# Patient Record
Sex: FEMALE | Race: WHITE | NOT HISPANIC OR LATINO | Employment: OTHER | URBAN - METROPOLITAN AREA
[De-identification: names, ages, dates, MRNs, and addresses within clinical notes are randomized per-mention and may not be internally consistent; named-entity substitution may affect disease eponyms.]

---

## 2017-01-31 ENCOUNTER — ALLSCRIPTS OFFICE VISIT (OUTPATIENT)
Dept: OTHER | Facility: OTHER | Age: 67
End: 2017-01-31

## 2017-01-31 ENCOUNTER — HOSPITAL ENCOUNTER (OUTPATIENT)
Dept: RADIOLOGY | Facility: MEDICAL CENTER | Age: 67
Discharge: HOME | End: 2017-01-31
Payer: MEDICARE

## 2017-01-31 DIAGNOSIS — M54.9 DORSALGIA: ICD-10-CM

## 2017-01-31 DIAGNOSIS — R20.2 PARESTHESIA OF SKIN: ICD-10-CM

## 2017-01-31 DIAGNOSIS — M17.10 OSTEOARTHRITIS OF KNEE: ICD-10-CM

## 2017-01-31 PROCEDURE — 73562 X-RAY EXAM OF KNEE 3: CPT

## 2017-01-31 PROCEDURE — 72110 X-RAY EXAM L-2 SPINE 4/>VWS: CPT

## 2017-02-23 ENCOUNTER — ALLSCRIPTS OFFICE VISIT (OUTPATIENT)
Dept: OTHER | Facility: OTHER | Age: 67
End: 2017-02-23

## 2017-02-28 ENCOUNTER — ALLSCRIPTS OFFICE VISIT (OUTPATIENT)
Dept: OTHER | Facility: OTHER | Age: 67
End: 2017-02-28

## 2017-03-02 ENCOUNTER — APPOINTMENT (OUTPATIENT)
Dept: PHYSICAL THERAPY | Facility: HOME HEALTHCARE | Age: 67
End: 2017-03-02
Payer: MEDICARE

## 2017-03-02 DIAGNOSIS — R20.2 PARESTHESIA OF SKIN: ICD-10-CM

## 2017-03-02 PROCEDURE — 97535 SELF CARE MNGMENT TRAINING: CPT

## 2017-03-02 PROCEDURE — G8979 MOBILITY GOAL STATUS: HCPCS

## 2017-03-02 PROCEDURE — G8978 MOBILITY CURRENT STATUS: HCPCS

## 2017-03-02 PROCEDURE — 97162 PT EVAL MOD COMPLEX 30 MIN: CPT

## 2017-03-03 ENCOUNTER — GENERIC CONVERSION - ENCOUNTER (OUTPATIENT)
Dept: OTHER | Facility: OTHER | Age: 67
End: 2017-03-03

## 2017-03-06 ENCOUNTER — APPOINTMENT (OUTPATIENT)
Dept: PHYSICAL THERAPY | Facility: HOME HEALTHCARE | Age: 67
End: 2017-03-06
Payer: MEDICARE

## 2017-03-06 PROCEDURE — 97140 MANUAL THERAPY 1/> REGIONS: CPT

## 2017-03-06 PROCEDURE — 97110 THERAPEUTIC EXERCISES: CPT

## 2017-03-08 RX ORDER — PRAVASTATIN SODIUM 20 MG
20 TABLET ORAL
COMMUNITY
End: 2019-07-26

## 2017-03-08 RX ORDER — ESCITALOPRAM OXALATE 20 MG/1
20 TABLET ORAL DAILY
COMMUNITY
End: 2018-03-19

## 2017-03-08 RX ORDER — GABAPENTIN 100 MG/1
100 CAPSULE ORAL 3 TIMES DAILY
COMMUNITY
End: 2019-07-26

## 2017-03-08 RX ORDER — DICLOFENAC POTASSIUM 50 MG/1
50 TABLET, FILM COATED ORAL 2 TIMES DAILY
COMMUNITY
End: 2017-08-21

## 2017-03-08 RX ORDER — FOLIC ACID 1 MG/1
1 TABLET ORAL DAILY
COMMUNITY
End: 2018-03-19

## 2017-03-09 ENCOUNTER — APPOINTMENT (OUTPATIENT)
Dept: PHYSICAL THERAPY | Facility: HOME HEALTHCARE | Age: 67
End: 2017-03-09
Payer: MEDICARE

## 2017-03-09 ENCOUNTER — ANESTHESIA EVENT (OUTPATIENT)
Dept: PERIOP | Facility: HOSPITAL | Age: 67
End: 2017-03-09
Payer: MEDICARE

## 2017-03-09 PROCEDURE — 97140 MANUAL THERAPY 1/> REGIONS: CPT

## 2017-03-09 PROCEDURE — 97150 GROUP THERAPEUTIC PROCEDURES: CPT

## 2017-03-10 ENCOUNTER — HOSPITAL ENCOUNTER (OUTPATIENT)
Facility: HOSPITAL | Age: 67
Setting detail: OUTPATIENT SURGERY
Discharge: HOME | End: 2017-03-10
Attending: INTERNAL MEDICINE | Admitting: INTERNAL MEDICINE
Payer: MEDICARE

## 2017-03-10 ENCOUNTER — GENERIC CONVERSION - ENCOUNTER (OUTPATIENT)
Dept: OTHER | Facility: OTHER | Age: 67
End: 2017-03-10

## 2017-03-10 ENCOUNTER — ANESTHESIA (OUTPATIENT)
Dept: PERIOP | Facility: HOSPITAL | Age: 67
End: 2017-03-10
Payer: MEDICARE

## 2017-03-10 VITALS
DIASTOLIC BLOOD PRESSURE: 70 MMHG | TEMPERATURE: 99 F | WEIGHT: 172 LBS | HEIGHT: 61 IN | RESPIRATION RATE: 18 BRPM | SYSTOLIC BLOOD PRESSURE: 127 MMHG | HEART RATE: 67 BPM | BODY MASS INDEX: 32.47 KG/M2 | OXYGEN SATURATION: 95 %

## 2017-03-10 DIAGNOSIS — R13.10 DYSPHAGIA: ICD-10-CM

## 2017-03-10 PROCEDURE — C1726 CATH, BAL DIL, NON-VASCULAR: HCPCS | Performed by: INTERNAL MEDICINE

## 2017-03-10 PROCEDURE — 88305 TISSUE EXAM BY PATHOLOGIST: CPT | Performed by: INTERNAL MEDICINE

## 2017-03-10 RX ORDER — PROPOFOL 10 MG/ML
INJECTION, EMULSION INTRAVENOUS AS NEEDED
Status: DISCONTINUED | OUTPATIENT
Start: 2017-03-10 | End: 2017-03-10 | Stop reason: SURG

## 2017-03-10 RX ORDER — SODIUM CHLORIDE, SODIUM LACTATE, POTASSIUM CHLORIDE, CALCIUM CHLORIDE 600; 310; 30; 20 MG/100ML; MG/100ML; MG/100ML; MG/100ML
125 INJECTION, SOLUTION INTRAVENOUS CONTINUOUS
Status: DISCONTINUED | OUTPATIENT
Start: 2017-03-10 | End: 2017-03-10 | Stop reason: HOSPADM

## 2017-03-10 RX ORDER — ONDANSETRON 2 MG/ML
4 INJECTION INTRAMUSCULAR; INTRAVENOUS ONCE AS NEEDED
Status: DISCONTINUED | OUTPATIENT
Start: 2017-03-10 | End: 2017-03-10 | Stop reason: HOSPADM

## 2017-03-10 RX ADMIN — LIDOCAINE HYDROCHLORIDE 100 MG: 20 INJECTION, SOLUTION INTRAVENOUS at 10:47

## 2017-03-10 RX ADMIN — PROPOFOL 50 MG: 10 INJECTION, EMULSION INTRAVENOUS at 10:47

## 2017-03-10 RX ADMIN — SODIUM CHLORIDE, SODIUM LACTATE, POTASSIUM CHLORIDE, AND CALCIUM CHLORIDE 125 ML/HR: .6; .31; .03; .02 INJECTION, SOLUTION INTRAVENOUS at 10:23

## 2017-03-10 RX ADMIN — PROPOFOL 50 MG: 10 INJECTION, EMULSION INTRAVENOUS at 10:49

## 2017-03-10 RX ADMIN — PROPOFOL 50 MG: 10 INJECTION, EMULSION INTRAVENOUS at 10:51

## 2017-03-10 RX ADMIN — PROPOFOL 50 MG: 10 INJECTION, EMULSION INTRAVENOUS at 10:48

## 2017-03-10 RX ADMIN — PROPOFOL 50 MG: 10 INJECTION, EMULSION INTRAVENOUS at 10:55

## 2017-03-10 RX ADMIN — PROPOFOL 50 MG: 10 INJECTION, EMULSION INTRAVENOUS at 10:58

## 2017-03-10 RX ADMIN — PROPOFOL 50 MG: 10 INJECTION, EMULSION INTRAVENOUS at 10:53

## 2017-03-14 ENCOUNTER — APPOINTMENT (OUTPATIENT)
Dept: PHYSICAL THERAPY | Facility: HOME HEALTHCARE | Age: 67
End: 2017-03-14
Payer: MEDICARE

## 2017-03-14 ENCOUNTER — GENERIC CONVERSION - ENCOUNTER (OUTPATIENT)
Dept: OTHER | Facility: OTHER | Age: 67
End: 2017-03-14

## 2017-03-16 ENCOUNTER — APPOINTMENT (OUTPATIENT)
Dept: PHYSICAL THERAPY | Facility: HOME HEALTHCARE | Age: 67
End: 2017-03-16
Payer: MEDICARE

## 2017-03-16 PROCEDURE — 97110 THERAPEUTIC EXERCISES: CPT

## 2017-03-16 PROCEDURE — 97140 MANUAL THERAPY 1/> REGIONS: CPT

## 2017-03-17 ENCOUNTER — APPOINTMENT (OUTPATIENT)
Dept: PHYSICAL THERAPY | Facility: HOME HEALTHCARE | Age: 67
End: 2017-03-17
Payer: MEDICARE

## 2017-03-17 PROCEDURE — 97140 MANUAL THERAPY 1/> REGIONS: CPT

## 2017-03-17 PROCEDURE — 97110 THERAPEUTIC EXERCISES: CPT

## 2017-03-21 ENCOUNTER — APPOINTMENT (OUTPATIENT)
Dept: PHYSICAL THERAPY | Facility: HOME HEALTHCARE | Age: 67
End: 2017-03-21
Payer: MEDICARE

## 2017-03-23 ENCOUNTER — APPOINTMENT (OUTPATIENT)
Dept: PHYSICAL THERAPY | Facility: HOME HEALTHCARE | Age: 67
End: 2017-03-23
Payer: MEDICARE

## 2017-03-23 PROCEDURE — 97150 GROUP THERAPEUTIC PROCEDURES: CPT

## 2017-03-23 PROCEDURE — 97140 MANUAL THERAPY 1/> REGIONS: CPT

## 2017-03-28 ENCOUNTER — APPOINTMENT (OUTPATIENT)
Dept: PHYSICAL THERAPY | Facility: HOME HEALTHCARE | Age: 67
End: 2017-03-28
Payer: MEDICARE

## 2017-03-30 ENCOUNTER — APPOINTMENT (OUTPATIENT)
Dept: PHYSICAL THERAPY | Facility: HOME HEALTHCARE | Age: 67
End: 2017-03-30
Payer: MEDICARE

## 2017-04-03 ENCOUNTER — APPOINTMENT (OUTPATIENT)
Dept: PHYSICAL THERAPY | Facility: HOME HEALTHCARE | Age: 67
End: 2017-04-03
Payer: MEDICARE

## 2017-04-03 PROCEDURE — G8978 MOBILITY CURRENT STATUS: HCPCS

## 2017-04-03 PROCEDURE — 97164 PT RE-EVAL EST PLAN CARE: CPT

## 2017-04-03 PROCEDURE — G8979 MOBILITY GOAL STATUS: HCPCS

## 2017-04-03 PROCEDURE — G8980 MOBILITY D/C STATUS: HCPCS

## 2017-04-03 PROCEDURE — 97110 THERAPEUTIC EXERCISES: CPT

## 2017-04-03 PROCEDURE — 97140 MANUAL THERAPY 1/> REGIONS: CPT

## 2017-04-05 ENCOUNTER — ALLSCRIPTS OFFICE VISIT (OUTPATIENT)
Dept: OTHER | Facility: OTHER | Age: 67
End: 2017-04-05

## 2017-04-06 ENCOUNTER — ALLSCRIPTS OFFICE VISIT (OUTPATIENT)
Dept: OTHER | Facility: OTHER | Age: 67
End: 2017-04-06

## 2017-04-07 ENCOUNTER — APPOINTMENT (OUTPATIENT)
Dept: PHYSICAL THERAPY | Facility: HOME HEALTHCARE | Age: 67
End: 2017-04-07
Payer: MEDICARE

## 2017-04-13 ENCOUNTER — GENERIC CONVERSION - ENCOUNTER (OUTPATIENT)
Dept: OTHER | Facility: OTHER | Age: 67
End: 2017-04-13

## 2017-04-13 LAB — COLOGUARD RESULT REPORTABLE: NEGATIVE

## 2017-04-28 RX ORDER — PANTOPRAZOLE SODIUM 40 MG/1
40 TABLET, DELAYED RELEASE ORAL 2 TIMES DAILY
COMMUNITY
End: 2019-07-26

## 2017-04-28 RX ORDER — OMEPRAZOLE 40 MG/1
40 CAPSULE, DELAYED RELEASE ORAL DAILY
COMMUNITY
End: 2017-08-21

## 2017-05-01 ENCOUNTER — GENERIC CONVERSION - ENCOUNTER (OUTPATIENT)
Dept: OTHER | Facility: OTHER | Age: 67
End: 2017-05-01

## 2017-05-02 ENCOUNTER — APPOINTMENT (OUTPATIENT)
Dept: RADIOLOGY | Facility: HOSPITAL | Age: 67
End: 2017-05-02
Payer: MEDICARE

## 2017-05-02 ENCOUNTER — HOSPITAL ENCOUNTER (OUTPATIENT)
Facility: HOSPITAL | Age: 67
Setting detail: OUTPATIENT SURGERY
Discharge: HOME | End: 2017-05-02
Attending: ANESTHESIOLOGY | Admitting: ANESTHESIOLOGY
Payer: MEDICARE

## 2017-05-02 VITALS
TEMPERATURE: 98.4 F | BODY MASS INDEX: 32.47 KG/M2 | RESPIRATION RATE: 18 BRPM | HEIGHT: 61 IN | OXYGEN SATURATION: 97 % | HEART RATE: 68 BPM | WEIGHT: 172 LBS | SYSTOLIC BLOOD PRESSURE: 137 MMHG | DIASTOLIC BLOOD PRESSURE: 77 MMHG

## 2017-05-02 PROBLEM — M17.9 OSTEOARTHRITIS, KNEE: Status: ACTIVE | Noted: 2017-05-02

## 2017-05-02 PROBLEM — M17.10 OSTEOARTHRITIS, KNEE: Status: ACTIVE | Noted: 2017-05-02

## 2017-05-02 PROBLEM — M25.561 RIGHT KNEE PAIN: Status: ACTIVE | Noted: 2017-05-02

## 2017-05-02 PROCEDURE — 73560 X-RAY EXAM OF KNEE 1 OR 2: CPT

## 2017-05-02 RX ORDER — LIDOCAINE HYDROCHLORIDE 10 MG/ML
INJECTION, SOLUTION INFILTRATION; PERINEURAL AS NEEDED
Status: DISCONTINUED | OUTPATIENT
Start: 2017-05-02 | End: 2017-05-02 | Stop reason: HOSPADM

## 2017-05-02 RX ORDER — LIDOCAINE HYDROCHLORIDE 20 MG/ML
INJECTION, SOLUTION INFILTRATION; PERINEURAL AS NEEDED
Status: DISCONTINUED | OUTPATIENT
Start: 2017-05-02 | End: 2017-05-02 | Stop reason: HOSPADM

## 2017-05-09 ENCOUNTER — GENERIC CONVERSION - ENCOUNTER (OUTPATIENT)
Dept: OTHER | Facility: OTHER | Age: 67
End: 2017-05-09

## 2017-05-17 ENCOUNTER — ANESTHESIA EVENT (OUTPATIENT)
Dept: PERIOP | Facility: HOSPITAL | Age: 67
End: 2017-05-17
Payer: MEDICARE

## 2017-05-19 ENCOUNTER — ANESTHESIA (OUTPATIENT)
Dept: PERIOP | Facility: HOSPITAL | Age: 67
End: 2017-05-19
Payer: MEDICARE

## 2017-05-19 ENCOUNTER — HOSPITAL ENCOUNTER (OUTPATIENT)
Facility: HOSPITAL | Age: 67
Setting detail: OUTPATIENT SURGERY
Discharge: HOME | End: 2017-05-19
Attending: INTERNAL MEDICINE | Admitting: INTERNAL MEDICINE
Payer: MEDICARE

## 2017-05-19 ENCOUNTER — GENERIC CONVERSION - ENCOUNTER (OUTPATIENT)
Dept: OTHER | Facility: OTHER | Age: 67
End: 2017-05-19

## 2017-05-19 VITALS
WEIGHT: 172 LBS | TEMPERATURE: 97.3 F | BODY MASS INDEX: 32.47 KG/M2 | HEART RATE: 73 BPM | HEIGHT: 61 IN | RESPIRATION RATE: 18 BRPM | DIASTOLIC BLOOD PRESSURE: 81 MMHG | SYSTOLIC BLOOD PRESSURE: 128 MMHG | OXYGEN SATURATION: 95 %

## 2017-05-19 DIAGNOSIS — R13.10 DYSPHAGIA: ICD-10-CM

## 2017-05-19 DIAGNOSIS — K22.2 ESOPHAGEAL OBSTRUCTION: ICD-10-CM

## 2017-05-19 DIAGNOSIS — K22.70 BARRETT ESOPHAGUS: ICD-10-CM

## 2017-05-19 PROCEDURE — 88305 TISSUE EXAM BY PATHOLOGIST: CPT | Performed by: INTERNAL MEDICINE

## 2017-05-19 RX ORDER — ONDANSETRON 2 MG/ML
4 INJECTION INTRAMUSCULAR; INTRAVENOUS ONCE AS NEEDED
Status: DISCONTINUED | OUTPATIENT
Start: 2017-05-19 | End: 2017-05-19 | Stop reason: HOSPADM

## 2017-05-19 RX ORDER — PROPOFOL 10 MG/ML
INJECTION, EMULSION INTRAVENOUS AS NEEDED
Status: DISCONTINUED | OUTPATIENT
Start: 2017-05-19 | End: 2017-05-19 | Stop reason: SURG

## 2017-05-19 RX ORDER — SODIUM CHLORIDE, SODIUM LACTATE, POTASSIUM CHLORIDE, CALCIUM CHLORIDE 600; 310; 30; 20 MG/100ML; MG/100ML; MG/100ML; MG/100ML
125 INJECTION, SOLUTION INTRAVENOUS CONTINUOUS
Status: DISCONTINUED | OUTPATIENT
Start: 2017-05-19 | End: 2017-05-19 | Stop reason: HOSPADM

## 2017-05-19 RX ADMIN — PROPOFOL 50 MG: 10 INJECTION, EMULSION INTRAVENOUS at 09:52

## 2017-05-19 RX ADMIN — LIDOCAINE HYDROCHLORIDE 50 MG: 20 INJECTION, SOLUTION INTRAVENOUS at 09:48

## 2017-05-19 RX ADMIN — SODIUM CHLORIDE, SODIUM LACTATE, POTASSIUM CHLORIDE, AND CALCIUM CHLORIDE 125 ML/HR: .6; .31; .03; .02 INJECTION, SOLUTION INTRAVENOUS at 08:28

## 2017-05-19 RX ADMIN — PROPOFOL 50 MG: 10 INJECTION, EMULSION INTRAVENOUS at 09:48

## 2017-05-19 RX ADMIN — PROPOFOL 50 MG: 10 INJECTION, EMULSION INTRAVENOUS at 09:54

## 2017-05-19 RX ADMIN — PROPOFOL 50 MG: 10 INJECTION, EMULSION INTRAVENOUS at 09:50

## 2017-05-30 ENCOUNTER — GENERIC CONVERSION - ENCOUNTER (OUTPATIENT)
Dept: OTHER | Facility: OTHER | Age: 67
End: 2017-05-30

## 2017-06-04 ENCOUNTER — GENERIC CONVERSION - ENCOUNTER (OUTPATIENT)
Dept: OTHER | Facility: OTHER | Age: 67
End: 2017-06-04

## 2017-06-21 ENCOUNTER — ALLSCRIPTS OFFICE VISIT (OUTPATIENT)
Dept: OTHER | Facility: OTHER | Age: 67
End: 2017-06-21

## 2017-07-03 ENCOUNTER — ALLSCRIPTS OFFICE VISIT (OUTPATIENT)
Dept: OTHER | Facility: OTHER | Age: 67
End: 2017-07-03

## 2017-07-03 DIAGNOSIS — K22.711 BARRETT'S ESOPHAGUS WITH HIGH GRADE DYSPLASIA: ICD-10-CM

## 2017-07-10 ENCOUNTER — HOSPITAL ENCOUNTER (OUTPATIENT)
Dept: NON INVASIVE DIAGNOSTICS | Facility: HOSPITAL | Age: 67
Discharge: HOME | End: 2017-07-10
Payer: MEDICARE

## 2017-07-10 ENCOUNTER — TRANSCRIBE ORDERS (OUTPATIENT)
Dept: ADMINISTRATIVE | Facility: HOSPITAL | Age: 67
End: 2017-07-10

## 2017-07-10 ENCOUNTER — APPOINTMENT (OUTPATIENT)
Dept: LAB | Facility: HOSPITAL | Age: 67
End: 2017-07-10
Payer: MEDICARE

## 2017-07-10 ENCOUNTER — LAB REQUISITION (OUTPATIENT)
Dept: LAB | Facility: HOSPITAL | Age: 67
End: 2017-07-10
Payer: MEDICARE

## 2017-07-10 DIAGNOSIS — K22.711: ICD-10-CM

## 2017-07-10 DIAGNOSIS — K22.711: Primary | ICD-10-CM

## 2017-07-10 DIAGNOSIS — K22.711 BARRETT'S ESOPHAGUS WITH HIGH GRADE DYSPLASIA: ICD-10-CM

## 2017-07-10 LAB
ANION GAP SERPL CALCULATED.3IONS-SCNC: 9 MMOL/L (ref 4–13)
APTT PPP: 32 SECONDS (ref 23–35)
ATRIAL RATE: 75 BPM
BUN SERPL-MCNC: 14 MG/DL (ref 5–25)
CALCIUM SERPL-MCNC: 8.9 MG/DL (ref 8.3–10.1)
CHLORIDE SERPL-SCNC: 106 MMOL/L (ref 100–108)
CO2 SERPL-SCNC: 28 MMOL/L (ref 21–32)
CREAT SERPL-MCNC: 0.84 MG/DL (ref 0.6–1.3)
ERYTHROCYTE [DISTWIDTH] IN BLOOD BY AUTOMATED COUNT: 14.2 % (ref 11.6–15.1)
GFR SERPL CREATININE-BSD FRML MDRD: >60 ML/MIN/1.73SQ M
GLUCOSE SERPL-MCNC: 87 MG/DL (ref 65–140)
HCT VFR BLD AUTO: 43 % (ref 34.8–46.1)
HGB BLD-MCNC: 13.7 G/DL (ref 11.5–15.4)
INR PPP: 1.09 (ref 0.86–1.16)
MCH RBC QN AUTO: 27.6 PG (ref 26.8–34.3)
MCHC RBC AUTO-ENTMCNC: 31.9 G/DL (ref 31.4–37.4)
MCV RBC AUTO: 87 FL (ref 82–98)
P AXIS: 61 DEGREES
PLATELET # BLD AUTO: 238 THOUSANDS/UL (ref 149–390)
PMV BLD AUTO: 11.3 FL (ref 8.9–12.7)
POTASSIUM SERPL-SCNC: 4.1 MMOL/L (ref 3.5–5.3)
PR INTERVAL: 164 MS
PROTHROMBIN TIME: 14 SECONDS (ref 12.1–14.4)
QRS AXIS: -22 DEGREES
QRSD INTERVAL: 86 MS
QT INTERVAL: 376 MS
QTC INTERVAL: 419 MS
RBC # BLD AUTO: 4.97 MILLION/UL (ref 3.81–5.12)
SODIUM SERPL-SCNC: 143 MMOL/L (ref 136–145)
T WAVE AXIS: 23 DEGREES
VENTRICULAR RATE: 75 BPM
WBC # BLD AUTO: 7.45 THOUSAND/UL (ref 4.31–10.16)

## 2017-07-10 PROCEDURE — 80048 BASIC METABOLIC PNL TOTAL CA: CPT

## 2017-07-10 PROCEDURE — 85610 PROTHROMBIN TIME: CPT

## 2017-07-10 PROCEDURE — 85730 THROMBOPLASTIN TIME PARTIAL: CPT

## 2017-07-10 PROCEDURE — 86900 BLOOD TYPING SEROLOGIC ABO: CPT | Performed by: PHYSICIAN ASSISTANT

## 2017-07-10 PROCEDURE — 36415 COLL VENOUS BLD VENIPUNCTURE: CPT

## 2017-07-10 PROCEDURE — 86901 BLOOD TYPING SEROLOGIC RH(D): CPT | Performed by: PHYSICIAN ASSISTANT

## 2017-07-10 PROCEDURE — 86850 RBC ANTIBODY SCREEN: CPT | Performed by: PHYSICIAN ASSISTANT

## 2017-07-10 PROCEDURE — 85027 COMPLETE CBC AUTOMATED: CPT

## 2017-07-10 PROCEDURE — 93005 ELECTROCARDIOGRAM TRACING: CPT

## 2017-07-11 LAB
ABO GROUP BLD: NORMAL
BLD GP AB SCN SERPL QL: NEGATIVE
RH BLD: POSITIVE
SPECIMEN EXPIRATION DATE: NORMAL

## 2017-07-21 ENCOUNTER — GENERIC CONVERSION - ENCOUNTER (OUTPATIENT)
Dept: OTHER | Facility: OTHER | Age: 67
End: 2017-07-21

## 2017-07-26 ENCOUNTER — HOSPITAL ENCOUNTER (OUTPATIENT)
Dept: CT IMAGING | Facility: HOSPITAL | Age: 67
Discharge: HOME | End: 2017-07-26
Payer: MEDICARE

## 2017-07-26 DIAGNOSIS — K22.711 BARRETT'S ESOPHAGUS WITH HIGH GRADE DYSPLASIA: ICD-10-CM

## 2017-07-26 PROCEDURE — 71260 CT THORAX DX C+: CPT

## 2017-07-26 RX ADMIN — IOHEXOL 85 ML: 350 INJECTION, SOLUTION INTRAVENOUS at 14:04

## 2017-08-08 ENCOUNTER — ALLSCRIPTS OFFICE VISIT (OUTPATIENT)
Dept: OTHER | Facility: OTHER | Age: 67
End: 2017-08-08

## 2017-08-09 ENCOUNTER — GENERIC CONVERSION - ENCOUNTER (OUTPATIENT)
Dept: OTHER | Facility: OTHER | Age: 67
End: 2017-08-09

## 2017-08-21 ENCOUNTER — ANESTHESIA EVENT (OUTPATIENT)
Dept: PERIOP | Facility: HOSPITAL | Age: 67
DRG: 327 | End: 2017-08-21
Payer: MEDICARE

## 2017-08-24 ENCOUNTER — APPOINTMENT (INPATIENT)
Dept: RADIOLOGY | Facility: HOSPITAL | Age: 67
DRG: 327 | End: 2017-08-24
Payer: MEDICARE

## 2017-08-24 ENCOUNTER — ANESTHESIA (OUTPATIENT)
Dept: PERIOP | Facility: HOSPITAL | Age: 67
DRG: 327 | End: 2017-08-24
Payer: MEDICARE

## 2017-08-24 ENCOUNTER — HOSPITAL ENCOUNTER (INPATIENT)
Facility: HOSPITAL | Age: 67
LOS: 7 days | Discharge: HOME HEALTH | DRG: 327 | End: 2017-08-31
Attending: THORACIC SURGERY (CARDIOTHORACIC VASCULAR SURGERY) | Admitting: THORACIC SURGERY (CARDIOTHORACIC VASCULAR SURGERY)
Payer: MEDICARE

## 2017-08-24 DIAGNOSIS — K22.711 BARRETT'S ESOPHAGUS WITH HIGH GRADE DYSPLASIA: ICD-10-CM

## 2017-08-24 DIAGNOSIS — E46 MALNUTRITION (HCC): Primary | ICD-10-CM

## 2017-08-24 LAB
ABO GROUP BLD: NORMAL
BASE EXCESS BLDA CALC-SCNC: -2.8 MMOL/L
BASOPHILS # BLD AUTO: 0.02 THOUSANDS/ΜL (ref 0–0.1)
BASOPHILS NFR BLD AUTO: 0 % (ref 0–1)
BLD GP AB SCN SERPL QL: NEGATIVE
EOSINOPHIL # BLD AUTO: 0 THOUSAND/ΜL (ref 0–0.61)
EOSINOPHIL NFR BLD AUTO: 0 % (ref 0–6)
ERYTHROCYTE [DISTWIDTH] IN BLOOD BY AUTOMATED COUNT: 14.1 % (ref 11.6–15.1)
HCO3 BLDA-SCNC: 24.2 MMOL/L (ref 22–28)
HCT VFR BLD AUTO: 37.8 % (ref 34.8–46.1)
HGB BLD-MCNC: 12 G/DL (ref 11.5–15.4)
LACTATE SERPL-SCNC: 1.4 MMOL/L (ref 0.5–2)
LYMPHOCYTES # BLD AUTO: 1.53 THOUSANDS/ΜL (ref 0.6–4.47)
LYMPHOCYTES NFR BLD AUTO: 11 % (ref 14–44)
MAGNESIUM SERPL-MCNC: 1.9 MG/DL (ref 1.6–2.6)
MCH RBC QN AUTO: 27.6 PG (ref 26.8–34.3)
MCHC RBC AUTO-ENTMCNC: 31.7 G/DL (ref 31.4–37.4)
MCV RBC AUTO: 87 FL (ref 82–98)
MONOCYTES # BLD AUTO: 0.2 THOUSAND/ΜL (ref 0.17–1.22)
MONOCYTES NFR BLD AUTO: 1 % (ref 4–12)
NEUTROPHILS # BLD AUTO: 12.35 THOUSANDS/ΜL (ref 1.85–7.62)
NEUTS SEG NFR BLD AUTO: 88 % (ref 43–75)
NON VENT ROOM AIR: ABNORMAL %
NRBC BLD AUTO-RTO: 0 /100 WBCS
O2 CT BLDA-SCNC: 16.9 ML/DL (ref 16–23)
OXYHGB MFR BLDA: 92.1 % (ref 94–97)
PCO2 BLDA: 51.4 MM HG (ref 36–44)
PH BLDA: 7.29 [PH] (ref 7.35–7.45)
PLATELET # BLD AUTO: 226 THOUSANDS/UL (ref 149–390)
PMV BLD AUTO: 10.8 FL (ref 8.9–12.7)
PO2 BLDA: 74.1 MM HG (ref 75–129)
RBC # BLD AUTO: 4.35 MILLION/UL (ref 3.81–5.12)
RH BLD: POSITIVE
SPECIMEN EXPIRATION DATE: NORMAL
SPECIMEN SOURCE: ABNORMAL
WBC # BLD AUTO: 14.15 THOUSAND/UL (ref 4.31–10.16)

## 2017-08-24 PROCEDURE — 0DB60ZZ EXCISION OF STOMACH, OPEN APPROACH: ICD-10-PCS | Performed by: THORACIC SURGERY (CARDIOTHORACIC VASCULAR SURGERY)

## 2017-08-24 PROCEDURE — 86901 BLOOD TYPING SEROLOGIC RH(D): CPT | Performed by: THORACIC SURGERY (CARDIOTHORACIC VASCULAR SURGERY)

## 2017-08-24 PROCEDURE — 86900 BLOOD TYPING SEROLOGIC ABO: CPT | Performed by: THORACIC SURGERY (CARDIOTHORACIC VASCULAR SURGERY)

## 2017-08-24 PROCEDURE — 71010 HB CHEST X-RAY 1 VIEW FRONTAL (PORTABLE): CPT

## 2017-08-24 PROCEDURE — 0DB30ZZ EXCISION OF LOWER ESOPHAGUS, OPEN APPROACH: ICD-10-PCS | Performed by: THORACIC SURGERY (CARDIOTHORACIC VASCULAR SURGERY)

## 2017-08-24 PROCEDURE — 0DHA3UZ INSERTION OF FEEDING DEVICE INTO JEJUNUM, PERCUTANEOUS APPROACH: ICD-10-PCS | Performed by: THORACIC SURGERY (CARDIOTHORACIC VASCULAR SURGERY)

## 2017-08-24 PROCEDURE — 0DJ08ZZ INSPECTION OF UPPER INTESTINAL TRACT, VIA NATURAL OR ARTIFICIAL OPENING ENDOSCOPIC: ICD-10-PCS | Performed by: THORACIC SURGERY (CARDIOTHORACIC VASCULAR SURGERY)

## 2017-08-24 PROCEDURE — 86850 RBC ANTIBODY SCREEN: CPT | Performed by: THORACIC SURGERY (CARDIOTHORACIC VASCULAR SURGERY)

## 2017-08-24 PROCEDURE — 85025 COMPLETE CBC W/AUTO DIFF WBC: CPT | Performed by: SURGERY

## 2017-08-24 PROCEDURE — 82805 BLOOD GASES W/O2 SATURATION: CPT | Performed by: SURGERY

## 2017-08-24 PROCEDURE — 88307 TISSUE EXAM BY PATHOLOGIST: CPT | Performed by: THORACIC SURGERY (CARDIOTHORACIC VASCULAR SURGERY)

## 2017-08-24 PROCEDURE — 83605 ASSAY OF LACTIC ACID: CPT | Performed by: SURGERY

## 2017-08-24 PROCEDURE — 83735 ASSAY OF MAGNESIUM: CPT | Performed by: SURGERY

## 2017-08-24 RX ORDER — HYDROMORPHONE HYDROCHLORIDE 2 MG/ML
INJECTION, SOLUTION INTRAMUSCULAR; INTRAVENOUS; SUBCUTANEOUS AS NEEDED
Status: DISCONTINUED | OUTPATIENT
Start: 2017-08-24 | End: 2017-08-24 | Stop reason: SURG

## 2017-08-24 RX ORDER — FENTANYL CITRATE/PF 50 MCG/ML
25 SYRINGE (ML) INJECTION
Status: DISCONTINUED | OUTPATIENT
Start: 2017-08-24 | End: 2017-08-24 | Stop reason: HOSPADM

## 2017-08-24 RX ORDER — SODIUM CHLORIDE 9 MG/ML
125 INJECTION, SOLUTION INTRAVENOUS CONTINUOUS
Status: DISCONTINUED | OUTPATIENT
Start: 2017-08-24 | End: 2017-08-26

## 2017-08-24 RX ORDER — ONDANSETRON 2 MG/ML
4 INJECTION INTRAMUSCULAR; INTRAVENOUS EVERY 4 HOURS PRN
Status: DISCONTINUED | OUTPATIENT
Start: 2017-08-24 | End: 2017-08-31 | Stop reason: HOSPADM

## 2017-08-24 RX ORDER — SODIUM CHLORIDE 9 MG/ML
INJECTION, SOLUTION INTRAVENOUS CONTINUOUS PRN
Status: DISCONTINUED | OUTPATIENT
Start: 2017-08-24 | End: 2017-08-24 | Stop reason: SURG

## 2017-08-24 RX ORDER — ONDANSETRON 2 MG/ML
INJECTION INTRAMUSCULAR; INTRAVENOUS AS NEEDED
Status: DISCONTINUED | OUTPATIENT
Start: 2017-08-24 | End: 2017-08-24 | Stop reason: SURG

## 2017-08-24 RX ORDER — MAGNESIUM HYDROXIDE 1200 MG/15ML
LIQUID ORAL AS NEEDED
Status: DISCONTINUED | OUTPATIENT
Start: 2017-08-24 | End: 2017-08-24 | Stop reason: HOSPADM

## 2017-08-24 RX ORDER — ONDANSETRON 2 MG/ML
4 INJECTION INTRAMUSCULAR; INTRAVENOUS EVERY 6 HOURS PRN
Status: DISCONTINUED | OUTPATIENT
Start: 2017-08-24 | End: 2017-08-24 | Stop reason: SDUPTHER

## 2017-08-24 RX ORDER — SUCCINYLCHOLINE CHLORIDE 20 MG/ML
INJECTION INTRAMUSCULAR; INTRAVENOUS AS NEEDED
Status: DISCONTINUED | OUTPATIENT
Start: 2017-08-24 | End: 2017-08-24 | Stop reason: SURG

## 2017-08-24 RX ORDER — NALOXONE HYDROCHLORIDE 0.4 MG/ML
0.1 INJECTION, SOLUTION INTRAMUSCULAR; INTRAVENOUS; SUBCUTANEOUS AS NEEDED
Status: DISCONTINUED | OUTPATIENT
Start: 2017-08-24 | End: 2017-08-31 | Stop reason: HOSPADM

## 2017-08-24 RX ORDER — MIDAZOLAM HYDROCHLORIDE 1 MG/ML
INJECTION INTRAMUSCULAR; INTRAVENOUS AS NEEDED
Status: DISCONTINUED | OUTPATIENT
Start: 2017-08-24 | End: 2017-08-24 | Stop reason: SURG

## 2017-08-24 RX ORDER — PANTOPRAZOLE SODIUM 40 MG/1
40 INJECTION, POWDER, FOR SOLUTION INTRAVENOUS
Status: DISCONTINUED | OUTPATIENT
Start: 2017-08-25 | End: 2017-08-27

## 2017-08-24 RX ORDER — ROCURONIUM BROMIDE 10 MG/ML
INJECTION, SOLUTION INTRAVENOUS AS NEEDED
Status: DISCONTINUED | OUTPATIENT
Start: 2017-08-24 | End: 2017-08-24 | Stop reason: SURG

## 2017-08-24 RX ORDER — GLYCOPYRROLATE 0.2 MG/ML
INJECTION INTRAMUSCULAR; INTRAVENOUS AS NEEDED
Status: DISCONTINUED | OUTPATIENT
Start: 2017-08-24 | End: 2017-08-24 | Stop reason: SURG

## 2017-08-24 RX ORDER — HEPARIN SODIUM 5000 [USP'U]/ML
5000 INJECTION, SOLUTION INTRAVENOUS; SUBCUTANEOUS EVERY 12 HOURS SCHEDULED
Status: DISCONTINUED | OUTPATIENT
Start: 2017-08-24 | End: 2017-08-26

## 2017-08-24 RX ORDER — SODIUM CHLORIDE, SODIUM LACTATE, POTASSIUM CHLORIDE, CALCIUM CHLORIDE 600; 310; 30; 20 MG/100ML; MG/100ML; MG/100ML; MG/100ML
75 INJECTION, SOLUTION INTRAVENOUS CONTINUOUS
Status: DISCONTINUED | OUTPATIENT
Start: 2017-08-24 | End: 2017-08-24

## 2017-08-24 RX ORDER — LIDOCAINE HYDROCHLORIDE 10 MG/ML
INJECTION, SOLUTION INFILTRATION; PERINEURAL AS NEEDED
Status: DISCONTINUED | OUTPATIENT
Start: 2017-08-24 | End: 2017-08-24 | Stop reason: SURG

## 2017-08-24 RX ORDER — ALBUMIN, HUMAN INJ 5% 5 %
SOLUTION INTRAVENOUS CONTINUOUS PRN
Status: DISCONTINUED | OUTPATIENT
Start: 2017-08-24 | End: 2017-08-24 | Stop reason: SURG

## 2017-08-24 RX ORDER — PROPOFOL 10 MG/ML
INJECTION, EMULSION INTRAVENOUS AS NEEDED
Status: DISCONTINUED | OUTPATIENT
Start: 2017-08-24 | End: 2017-08-24 | Stop reason: SURG

## 2017-08-24 RX ORDER — FENTANYL CITRATE 50 UG/ML
INJECTION, SOLUTION INTRAMUSCULAR; INTRAVENOUS AS NEEDED
Status: DISCONTINUED | OUTPATIENT
Start: 2017-08-24 | End: 2017-08-24 | Stop reason: SURG

## 2017-08-24 RX ORDER — DIPHENHYDRAMINE HYDROCHLORIDE 50 MG/ML
25 INJECTION INTRAMUSCULAR; INTRAVENOUS EVERY 6 HOURS PRN
Status: DISCONTINUED | OUTPATIENT
Start: 2017-08-24 | End: 2017-08-31 | Stop reason: HOSPADM

## 2017-08-24 RX ORDER — ONDANSETRON 2 MG/ML
4 INJECTION INTRAMUSCULAR; INTRAVENOUS ONCE AS NEEDED
Status: DISCONTINUED | OUTPATIENT
Start: 2017-08-24 | End: 2017-08-24 | Stop reason: HOSPADM

## 2017-08-24 RX ORDER — EPHEDRINE SULFATE 50 MG/ML
INJECTION, SOLUTION INTRAVENOUS AS NEEDED
Status: DISCONTINUED | OUTPATIENT
Start: 2017-08-24 | End: 2017-08-24 | Stop reason: SURG

## 2017-08-24 RX ORDER — SODIUM CHLORIDE, SODIUM LACTATE, POTASSIUM CHLORIDE, CALCIUM CHLORIDE 600; 310; 30; 20 MG/100ML; MG/100ML; MG/100ML; MG/100ML
50 INJECTION, SOLUTION INTRAVENOUS CONTINUOUS
Status: DISCONTINUED | OUTPATIENT
Start: 2017-08-24 | End: 2017-08-24

## 2017-08-24 RX ORDER — SODIUM CHLORIDE, SODIUM LACTATE, POTASSIUM CHLORIDE, CALCIUM CHLORIDE 600; 310; 30; 20 MG/100ML; MG/100ML; MG/100ML; MG/100ML
INJECTION, SOLUTION INTRAVENOUS CONTINUOUS PRN
Status: DISCONTINUED | OUTPATIENT
Start: 2017-08-24 | End: 2017-08-24 | Stop reason: SURG

## 2017-08-24 RX ORDER — NALBUPHINE HCL 10 MG/ML
5 AMPUL (ML) INJECTION
Status: DISCONTINUED | OUTPATIENT
Start: 2017-08-24 | End: 2017-08-31 | Stop reason: HOSPADM

## 2017-08-24 RX ADMIN — HYDROMORPHONE HYDROCHLORIDE 0.4 MG: 1 INJECTION, SOLUTION INTRAMUSCULAR; INTRAVENOUS; SUBCUTANEOUS at 17:33

## 2017-08-24 RX ADMIN — SODIUM CHLORIDE, SODIUM LACTATE, POTASSIUM CHLORIDE, AND CALCIUM CHLORIDE: .6; .31; .03; .02 INJECTION, SOLUTION INTRAVENOUS at 13:06

## 2017-08-24 RX ADMIN — SODIUM CHLORIDE, SODIUM LACTATE, POTASSIUM CHLORIDE, AND CALCIUM CHLORIDE: .6; .31; .03; .02 INJECTION, SOLUTION INTRAVENOUS at 14:58

## 2017-08-24 RX ADMIN — SODIUM CHLORIDE: 0.9 INJECTION, SOLUTION INTRAVENOUS at 13:16

## 2017-08-24 RX ADMIN — Medication: at 17:30

## 2017-08-24 RX ADMIN — ROCURONIUM BROMIDE 10 MG: 10 INJECTION, SOLUTION INTRAVENOUS at 15:39

## 2017-08-24 RX ADMIN — PROPOFOL 150 MG: 10 INJECTION, EMULSION INTRAVENOUS at 13:11

## 2017-08-24 RX ADMIN — HYDROMORPHONE HYDROCHLORIDE 0.2 MG: 1 INJECTION, SOLUTION INTRAMUSCULAR; INTRAVENOUS; SUBCUTANEOUS at 17:47

## 2017-08-24 RX ADMIN — HYDROMORPHONE HYDROCHLORIDE 0.5 MG: 2 INJECTION, SOLUTION INTRAMUSCULAR; INTRAVENOUS; SUBCUTANEOUS at 16:52

## 2017-08-24 RX ADMIN — ROCURONIUM BROMIDE 30 MG: 10 INJECTION, SOLUTION INTRAVENOUS at 14:27

## 2017-08-24 RX ADMIN — FENTANYL CITRATE 25 MCG: 50 INJECTION, SOLUTION INTRAMUSCULAR; INTRAVENOUS at 11:21

## 2017-08-24 RX ADMIN — LIDOCAINE HYDROCHLORIDE 80 MG: 10 INJECTION, SOLUTION INFILTRATION; PERINEURAL at 13:11

## 2017-08-24 RX ADMIN — CEFAZOLIN SODIUM 2000 MG: 2 SOLUTION INTRAVENOUS at 13:25

## 2017-08-24 RX ADMIN — GLYCOPYRROLATE 0.6 MG: 0.2 INJECTION, SOLUTION INTRAMUSCULAR; INTRAVENOUS at 16:35

## 2017-08-24 RX ADMIN — EPHEDRINE SULFATE 20 MG: 50 INJECTION, SOLUTION INTRAMUSCULAR; INTRAVENOUS; SUBCUTANEOUS at 14:02

## 2017-08-24 RX ADMIN — ALBUMIN HUMAN: 0.05 INJECTION, SOLUTION INTRAVENOUS at 15:06

## 2017-08-24 RX ADMIN — ROCURONIUM BROMIDE 50 MG: 10 INJECTION, SOLUTION INTRAVENOUS at 13:20

## 2017-08-24 RX ADMIN — HYDROMORPHONE HYDROCHLORIDE 0.5 MG: 2 INJECTION, SOLUTION INTRAMUSCULAR; INTRAVENOUS; SUBCUTANEOUS at 13:47

## 2017-08-24 RX ADMIN — HYDROMORPHONE HYDROCHLORIDE 0.5 MG: 2 INJECTION, SOLUTION INTRAMUSCULAR; INTRAVENOUS; SUBCUTANEOUS at 16:42

## 2017-08-24 RX ADMIN — SODIUM CHLORIDE 125 ML/HR: 0.9 INJECTION, SOLUTION INTRAVENOUS at 18:00

## 2017-08-24 RX ADMIN — EPHEDRINE SULFATE 10 MG: 50 INJECTION, SOLUTION INTRAMUSCULAR; INTRAVENOUS; SUBCUTANEOUS at 13:59

## 2017-08-24 RX ADMIN — SODIUM CHLORIDE, SODIUM LACTATE, POTASSIUM CHLORIDE, AND CALCIUM CHLORIDE: .6; .31; .03; .02 INJECTION, SOLUTION INTRAVENOUS at 13:07

## 2017-08-24 RX ADMIN — MIDAZOLAM HYDROCHLORIDE 1 MG: 1 INJECTION, SOLUTION INTRAMUSCULAR; INTRAVENOUS at 13:06

## 2017-08-24 RX ADMIN — FENTANYL CITRATE 25 MCG: 50 INJECTION, SOLUTION INTRAMUSCULAR; INTRAVENOUS at 11:20

## 2017-08-24 RX ADMIN — ALBUMIN HUMAN: 0.05 INJECTION, SOLUTION INTRAVENOUS at 14:50

## 2017-08-24 RX ADMIN — ONDANSETRON 4 MG: 2 INJECTION INTRAMUSCULAR; INTRAVENOUS at 16:08

## 2017-08-24 RX ADMIN — HYDROMORPHONE HYDROCHLORIDE 0.5 MG: 2 INJECTION, SOLUTION INTRAMUSCULAR; INTRAVENOUS; SUBCUTANEOUS at 16:57

## 2017-08-24 RX ADMIN — FENTANYL CITRATE 50 MCG: 50 INJECTION, SOLUTION INTRAMUSCULAR; INTRAVENOUS at 13:11

## 2017-08-24 RX ADMIN — HEPARIN SODIUM 5000 UNITS: 5000 INJECTION, SOLUTION INTRAVENOUS; SUBCUTANEOUS at 21:27

## 2017-08-24 RX ADMIN — DEXAMETHASONE SODIUM PHOSPHATE 10 MG: 10 INJECTION INTRAMUSCULAR; INTRAVENOUS at 13:11

## 2017-08-24 RX ADMIN — HYDROMORPHONE HYDROCHLORIDE 0.4 MG: 1 INJECTION, SOLUTION INTRAMUSCULAR; INTRAVENOUS; SUBCUTANEOUS at 17:17

## 2017-08-24 RX ADMIN — METRONIDAZOLE 500 MG: 500 INJECTION, SOLUTION INTRAVENOUS at 13:25

## 2017-08-24 RX ADMIN — EPHEDRINE SULFATE 5 MG: 50 INJECTION, SOLUTION INTRAMUSCULAR; INTRAVENOUS; SUBCUTANEOUS at 13:57

## 2017-08-24 RX ADMIN — MIDAZOLAM HYDROCHLORIDE 1 MG: 1 INJECTION, SOLUTION INTRAMUSCULAR; INTRAVENOUS at 11:20

## 2017-08-24 RX ADMIN — SUCCINYLCHOLINE CHLORIDE 80 MG: 20 INJECTION, SOLUTION INTRAMUSCULAR; INTRAVENOUS at 13:11

## 2017-08-24 RX ADMIN — NEOSTIGMINE METHYLSULFATE 3 MG: 1 INJECTION, SOLUTION INTRAMUSCULAR; INTRAVENOUS; SUBCUTANEOUS at 16:35

## 2017-08-25 ENCOUNTER — APPOINTMENT (INPATIENT)
Dept: RADIOLOGY | Facility: HOSPITAL | Age: 67
DRG: 327 | End: 2017-08-25
Payer: MEDICARE

## 2017-08-25 LAB
ANION GAP SERPL CALCULATED.3IONS-SCNC: 7 MMOL/L (ref 4–13)
ANION GAP SERPL CALCULATED.3IONS-SCNC: 7 MMOL/L (ref 4–13)
BASE EXCESS BLDA CALC-SCNC: -1 MMOL/L
BASOPHILS # BLD AUTO: 0 THOUSANDS/ΜL (ref 0–0.1)
BASOPHILS NFR BLD AUTO: 0 % (ref 0–1)
BODY TEMPERATURE: 98.8 DEGREES FEHRENHEIT
BUN SERPL-MCNC: 15 MG/DL (ref 5–25)
BUN SERPL-MCNC: 15 MG/DL (ref 5–25)
CA-I BLD-SCNC: 1.1 MMOL/L (ref 1.12–1.32)
CALCIUM SERPL-MCNC: 8 MG/DL (ref 8.3–10.1)
CALCIUM SERPL-MCNC: 8.1 MG/DL (ref 8.3–10.1)
CHLORIDE SERPL-SCNC: 110 MMOL/L (ref 100–108)
CHLORIDE SERPL-SCNC: 111 MMOL/L (ref 100–108)
CO2 SERPL-SCNC: 24 MMOL/L (ref 21–32)
CO2 SERPL-SCNC: 25 MMOL/L (ref 21–32)
CREAT SERPL-MCNC: 0.75 MG/DL (ref 0.6–1.3)
CREAT SERPL-MCNC: 0.88 MG/DL (ref 0.6–1.3)
EOSINOPHIL # BLD AUTO: 0 THOUSAND/ΜL (ref 0–0.61)
EOSINOPHIL NFR BLD AUTO: 0 % (ref 0–6)
ERYTHROCYTE [DISTWIDTH] IN BLOOD BY AUTOMATED COUNT: 14.4 % (ref 11.6–15.1)
GFR SERPL CREATININE-BSD FRML MDRD: 68 ML/MIN/1.73SQ M
GFR SERPL CREATININE-BSD FRML MDRD: 82 ML/MIN/1.73SQ M
GLUCOSE SERPL-MCNC: 136 MG/DL (ref 65–140)
GLUCOSE SERPL-MCNC: 137 MG/DL (ref 65–140)
GLUCOSE SERPL-MCNC: 147 MG/DL (ref 65–140)
GLUCOSE SERPL-MCNC: 185 MG/DL (ref 65–140)
HCO3 BLDA-SCNC: 24 MMOL/L (ref 22–28)
HCT VFR BLD AUTO: 35.4 % (ref 34.8–46.1)
HGB BLD-MCNC: 11.4 G/DL (ref 11.5–15.4)
LYMPHOCYTES # BLD AUTO: 1.47 THOUSANDS/ΜL (ref 0.6–4.47)
LYMPHOCYTES NFR BLD AUTO: 10 % (ref 14–44)
MAGNESIUM SERPL-MCNC: 1.8 MG/DL (ref 1.6–2.6)
MCH RBC QN AUTO: 28.1 PG (ref 26.8–34.3)
MCHC RBC AUTO-ENTMCNC: 32.2 G/DL (ref 31.4–37.4)
MCV RBC AUTO: 87 FL (ref 82–98)
MONOCYTES # BLD AUTO: 1.13 THOUSAND/ΜL (ref 0.17–1.22)
MONOCYTES NFR BLD AUTO: 7 % (ref 4–12)
NASAL CANNULA: 4
NEUTROPHILS # BLD AUTO: 12.67 THOUSANDS/ΜL (ref 1.85–7.62)
NEUTS SEG NFR BLD AUTO: 83 % (ref 43–75)
NRBC BLD AUTO-RTO: 0 /100 WBCS
O2 CT BLDA-SCNC: 16.1 ML/DL (ref 16–23)
OXYHGB MFR BLDA: 93.6 % (ref 94–97)
PCO2 BLDA: 41 MM HG (ref 36–44)
PH BLDA: 7.38 [PH] (ref 7.35–7.45)
PLATELET # BLD AUTO: 223 THOUSANDS/UL (ref 149–390)
PLATELET # BLD AUTO: 240 THOUSANDS/UL (ref 149–390)
PMV BLD AUTO: 10.6 FL (ref 8.9–12.7)
PMV BLD AUTO: 10.6 FL (ref 8.9–12.7)
PO2 BLDA: 70.4 MM HG (ref 75–129)
POTASSIUM SERPL-SCNC: 3.5 MMOL/L (ref 3.5–5.3)
POTASSIUM SERPL-SCNC: 3.6 MMOL/L (ref 3.5–5.3)
RBC # BLD AUTO: 4.06 MILLION/UL (ref 3.81–5.12)
SODIUM SERPL-SCNC: 142 MMOL/L (ref 136–145)
SODIUM SERPL-SCNC: 142 MMOL/L (ref 136–145)
SPECIMEN SOURCE: ABNORMAL
WBC # BLD AUTO: 15.3 THOUSAND/UL (ref 4.31–10.16)

## 2017-08-25 PROCEDURE — 82948 REAGENT STRIP/BLOOD GLUCOSE: CPT

## 2017-08-25 PROCEDURE — 71010 HB CHEST X-RAY 1 VIEW FRONTAL (PORTABLE): CPT

## 2017-08-25 PROCEDURE — 80048 BASIC METABOLIC PNL TOTAL CA: CPT | Performed by: SURGERY

## 2017-08-25 PROCEDURE — C9113 INJ PANTOPRAZOLE SODIUM, VIA: HCPCS | Performed by: SURGERY

## 2017-08-25 PROCEDURE — 85049 AUTOMATED PLATELET COUNT: CPT | Performed by: SURGERY

## 2017-08-25 PROCEDURE — 83735 ASSAY OF MAGNESIUM: CPT | Performed by: SURGERY

## 2017-08-25 PROCEDURE — 82805 BLOOD GASES W/O2 SATURATION: CPT | Performed by: EMERGENCY MEDICINE

## 2017-08-25 PROCEDURE — 82330 ASSAY OF CALCIUM: CPT | Performed by: EMERGENCY MEDICINE

## 2017-08-25 PROCEDURE — 85025 COMPLETE CBC W/AUTO DIFF WBC: CPT | Performed by: SURGERY

## 2017-08-25 RX ORDER — POTASSIUM CHLORIDE 14.9 MG/ML
20 INJECTION INTRAVENOUS ONCE
Status: COMPLETED | OUTPATIENT
Start: 2017-08-25 | End: 2017-08-26

## 2017-08-25 RX ORDER — PROMETHAZINE HYDROCHLORIDE 25 MG/ML
25 INJECTION, SOLUTION INTRAMUSCULAR; INTRAVENOUS EVERY 6 HOURS PRN
Status: DISCONTINUED | OUTPATIENT
Start: 2017-08-25 | End: 2017-08-31 | Stop reason: HOSPADM

## 2017-08-25 RX ORDER — POTASSIUM CHLORIDE 14.9 MG/ML
20 INJECTION INTRAVENOUS ONCE
Status: COMPLETED | OUTPATIENT
Start: 2017-08-25 | End: 2017-08-25

## 2017-08-25 RX ORDER — METOCLOPRAMIDE HYDROCHLORIDE 5 MG/ML
10 INJECTION INTRAMUSCULAR; INTRAVENOUS EVERY 6 HOURS PRN
Status: DISCONTINUED | OUTPATIENT
Start: 2017-08-25 | End: 2017-08-25

## 2017-08-25 RX ORDER — MAGNESIUM SULFATE HEPTAHYDRATE 40 MG/ML
2 INJECTION, SOLUTION INTRAVENOUS ONCE
Status: COMPLETED | OUTPATIENT
Start: 2017-08-25 | End: 2017-08-25

## 2017-08-25 RX ADMIN — HYDROMORPHONE HYDROCHLORIDE 0.5 MG: 1 INJECTION, SOLUTION INTRAMUSCULAR; INTRAVENOUS; SUBCUTANEOUS at 11:27

## 2017-08-25 RX ADMIN — HEPARIN SODIUM 5000 UNITS: 5000 INJECTION, SOLUTION INTRAVENOUS; SUBCUTANEOUS at 20:11

## 2017-08-25 RX ADMIN — Medication: at 12:23

## 2017-08-25 RX ADMIN — POTASSIUM CHLORIDE 20 MEQ: 200 INJECTION, SOLUTION INTRAVENOUS at 09:13

## 2017-08-25 RX ADMIN — POTASSIUM CHLORIDE 20 MEQ: 200 INJECTION, SOLUTION INTRAVENOUS at 04:24

## 2017-08-25 RX ADMIN — MAGNESIUM SULFATE IN WATER 2 G: 40 INJECTION, SOLUTION INTRAVENOUS at 04:22

## 2017-08-25 RX ADMIN — PANTOPRAZOLE SODIUM 40 MG: 40 INJECTION, POWDER, FOR SOLUTION INTRAVENOUS at 08:17

## 2017-08-25 RX ADMIN — HEPARIN SODIUM 5000 UNITS: 5000 INJECTION, SOLUTION INTRAVENOUS; SUBCUTANEOUS at 08:23

## 2017-08-25 RX ADMIN — POTASSIUM CHLORIDE 20 MEQ: 200 INJECTION, SOLUTION INTRAVENOUS at 08:14

## 2017-08-25 RX ADMIN — SODIUM CHLORIDE 125 ML/HR: 0.9 INJECTION, SOLUTION INTRAVENOUS at 09:30

## 2017-08-25 RX ADMIN — HYDROMORPHONE HYDROCHLORIDE 0.5 MG: 1 INJECTION, SOLUTION INTRAMUSCULAR; INTRAVENOUS; SUBCUTANEOUS at 10:20

## 2017-08-25 RX ADMIN — ONDANSETRON 4 MG: 2 INJECTION INTRAMUSCULAR; INTRAVENOUS at 15:00

## 2017-08-25 RX ADMIN — CALCIUM GLUCONATE 1 G: 94 INJECTION, SOLUTION INTRAVENOUS at 06:12

## 2017-08-25 RX ADMIN — SODIUM CHLORIDE 125 ML/HR: 0.9 INJECTION, SOLUTION INTRAVENOUS at 17:40

## 2017-08-25 RX ADMIN — PROMETHAZINE HYDROCHLORIDE 25 MG: 25 INJECTION INTRAMUSCULAR; INTRAVENOUS at 16:43

## 2017-08-25 RX ADMIN — POTASSIUM CHLORIDE 20 MEQ: 200 INJECTION, SOLUTION INTRAVENOUS at 06:13

## 2017-08-26 ENCOUNTER — APPOINTMENT (INPATIENT)
Dept: RADIOLOGY | Facility: HOSPITAL | Age: 67
DRG: 327 | End: 2017-08-26
Payer: MEDICARE

## 2017-08-26 LAB
ANION GAP SERPL CALCULATED.3IONS-SCNC: 6 MMOL/L (ref 4–13)
ATRIAL RATE: 109 BPM
BASOPHILS # BLD AUTO: 0.02 THOUSANDS/ΜL (ref 0–0.1)
BASOPHILS NFR BLD AUTO: 0 % (ref 0–1)
BUN SERPL-MCNC: 7 MG/DL (ref 5–25)
CA-I BLD-SCNC: 1.09 MMOL/L (ref 1.12–1.32)
CALCIUM SERPL-MCNC: 8.6 MG/DL (ref 8.3–10.1)
CHLORIDE SERPL-SCNC: 105 MMOL/L (ref 100–108)
CO2 SERPL-SCNC: 26 MMOL/L (ref 21–32)
CREAT SERPL-MCNC: 0.56 MG/DL (ref 0.6–1.3)
EOSINOPHIL # BLD AUTO: 0.02 THOUSAND/ΜL (ref 0–0.61)
EOSINOPHIL NFR BLD AUTO: 0 % (ref 0–6)
ERYTHROCYTE [DISTWIDTH] IN BLOOD BY AUTOMATED COUNT: 14.6 % (ref 11.6–15.1)
GFR SERPL CREATININE-BSD FRML MDRD: 96 ML/MIN/1.73SQ M
GLUCOSE SERPL-MCNC: 105 MG/DL (ref 65–140)
GLUCOSE SERPL-MCNC: 113 MG/DL (ref 65–140)
GLUCOSE SERPL-MCNC: 135 MG/DL (ref 65–140)
GLUCOSE SERPL-MCNC: 138 MG/DL (ref 65–140)
GLUCOSE SERPL-MCNC: 90 MG/DL (ref 65–140)
HCT VFR BLD AUTO: 34.8 % (ref 34.8–46.1)
HGB BLD-MCNC: 11.5 G/DL (ref 11.5–15.4)
LYMPHOCYTES # BLD AUTO: 3.05 THOUSANDS/ΜL (ref 0.6–4.47)
LYMPHOCYTES NFR BLD AUTO: 16 % (ref 14–44)
MAGNESIUM SERPL-MCNC: 2.2 MG/DL (ref 1.6–2.6)
MCH RBC QN AUTO: 28.4 PG (ref 26.8–34.3)
MCHC RBC AUTO-ENTMCNC: 33 G/DL (ref 31.4–37.4)
MCV RBC AUTO: 86 FL (ref 82–98)
MONOCYTES # BLD AUTO: 1.39 THOUSAND/ΜL (ref 0.17–1.22)
MONOCYTES NFR BLD AUTO: 7 % (ref 4–12)
NEUTROPHILS # BLD AUTO: 14.92 THOUSANDS/ΜL (ref 1.85–7.62)
NEUTS SEG NFR BLD AUTO: 77 % (ref 43–75)
NRBC BLD AUTO-RTO: 0 /100 WBCS
P AXIS: 42 DEGREES
PLATELET # BLD AUTO: 224 THOUSANDS/UL (ref 149–390)
PMV BLD AUTO: 10.8 FL (ref 8.9–12.7)
POTASSIUM SERPL-SCNC: 3.6 MMOL/L (ref 3.5–5.3)
PR INTERVAL: 138 MS
QRS AXIS: -33 DEGREES
QRSD INTERVAL: 88 MS
QT INTERVAL: 342 MS
QTC INTERVAL: 460 MS
RBC # BLD AUTO: 4.05 MILLION/UL (ref 3.81–5.12)
SODIUM SERPL-SCNC: 137 MMOL/L (ref 136–145)
T WAVE AXIS: -2 DEGREES
VENTRICULAR RATE: 109 BPM
WBC # BLD AUTO: 19.45 THOUSAND/UL (ref 4.31–10.16)

## 2017-08-26 PROCEDURE — 82330 ASSAY OF CALCIUM: CPT | Performed by: NURSE PRACTITIONER

## 2017-08-26 PROCEDURE — G8978 MOBILITY CURRENT STATUS: HCPCS

## 2017-08-26 PROCEDURE — 3E0H76Z INTRODUCTION OF NUTRITIONAL SUBSTANCE INTO LOWER GI, VIA NATURAL OR ARTIFICIAL OPENING: ICD-10-PCS | Performed by: THORACIC SURGERY (CARDIOTHORACIC VASCULAR SURGERY)

## 2017-08-26 PROCEDURE — G8979 MOBILITY GOAL STATUS: HCPCS

## 2017-08-26 PROCEDURE — 80048 BASIC METABOLIC PNL TOTAL CA: CPT | Performed by: NURSE PRACTITIONER

## 2017-08-26 PROCEDURE — 71010 HB CHEST X-RAY 1 VIEW FRONTAL (PORTABLE): CPT

## 2017-08-26 PROCEDURE — 93005 ELECTROCARDIOGRAM TRACING: CPT | Performed by: SURGERY

## 2017-08-26 PROCEDURE — 97163 PT EVAL HIGH COMPLEX 45 MIN: CPT

## 2017-08-26 PROCEDURE — C9113 INJ PANTOPRAZOLE SODIUM, VIA: HCPCS | Performed by: SURGERY

## 2017-08-26 PROCEDURE — 94668 MNPJ CHEST WALL SBSQ: CPT

## 2017-08-26 PROCEDURE — 85025 COMPLETE CBC W/AUTO DIFF WBC: CPT | Performed by: NURSE PRACTITIONER

## 2017-08-26 PROCEDURE — 94760 N-INVAS EAR/PLS OXIMETRY 1: CPT

## 2017-08-26 PROCEDURE — 83735 ASSAY OF MAGNESIUM: CPT | Performed by: NURSE PRACTITIONER

## 2017-08-26 PROCEDURE — 94669 MECHANICAL CHEST WALL OSCILL: CPT

## 2017-08-26 PROCEDURE — 82948 REAGENT STRIP/BLOOD GLUCOSE: CPT

## 2017-08-26 RX ORDER — POTASSIUM CHLORIDE 14.9 MG/ML
20 INJECTION INTRAVENOUS ONCE
Status: COMPLETED | OUTPATIENT
Start: 2017-08-26 | End: 2017-08-26

## 2017-08-26 RX ORDER — SODIUM CHLORIDE, SODIUM GLUCONATE, SODIUM ACETATE, POTASSIUM CHLORIDE, MAGNESIUM CHLORIDE, SODIUM PHOSPHATE, DIBASIC, AND POTASSIUM PHOSPHATE .53; .5; .37; .037; .03; .012; .00082 G/100ML; G/100ML; G/100ML; G/100ML; G/100ML; G/100ML; G/100ML
125 INJECTION, SOLUTION INTRAVENOUS CONTINUOUS
Status: DISCONTINUED | OUTPATIENT
Start: 2017-08-26 | End: 2017-08-27

## 2017-08-26 RX ORDER — HEPARIN SODIUM 5000 [USP'U]/ML
5000 INJECTION, SOLUTION INTRAVENOUS; SUBCUTANEOUS EVERY 8 HOURS SCHEDULED
Status: DISCONTINUED | OUTPATIENT
Start: 2017-08-26 | End: 2017-08-31 | Stop reason: HOSPADM

## 2017-08-26 RX ORDER — HEPARIN SODIUM 5000 [USP'U]/ML
5000 INJECTION, SOLUTION INTRAVENOUS; SUBCUTANEOUS EVERY 8 HOURS SCHEDULED
Status: CANCELLED | OUTPATIENT
Start: 2017-08-26

## 2017-08-26 RX ORDER — SODIUM CHLORIDE, SODIUM GLUCONATE, SODIUM ACETATE, POTASSIUM CHLORIDE, MAGNESIUM CHLORIDE, SODIUM PHOSPHATE, DIBASIC, AND POTASSIUM PHOSPHATE .53; .5; .37; .037; .03; .012; .00082 G/100ML; G/100ML; G/100ML; G/100ML; G/100ML; G/100ML; G/100ML
1000 INJECTION, SOLUTION INTRAVENOUS ONCE
Status: COMPLETED | OUTPATIENT
Start: 2017-08-26 | End: 2017-08-26

## 2017-08-26 RX ADMIN — CALCIUM GLUCONATE 1 G: 94 INJECTION, SOLUTION INTRAVENOUS at 09:45

## 2017-08-26 RX ADMIN — HEPARIN SODIUM 5000 UNITS: 5000 INJECTION, SOLUTION INTRAVENOUS; SUBCUTANEOUS at 13:41

## 2017-08-26 RX ADMIN — PANTOPRAZOLE SODIUM 40 MG: 40 INJECTION, POWDER, FOR SOLUTION INTRAVENOUS at 09:00

## 2017-08-26 RX ADMIN — SODIUM CHLORIDE 125 ML/HR: 0.9 INJECTION, SOLUTION INTRAVENOUS at 04:35

## 2017-08-26 RX ADMIN — HYDROMORPHONE HYDROCHLORIDE 0.5 MG: 1 INJECTION, SOLUTION INTRAMUSCULAR; INTRAVENOUS; SUBCUTANEOUS at 02:01

## 2017-08-26 RX ADMIN — POTASSIUM CHLORIDE 20 MEQ: 200 INJECTION, SOLUTION INTRAVENOUS at 08:45

## 2017-08-26 RX ADMIN — POTASSIUM CHLORIDE 20 MEQ: 200 INJECTION, SOLUTION INTRAVENOUS at 10:55

## 2017-08-26 RX ADMIN — SODIUM CHLORIDE, SODIUM GLUCONATE, SODIUM ACETATE, POTASSIUM CHLORIDE, MAGNESIUM CHLORIDE, SODIUM PHOSPHATE, DIBASIC, AND POTASSIUM PHOSPHATE 125 ML/HR: .53; .5; .37; .037; .03; .012; .00082 INJECTION, SOLUTION INTRAVENOUS at 07:58

## 2017-08-26 RX ADMIN — SODIUM CHLORIDE, SODIUM GLUCONATE, SODIUM ACETATE, POTASSIUM CHLORIDE, MAGNESIUM CHLORIDE, SODIUM PHOSPHATE, DIBASIC, AND POTASSIUM PHOSPHATE 125 ML/HR: .53; .5; .37; .037; .03; .012; .00082 INJECTION, SOLUTION INTRAVENOUS at 19:32

## 2017-08-26 RX ADMIN — Medication: at 15:00

## 2017-08-26 RX ADMIN — SODIUM CHLORIDE, SODIUM GLUCONATE, SODIUM ACETATE, POTASSIUM CHLORIDE, MAGNESIUM CHLORIDE, SODIUM PHOSPHATE, DIBASIC, AND POTASSIUM PHOSPHATE 1000 ML: .53; .5; .37; .037; .03; .012; .00082 INJECTION, SOLUTION INTRAVENOUS at 07:58

## 2017-08-26 RX ADMIN — Medication: at 03:55

## 2017-08-26 RX ADMIN — HEPARIN SODIUM 5000 UNITS: 5000 INJECTION, SOLUTION INTRAVENOUS; SUBCUTANEOUS at 21:37

## 2017-08-27 ENCOUNTER — APPOINTMENT (INPATIENT)
Dept: RADIOLOGY | Facility: HOSPITAL | Age: 67
DRG: 327 | End: 2017-08-27
Payer: MEDICARE

## 2017-08-27 LAB
ANION GAP SERPL CALCULATED.3IONS-SCNC: 6 MMOL/L (ref 4–13)
BASOPHILS # BLD AUTO: 0.04 THOUSANDS/ΜL (ref 0–0.1)
BASOPHILS NFR BLD AUTO: 0 % (ref 0–1)
BUN SERPL-MCNC: 9 MG/DL (ref 5–25)
CALCIUM SERPL-MCNC: 8.4 MG/DL (ref 8.3–10.1)
CHLORIDE SERPL-SCNC: 104 MMOL/L (ref 100–108)
CO2 SERPL-SCNC: 27 MMOL/L (ref 21–32)
CREAT SERPL-MCNC: 0.53 MG/DL (ref 0.6–1.3)
EOSINOPHIL # BLD AUTO: 0.29 THOUSAND/ΜL (ref 0–0.61)
EOSINOPHIL NFR BLD AUTO: 2 % (ref 0–6)
ERYTHROCYTE [DISTWIDTH] IN BLOOD BY AUTOMATED COUNT: 14.6 % (ref 11.6–15.1)
GFR SERPL CREATININE-BSD FRML MDRD: 98 ML/MIN/1.73SQ M
GLUCOSE SERPL-MCNC: 103 MG/DL (ref 65–140)
GLUCOSE SERPL-MCNC: 110 MG/DL (ref 65–140)
HCT VFR BLD AUTO: 32.4 % (ref 34.8–46.1)
HGB BLD-MCNC: 10.3 G/DL (ref 11.5–15.4)
LYMPHOCYTES # BLD AUTO: 3.34 THOUSANDS/ΜL (ref 0.6–4.47)
LYMPHOCYTES NFR BLD AUTO: 21 % (ref 14–44)
MAGNESIUM SERPL-MCNC: 2.3 MG/DL (ref 1.6–2.6)
MCH RBC QN AUTO: 27.9 PG (ref 26.8–34.3)
MCHC RBC AUTO-ENTMCNC: 31.8 G/DL (ref 31.4–37.4)
MCV RBC AUTO: 88 FL (ref 82–98)
MONOCYTES # BLD AUTO: 1 THOUSAND/ΜL (ref 0.17–1.22)
MONOCYTES NFR BLD AUTO: 6 % (ref 4–12)
NEUTROPHILS # BLD AUTO: 11.2 THOUSANDS/ΜL (ref 1.85–7.62)
NEUTS SEG NFR BLD AUTO: 71 % (ref 43–75)
NRBC BLD AUTO-RTO: 0 /100 WBCS
PLATELET # BLD AUTO: 206 THOUSANDS/UL (ref 149–390)
PMV BLD AUTO: 10.3 FL (ref 8.9–12.7)
POTASSIUM SERPL-SCNC: 3.7 MMOL/L (ref 3.5–5.3)
RBC # BLD AUTO: 3.69 MILLION/UL (ref 3.81–5.12)
SODIUM SERPL-SCNC: 137 MMOL/L (ref 136–145)
WBC # BLD AUTO: 15.96 THOUSAND/UL (ref 4.31–10.16)

## 2017-08-27 PROCEDURE — 94760 N-INVAS EAR/PLS OXIMETRY 1: CPT

## 2017-08-27 PROCEDURE — C9113 INJ PANTOPRAZOLE SODIUM, VIA: HCPCS | Performed by: SURGERY

## 2017-08-27 PROCEDURE — 82948 REAGENT STRIP/BLOOD GLUCOSE: CPT

## 2017-08-27 PROCEDURE — 83735 ASSAY OF MAGNESIUM: CPT | Performed by: SURGERY

## 2017-08-27 PROCEDURE — 71010 HB CHEST X-RAY 1 VIEW FRONTAL (PORTABLE): CPT

## 2017-08-27 PROCEDURE — 94669 MECHANICAL CHEST WALL OSCILL: CPT

## 2017-08-27 PROCEDURE — 85025 COMPLETE CBC W/AUTO DIFF WBC: CPT | Performed by: SURGERY

## 2017-08-27 PROCEDURE — 80048 BASIC METABOLIC PNL TOTAL CA: CPT | Performed by: SURGERY

## 2017-08-27 RX ORDER — POTASSIUM CHLORIDE 14.9 MG/ML
20 INJECTION INTRAVENOUS ONCE
Status: COMPLETED | OUTPATIENT
Start: 2017-08-27 | End: 2017-08-27

## 2017-08-27 RX ORDER — DEXTROSE, SODIUM CHLORIDE, AND POTASSIUM CHLORIDE 5; .45; .15 G/100ML; G/100ML; G/100ML
40 INJECTION INTRAVENOUS CONTINUOUS
Status: DISCONTINUED | OUTPATIENT
Start: 2017-08-27 | End: 2017-08-29

## 2017-08-27 RX ADMIN — PANTOPRAZOLE SODIUM 40 MG: 40 INJECTION, POWDER, FOR SOLUTION INTRAVENOUS at 08:08

## 2017-08-27 RX ADMIN — POTASSIUM CHLORIDE 20 MEQ: 200 INJECTION, SOLUTION INTRAVENOUS at 08:04

## 2017-08-27 RX ADMIN — SODIUM CHLORIDE, SODIUM GLUCONATE, SODIUM ACETATE, POTASSIUM CHLORIDE, MAGNESIUM CHLORIDE, SODIUM PHOSPHATE, DIBASIC, AND POTASSIUM PHOSPHATE 125 ML/HR: .53; .5; .37; .037; .03; .012; .00082 INJECTION, SOLUTION INTRAVENOUS at 03:17

## 2017-08-27 RX ADMIN — HEPARIN SODIUM 5000 UNITS: 5000 INJECTION, SOLUTION INTRAVENOUS; SUBCUTANEOUS at 05:00

## 2017-08-27 RX ADMIN — DEXTROSE, SODIUM CHLORIDE, AND POTASSIUM CHLORIDE 75 ML/HR: 5; .45; .15 INJECTION INTRAVENOUS at 21:45

## 2017-08-27 RX ADMIN — HEPARIN SODIUM 5000 UNITS: 5000 INJECTION, SOLUTION INTRAVENOUS; SUBCUTANEOUS at 21:47

## 2017-08-27 RX ADMIN — HEPARIN SODIUM 5000 UNITS: 5000 INJECTION, SOLUTION INTRAVENOUS; SUBCUTANEOUS at 15:22

## 2017-08-27 RX ADMIN — DEXTROSE, SODIUM CHLORIDE, AND POTASSIUM CHLORIDE 75 ML/HR: 5; .45; .15 INJECTION INTRAVENOUS at 08:02

## 2017-08-27 RX ADMIN — Medication: at 03:22

## 2017-08-28 ENCOUNTER — APPOINTMENT (INPATIENT)
Dept: RADIOLOGY | Facility: HOSPITAL | Age: 67
DRG: 327 | End: 2017-08-28
Attending: THORACIC SURGERY (CARDIOTHORACIC VASCULAR SURGERY)
Payer: MEDICARE

## 2017-08-28 LAB
ANION GAP SERPL CALCULATED.3IONS-SCNC: 5 MMOL/L (ref 4–13)
BASOPHILS # BLD AUTO: 0.03 THOUSANDS/ΜL (ref 0–0.1)
BASOPHILS NFR BLD AUTO: 0 % (ref 0–1)
BUN SERPL-MCNC: 10 MG/DL (ref 5–25)
CALCIUM SERPL-MCNC: 8.6 MG/DL (ref 8.3–10.1)
CHLORIDE SERPL-SCNC: 104 MMOL/L (ref 100–108)
CO2 SERPL-SCNC: 29 MMOL/L (ref 21–32)
CREAT SERPL-MCNC: 0.62 MG/DL (ref 0.6–1.3)
EOSINOPHIL # BLD AUTO: 0.31 THOUSAND/ΜL (ref 0–0.61)
EOSINOPHIL NFR BLD AUTO: 3 % (ref 0–6)
ERYTHROCYTE [DISTWIDTH] IN BLOOD BY AUTOMATED COUNT: 14.5 % (ref 11.6–15.1)
GFR SERPL CREATININE-BSD FRML MDRD: 93 ML/MIN/1.73SQ M
GLUCOSE SERPL-MCNC: 103 MG/DL (ref 65–140)
GLUCOSE SERPL-MCNC: 116 MG/DL (ref 65–140)
GLUCOSE SERPL-MCNC: 124 MG/DL (ref 65–140)
GLUCOSE SERPL-MCNC: 137 MG/DL (ref 65–140)
GLUCOSE SERPL-MCNC: 140 MG/DL (ref 65–140)
HCT VFR BLD AUTO: 31 % (ref 34.8–46.1)
HGB BLD-MCNC: 9.9 G/DL (ref 11.5–15.4)
LYMPHOCYTES # BLD AUTO: 2.96 THOUSANDS/ΜL (ref 0.6–4.47)
LYMPHOCYTES NFR BLD AUTO: 24 % (ref 14–44)
MAGNESIUM SERPL-MCNC: 2.3 MG/DL (ref 1.6–2.6)
MCH RBC QN AUTO: 28.1 PG (ref 26.8–34.3)
MCHC RBC AUTO-ENTMCNC: 31.9 G/DL (ref 31.4–37.4)
MCV RBC AUTO: 88 FL (ref 82–98)
MONOCYTES # BLD AUTO: 0.9 THOUSAND/ΜL (ref 0.17–1.22)
MONOCYTES NFR BLD AUTO: 7 % (ref 4–12)
NEUTROPHILS # BLD AUTO: 8.12 THOUSANDS/ΜL (ref 1.85–7.62)
NEUTS SEG NFR BLD AUTO: 66 % (ref 43–75)
NRBC BLD AUTO-RTO: 0 /100 WBCS
PLATELET # BLD AUTO: 217 THOUSANDS/UL (ref 149–390)
PMV BLD AUTO: 10.6 FL (ref 8.9–12.7)
POTASSIUM SERPL-SCNC: 3.8 MMOL/L (ref 3.5–5.3)
PREALB SERPL-MCNC: 9.7 MG/DL (ref 18–40)
RBC # BLD AUTO: 3.52 MILLION/UL (ref 3.81–5.12)
SODIUM SERPL-SCNC: 138 MMOL/L (ref 136–145)
WBC # BLD AUTO: 12.36 THOUSAND/UL (ref 4.31–10.16)

## 2017-08-28 PROCEDURE — 83735 ASSAY OF MAGNESIUM: CPT | Performed by: SURGERY

## 2017-08-28 PROCEDURE — 71020 HB CHEST X-RAY 2VW FRONTAL&LATL: CPT

## 2017-08-28 PROCEDURE — 0W9B3ZX DRAINAGE OF LEFT PLEURAL CAVITY, PERCUTANEOUS APPROACH, DIAGNOSTIC: ICD-10-PCS | Performed by: THORACIC SURGERY (CARDIOTHORACIC VASCULAR SURGERY)

## 2017-08-28 PROCEDURE — 85025 COMPLETE CBC W/AUTO DIFF WBC: CPT | Performed by: SURGERY

## 2017-08-28 PROCEDURE — 80048 BASIC METABOLIC PNL TOTAL CA: CPT | Performed by: SURGERY

## 2017-08-28 PROCEDURE — 84134 ASSAY OF PREALBUMIN: CPT | Performed by: SURGERY

## 2017-08-28 PROCEDURE — 82948 REAGENT STRIP/BLOOD GLUCOSE: CPT

## 2017-08-28 RX ORDER — POTASSIUM CHLORIDE 20MEQ/15ML
40 LIQUID (ML) ORAL ONCE
Status: COMPLETED | OUTPATIENT
Start: 2017-08-28 | End: 2017-08-28

## 2017-08-28 RX ADMIN — DEXTROSE, SODIUM CHLORIDE, AND POTASSIUM CHLORIDE 40 ML/HR: 5; .45; .15 INJECTION INTRAVENOUS at 11:35

## 2017-08-28 RX ADMIN — POTASSIUM CHLORIDE 40 MEQ: 20 SOLUTION ORAL at 10:49

## 2017-08-28 RX ADMIN — HEPARIN SODIUM 5000 UNITS: 5000 INJECTION, SOLUTION INTRAVENOUS; SUBCUTANEOUS at 21:47

## 2017-08-28 RX ADMIN — HEPARIN SODIUM 5000 UNITS: 5000 INJECTION, SOLUTION INTRAVENOUS; SUBCUTANEOUS at 15:30

## 2017-08-28 RX ADMIN — HEPARIN SODIUM 5000 UNITS: 5000 INJECTION, SOLUTION INTRAVENOUS; SUBCUTANEOUS at 06:34

## 2017-08-29 ENCOUNTER — APPOINTMENT (INPATIENT)
Dept: RADIOLOGY | Facility: HOSPITAL | Age: 67
DRG: 327 | End: 2017-08-29
Payer: MEDICARE

## 2017-08-29 LAB
ANION GAP SERPL CALCULATED.3IONS-SCNC: 6 MMOL/L (ref 4–13)
BASOPHILS # BLD AUTO: 0.03 THOUSANDS/ΜL (ref 0–0.1)
BASOPHILS NFR BLD AUTO: 0 % (ref 0–1)
BUN SERPL-MCNC: 9 MG/DL (ref 5–25)
CALCIUM SERPL-MCNC: 9 MG/DL (ref 8.3–10.1)
CHLORIDE SERPL-SCNC: 103 MMOL/L (ref 100–108)
CO2 SERPL-SCNC: 29 MMOL/L (ref 21–32)
CREAT SERPL-MCNC: 0.62 MG/DL (ref 0.6–1.3)
EOSINOPHIL # BLD AUTO: 0.29 THOUSAND/ΜL (ref 0–0.61)
EOSINOPHIL NFR BLD AUTO: 3 % (ref 0–6)
EOSINOPHIL NFR FLD MANUAL: 2 %
ERYTHROCYTE [DISTWIDTH] IN BLOOD BY AUTOMATED COUNT: 14.7 % (ref 11.6–15.1)
GFR SERPL CREATININE-BSD FRML MDRD: 93 ML/MIN/1.73SQ M
GLUCOSE SERPL-MCNC: 110 MG/DL (ref 65–140)
GLUCOSE SERPL-MCNC: 114 MG/DL (ref 65–140)
GLUCOSE SERPL-MCNC: 125 MG/DL (ref 65–140)
GLUCOSE SERPL-MCNC: 128 MG/DL (ref 65–140)
HCT VFR BLD AUTO: 31.5 % (ref 34.8–46.1)
HGB BLD-MCNC: 9.9 G/DL (ref 11.5–15.4)
HISTIOCYTES NFR FLD: 2 %
INR PPP: 1.06 (ref 0.86–1.16)
LDH FLD L TO P-CCNC: 500 U/L
LYMPHOCYTES # BLD AUTO: 2.7 THOUSANDS/ΜL (ref 0.6–4.47)
LYMPHOCYTES NFR BLD AUTO: 27 % (ref 14–44)
LYMPHOCYTES NFR BLD AUTO: 5 %
MCH RBC QN AUTO: 27.6 PG (ref 26.8–34.3)
MCHC RBC AUTO-ENTMCNC: 31.4 G/DL (ref 31.4–37.4)
MCV RBC AUTO: 88 FL (ref 82–98)
MONO+MESO NFR FLD MANUAL: 3 %
MONOCYTES # BLD AUTO: 0.81 THOUSAND/ΜL (ref 0.17–1.22)
MONOCYTES NFR BLD AUTO: 1 %
MONOCYTES NFR BLD AUTO: 8 % (ref 4–12)
NEUTROPHILS # BLD AUTO: 6.21 THOUSANDS/ΜL (ref 1.85–7.62)
NEUTS BAND NFR FLD MANUAL: 1 %
NEUTS SEG NFR BLD AUTO: 62 % (ref 43–75)
NEUTS SEG NFR BLD AUTO: 86 %
NRBC BLD AUTO-RTO: 0 /100 WBCS
PLATELET # BLD AUTO: 245 THOUSANDS/UL (ref 149–390)
PMV BLD AUTO: 10.7 FL (ref 8.9–12.7)
POTASSIUM SERPL-SCNC: 4.1 MMOL/L (ref 3.5–5.3)
PROT FLD-MCNC: 3.8 G/DL
PROTHROMBIN TIME: 13.8 SECONDS (ref 12.1–14.4)
RBC # BLD AUTO: 3.59 MILLION/UL (ref 3.81–5.12)
SITE: NORMAL
SODIUM SERPL-SCNC: 138 MMOL/L (ref 136–145)
TOTAL CELLS COUNTED SPEC: 100
WBC # BLD AUTO: 10.1 THOUSAND/UL (ref 4.31–10.16)
WBC # FLD MANUAL: 2671 /UL

## 2017-08-29 PROCEDURE — 83615 LACTATE (LD) (LDH) ENZYME: CPT | Performed by: THORACIC SURGERY (CARDIOTHORACIC VASCULAR SURGERY)

## 2017-08-29 PROCEDURE — 92610 EVALUATE SWALLOWING FUNCTION: CPT

## 2017-08-29 PROCEDURE — 84157 ASSAY OF PROTEIN OTHER: CPT | Performed by: THORACIC SURGERY (CARDIOTHORACIC VASCULAR SURGERY)

## 2017-08-29 PROCEDURE — 89051 BODY FLUID CELL COUNT: CPT | Performed by: THORACIC SURGERY (CARDIOTHORACIC VASCULAR SURGERY)

## 2017-08-29 PROCEDURE — 88112 CYTOPATH CELL ENHANCE TECH: CPT | Performed by: THORACIC SURGERY (CARDIOTHORACIC VASCULAR SURGERY)

## 2017-08-29 PROCEDURE — 87070 CULTURE OTHR SPECIMN AEROBIC: CPT | Performed by: THORACIC SURGERY (CARDIOTHORACIC VASCULAR SURGERY)

## 2017-08-29 PROCEDURE — 85025 COMPLETE CBC W/AUTO DIFF WBC: CPT | Performed by: PHYSICIAN ASSISTANT

## 2017-08-29 PROCEDURE — 80048 BASIC METABOLIC PNL TOTAL CA: CPT | Performed by: PHYSICIAN ASSISTANT

## 2017-08-29 PROCEDURE — 85610 PROTHROMBIN TIME: CPT | Performed by: SURGERY

## 2017-08-29 PROCEDURE — 32555 ASPIRATE PLEURA W/ IMAGING: CPT

## 2017-08-29 PROCEDURE — 49083 ABD PARACENTESIS W/IMAGING: CPT

## 2017-08-29 PROCEDURE — 82948 REAGENT STRIP/BLOOD GLUCOSE: CPT

## 2017-08-29 PROCEDURE — 74220 X-RAY XM ESOPHAGUS 1CNTRST: CPT

## 2017-08-29 PROCEDURE — 88305 TISSUE EXAM BY PATHOLOGIST: CPT | Performed by: THORACIC SURGERY (CARDIOTHORACIC VASCULAR SURGERY)

## 2017-08-29 PROCEDURE — 87205 SMEAR GRAM STAIN: CPT | Performed by: THORACIC SURGERY (CARDIOTHORACIC VASCULAR SURGERY)

## 2017-08-29 RX ORDER — OXYCODONE HCL 5 MG/5 ML
10 SOLUTION, ORAL ORAL EVERY 4 HOURS PRN
Status: DISCONTINUED | OUTPATIENT
Start: 2017-08-29 | End: 2017-08-31 | Stop reason: HOSPADM

## 2017-08-29 RX ORDER — OXYCODONE HCL 5 MG/5 ML
5 SOLUTION, ORAL ORAL EVERY 4 HOURS PRN
Status: DISCONTINUED | OUTPATIENT
Start: 2017-08-29 | End: 2017-08-31 | Stop reason: HOSPADM

## 2017-08-29 RX ADMIN — HEPARIN SODIUM 5000 UNITS: 5000 INJECTION, SOLUTION INTRAVENOUS; SUBCUTANEOUS at 13:48

## 2017-08-29 RX ADMIN — Medication: at 07:38

## 2017-08-29 RX ADMIN — HEPARIN SODIUM 5000 UNITS: 5000 INJECTION, SOLUTION INTRAVENOUS; SUBCUTANEOUS at 05:48

## 2017-08-29 RX ADMIN — HEPARIN SODIUM 5000 UNITS: 5000 INJECTION, SOLUTION INTRAVENOUS; SUBCUTANEOUS at 22:50

## 2017-08-29 RX ADMIN — IOHEXOL 10 ML: 350 INJECTION, SOLUTION INTRAVENOUS at 10:45

## 2017-08-30 ENCOUNTER — APPOINTMENT (INPATIENT)
Dept: RADIOLOGY | Facility: HOSPITAL | Age: 67
DRG: 327 | End: 2017-08-30
Attending: THORACIC SURGERY (CARDIOTHORACIC VASCULAR SURGERY)
Payer: MEDICARE

## 2017-08-30 LAB
ANION GAP SERPL CALCULATED.3IONS-SCNC: 6 MMOL/L (ref 4–13)
BUN SERPL-MCNC: 12 MG/DL (ref 5–25)
CALCIUM SERPL-MCNC: 8.8 MG/DL (ref 8.3–10.1)
CHLORIDE SERPL-SCNC: 102 MMOL/L (ref 100–108)
CO2 SERPL-SCNC: 28 MMOL/L (ref 21–32)
CREAT SERPL-MCNC: 0.64 MG/DL (ref 0.6–1.3)
GFR SERPL CREATININE-BSD FRML MDRD: 92 ML/MIN/1.73SQ M
GLUCOSE SERPL-MCNC: 120 MG/DL (ref 65–140)
GLUCOSE SERPL-MCNC: 125 MG/DL (ref 65–140)
GLUCOSE SERPL-MCNC: 125 MG/DL (ref 65–140)
GLUCOSE SERPL-MCNC: 84 MG/DL (ref 65–140)
MAGNESIUM SERPL-MCNC: 2.2 MG/DL (ref 1.6–2.6)
POTASSIUM SERPL-SCNC: 3.8 MMOL/L (ref 3.5–5.3)
SODIUM SERPL-SCNC: 136 MMOL/L (ref 136–145)

## 2017-08-30 PROCEDURE — 97116 GAIT TRAINING THERAPY: CPT

## 2017-08-30 PROCEDURE — 82948 REAGENT STRIP/BLOOD GLUCOSE: CPT

## 2017-08-30 PROCEDURE — 97167 OT EVAL HIGH COMPLEX 60 MIN: CPT

## 2017-08-30 PROCEDURE — 83735 ASSAY OF MAGNESIUM: CPT | Performed by: SURGERY

## 2017-08-30 PROCEDURE — 80048 BASIC METABOLIC PNL TOTAL CA: CPT | Performed by: SURGERY

## 2017-08-30 PROCEDURE — 97110 THERAPEUTIC EXERCISES: CPT

## 2017-08-30 PROCEDURE — G8988 SELF CARE GOAL STATUS: HCPCS

## 2017-08-30 PROCEDURE — G8987 SELF CARE CURRENT STATUS: HCPCS

## 2017-08-30 PROCEDURE — 71020 HB CHEST X-RAY 2VW FRONTAL&LATL: CPT

## 2017-08-30 RX ORDER — POTASSIUM CHLORIDE 20MEQ/15ML
40 LIQUID (ML) ORAL ONCE
Status: COMPLETED | OUTPATIENT
Start: 2017-08-30 | End: 2017-08-30

## 2017-08-30 RX ORDER — ESCITALOPRAM OXALATE 20 MG/1
20 TABLET ORAL DAILY
Status: DISCONTINUED | OUTPATIENT
Start: 2017-08-30 | End: 2017-08-31 | Stop reason: HOSPADM

## 2017-08-30 RX ORDER — PRAVASTATIN SODIUM 20 MG
20 TABLET ORAL
Status: DISCONTINUED | OUTPATIENT
Start: 2017-08-30 | End: 2017-08-31 | Stop reason: HOSPADM

## 2017-08-30 RX ORDER — GABAPENTIN 100 MG/1
100 CAPSULE ORAL 3 TIMES DAILY
Status: DISCONTINUED | OUTPATIENT
Start: 2017-08-30 | End: 2017-08-31 | Stop reason: HOSPADM

## 2017-08-30 RX ADMIN — HEPARIN SODIUM 5000 UNITS: 5000 INJECTION, SOLUTION INTRAVENOUS; SUBCUTANEOUS at 15:10

## 2017-08-30 RX ADMIN — HEPARIN SODIUM 5000 UNITS: 5000 INJECTION, SOLUTION INTRAVENOUS; SUBCUTANEOUS at 06:15

## 2017-08-30 RX ADMIN — GABAPENTIN 100 MG: 100 CAPSULE ORAL at 15:10

## 2017-08-30 RX ADMIN — POTASSIUM CHLORIDE 40 MEQ: 20 SOLUTION ORAL at 10:41

## 2017-08-30 RX ADMIN — ESCITALOPRAM OXALATE 20 MG: 20 TABLET ORAL at 10:20

## 2017-08-30 RX ADMIN — PRAVASTATIN SODIUM 20 MG: 20 TABLET ORAL at 16:50

## 2017-08-30 RX ADMIN — HEPARIN SODIUM 5000 UNITS: 5000 INJECTION, SOLUTION INTRAVENOUS; SUBCUTANEOUS at 21:59

## 2017-08-30 RX ADMIN — GABAPENTIN 100 MG: 100 CAPSULE ORAL at 21:59

## 2017-08-30 RX ADMIN — GABAPENTIN 100 MG: 100 CAPSULE ORAL at 10:20

## 2017-08-30 RX ADMIN — OXYCODONE HYDROCHLORIDE 5 MG: 5 SOLUTION ORAL at 10:12

## 2017-08-30 RX ADMIN — Medication: at 06:00

## 2017-08-31 VITALS
HEIGHT: 62 IN | HEART RATE: 95 BPM | RESPIRATION RATE: 20 BRPM | BODY MASS INDEX: 32.62 KG/M2 | TEMPERATURE: 97.9 F | SYSTOLIC BLOOD PRESSURE: 125 MMHG | WEIGHT: 177.25 LBS | DIASTOLIC BLOOD PRESSURE: 66 MMHG | OXYGEN SATURATION: 94 %

## 2017-08-31 LAB
ANION GAP SERPL CALCULATED.3IONS-SCNC: 7 MMOL/L (ref 4–13)
BUN SERPL-MCNC: 14 MG/DL (ref 5–25)
CALCIUM SERPL-MCNC: 9.2 MG/DL (ref 8.3–10.1)
CHLORIDE SERPL-SCNC: 104 MMOL/L (ref 100–108)
CO2 SERPL-SCNC: 27 MMOL/L (ref 21–32)
CREAT SERPL-MCNC: 0.74 MG/DL (ref 0.6–1.3)
GFR SERPL CREATININE-BSD FRML MDRD: 84 ML/MIN/1.73SQ M
GLUCOSE SERPL-MCNC: 126 MG/DL (ref 65–140)
MAGNESIUM SERPL-MCNC: 2.4 MG/DL (ref 1.6–2.6)
POTASSIUM SERPL-SCNC: 4 MMOL/L (ref 3.5–5.3)
SODIUM SERPL-SCNC: 138 MMOL/L (ref 136–145)

## 2017-08-31 PROCEDURE — 83735 ASSAY OF MAGNESIUM: CPT | Performed by: SURGERY

## 2017-08-31 PROCEDURE — 97116 GAIT TRAINING THERAPY: CPT

## 2017-08-31 PROCEDURE — 80048 BASIC METABOLIC PNL TOTAL CA: CPT | Performed by: SURGERY

## 2017-08-31 PROCEDURE — 97530 THERAPEUTIC ACTIVITIES: CPT

## 2017-08-31 RX ORDER — OXYCODONE HCL 5 MG/5 ML
SOLUTION, ORAL ORAL
Qty: 400 ML | Refills: 0
Start: 2017-08-31 | End: 2018-03-19

## 2017-08-31 RX ADMIN — GABAPENTIN 100 MG: 100 CAPSULE ORAL at 11:58

## 2017-08-31 RX ADMIN — HEPARIN SODIUM 5000 UNITS: 5000 INJECTION, SOLUTION INTRAVENOUS; SUBCUTANEOUS at 06:37

## 2017-08-31 RX ADMIN — ESCITALOPRAM OXALATE 20 MG: 20 TABLET ORAL at 11:58

## 2017-09-01 LAB
BACTERIA SPEC BFLD CULT: NO GROWTH
GRAM STN SPEC: NORMAL
GRAM STN SPEC: NORMAL

## 2017-09-04 ENCOUNTER — HOSPITAL ENCOUNTER (OUTPATIENT)
Dept: RADIOLOGY | Facility: HOSPITAL | Age: 67
Discharge: HOME | End: 2017-09-04
Payer: MEDICARE

## 2017-09-04 DIAGNOSIS — C15.9 ESOPHAGEAL CANCER (HCC): ICD-10-CM

## 2017-09-04 PROCEDURE — 71020 HB CHEST X-RAY 2VW FRONTAL&LATL: CPT

## 2017-09-07 ENCOUNTER — GENERIC CONVERSION - ENCOUNTER (OUTPATIENT)
Dept: OTHER | Facility: OTHER | Age: 67
End: 2017-09-07

## 2017-09-12 ENCOUNTER — GENERIC CONVERSION - ENCOUNTER (OUTPATIENT)
Dept: OTHER | Facility: OTHER | Age: 67
End: 2017-09-12

## 2017-09-13 ENCOUNTER — GENERIC CONVERSION - ENCOUNTER (OUTPATIENT)
Dept: OTHER | Facility: OTHER | Age: 67
End: 2017-09-13

## 2017-09-25 ENCOUNTER — ALLSCRIPTS OFFICE VISIT (OUTPATIENT)
Dept: OTHER | Facility: OTHER | Age: 67
End: 2017-09-25

## 2017-09-27 ENCOUNTER — GENERIC CONVERSION - ENCOUNTER (OUTPATIENT)
Dept: OTHER | Facility: OTHER | Age: 67
End: 2017-09-27

## 2017-10-04 ENCOUNTER — ALLSCRIPTS OFFICE VISIT (OUTPATIENT)
Dept: OTHER | Facility: OTHER | Age: 67
End: 2017-10-04

## 2017-10-05 NOTE — PROGRESS NOTES
Assessment  1  History of Eagle's esophagus with high grade dysplasia (405 12) (K22 619)    Plan  Jejunostomy tube present    · Follow-up visit in 3 months Evaluation and Treatment  Follow-up  Status: Hold For -  Scheduling  Requested for: 27OEF1425   Ordered; For: Jejunostomy tube present; Ordered By: Krishna Britt Performed:  Due: 92FCC6030    Discussion/Summary  Discussion Summary:   50-year-old woman status post transhiatal esophagectomy for Eagle's dysplasia, high-grade  She is doing well  J-tube was removed at today's visit  I will plan see her in 3 months for follow-up to see how she is doing long term  Post esophagectomy guidelines discussed with her including eating smaller meals more frequently, stopping eating when she feels full, and not eating before going to bed  I also recommended that she sleep with the head of bed elevated  She has recently purchased a wedge to help her with the nighttime reflux  Counseling Documentation With Imm: The patient, patient's family was counseled regarding instructions for management,-impressions  Chief Complaint  Chief Complaint Free Text Note Form: Patient here for follow up per Dr Gianluca Duong  Patient had esophagectomy in August 2017  History of Present Illness  Interval History: Patient is a 76-year-old woman status post recent transhiatal esophagectomy for esophageal stricture and high-grade dysplasia  She is here for follow-up  After discharge from the hospital, she was in rehab where she was weaned off J-tube feeds  She last used the J tube about a month ago  Since then, she has gained about 6 lb  She is able to eat soft foods, and is able to do so without much difficulty  All in all, she seems to be doing very well  She is now here to discuss the possibility of J-tube removal     She does have the expected symptoms of occasional gastric reflux post esophagectomy, but no other complaints  Review of Systems  ROS Reviewed:   ROS reviewed  Active Problems  1  3-vessel CAD (414 00) (I25 10)   2  Abnormal blood chemistry (790 6) (R79 9)   3  Acute maxillary sinusitis, recurrence not specified (461 0) (J01 00)   4  Acute tracheobronchitis (466 0) (J20 9)   5  Admission for long-term (current) use of other medications (V58 69) (Z51 81)   6  A-fib (427 31) (I48 91)   7  Anemia (285 9) (D64 9)   8  Atypical chest pain (786 59) (R07 89)   9  Backache with radiation (724 5) (M54 9)   10  Chronic pain of right knee (419 53,911 70) (M25 561,G89 29)   11  Colon cancer screening (V76 51) (Z12 11)   12  Continuous left lower quadrant pain (789 04) (R10 32)   13  Depression (311) (F32 9)   14  Dysphagia (787 20) (R13 10)   15  Encounter for mammogram to establish baseline mammogram (V76 12) (Z12 31)   16  Encounter for routine gynecological examination with Papanicolaou smear of cervix    (V72 31,V76 2) (Z01 419)   17  Gastroesophageal reflux disease (530 81) (K21 9)   18  Hyperlipidemia (272 4) (E78 5)   19  Hypothyroidism (244 9) (E03 9)   20  Insomnia (780 52) (G47 00)   21  Joint pain, knee (719 46) (M25 569)   22  Low vitamin D level (268 9) (E55 9)   23  Lumbar radiculopathy (724 4) (M54 16)   24  Myofascial pain (729 1) (M79 1)   25  Need for hepatitis C screening test (V73 89) (Z11 59)   26  Need for influenza vaccination (V04 81) (Z23)   27  Osteoarthritis (715 90) (M19 90)   28  Osteoarthritis of knee (715 36) (M17 10)   29  Right leg paresthesias (782 0) (R20 2)   30  Sciatica (724 3) (M54 30)   31  Screening for genitourinary condition (V81 6) (Z13 89)   32  Spondylosis of 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 (721 3) (M47 816)   33  Status post total knee replacement (V43 65) (Z96 659)   34  Stricture of esophagus (530 3) (K22 2)   35  Tooth infection (522 4) (K04 7)   36  Venous insufficiency (459 81) (I87 2)    Past Medical History  1  History of Abnormal blood chemistry (790 6) (R79 9)   2   History of Achalasia, esophageal (530 0) (K22 0)   3  History of Acute Medial Meniscus Tear (836 0)   4  History of Acute otitis externa, unspecified laterality   5  History of Acute pharyngitis (462) (J02 9)   6  History of Acute sinusitis (461 9) (J01 90)   7  History of Acute sinusitis (461 9) (J01 90)   8  History of Eagle's esophagus with high grade dysplasia (530 85) (K22 711)   9  History of Cerumen impaction (380 4) (H61 20)   10  History of Headache (784 0) (R51)   11  History of esophagogastroduodenoscopy (EGD) (V15 29) (Z98 890)   12  History of pneumonia (V12 61) (Z87 01)   13  History of sinusitis (V12 69) (Z87 09)   14  History of Impacted cerumen, unspecified laterality (380 4) (H61 20)   15  History of Knee Pain Elicited By Motion   16  History of Screening for depression (V79 0) (Z13 89)   17  History of Screening for genitourinary condition (V81 6) (Z13 89)   18  History of Screening for malignant neoplasm of breast (V76 10) (Z12 31)   19  History of Screening for neurological condition (V80 09) (Z13 89)   20  History of Screening for osteoporosis (V82 81) (Z13 820)   21  History of Shingles (053 9) (B02 9)    Surgical History  1  History of  Section   2  History of Esophagectomy Transhiatal   3  History of J-Tube Replacement Date   4  History of Total Knee Arthroplasty   · Dr Horn [place]  Surgical History Reviewed: The surgical history was reviewed and updated today  Family History  Mother    1  Family history of Alzheimer's disease (V17 2) (Z82 0)  Father    2  Family history of Type 2 diabetes mellitus (250 00) (E11 9)  Family History Reviewed: The family history was reviewed and updated today  Social History   · Denied: History of Being A Social Drinker   · Denied: Drug use (305 90) (F19 90)   · Never a smoker   · Patient has living will (V49 89) (Z78 9)  Social History Reviewed: The social history was reviewed and updated today  The social history was reviewed and is unchanged  Current Meds   1   Acetaminophen [address] [employer] [employer]; Therapy: (Recorded:38Jyd5671) to Recorded   2  Albuterol Sulfate (2 5 MG/3ML) 0 083% Inhalation Nebulization Solution; Therapy: (Laymond August) to Recorded   3  Biscolax 10 MG Rectal Suppository; Therapy: (Laymond August) to Recorded   4  Escitalopram Oxalate 20 MG Oral Tablet; TAKE 1 TABLET EVERY MORNING; Therapy: 90QDL5569 to (Evaluate:72Gie1499)  Requested for: 56XLZ5685; Last   Rx:02Jun2017 Ordered   5  Fleet Enema 7-19 GM/118ML Rectal Enema; Therapy: (Laymond August) to Recorded   6  Folic Acid 1 MG Oral Tablet; TAKE ONE (1) TABLET DAILY; Therapy: 19RNZ9358 to (Warren Lundborg)  Requested for: 05AFO5638; Last   Rx:02Jun2017 Ordered   7  Gabapentin 100 MG Oral Capsule; TAKE 1 CAPSULE BEDTIME MAY INCREASE TO 3   CAPSULES AT BEDTIME AS TOLERATED; Therapy: 87YJO0542 to (Evaluate:06Dcr4932)  Requested for: 00SXL2536; Last   Rx:85Bdy3803 Ordered   8  Levaquin 500 MG Oral Tablet; Therapy: (Laymond August) to Recorded   9  Milk of Magnesia 400 MG/5ML Oral Suspension; Therapy: (Laymond August) to Recorded   10  OxyCODONE HCl - 5 MG/5ML Oral Solution; Therapy: (Laymond August) to Recorded   11  Pravastatin Sodium 20 MG Oral Tablet; TAKE 1 TABLET AT BEDTIME; Therapy: 94ZXO0891 to (Evaluate:31Oct2017)  Requested for: 12IUU5264; Last    RJ:07MPV5116 Ordered  Medication List Reviewed: The medication list was reviewed and updated today  Allergies  1  No Known Drug Allergies    Physical Exam    Constitutional: General appearance: The Patient is well-developed, well-nourished female who appears her stated age in no acute distress  She is pleasant and talkative  HEENT: Conjunctiva and lids: No swelling, erythema, or discharge  -Neck is supple without adenopathy  No JVD  -Well-healed left lower neck incision  Chest: There are bilaterally symmetrical breath sounds  -The lungs are clear to auscultation  Cardiac: Heart is regular rate      Abdomen: Abdomen is soft, nontender without masses  -Incision clean dry and intact  G-tube site clean dry and intact  Health Management  Colon cancer screening   (1) COLOGUARD; every 3 years; Last 33Gwo4241; Next Due: 64Jfs2742; Active  Health Maintenance   Medicare Annual Wellness Visit; every 1 year; Last 47Hwa5995; Next Due: 99Uro4377; Overdue    Future Appointments    Date/Time Provider Specialty Site   2018 10:10 AM IGOR Martinez  Orthopedic Surgery [facility]     End of Encounter Meds  1  Folic Acid 1 MG Oral Tablet; TAKE ONE (1) TABLET DAILY; Therapy: 28JGY5610 to (Kristen Felipe)  Requested for: 38XUY7873; Last   Rx:2017 Ordered  2  Pravastatin Sodium 20 MG Oral Tablet; TAKE 1 TABLET AT BEDTIME; Therapy: 56ILT3070 to (Evaluate:2017)  Requested for: 31EMO8052; Last   [id number] Ordered  3  Escitalopram Oxalate 20 MG Oral Tablet; TAKE 1 TABLET EVERY MORNING; Therapy: 57NXU3396 to (Evaluate:74Roq1093)  Requested for: 59JDT0824; Last   Rx:2017 Ordered  4  Gabapentin 100 MG Oral Capsule; TAKE 1 CAPSULE BEDTIME MAY INCREASE TO 3   CAPSULES AT BEDTIME AS TOLERATED; Therapy: 07UCW7382 to (Evaluate:47Quu8184)  Requested for: 66IYB7922; Last   Rx:39Jpb6232 Ordered  5  Acetaminophen 325 MG CAPS; Therapy: (Lavaun Brunner) to Recorded   6  Albuterol Sulfate (2 5 MG/3ML) 0 083% Inhalation Nebulization Solution; Therapy: (Lavaun Brunner) to Recorded   7  Biscolax 10 MG Rectal Suppository; Therapy: (Lavaun Brunner) to Recorded   8  Fleet Enema 7-19 GM/118ML Rectal Enema; Therapy: (Lavaun Brunner) to Recorded   9  Levaquin 500 MG Oral Tablet (LevoFLOXacin); Therapy: (Lavaun Brunner) to Recorded   10  Milk of Magnesia 400 MG/5ML Oral Suspension; Therapy: (Lavaun Brunner) to Recorded   11  OxyCODONE HCl - 5 MG/5ML Oral Solution;     Therapy: (Lavaun Brunner) to Recorded    Signatures   Electronically signed by : Dmitri Mims MD; Oct  4 2017 11:07AM EST                       (Author)

## 2017-10-26 ENCOUNTER — ALLSCRIPTS OFFICE VISIT (OUTPATIENT)
Dept: OTHER | Facility: OTHER | Age: 67
End: 2017-10-26

## 2017-10-27 NOTE — PROGRESS NOTES
Assessment  1  Exercise counseling (V65 41) (Z71 82)   2  Patient has living will (V49 89) (Z78 9)   3  Advance directive in chart (V49 89) (Z78 9)   4  Acute maxillary sinusitis, recurrence not specified (461 0) (J01 00)    Plan  Acute maxillary sinusitis, recurrence not specified    · Amoxicillin 250 MG/5ML Oral Suspension Reconstituted; SWALLOW 10 ML 3 times  daily  SocHx: Advance directive in chart, SocHx: Patient has living will    · We recommend that you create an advance directive ; Status:Complete - Retrospective  By Protocol Authorization;   Done: 15THS9709    Discussion/Summary    Patient has had extensive gastric surgery  She will need liquid antibiotics  We'll prescribe amoxicillin  Chief Complaint  1  Cold Symptoms    History of Present Illness  Cold Symptoms:   Addie Blanc presents with complaints of sudden onset of constant episodes of moderate cold symptoms  Episodes started about 3 days ago  She is currently experiencing cold symptoms  Associated symptoms include sneezing,-- nasal congestion,-- runny nose,-- post nasal drainage,-- scratchy throat-- and-- sore throat  Review of Systems    Constitutional: feeling poorly-- and-- feeling tired  ENT: sore throat  Cardiovascular: no complaints of slow or fast heart rate, no chest pain, no palpitations, no leg claudication or lower extremity edema  Respiratory: cough  Gastrointestinal: no complaints of abdominal pain, no constipation, no nausea or diarrhea, no vomiting, no bloody stools  Genitourinary: no complaints of dysuria, no incontinence, no pelvic pain, no dysmenorrhea, no vaginal discharge or abnormal vaginal bleeding  Musculoskeletal: no complaints of arthralgia, no myalgia, no joint swelling or stiffness, no limb pain or swelling  Integumentary: no complaints of skin rash or lesion, no itching or dry skin, no skin wounds     Neurological: no complaints of headache, no confusion, no numbness or tingling, no dizziness or fainting  ROS reviewed  Active Problems  1  3-vessel CAD (414 00) (I25 10)   2  Abnormal blood chemistry (790 6) (R79 9)   3  Acute maxillary sinusitis, recurrence not specified (461 0) (J01 00)   4  Acute tracheobronchitis (466 0) (J20 9)   5  Admission for long-term (current) use of other medications (V58 69) (Z51 81)   6  Advance directive in chart (V49 89) (Z78 9)   7  A-fib (427 31) (I48 91)   8  Anemia (285 9) (D64 9)   9  Atypical chest pain (786 59) (R07 89)   10  Backache with radiation (724 5) (M54 9)   11  Chronic pain of right knee (165 22,534 63) (M25 561,G89 29)   12  Colon cancer screening (V76 51) (Z12 11)   13  Continuous left lower quadrant pain (789 04) (R10 32)   14  Depression (311) (F32 9)   15  Dysphagia (787 20) (R13 10)   16  Encounter for mammogram to establish baseline mammogram (V76 12) (Z12 31)   17  Encounter for routine gynecological examination with Papanicolaou smear of cervix    (V72 31,V76 2) (Z01 419)   18  Exercise counseling (V65 41) (Z71 82)   19  Gastroesophageal reflux disease (530 81) (K21 9)   20  Hyperlipidemia (272 4) (E78 5)   21  Hypothyroidism (244 9) (E03 9)   22  Insomnia (780 52) (G47 00)   23  Jejunostomy tube present (V44 4) (Z93 4)   24  Joint pain, knee (719 46) (M25 569)   25  Low vitamin D level (268 9) (E55 9)   26  Lumbar radiculopathy (724 4) (M54 16)   27  Myofascial pain (729 1) (M79 1)   28  Need for hepatitis C screening test (V73 89) (Z11 59)   29  Need for influenza vaccination (V04 81) (Z23)   30  Osteoarthritis (715 90) (M19 90)   31  Osteoarthritis of knee (715 36) (M17 10)   32  Right leg paresthesias (782 0) (R20 2)   33  Sciatica (724 3) (M54 30)   34  Screening for genitourinary condition (V81 6) (Z13 89)   35  Spondylosis of 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 (721 3) (M47 816)   36  Status post total knee replacement (V43 65) (Z96 659)   37  Stricture of esophagus (530 3) (K22 2)   38   Tooth infection (522 4) (K04 7)   39  Venous insufficiency (459 81) (I87 2)    Past Medical History  1  History of Abnormal blood chemistry (790 6) (R79 9)   2  History of Achalasia, esophageal (530 0) (K22 0)   3  History of Acute Medial Meniscus Tear (836 0)   4  History of Acute otitis externa, unspecified laterality   5  History of Acute pharyngitis (462) (J02 9)   6  History of Acute sinusitis (461 9) (J01 90)   7  History of Acute sinusitis (461 9) (J01 90)   8  History of Eagle's esophagus with high grade dysplasia (530 85) (K22 711)   9  History of Cerumen impaction (380 4) (H61 20)   10  History of Headache (784 0) (R51)   11  History of esophagogastroduodenoscopy (EGD) (V15 29) (Z98 890)   12  History of pneumonia (V12 61) (Z87 01)   13  History of sinusitis (V12 69) (Z87 09)   14  History of Impacted cerumen, unspecified laterality (380 4) (H61 20)   15  History of Knee Pain Elicited By Motion   16  History of Screening for depression (V79 0) (Z13 89)   17  History of Screening for genitourinary condition (V81 6) (Z13 89)   18  History of Screening for malignant neoplasm of breast (V76 10) (Z12 31)   19  History of Screening for neurological condition (V80 09) (Z13 89)   20  History of Screening for osteoporosis (V82 81) (Z13 820)   21  History of Shingles (053 9) (B02 9)  Active Problems And Past Medical History Reviewed: The active problems and past medical history were reviewed and updated today  Family History  Mother    1  Family history of Alzheimer's disease (V17 2) (Z82 0)  Father    2  Family history of Type 2 diabetes mellitus (250 00) (E11 9)  Family History Reviewed: The family history was reviewed and updated today  Social History   · Advance directive in chart (V4 89) (Z78 9)   · Denied: History of Being A Social Drinker   · Denied: Drug use (305 90) (F19 90)   · Never a smoker   · Patient has living will (V49 89) (Z78 9)  The social history was reviewed and updated today   The social history was reviewed and is unchanged  Surgical History  1  History of  Section   2  History of Esophagectomy Transhiatal   3  History of J-Tube Replacement Date   4  History of Total Knee Arthroplasty  Surgical History Reviewed: The surgical history was reviewed and updated today  Current Meds   1  Acetaminophen 325 MG CAPS; Therapy: (Recorded:12Uwy6667) to Recorded   2  Escitalopram Oxalate 20 MG Oral Tablet; TAKE 1 TABLET EVERY MORNING; Therapy: 15PBP3762 to (Evaluate:93Lvn2032)  Requested for: 41URW4276; Last   Rx:2017 Ordered   3  Folic Acid 1 MG Oral Tablet; TAKE ONE (1) TABLET DAILY; Therapy: 26WVD1947 to (Ksenia Ramírez)  Requested for: 84RFQ9011; Last   Rx:2017 Ordered   4  Gabapentin 100 MG Oral Capsule; TAKE 1 CAPSULE BEDTIME MAY INCREASE TO 3   CAPSULES AT BEDTIME AS TOLERATED; Therapy: 20AFP4473 to (Evaluate:72Jwn1335)  Requested for: 11HFH0613; Last   Rx:96Mun9974 Ordered    The medication list was reviewed and updated today  Allergies  1  No Known Drug Allergies    Vitals   Recorded: 11AAF3654 08:05AM   Temperature 123 8 F   Systolic 651   Diastolic 80   Height 5 ft 1 in   Weight 166 lb 12 8 oz   BMI Calculated 31 52   BSA Calculated 1 75     Physical Exam    Constitutional   General appearance: No acute distress, well appearing and well nourished  Ears, Nose, Mouth, and Throat   External inspection of ears and nose: Normal     Otoscopic examination: Tympanic membranes translucent with normal light reflex  Canals patent without erythema  Nasal mucosa, septum, and turbinates: Abnormal  -- Sinuses are hyperemic  Oropharynx: Abnormal  -- Postnasal drip  Pulmonary   Respiratory effort: No increased work of breathing or signs of respiratory distress  Auscultation of lungs: Abnormal  -- Rhonchi  Cardiovascular   Palpation of heart: Normal PMI, no thrills  Auscultation of heart: Normal rate and rhythm, normal S1 and S2, without murmurs      Abdomen Fresh surgical scar from laparotomy  Liver and spleen: No hepatomegaly or splenomegaly  Lymphatic   Palpation of lymph nodes in neck: No lymphadenopathy  Musculoskeletal   Gait and station: Normal     Skin   Skin and subcutaneous tissue: Normal without rashes or lesions  Future Appointments    Date/Time Provider Specialty Site   01/03/2018 01:00 PM Yarelis Stahl MD Surgical Oncology [facility]  [place]   01/31/2018 10:10 AM IGOR Workman   Orthopedic Surgery [facility] SPECIAL[facility]     Signatures   Electronically signed by : Lilliam Michaels DO; Oct 26 2017  9:02AM EST                       (Author)

## 2018-01-03 ENCOUNTER — ALLSCRIPTS OFFICE VISIT (OUTPATIENT)
Dept: OTHER | Facility: OTHER | Age: 68
End: 2018-01-03

## 2018-01-04 NOTE — PROGRESS NOTES
Assessment   1  History of Eagle's esophagus with high grade dysplasia (530 85) (K22 711)   2  History of Eagle's esophagus with high grade dysplasia (530 85) (K22 711)    Plan   Gastroesophageal reflux disease    · 1 - Zeus Goodwin MD, Shraddha Cullen (Gastroenterology) Co-Management  *  Status: Active  Requested    for: 84CUO4605   Ordered; For: Gastroesophageal reflux disease; Ordered By: Marci Lundberg Performed:  Due: 32VFT1154  Care Summary provided  : Yes   · Follow-up PRN Evaluation and Treatment  Follow-up  Status: Complete  Done:    70PPG6473   Ordered; For: Gastroesophageal reflux disease; Ordered By: Marci Lundberg Performed:  Due: 40BCJ0794    Discussion/Summary   Discussion Summary:    68-year-old woman with history of high-grade dysplasia/Eagle's esophagus  Patient doing well after transhiatal esophagectomy  Will therefore plan on seeing her on an as-needed basis  Will set up to see and follow up with our GI team here given her prior diagnosis  She may merit surveillance endoscopy in the 1st year after operation  Counseling Documentation With Imm: The patient was counseled regarding diagnostic results,-- instructions for management,-- impressions,-- importance of compliance with treatment  Chief Complaint   Chief Complaint Free Text Note Form: Patient here for 3 month follow up Eagle's esophagus with high grade dysplasia  Patient Reports no major complaints  Her diet and appetite have normalized over the last 3 months  History of Present Illness   Interval History:   She is doing well  She is able to eat fairly normally now  She is having little to no reflux symptoms  Her bowels are moving fairly normally  Review of Systems   Complete Female ROS SurgOnc:      Constitutional: The patient denies new or recent history of general fatigue, no recent weight loss, no change in appetite  Eyes: No complaints of visual problems, no scleral icterus        ENT: no complaints of ear pain, no hoarseness, no difficulty swallowing,-- no tinnitus-- and-- no new masses in head, oral cavity, or neck  Cardiovascular: No complaints of chest pain, no palpitations, no ankle edema  Respiratory: No complaints of shortness of breath, no cough  Gastrointestinal: No complaints of jaundice, no bloody stools, no pale stools  Genitourinary: No complaints of dysuria, no hematuria, no nocturia, no frequent urination, no urethral discharge  Musculoskeletal: No complaints of weakness, paralysis, joint stiffness or arthralgias,  Integumentary: No complaints of rash, no new lesions  Neurological: No complaints of convulsions, no seizures, no dizziness  Hematologic/Lymphatic: No complaints of easy bruising  ROS Reviewed:    ROS reviewed  Active Problems   1  3-vessel CAD (414 00) (I25 10)   2  Abnormal blood chemistry (790 6) (R79 9)   3  Acute maxillary sinusitis, recurrence not specified (461 0) (J01 00)   4  Acute tracheobronchitis (466 0) (J20 9)   5  Admission for long-term (current) use of other medications (V58 69) (Z51 81)   6  Advance directive in chart (V49 89) (Z78 9)   7  A-fib (427 31) (I48 91)   8  Anemia (285 9) (D64 9)   9  Atypical chest pain (786 59) (R07 89)   10  Backache with radiation (724 5) (M54 9)   11  Chronic pain of right knee (848 37,878 87) (M25 561,G89 29)   12  Colon cancer screening (V76 51) (Z12 11)   13  Continuous left lower quadrant pain (789 04) (R10 32)   14  Depression (311) (F32 9)   15  Dysphagia (787 20) (R13 10)   16  Encounter for mammogram to establish baseline mammogram (V76 12) (Z12 31)   17  Encounter for routine gynecological examination with Papanicolaou smear of cervix      (V72 31,V76 2) (Z01 419)   18  Exercise counseling (V65 41) (Z71 82)   19  Gastroesophageal reflux disease (530 81) (K21 9)   20  Hyperlipidemia (272 4) (E78 5)   21  Hypothyroidism (244 9) (E03 9)   22   Insomnia (780 52) (G47 00)   23  Jejunostomy tube present (V44 4) (Z93 4)   24  Joint pain, knee (719 46) (M25 569)   25  Low vitamin D level (268 9) (E55 9)   26  Lumbar radiculopathy (724 4) (M54 16)   27  Myofascial pain (729 1) (M79 1)   28  Need for hepatitis C screening test (V73 89) (Z11 59)   29  Need for influenza vaccination (V04 81) (Z23)   30  Osteoarthritis (715 90) (M19 90)   31  Osteoarthritis of knee (715 36) (M17 10)   32  Right leg paresthesias (782 0) (R20 2)   33  Sciatica (724 3) (M54 30)   34  Screening for genitourinary condition (V81 6) (Z13 89)   35  Spondylosis of 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 (721 3) (M47 816)   36  Status post total knee replacement (V43 65) (Z96 659)   37  Stricture of esophagus (530 3) (K22 2)   38  Tooth infection (522 4) (K04 7)   39  Venous insufficiency (459 81) (I87 2)    Past Medical History   1  History of Abnormal blood chemistry (790 6) (R79 9)   2  History of Achalasia, esophageal (530 0) (K22 0)   3  History of Acute Medial Meniscus Tear (836 0)   4  History of Acute otitis externa, unspecified laterality   5  History of Acute pharyngitis (462) (J02 9)   6  History of Acute sinusitis (461 9) (J01 90)   7  History of Acute sinusitis (461 9) (J01 90)   8  History of Eagle's esophagus with high grade dysplasia (530 85) (K22 711)   9  History of Eagle's esophagus with high grade dysplasia (530 85) (K22 711)   10  History of Cerumen impaction (380 4) (H61 20)   11  History of Headache (784 0) (R51)   12  History of esophagogastroduodenoscopy (EGD) (V15 29) (Z98 890)   13  History of pneumonia (V12 61) (Z87 01)   14  History of sinusitis (V12 69) (Z87 09)   15  History of Impacted cerumen, unspecified laterality (380 4) (H61 20)   16  History of Knee Pain Elicited By Motion   17  History of Screening for depression (V79 0) (Z13 89)   18  History of Screening for genitourinary condition (V81 6) (Z13 89)   19  History of Screening for malignant neoplasm of breast (V76 10) (Z12 31)   20  History of Screening for neurological condition (V80 09) (Z13 89)   21  History of Screening for osteoporosis (V82 81) (Z13 820)   22  History of Shingles (053 9) (B02 9)    Surgical History   1  History of  Section   2  History of Esophagectomy Transhiatal   3  History of J-Tube Replacement Date   4  History of Total Knee Arthroplasty   · Dr Marlene Ho  Surgical History Reviewed: The surgical history was reviewed and updated today  Family History   Mother    1  Family history of Alzheimer's disease (V17 2) (Z82 0)  Father    2  Family history of Type 2 diabetes mellitus (250 00) (E11 9)  Family History Reviewed: The family history was reviewed and updated today  Social History    · Advance directive in chart (V49 89) (Z78 9)   · Denied: History of Being A Social Drinker   · Denied: Drug use (305 90) (F19 90)   · Never a smoker   · Patient has living will (V49 89) (Z78 9)  Social History Reviewed: The social history was reviewed and updated today  The social history was reviewed and is unchanged  Current Meds    1  Acetaminophen 325 MG CAPS; Therapy: (Recorded:57Nbr9487) to Recorded   2  Amoxicillin 250 MG/5ML Oral Suspension Reconstituted; SWALLOW 10 ML 3 times daily; Therapy: 07SLO6549 to (Nerissa Coffee)  Requested for: 2017; Last     Rx:2017 Ordered   3  Escitalopram Oxalate 20 MG Oral Tablet; TAKE 1 TABLET EVERY MORNING; Therapy: 02IPW2693 to (Evaluate:45Gsa1017)  Requested for: 17OCF1479; Last     Rx:2017 Ordered   4  Folic Acid 1 MG Oral Tablet; TAKE ONE (1) TABLET DAILY; Therapy: 45AMI2146 to (Nerissa Coffee)  Requested for: 80JVC3600; Last     Rx:2017 Ordered   5  Gabapentin 100 MG Oral Capsule; TAKE 1 CAPSULE BEDTIME MAY INCREASE TO 3     CAPSULES AT BEDTIME AS TOLERATED; Therapy: 53QEM8328 to (Evaluate:2018)  Requested for: 52OGU0273; Last     Rx:74Wgr0971 Ordered   6   Pantoprazole Sodium 40 MG Oral Tablet Delayed Release; TAKE 1 TABLET DAILY; Therapy: 76DXD7383 to (Evaluate:15Mar2018)  Requested for: 05ZXC1463; Last     Rx:48Kmo8484 Ordered  Medication List Reviewed: The medication list was reviewed and updated today  Allergies   1  No Known Drug Allergies    Vitals   Vital Signs    Recorded: 72EGC4613 01:39PM   Temperature 97 7 F   Heart Rate 89   Respiration 16   Systolic 345   Diastolic 80   Height 5 ft 1 in   Weight 159 lb    BMI Calculated 30 04   BSA Calculated 1 71   O2 Saturation 98     Physical Exam        Constitutional: General appearance: The Patient is well-developed, well-nourished female who appears her stated age in no acute distress  She is pleasant and talkative  HEENT: Conjunctiva and lids: No swelling, erythema, or discharge  -- Sclerae are anicteric  -- External inspection of ears and nose: Normal  -- Nasal mucosa, septum, and turbinates: Normal without edema or erythema  -- Mucous membranes are moist  -- Neck is supple without adenopathy  No JVD  Chest: There are bilaterally symmetrical breath sounds  -- The lungs are clear to auscultation  Cardiac: Heart is regular rate  Neuro: Grossly nonfocal  -- Gait is normal  -- Sensation is intact to light touch  -- Orientation to person, place and time: Normal  -- Mood and affect: Normal        Lymphatic: no evidence of cervical adenopathy bilaterally  -- no evidence of axillary adenopathy bilaterally  Future Appointments      Date/Time Provider Specialty Site   01/31/2018 10:10 AM IGOR Syed  Orthopedic Surgery [facility]     End of Encounter Meds   1  Amoxicillin 250 MG/5ML Oral Suspension Reconstituted; SWALLOW 10 ML 3 times daily; Therapy: 85NCF7738 to (Velna Leopard)  Requested for: 26Oct2017; Last     Rx:26Oct2017 Ordered  2  Pantoprazole Sodium 40 MG Oral Tablet Delayed Release (Protonix); TAKE 1 TABLET     DAILY;      Therapy: 19MQA5356 to (Evaluate:15Mar2018)  Requested for: 28FGM8239; Last Rx:15Nov2017 Ordered  3  Folic Acid 1 MG Oral Tablet; TAKE ONE (1) TABLET DAILY; Therapy: 92JEZ7704 to (Ander Heard)  Requested for: 61VKK8624; Last     Rx:02Jun2017 Ordered  4  Escitalopram Oxalate 20 MG Oral Tablet; TAKE 1 TABLET EVERY MORNING; Therapy: 34SJA5220 to (Evaluate:85Jsp1717)  Requested for: 85QLC1962; Last     Rx:29Nov2017 Ordered  5  Gabapentin 100 MG Oral Capsule; TAKE 1 CAPSULE BEDTIME MAY INCREASE TO 3     CAPSULES AT BEDTIME AS TOLERATED; Therapy: 62FSN4855 to (Evaluate:14Jan2018)  Requested for: 20EPQ7357; Last     Rx:15Nov2017 Ordered  6  Acetaminophen 325 MG CAPS;      Therapy: (Archie Smith) to Recorded    Signatures    Electronically signed by : Zahira Lugo MD; Sen  3 2018  2:03PM EST                       (Author)

## 2018-01-09 ENCOUNTER — ALLSCRIPTS OFFICE VISIT (OUTPATIENT)
Dept: OTHER | Facility: OTHER | Age: 68
End: 2018-01-09

## 2018-01-09 NOTE — RESULT NOTES
Verified Results  * XR CHEST PA & LATERAL 65PZB0968 10:58AM Marcelo Goldman Order Number: [id number]     Test Name Result Flag Reference   XR CHEST PA & LATERAL (Report)     CHEST     INDICATION: Epigastric pain  COMPARISON: November 3, 2014     VIEWS: PA and lateral; 2 images     FINDINGS:     The cardiomediastinal silhouette is unremarkable  The lungs are clear  No pleural effusions  Mild degenerative changes, thoracic spine  Mild thoracic dextroscoliosis  IMPRESSION:     No active pulmonary disease          Workstation performed: [id number]     Signed by:   Ene Copeland MD   11/22/16

## 2018-01-10 NOTE — RESULT NOTES
Verified Results  (1) HEP C ANTIBODY 22Nov2016 11:10AM Carlyon Lemon   TW Order Number: [id number]     Test Name Result Flag Reference   HEPATITIS C ANTIBODY Non-reactive  Non-reactive     (1) CK (CPK) 10FEI2252 11:10AM [employer] Order Number: [id number]     Test Name Result Flag Reference   CK (CPK) 49 U/L       (1) TROPONIN I 62JWJ8539 11:10AM [employer] Order Number: [id number]     Test Name Result Flag Reference   TROPONIN I <0 02 ng/mL  <=0 04   3Autovalidation override      [employer] Chemistry analyzer 99% cutoff is > 0 04 ng/mL in network labs    o cTnI 99% cutoff is useful only when applied to patients in the clinical setting of myocardial ischemia  o cTnI 99% cutoff should be interpreted in the context of clinical history, ECG findings and possibly cardiac imaging to establish correct diagnosis  o cTnI 99% cutoff may be suggestive but clearly not indicative of a coronary event without the clinical setting of myocardial ischemia  (1) D-DIMER 20ONH1931 11:10AM [employer] Order Number: [id number]     Test Name Result Flag Reference   D-DIMER 288 ng/ml (FEU)  0-424   Reference and upper limits to exclude DVT and PE are the same  Do not use to exclude if clinical symptoms are present  Pregnant women:  1st trimester:  <220 - 1060 ng/ml (FEU)  2nd trimester:  <220 - 1880 ng/ml (FEU)  3rd trimester:   238 - 3280 ng/ml (FEU)     (1) COMPREHENSIVE METABOLIC PANEL 99MBC7287 97:03DU Carlyon Lemon   TW Order Number: [id number]     Test Name Result Flag Reference   GLUCOSE,RANDM 92 mg/dL     If the patient is fasting, the ADA then defines impaired fasting glucose as > 100 mg/dL and diabetes as > or equal to 123 mg/dL     SODIUM 137 mmol/L  136-145   POTASSIUM 3 6 mmol/L  3 5-5 3   CHLORIDE 102 mmol/L  100-108   CARBON DIOXIDE 26 mmol/L  21-32   ANION GAP (CALC) 9 mmol/L  4-13   BLOOD UREA NITROGEN 12 mg/dL  5-25   CREATININE 0 73 mg/dL  0 60-1 30   Standardized to IDMS reference method   CALCIUM 9 0 mg/dL  8 3-10 1   BILI, TOTAL 0 50 mg/dL  0 20-1 00   ALK PHOSPHATAS 73 U/L     ALT (SGPT) 24 U/L  12-78   AST(SGOT) 19 U/L  5-45   ALBUMIN 3 7 g/dL  3 5-5 0   TOTAL PROTEIN 7 4 g/dL  6 4-8 2   eGFR Non-African American      >60 0 ml/min/1 73sq [facility] Disease Education Program recommendations are as follows:  GFR calculation is accurate only with a steady state creatinine  Chronic Kidney disease less than 60 ml/min/1 73 sq  meters  Kidney failure less than 15 ml/min/1 73 sq  meters

## 2018-01-10 NOTE — RESULT NOTES
Message   Has Eagle's, but no cancer found on biopsies  Continue PPI  Will repeat EGD in 2-3 months to rebiopsy the ulcerated stricture  Verified Results  (1) TISSUE EXAM 38ZAO7278 10:56AM Carmell Common     Test Name Result Flag Reference   LAB AP CASE REPORT (Report)     Surgical Pathology Report             Case: [id number]                   Authorizing Provider: Laila Farrar MD      Collected:      03/10/2017 1056        Ordering Location:   [facility] Received:      03/10/2017 1332                    Operating Room                                 Pathologist:      Laazro Weathers DO                               Specimens:  A) - Esophagus, Cold forcep Bx of distal esophagus R/O Cancer                     B) - Esophagus, Cold forcep Bx of Proximal esophagus R/O EOE   LAB AP FINAL DIAGNOSIS (Report)     A  Esophagus, distal, biopsy:  - Glandular mucosa with acute and chronic inflammation, focal intestinal   metaplasia and marked reactive atypia  Note: This biopsy shows gastric-type mucosa with scattered goblet cells  The diagnosis in this case depends on the location of this biopsy  If   this biopsy was taken from the tubular esophagus, it represents Eagle   mucosa of the distinctive type  If this biopsy was taken from the gastric   cardia, it represents intestinal metaplasia of the gastric cardia  Recommend clinical correlation  B  Esophagus, proximal, biopsy:  - Benign squamous mucosa with nonspecific reactive changes   - No intraepithelial eosinophils, columnar mucosa, intestinal metaplasia   or dysplasia identified  Interpretation performed at [facility]  Electronically signed by Lazaro Weathers DO on 3/13/2017 at 2:31 PM   LAB AP SURGICAL ADDITIONAL INFORMATION (Report)     These tests were developed and their performance characteristics   determined by [facility] or [address] [place] [address]   They may not be cleared or approved by the [place]  Food and   Drug Administration  The FDA has determined that such clearance or   approval is not necessary  These tests are used for clinical purposes  They should not be regarded as investigational or for research  This   laboratory has been approved by [place] [address], designated as a high-complexity   laboratory and is qualified to perform these tests  LAB AP GROSS DESCRIPTION (Report)     A  The specimen is received in formalin, labeled with the patient's name   and hospital number, and is designated biopsy of distal esophagus  The   specimen consists of 4 tan soft tissue fragments ranging in greatest   dimension from 0 2 to 0 4 centimeters  Entirely submitted  One cassette  B  The specimen is received in formalin, labeled with the patient's name   and hospital number, and is designated biopsy of proximal esophagus  The specimen consists of a single tan soft tissue fragment measuring 0 2   centimeters in greatest dimension  Entirely submitted  One cassette  Note: The estimated total formalin fixation time based upon information   provided by the submitting clinician and the standard processing schedule   is over 72 hours      BMV

## 2018-01-10 NOTE — PROGRESS NOTES
Assessment   1  Acute sinusitis (461 9) (J01 90)    Plan   Acute sinusitis    · Amoxicillin 500 MG Oral Capsule; TAKE 1 CAPSULE 3 TIMES DAILY  Encounter for screening mammogram for malignant neoplasm of breast    · MAMMO SCREENING BILATERAL W 3D & CAD; Status:Temporary Deferral - Pt requests    deferral;    1/9/2019  Need for influenza vaccination    · Stop: Fluzone High-Dose 0 5 ML Intramuscular Suspension Prefilled    Syringe  Screening for depression    · *VB - PHQ-9 Tool; Status:Complete;   Done: 55UBW2974 10:50AM  Screening for genitourinary condition    · *VB - Urinary Incontinence Screen (Dx Z13 89 Screen for UI); Status:Complete;   Done:    64WCE3147 10:50AM  Screening for neurological condition    · *VB - Fall Risk Assessment  (Dx Z13 89 Screen for Neurologic Disorder);    Status:Complete;   Done: 28ZYZ4248 10:48AM    Discussion/Summary   The patient was counseled regarding instructions for management,-- risk factor reductions,-- prognosis,-- patient and family education,-- risks and benefits of treatment options,-- importance of compliance with treatment  Possible side effects of new medications were reviewed with the patient/guardian today  The treatment plan was reviewed with the patient/guardian  The patient/guardian understands and agrees with the treatment plan      Chief Complaint   1  Cold Symptoms  Erika Padilla is here today with cold symptoms x few days  History of Present Illness   HPI: See chief complaint  Review of Systems        Constitutional: chills, but-- no fever  ENT: sore throat--       The patient presents with complaints of right earache  Cardiovascular: no chest pain-- and-- no palpitations  Respiratory: cough-- and-- PND, but-- no shortness of breath-- and-- no wheezing  Gastrointestinal: no abdominal pain,-- no vomiting,-- no constipation-- and-- no blood in stools--       The patient presents with complaints of occasional episodes of mild diarrhea  Musculoskeletal: no arthralgias-- and-- no myalgias  Integumentary: no rashes  Neurological: no headache  ROS reviewed  Active Problems   1  3-vessel CAD (414 00) (I25 10)   2  Abnormal blood chemistry (790 6) (R79 9)   3  Acute maxillary sinusitis, recurrence not specified (461 0) (J01 00)   4  Acute tracheobronchitis (466 0) (J20 9)   5  Admission for long-term (current) use of other medications (V58 69) (Z51 81)   6  Advance directive in chart (V49 89) (Z78 9)   7  A-fib (427 31) (I48 91)   8  Anemia (285 9) (D64 9)   9  Atypical chest pain (786 59) (R07 89)   10  Backache with radiation (724 5) (M54 9)   11  Chronic pain of right knee (273 42,383 23) (M25 561,G89 29)   12  Colon cancer screening (V76 51) (Z12 11)   13  Continuous left lower quadrant pain (789 04) (R10 32)   14  Depression (311) (F32 9)   15  Dysphagia (787 20) (R13 10)   16  Encounter for mammogram to establish baseline mammogram (V76 12) (Z12 31)   17  Encounter for routine gynecological examination with Papanicolaou smear of cervix      (V72 31,V76 2) (Z01 419)   18  Encounter for screening mammogram for malignant neoplasm of breast (V76 12)      (Z12 31)   19  Exercise counseling (V65 41) (Z71 82)   20  Gastroesophageal reflux disease (530 81) (K21 9)   21  Hyperlipidemia (272 4) (E78 5)   22  Hypothyroidism (244 9) (E03 9)   23  Insomnia (780 52) (G47 00)   24  Jejunostomy tube present (V44 4) (Z93 4)   25  Joint pain, knee (719 46) (M25 569)   26  Low vitamin D level (268 9) (E55 9)   27  Lumbar radiculopathy (724 4) (M54 16)   28  Myofascial pain (729 1) (M79 1)   29  Need for hepatitis C screening test (V73 89) (Z11 59)   30  Need for influenza vaccination (V04 81) (Z23)   31  Osteoarthritis (715 90) (M19 90)   32  Osteoarthritis of knee (715 36) (M17 10)   33  Right leg paresthesias (782 0) (R20 2)   34  Sciatica (724 3) (M54 30)   35  Screening for depression (V79 0) (Z13 89)   36   Screening for genitourinary condition (V81 6) (Z13 89)   37  Screening for neurological condition (V80 09) (Z13 89)   38  Spondylosis of 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 (721 3) (M47 816)   39  Status post total knee replacement (V43 65) (Z96 659)   40  Stricture of esophagus (530 3) (K22 2)   41  Tooth infection (522 4) (K04 7)   42  Venous insufficiency (459 81) (I87 2)    Past Medical History   1  History of Abnormal blood chemistry (790 6) (R79 9)   2  History of Achalasia, esophageal (530 0) (K22 0)   3  History of Acute Medial Meniscus Tear (836 0)   4  History of Acute otitis externa, unspecified laterality   5  History of Acute pharyngitis (462) (J02 9)   6  History of Acute sinusitis (461 9) (J01 90)   7  History of Acute sinusitis (461 9) (J01 90)   8  History of Eagle's esophagus with high grade dysplasia (530 85) (K22 711)   9  History of Eagle's esophagus with high grade dysplasia (530 85) (K22 711)   10  History of Cerumen impaction (380 4) (H61 20)   11  History of Headache (784 0) (R51)   12  History of esophagogastroduodenoscopy (EGD) (V15 29) (Z98 890)   13  History of pneumonia (V12 61) (Z87 01)   14  History of sinusitis (V12 69) (Z87 09)   15  History of Impacted cerumen, unspecified laterality (380 4) (H61 20)   16  History of Knee Pain Elicited By Motion   17  History of Screening for genitourinary condition (V81 6) (Z13 89)   18  History of Screening for malignant neoplasm of breast (V76 10) (Z12 31)   19  History of Screening for osteoporosis (V82 81) (Z13 820)   20  History of Shingles (053 9) (B02 9)  Active Problems And Past Medical History Reviewed: The active problems and past medical history were reviewed and updated today  Family History   Mother    1  Family history of Alzheimer's disease (V17 2) (Z82 0)  Father    2  Family history of Type 2 diabetes mellitus (250 00) (E11 9)  Family History Reviewed: The family history was reviewed and updated today         Social History    · Advance directive in chart (V49 89) (Z78 9)   · Denied: History of Being A Social Drinker   · Denied: Drug use (305 90) (F19 90)   · Never a smoker   · Patient has living will (V49 89) (Z78 9)  The social history was reviewed and updated today  The social history was reviewed and is unchanged  Surgical History   1  History of  Section   2  History of Esophagectomy Transhiatal   3  History of J-Tube Replacement Date   4  History of Total Knee Arthroplasty  Surgical History Reviewed: The surgical history was reviewed and updated today  Current Meds    1  Escitalopram Oxalate 20 MG Oral Tablet; TAKE 1 TABLET EVERY MORNING; Therapy: 07DFN1871 to (Evaluate:90Vhu3256)  Requested for: 98ACF5639; Last     Rx:2017 Ordered   2  Folic Acid 1 MG Oral Tablet; TAKE ONE (1) TABLET DAILY; Therapy: 09BRC8273 to (Mando Barrera)  Requested for: 82OTY1576; Last     Rx:2017 Ordered   3  Gabapentin 100 MG Oral Capsule; TAKE 1 CAPSULE BEDTIME MAY INCREASE TO 3     CAPSULES AT BEDTIME AS TOLERATED; Therapy: 43YDY3790 to (Evaluate:2018)  Requested for: 19EMA4296; Last     Rx:01Fbh1319 Ordered   4  Pantoprazole Sodium 40 MG Oral Tablet Delayed Release; TAKE 1 TABLET DAILY; Therapy: 06LZS6992 to (Evaluate:2018)  Requested for: 34GJX9684; Last     Rx:02Vhm5144 Ordered     The medication list was reviewed and updated today  Allergies   1  No Known Drug Allergies    Vitals    Recorded: 73YVN3037 10:45AM   Temperature 91 8 F   Systolic 984, LUE, Sitting   Diastolic 86, LUE, Sitting   Height 5 ft 1 in   Weight 157 lb 6 0 oz   BMI Calculated 29 74   BSA Calculated 1 71     Physical Exam        Constitutional      General appearance: No acute distress, well appearing and well nourished         Ears, Nose, Mouth, and Throat      External inspection of ears and nose: Normal        Otoscopic examination: Abnormal   The right tympanic membrane had a diminished light reflex, but-- was not red  The left tympanic membrane had a diminished light reflex, but-- was not red  Oropharynx: Abnormal   The posterior pharynx was erythematous, but-- did not have an exudate  Pulmonary      Respiratory effort: No increased work of breathing or signs of respiratory distress  Auscultation of lungs: Clear to auscultation  Cardiovascular      Auscultation of heart: Normal rate and rhythm, normal S1 and S2, without murmurs  Lymphatic      Palpation of lymph nodes in neck: Abnormal   bilateral anterior cervical node enlargement  Skin      Skin and subcutaneous tissue: Normal without rashes or lesions  Psychiatric      Orientation to person, place, and time: Normal        Mood and affect: Normal           Results/Data   *VB - PHQ-9 Tool 71NUV4617 10:50AM Homa James      Test Name Result Flag Reference   PHQ-9 Adult Depression Score 0     PHQ-9 Adult Depression Screening Negative        *VB - Urinary Incontinence Screen (Dx Z13 89 Screen for UI) 95TCO9883 10:50AM Tyronlalyjessica James      Test Name Result Flag Reference   Urinary Incontinence Assessment      1/9/18-No urinary incontinence  *VB - Fall Risk Assessment  (Dx Z13 89 Screen for Neurologic Disorder) 75NGU7629 10:48AM Tyronlalyjessica James      Test Name Result Flag Reference   Falls Risk      No falls in the past year        Future Appointments      Date/Time Provider Specialty Site   02/26/2018 02:00 PM Jordi Ro MD Gastroenterology Adult [place] [address]   01/31/2018 10:10 AM [facility]   Orthopedic Surgery [facility]     Signatures    Electronically signed by : Kindra Rodriguez [facility]; Jan 9 2018 11:13AM EST                       (Author)     Electronically signed by : Amilcar Gutierrez DO; Jan 9 2018 11:53AM EST                       (Author)

## 2018-01-12 VITALS
BODY MASS INDEX: 33.25 KG/M2 | TEMPERATURE: 97.4 F | OXYGEN SATURATION: 95 % | HEART RATE: 77 BPM | WEIGHT: 176 LBS | DIASTOLIC BLOOD PRESSURE: 81 MMHG | SYSTOLIC BLOOD PRESSURE: 129 MMHG

## 2018-01-13 VITALS
OXYGEN SATURATION: 97 % | HEART RATE: 82 BPM | TEMPERATURE: 97.9 F | WEIGHT: 174.5 LBS | SYSTOLIC BLOOD PRESSURE: 137 MMHG | DIASTOLIC BLOOD PRESSURE: 73 MMHG | BODY MASS INDEX: 32.97 KG/M2

## 2018-01-13 VITALS
BODY MASS INDEX: 31.49 KG/M2 | DIASTOLIC BLOOD PRESSURE: 80 MMHG | HEIGHT: 61 IN | TEMPERATURE: 100.1 F | WEIGHT: 166.8 LBS | SYSTOLIC BLOOD PRESSURE: 128 MMHG

## 2018-01-13 VITALS
SYSTOLIC BLOOD PRESSURE: 140 MMHG | WEIGHT: 172.38 LBS | TEMPERATURE: 98.2 F | BODY MASS INDEX: 32.55 KG/M2 | HEIGHT: 61 IN | DIASTOLIC BLOOD PRESSURE: 80 MMHG

## 2018-01-13 NOTE — MISCELLANEOUS
Message   Recorded as Task   Date: 05/01/2017 12:02 PM, Created By: Geronimo Thomas   Task Name: Call Back   Assigned To: [facility] [facility],Team   Regarding Patient: Susie Warner, Status: In Progress   Abdulaziz Kidd - 01 May 2017 12:02 PM     TASK CREATED  [facility]- Patient called today stating she never rec'd a reminder call or info about her scheduled procedure for tomorrow  Stated she doesn't know where to go or what she needs to do before appt  Please c/b [phone]  Eyal Cap - 01 May 2017 3:26 PM     TASK IN PROGRESS   Eyal Cap - 01 May 2017 3:28 PM     TASK EDITED  Spoke with patient, informed her that the procedure was at the hospital and that someone from the hospital will call her with the exact time to go and register  patient understood, she was pleasant and appreciative of the call back        Active Problems    1  3-vessel CAD (414 00) (I25 10)   2  Abnormal blood chemistry (790 6) (R79 9)   3  Acute maxillary sinusitis, recurrence not specified (461 0) (J01 00)   4  Acute tracheobronchitis (466 0) (J20 9)   5  Admission for long-term (current) use of other medications (V58 69) (Z51 81)   6  A-fib (427 31) (I48 91)   7  Anemia (285 9) (D64 9)   8  Atypical chest pain (786 59) (R07 89)   9  Backache with radiation (724 5) (M54 9)   10  Eagle esophagus (530 85) (K22 70)   11  Chronic pain of right knee (398 43,334 68) (M25 561,G89 29)   12  Colon cancer screening (V76 51) (Z12 11)   13  Continuous left lower quadrant pain (789 04) (R10 32)   14  Depression (311) (F32 9)   15  Dysphagia (787 20) (R13 10)   16  Encounter for mammogram to establish baseline mammogram (V76 12) (Z12 31)   17  Encounter for routine gynecological examination with Papanicolaou smear of cervix    (V72 31,V76 2) (Z01 419)   18  Gastroesophageal reflux disease (530 81) (K21 9)   19  Hyperlipidemia (272 4) (E78 5)   20  Hypothyroidism (244 9) (E03 9)   21   Insomnia (780 52) (G47 00)   22  Joint pain, knee (719 46) (M25 569)   23  Low vitamin D level (268 9) (E55 9)   24  Lumbar radiculopathy (724 4) (M54 16)   25  Myofascial pain (729 1) (M79 1)   26  Need for hepatitis C screening test (V73 89) (Z11 59)   27  Need for influenza vaccination (V04 81) (Z23)   28  Osteoarthritis (715 90) (M19 90)   29  Osteoarthritis of knee (715 36) (M17 10)   30  Right leg paresthesias (782 0) (R20 2)   31  Sciatica (724 3) (M54 30)   32  Screening for genitourinary condition (V81 6) (Z13 89)   33  Spondylosis of 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 (721 3) (M47 816)   34  Status post total knee replacement (V43 65) (Z96 659)   35  Stricture of esophagus (530 3) (K22 2)   36  Tooth infection (522 4) (K04 7)   37  Venous insufficiency (459 81) (I87 2)    Current Meds   1  Diclofenac Sodium 50 MG Oral Tablet Delayed Release; TAKE 1 TABLET PO BID prn; Therapy: 27FPU4579 to (Renée Santizo)  Requested for: 23Feb2017; Last   Rx:37Okj8622 Ordered   2  Escitalopram Oxalate 20 MG Oral Tablet; TAKE 1 TABLET EVERY MORNING; Therapy: 09YZJ0220 to (Evaluate:22Jun2017)  Requested for: 81Yfi0837; Last   Rx:93But8202 Ordered   3  Folic Acid 1 MG Oral Tablet; Take 1 tablet daily; Therapy: 66NVW9808 to (Renée Santizo)  Requested for: 08LCM3942; Last   Rx:28Nov2016 Ordered   4  Gabapentin 100 MG Oral Capsule; TAKE 1 CAPSULE BEDTIME MAY INCREASE TO 3   CAPSULES AT BEDTIME AS TOLERATED; Therapy: 93GPE6789 to (Evaluate:86Xro7629)  Requested for: 61OKQ7506; Last   Rx:95Isx8043 Ordered   5  Omeprazole 40 MG Oral Capsule Delayed Release; Take 1 capsule twice daily; Therapy: 64DSZ7477 to (Evaluate:02Oct2017)  Requested for: 22Mxg9459; Last   Rx:05Apr2017 Ordered   6  Pantoprazole Sodium 40 MG Oral Tablet Delayed Release (Protonix); TAKE 1 TABLET   DAILY; Therapy: 15JXO2321 to (Evaluate:28Jun2017)  Requested for: 01OGB9535; Last   Rx:94Bjj5197 Ordered   7   Pravastatin Sodium 20 MG Oral Tablet; TAKE 1 TABLET AT BEDTIME; Therapy: 08RNS0605 to (Evaluate:07Apr2017)  Requested for: 12Apr2016; Last   Rx:12Apr2016 Ordered    Allergies    1   No Known Drug Allergies    Signatures   Electronically signed by : Josh Littlejohn, ; May  1 2017  3:29PM EST                       (Author)

## 2018-01-14 VITALS
SYSTOLIC BLOOD PRESSURE: 119 MMHG | BODY MASS INDEX: 32.55 KG/M2 | WEIGHT: 172.38 LBS | HEART RATE: 102 BPM | HEIGHT: 61 IN | DIASTOLIC BLOOD PRESSURE: 76 MMHG

## 2018-01-14 VITALS
WEIGHT: 172.25 LBS | HEIGHT: 61 IN | BODY MASS INDEX: 32.52 KG/M2 | HEART RATE: 92 BPM | SYSTOLIC BLOOD PRESSURE: 146 MMHG | OXYGEN SATURATION: 96 % | TEMPERATURE: 98.2 F | DIASTOLIC BLOOD PRESSURE: 82 MMHG

## 2018-01-14 VITALS
TEMPERATURE: 98.6 F | SYSTOLIC BLOOD PRESSURE: 118 MMHG | DIASTOLIC BLOOD PRESSURE: 70 MMHG | BODY MASS INDEX: 33.09 KG/M2 | WEIGHT: 175.13 LBS

## 2018-01-14 VITALS
HEIGHT: 61 IN | SYSTOLIC BLOOD PRESSURE: 108 MMHG | RESPIRATION RATE: 14 BRPM | BODY MASS INDEX: 33.04 KG/M2 | HEART RATE: 88 BPM | TEMPERATURE: 96.4 F | DIASTOLIC BLOOD PRESSURE: 64 MMHG | WEIGHT: 175 LBS | OXYGEN SATURATION: 97 %

## 2018-01-14 NOTE — MISCELLANEOUS
Message   Recorded as Task   Date: 05/08/2017 11:40 AM, Created By: Deandre Doyle   Task Name: Miscellaneous   Assigned To: Reva Bass   Regarding Patient: Bandar Brody, Status: In Progress   CommentClifton Chua - 08 May 2017 11:40 AM     TASK CREATED  S/P RIGHT GENICULATE NERVE BLOCKS DONE 5/2 @ '[facility] BY JW, F/U 5/30  Attempted to call patient 6 days post-op, Dorcas Hernandez - 08 May 2017 11:40 AM     TASK IN PROGRESS   Deandre Doyle - 09 May 2017 11:33 AM     TASK EDITED  Attempted to call patient 7 days post-op, Cande Lofton - 09 May 2017 1:28 PM     TASK EDITED  Pt left message w/[facility]:  [place] - Phone # [phone]  AdanmercedesNai gastelum - 09 May 2017 4:00 PM     TASK IN PROGRESS   Deandre Doyle - 09 May 2017 4:03 PM     TASK EDITED  Spoke with patient 7 days post-op, she states that she is doing "good"  She denies any s/s of infection, she still has some minimal swelling on the side of her knee but not where the injection was done  She rates her pain 3/10 but only when she bends her knee does she have pain  She is scheduled for follow up 5/30 and will call the office prior to appt if she has any questions, concerns  She was pleasant and appreciative of the call  Active Problems    1  3-vessel CAD (414 00) (I25 10)   2  Abnormal blood chemistry (790 6) (R79 9)   3  Acute maxillary sinusitis, recurrence not specified (461 0) (J01 00)   4  Acute tracheobronchitis (466 0) (J20 9)   5  Admission for long-term (current) use of other medications (V58 69) (Z51 81)   6  A-fib (427 31) (I48 91)   7  Anemia (285 9) (D64 9)   8  Atypical chest pain (786 59) (R07 89)   9  Backache with radiation (724 5) (M54 9)   10  Eagle esophagus (530 85) (K22 70)   11  Chronic pain of right knee (747 83,439 83) (M25 561,G89 29)   12  Colon cancer screening (V76 51) (Z12 11)   13   Continuous left lower quadrant pain (789 04) (R10 32) 14  Depression (311) (F32 9)   15  Dysphagia (787 20) (R13 10)   16  Encounter for mammogram to establish baseline mammogram (V76 12) (Z12 31)   17  Encounter for routine gynecological examination with Papanicolaou smear of cervix    (V72 31,V76 2) (Z01 419)   18  Gastroesophageal reflux disease (530 81) (K21 9)   19  Hyperlipidemia (272 4) (E78 5)   20  Hypothyroidism (244 9) (E03 9)   21  Insomnia (780 52) (G47 00)   22  Joint pain, knee (719 46) (M25 569)   23  Low vitamin D level (268 9) (E55 9)   24  Lumbar radiculopathy (724 4) (M54 16)   25  Myofascial pain (729 1) (M79 1)   26  Need for hepatitis C screening test (V73 89) (Z11 59)   27  Need for influenza vaccination (V04 81) (Z23)   28  Osteoarthritis (715 90) (M19 90)   29  Osteoarthritis of knee (715 36) (M17 10)   30  Right leg paresthesias (782 0) (R20 2)   31  Sciatica (724 3) (M54 30)   32  Screening for genitourinary condition (V81 6) (Z13 89)   33  Spondylosis of 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 (721 3) (M47 816)   34  Status post total knee replacement (V43 65) (Z96 659)   35  Stricture of esophagus (530 3) (K22 2)   36  Tooth infection (522 4) (K04 7)   37  Venous insufficiency (459 81) (I87 2)    Current Meds   1  Diclofenac Sodium 50 MG Oral Tablet Delayed Release; TAKE 1 TABLET PO BID prn; Therapy: 54XVQ7935 to (Ro Joseph)  Requested for: 88Sdd6941; Last   Rx:27Ypw5824 Ordered   2  Escitalopram Oxalate 20 MG Oral Tablet; TAKE 1 TABLET EVERY MORNING; Therapy: 36GTE4208 to (Evaluate:22Jun2017)  Requested for: 61Sps2237; Last   Rx:23Rkq5506 Ordered   3  Folic Acid 1 MG Oral Tablet; Take 1 tablet daily; Therapy: 27OOQ7950 to (Ro Joseph)  Requested for: 77CAR3860; Last   Rx:28Nov2016 Ordered   4  Gabapentin 100 MG Oral Capsule; TAKE 1 CAPSULE BEDTIME MAY INCREASE TO 3   CAPSULES AT BEDTIME AS TOLERATED; Therapy: 19XUL5104 to (Evaluate:24Apr2017)  Requested for: 15HPK1030; Last   Rx:49Jrg1955 Ordered   5  Omeprazole 40 MG Oral Capsule Delayed Release; Take 1 capsule twice daily; Therapy: 71ULB3518 to (Evaluate:02Oct2017)  Requested for: 37Fga8004; Last   Rx:05Apr2017 Ordered   6  Pantoprazole Sodium 40 MG Oral Tablet Delayed Release (Protonix); TAKE 1 TABLET   DAILY; Therapy: 95CCJ2864 to (Evaluate:28Jun2017)  Requested for: 49SDN1888; Last   Rx:70Ccu6306 Ordered   7  Pravastatin Sodium 20 MG Oral Tablet; TAKE 1 TABLET AT BEDTIME; Therapy: 42JTD0503 to (Evaluate:31Oct2017)  Requested for: 98XMX0991; Last   TP:34ZGV1144 Ordered    Allergies    1   No Known Drug Allergies    Signatures   Electronically signed by : Vivian Easley, ; May  9 2017  4:04PM EST                       (Author)

## 2018-01-14 NOTE — CONSULTS
I had the pleasure of evaluating your patient, Kelechi Clark  My full evaluation follows:      History of Present Illness    Diagnosis: Eagle's Esophagus with high grade dyplasia  Procedures: EGD, transhiatal esophagectomy, jejunostomy placement performed on 8/24/17  Pathology: Eagle's Esophagus with extensive high grade dysplasia, no invasive carcinoma identified  Margins negative for dysplasia/neoplasia  All 18 lymph nodes negative  Ms Domingo Darden is a 74 yo female with a history of GERD who was referred to our office by Dr Herminia Marquez for dysphagia, chronic distal esophageal stricture, and Eagle's esophagus with high-grade dysplasia  She was electively admitted on 8/24/17 to undergo a EGD and THE in conjunction with Dr Kelin Edwards  Her hospital course was only complicated by bilateral pleural effusions, requiring a left thoracentesis of 325 mL  She was discharged to home with VNA and full tube feed support on POD #7, 8/31/17  She was seen by Dr Kelin Edwards on 9/7/17 at which point he increased her to a soft diet and decrease her TF from 4 to 3 cans a day, secondary to diarrhea  Approximately one week later, after her appointment with Dr Kelin Edwards, she was admitted to [facility], secondary to weakness  On discussion with Ms Chung, she has not had TF since Saturday, secondary to diarrhea  She is on a regular diet of small portions at [facility], without limitation  She states her weight has been stable and is scheduled to be discharged to home today with VNA  She has only lost 6 lbs since before her surgery  She underwent a CXR on 9/15/17, which revealed a left lower lobe infiltrate  They placed her on Levaquin and her course is finished tomorrow  She denies fever, chills, cough, chest pain, shortness of breath, constipation, dysphagia, nausea, or vomiting  Active Problems    1  3-vessel CAD (414 00) (I25 10)   2  Abnormal blood chemistry (790 6) (R79 9)   3   Acute maxillary sinusitis, recurrence not specified (461 0) (J01 00)   4  Acute tracheobronchitis (466 0) (J20 9)   5  Admission for long-term (current) use of other medications (V58 69) (Z51 81)   6  A-fib (427 31) (I48 91)   7  Anemia (285 9) (D64 9)   8  Atypical chest pain (786 59) (R07 89)   9  Backache with radiation (724 5) (M54 9)   10  Eagle's esophagus with high grade dysplasia (530 85) (K22 711)   11  Chronic pain of right knee (618 72,429 12) (M25 561,G89 29)   12  Colon cancer screening (V76 51) (Z12 11)   13  Continuous left lower quadrant pain (789 04) (R10 32)   14  Depression (311) (F32 9)   15  Dysphagia (787 20) (R13 10)   16  Encounter for mammogram to establish baseline mammogram (V76 12) (Z12 31)   17  Encounter for routine gynecological examination with Papanicolaou smear of cervix    (V72 31,V76 2) (Z01 419)   18  Gastroesophageal reflux disease (530 81) (K21 9)   19  Hyperlipidemia (272 4) (E78 5)   20  Hypothyroidism (244 9) (E03 9)   21  Insomnia (780 52) (G47 00)   22  Joint pain, knee (719 46) (M25 569)   23  Low vitamin D level (268 9) (E55 9)   24  Lumbar radiculopathy (724 4) (M54 16)   25  Myofascial pain (729 1) (M79 1)   26  Need for hepatitis C screening test (V73 89) (Z11 59)   27  Need for influenza vaccination (V04 81) (Z23)   28  Osteoarthritis (715 90) (M19 90)   29  Osteoarthritis of knee (715 36) (M17 10)   30  Right leg paresthesias (782 0) (R20 2)   31  Sciatica (724 3) (M54 30)   32  Screening for genitourinary condition (V81 6) (Z13 89)   33  Spondylosis of 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 (721 3) (M47 816)   34  Status post total knee replacement (V43 65) (Z96 659)   35  Stricture of esophagus (530 3) (K22 2)   36  Tooth infection (522 4) (K04 7)   37  Venous insufficiency (459 81) (I87 2)    Current Meds   1  Acetaminophen 325 MG CAPS; Therapy: (Recorded:17Lpy8626) to Recorded   2  Albuterol Sulfate (2 5 MG/3ML) 0 083% Inhalation Nebulization Solution;    Therapy: (Caralyn Lion) to Recorded   3  Biscolax 10 MG Rectal Suppository; Therapy: (Luis Eduardo Yip) to Recorded   4  Diclofenac Sodium 50 MG Oral Tablet Delayed Release; TAKE 1 TABLET PO BID prn; Therapy: 39IXV5689 to (Uriel Blank)  Requested for: 46Bgr0390; Last   Rx:35Tbx1072 Ordered   5  Escitalopram Oxalate 20 MG Oral Tablet; TAKE 1 TABLET EVERY MORNING; Therapy: 54WVM1847 to (Evaluate:11Rje7138)  Requested for: 96OPP5504; Last   Rx:02Jun2017 Ordered   6  Fleet Enema 7-19 GM/118ML Rectal Enema; Therapy: (Luis Eduardo Yip) to Recorded   7  Folic Acid 1 MG Oral Tablet; TAKE ONE (1) TABLET DAILY; Therapy: 64OGX0149 to (Catrachito Kelly)  Requested for: 00XOL0406; Last   Rx:02Jun2017 Ordered   8  Gabapentin 100 MG Oral Capsule; TAKE 1 CAPSULE BEDTIME MAY INCREASE TO 3   CAPSULES AT BEDTIME AS TOLERATED; Therapy: 60MGD2013 to (Evaluate:41Iqe6093)  Requested for: 78GWD2839; Last   Rx:64Zzd8540 Ordered   9  Levaquin 500 MG Oral Tablet; Therapy: (Luis Eduardo Yip) to Recorded   10  Milk of Magnesia 400 MG/5ML Oral Suspension; Therapy: (Luis Eduardo Yip) to Recorded   11  Omeprazole 40 MG Oral Capsule Delayed Release; Take 1 capsule twice daily; Therapy: 45SVJ2784 to (Evaluate:02Oct2017)  Requested for: 37Pqj1316; Last    Rx:86Fli7595 Ordered   12  OxyCODONE HCl - 5 MG/5ML Oral Solution; Therapy: (Luis Eduardo Yip) to Recorded   13  Pravastatin Sodium 20 MG Oral Tablet; TAKE 1 TABLET AT BEDTIME; Therapy: 38UJN7416 to (Evaluate:31Oct2017)  Requested for: 66LBH4390; Last    NI:53KYG7363 Ordered    Allergies    1  No Known Drug Allergies    Vitals   Recorded: 25GNG2122 01:39PM   Temperature 98 F   Heart Rate 99   Systolic 278   Diastolic 65   Height 5 ft 1 in   Weight 168 lb 6 oz   BMI Calculated 31 81   BSA Calculated 1 76   O2 Saturation 96, RA     Physical Exam    Constitutional   General appearance: No acute distress, well appearing and well nourished      Head and Face   Head and face: Normal  Ears, Nose, Mouth, and Throat   Hearing: Normal     Neck   Neck: Supple, symmetric, trachea midline, no masses  neck incision well healed  Pulmonary   Respiratory effort: No increased work of breathing or signs of respiratory distress  Auscultation of lungs: Clear to auscultation  no rales or crackles were heard bilaterally  no rhonchi  no wheezing  Cardiovascular   Auscultation of heart: Normal rate and rhythm, normal S1 and S2, no murmurs  Abdomen   Abdomen: Non-tender, no masses  The abdomen was rounded  Bowel sounds were normal    Musculoskeletal   Gait and station: Normal     Skin   Incision Site Exam: neck/abdominal incisions healed well  j tube site without infection, minimal erythema around J tube site  Neurologic   Cranial nerves: Cranial nerves II-XII intact  Cortical function: Normal mental status  Psychiatric   Judgment and insight: Normal        Assessment    1  Eagle's esophagus with high grade dysplasia (530 85) (K22 711)    Plan  Eagle's esophagus with high grade dysplasia    · Continue with our present treatment plan ; Status:Complete;   Done: 59MSD6236   Ordered; For:Eagle's esophagus with high grade dysplasia; Ordered By:Evie Martin;   · Follow-up PRN Evaluation and Treatment  Follow-up  Status: Complete  Done:  83GGN8100   Ordered; For: Eagle's esophagus with high grade dysplasia; Ordered By: Stella Liu Performed:  Due: 23RAT0733    Discussion/Summary    Ms Chung is progressing from a thoracic surgery standpoint, without any significant limitations  She is doing very well and maintaining her weight  She will be discharged to home later today and she already has an appointment with Dr Gal Posada next Wednesday at [facility]  We recommended for her to remain off TF, since they are causing diarrhea and eat 5-6 small meals a day   It is quite a hardship to get down to [place] and she would really prefer to finish up her postsurgical follow-up with Dr Gal Posada at [place], since it is closer to her home  Therefore, we will not schedule a follow up appointment at this time  The patient is in complete agreement with the above plan  The patient has the current Goals: Get off TF  The patent has the current Barriers: On tube feeds  Patient is able to Self-Care  Thank you very much for allowing me to participate in the care of this patient  If you have any questions, please do not hesitate to contact me  End of Encounter Meds   Folic Acid 1 MG Oral Tablet; TAKE ONE (1) TABLET DAILY; Therapy: 12RUO9659 to (Evaluate:29Nov2017)  Requested for: 21DJM7625; Last  Rx:02Jun2017 Ordered   Pravastatin Sodium 20 MG Oral Tablet; TAKE 1 TABLET AT BEDTIME; Therapy: 61YDM7919 to (Evaluate:31Oct2017)  Requested for: 69LGH8705; Last  KD:06KUX3571 Ordered   Escitalopram Oxalate 20 MG Oral Tablet; TAKE 1 TABLET EVERY MORNING; Therapy: 06PIP8905 to (Evaluate:60Gcm6535)  Requested for: 72XJQ8381; Last  Rx:02Jun2017 Ordered   Gabapentin 100 MG Oral Capsule; TAKE 1 CAPSULE BEDTIME MAY INCREASE TO 3  CAPSULES AT BEDTIME AS TOLERATED; Therapy: 60GYH7854 to (Evaluate:87Yfx6188)  Requested for: 88CYZ0320; Last  Rx:75Bfs1676 Ordered   Acetaminophen 325 MG CAPS; Therapy: (Recorded:88Hbi4927) to Recorded  Albuterol Sulfate (2 5 MG/3ML) 0 083% Inhalation Nebulization Solution; Therapy: (Recorded:94Wjj9247) to Recorded  Biscolax 10 MG Rectal Suppository; Therapy: (Recorded:05Uza9721) to Recorded  Fleet Enema 7-19 GM/118ML Rectal Enema; Therapy: (Recorded:36Zci8471) to Recorded  Levaquin 500 MG Oral Tablet (LevoFLOXacin); Therapy: (Recorded:14Aft4174) to Recorded  Milk of Magnesia 400 MG/5ML Oral Suspension; Therapy: (Recorded:87Cjs8166) to Recorded  OxyCODONE HCl - 5 MG/5ML Oral Solution;   Therapy: (Talat Zimmerman) to Recorded    Future Appointments    Signatures   Electronically signed by : Tina Jennings [facility]; Sep 25 2017  2:18PM EST                       (Author)    Electronically signed by :  IGOR Merritt ; Sep 25 2017  3:07PM EST                       (Author)

## 2018-01-15 VITALS
BODY MASS INDEX: 32.85 KG/M2 | HEIGHT: 61 IN | DIASTOLIC BLOOD PRESSURE: 76 MMHG | TEMPERATURE: 97.1 F | OXYGEN SATURATION: 93 % | SYSTOLIC BLOOD PRESSURE: 118 MMHG | WEIGHT: 174 LBS | RESPIRATION RATE: 16 BRPM | HEART RATE: 111 BPM

## 2018-01-16 NOTE — MISCELLANEOUS
Message   Recorded as Task   Date: 05/30/2017 01:02 PM, Created By: Olimpia Huizar   Task Name: Call Back   Assigned To: [facility],Team   Regarding Patient: Layman Letters, Status: In Progress   Edith Waggoner - 30 May 2017 1:02 PM     TASK CREATED  PT LM w/service today @ 12:21 "CANCEL TODAY'S APPT @ 1:45 AND NEEDS REFILL ON SLEEPING PILL PHARM "   AdanmagnusNai - 30 May 2017 1:05 PM     TASK IN PROGRESS   Olu Gama - 30 May 2017 1:08 PM     TASK EDITED  prescription was sent to pharmacy earlier  Tried to call patient, left message on machine  Active Problems    1  3-vessel CAD (414 00) (I25 10)   2  Abnormal blood chemistry (790 6) (R79 9)   3  Acute maxillary sinusitis, recurrence not specified (461 0) (J01 00)   4  Acute tracheobronchitis (466 0) (J20 9)   5  Admission for long-term (current) use of other medications (V58 69) (Z51 81)   6  A-fib (427 31) (I48 91)   7  Anemia (285 9) (D64 9)   8  Atypical chest pain (786 59) (R07 89)   9  Backache with radiation (724 5) (M54 9)   10  Eagle esophagus (530 85) (K22 70)   11  Chronic pain of right knee (976 05,053 88) (M25 561,G89 29)   12  Colon cancer screening (V76 51) (Z12 11)   13  Continuous left lower quadrant pain (789 04) (R10 32)   14  Depression (311) (F32 9)   15  Dysphagia (787 20) (R13 10)   16  Encounter for mammogram to establish baseline mammogram (V76 12) (Z12 31)   17  Encounter for routine gynecological examination with Papanicolaou smear of cervix    (V72 31,V76 2) (Z01 419)   18  Gastroesophageal reflux disease (530 81) (K21 9)   19  Hyperlipidemia (272 4) (E78 5)   20  Hypothyroidism (244 9) (E03 9)   21  Insomnia (780 52) (G47 00)   22  Joint pain, knee (719 46) (M25 569)   23  Low vitamin D level (268 9) (E55 9)   24  Lumbar radiculopathy (724 4) (M54 16)   25  Myofascial pain (729 1) (M79 1)   26  Need for hepatitis C screening test (V73 89) (Z11 59)   27   Need for influenza vaccination (V04 81) (Z23)   28  Osteoarthritis (715 90) (M19 90)   29  Osteoarthritis of knee (715 36) (M17 10)   30  Right leg paresthesias (782 0) (R20 2)   31  Sciatica (724 3) (M54 30)   32  Screening for genitourinary condition (V81 6) (Z13 89)   33  Spondylosis of 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 (721 3) (M47 816)   34  Status post total knee replacement (V43 65) (Z96 659)   35  Stricture of esophagus (530 3) (K22 2)   36  Tooth infection (522 4) (K04 7)   37  Venous insufficiency (459 81) (I87 2)    Current Meds   1  Diclofenac Sodium 50 MG Oral Tablet Delayed Release; TAKE 1 TABLET PO BID prn; Therapy: 13EHF4214 to (Eldora Nissen)  Requested for: 80Opn4155; Last   Rx:13Kmt8717 Ordered   2  Escitalopram Oxalate 20 MG Oral Tablet; TAKE 1 TABLET EVERY MORNING; Therapy: 48BUN8401 to (Evaluate:22Jun2017)  Requested for: 29Bjz1851; Last   Rx:39Oih8510 Ordered   3  Folic Acid 1 MG Oral Tablet; Take 1 tablet daily; Therapy: 79BFI5244 to (Sarah Mittal)  Requested for: 47EUF3428; Last   Rx:28Nov2016 Ordered   4  Gabapentin 100 MG Oral Capsule; TAKE 1 CAPSULE BEDTIME MAY INCREASE TO 3   CAPSULES AT BEDTIME AS TOLERATED; Therapy: 68HLO7165 to (Evaluate:96Pqw9674)  Requested for: 39SRT7782; Last   Rx:39Aaa7432 Ordered   5  Omeprazole 40 MG Oral Capsule Delayed Release; Take 1 capsule twice daily; Therapy: 58XKD6301 to (Evaluate:02Oct2017)  Requested for: 47Uwm8154; Last   Rx:95Ezc7148 Ordered   6  Pantoprazole Sodium 40 MG Oral Tablet Delayed Release (Protonix); TAKE 1 TABLET   DAILY; Therapy: 23LPG9836 to (Evaluate:28Jun2017)  Requested for: 41DPL6758; Last   Rx:84Xcz6430 Ordered   7  Pravastatin Sodium 20 MG Oral Tablet; TAKE 1 TABLET AT BEDTIME; Therapy: 22NNM6469 to (Evaluate:31Oct2017)  Requested for: 57KKX7463; Last   DF:50SYU7035 Ordered    Allergies    1   No Known Drug Allergies    Signatures   Electronically signed by : Magdiel Griffin, ; May 30 2017  1:09PM EST (Author)

## 2018-01-16 NOTE — RESULT NOTES
Verified Results  US GALLBLADDER [id number] 07:17AM Tracy [facility] Order Number: [id number]    - Patient Instructions: To schedule this appointment, please contact Central Scheduling at [phone]  Test Name Result Flag Reference   US GALLBLADDER (Report)     RIGHT UPPER QUADRANT ULTRASOUND     INDICATION: Right upper quadrant pain  Pain radiates to back  COMPARISON: None  TECHNIQUE:  Real-time ultrasound of the right upper quadrant was performed with a curvilinear transducer with both volumetric sweeps and still imaging techniques  FINDINGS:     PANCREAS: Visualized portions of the pancreas are within normal limits  AORTA AND IVC: Visualized portions are normal for patient age  LIVER:   Size: Within normal range  The liver measures 14 4 cm in the midclavicular line  Contour: Surface contour is smooth  Parenchyma: Echogenicity and echotexture are within normal limits  No evidence of suspicious mass  The main portal vein is patent and hepatopetal       BILIARY:   The gallbladder is normal in caliber  No wall thickening or pericholecystic fluid  No stones or sludge identified  No sonographic Alarcon's sign  No intrahepatic biliary dilatation  CBD measures 7 mm  No choledocholithiasis  KIDNEY:    Right kidney measures 9 8 x 4 1 cm  Within normal limits  ASCITES:  None  IMPRESSION:     Prominent common bile duct caliber (7 mm)  If clinically warranted, MRCP may provide further information  Workstation performed: [id number]     Signed by:   Sharan Cole MD   11/28/16       Plan  Health Maintenance    · Folic Acid 1 MG Oral Tablet;  Take 1 tablet daily

## 2018-01-17 NOTE — MISCELLANEOUS
Message   Recorded as Task   Date: 05/30/2017 08:15 AM, Created By: Oh Lund   Task Name: Miscellaneous   Assigned To: [facility],Team   Regarding Patient: Diana Merino, Status: Active   CommentTrilby Handler - 30 May 2017 8:15 AM     TASK CREATED  Pt has an appt today at 1:45pm and wanted to request a refill for her Gabapentin 100mg capsules - 90 days before she can in today because she has to get other rxs also at the pharmacy  Please call [phone]  Jackie Michael - 30 May 2017 8:19 AM     TASK REASSIGNED: Previously Assigned To MINI BRADLEY AT [place]  please advise   Yanet Mays - 30 May 2017 12:23 PM     TASK REPLIED TO: Previously Assigned To Jose Rodriguez                      Refill sent to pharmacy        Active Problems    1  3-vessel CAD (414 00) (I25 10)   2  Abnormal blood chemistry (790 6) (R79 9)   3  Acute maxillary sinusitis, recurrence not specified (461 0) (J01 00)   4  Acute tracheobronchitis (466 0) (J20 9)   5  Admission for long-term (current) use of other medications (V58 69) (Z51 81)   6  A-fib (427 31) (I48 91)   7  Anemia (285 9) (D64 9)   8  Atypical chest pain (786 59) (R07 89)   9  Backache with radiation (724 5) (M54 9)   10  Eagle esophagus (530 85) (K22 70)   11  Chronic pain of right knee (014 33,263 60) (M25 561,G89 29)   12  Colon cancer screening (V76 51) (Z12 11)   13  Continuous left lower quadrant pain (789 04) (R10 32)   14  Depression (311) (F32 9)   15  Dysphagia (787 20) (R13 10)   16  Encounter for mammogram to establish baseline mammogram (V76 12) (Z12 31)   17  Encounter for routine gynecological examination with Papanicolaou smear of cervix    (V72 31,V76 2) (Z01 419)   18  Gastroesophageal reflux disease (530 81) (K21 9)   19  Hyperlipidemia (272 4) (E78 5)   20  Hypothyroidism (244 9) (E03 9)   21  Insomnia (780 52) (G47 00)   22  Joint pain, knee (719 46) (M21 569)   23  Low vitamin D level (268 9) (E55 9)   24   Lumbar radiculopathy (724 4) (M54 16)   25  Myofascial pain (729 1) (M79 1)   26  Need for hepatitis C screening test (V73 89) (Z11 59)   27  Need for influenza vaccination (V04 81) (Z23)   28  Osteoarthritis (715 90) (M19 90)   29  Osteoarthritis of knee (715 36) (M17 10)   30  Right leg paresthesias (782 0) (R20 2)   31  Sciatica (724 3) (M54 30)   32  Screening for genitourinary condition (V81 6) (Z13 89)   33  Spondylosis of 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 (721 3) (M47 816)   34  Status post total knee replacement (V43 65) (Z96 659)   35  Stricture of esophagus (530 3) (K22 2)   36  Tooth infection (522 4) (K04 7)   37  Venous insufficiency (459 81) (I87 2)    Current Meds   1  Diclofenac Sodium 50 MG Oral Tablet Delayed Release; TAKE 1 TABLET PO BID prn; Therapy: 57WCM6868 to (Matthew Hang)  Requested for: 90Hdl3856; Last   Rx:66Wtp1683 Ordered   2  Escitalopram Oxalate 20 MG Oral Tablet; TAKE 1 TABLET EVERY MORNING; Therapy: 08WRU4970 to (Evaluate:22Jun2017)  Requested for: 34Sui0646; Last   Rx:00Yzz8875 Ordered   3  Folic Acid 1 MG Oral Tablet; Take 1 tablet daily; Therapy: 19YTI1668 to (Matthew Hang)  Requested for: 86UQG0800; Last   Rx:28Nov2016 Ordered   4  Gabapentin 100 MG Oral Capsule; TAKE 1 CAPSULE BEDTIME MAY INCREASE TO 3   CAPSULES AT BEDTIME AS TOLERATED; Therapy: 37AYM1247 to (Evaluate:32Vdz4980)  Requested for: 00QXW5949; Last   Rx:86Fas5960 Ordered   5  Omeprazole 40 MG Oral Capsule Delayed Release; Take 1 capsule twice daily; Therapy: 76SJZ9939 to (Evaluate:02Oct2017)  Requested for: 77Ojj4237; Last   Rx:54Bjh1587 Ordered   6  Pantoprazole Sodium 40 MG Oral Tablet Delayed Release (Protonix); TAKE 1 TABLET   DAILY; Therapy: 75MIO4470 to (Evaluate:28Jun2017)  Requested for: 63LYT3495; Last   Rx:96Ukt7216 Ordered   7  Pravastatin Sodium 20 MG Oral Tablet; TAKE 1 TABLET AT BEDTIME;    Therapy: 55GGV8259 to (Evaluate:31Oct2017)  Requested for: 39PDC2582; Last   YB:46JJU0250 Ordered    Allergies    1   No Known Drug Allergies    Signatures   Electronically signed by : Ta Hassan, ; May 30 2017 12:54PM EST                       (Author)

## 2018-01-17 NOTE — PROGRESS NOTES
Preliminary Nursing Report                Patient Information    Initial Encounter Entry Date:   2017 10:36 AM EST (Automated Transmission Automated Transmission)       Last Modified:   {ParH  ModifiedOn}              Legal Name: Amado Chapa        Social Security Number:        YOB: 1950        Age (years): 75        Gender: F        Body Mass Index (BMI): 33 kg/m2        Height: 61 in  Weight: 174 lbs (79 kgs)           Address:   [place] [id number]               Phone: [phone]   (consent to leave messages)        Email:        Ethnicity: Decline to State        Episcopal:        Marital Status:        Preferred Language: English        Race: Other Race                    Patient Insurance Information        Primary Insurance Information Carrier Name: {Primary  CarrierName}           Carrier Address:   {Primary  CarrierAddress}              Carrier Phone: {Primary  CarrierPhone}          Group Number: {Primary  GroupNumber}          Policy Number: {Primary  PolicyNumber}          Insured Name: {Primary  InsuredName}          Insured : {Primary  InsuredDOB}          Relationship to Insured: {Primary  RelationshiptoInsured}           Secondary Insurance Information Carrier Name: {Secondary  CarrierName}           Carrier Address:   {Secondary  CarrierAddress}              Carrier Phone: {Secondary  CarrierPhone}          Group Number: {Secondary  GroupNumber}          Policy Number: {Secondary  PolicyNumber}          Insured Name: {Secondary  InsuredName}          Insured : {Secondary  InsuredDOB}          Relationship to Insured: {Secondary  RelationshiptoInsured}                       Health Profile   Booking #:   Tammy Ship #: [id number]               DOS: 2017    Surgery :  Total or near total esophagectomy, without thoracotomy; with pharyngogastrostomy or cervical esophagogastrostomy, with or without pyloroplasty (transhiatal)    Add'l Procedures/Notes:     Surgery Risk: Major          Precautions     3-vessel CAD       A-fib       Atypical chest pain                   Allergies    No Known Drug Allergies             Medications    Diclofenac Sodium 50 MG Oral Tablet Delayed Release       Escitalopram Oxalate 20 MG Oral Tablet       Folic Acid 1 MG Oral Tablet       Gabapentin 100 MG Oral Capsule       Omeprazole 40 MG Oral Capsule Delayed Release       Pravastatin Sodium 20 MG Oral Tablet               Conditions    3-vessel CAD       Abnormal blood chemistry       Acute maxillary sinusitis, recurrence not specified       Acute tracheobronchitis       Admission for long-term (current) use of other medications       A-fib       Anemia       Atypical chest pain       Backache with radiation       Eagle's esophagus with high grade dysplasia       Chronic pain of right knee       Colon cancer screening       Continuous left lower quadrant pain       Depression       Dysphagia       Encounter for mammogram to establish baseline mammogram       Encounter for routine gynecological examination with Papanicolaou smear of cervix       Gastroesophageal reflux disease       Hyperlipidemia       Hypothyroidism       Insomnia       Joint pain, knee       Low vitamin D level       Lumbar radiculopathy       Myofascial pain       Need for hepatitis C screening test       Need for influenza vaccination       Osteoarthritis       Osteoarthritis of knee       Right leg paresthesias       Sciatica       Screening for genitourinary condition       Spondylosis of 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       Status post total knee replacement       Stricture of esophagus       Tooth infection       Venous insufficiency               Family History    None             Surgical History    None             Social History    Denies Being A Social Drinker       Denies Drug use       Never a smoker       Patient has living will Patient Instructions       Medical Procedure Risk  NPO Instructions   The day before surgery it is recommended to have a light dinner at your usual time and you are allowed a light snack early in the evening  Do not eat anything heavy or eat a big meal after 7pm  Do not eat or drink anything after midnight prior to your surgery  If you are supposed to take any of your medications, do so with a sip of water  Failure to follow these instructions can lead to an increased risk of lung complications and may result in a delay or cancellation of your procedure  If you have any questions, contact your institution for further instructions  No candy, no gum, no mints, no chewing tobacco          Gabapentin 100 MG Oral Capsule  Medication Instruction (Neurological Medication) 8  Please continue to take this medication on your normal schedule  If this is an oral medication and you take in the morning, you may do so with a sip of water  Diclofenac Sodium 50 MG Oral Tablet Delayed Release  Medication Instruction (NSAID - Pain Medication) 61  Please stop ibuprofen, naproxen and other non-steroidal anti-inflammatory drugs (NSAIDS) for 24 hrs before surgery  Gastroesophageal reflux disease  Medication Instruction (Reflux Disease)   Please continue to take this medication on your normal schedule  If this is an oral medication and you take in the morning, you may do so with a sip of water  Escitalopram Oxalate 20 MG Oral Tablet  Medication Instruction (SSRI - Antidepressants) 80  Please continue to take this medication on your normal schedule  If this is an oral medication and you take in the morning, you may do so with a sip of water  Pravastatin Sodium 20 MG Oral Tablet  Medication Instruction (Cholesterol Medication) 81, 82  Please continue to take this medication on your normal schedule  If this is an oral medication and you take in the morning, you may do so with a sip of water  Testing Considerations       ? Chest X-ray (CXR) t  Consider a Chest X-Ray (CXR) if patient is having respiratory symptoms  Triggered by: Atypical chest pain, Age or Facility Rec         ? Coagulation Tests (PT/PTT/INR) t  Triggered by: Medical Procedure Risk         ? Complete Blood Count (CBC) t, client, client  If test was completed and normal within last six months, repeat test is not necessary  Triggered by: 3-vessel CAD, A-fib, Anemia, Atypical chest pain, Age or Facility Rec         ? Comprehensive Metabolic Panel (CMP) t  If test was completed and normal within last six months, repeat test is not necessary  Triggered by: 3-vessel CAD, A-fib, Atypical chest pain         ? Electrocardiogram (ECG) t  Patient does not need new test if normal ECG is present within the last six months and no change in clinical condition  Triggered by: 3-vessel CAD, A-fib, Atypical chest pain, Age or Facility Rec         ? Type and Screen client  Type and Screen - Blood: If there is anticipated or possible large blood loss with this procedure, then a Type and Screen for Blood should be ordered  Triggered by: Age or Facility Rec               Consultations       ? Cardiac Consult (Major MI) c  If the patient has had a Myocardial Infarction within 30 days then a cardiac consult is indicated  Also, if the patient is having increasing frequency or intensity of chest pain then a cardiac consult is indicated  Otherwise, optimization of medical therapy is recommended under the direction of the PCP or cardiologist   Triggered by: 3-vessel CAD, Atypical chest pain         ? Cardiac Consult (Major Rate) c  If the patient has a heart rate of greater than 100 bpm, or if the heart rhythm disturbance is new, then a cardiac consult is indicated  Otherwise, optimization of medical therapy is recommended under the direction of the PCP or cardiologist   Triggered by: A-fib         ?  Primary Care Physician Evaluation   Primary care physician may need to evaluate patient prior to surgery  This is likely NOT necessary if the listed conditions are chronic and stable  Triggered by: Anemia               Miscellaneous Questions         Question: Are you able to walk up a flight of stairs, walk up a hill or do heavy housework WITHOUT having chest pain or shortness of breath? Answer: YES                   Allergies/Conditions/Medications Not Found        The following were not recognized by our system when generating the recommendations  Please consider if this would impact any preoperative protocols  ? Denies Being A Social Drinker       ? Encounter for mammogram to establish baseline mammogram       ? Never a smoker       ? Patient has living will       ? Folic Acid 1 MG Oral Tablet                  Appointment Information         Date:    08/24/2017        Location:    [place]        Address:           Directions:                      Footnotes revision 14      ?? Denotes a free-text entry  Legal Disclaimer: Any and all recommendations and services provided herein are designed to assist in the preoperative care of the patient  Nothing contained herein is designed to replace, eliminate or alleviate the responsibility of the attending physician to supervise and determine the patient?s preoperative care and course of treatment  Failure to provide complete, accurate information may negatively impact the system?s ability to recommend the proper preoperative protocol  THE ATTENDING PHYSICIAN IS RESPONSIBLE TO REVIEW THE SUGGESTED PREOPERATIVE PROTOCOLS/COURSE OF TREATMENT AND PRESCRIBE THE FINAL COURSE OF PREOPERATIVE TREATMENT IN CONSULTATION WITH THE PATIENT  THE [employer] SYSTEM AND ITS MATERIALS ARE PROVIDED ? AS IS? WITHOUT WARRANTY OF ANY KIND, EXPRESS OR IMPLIED, INCLUDING, BUT NOT LIMITED TO, WARRANTIES OF PERFORMANCE OR MERCHANTABILITY OR FITNESS FOR A PARTICULAR PURPOSE   PATIENT AND PHYSICIANS HEREBY AGREE THAT THEIR USE OF THE MATERIALS AND RESOURCES ACT AS A CONSENT TO RELEASE AND WAIVE ePREOP FROM ANY AND ALL CLAIMS OF WARRANTY, TORT OR CONTRACT LAW OF ANY KIND  Electronically signed by:Boston TOBAR    Aug  9 2017  2:47PM EST

## 2018-01-22 VITALS
TEMPERATURE: 97.7 F | HEIGHT: 61 IN | OXYGEN SATURATION: 98 % | SYSTOLIC BLOOD PRESSURE: 110 MMHG | BODY MASS INDEX: 30.02 KG/M2 | HEART RATE: 89 BPM | RESPIRATION RATE: 16 BRPM | WEIGHT: 159 LBS | DIASTOLIC BLOOD PRESSURE: 80 MMHG

## 2018-01-22 VITALS
SYSTOLIC BLOOD PRESSURE: 128 MMHG | HEIGHT: 61 IN | DIASTOLIC BLOOD PRESSURE: 86 MMHG | BODY MASS INDEX: 29.71 KG/M2 | TEMPERATURE: 97.5 F | WEIGHT: 157.38 LBS

## 2018-01-22 VITALS
TEMPERATURE: 98.3 F | WEIGHT: 166 LBS | DIASTOLIC BLOOD PRESSURE: 78 MMHG | HEART RATE: 118 BPM | BODY MASS INDEX: 31.34 KG/M2 | SYSTOLIC BLOOD PRESSURE: 122 MMHG | HEIGHT: 61 IN | OXYGEN SATURATION: 97 % | RESPIRATION RATE: 16 BRPM

## 2018-01-22 VITALS
WEIGHT: 168.38 LBS | SYSTOLIC BLOOD PRESSURE: 121 MMHG | DIASTOLIC BLOOD PRESSURE: 65 MMHG | OXYGEN SATURATION: 96 % | HEIGHT: 61 IN | TEMPERATURE: 98 F | HEART RATE: 99 BPM | BODY MASS INDEX: 31.79 KG/M2

## 2018-03-19 ENCOUNTER — OFFICE VISIT (OUTPATIENT)
Dept: FAMILY MEDICINE CLINIC | Facility: CLINIC | Age: 68
End: 2018-03-19
Payer: MEDICARE

## 2018-03-19 VITALS
SYSTOLIC BLOOD PRESSURE: 118 MMHG | DIASTOLIC BLOOD PRESSURE: 78 MMHG | WEIGHT: 152 LBS | HEIGHT: 62 IN | TEMPERATURE: 97.4 F | BODY MASS INDEX: 27.97 KG/M2

## 2018-03-19 DIAGNOSIS — H61.21 IMPACTED CERUMEN OF RIGHT EAR: Primary | ICD-10-CM

## 2018-03-19 PROBLEM — R20.2 RIGHT LEG PARESTHESIAS: Status: ACTIVE | Noted: 2017-02-23

## 2018-03-19 PROBLEM — I48.91 A-FIB (HCC): Status: ACTIVE | Noted: 2017-04-05

## 2018-03-19 PROBLEM — M47.816 SPONDYLOSIS OF LUMBAR REGION WITHOUT MYELOPATHY OR RADICULOPATHY: Status: ACTIVE | Noted: 2017-02-23

## 2018-03-19 PROBLEM — M54.16 LUMBAR RADICULOPATHY: Status: ACTIVE | Noted: 2017-03-02

## 2018-03-19 PROBLEM — I25.10 3-VESSEL CAD: Status: ACTIVE | Noted: 2017-04-05

## 2018-03-19 PROBLEM — M54.9 BACKACHE WITH RADIATION: Status: ACTIVE | Noted: 2017-01-31

## 2018-03-19 PROBLEM — R13.10 DYSPHAGIA: Status: ACTIVE | Noted: 2017-02-28

## 2018-03-19 PROBLEM — M79.18 MYOFASCIAL PAIN: Status: ACTIVE | Noted: 2017-02-23

## 2018-03-19 PROBLEM — M25.569 JOINT PAIN, KNEE: Status: ACTIVE | Noted: 2017-05-02

## 2018-03-19 PROCEDURE — 69209 REMOVE IMPACTED EAR WAX UNI: CPT | Performed by: PHYSICIAN ASSISTANT

## 2018-03-19 PROCEDURE — 99212 OFFICE O/P EST SF 10 MIN: CPT | Performed by: PHYSICIAN ASSISTANT

## 2018-03-19 RX ORDER — ESCITALOPRAM OXALATE 20 MG/1
1 TABLET ORAL
COMMUNITY
Start: 2014-12-01 | End: 2019-07-26

## 2018-03-19 RX ORDER — PANTOPRAZOLE SODIUM 40 MG/1
1 TABLET, DELAYED RELEASE ORAL DAILY
COMMUNITY
Start: 2017-02-28 | End: 2018-03-19

## 2018-03-19 RX ORDER — FOLIC ACID 1 MG/1
1 TABLET ORAL DAILY
COMMUNITY
Start: 2015-03-30 | End: 2019-07-26

## 2018-03-19 RX ORDER — GABAPENTIN 100 MG/1
1 CAPSULE ORAL
COMMUNITY
Start: 2017-02-23 | End: 2018-03-19

## 2018-03-19 NOTE — PROGRESS NOTES
Assessment/Plan:    No problem-specific Assessment & Plan notes found for this encounter  Diagnoses and all orders for this visit:    Impacted cerumen of right ear    Other orders  -     Acetaminophen 325 MG CAPS; Take by mouth  -     escitalopram (LEXAPRO) 20 mg tablet; Take 1 tablet by mouth  -     folic acid (FOLVITE) 1 mg tablet; Take 1 tablet by mouth daily  -     Discontinue: gabapentin (NEURONTIN) 100 mg capsule; Take 1 capsule by mouth  -     Discontinue: pantoprazole (PROTONIX) 40 mg tablet; Take 1 tablet by mouth daily          Subjective:      Patient ID: Melissa Marc is a 70 y o  female  Earache    There is pain in the right ear  This is a new problem  The current episode started in the past 7 days  The problem occurs constantly  The problem has been unchanged  There has been no fever  The pain is mild  Associated symptoms include hearing loss  Pertinent negatives include no abdominal pain, coughing, diarrhea, ear discharge, headaches, neck pain, rash, rhinorrhea, sore throat or vomiting  Treatments tried: hydrogen peroxide into the R ear  The treatment provided no relief  The following portions of the patient's history were reviewed and updated as appropriate:   She  has a past medical history of Anemia; Arthritis; Depression; Disease of thyroid gland; GERD (gastroesophageal reflux disease); Hiatal hernia; and Hyperlipidemia    She   Patient Active Problem List    Diagnosis Date Noted    Eagle's esophagus with high grade dysplasia 08/24/2017    Osteoarthritis of knee 05/02/2017    Joint pain, knee 05/02/2017    3-vessel CAD 04/05/2017    A-fib ([address] ) 04/05/2017    Lumbar radiculopathy 03/02/2017    Dysphagia 02/28/2017    Myofascial pain 02/23/2017    Right leg paresthesias 02/23/2017    Spondylosis of 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 02/23/2017    Backache with radiation 01/31/2017    Atypical chest pain 11/22/2016    Continuous left lower quadrant pain 2015    Low vitamin D level 2015    Abnormal blood chemistry 2015    Sciatica 2015    Stricture of esophagus 2014    Gastroesophageal reflux disease 2014    Hyperlipidemia 2014    Anemia 2014    Hypothyroidism 2014    Venous insufficiency 2013    Depression 2013    Insomnia 2013     She  has a past surgical history that includes  section; pr esophagogastroduodenoscopy transoral diagnostic (N/A, 3/10/2017); Steroid injection knee (Right, 2017); Joint replacement (Right); pr esophagogastroduodenoscopy transoral diagnostic (N/A, 2017); pr esophagoscopy flexible transoral with biopsy (N/A, 2017); pr removal esophagus,no thoracotomy (N/A, 2017); and Gastrojejunostomy w/ jejunostomy tube (N/A, 2017)  Her family history includes No Known Problems in her mother  She  reports that she has never smoked  She has never used smokeless tobacco  She reports that she does not drink alcohol or use drugs  Current Outpatient Prescriptions   Medication Sig Dispense Refill    Acetaminophen 325 MG CAPS Take by mouth      escitalopram (LEXAPRO) 20 mg tablet Take 1 tablet by mouth      folic acid (FOLVITE) 1 mg tablet Take 1 tablet by mouth daily      gabapentin (NEURONTIN) 100 mg capsule Take 100 mg by mouth 3 (three) times a day      pantoprazole (PROTONIX) 40 mg tablet Take 40 mg by mouth 2 (two) times a day        pravastatin (PRAVACHOL) 20 mg tablet Take 20 mg by mouth daily with dinner         No current facility-administered medications for this visit        Current Outpatient Prescriptions on File Prior to Visit   Medication Sig    gabapentin (NEURONTIN) 100 mg capsule Take 100 mg by mouth 3 (three) times a day    pantoprazole (PROTONIX) 40 mg tablet Take 40 mg by mouth 2 (two) times a day      pravastatin (PRAVACHOL) 20 mg tablet Take 20 mg by mouth daily with dinner      [DISCONTINUED] escitalopram (LEXAPRO) 20 mg tablet Take 20 mg by mouth daily    [DISCONTINUED] folic acid (FOLVITE) 1 mg tablet Take 1 mg by mouth daily    [DISCONTINUED] oxyCODONE (ROXICODONE) 5 mg/5 mL solution 5-10 cc every 4 hours as needed for moderate to severe incisional pain     No current facility-administered medications on file prior to visit  She has No Known Allergies       Review of Systems   Constitutional: Negative for activity change, chills, fatigue and fever  HENT: Positive for ear pain and hearing loss  Negative for congestion, ear discharge, postnasal drip, rhinorrhea, sinus pain, sinus pressure, sneezing and sore throat  Respiratory: Negative for cough and shortness of breath  Cardiovascular: Negative for chest pain  Gastrointestinal: Negative for abdominal pain, blood in stool, constipation, diarrhea, nausea and vomiting  Genitourinary: Negative for dysuria  Musculoskeletal: Negative for neck pain  Skin: Negative for rash  Neurological: Negative for headaches  Objective:      /78 (BP Location: Left arm, Patient Position: Sitting)   Temp (!) 97 4 °F (36 3 °C)   Ht 5' 2" (1 575 m)   Wt 68 9 kg (152 lb)   BMI 27 80 kg/m²          Physical Exam   Constitutional: She is oriented to person, place, and time  She appears well-developed and well-nourished  No distress  HENT:   Head: Normocephalic and atraumatic  Right Ear: External ear normal    Left Ear: Hearing, tympanic membrane, external ear and ear canal normal    Mouth/Throat: Oropharynx is clear and moist    R ear canal completely blocked with cerumen   Eyes: Conjunctivae are normal    Neck: Neck supple  No thyromegaly present  Lymphadenopathy:     She has no cervical adenopathy  Neurological: She is alert and oriented to person, place, and time  Skin: Skin is dry  She is not diaphoretic  Psychiatric: She has a normal mood and affect  Her behavior is normal  Judgment and thought content normal    Vitals reviewed  Ear cerumen removal  Date/Time: 3/19/2018 1:24 PM  Performed by: Billie Haley by: Alyson Cronin     Patient location:  Clinic  Indications / Diagnosis:  R ear pain, blockage, decreased hearing  Other Assisting Provider: No    Consent:     Consent obtained:  Verbal    Consent given by:  Patient    Risks discussed:  Pain, incomplete removal and dizziness  Universal protocol:     Procedure explained and questions answered to patient or proxy's satisfaction: yes      Patient identity confirmed:  Verbally with patient  Procedure details:     Local anesthetic:  None    Location:  R ear    Procedure type: irrigation      Approach:  External and natural orifice  Post-procedure details:     Complication:  None    Hearing quality:  Improved    Patient tolerance of procedure:   Tolerated well, no immediate complications

## 2018-06-27 DIAGNOSIS — Z13.820 SCREENING FOR OSTEOPOROSIS: ICD-10-CM

## 2018-06-27 DIAGNOSIS — Z12.39 SCREENING FOR BREAST CANCER: Primary | ICD-10-CM

## 2018-09-17 ENCOUNTER — CLINICAL SUPPORT (OUTPATIENT)
Dept: FAMILY MEDICINE CLINIC | Facility: CLINIC | Age: 68
End: 2018-09-17
Payer: MEDICARE

## 2018-09-17 DIAGNOSIS — Z23 NEED FOR INFLUENZA VACCINATION: Primary | ICD-10-CM

## 2018-09-17 PROCEDURE — G0008 ADMIN INFLUENZA VIRUS VAC: HCPCS

## 2018-09-17 PROCEDURE — 90662 IIV NO PRSV INCREASED AG IM: CPT

## 2018-10-24 ENCOUNTER — OFFICE VISIT (OUTPATIENT)
Dept: FAMILY MEDICINE CLINIC | Facility: CLINIC | Age: 68
End: 2018-10-24
Payer: MEDICARE

## 2018-10-24 VITALS
WEIGHT: 142.6 LBS | BODY MASS INDEX: 26.24 KG/M2 | HEIGHT: 62 IN | DIASTOLIC BLOOD PRESSURE: 78 MMHG | SYSTOLIC BLOOD PRESSURE: 120 MMHG

## 2018-10-24 DIAGNOSIS — R10.32 LEFT LOWER QUADRANT PAIN: Primary | ICD-10-CM

## 2018-10-24 PROCEDURE — 99214 OFFICE O/P EST MOD 30 MIN: CPT | Performed by: FAMILY MEDICINE

## 2018-10-24 NOTE — PROGRESS NOTES
Assessment/Plan:  Patient needs to CBC BMP and a CT scan of the abdomen and pelvis with contrast    No problem-specific Assessment & Plan notes found for this encounter  Diagnoses and all orders for this visit:    Left lower quadrant pain          Subjective:      Patient ID: Fredis Zazueta is a 70 y o  female  Patient has left lower quadrant pain of 1-2 weeks duration sharp stabbing reproducible with palpation of the left lower quadrant no fever no chills patient has had nausea no diarrhea no hematuria hematochezia patient on has had prior abdominal surgery        The following portions of the patient's history were reviewed and updated as appropriate:   She  has a past medical history of Abnormal blood chemistry; Achalasia, esophageal; Acute medial meniscus tear; Acute otitis externa; Anemia; Arthritis; Atrial fibrillation ([address] ); Atypical chest pain; Eagle's esophagus with high grade dysplasia; Cerumen impaction; Chronic pain of right knee; Coronary artery disease; Depression; Disease of thyroid gland; Dysphagia; Esophageal stricture; GERD (gastroesophageal reflux disease); Headache; Hiatal hernia; Hyperlipidemia; Insomnia; Jejunostomy tube present ([address] ); Low vitamin D level; Osteoarthritis, knee; Pneumonia; Right leg paresthesias; Sciatica; Shingles; Spondylosis of 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; and Venous insufficiency    She   Patient Active Problem List    Diagnosis Date Noted    Eagle's esophagus with high grade dysplasia 08/24/2017    Osteoarthritis of knee 05/02/2017    Joint pain, knee 05/02/2017    3-vessel CAD 04/05/2017    A-fib ([facility] [address] ) 04/05/2017    Lumbar radiculopathy 03/02/2017    Dysphagia 02/28/2017    Myofascial pain 02/23/2017    Right leg paresthesias 02/23/2017    Spondylosis of 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 02/23/2017    Backache with radiation 01/31/2017    Atypical chest pain 11/22/2016    Continuous left lower quadrant pain 2015    Low vitamin D level 2015    Abnormal blood chemistry 2015    Sciatica 2015    Stricture of esophagus 2014    Gastroesophageal reflux disease 2014    Hyperlipidemia 2014    Anemia 2014    Hypothyroidism 2014    Venous insufficiency 2013    Depression 2013    Insomnia 2013     She  has a past surgical history that includes  section; pr esophagogastroduodenoscopy transoral diagnostic (N/A, 3/10/2017); Steroid injection knee (Right, 2017); Joint replacement (Right, 11/10/2014); pr esophagogastroduodenoscopy transoral diagnostic (N/A, 2017); pr esophagoscopy flexible transoral with biopsy (N/A, 2017); pr removal esophagus,no thoracotomy (N/A, 2017); and Gastrojejunostomy w/ jejunostomy tube (N/A, 2017)  Her family history includes Alzheimer's disease in her mother; Diabetes type II in her father  She  reports that she has never smoked  She has never used smokeless tobacco  She reports that she does not drink alcohol or use drugs  Current Outpatient Prescriptions   Medication Sig Dispense Refill    Acetaminophen 325 MG CAPS Take by mouth      escitalopram (LEXAPRO) 20 mg tablet Take 1 tablet by mouth      folic acid (FOLVITE) 1 mg tablet Take 1 tablet by mouth daily      gabapentin (NEURONTIN) 100 mg capsule Take 100 mg by mouth 3 (three) times a day      pantoprazole (PROTONIX) 40 mg tablet Take 40 mg by mouth 2 (two) times a day        pravastatin (PRAVACHOL) 20 mg tablet Take 20 mg by mouth daily with dinner         No current facility-administered medications for this visit        Current Outpatient Prescriptions on File Prior to Visit   Medication Sig    Acetaminophen 325 MG CAPS Take by mouth    escitalopram (LEXAPRO) 20 mg tablet Take 1 tablet by mouth    folic acid (FOLVITE) 1 mg tablet Take 1 tablet by mouth daily    gabapentin (NEURONTIN) 100 mg capsule Take 100 mg by mouth 3 (three) times a day    pantoprazole (PROTONIX) 40 mg tablet Take 40 mg by mouth 2 (two) times a day      pravastatin (PRAVACHOL) 20 mg tablet Take 20 mg by mouth daily with dinner       No current facility-administered medications on file prior to visit  She has No Known Allergies       Review of Systems   Constitutional: Negative for activity change, appetite change, diaphoresis, fatigue and fever  HENT: Negative  Eyes: Negative  Respiratory: Negative for apnea, cough, chest tightness, shortness of breath and wheezing  Cardiovascular: Negative for chest pain, palpitations and leg swelling  Gastrointestinal: Positive for abdominal pain  Negative for abdominal distention, anal bleeding, constipation, diarrhea, nausea and vomiting  Endocrine: Negative for cold intolerance, heat intolerance, polydipsia, polyphagia and polyuria  Genitourinary: Negative for difficulty urinating, dysuria, flank pain, hematuria and urgency  Musculoskeletal: Negative for arthralgias, back pain, gait problem, joint swelling and myalgias  Skin: Negative for color change, rash and wound  Allergic/Immunologic: Negative for environmental allergies, food allergies and immunocompromised state  Neurological: Negative for dizziness, seizures, syncope, speech difficulty, numbness and headaches  Hematological: Negative for adenopathy  Does not bruise/bleed easily  Psychiatric/Behavioral: Negative for agitation, behavioral problems, hallucinations, sleep disturbance and suicidal ideas  Objective:      /78 (BP Location: Left arm, Patient Position: Sitting, Cuff Size: Standard)   Ht 5' 2" (1 575 m)   Wt 64 7 kg (142 lb 9 6 oz)   BMI 26 08 kg/m²          Physical Exam   Constitutional: She is oriented to person, place, and time  She appears well-developed and well-nourished  No distress  HENT:   Head: Normocephalic     Right Ear: External ear normal    Left Ear: External ear normal    Nose: Nose normal    Mouth/Throat: Oropharynx is clear and moist    Eyes: Pupils are equal, round, and reactive to light  Conjunctivae and EOM are normal  Right eye exhibits no discharge  Left eye exhibits no discharge  No scleral icterus  Neck: Normal range of motion  No tracheal deviation present  No thyromegaly present  Cardiovascular: Normal rate, regular rhythm and normal heart sounds  Exam reveals no gallop and no friction rub  No murmur heard  Pulmonary/Chest: Effort normal and breath sounds normal  No respiratory distress  She has no wheezes  Abdominal: Soft  Bowel sounds are normal  She exhibits no mass  There is tenderness  There is guarding  Pain left lower quadrant of abdomen with deep palpation   Musculoskeletal: She exhibits no edema or deformity  Lymphadenopathy:     She has no cervical adenopathy  Neurological: She is alert and oriented to person, place, and time  No cranial nerve deficit  Skin: Skin is warm and dry  No rash noted  She is not diaphoretic  No erythema  Psychiatric: She has a normal mood and affect   Thought content normal

## 2018-10-25 ENCOUNTER — LAB (OUTPATIENT)
Dept: LAB | Facility: MEDICAL CENTER | Age: 68
End: 2018-10-25
Payer: MEDICARE

## 2018-10-25 DIAGNOSIS — R10.32 LEFT LOWER QUADRANT PAIN: ICD-10-CM

## 2018-10-25 LAB
ANION GAP SERPL CALCULATED.3IONS-SCNC: 5 MMOL/L (ref 4–13)
BASOPHILS # BLD AUTO: 0.06 THOUSANDS/ΜL (ref 0–0.1)
BASOPHILS NFR BLD AUTO: 1 % (ref 0–1)
BUN SERPL-MCNC: 14 MG/DL (ref 5–25)
CALCIUM SERPL-MCNC: 8.8 MG/DL (ref 8.3–10.1)
CHLORIDE SERPL-SCNC: 106 MMOL/L (ref 100–108)
CO2 SERPL-SCNC: 28 MMOL/L (ref 21–32)
CREAT SERPL-MCNC: 0.8 MG/DL (ref 0.6–1.3)
EOSINOPHIL # BLD AUTO: 0.11 THOUSAND/ΜL (ref 0–0.61)
EOSINOPHIL NFR BLD AUTO: 1 % (ref 0–6)
ERYTHROCYTE [DISTWIDTH] IN BLOOD BY AUTOMATED COUNT: 14.3 % (ref 11.6–15.1)
GFR SERPL CREATININE-BSD FRML MDRD: 75 ML/MIN/1.73SQ M
GLUCOSE P FAST SERPL-MCNC: 85 MG/DL (ref 65–99)
HCT VFR BLD AUTO: 43.7 % (ref 34.8–46.1)
HGB BLD-MCNC: 14.1 G/DL (ref 11.5–15.4)
IMM GRANULOCYTES # BLD AUTO: 0.03 THOUSAND/UL (ref 0–0.2)
IMM GRANULOCYTES NFR BLD AUTO: 0 % (ref 0–2)
LYMPHOCYTES # BLD AUTO: 3.5 THOUSANDS/ΜL (ref 0.6–4.47)
LYMPHOCYTES NFR BLD AUTO: 41 % (ref 14–44)
MCH RBC QN AUTO: 29 PG (ref 26.8–34.3)
MCHC RBC AUTO-ENTMCNC: 32.3 G/DL (ref 31.4–37.4)
MCV RBC AUTO: 90 FL (ref 82–98)
MONOCYTES # BLD AUTO: 0.66 THOUSAND/ΜL (ref 0.17–1.22)
MONOCYTES NFR BLD AUTO: 8 % (ref 4–12)
NEUTROPHILS # BLD AUTO: 4.28 THOUSANDS/ΜL (ref 1.85–7.62)
NEUTS SEG NFR BLD AUTO: 49 % (ref 43–75)
NRBC BLD AUTO-RTO: 0 /100 WBCS
PLATELET # BLD AUTO: 200 THOUSANDS/UL (ref 149–390)
PMV BLD AUTO: 12.1 FL (ref 8.9–12.7)
POTASSIUM SERPL-SCNC: 3.7 MMOL/L (ref 3.5–5.3)
RBC # BLD AUTO: 4.86 MILLION/UL (ref 3.81–5.12)
SODIUM SERPL-SCNC: 139 MMOL/L (ref 136–145)
WBC # BLD AUTO: 8.64 THOUSAND/UL (ref 4.31–10.16)

## 2018-10-25 PROCEDURE — 85025 COMPLETE CBC W/AUTO DIFF WBC: CPT

## 2018-10-25 PROCEDURE — 36415 COLL VENOUS BLD VENIPUNCTURE: CPT

## 2018-10-25 PROCEDURE — 80048 BASIC METABOLIC PNL TOTAL CA: CPT

## 2018-10-29 ENCOUNTER — HOSPITAL ENCOUNTER (OUTPATIENT)
Dept: CT IMAGING | Facility: HOSPITAL | Age: 68
Discharge: HOME | End: 2018-10-29
Attending: FAMILY MEDICINE
Payer: MEDICARE

## 2018-10-29 DIAGNOSIS — R10.32 LEFT LOWER QUADRANT PAIN: ICD-10-CM

## 2018-10-29 PROCEDURE — 74177 CT ABD & PELVIS W/CONTRAST: CPT

## 2018-10-29 RX ADMIN — IOHEXOL 100 ML: 350 INJECTION, SOLUTION INTRAVENOUS at 16:47

## 2018-10-31 ENCOUNTER — TELEPHONE (OUTPATIENT)
Dept: FAMILY MEDICINE CLINIC | Facility: CLINIC | Age: 68
End: 2018-10-31

## 2018-10-31 ENCOUNTER — TELEPHONE (OUTPATIENT)
Dept: UROLOGY | Facility: CLINIC | Age: 68
End: 2018-10-31

## 2018-10-31 DIAGNOSIS — E27.8 ADRENAL NODULE (HCC): Primary | ICD-10-CM

## 2018-10-31 DIAGNOSIS — K46.9 HERNIA OF SMALL INTESTINE: Primary | ICD-10-CM

## 2018-10-31 NOTE — PROGRESS NOTES
Please call the patient regarding her abnormal result    Patient has 3 hernias in her abdomen anyone of them could be causing her pain her she also has 2 adrenal nodules so she should be set up with Dr Ignacio garcia to follow up on the adrenal nodules

## 2018-10-31 NOTE — TELEPHONE ENCOUNTER
Patient has 3 hernias in her abdomen 1 of them has a loop of bowel in it the other to half at anyone of the 3 could be causing her pain she also has 2 adrenal adenomas that needs to be referred to Dr Cheri garcia to follow up

## 2018-10-31 NOTE — TELEPHONE ENCOUNTER
New patient for adrenal nodule  Reason for appointment/Complaint/Diagnosis : adrenal nodule    Insurance: Medicare A/B, [employer]    History of Cancer? yes                       If yes, what kind? Esophogeal and stomach    Previous urologist?     no                  Records requested/where? [address]    Outside testing/where? no    Location Preference for office visit? [facility] patient paperwork sent via mail

## 2018-10-31 NOTE — TELEPHONE ENCOUNTER
Called requesting results of recent testing - Says she is still having pain    (10/29/18 - CT Abdomen/Pelvis)

## 2018-11-01 ENCOUNTER — OFFICE VISIT (OUTPATIENT)
Dept: SURGERY | Facility: HOSPITAL | Age: 68
End: 2018-11-01
Payer: MEDICARE

## 2018-11-01 VITALS
DIASTOLIC BLOOD PRESSURE: 74 MMHG | HEIGHT: 62 IN | BODY MASS INDEX: 26.87 KG/M2 | WEIGHT: 146 LBS | SYSTOLIC BLOOD PRESSURE: 131 MMHG | HEART RATE: 84 BPM | TEMPERATURE: 97.9 F

## 2018-11-01 DIAGNOSIS — K43.2 INCISIONAL HERNIA, WITHOUT OBSTRUCTION OR GANGRENE: Primary | ICD-10-CM

## 2018-11-01 DIAGNOSIS — K46.9 HERNIA OF SMALL INTESTINE: ICD-10-CM

## 2018-11-01 PROCEDURE — 99204 OFFICE O/P NEW MOD 45 MIN: CPT | Performed by: SURGERY

## 2018-11-01 NOTE — PROGRESS NOTES
Assessment/Plan:    Incisional hernia  Status post esophagectomy, now with incisional hernia of upper midline incision  Minimal to no tenderness  Most were tenderness in left lower quadrant where she states her J-tube was prior  No evidence of any obstructive symptoms  Hernia is reducible  Patient not interested have any major surgery of the hernia fixed this time  Incarceration and strangulation hernia were discussed  Patient follow-up  Diagnoses and all orders for this visit:    Incisional hernia, without obstruction or gangrene    Hernia of small intestine  -     Ambulatory referral to General Surgery          Subjective:      Patient ID: Alek Hunt is a 70 y o  female  69-year-old female with history of esophagectomy and J-tube, presents today for evaluation of abdominal pain and subsequent incisional hernia finding on recent CT scan  CT showing small supraumbilical hernia containing fat in addition to an epigastric hernia containing a loop of colon which is nonobstructed  Patient states that majority of her pain is in the left lower quadrant happens intermittently  No specific exacerbating or relieving factors  The pain is sharp sometimes stabbing and does not radiate  Denies any specific pain or tenderness in the upper abdomen  She is tolerating diet although she still is eating very little  No changes in bowel bladder habits  She is passing flatus  No nausea vomiting  No fevers or chills  No chest pain shortness of breath  The following portions of the patient's history were reviewed and updated as appropriate:   She  has a past medical history of Abnormal blood chemistry; Achalasia, esophageal; Acute medial meniscus tear; Acute otitis externa; Anemia; Arthritis; Atrial fibrillation ([address] ); Atypical chest pain; Eagle's esophagus with high grade dysplasia;  Cerumen impaction; Chronic pain of right knee; Coronary artery disease; Depression; Disease of thyroid gland; Dysphagia; Esophageal stricture; GERD (gastroesophageal reflux disease); Headache; Hiatal hernia; Hyperlipidemia; Insomnia; Jejunostomy tube present ([facility] ); Low vitamin D level; Osteoarthritis, knee; Pneumonia; Right leg paresthesias; Sciatica; Shingles; Spondylosis of 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; and Venous insufficiency  She   Patient Active Problem List    Diagnosis Date Noted    Incisional hernia 2018    Eagle's esophagus with high grade dysplasia 2017    Osteoarthritis of knee 2017    Joint pain, knee 2017    3-vessel CAD 2017    A-fib ([facility] [address] ) 2017    Lumbar radiculopathy 2017    Dysphagia 2017    Myofascial pain 2017    Right leg paresthesias 2017    Spondylosis of 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 2017    Backache with radiation 2017    Atypical chest pain 2016    Continuous left lower quadrant pain 2015    Low vitamin D level 2015    Abnormal blood chemistry 2015    Sciatica 2015    Stricture of esophagus 2014    Gastroesophageal reflux disease 2014    Hyperlipidemia 2014    Anemia 2014    Hypothyroidism 2014    Venous insufficiency 2013    Depression 2013    Insomnia 2013     She  has a past surgical history that includes  section; pr esophagogastroduodenoscopy transoral diagnostic (N/A, 3/10/2017); Steroid injection knee (Right, 2017); Joint replacement (Right, 11/10/2014); pr esophagogastroduodenoscopy transoral diagnostic (N/A, 2017); pr esophagoscopy flexible transoral with biopsy (N/A, 2017); pr removal esophagus,no thoracotomy (N/A, 2017); and Gastrojejunostomy w/ jejunostomy tube (N/A, 2017)  Her family history includes Alzheimer's disease in her mother; Diabetes type II in her father  She  reports that she has never smoked   She has never used smokeless tobacco  She reports that she does not drink alcohol or use drugs  Current Outpatient Prescriptions   Medication Sig Dispense Refill    Acetaminophen 325 MG CAPS Take by mouth      escitalopram (LEXAPRO) 20 mg tablet Take 1 tablet by mouth      folic acid (FOLVITE) 1 mg tablet Take 1 tablet by mouth daily      gabapentin (NEURONTIN) 100 mg capsule Take 100 mg by mouth 3 (three) times a day      pantoprazole (PROTONIX) 40 mg tablet Take 40 mg by mouth 2 (two) times a day        pravastatin (PRAVACHOL) 20 mg tablet Take 20 mg by mouth daily with dinner         No current facility-administered medications for this visit  She has No Known Allergies       Review of Systems      A 10 point review of systems was conducted, all negative except as noted above HPI  Objective:      /74   Pulse 84   Temp 97 9 °F (36 6 °C)   Ht 5' 2" (1 575 m)   Wt 66 2 kg (146 lb)   BMI 26 70 kg/m²          Physical Exam   Constitutional: She is oriented to person, place, and time  She appears well-developed and well-nourished  No distress  HENT:   Head: Normocephalic and atraumatic  Eyes: No scleral icterus  Neck: Normal range of motion  Neck supple  No tracheal deviation present  Cardiovascular: Normal rate, regular rhythm and normal heart sounds  Exam reveals no gallop and no friction rub  No murmur heard  Pulmonary/Chest: Effort normal and breath sounds normal  No respiratory distress  She has no wheezes  She has no rales  She exhibits no tenderness  Abdominal: Soft  She exhibits no distension and no mass  There is tenderness (Mild tenderness in the left lower quadrant  No evidence of any rebound or guarding  )  There is no rebound and no guarding  Upper midline incision clean, dry, intact and healed well good cosmesis  There is some mild tenderness palpation the epigastrium  There is a palpable reducible hernia along the midline incision both the epigastric and supraumbilical your region   No evidence of overlying skin changes   Musculoskeletal: Normal range of motion  She exhibits no edema or deformity  Lymphadenopathy:     She has no cervical adenopathy  Neurological: She is alert and oriented to person, place, and time  No cranial nerve deficit  Skin: Skin is warm and dry  No rash noted  She is not diaphoretic  No erythema  No pallor  Psychiatric: She has a normal mood and affect  Her behavior is normal    Vitals reviewed

## 2018-11-09 ENCOUNTER — TELEPHONE (OUTPATIENT)
Dept: FAMILY MEDICINE CLINIC | Facility: CLINIC | Age: 68
End: 2018-11-09

## 2018-11-09 DIAGNOSIS — Z12.31 ENCOUNTER FOR SCREENING MAMMOGRAM FOR MALIGNANT NEOPLASM OF BREAST: Primary | ICD-10-CM

## 2018-11-19 ENCOUNTER — OFFICE VISIT (OUTPATIENT)
Dept: FAMILY MEDICINE CLINIC | Facility: CLINIC | Age: 68
End: 2018-11-19
Payer: MEDICARE

## 2018-11-19 VITALS
BODY MASS INDEX: 25.62 KG/M2 | HEIGHT: 62 IN | TEMPERATURE: 98.9 F | WEIGHT: 139.2 LBS | SYSTOLIC BLOOD PRESSURE: 130 MMHG | DIASTOLIC BLOOD PRESSURE: 68 MMHG

## 2018-11-19 DIAGNOSIS — J02.9 PHARYNGITIS, UNSPECIFIED ETIOLOGY: ICD-10-CM

## 2018-11-19 DIAGNOSIS — J32.9 SINUSITIS, UNSPECIFIED CHRONICITY, UNSPECIFIED LOCATION: Primary | ICD-10-CM

## 2018-11-19 PROCEDURE — 99214 OFFICE O/P EST MOD 30 MIN: CPT | Performed by: PHYSICIAN ASSISTANT

## 2018-11-19 RX ORDER — AMOXICILLIN 500 MG/1
500 CAPSULE ORAL EVERY 8 HOURS SCHEDULED
Qty: 30 CAPSULE | Refills: 0 | Status: SHIPPED | OUTPATIENT
Start: 2018-11-19 | End: 2018-11-29

## 2018-11-19 NOTE — PROGRESS NOTES
Assessment/Plan:         Diagnoses and all orders for this visit:    Sinusitis, unspecified chronicity, unspecified location  -     amoxicillin (AMOXIL) 500 mg capsule; Take 1 capsule (500 mg total) by mouth every 8 (eight) hours for 10 days    Pharyngitis, unspecified etiology  -     amoxicillin (AMOXIL) 500 mg capsule; Take 1 capsule (500 mg total) by mouth every 8 (eight) hours for 10 days          Subjective:      Patient ID: Jamila Gutierrez is a 70 y o  female  Nery Franklin is here today complaining of cold symptoms x 4 days  URI    This is a new problem  The current episode started in the past 7 days  The problem has been unchanged  There has been no fever  Associated symptoms include congestion, coughing, joint pain, rhinorrhea, sinus pain, a sore throat and swollen glands  Pertinent negatives include no abdominal pain, chest pain, diarrhea, dysuria, ear pain, headaches, nausea, rash, vomiting or wheezing  Treatments tried: Mucinex  The treatment provided no relief (Per pt, Mucinex gave her diarrhea and she will never take it again  )  The following portions of the patient's history were reviewed and updated as appropriate:   She  has a past medical history of Abnormal blood chemistry; Achalasia, esophageal; Acute medial meniscus tear; Acute otitis externa; Anemia; Arthritis; Atrial fibrillation ([address] ); Atypical chest pain; Eagle's esophagus with high grade dysplasia; Cerumen impaction; Chronic pain of right knee; Coronary artery disease; Depression; Disease of thyroid gland; Dysphagia; Esophageal stricture; GERD (gastroesophageal reflux disease); Headache; Hiatal hernia; Hyperlipidemia; Insomnia; Jejunostomy tube present ([address] ); Low vitamin D level; Osteoarthritis, knee; Pneumonia; Right leg paresthesias; Sciatica; Shingles; Spondylosis of 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; and Venous insufficiency    She   Patient Active Problem List    Diagnosis Date Noted    Incisional hernia 2018    Eagle's esophagus with high grade dysplasia 2017    Osteoarthritis of knee 2017    Joint pain, knee 2017    3-vessel CAD 2017    A-fib ([facility] [address] ) 2017    Lumbar radiculopathy 2017    Dysphagia 2017    Myofascial pain 2017    Right leg paresthesias 2017    Spondylosis of 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 2017    Backache with radiation 2017    Atypical chest pain 2016    Continuous left lower quadrant pain 2015    Low vitamin D level 2015    Abnormal blood chemistry 2015    Sciatica 2015    Stricture of esophagus 2014    Gastroesophageal reflux disease 2014    Hyperlipidemia 2014    Anemia 2014    Hypothyroidism 2014    Venous insufficiency 2013    Depression 2013    Insomnia 2013     She  has a past surgical history that includes  section; pr esophagogastroduodenoscopy transoral diagnostic (N/A, 3/10/2017); Steroid injection knee (Right, 2017); Joint replacement (Right, 11/10/2014); pr esophagogastroduodenoscopy transoral diagnostic (N/A, 2017); pr esophagoscopy flexible transoral with biopsy (N/A, 2017); pr removal esophagus,no thoracotomy (N/A, 2017); and Gastrojejunostomy w/ jejunostomy tube (N/A, 2017)  Her family history includes Alzheimer's disease in her mother; Diabetes type II in her father  She  reports that she has never smoked  She has never used smokeless tobacco  She reports that she does not drink alcohol or use drugs    Current Outpatient Prescriptions   Medication Sig Dispense Refill    Acetaminophen 325 MG CAPS Take by mouth      escitalopram (LEXAPRO) 20 mg tablet Take 1 tablet by mouth      folic acid (FOLVITE) 1 mg tablet Take 1 tablet by mouth daily      gabapentin (NEURONTIN) 100 mg capsule Take 100 mg by mouth 3 (three) times a day      pantoprazole (PROTONIX) 40 mg tablet Take 40 mg by mouth 2 (two) times a day        pravastatin (PRAVACHOL) 20 mg tablet Take 20 mg by mouth daily with dinner        amoxicillin (AMOXIL) 500 mg capsule Take 1 capsule (500 mg total) by mouth every 8 (eight) hours for 10 days 30 capsule 0     No current facility-administered medications for this visit  Current Outpatient Prescriptions on File Prior to Visit   Medication Sig    Acetaminophen 325 MG CAPS Take by mouth    escitalopram (LEXAPRO) 20 mg tablet Take 1 tablet by mouth    folic acid (FOLVITE) 1 mg tablet Take 1 tablet by mouth daily    gabapentin (NEURONTIN) 100 mg capsule Take 100 mg by mouth 3 (three) times a day    pantoprazole (PROTONIX) 40 mg tablet Take 40 mg by mouth 2 (two) times a day      pravastatin (PRAVACHOL) 20 mg tablet Take 20 mg by mouth daily with dinner       No current facility-administered medications on file prior to visit  She has No Known Allergies       Review of Systems   Constitutional: Positive for fatigue  Negative for chills and fever  HENT: Positive for congestion, postnasal drip, rhinorrhea, sinus pain, sinus pressure and sore throat  Negative for ear pain  Eyes: Negative for visual disturbance  Respiratory: Positive for cough  Negative for chest tightness, shortness of breath and wheezing  Cardiovascular: Negative for chest pain, palpitations and leg swelling  Gastrointestinal: Negative for abdominal pain, constipation, diarrhea, nausea and vomiting  Genitourinary: Negative for decreased urine volume, difficulty urinating and dysuria  Musculoskeletal: Positive for arthralgias, joint pain and myalgias  Skin: Negative for rash  Neurological: Negative for dizziness, light-headedness and headaches  Objective:      /68   Temp 98 9 °F (37 2 °C)   Ht 5' 2" (1 575 m)   Wt 63 1 kg (139 lb 3 2 oz)   BMI 25 46 kg/m²          Physical Exam   Constitutional: She is oriented to person, place, and time   She appears well-developed and well-nourished  No distress  HENT:   Head: Normocephalic and atraumatic  Right Ear: Hearing and external ear normal  Tympanic membrane is injected  Left Ear: Hearing and external ear normal  Tympanic membrane is injected  Mouth/Throat: Uvula is midline and mucous membranes are normal  Posterior oropharyngeal erythema (PND present) present  No oropharyngeal exudate, posterior oropharyngeal edema or tonsillar abscesses  Eyes: Conjunctivae are normal  Right eye exhibits no discharge  Left eye exhibits no discharge  No scleral icterus  Neck: Neck supple  No thyromegaly present  Cardiovascular: Normal rate and regular rhythm  No murmur heard  Pulmonary/Chest: Effort normal and breath sounds normal  No respiratory distress  She has no wheezes  Lymphadenopathy:     She has cervical adenopathy  Neurological: She is alert and oriented to person, place, and time  Skin: Skin is warm and dry  No rash noted  She is not diaphoretic  No erythema  Psychiatric: She has a normal mood and affect   Her behavior is normal  Judgment and thought content normal

## 2018-12-06 ENCOUNTER — OFFICE VISIT (OUTPATIENT)
Dept: UROLOGY | Facility: CLINIC | Age: 68
End: 2018-12-06
Payer: MEDICARE

## 2018-12-06 VITALS
DIASTOLIC BLOOD PRESSURE: 80 MMHG | BODY MASS INDEX: 24.48 KG/M2 | HEIGHT: 62 IN | WEIGHT: 133 LBS | HEART RATE: 92 BPM | SYSTOLIC BLOOD PRESSURE: 130 MMHG

## 2018-12-06 DIAGNOSIS — E27.8 ADRENAL NODULE (HCC): ICD-10-CM

## 2018-12-06 PROCEDURE — 99204 OFFICE O/P NEW MOD 45 MIN: CPT | Performed by: UROLOGY

## 2018-12-06 RX ORDER — DEXAMETHASONE 1 MG
TABLET ORAL
Qty: 1 TABLET | Refills: 0 | Status: SHIPPED | OUTPATIENT
Start: 2018-12-06 | End: 2019-06-14 | Stop reason: ALTCHOICE

## 2018-12-06 NOTE — PROGRESS NOTES
UROLOGY NEW CONSULT NOTE     CHIEF COMPLAINT   Mary Chung is a 70 y o  female with a complaint of   Chief Complaint   Patient presents with    Adrenal Nodules       History of Present Illness:     70 y o  female, known to me socially as the wife of one of my former patients  Patient was operating on in August 2017 for esophageal tumor and stricture disease   Recent CT scan imaging demonstrates 2 small adrenal nodules on the left and a 1 5 cm lesion in the left kidney  She presents for discussion  She has recurrent incisional hernias and pain with eating  Has been referred to general surgery, but she has deferred      Past Medical History:     Past Medical History:   Diagnosis Date    Abnormal blood chemistry     last assessed 03/06/2014    Achalasia, esophageal     Acute medial meniscus tear     last assessed 03/10/2014    Acute otitis externa     last assessed 03/24/2014    Ad[facility]al nodule ([facility] [address] )     Anemia     Arthritis     Atrial fibrillation (HCC)     resolved 01/09/2018    Atypical chest pain     last assessed 11/22/2016    Eagle's esophagus with high grade dysplasia     last assessed 01/03/2018    Cancer ([facility] [address] )     Cerumen impaction     last assessed 03/24/2014    Chronic pain of right knee     last assessed 04/06/2017    Coronary artery disease     3 vessel, resolved 01/09/2018    Depression     Disease of thyroid gland     Dysphagia     last assessed 06/21/2017    Esophageal stricture     last assessed 06/21/2017    GERD (gastroesophageal reflux disease)     Headache     last assessed 08/01/2013    Hiatal hernia     Hyperlipidemia     Insomnia     last assessed 10/15/2013    Jejunostomy tube present ([facility] [address] )     resolved 01/09/2018    Low vitamin D level     resolved 01/08/2018    Osteoarthritis, knee     last assessed 04/06/2017    Pneumonia     last assessed 05/06/2013    Right leg paresthesias     last assessed 02/23/2017    Sciatica     last assessed 05/08/2015  Shingles     last assessed 2015    Spondylosis of 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     last assessed 2017    Venous insufficiency     last assessed 2013       PAST SURGICAL HISTORY:     Past Surgical History:   Procedure Laterality Date     SECTION      GASTROJEJUNOSTOMY W/ JEJUNOSTOMY TUBE N/A 2017    Procedure: Piero Melton;  Surgeon: Glori Fothergill, MD;  [facility];  Service: Thoracic    JOINT REPLACEMENT Right 11/10/2014    knee, Dr Kalia Mercedes [place] ESOPHAGOGASTRODUODENOSCOPY TRANSORAL DIAGNOSTIC N/A 3/10/2017    Procedure: ESOPHAGOGASTRODUODENOSCOPY (EGD); Surgeon: Jose Elizabeth MD;  [facility]: [facility];  Service: Gastroenterology    MI ESOPHAGOGASTRODUODENOSCOPY TRANSORAL DIAGNOSTIC N/A 2017    Procedure: ESOPHAGOGASTRODUODENOSCOPY (EGD); Surgeon: Jose Elizabeth MD;  [facility];  Service: Gastroenterology    MI ESOPHAGOSCOPY FLEXIBLE TRANSORAL WITH BIOPSY N/A 2017    Procedure: ESOPHAGOGASTRODUODENOSCOPY (EGD);   Surgeon: Glori Fothergill, MD;  [facility];  Service: Thoracic    MI REMOVAL [address] N/A 2017    Procedure: TRANSHIATAL ESOPHAGECTOMY;  Surgeon: Glori Fothergill, MD;  [facility];  Service: Thoracic    STEROID INJECTION KNEE Right 2017    Procedure: GENICULATE NERVE BLOCKS;  Surgeon: Rafael Mojica DO;  [facility]: [facility];  Service:        CURRENT MEDICATIONS:     Current Outpatient Prescriptions   Medication Sig Dispense Refill    Acetaminophen 325 MG CAPS Take by mouth      escitalopram (LEXAPRO) 20 mg tablet Take 1 tablet by mouth      folic acid (FOLVITE) 1 mg tablet Take 1 tablet by mouth daily      gabapentin (NEURONTIN) 100 mg capsule Take 100 mg by mouth 3 (three) times a day      pantoprazole (PROTONIX) 40 mg tablet Take 40 mg by mouth 2 (two) times a day        pravastatin (PRAVACHOL) 20 mg tablet Take 20 mg by mouth daily with dinner No current facility-administered medications for this visit  ALLERGIES:   No Known Allergies    SOCIAL HISTORY:     Social History     Social History    Marital status: /Civil Union     Spouse name: N/A    Number of children: N/A    Years of education: N/A     Social History Main Topics    Smoking status: Never Smoker    Smokeless tobacco: Never Used    Alcohol use No    Drug use: No    Sexual activity: Not Asked     Other Topics Concern    None     Social History Narrative    Patient has living will       SOCIAL HISTORY:     Family History   Problem Relation Age of Onset    Alzheimer's disease Mother     Diabetes type II Father        REVIEW OF SYSTEMS:     Review of Systems   Constitutional: Negative  Respiratory: Negative  Cardiovascular: Negative  Gastrointestinal: Positive for abdominal pain  Musculoskeletal: Positive for back pain  Skin: Negative  Psychiatric/Behavioral: Negative  PHYSICAL EXAM:     /80   Pulse 92   Ht 5' 2" (1 575 m)   Wt 60 3 kg (133 lb)   BMI 24 33 kg/m²     General:  Healthy appearing female in no acute distress  They have a normal affect  There is not appear to be any gross neurologic defects or abnormalities  HEENT:  Normocephalic, atraumatic  Neck is supple without any palpable lymphadenopathy  Cardiovascular:  Patient has normal palpable distal radial pulses  There is no significant peripheral edema  No JVD is noted  Respiratory:  Patient has unlabored respirations  There is no audible wheeze or rhonchi  Abdomen:  Abdomen with healed surgical scars  Abdomen is soft and nontender  There is no tympany  Inguinal and umbilical hernia are not appreciated  Musculoskeletal:  Patient does not have significant CVA tenderness in the  flank with palpation or percussion  They full range of motion in all 4 extremities  Strength in all 4 extremities appears congruent    Patient is able to ambulate without assistance or difficulty  Dermatologic:  Patient has no skin abnormalities or rashes  LABS:     CBC:   Lab Results   Component Value Date    WBC 8 64 10/25/2018    HGB 14 1 10/25/2018    HCT 43 7 10/25/2018    MCV 90 10/25/2018     10/25/2018       BMP:   Lab Results   Component Value Date    GLUCOSE 83 07/23/2015    CALCIUM 8 8 10/25/2018     07/23/2015    K 3 7 10/25/2018    CO2 28 10/25/2018     10/25/2018    BUN 14 10/25/2018    CREATININE 0 80 10/25/2018       IMAGING:     10/29/18  CT ABDOMEN AND PELVIS WITH IV CONTRAST     INDICATION:   R10 32: Left lower quadrant pain      COMPARISON:  Chest CT performed July 26, 2017  Ultrasound performed November 28, 2016      TECHNIQUE:  CT examination of the abdomen and pelvis was performed  Axial, sagittal, and coronal 2D reformatted images were created from the source data and submitted for interpretation      Radiation dose length product (DLP) for this visit:  243 9 mGy-cm   This examination, like all CT scans performed in the [facility], was performed utilizing techniques to minimize radiation dose exposure, including the use of iterative   reconstruction and automated exposure control      IV Contrast:  100 mL of iohexol (OMNIPAQUE)  Enteric Contrast:  Enteric contrast was not administered      FINDINGS:     ABDOMEN     LOWER CHEST:  Hiatal hernia containing the entirety of the gastric fundus, similar from July 2017      LIVER/BILIARY TREE:  Unremarkable      GALLBLADDER:  No calcified gallstones  No pericholecystic inflammatory change      SPLEEN:  Unremarkable      PANCREAS:  Unremarkable      ADRENAL GLANDS:  There are 2 left adrenal nodules each measuring 10 mm in size both of which appear to be new when compared with July 26, 2017      KIDNEYS/URETERS:  There is an oblong cystic lesion in the posterior interpolar left kidney, 1 5 x 0 7 cm    An exophytic 9 mm hypodensity at the lateral midportion of the left kidney is too small accurately characterize      STOMACH AND BOWEL:  Hiatal hernia as mentioned above  Colonic diverticulosis without acute diverticulitis  Diverticula are most numerous in the sigmoid  No bowel wall thickening or obstruction      APPENDIX:  A normal appendix was visualized      ABDOMINOPELVIC CAVITY:  No ascites or free intraperitoneal air  No lymphadenopathy      VESSELS:  Unremarkable for patient's age      PELVIS     REPRODUCTIVE ORGANS:  Unremarkable for patient's age      URINARY BLADDER:  Unremarkable      ABDOMINAL WALL/INGUINAL REGIONS:  Multiple ventral abdominal wall hernias are noted including an epigastric region hernia with hernia defect measuring 5 2 cm and containing a nonobstructed loop of transverse colon  There are 2 supraumbilical midline   ventral hernias with hernia defect measuring 14 and 15 mm each containing peritoneal fat within the hernia sac      OSSEOUS STRUCTURES:  No acute fracture or destructive osseous lesion  Lower lumbar degenerative changes are noted      IMPRESSION:     Extensive sigmoid diverticulosis  No convincing CT evidence of acute diverticulitis      Hiatal hernia containing the entirety of the gastric fundus      Ventral abdominal wall hernias      There are 2 left adrenal nodules each measuring 10 mm  Although the nodules are both small, and demonstrate indeterminate features, because they appear to be new when compared with July 26, 2017, further characterization and close interval follow-up   with three-month noncontrast and contrast-enhanced adrenal protocol CT follow-up is recommended      The study was marked in EPIC for significant notification  ASSESSMENT:     70 y o  female with recent high grade dysplasia of the esophagus and new adrenal/renal nodules    PLAN:     Recommended adrenal protocol CT to evaluate the adrenal nodules  May also better characterize the renal lesion  Recommended adrenal protocol labs testing as well   Return in 3 months unless tests abnormal

## 2018-12-10 ENCOUNTER — TELEPHONE (OUTPATIENT)
Dept: UROLOGY | Facility: CLINIC | Age: 68
End: 2018-12-10

## 2018-12-10 ENCOUNTER — APPOINTMENT (OUTPATIENT)
Dept: LAB | Facility: MEDICAL CENTER | Age: 68
End: 2018-12-10
Payer: MEDICARE

## 2018-12-10 DIAGNOSIS — E27.8 ADRENAL NODULE (HCC): ICD-10-CM

## 2018-12-10 LAB — CORTIS AM PEAK SERPL-MCNC: 1.5 UG/DL (ref 4.2–22.4)

## 2018-12-10 PROCEDURE — 82533 TOTAL CORTISOL: CPT

## 2018-12-10 PROCEDURE — 84244 ASSAY OF RENIN: CPT

## 2018-12-10 PROCEDURE — 82088 ASSAY OF ALDOSTERONE: CPT

## 2018-12-10 PROCEDURE — 83835 ASSAY OF METANEPHRINES: CPT

## 2018-12-10 PROCEDURE — 36415 COLL VENOUS BLD VENIPUNCTURE: CPT

## 2018-12-11 ENCOUNTER — HOSPITAL ENCOUNTER (OUTPATIENT)
Dept: CT IMAGING | Facility: HOSPITAL | Age: 68
Discharge: HOME | End: 2018-12-11
Attending: UROLOGY
Payer: MEDICARE

## 2018-12-11 DIAGNOSIS — E27.8 ADRENAL NODULE (HCC): ICD-10-CM

## 2018-12-11 PROCEDURE — 74170 CT ABD WO CNTRST FLWD CNTRST: CPT

## 2018-12-11 RX ADMIN — IOHEXOL 100 ML: 350 INJECTION, SOLUTION INTRAVENOUS at 13:19

## 2018-12-14 LAB
METANEPH FREE SERPL-MCNC: <10 PG/ML (ref 0–62)
NORMETANEPHRINE SERPL-MCNC: 113 PG/ML (ref 0–145)
RENIN PLAS-CCNC: 0.39 NG/ML/HR (ref 0.17–5.38)

## 2018-12-15 LAB — ALDOST SERPL-MCNC: 4.8 NG/DL (ref 0–30)

## 2019-01-28 ENCOUNTER — OFFICE VISIT (OUTPATIENT)
Dept: FAMILY MEDICINE CLINIC | Facility: CLINIC | Age: 69
End: 2019-01-28
Payer: MEDICARE

## 2019-01-28 VITALS
HEIGHT: 62 IN | SYSTOLIC BLOOD PRESSURE: 122 MMHG | TEMPERATURE: 99.8 F | DIASTOLIC BLOOD PRESSURE: 68 MMHG | BODY MASS INDEX: 25.28 KG/M2 | WEIGHT: 137.4 LBS

## 2019-01-28 DIAGNOSIS — J06.9 UPPER RESPIRATORY TRACT INFECTION, UNSPECIFIED TYPE: Primary | ICD-10-CM

## 2019-01-28 PROCEDURE — 99213 OFFICE O/P EST LOW 20 MIN: CPT | Performed by: PHYSICIAN ASSISTANT

## 2019-01-28 RX ORDER — AZITHROMYCIN 250 MG/1
TABLET, FILM COATED ORAL
Qty: 6 TABLET | Refills: 0 | Status: SHIPPED | OUTPATIENT
Start: 2019-01-28 | End: 2019-02-01

## 2019-01-28 NOTE — PROGRESS NOTES
Assessment/Plan:     Diagnoses and all orders for this visit:    Upper respiratory tract infection, unspecified type  -     azithromycin (ZITHROMAX) 250 mg tablet; Take 2 tablets today then 1 tablet daily x 4 days        Prescription for zithromax  Advised patient to push fluids  If symptoms do not improve or worsen she was advised to let us know  Subjective:      Patient ID: Driss Dozier is a 70 y o  female  Claudia Collazo is a 70year old female who presents with cold symptoms since last Wednesday  She complains of congestion, sneezing, sore throat, headache, stuffy nose, and a dry cough  She denies any fevers, chest pains, shortness of breath, abdominal pain, nausea, or vomiting  She admits that she did have diarrhea on Wednesday, but it has subsided  She has not tried anything OTC  URI    This is a new problem  The current episode started in the past 7 days  The problem has been unchanged  There has been no fever  Associated symptoms include congestion, coughing (non-productive), diarrhea (resolved), headaches, rhinorrhea, sneezing and a sore throat  Pertinent negatives include no abdominal pain, chest pain, dysuria, ear pain, joint pain, joint swelling, nausea, neck pain, rash, sinus pain, vomiting or wheezing  She has tried nothing for the symptoms  The following portions of the patient's history were reviewed and updated as appropriate:   She  has a past medical history of Abnormal blood chemistry; Achalasia, esophageal; Acute medial meniscus tear; Acute otitis externa; Adrenal nodule ([address] ); Anemia; Arthritis; Atrial fibrillation ([address] ); Atypical chest pain; Eagle's esophagus with high grade dysplasia; Cancer ([address] ); Cerumen impaction; Chronic pain of right knee; Coronary artery disease; Depression; Disease of thyroid gland; Dysphagia; Esophageal stricture; GERD (gastroesophageal reflux disease); Headache; Hiatal hernia; Hyperlipidemia; Insomnia; Jejunostomy tube present ([address] );  Low vitamin D level; Osteoarthritis, knee; Pneumonia; Right leg paresthesias; Sciatica; Shingles; Spondylosis of 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; and Venous insufficiency  She   Patient Active Problem List    Diagnosis Date Noted    Adrenal nodule ([facility] ) 2018    Incisional hernia 2018    Eagle's esophagus with high grade dysplasia 2017    Osteoarthritis of knee 2017    Joint pain, knee 2017    3-vessel CAD 2017    A-fib ([facility] ) 2017    Lumbar radiculopathy 2017    Dysphagia 2017    Myofascial pain 2017    Right leg paresthesias 2017    Spondylosis of 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 2017    Backache with radiation 2017    Atypical chest pain 2016    Continuous left lower quadrant pain 2015    Low vitamin D level 2015    Abnormal blood chemistry 2015    Sciatica 2015    Stricture of esophagus 2014    Gastroesophageal reflux disease 2014    Hyperlipidemia 2014    Anemia 2014    Hypothyroidism 2014    Venous insufficiency 2013    Depression 2013    Insomnia 2013     She  has a past surgical history that includes  section; pr esophagogastroduodenoscopy transoral diagnostic (N/A, 3/10/2017); Steroid injection knee (Right, 2017); Joint replacement (Right, 11/10/2014); pr esophagogastroduodenoscopy transoral diagnostic (N/A, 2017); pr esophagoscopy flexible transoral with biopsy (N/A, 2017); pr removal esophagus,no thoracotomy (N/A, 2017); and Gastrojejunostomy w/ jejunostomy tube (N/A, 2017)  Her family history includes Alzheimer's disease in her mother; Diabetes type II in her father  She  reports that she has never smoked  She has never used smokeless tobacco  She reports that she does not drink alcohol or use drugs    Current Outpatient Prescriptions   Medication Sig Dispense Refill  Acetaminophen 325 MG CAPS Take by mouth      dexamethasone (DECADRON) 1 mg tablet Take one pill at 11pm the night prior to morning blood testing 1 tablet 0    escitalopram (LEXAPRO) 20 mg tablet Take 1 tablet by mouth      folic acid (FOLVITE) 1 mg tablet Take 1 tablet by mouth daily      gabapentin (NEURONTIN) 100 mg capsule Take 100 mg by mouth 3 (three) times a day      pantoprazole (PROTONIX) 40 mg tablet Take 40 mg by mouth 2 (two) times a day        pravastatin (PRAVACHOL) 20 mg tablet Take 20 mg by mouth daily with dinner        azithromycin (ZITHROMAX) 250 mg tablet Take 2 tablets today then 1 tablet daily x 4 days 6 tablet 0     No current facility-administered medications for this visit  Current Outpatient Prescriptions on File Prior to Visit   Medication Sig    Acetaminophen 325 MG CAPS Take by mouth    dexamethasone (DECADRON) 1 mg tablet Take one pill at 11pm the night prior to morning blood testing    escitalopram (LEXAPRO) 20 mg tablet Take 1 tablet by mouth    folic acid (FOLVITE) 1 mg tablet Take 1 tablet by mouth daily    gabapentin (NEURONTIN) 100 mg capsule Take 100 mg by mouth 3 (three) times a day    pantoprazole (PROTONIX) 40 mg tablet Take 40 mg by mouth 2 (two) times a day      pravastatin (PRAVACHOL) 20 mg tablet Take 20 mg by mouth daily with dinner       No current facility-administered medications on file prior to visit  She has No Known Allergies       Review of Systems   Constitutional: Positive for chills and fatigue  Negative for diaphoresis and fever  HENT: Positive for congestion, rhinorrhea, sinus pressure, sneezing and sore throat  Negative for ear pain, postnasal drip, sinus pain and trouble swallowing  Eyes: Negative for pain and visual disturbance  Respiratory: Positive for cough (non-productive)  Negative for apnea, shortness of breath and wheezing  Cardiovascular: Negative for chest pain and palpitations     Gastrointestinal: Positive for diarrhea (resolved)  Negative for abdominal pain, constipation, nausea and vomiting  Genitourinary: Negative for dysuria and hematuria  Musculoskeletal: Negative for arthralgias, gait problem, joint pain, myalgias and neck pain  Skin: Negative for rash  Neurological: Positive for headaches  Negative for dizziness, syncope, weakness, light-headedness and numbness  Psychiatric/Behavioral: Negative for suicidal ideas  The patient is not nervous/anxious  Objective:      /68   Temp 99 8 °F (37 7 °C)   Ht 5' 2" (1 575 m)   Wt 62 3 kg (137 lb 6 4 oz)   BMI 25 13 kg/m²          Physical Exam   Constitutional: She is oriented to person, place, and time  She appears well-developed and well-nourished  HENT:   Head: Normocephalic and atraumatic  Right Ear: Tympanic membrane, external ear and ear canal normal    Left Ear: Tympanic membrane, external ear and ear canal normal    Nose: Mucosal edema and rhinorrhea present  Mouth/Throat: Mucous membranes are normal  Posterior oropharyngeal erythema present  No oropharyngeal exudate or posterior oropharyngeal edema  +PND   Eyes: Pupils are equal, round, and reactive to light  EOM are normal    Neck: Normal range of motion  Neck supple  Cardiovascular: Normal rate, regular rhythm and normal heart sounds  Exam reveals no gallop and no friction rub  No murmur heard  Pulmonary/Chest: Effort normal and breath sounds normal  No respiratory distress  She has no wheezes  She has no rales  Abdominal: Soft  Bowel sounds are normal  There is no tenderness  There is no rebound and no guarding  Musculoskeletal: Normal range of motion  Lymphadenopathy:     She has no cervical adenopathy  Neurological: She is alert and oriented to person, place, and time  Skin: Skin is warm and dry  Psychiatric: She has a normal mood and affect  Her behavior is normal  Judgment and thought content normal    Vitals reviewed

## 2019-03-21 ENCOUNTER — OFFICE VISIT (OUTPATIENT)
Dept: UROLOGY | Facility: CLINIC | Age: 69
End: 2019-03-21
Payer: MEDICARE

## 2019-03-21 VITALS
BODY MASS INDEX: 25.76 KG/M2 | SYSTOLIC BLOOD PRESSURE: 124 MMHG | DIASTOLIC BLOOD PRESSURE: 80 MMHG | HEIGHT: 62 IN | WEIGHT: 140 LBS | HEART RATE: 77 BPM

## 2019-03-21 DIAGNOSIS — K43.2 INCISIONAL HERNIA, WITHOUT OBSTRUCTION OR GANGRENE: ICD-10-CM

## 2019-03-21 DIAGNOSIS — D35.00 ADRENAL ADENOMA, UNSPECIFIED LATERALITY: Primary | ICD-10-CM

## 2019-03-21 PROCEDURE — 99213 OFFICE O/P EST LOW 20 MIN: CPT | Performed by: UROLOGY

## 2019-03-21 NOTE — PROGRESS NOTES
UROLOGY   FOLLOW-UP NOTE     CHIEF COMPLAINT   Danae Prasad is a 78 y o  female with a complaint of   Chief Complaint   Patient presents with    Adrenal Nodule       History of Present Illness:     78 y o  female, known to me socially as the wife of one of my former patients  Patient was operating on in August 2017 for esophageal tumor and stricture disease   Recent CT scan imaging demonstrates 2 small adrenal nodules on the left and a 1 5 cm lesion in the left kidney  She presents for discussion  She has recurrent incisional hernias and pain with eating  Has been referred to general surgery, but she has deferred  Patient returns for discussion of her testing  She continues to have issues with her ventral hernia and is now more interested in discussing with General surgery      Past Medical History:     Past Medical History:   Diagnosis Date    Abnormal blood chemistry     last assessed 03/06/2014    Achalasia, esophageal     Acute medial meniscus tear     last assessed 03/10/2014    Acute otitis externa     last assessed 03/24/2014    [facility] )     Anemia     Arthritis     Atrial fibrillation (HCC)     resolved 01/09/2018    Atypical chest pain     last assessed 11/22/2016    Eagle's esophagus with high grade dysplasia     last assessed 01/03/2018    Cancer [facility] )     Cerumen impaction     last assessed 03/24/2014    Chronic pain of right knee     last assessed 04/06/2017    Coronary artery disease     3 vessel, resolved 01/09/2018    Depression     Disease of thyroid gland     Dysphagia     last assessed 06/21/2017    Esophageal stricture     last assessed 06/21/2017    GERD (gastroesophageal reflux disease)     Headache     last assessed 08/01/2013    Hiatal hernia     Hyperlipidemia     Insomnia     last assessed 10/15/2013    Jejunostomy tube present ([facility] )     resolved 01/09/2018    Low vitamin D level     resolved 01/08/2018    Osteoarthritis, knee last assessed 2017    Pneumonia     last assessed 2013    Right leg paresthesias     last assessed 2017    Sciatica     last assessed 2015    Shingles     last assessed 2015    Spondylosis of 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     last assessed 2017    Venous insufficiency     last assessed 2013       PAST SURGICAL HISTORY:     Past Surgical History:   Procedure Laterality Date     SECTION      GASTROJEJUNOSTOMY W/ JEJUNOSTOMY TUBE N/A 2017    Procedure: Piero Melton;  Surgeon: Glori Fothergill, MD;  [facility];  Service: Thoracic    JOINT REPLACEMENT Right 11/10/2014    knee, Dr Kalia Mercedes [place] ESOPHAGOGASTRODUODENOSCOPY TRANSORAL DIAGNOSTIC N/A 3/10/2017    Procedure: ESOPHAGOGASTRODUODENOSCOPY (EGD); Surgeon: Jose Elizabeth MD;  Location: [facility];  Service: Gastroenterology    [place] ESOPHAGOGASTRODUODENOSCOPY TRANSORAL DIAGNOSTIC N/A 2017    Procedure: ESOPHAGOGASTRODUODENOSCOPY (EGD); Surgeon: Jose Elizabeth MD;  [facility]: [facility];  Service: Gastroenterology    [place] ESOPHAGOSCOPY FLEXIBLE TRANSORAL WITH BIOPSY N/A 2017    Procedure: ESOPHAGOGASTRODUODENOSCOPY (EGD);   Surgeon: Glori Fothergill, MD;  [facility];  Service: Thoracic    [place] REMOVAL [address] THORACOTOMY N/A 2017    Procedure: TRANSHIATAL ESOPHAGECTOMY;  Surgeon: Glori Fothergill, MD;  [facility];  Service: Thoracic    STEROID INJECTION KNEE Right 2017    Procedure: GENICULATE NERVE BLOCKS;  Surgeon: Rafael Mojica DO;  Location: [facility];  Service:        CURRENT MEDICATIONS:     Current Outpatient Medications   Medication Sig Dispense Refill    Acetaminophen 325 MG CAPS Take by mouth      dexamethasone (DECADRON) 1 mg tablet Take one pill at 11pm the night prior to morning blood testing 1 tablet 0    escitalopram (LEXAPRO) 20 mg tablet Take 1 tablet by mouth      folic acid (FOLVITE) 1 mg tablet Take 1 tablet by mouth daily      gabapentin (NEURONTIN) 100 mg capsule Take 100 mg by mouth 3 (three) times a day      pantoprazole (PROTONIX) 40 mg tablet Take 40 mg by mouth 2 (two) times a day        pravastatin (PRAVACHOL) 20 mg tablet Take 20 mg by mouth daily with dinner         No current facility-administered medications for this visit  ALLERGIES:   No Known Allergies    SOCIAL HISTORY:     Social History     Socioeconomic History    Marital status: /Civil Union     Spouse name: None    Number of children: None    Years of education: None    Highest education level: None   Occupational History    None   Social Needs    Financial resource strain: None    Food insecurity:     Worry: None     Inability: None    Transportation needs:     Medical: None     Non-medical: None   Tobacco Use    Smoking status: Never Smoker    Smokeless tobacco: Never Used   Substance and Sexual Activity    Alcohol use: No    Drug use: No    Sexual activity: None   Lifestyle    Physical activity:     Days per week: None     Minutes per session: None    Stress: None   Relationships    Social connections:     Talks on phone: None     Gets together: None     Attends [employer] service: None     Active member of club or organization: None     Attends meetings of clubs or organizations: None     Relationship status: None    Intimate partner violence:     Fear of current or ex partner: None     Emotionally abused: None     Physically abused: None     Forced sexual activity: None   Other Topics Concern    None   Social History Narrative    Patient has living will       SOCIAL HISTORY:     Family History   Problem Relation Age of Onset    Alzheimer's disease Mother     Diabetes type II Father        REVIEW OF SYSTEMS:     Review of Systems   Constitutional: Negative  Respiratory: Negative  Cardiovascular: Negative  Gastrointestinal: Positive for abdominal pain     Musculoskeletal: Positive for back pain    Skin: Negative  Psychiatric/Behavioral: Negative  PHYSICAL EXAM:     /80   Pulse 77   Ht 5' 2" (1 575 m)   Wt 63 5 kg (140 lb)   BMI 25 61 kg/m²     General:  Healthy appearing female in no acute distress  They have a normal affect  There is not appear to be any gross neurologic defects or abnormalities  HEENT:  Normocephalic, atraumatic  Neck is supple without any palpable lymphadenopathy  Cardiovascular:  Patient has normal palpable distal radial pulses  There is no significant peripheral edema  No JVD is noted  Respiratory:  Patient has unlabored respirations  There is no audible wheeze or rhonchi  Abdomen:  Abdomen with healed surgical scars  Ventral hernia present  Some point tenderness  Abdomen is soft and nontender  There is no tympany  Inguinal and umbilical hernia are not appreciated  Musculoskeletal:  Patient does not have significant CVA tenderness in the  flank with palpation or percussion  They full range of motion in all 4 extremities  Strength in all 4 extremities appears congruent  Patient is able to ambulate without assistance or difficulty  Dermatologic:  Patient has no skin abnormalities or rashes  LABS:     CBC:   Lab Results   Component Value Date    WBC 8 64 10/25/2018    HGB 14 1 10/25/2018    HCT 43 7 10/25/2018    MCV 90 10/25/2018     10/25/2018       BMP:   Lab Results   Component Value Date    GLUCOSE 83 2015    CALCIUM 8 8 10/25/2018     2015    K 3 7 10/25/2018    CO2 28 10/25/2018     10/25/2018    BUN 14 10/25/2018    CREATININE 0 80        Metabolic adrenal workup is negative and available in the lab section    IMAGIN/11/18  CT ABDOMEN - ADRENAL PROTOCOL - WITH AND WITHOUT IV CONTRAST     INDICATION:   New 10 mm left adrenal nodules      COMPARISON: CT of the abdomen/pelvis dated 10/20/2019    CT of the chest dated 2017      TECHNIQUE: Thin section noncontrast CT examination of the abdomen was performed per adrenal mass protocol  Scan were performed prior to IV contrast administration, in postcontrast portal venous phase, and at 10 minute delay  This examination, like all   CT scans performed in the [facility], was performed utilizing techniques to minimize radiation dose exposure, including the use of iterative reconstruction and automated exposure control  Axial, sagittal, and coronal 2D reformatted   images were created from the source data and submitted for interpretation  Rad dose 585 9 mGy-cm      IV Contrast:  100 mL of iohexol (OMNIPAQUE)  Enteric Contrast:  Enteric contrast was not administered      FINDINGS:     ADRENAL GLANDS:   Left adrenal nodule 1--  There is a 9 mm nodule arising from the superior limb of the left adrenal gland (series 2, image 40)   The lesion demonstrates homogeneous attenuation and circumscribed margins  Unenhanced density is -0 50 Hounsfield units  Parenchymal phase density measures 109 Hounsfield units  Delayed phase density measures 24 Hounsfield units       Precontrast and enhancement characteristics are consistent with either small adenoma or diffuse nodular hypertrophy of the left gland      Left adrenal nodule 2--  9 mm nodule arising from inferior medial limb on series 2, image 43  The lesion demonstrates homogeneous attenuation and circumscribed margins  Unenhanced density is -3 Hounsfield units  Parenchymal phase density measures 134 Hounsfield units  Delayed phase density measures 41 Hounsfield units      Precontrast and enhancement characteristics are consistent with either small adenoma or diffuse nodular hypertrophy of the left gland      Right adrenal gland is normal      ABDOMEN     LOWER CHEST:  Large hiatal hernia involving the majority of the gastric body and antrum  No acute findings visualized mediastinum or lower lungs      LIVER/BILIARY TREE:  Unremarkable      GALLBLADDER:  No calcified gallstones   No pericholecystic inflammatory change      SPLEEN:  Unremarkable      PANCREAS: Normal   Main pancreatic duct is prominent but within allowable limits of diameter with smooth distal tapering measuring 4 mm at the head and 2 mm at the distal body  Appearance is unchanged  No pancreatic masses      KIDNEYS/URETERS:  Renal lesions are either characteristically-benign nonenhancing cysts or too small to characterize  No suspicious renal or upper collecting system masses  No perinephric collections  No urolithiasis or hydronephrosis      VISUALIZED STOMACH AND BOWEL:  Large hiatal hernia, as previously discussed  No gastric wall thickening  No dilated or inflamed loops of bowel demonstrated  A few scattered colonic diverticula are demonstrated      ABDOMINAL CAVITY:  Wide necked supraumbilical ventral hernia with knuckle of nonobstructed, noninflamed transverse colon protruding into the defect  2 7 cm x 1 4 cm fat-containing umbilical hernia      VESSELS: Aortoiliac atherosclerosis without aneurysm      OSSEOUS STRUCTURES:  Left hemisacralization of L5  Mild degenerative changes at the left sacroiliac joint  Mild facet hypertrophic predominant lumbar spondylosis  No acute fracture or destructive osseous lesion      IMPRESSION:     1   Left adrenal nodules have imaging characteristics of either two discrete subcentimeter adenomata or manifestations of diffuse nodular hypertrophy of the gland  In the absence of history of prior malignancy, findings are characteristically-benign  Correlate for clinical/laboratory evidence of hyperfunctioning, if warranted      2  No suspicious renal masses or lesions requiring imaging follow-up      3  Chronic findings including large hiatal hernia, colonic diverticulosis, and small ventral hernias      ASSESSMENT:     78 y o  female with recent high grade dysplasia of the esophagus and new adrenal/renal nodules    PLAN:      fortunately, the patient's metabolic workup and directed imaging are not concerning for adrenal malignancy or hyper functioning nodule  I have discussed this with her and given her a copy of her report  I have released her from urologic care at this point time  The patient has significant discomfort from her ventral hernia would like evaluation    We have referred her to the general surgery team

## 2019-04-16 ENCOUNTER — CONSULT (OUTPATIENT)
Dept: SURGERY | Facility: HOSPITAL | Age: 69
End: 2019-04-16
Payer: MEDICARE

## 2019-04-16 VITALS
TEMPERATURE: 98.7 F | WEIGHT: 144 LBS | HEART RATE: 89 BPM | SYSTOLIC BLOOD PRESSURE: 133 MMHG | DIASTOLIC BLOOD PRESSURE: 79 MMHG | BODY MASS INDEX: 26.5 KG/M2 | HEIGHT: 62 IN

## 2019-04-16 DIAGNOSIS — K43.2 INCISIONAL HERNIA, WITHOUT OBSTRUCTION OR GANGRENE: Primary | ICD-10-CM

## 2019-04-16 PROCEDURE — 99214 OFFICE O/P EST MOD 30 MIN: CPT | Performed by: SURGERY

## 2019-06-14 ENCOUNTER — CONSULT (OUTPATIENT)
Dept: FAMILY MEDICINE CLINIC | Facility: CLINIC | Age: 69
End: 2019-06-14
Payer: MEDICARE

## 2019-06-14 VITALS
SYSTOLIC BLOOD PRESSURE: 112 MMHG | RESPIRATION RATE: 15 BRPM | WEIGHT: 128 LBS | HEART RATE: 59 BPM | TEMPERATURE: 97.8 F | HEIGHT: 62 IN | OXYGEN SATURATION: 98 % | DIASTOLIC BLOOD PRESSURE: 68 MMHG | BODY MASS INDEX: 23.55 KG/M2

## 2019-06-14 DIAGNOSIS — Z01.810 PREOPERATIVE CARDIOVASCULAR EXAMINATION: ICD-10-CM

## 2019-06-14 DIAGNOSIS — K45.8 OTHER SPECIFIED ABDOMINAL HERNIA WITHOUT OBSTRUCTION OR GANGRENE: Primary | ICD-10-CM

## 2019-06-14 DIAGNOSIS — I10 ESSENTIAL HYPERTENSION: ICD-10-CM

## 2019-06-14 DIAGNOSIS — Z01.818 PREOPERATIVE TESTING: ICD-10-CM

## 2019-06-14 PROCEDURE — 99213 OFFICE O/P EST LOW 20 MIN: CPT | Performed by: FAMILY MEDICINE

## 2019-06-18 ENCOUNTER — HOSPITAL ENCOUNTER (OUTPATIENT)
Dept: NON INVASIVE DIAGNOSTICS | Facility: HOSPITAL | Age: 69
Discharge: HOME | End: 2019-06-18
Attending: FAMILY MEDICINE
Payer: MEDICARE

## 2019-06-18 ENCOUNTER — APPOINTMENT (OUTPATIENT)
Dept: LAB | Facility: HOSPITAL | Age: 69
End: 2019-06-18
Attending: FAMILY MEDICINE
Payer: MEDICARE

## 2019-06-18 ENCOUNTER — HOSPITAL ENCOUNTER (OUTPATIENT)
Dept: RADIOLOGY | Facility: HOSPITAL | Age: 69
Discharge: HOME | End: 2019-06-18
Attending: FAMILY MEDICINE
Payer: MEDICARE

## 2019-06-18 DIAGNOSIS — Z01.818 PREOPERATIVE TESTING: ICD-10-CM

## 2019-06-18 DIAGNOSIS — Z01.810 PREOPERATIVE CARDIOVASCULAR EXAMINATION: ICD-10-CM

## 2019-06-18 DIAGNOSIS — K45.8 OTHER SPECIFIED ABDOMINAL HERNIA WITHOUT OBSTRUCTION OR GANGRENE: ICD-10-CM

## 2019-06-18 DIAGNOSIS — I10 ESSENTIAL HYPERTENSION: ICD-10-CM

## 2019-06-18 LAB
ALBUMIN SERPL BCP-MCNC: 3.6 G/DL (ref 3.5–5)
ALP SERPL-CCNC: 57 U/L (ref 46–116)
ALT SERPL W P-5'-P-CCNC: 17 U/L (ref 12–78)
ANION GAP SERPL CALCULATED.3IONS-SCNC: 9 MMOL/L (ref 4–13)
APTT PPP: 31 SECONDS (ref 26–38)
AST SERPL W P-5'-P-CCNC: 16 U/L (ref 5–45)
BILIRUB SERPL-MCNC: 0.6 MG/DL (ref 0.2–1)
BUN SERPL-MCNC: 12 MG/DL (ref 5–25)
CALCIUM SERPL-MCNC: 9.2 MG/DL (ref 8.3–10.1)
CHLORIDE SERPL-SCNC: 106 MMOL/L (ref 100–108)
CO2 SERPL-SCNC: 28 MMOL/L (ref 21–32)
CREAT SERPL-MCNC: 0.76 MG/DL (ref 0.6–1.3)
ERYTHROCYTE [DISTWIDTH] IN BLOOD BY AUTOMATED COUNT: 13.5 % (ref 11.6–15.1)
GFR SERPL CREATININE-BSD FRML MDRD: 80 ML/MIN/1.73SQ M
GLUCOSE P FAST SERPL-MCNC: 85 MG/DL (ref 65–99)
HCT VFR BLD AUTO: 41.5 % (ref 34.8–46.1)
HGB BLD-MCNC: 13.4 G/DL (ref 11.5–15.4)
INR PPP: 1.13 (ref 0.86–1.17)
MCH RBC QN AUTO: 28.6 PG (ref 26.8–34.3)
MCHC RBC AUTO-ENTMCNC: 32.3 G/DL (ref 31.4–37.4)
MCV RBC AUTO: 89 FL (ref 82–98)
PLATELET # BLD AUTO: 222 THOUSANDS/UL (ref 149–390)
PMV BLD AUTO: 10.8 FL (ref 8.9–12.7)
POTASSIUM SERPL-SCNC: 3.4 MMOL/L (ref 3.5–5.3)
PROT SERPL-MCNC: 7.2 G/DL (ref 6.4–8.2)
PROTHROMBIN TIME: 14 SECONDS (ref 11.8–14.2)
RBC # BLD AUTO: 4.68 MILLION/UL (ref 3.81–5.12)
SODIUM SERPL-SCNC: 143 MMOL/L (ref 136–145)
WBC # BLD AUTO: 6.45 THOUSAND/UL (ref 4.31–10.16)

## 2019-06-18 PROCEDURE — 85730 THROMBOPLASTIN TIME PARTIAL: CPT | Performed by: FAMILY MEDICINE

## 2019-06-18 PROCEDURE — 71046 X-RAY EXAM CHEST 2 VIEWS: CPT

## 2019-06-18 PROCEDURE — 80053 COMPREHEN METABOLIC PANEL: CPT | Performed by: FAMILY MEDICINE

## 2019-06-18 PROCEDURE — 85027 COMPLETE CBC AUTOMATED: CPT | Performed by: FAMILY MEDICINE

## 2019-06-18 PROCEDURE — 36415 COLL VENOUS BLD VENIPUNCTURE: CPT | Performed by: FAMILY MEDICINE

## 2019-06-18 PROCEDURE — 85610 PROTHROMBIN TIME: CPT | Performed by: FAMILY MEDICINE

## 2019-06-18 PROCEDURE — 93005 ELECTROCARDIOGRAM TRACING: CPT

## 2019-06-19 LAB
ATRIAL RATE: 86 BPM
P AXIS: 62 DEGREES
PR INTERVAL: 162 MS
QRS AXIS: -65 DEGREES
QRSD INTERVAL: 88 MS
QT INTERVAL: 382 MS
QTC INTERVAL: 457 MS
T WAVE AXIS: 34 DEGREES
VENTRICULAR RATE: 86 BPM

## 2019-06-19 PROCEDURE — 93010 ELECTROCARDIOGRAM REPORT: CPT | Performed by: INTERNAL MEDICINE

## 2019-06-25 DIAGNOSIS — R91.8 RIGHT UPPER LOBE PULMONARY INFILTRATE: Primary | ICD-10-CM

## 2019-06-26 ENCOUNTER — OFFICE VISIT (OUTPATIENT)
Dept: SURGERY | Facility: HOSPITAL | Age: 69
End: 2019-06-26
Payer: MEDICARE

## 2019-06-26 VITALS
HEIGHT: 62 IN | HEART RATE: 89 BPM | DIASTOLIC BLOOD PRESSURE: 81 MMHG | WEIGHT: 126 LBS | SYSTOLIC BLOOD PRESSURE: 155 MMHG | BODY MASS INDEX: 23.19 KG/M2 | TEMPERATURE: 99.6 F

## 2019-06-26 DIAGNOSIS — K43.2 INCISIONAL HERNIA, WITHOUT OBSTRUCTION OR GANGRENE: Primary | ICD-10-CM

## 2019-06-26 PROCEDURE — 99213 OFFICE O/P EST LOW 20 MIN: CPT | Performed by: SURGERY

## 2019-06-26 RX ORDER — HEPARIN SODIUM 5000 [USP'U]/ML
5000 INJECTION, SOLUTION INTRAVENOUS; SUBCUTANEOUS ONCE
Status: CANCELLED | OUTPATIENT
Start: 2019-07-31 | End: 2019-06-26

## 2019-06-26 RX ORDER — CHLORHEXIDINE GLUCONATE 4 G/100ML
SOLUTION TOPICAL DAILY PRN
Status: CANCELLED | OUTPATIENT
Start: 2019-06-26

## 2019-06-26 RX ORDER — CEFAZOLIN SODIUM 1 G/50ML
1000 SOLUTION INTRAVENOUS ONCE
Status: CANCELLED | OUTPATIENT
Start: 2019-07-31 | End: 2019-06-26

## 2019-06-26 RX ORDER — SODIUM CHLORIDE, SODIUM LACTATE, POTASSIUM CHLORIDE, CALCIUM CHLORIDE 600; 310; 30; 20 MG/100ML; MG/100ML; MG/100ML; MG/100ML
125 INJECTION, SOLUTION INTRAVENOUS CONTINUOUS
Status: CANCELLED | OUTPATIENT
Start: 2019-07-31

## 2019-06-28 PROBLEM — K43.2 INCISIONAL HERNIA, WITHOUT OBSTRUCTION OR GANGRENE: Status: ACTIVE | Noted: 2019-06-28

## 2019-07-01 ENCOUNTER — HOSPITAL ENCOUNTER (OUTPATIENT)
Dept: CT IMAGING | Facility: HOSPITAL | Age: 69
Discharge: HOME | End: 2019-07-01
Attending: FAMILY MEDICINE
Payer: MEDICARE

## 2019-07-01 DIAGNOSIS — R91.8 RIGHT UPPER LOBE PULMONARY INFILTRATE: ICD-10-CM

## 2019-07-01 PROCEDURE — 71260 CT THORAX DX C+: CPT

## 2019-07-01 RX ADMIN — IOHEXOL 85 ML: 350 INJECTION, SOLUTION INTRAVENOUS at 13:00

## 2019-07-05 ENCOUNTER — TELEPHONE (OUTPATIENT)
Dept: SURGERY | Facility: HOSPITAL | Age: 69
End: 2019-07-05

## 2019-07-26 NOTE — PRE-PROCEDURE INSTRUCTIONS
No outpatient medications have been marked as taking for the 7/31/19 encounter [facility] Encounter)

## 2019-07-29 ENCOUNTER — ANESTHESIA EVENT (OUTPATIENT)
Dept: PERIOP | Facility: HOSPITAL | Age: 69
DRG: 353 | End: 2019-07-29
Payer: MEDICARE

## 2019-07-31 ENCOUNTER — ANESTHESIA (OUTPATIENT)
Dept: PERIOP | Facility: HOSPITAL | Age: 69
DRG: 353 | End: 2019-07-31
Payer: MEDICARE

## 2019-07-31 ENCOUNTER — HOSPITAL ENCOUNTER (INPATIENT)
Facility: HOSPITAL | Age: 69
LOS: 1 days | DRG: 353 | End: 2019-08-02
Attending: SURGERY | Admitting: SURGERY
Payer: MEDICARE

## 2019-07-31 DIAGNOSIS — R09.02 HYPOXIA: Primary | ICD-10-CM

## 2019-07-31 DIAGNOSIS — N17.9 AKI (ACUTE KIDNEY INJURY) (HCC): ICD-10-CM

## 2019-07-31 PROCEDURE — 49652 PR LAP, VENTRAL HERNIA REPAIR,REDUCIBLE: CPT | Performed by: SURGERY

## 2019-07-31 PROCEDURE — NC001 PR NO CHARGE: Performed by: SURGERY

## 2019-07-31 PROCEDURE — 0WUF4JZ SUPPLEMENT ABDOMINAL WALL WITH SYNTHETIC SUBSTITUTE, PERCUTANEOUS ENDOSCOPIC APPROACH: ICD-10-PCS | Performed by: SURGERY

## 2019-07-31 PROCEDURE — 49652 PR LAP, VENTRAL HERNIA REPAIR,REDUCIBLE: CPT

## 2019-07-31 PROCEDURE — C1781 MESH (IMPLANTABLE): HCPCS | Performed by: SURGERY

## 2019-07-31 DEVICE — CAPSURE PERMANENT FIXATION SYSTEM 30 PERMANENT FASTENERS
Type: IMPLANTABLE DEVICE | Site: ABDOMEN | Status: FUNCTIONAL
Brand: CAPSURE PERMANENT FIXATION SYSTEM

## 2019-07-31 DEVICE — VENTRALIGHT ST MESH
Type: IMPLANTABLE DEVICE | Site: ABDOMEN | Status: FUNCTIONAL
Brand: VENTRALIGHT ST

## 2019-07-31 RX ORDER — FENTANYL CITRATE 50 UG/ML
INJECTION, SOLUTION INTRAMUSCULAR; INTRAVENOUS AS NEEDED
Status: DISCONTINUED | OUTPATIENT
Start: 2019-07-31 | End: 2019-07-31 | Stop reason: SURG

## 2019-07-31 RX ORDER — KETOROLAC TROMETHAMINE 30 MG/ML
15 INJECTION, SOLUTION INTRAMUSCULAR; INTRAVENOUS EVERY 6 HOURS SCHEDULED
Status: DISCONTINUED | OUTPATIENT
Start: 2019-07-31 | End: 2019-08-01

## 2019-07-31 RX ORDER — ROCURONIUM BROMIDE 10 MG/ML
INJECTION, SOLUTION INTRAVENOUS AS NEEDED
Status: DISCONTINUED | OUTPATIENT
Start: 2019-07-31 | End: 2019-07-31 | Stop reason: SURG

## 2019-07-31 RX ORDER — EPHEDRINE SULFATE 50 MG/ML
INJECTION INTRAVENOUS AS NEEDED
Status: DISCONTINUED | OUTPATIENT
Start: 2019-07-31 | End: 2019-07-31 | Stop reason: SURG

## 2019-07-31 RX ORDER — HYDROMORPHONE HYDROCHLORIDE 2 MG/ML
INJECTION, SOLUTION INTRAMUSCULAR; INTRAVENOUS; SUBCUTANEOUS AS NEEDED
Status: DISCONTINUED | OUTPATIENT
Start: 2019-07-31 | End: 2019-07-31 | Stop reason: SURG

## 2019-07-31 RX ORDER — ONDANSETRON 2 MG/ML
4 INJECTION INTRAMUSCULAR; INTRAVENOUS EVERY 6 HOURS PRN
Status: DISCONTINUED | OUTPATIENT
Start: 2019-07-31 | End: 2019-08-02 | Stop reason: HOSPADM

## 2019-07-31 RX ORDER — DEXAMETHASONE SODIUM PHOSPHATE 4 MG/ML
INJECTION, SOLUTION INTRA-ARTICULAR; INTRALESIONAL; INTRAMUSCULAR; INTRAVENOUS; SOFT TISSUE AS NEEDED
Status: DISCONTINUED | OUTPATIENT
Start: 2019-07-31 | End: 2019-07-31 | Stop reason: SURG

## 2019-07-31 RX ORDER — CHLORHEXIDINE GLUCONATE 4 G/100ML
SOLUTION TOPICAL DAILY PRN
Status: DISCONTINUED | OUTPATIENT
Start: 2019-07-31 | End: 2019-07-31 | Stop reason: HOSPADM

## 2019-07-31 RX ORDER — HEPARIN SODIUM 5000 [USP'U]/ML
5000 INJECTION, SOLUTION INTRAVENOUS; SUBCUTANEOUS ONCE
Status: COMPLETED | OUTPATIENT
Start: 2019-07-31 | End: 2019-07-31

## 2019-07-31 RX ORDER — OXYCODONE HYDROCHLORIDE 10 MG/1
10 TABLET ORAL EVERY 6 HOURS PRN
Status: DISCONTINUED | OUTPATIENT
Start: 2019-07-31 | End: 2019-08-01

## 2019-07-31 RX ORDER — FENTANYL CITRATE/PF 50 MCG/ML
25 SYRINGE (ML) INJECTION
Status: COMPLETED | OUTPATIENT
Start: 2019-07-31 | End: 2019-07-31

## 2019-07-31 RX ORDER — PROPOFOL 10 MG/ML
INJECTION, EMULSION INTRAVENOUS AS NEEDED
Status: DISCONTINUED | OUTPATIENT
Start: 2019-07-31 | End: 2019-07-31 | Stop reason: SURG

## 2019-07-31 RX ORDER — METOCLOPRAMIDE HYDROCHLORIDE 5 MG/ML
10 INJECTION INTRAMUSCULAR; INTRAVENOUS EVERY 6 HOURS PRN
Status: DISCONTINUED | OUTPATIENT
Start: 2019-07-31 | End: 2019-08-02 | Stop reason: HOSPADM

## 2019-07-31 RX ORDER — HEPARIN SODIUM 5000 [USP'U]/ML
5000 INJECTION, SOLUTION INTRAVENOUS; SUBCUTANEOUS EVERY 8 HOURS SCHEDULED
Status: DISCONTINUED | OUTPATIENT
Start: 2019-07-31 | End: 2019-08-01

## 2019-07-31 RX ORDER — SUCCINYLCHOLINE/SOD CL,ISO/PF 100 MG/5ML
SYRINGE (ML) INTRAVENOUS AS NEEDED
Status: DISCONTINUED | OUTPATIENT
Start: 2019-07-31 | End: 2019-07-31 | Stop reason: SURG

## 2019-07-31 RX ORDER — SODIUM CHLORIDE, SODIUM LACTATE, POTASSIUM CHLORIDE, CALCIUM CHLORIDE 600; 310; 30; 20 MG/100ML; MG/100ML; MG/100ML; MG/100ML
125 INJECTION, SOLUTION INTRAVENOUS CONTINUOUS
Status: DISCONTINUED | OUTPATIENT
Start: 2019-07-31 | End: 2019-07-31

## 2019-07-31 RX ORDER — NEOSTIGMINE METHYLSULFATE 1 MG/ML
INJECTION INTRAVENOUS AS NEEDED
Status: DISCONTINUED | OUTPATIENT
Start: 2019-07-31 | End: 2019-07-31 | Stop reason: SURG

## 2019-07-31 RX ORDER — SODIUM CHLORIDE, SODIUM LACTATE, POTASSIUM CHLORIDE, CALCIUM CHLORIDE 600; 310; 30; 20 MG/100ML; MG/100ML; MG/100ML; MG/100ML
125 INJECTION, SOLUTION INTRAVENOUS CONTINUOUS
Status: ACTIVE | OUTPATIENT
Start: 2019-07-31 | End: 2019-07-31

## 2019-07-31 RX ORDER — ONDANSETRON 2 MG/ML
INJECTION INTRAMUSCULAR; INTRAVENOUS AS NEEDED
Status: DISCONTINUED | OUTPATIENT
Start: 2019-07-31 | End: 2019-07-31 | Stop reason: SURG

## 2019-07-31 RX ORDER — MAGNESIUM HYDROXIDE 1200 MG/15ML
LIQUID ORAL AS NEEDED
Status: DISCONTINUED | OUTPATIENT
Start: 2019-07-31 | End: 2019-07-31 | Stop reason: HOSPADM

## 2019-07-31 RX ORDER — HYDROMORPHONE HCL/PF 1 MG/ML
0.2 SYRINGE (ML) INJECTION
Status: COMPLETED | OUTPATIENT
Start: 2019-07-31 | End: 2019-07-31

## 2019-07-31 RX ORDER — GLYCOPYRROLATE 0.2 MG/ML
INJECTION INTRAMUSCULAR; INTRAVENOUS AS NEEDED
Status: DISCONTINUED | OUTPATIENT
Start: 2019-07-31 | End: 2019-07-31 | Stop reason: SURG

## 2019-07-31 RX ORDER — CEFAZOLIN SODIUM 1 G/50ML
1000 SOLUTION INTRAVENOUS ONCE
Status: COMPLETED | OUTPATIENT
Start: 2019-07-31 | End: 2019-07-31

## 2019-07-31 RX ORDER — ONDANSETRON 2 MG/ML
4 INJECTION INTRAMUSCULAR; INTRAVENOUS ONCE AS NEEDED
Status: DISCONTINUED | OUTPATIENT
Start: 2019-07-31 | End: 2019-07-31 | Stop reason: HOSPADM

## 2019-07-31 RX ORDER — SODIUM CHLORIDE, SODIUM LACTATE, POTASSIUM CHLORIDE, CALCIUM CHLORIDE 600; 310; 30; 20 MG/100ML; MG/100ML; MG/100ML; MG/100ML
125 INJECTION, SOLUTION INTRAVENOUS CONTINUOUS
Status: DISCONTINUED | OUTPATIENT
Start: 2019-07-31 | End: 2019-08-02 | Stop reason: HOSPADM

## 2019-07-31 RX ORDER — PANTOPRAZOLE SODIUM 40 MG/1
40 TABLET, DELAYED RELEASE ORAL
Status: DISCONTINUED | OUTPATIENT
Start: 2019-07-31 | End: 2019-08-02

## 2019-07-31 RX ORDER — HYDROMORPHONE HCL/PF 1 MG/ML
0.5 SYRINGE (ML) INJECTION
Status: DISCONTINUED | OUTPATIENT
Start: 2019-07-31 | End: 2019-08-02 | Stop reason: HOSPADM

## 2019-07-31 RX ORDER — LIDOCAINE HYDROCHLORIDE 10 MG/ML
INJECTION, SOLUTION INFILTRATION; PERINEURAL AS NEEDED
Status: DISCONTINUED | OUTPATIENT
Start: 2019-07-31 | End: 2019-07-31 | Stop reason: SURG

## 2019-07-31 RX ORDER — BUPIVACAINE HYDROCHLORIDE AND EPINEPHRINE 5; 5 MG/ML; UG/ML
INJECTION, SOLUTION PERINEURAL AS NEEDED
Status: DISCONTINUED | OUTPATIENT
Start: 2019-07-31 | End: 2019-07-31 | Stop reason: HOSPADM

## 2019-07-31 RX ORDER — MIDAZOLAM HYDROCHLORIDE 1 MG/ML
INJECTION INTRAMUSCULAR; INTRAVENOUS AS NEEDED
Status: DISCONTINUED | OUTPATIENT
Start: 2019-07-31 | End: 2019-07-31 | Stop reason: SURG

## 2019-07-31 RX ORDER — OXYCODONE HYDROCHLORIDE 5 MG/1
5 TABLET ORAL EVERY 6 HOURS PRN
Status: DISCONTINUED | OUTPATIENT
Start: 2019-07-31 | End: 2019-08-02 | Stop reason: HOSPADM

## 2019-07-31 RX ADMIN — ROCURONIUM BROMIDE 10 MG: 10 INJECTION, SOLUTION INTRAVENOUS at 08:49

## 2019-07-31 RX ADMIN — METOCLOPRAMIDE 10 MG: 5 INJECTION, SOLUTION INTRAMUSCULAR; INTRAVENOUS at 19:05

## 2019-07-31 RX ADMIN — ROCURONIUM BROMIDE 10 MG: 10 INJECTION, SOLUTION INTRAVENOUS at 07:56

## 2019-07-31 RX ADMIN — HEPARIN SODIUM 5000 UNITS: 5000 INJECTION INTRAVENOUS; SUBCUTANEOUS at 07:20

## 2019-07-31 RX ADMIN — SODIUM CHLORIDE, SODIUM LACTATE, POTASSIUM CHLORIDE, AND CALCIUM CHLORIDE 125 ML/HR: .6; .31; .03; .02 INJECTION, SOLUTION INTRAVENOUS at 06:33

## 2019-07-31 RX ADMIN — PANTOPRAZOLE SODIUM 40 MG: 40 TABLET, DELAYED RELEASE ORAL at 16:22

## 2019-07-31 RX ADMIN — FENTANYL CITRATE 25 MCG: 50 INJECTION, SOLUTION INTRAMUSCULAR; INTRAVENOUS at 07:57

## 2019-07-31 RX ADMIN — KETOROLAC TROMETHAMINE 15 MG: 30 INJECTION, SOLUTION INTRAMUSCULAR; INTRAVENOUS at 23:29

## 2019-07-31 RX ADMIN — EPHEDRINE SULFATE 5 MG: 50 INJECTION, SOLUTION INTRAVENOUS at 07:46

## 2019-07-31 RX ADMIN — HYDROMORPHONE HYDROCHLORIDE 0.5 MG: 2 INJECTION, SOLUTION INTRAMUSCULAR; INTRAVENOUS; SUBCUTANEOUS at 10:12

## 2019-07-31 RX ADMIN — SODIUM CHLORIDE, POTASSIUM CHLORIDE, SODIUM LACTATE AND CALCIUM CHLORIDE 125 ML/HR: 600; 310; 30; 20 INJECTION, SOLUTION INTRAVENOUS at 21:43

## 2019-07-31 RX ADMIN — KETOROLAC TROMETHAMINE 15 MG: 30 INJECTION, SOLUTION INTRAMUSCULAR; INTRAVENOUS at 11:01

## 2019-07-31 RX ADMIN — MIDAZOLAM HYDROCHLORIDE 2 MG: 1 INJECTION, SOLUTION INTRAMUSCULAR; INTRAVENOUS at 07:29

## 2019-07-31 RX ADMIN — CEFAZOLIN SODIUM 1000 MG: 1 SOLUTION INTRAVENOUS at 07:24

## 2019-07-31 RX ADMIN — FENTANYL CITRATE 25 MCG: 50 INJECTION, SOLUTION INTRAMUSCULAR; INTRAVENOUS at 07:31

## 2019-07-31 RX ADMIN — FENTANYL CITRATE 25 MCG: 50 INJECTION INTRAMUSCULAR; INTRAVENOUS at 10:48

## 2019-07-31 RX ADMIN — PHENYLEPHRINE HYDROCHLORIDE 20 MCG/MIN: 10 INJECTION INTRAVENOUS at 07:50

## 2019-07-31 RX ADMIN — NEOSTIGMINE METHYLSULFATE 3 MG: 1 INJECTION INTRAMUSCULAR; INTRAVENOUS; SUBCUTANEOUS at 10:06

## 2019-07-31 RX ADMIN — PROPOFOL 140 MG: 10 INJECTION, EMULSION INTRAVENOUS at 07:32

## 2019-07-31 RX ADMIN — KETOROLAC TROMETHAMINE 15 MG: 30 INJECTION, SOLUTION INTRAMUSCULAR; INTRAVENOUS at 17:40

## 2019-07-31 RX ADMIN — HYDROMORPHONE HYDROCHLORIDE 0.2 MG: 1 INJECTION, SOLUTION INTRAMUSCULAR; INTRAVENOUS; SUBCUTANEOUS at 11:23

## 2019-07-31 RX ADMIN — PHENYLEPHRINE HYDROCHLORIDE 100 MCG: 10 INJECTION INTRAVENOUS at 07:45

## 2019-07-31 RX ADMIN — CEFAZOLIN SODIUM 1000 MG: 1 SOLUTION INTRAVENOUS at 07:26

## 2019-07-31 RX ADMIN — GLYCOPYRROLATE 0.4 MG: 0.2 INJECTION, SOLUTION INTRAMUSCULAR; INTRAVENOUS at 10:06

## 2019-07-31 RX ADMIN — Medication 70 MG: at 07:32

## 2019-07-31 RX ADMIN — FENTANYL CITRATE 25 MCG: 50 INJECTION INTRAMUSCULAR; INTRAVENOUS at 10:43

## 2019-07-31 RX ADMIN — PHENYLEPHRINE HYDROCHLORIDE 100 MCG: 10 INJECTION INTRAVENOUS at 07:39

## 2019-07-31 RX ADMIN — FENTANYL CITRATE 25 MCG: 50 INJECTION INTRAMUSCULAR; INTRAVENOUS at 10:53

## 2019-07-31 RX ADMIN — ROCURONIUM BROMIDE 10 MG: 10 INJECTION, SOLUTION INTRAVENOUS at 08:58

## 2019-07-31 RX ADMIN — DEXAMETHASONE SODIUM PHOSPHATE 4 MG: 4 INJECTION, SOLUTION INTRAMUSCULAR; INTRAVENOUS at 07:42

## 2019-07-31 RX ADMIN — METOCLOPRAMIDE 10 MG: 5 INJECTION, SOLUTION INTRAMUSCULAR; INTRAVENOUS at 13:01

## 2019-07-31 RX ADMIN — FENTANYL CITRATE 50 MCG: 50 INJECTION, SOLUTION INTRAMUSCULAR; INTRAVENOUS at 07:58

## 2019-07-31 RX ADMIN — HYDROMORPHONE HYDROCHLORIDE 0.2 MG: 1 INJECTION, SOLUTION INTRAMUSCULAR; INTRAVENOUS; SUBCUTANEOUS at 11:08

## 2019-07-31 RX ADMIN — PROPOFOL 20 MG: 10 INJECTION, EMULSION INTRAVENOUS at 07:34

## 2019-07-31 RX ADMIN — ONDANSETRON HYDROCHLORIDE 4 MG: 2 INJECTION, SOLUTION INTRAVENOUS at 09:44

## 2019-07-31 RX ADMIN — HYDROMORPHONE HYDROCHLORIDE 0.5 MG: 2 INJECTION, SOLUTION INTRAMUSCULAR; INTRAVENOUS; SUBCUTANEOUS at 10:10

## 2019-07-31 RX ADMIN — HYDROMORPHONE HYDROCHLORIDE 0.2 MG: 1 INJECTION, SOLUTION INTRAMUSCULAR; INTRAVENOUS; SUBCUTANEOUS at 11:13

## 2019-07-31 RX ADMIN — SODIUM CHLORIDE, SODIUM LACTATE, POTASSIUM CHLORIDE, AND CALCIUM CHLORIDE: .6; .31; .03; .02 INJECTION, SOLUTION INTRAVENOUS at 08:51

## 2019-07-31 RX ADMIN — FENTANYL CITRATE 25 MCG: 50 INJECTION INTRAMUSCULAR; INTRAVENOUS at 10:58

## 2019-07-31 RX ADMIN — ROCURONIUM BROMIDE 30 MG: 10 INJECTION, SOLUTION INTRAVENOUS at 07:40

## 2019-07-31 RX ADMIN — ROCURONIUM BROMIDE 10 MG: 10 INJECTION, SOLUTION INTRAVENOUS at 08:17

## 2019-07-31 RX ADMIN — HYDROMORPHONE HYDROCHLORIDE 0.2 MG: 1 INJECTION, SOLUTION INTRAMUSCULAR; INTRAVENOUS; SUBCUTANEOUS at 11:28

## 2019-07-31 RX ADMIN — LIDOCAINE HYDROCHLORIDE 50 MG: 10 INJECTION, SOLUTION INFILTRATION; PERINEURAL at 07:31

## 2019-07-31 RX ADMIN — OXYCODONE HYDROCHLORIDE 10 MG: 10 TABLET ORAL at 19:04

## 2019-07-31 RX ADMIN — EPHEDRINE SULFATE 5 MG: 50 INJECTION, SOLUTION INTRAVENOUS at 07:51

## 2019-07-31 RX ADMIN — HEPARIN SODIUM 5000 UNITS: 5000 INJECTION INTRAVENOUS; SUBCUTANEOUS at 19:06

## 2019-07-31 RX ADMIN — HYDROMORPHONE HYDROCHLORIDE 0.2 MG: 1 INJECTION, SOLUTION INTRAMUSCULAR; INTRAVENOUS; SUBCUTANEOUS at 11:18

## 2019-07-31 RX ADMIN — SODIUM CHLORIDE, POTASSIUM CHLORIDE, SODIUM LACTATE AND CALCIUM CHLORIDE 125 ML/HR: 600; 310; 30; 20 INJECTION, SOLUTION INTRAVENOUS at 14:26

## 2019-07-31 NOTE — ANESTHESIA PREPROCEDURE EVALUATION
Review of Systems/Medical History  Patient summary reviewed  Chart reviewed  No history of anesthetic complications     Cardiovascular  EKG reviewed, Hyperlipidemia,    Pulmonary  Negative pulmonary ROS        GI/Hepatic    GERD , Esophageal disease (hx esophagectomy for Eagle's with dysplasia) ,  Hiatal hernia,   Comment: Denies reflux, c/o food sometimes sticks when going down, has not been evaluated since esophagectomy     Negative  ROS        Endo/Other  Negative endo/other ROS      GYN  Negative gynecology ROS          Hematology  Negative hematology ROS      Musculoskeletal  Negative musculoskeletal ROS        Neurology  Negative neurology ROS      Psychology   Anxiety, Depression ,              Physical Exam    Airway    Mallampati score: II  TM Distance: >3 FB  Neck ROM: full     Dental   Comment: Poor dentition, missing teeth, denies loose teeth,     Cardiovascular  Rhythm: regular, Rate: normal,     Pulmonary      Other Findings       Lab Results   Component Value Date    WBC 6 45 06/18/2019    HGB 13 4 06/18/2019     06/18/2019     Lab Results   Component Value Date    SODIUM 143 06/18/2019    K 3 4 (L) 06/18/2019    BUN 12 06/18/2019    CREATININE 0 76 06/18/2019    GLUCOSE 83 07/23/2015     Lab Results   Component Value Date    PTT 31 06/18/2019      Lab Results   Component Value Date    INR 1 13 06/18/2019     Anesthesia Plan  ASA Score- 2     Anesthesia Type- general with ASA Monitors  Additional Monitors:   Airway Plan: ETT  Plan Factors-    Induction- intravenous and rapid sequence induction  Postoperative Plan-     Informed Consent- Anesthetic plan and risks discussed with patient  I personally reviewed this patient with the CRNA  Discussed and agreed on the Anesthesia Plan with the CRNA  Mary Waters

## 2019-07-31 NOTE — PLAN OF CARE
Problem: Potential for Falls  Goal: Patient will remain free of falls  Description  INTERVENTIONS:  - Assess patient frequently for physical needs  -  Identify  physical deficits that affect risk of falls post anesthesia  -  Yeoman fall precautions while anesthesia wears off  - Educate patient/family on patient safety including physical limitations  - Instruct patient to call for assistance with activity   - Modify environment to reduce risk of injury     Outcome: Progressing     Problem: PAIN - ADULT  Goal: Verbalizes/displays adequate comfort level or baseline comfort level  Description  Interventions:  - Encourage patient to monitor pain and request assistance  - Assess pain using 0-10 pain scale  - Administer analgesics based on type and severity of pain and evaluate response  - Implement non-pharmacological measures as appropriate and evaluate response  - Notify physician/advanced practitioner if interventions unsuccessful or patient reports new pain   Outcome: Progressing     Problem: INFECTION - ADULT  Goal: Absence or prevention of progression during hospitalization  Description  INTERVENTIONS:  - Assess and monitor for signs and symptoms of infection  - Monitor temp and WBC  - Monitor IV insertion site  - Yeoman appropriate cooling/warming therapies per order  - Administer medications as ordered  - Instruct and encourage patient and family to use good hand hygiene technique   Outcome: Progressing     Problem: DISCHARGE PLANNING  Goal: Discharge to home or other facility with appropriate resources  Description  INTERVENTIONS:  - Identify barriers to discharge w/patient   - Arrange for needed discharge resources and transportation as appropriate  - Identify discharge learning needs (meds, wound care, etc )  - Refer to Case Management Department for coordinating discharge planning if the patient needs post-hospital services    Outcome: Progressing     Problem: Knowledge Deficit  Goal: Patient/family/caregiver demonstrates understanding of disease process, treatment plan, medications, and discharge instructions  Description  Complete learning assessment and assess knowledge base    Interventions:  - Provide teaching at level of understanding  - Provide teaching via preferred learning methods  Outcome: Progressing     Problem: GASTROINTESTINAL - ADULT  Goal: Minimal or absence of nausea and/or vomiting  Description  INTERVENTIONS:  - Administer IV fluids as ordered to ensure adequate hydration  - Administer ordered antiemetic medications as needed  - Provide nonpharmacologic comfort measures as appropriate  - Advance diet as tolerated, if ordered   Outcome: Progressing     Problem: SKIN/TISSUE INTEGRITY - ADULT  Goal: Incision(s), wounds(s) or drain site(s) healing without S/S of infection  Description  INTERVENTIONS  - Assess and document dressing and incision     Outcome: Progressing

## 2019-07-31 NOTE — ANESTHESIA POSTPROCEDURE EVALUATION
Post-Op Assessment Note    CV Status:  Stable       Mental Status:  Confused   Hydration Status:  Stable   PONV Controlled:  None   Airway Patency:  Patent   Post Op Vitals Reviewed: Yes      Staff: CRNA           BP   132/64   Temp   97 7   Pulse  91   Resp   20   SpO2   100%

## 2019-07-31 NOTE — H&P
Patient seen and examined  Prior history reviewed  No change from prior  Will proceed to OR as planned  The patients exam is unchanged compared to previous history and physical     Blood pressure 147/62, pulse 61, temperature 98 2 °F (36 8 °C), resp  rate 20, SpO2 97 %  Assessment/Plan:     Incisional hernia  Symptomatic incisional hernia  Patient does have history of Eagle's with high-grade dysplasia status post esophagectomy  Does now have a x-ray with worsening consolidation in the right long concerning for developing neoplasm  She is awaiting CT scan for further evaluation  Will go ahead and get her ready to go and cleared for potential surgery, however depending on the CT scan results that may or may not determine whether not the surgery will come to fruition  For now she will be scheduled for laparoscopic ventral hernia repair with mesh, possible open  The procedure self including all associated risks and benefits were discussed the patient great length  The patient verbalized understand these risks is willing to proceed, consent was signed  She was seen by her medical doctor for medical clearance  Again will await CT results prior to undergoing surgery          Diagnoses and all orders for this visit:     Incisional hernia, without obstruction or gangrene            Subjective:       Patient ID: Elliott Hale is a 78 y o  female      78-year-old female with a history of esophagectomy for Eagle's esophagus with high-grade dysplasia, presents for follow-up regarding symptomatic incisional hernia  Currently she does have intermittent pain mostly in the more superior portion of the hernia  Denies any nausea vomiting  No fevers or chills  She still hoping to proceed with the surgery  She is aware of the findings on the x-ray knows she will be getting a CT scan soon  She is having lot of personal and family issues right now that is causing her lot of emotional stress    Currently denies any worsening shortness of breath or chest pain         The following portions of the patient's history were reviewed and updated as appropriate:   She  has a past medical history of Abnormal blood chemistry, Achalasia, esophageal, Acute medial meniscus tear, Acute otitis externa, Adrenal nodule ([facility] [address] ), Anemia, Arthritis, Atrial fibrillation ([facility] [address] ), Atypical chest pain, Eagle's esophagus with high grade dysplasia, Cancer ([facility] [address] ), Cerumen impaction, Chronic pain of right knee, Coronary artery disease, Depression, Disease of thyroid gland, Dysphagia, Esophageal stricture, GERD (gastroesophageal reflux disease), Headache, Hiatal hernia, Hyperlipidemia, Insomnia, Jejunostomy tube present ([facility] [address] ), Low vitamin D level, Osteoarthritis, knee, Pneumonia, Right leg paresthesias, Sciatica, Shingles, Spondylosis of 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, and Venous insufficiency    She        Patient Active Problem List     Diagnosis Date Noted    Adrenal adenoma 12/06/2018    Incisional hernia 11/01/2018    Eagle's esophagus with high grade dysplasia 08/24/2017    Osteoarthritis of knee 05/02/2017    Joint pain, knee 05/02/2017    3-vessel CAD 04/05/2017    A-fib ([facility] [address] ) 04/05/2017    Lumbar radiculopathy 03/02/2017    Dysphagia 02/28/2017    Myofascial pain 02/23/2017    Right leg paresthesias 02/23/2017    Spondylosis of 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 02/23/2017    Backache with radiation 01/31/2017    Atypical chest pain 11/22/2016    Continuous left lower quadrant pain 08/25/2015    Low vitamin D level 07/24/2015    Abnormal blood chemistry 07/22/2015    Sciatica 05/08/2015    Stricture of esophagus 07/02/2014    Gastroesophageal reflux disease 04/21/2014    Hyperlipidemia 03/11/2014    Anemia 03/06/2014    Hypothyroidism 03/06/2014    Venous insufficiency 12/06/2013    Depression 06/28/2013    Insomnia 06/28/2013      She  has a past surgical history that includes  section; pr esophagogastroduodenoscopy transoral diagnostic (N/A, 3/10/2017); Steroid injection knee (Right, 2017); Joint replacement (Right, 11/10/2014); pr esophagogastroduodenoscopy transoral diagnostic (N/A, 2017); pr esophagoscopy flexible transoral with biopsy (N/A, 2017); pr removal esophagus,no thoracotomy (N/A, 2017); and Gastrojejunostomy w/ jejunostomy tube (N/A, 2017)  Her family history includes Alzheimer's disease in her mother; Diabetes type II in her father  She  reports that she has never smoked  She has never used smokeless tobacco  She reports that she drank alcohol  She reports that she does not use drugs  Current Outpatient Medications   Medication Sig Dispense Refill    escitalopram (LEXAPRO) 20 mg tablet Take 1 tablet by mouth        folic acid (FOLVITE) 1 mg tablet Take 1 tablet by mouth daily        gabapentin (NEURONTIN) 100 mg capsule Take 100 mg by mouth 3 (three) times a day        pantoprazole (PROTONIX) 40 mg tablet Take 40 mg by mouth 2 (two) times a day          pravastatin (PRAVACHOL) 20 mg tablet Take 20 mg by mouth daily with dinner            No current facility-administered medications for this visit        She has No Known Allergies        Review of Systems   Constitutional: Negative for activity change, appetite change, chills, diaphoresis, fatigue, fever and unexpected weight change  Respiratory: Negative for shortness of breath  Cardiovascular: Negative for chest pain and palpitations  Gastrointestinal: Positive for abdominal pain  Negative for constipation, diarrhea, nausea and vomiting           Objective:        /81   Pulse 89   Temp 99 6 °F (37 6 °C)   Ht 5' 2" (1 575 m)   Wt 57 2 kg (126 lb)   BMI 23 05 kg/m²              Physical Exam   Constitutional: She is oriented to person, place, and time  She appears well-developed and well-nourished  No distress  HENT:   Head: Normocephalic and atraumatic  Eyes: No scleral icterus  Neck: Normal range of motion  No tracheal deviation present  Cardiovascular: Normal rate, regular rhythm and normal heart sounds  Exam reveals no gallop and no friction rub  No murmur heard  Pulmonary/Chest: Effort normal and breath sounds normal  No stridor  No respiratory distress  She has no wheezes  She has no rales  She exhibits no tenderness  Abdominal: Soft  She exhibits no distension and no mass  There is tenderness (Overlying the hernia  In the epigastric)  There is no rebound and no guarding  A hernia ( epigastric containing colon addition to periumbilical) is present  Midline incision healed well with good cosmesis   Musculoskeletal: Normal range of motion  She exhibits no edema, tenderness or deformity  Neurological: She is alert and oriented to person, place, and time  No cranial nerve deficit  Skin: Skin is warm  No rash noted  She is not diaphoretic  No erythema  No pallor  Psychiatric: She has a normal mood and affect  Her behavior is normal    Vitals reviewed

## 2019-07-31 NOTE — OP NOTE
OPERATIVE REPORT  PATIENT NAME: Herminio Chung    :  1950  MRN: [id number]  Pt Location: [facility] OR ROOM 02    SURGERY DATE: 2019    Surgeon(s) and Role: Jacqueline Cade,  - Primary     * Brian Simeon PA-C - Assisting    Preop Diagnosis:  Incisional hernia, without obstruction or gangrene [K43 2]    Post-Op Diagnosis Codes:     * Incisional hernia, without obstruction or gangrene [K43 2]    Procedure(s) (LRB):  REPAIR HERNIA VENTRAL LAPAROSCOPIC (N/A)    Specimen(s):  * No specimens in log *    Estimated Blood Loss:   50 mL    Drains:  Gastrostomy/Enterostomy Jejunostomy 14 Fr  LUQ (Active)   Number of days: 706       Anesthesia Type:   General    Operative Indications:  Incisional hernia, without obstruction or gangrene [K43 2]      Operative Findings:  Intra-abdominal findings included dense adhesions of omentum and small bowel to the anterior abdominal wall  There were 3 separate hernia defects along the midline incision  The total defect size was approximately 15 x 6 cm  An approximately 25 x 15 cm ventral light Bard mesh was used to repair this defect  Complications:   None    Procedure and Technique:  Patient is brought to operating arena and placed in supine position operating table  All the regular monitor devices were then connected  The patient underwent general anesthesia with endotracheal intubation without complication  She received subcutaneous heparin addition to bilateral lower sequential compressive devices for DVT prophylaxis  She received perioperative antibiotics  The patient was then prepped and draped in usual sterile fashion  A time-out was performed to verify the correct patient, procedure, position, site  Verlie Cotton a 5 mm incision was made in the left upper quadrant at peguero's point  Towel clamps were then grasped the skin on either side incisions was raise  Using Veress needle attempt was made to establish insufflation    Unfortunately this was not successful and appears that the preperitoneal space was then dilated  It was decided to make a cut down in the left lower quadrant as this will likely be the site overall larger trocar  A 1 2 cm incision was then made in the left lower quadrant  This was carried down through the subcutaneous fat into the fascia  The fascia was then grasped with Radha Nunez is elevated incised  This point there was clearly peritoneum which was then grasped elevated and incised with Carlos Loredo  With that at this point the abdomen was entered  Under direct vision and a 12 mm Lennon trocar was then inserted  The abdomen was then insufflated with carbon dioxide to a pressure of 12-15 mmHg  The patient tolerated insufflation well  Once this was completed 5 mm trocars were then placed in the right upper right lower end at the initial left upper quadrant site without complication  Upon entering the abdomen initially was noted there is a densely adhered speeds of small bowel left upper quadrant  In addition there was densely adhered omentum involved in the hernia  This also allowed for elevation of the transverse colon  Attention was turned to taking down easier filmy adhesions  This was taken down with a combination of blunt and sharp technique  Significant amount of omentum was involved and this was taken down using scissors with electrocautery  Care was taken make sure that the both:  Small bowel were far away  Once all the filmy adhesions and omentum was taken down it was clear that there were 3 separate defects  The remaining incarcerated omentum was then meticulously taken down from all 3 hernia sites until the hernia was clearly visible and all edges of fascia were clearly visible  Lastly there was a densely adhered piece of small bowel in the left upper quadrant that was not involved in the hernia but would clearly be in the way of the mesh    Care was taken to take down this small piece of small bowel by incising the peritoneum just above with bowel was adhesed  The he shins to the peritoneum were too dense to carefully tease away the bowel without fear of injury  A small piece of peritoneum was then incised and the small bowel was taken down without complication  There is noted to be some oozing from the abdominal wall which cauterized  At this point the omentum and all small bowel and large bowel were then visualized and inspected  There appeared to be no injury to either small large bowel  There is occasional oozing at the omentum which was then cauterized without complication  The hernia defect was then measured to include all 3 defects in this measured approximately 15 x 6 cm  Attention was then turned to repairing this hernia  At approximately 25 x 15 cm elliptical mesh ventral light Bard was chosen for this repair  This mesh was subsequently rolled up and inserted through the left lower quadrant port site  Once inserted a suture grasper was then used to penetrate the middle point of all hernia defects and grasp the string connected the mesh  This is that this was then elevated out of the abdomen  The balloon insufflation device was then used to insufflate the underlying balloon to the mesh  Once the balloon was insufflated the mesh allowed to be flush with the anterior abdominal wall  A tacking device was then used to tack all 4 corners secure the orientation of the mesh  Under direct vision tacks were then placed in a circumferential manner approximately 1 cm power at the outer rim of the mesh  Once this was completed the balloon was then taken down and removed from the abdomen  An inner row of tacks were then placed to secure the mesh against the anterior abdominal wall  Care was taken make sure that none of the tacks were actually involving the hernia itself  Once this was done the mesh was inspected and appeared to be in place and there is no active bleeding    Once again the large and small bowel were inspected and the omentum was also inspected appeared to be no injuries and no obvious active bleeding  At this time the trocars were all removed under direct vision without complication  The abdomen was allowed to desufflate  The left lower quadrant incision was closed in layers with the fascia being closed with 0 Vicryl  The remainder of the incisions then closed with 4 Monocryl to approximate the skin  Steri-Strips and dry sterile dressings were then placed  The patient tolerated procedure well was taken to the postanesthesia care unit stable condition  All lap, needle, and instrument counts were correct  I was present for the entire procedure and A qualified resident physician was not available, [place] STAR Quarles was required for adequate exposure retraction, and suturing during the case      Patient Disposition:  PACU     SIGNATURE: Maria Abarca DO  DATE: July 31, 2019  TIME: 5:07 PM

## 2019-08-01 LAB
ANION GAP SERPL CALCULATED.3IONS-SCNC: 6 MMOL/L (ref 4–13)
ANION GAP SERPL CALCULATED.3IONS-SCNC: 8 MMOL/L (ref 4–13)
BASOPHILS # BLD MANUAL: 0 THOUSAND/UL (ref 0–0.1)
BASOPHILS NFR MAR MANUAL: 0 % (ref 0–1)
BUN SERPL-MCNC: 20 MG/DL (ref 5–25)
BUN SERPL-MCNC: 29 MG/DL (ref 5–25)
CALCIUM SERPL-MCNC: 8.5 MG/DL (ref 8.3–10.1)
CALCIUM SERPL-MCNC: 8.6 MG/DL (ref 8.3–10.1)
CHLORIDE SERPL-SCNC: 101 MMOL/L (ref 100–108)
CHLORIDE SERPL-SCNC: 103 MMOL/L (ref 100–108)
CO2 SERPL-SCNC: 26 MMOL/L (ref 21–32)
CO2 SERPL-SCNC: 29 MMOL/L (ref 21–32)
CREAT SERPL-MCNC: 0.87 MG/DL (ref 0.6–1.3)
CREAT SERPL-MCNC: 1.38 MG/DL (ref 0.6–1.3)
EOSINOPHIL # BLD MANUAL: 0 THOUSAND/UL (ref 0–0.4)
EOSINOPHIL NFR BLD MANUAL: 0 % (ref 0–6)
ERYTHROCYTE [DISTWIDTH] IN BLOOD BY AUTOMATED COUNT: 14 % (ref 11.6–15.1)
GFR SERPL CREATININE-BSD FRML MDRD: 39 ML/MIN/1.73SQ M
GFR SERPL CREATININE-BSD FRML MDRD: 68 ML/MIN/1.73SQ M
GLUCOSE SERPL-MCNC: 110 MG/DL (ref 65–140)
GLUCOSE SERPL-MCNC: 113 MG/DL (ref 65–140)
HCT VFR BLD AUTO: 40.2 % (ref 34.8–46.1)
HGB BLD-MCNC: 12.8 G/DL (ref 11.5–15.4)
LYMPHOCYTES # BLD AUTO: 1.41 THOUSAND/UL (ref 0.6–4.47)
LYMPHOCYTES # BLD AUTO: 16 % (ref 14–44)
MAGNESIUM SERPL-MCNC: 1.5 MG/DL (ref 1.6–2.6)
MCH RBC QN AUTO: 28.7 PG (ref 26.8–34.3)
MCHC RBC AUTO-ENTMCNC: 31.8 G/DL (ref 31.4–37.4)
MCV RBC AUTO: 90 FL (ref 82–98)
MONOCYTES # BLD AUTO: 0.26 THOUSAND/UL (ref 0–1.22)
MONOCYTES NFR BLD: 3 % (ref 4–12)
NEUTROPHILS # BLD MANUAL: 7.15 THOUSAND/UL (ref 1.85–7.62)
NEUTS BAND NFR BLD MANUAL: 7 % (ref 0–8)
NEUTS SEG NFR BLD AUTO: 74 % (ref 43–75)
NRBC BLD AUTO-RTO: 0 /100 WBCS
PLATELET # BLD AUTO: 199 THOUSANDS/UL (ref 149–390)
PLATELET # BLD AUTO: 228 THOUSANDS/UL (ref 149–390)
PLATELET BLD QL SMEAR: ADEQUATE
PMV BLD AUTO: 10.9 FL (ref 8.9–12.7)
PMV BLD AUTO: 11.4 FL (ref 8.9–12.7)
POTASSIUM SERPL-SCNC: 4 MMOL/L (ref 3.5–5.3)
POTASSIUM SERPL-SCNC: 4 MMOL/L (ref 3.5–5.3)
RBC # BLD AUTO: 4.46 MILLION/UL (ref 3.81–5.12)
SODIUM SERPL-SCNC: 135 MMOL/L (ref 136–145)
SODIUM SERPL-SCNC: 138 MMOL/L (ref 136–145)
TOTAL CELLS COUNTED SPEC: 100
WBC # BLD AUTO: 8.83 THOUSAND/UL (ref 4.31–10.16)

## 2019-08-01 PROCEDURE — 97163 PT EVAL HIGH COMPLEX 45 MIN: CPT | Performed by: PHYSICAL THERAPIST

## 2019-08-01 PROCEDURE — 83735 ASSAY OF MAGNESIUM: CPT | Performed by: SURGERY

## 2019-08-01 PROCEDURE — 85049 AUTOMATED PLATELET COUNT: CPT | Performed by: PHYSICIAN ASSISTANT

## 2019-08-01 PROCEDURE — 80048 BASIC METABOLIC PNL TOTAL CA: CPT | Performed by: SURGERY

## 2019-08-01 PROCEDURE — G8978 MOBILITY CURRENT STATUS: HCPCS | Performed by: PHYSICAL THERAPIST

## 2019-08-01 PROCEDURE — 85027 COMPLETE CBC AUTOMATED: CPT | Performed by: SURGERY

## 2019-08-01 PROCEDURE — 85007 BL SMEAR W/DIFF WBC COUNT: CPT | Performed by: SURGERY

## 2019-08-01 PROCEDURE — G8987 SELF CARE CURRENT STATUS: HCPCS

## 2019-08-01 PROCEDURE — G8979 MOBILITY GOAL STATUS: HCPCS | Performed by: PHYSICAL THERAPIST

## 2019-08-01 PROCEDURE — 99024 POSTOP FOLLOW-UP VISIT: CPT | Performed by: SURGERY

## 2019-08-01 PROCEDURE — G8988 SELF CARE GOAL STATUS: HCPCS

## 2019-08-01 PROCEDURE — 97167 OT EVAL HIGH COMPLEX 60 MIN: CPT

## 2019-08-01 PROCEDURE — 93005 ELECTROCARDIOGRAM TRACING: CPT

## 2019-08-01 RX ORDER — TAMSULOSIN HYDROCHLORIDE 0.4 MG/1
0.4 CAPSULE ORAL
Status: DISCONTINUED | OUTPATIENT
Start: 2019-08-01 | End: 2019-08-02 | Stop reason: HOSPADM

## 2019-08-01 RX ORDER — ACETAMINOPHEN 325 MG/1
650 TABLET ORAL EVERY 6 HOURS SCHEDULED
Status: DISCONTINUED | OUTPATIENT
Start: 2019-08-01 | End: 2019-08-02 | Stop reason: HOSPADM

## 2019-08-01 RX ORDER — HEPARIN SODIUM 5000 [USP'U]/ML
5000 INJECTION, SOLUTION INTRAVENOUS; SUBCUTANEOUS EVERY 8 HOURS SCHEDULED
Status: DISCONTINUED | OUTPATIENT
Start: 2019-08-01 | End: 2019-08-02 | Stop reason: ALTCHOICE

## 2019-08-01 RX ORDER — TRAMADOL HYDROCHLORIDE 50 MG/1
50 TABLET ORAL ONCE
Status: COMPLETED | OUTPATIENT
Start: 2019-08-01 | End: 2019-08-01

## 2019-08-01 RX ORDER — ACETAMINOPHEN 325 MG/1
650 TABLET ORAL EVERY 6 HOURS PRN
Status: DISCONTINUED | OUTPATIENT
Start: 2019-08-01 | End: 2019-08-01

## 2019-08-01 RX ORDER — MAGNESIUM SULFATE HEPTAHYDRATE 40 MG/ML
2 INJECTION, SOLUTION INTRAVENOUS
Status: COMPLETED | OUTPATIENT
Start: 2019-08-01 | End: 2019-08-02

## 2019-08-01 RX ADMIN — KETOROLAC TROMETHAMINE 15 MG: 30 INJECTION, SOLUTION INTRAMUSCULAR; INTRAVENOUS at 12:27

## 2019-08-01 RX ADMIN — SODIUM CHLORIDE, POTASSIUM CHLORIDE, SODIUM LACTATE AND CALCIUM CHLORIDE 125 ML/HR: 600; 310; 30; 20 INJECTION, SOLUTION INTRAVENOUS at 05:50

## 2019-08-01 RX ADMIN — SODIUM CHLORIDE, POTASSIUM CHLORIDE, SODIUM LACTATE AND CALCIUM CHLORIDE 125 ML/HR: 600; 310; 30; 20 INJECTION, SOLUTION INTRAVENOUS at 13:57

## 2019-08-01 RX ADMIN — TRAMADOL HYDROCHLORIDE 50 MG: 50 TABLET, FILM COATED ORAL at 09:32

## 2019-08-01 RX ADMIN — ACETAMINOPHEN 650 MG: 325 TABLET, FILM COATED ORAL at 17:46

## 2019-08-01 RX ADMIN — ONDANSETRON HYDROCHLORIDE 4 MG: 2 SOLUTION INTRAMUSCULAR; INTRAVENOUS at 01:34

## 2019-08-01 RX ADMIN — KETOROLAC TROMETHAMINE 15 MG: 30 INJECTION, SOLUTION INTRAMUSCULAR; INTRAVENOUS at 17:47

## 2019-08-01 RX ADMIN — SODIUM CHLORIDE, SODIUM LACTATE, POTASSIUM CHLORIDE, AND CALCIUM CHLORIDE 1000 ML: .6; .31; .03; .02 INJECTION, SOLUTION INTRAVENOUS at 19:32

## 2019-08-01 RX ADMIN — TAMSULOSIN HYDROCHLORIDE 0.4 MG: 0.4 CAPSULE ORAL at 17:46

## 2019-08-01 RX ADMIN — HEPARIN SODIUM 5000 UNITS: 5000 INJECTION INTRAVENOUS; SUBCUTANEOUS at 21:39

## 2019-08-01 RX ADMIN — OXYCODONE HYDROCHLORIDE 5 MG: 5 TABLET ORAL at 14:22

## 2019-08-01 RX ADMIN — ACETAMINOPHEN 650 MG: 325 TABLET, FILM COATED ORAL at 09:32

## 2019-08-01 RX ADMIN — HYDROMORPHONE HYDROCHLORIDE 0.5 MG: 1 INJECTION, SOLUTION INTRAMUSCULAR; INTRAVENOUS; SUBCUTANEOUS at 01:36

## 2019-08-01 RX ADMIN — KETOROLAC TROMETHAMINE 15 MG: 30 INJECTION, SOLUTION INTRAMUSCULAR; INTRAVENOUS at 05:18

## 2019-08-01 RX ADMIN — OXYCODONE HYDROCHLORIDE 5 MG: 5 TABLET ORAL at 08:21

## 2019-08-01 RX ADMIN — PANTOPRAZOLE SODIUM 40 MG: 40 TABLET, DELAYED RELEASE ORAL at 05:19

## 2019-08-01 RX ADMIN — ACETAMINOPHEN 650 MG: 325 TABLET, FILM COATED ORAL at 23:25

## 2019-08-01 RX ADMIN — MAGNESIUM SULFATE HEPTAHYDRATE 2 G: 40 INJECTION, SOLUTION INTRAVENOUS at 19:31

## 2019-08-01 RX ADMIN — MAGNESIUM SULFATE HEPTAHYDRATE 2 G: 40 INJECTION, SOLUTION INTRAVENOUS at 20:45

## 2019-08-01 RX ADMIN — HEPARIN SODIUM 5000 UNITS: 5000 INJECTION INTRAVENOUS; SUBCUTANEOUS at 05:18

## 2019-08-01 RX ADMIN — HEPARIN SODIUM 5000 UNITS: 5000 INJECTION INTRAVENOUS; SUBCUTANEOUS at 13:58

## 2019-08-01 NOTE — PLAN OF CARE
Problem: OCCUPATIONAL THERAPY ADULT  Goal: Performs self-care activities at highest level of function for planned discharge setting  See evaluation for individualized goals  Description  Treatment Interventions: ADL retraining, Functional transfer training, UE strengthening/ROM, Endurance training, Patient/family training, Activityengagement          See flowsheet documentation for full assessment, interventions and recommendations  Note:   Limitation: Decreased ADL status, Decreased UE strength, Decreased Safe judgement during ADL, Decreased endurance, Decreased self-care trans, Decreased high-level ADLs     Assessment: Pt is a 78 y o  female seen for OT evaluation s/p admit to [facility] on 7/31/2019 w/ Incisional hernia, without obstruction or gangrene  Comorbidities affecting pt's functional performance at time of assessment include: previous surgery and anemia, GERD, depression, anxiety, CAD, a-fib, CA, shingles, arthritis  Personal factors affecting pt at time of IE include:steps to enter environment, limited home support, difficulty performing ADLS, difficulty performing IADLS , flat affect, decreased initiation and engagement  and health management   Prior to admission, pt was (A) with ADL and IADL performance with use of SPC during functional mobility  Upon evaluation: Pt requires (S)-max (A) with use of RW during functional mobility 2* the following deficits impacting occupational performance: weakness, decreased strength, decreased balance, decreased tolerance, impaired initiation, impaired problem solving, decreased safety awareness and increased pain  Pt to benefit from continued skilled OT tx while in the hospital to address deficits as defined above and maximize level of functional independence w ADL's and functional mobility  Occupational Performance areas to address include: grooming, bathing/shower, toilet hygiene, dressing, functional mobility, community mobility and clothing management   From OT standpoint, recommendation at time of d/c would be short term rehab       OT Discharge Recommendation: Short Term Rehab

## 2019-08-01 NOTE — UTILIZATION REVIEW
Initial Clinical Review  PLEASE NOTE: Patient Upgarded to IP 8/1 @ [phone] from OP No Charge Bed due to  Low urine output, IVF's and recommendation for rehab     Start   Ordered   08/01/19 1041  Inpatient Admission Once     Transfer Service: Surgery-General    Expected Discharge Time: Evening    Expected Discharge Date: 08/02/19       Question Answer Comment   Admitting Physician Ren Quintana    Level of Care Med Surg    Estimated length of stay More than 2 Midnights    Certification I certify that inpatient services are medically necessary for this patient for a duration of greater than two midnights  See H&P and MD Progress Notes for additional information about the patient's course of treatment  08/01/19 1042         Elective OP surgical procedure  Age/Sex: 78 y o  female     Surgery Date: 7/31/2019    Procedure: REPAIR HERNIA VENTRAL LAPAROSCOPIC (N/A)    Anesthesia: General    Operative Findings: Intra-abdominal findings included dense adhesions of omentum and small bowel to the anterior abdominal wall  There were 3 separate hernia defects along the midline incision  The total defect size was approximately 15 x 6 cm  An approximately 25 x 15 cm ventral light Bard mesh was used to repair this defect  POD #1: s/p laparoscopic ventral hernia repair with mesh  Low urine output  Continue to monitor on IVF's  Persistent pain and poor level of activity    Tolerated liquids - advanced to Reg Diet    PT recommending Rehab    Per ATTENDING: patient flipped to an inpatient status as she will require additional and I states to allow for rehab placement    Admission Orders: Date/Time/Statement: 8/1/19 @ [phone]   Orders Placed This Encounter   Procedures    Inpatient Admission     Standing Status:   Standing     Number of Occurrences:   1     Order Specific Question:   Admitting Physician     Answer:   Ren Quintana [[id number]]     Order Specific Question:   Level of Care     Answer:   Med Surg [16]     Order Specific Question:   Estimated length of stay     Answer:   More than 2 Midnights     Order Specific Question:   Certification     Answer:   I certify that inpatient services are medically necessary for this patient for a duration of greater than two midnights  See H&P and MD Progress Notes for additional information about the patient's course of treatment  Vital Signs: /66   Pulse (!) 108   Temp 98 6 °F (37 °C)   Resp 18   Ht 5' 2" (1 575 m)   Wt 57 2 kg (126 lb 1 7 oz)   SpO2 98%   BMI 23 06 kg/m²      Diet: Regular  Mobility: OOB as tolerated  DVT Prophylaxis: SCD's    Medications/Pain Control:   Current Facility-Administered Medications:  acetaminophen 650 mg Oral Q6H [facility]    heparin (porcine) 5,000 Units Subcutaneous Q8H [facility]    HYDROmorphone 0 5 mg Intravenous Q3H PRN IV x 1 8/1   ketorolac 15 mg Intravenous Q6H [facility]    lactated ringers 125 mL/hr Intravenous Continuous    metoclopramide 10 mg Intravenous Q6H PRN  IV x 2 7/31   ondansetron 4 mg Intravenous Q6H PRN IV x 1 8/1   oxyCODONE 5 mg Oral Q6H PRN X 1 8/1   pantoprazole 40 mg Oral Early Morning    tamsulosin 0 4 mg Oral Daily With [address] [facility] Utilization Review Department  Phone: [phone]; Fax [phone]  [email or web address]  ATTENTION: Please call with any questions or concerns to [phone]  and carefully listen to the prompts so that you are directed to the right person  Send all requests for admission clinical reviews, approved or denied determinations and any other requests to fax [phone]   All voicemails are confidential

## 2019-08-01 NOTE — PROGRESS NOTES
Progress Note - General Surgery   Kristel Chung 78 y o  female MRN: [id number]  Unit/Bed#: 420-01 Encounter: [id number]    Assessment:  POD1 s/p laparoscopic ventral hernia repair with mesh  Low UOP  Poor level of activity with PT, recommending rehab  Persistent pain      Plan:  Advance to regular diet   Encourage OOB to void instead of bedpan  Monitor I/O's  Start Flomax  Schedule Tylenol 650mg q6h instead of PRN  One time dose of ultam now  Encourage oxy 5mg pain medication as needed    Subjective/Objective   Chief Complaint: POD1 s/p     Subjective:  complains of mid abdominal / periumbilcal discomfort with sneeze/cough  Only received a single dose of Dilaudid IV overnight  Passing flatus  No BM as of yet  Wishes to eat regular food as she does not like liquids  Has tolerated liquids to this point without complaints of N/V  Denies fevers/chills  Objective:     Blood pressure 112/66, pulse (!) 108, temperature 98 6 °F (37 °C), resp  rate 18, height 5' 2" (1 575 m), weight 57 2 kg (126 lb 1 7 oz), SpO2 98 %  ,Body mass index is 23 06 kg/m²  Intake/Output Summary (Last 24 hours) at 8/1/2019 0815  Last data filed at 8/1/2019 0550  Gross per 24 hour   Intake 3535 ml   Output 450 ml   Net 3085 ml       Invasive Devices     Peripheral Intravenous Line            Peripheral IV 07/31/19 Left less than 1 day          Drain            Gastrostomy/Enterostomy Jejunostomy 14 Fr   days                Physical Exam: Oral mucosa slightly dry  Abdomen soft, NT; BS positive  Dressing without strike through      Lab, Imaging and other studies:   CBC:   Lab Results   Component Value Date    WBC 8 83 08/01/2019    HGB 12 8 08/01/2019    HCT 40 2 08/01/2019    MCV 90 08/01/2019     08/01/2019    MCH 28 7 08/01/2019    MCHC 31 8 08/01/2019    RDW 14 0 08/01/2019    MPV 11 4 08/01/2019    NRBC 0 08/01/2019   CMP:   Lab Results   Component Value Date    SODIUM 138 08/01/2019    K 4 0 08/01/2019     08/01/2019    CO2 29 08/01/2019    BUN 20 08/01/2019    CREATININE 0 87 08/01/2019    CALCIUM 8 5 08/01/2019    EGFR 68 08/01/2019     VTE Pharmacologic Prophylaxis:  Heparin  VTE Mechanical Prophylaxis:  sequential compression device     Edith Galo PA-C

## 2019-08-01 NOTE — SOCIAL WORK
Physical therapy is recommending that patient go to STR  Spoke with patient and her daughter Angelica Asencio ([phone]) they are both agreeable to going to STR  A post acute care recommendation was made by your care team for STR  Discussed Freedom of Choice with both patient and POA  List of facilities given to both patient and POA via in person  both patient and POA aware the list is custom filtered for them by preference  and that [facility] post acute providers are designated  They would like me to make a referral to [facility]  Referral made as requested

## 2019-08-01 NOTE — SOCIAL WORK
Met with pt to discuss role as  in helping pt to develop discharge plan and to help pt carry out their plan  Pt lives in an apartment alone  Pt has 3 EUN  Pt has no DME   Pt is independent with ADL's   Pt has Meals on Wheels  Pt has history of [facility]  Pt has and aide from [place] [id number] ([phone])  on Monday for 11/2 hours  Mary Jo Suárez is her     Pt's PCP is Dr Noel Padilla  Pt uses [facility]  Will continue to follow for any Case Management needs

## 2019-08-01 NOTE — PHYSICIAN ADVISOR
Current patient class: Inpatient  The patient is currently on Hospital Day: 2 at [address]      The patient was admitted to the hospital at [phone] on 8/1/19 for the following diagnosis:  Incisional hernia, without obstruction or gangrene [K43 2]       There is documentation in the medical record of an expected length of stay of at least 2 midnights  The patient is therefore expected to satisfy the 2 midnight benchmark and given the 2 midnight presumption is appropriate for INPATIENT ADMISSION  Given this expectation of a satisfying stay, CMS instructs us that the patient is most often appropriate for inpatient admission under part A provided medical necessity is documented in the chart  After review of the relevant documentation, labs, vital signs and test results, the patient is appropriate for INPATIENT ADMISSION  Admission to the hospital as an inpatient is a complex decision making process which requires the practitioner to consider the patients presenting complaint, history and physical examination and all relevant testing  With this in mind, in this case, the patient was deemed appropriate for INPATIENT ADMISSION  After review of the documentation and testing available at the time of the admission I concur with this clinical determination of medical necessity  Rationale is as follows: The patient is a 78 yrs old Female who presented as a direct admission for the surgical service for repair/treatment of a symptomatic vental incisional hernia  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 with worsening consolidation of the right lung concerning for neoplasm  On 7/31 the patient presented for repair and found to have intra-abdominal adhesions of the omentum and small bowel to the anterior wall  There were also 3 separate hernia defects along the midline incision requiring a Bard mesh for repair   Post-op day 1, the patient was noted to have significant pain as well as need for continuation of IVF hydration secondary to low urine output, as well as SOB with functional mobility evaluation with PT/OT and therefore need for rehab placement  Given the patient's increasing needs including inpatient STR, the patient is currently appropriate for INPATIENT ADMISSION       The patients vitals on arrival were ED Triage Vitals   Temperature Pulse Respirations Blood Pressure SpO2   07/31/19 0626 07/31/19 0626 07/31/19 0626 07/31/19 0626 07/31/19 0626   98 2 °F (36 8 °C) 61 20 147/62 97 %      Temp Source Heart Rate Source Patient Position - Orthostatic VS BP Location FiO2 (%)   07/31/19 1236 07/31/19 1236 07/31/19 1236 07/31/19 1236 --   Axillary Monitor Lying Right arm       Pain Score       07/31/19 0626       5           Past Medical History:   Diagnosis Date    Abnormal blood chemistry     last assessed 03/06/2014    Achalasia, esophageal     Acute medial meniscus tear     last assessed 03/10/2014    Acute otitis externa     last assessed 03/24/2014    Adrenal nodule (HCC)     Anemia     Anxiety     Arthritis     Atrial fibrillation (HCC)     resolved 01/09/2018    Atypical chest pain     last assessed 11/22/2016    Eagle's esophagus with high grade dysplasia     last assessed 01/03/2018    Cancer ([facility] )     esophageal    Cerumen impaction     last assessed 03/24/2014    Chronic pain of right knee     last assessed 04/06/2017    Coronary artery disease     3 vessel, resolved 01/09/2018    Depression     Dysphagia     last assessed 06/21/2017    Esophageal stricture     last assessed 06/21/2017    GERD (gastroesophageal reflux disease)     Headache     last assessed 08/01/2013    Hiatal hernia     Insomnia     last assessed 10/15/2013    Jejunostomy tube present ([facility] [address] )     resolved 01/09/2018    Low vitamin D level     resolved 01/08/2018    Osteoarthritis, knee     last assessed 04/06/2017    Pneumonia     last assessed 2013    Right leg paresthesias     last assessed 2017    Sciatica     last assessed 2015    Shingles     last assessed 2015    Spondylosis of 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     last assessed 2017    Venous insufficiency     last assessed 2013     Past Surgical History:   Procedure Laterality Date     SECTION      GASTRECTOMY      partial    GASTROJEJUNOSTOMY W/ JEJUNOSTOMY TUBE N/A 2017    Procedure: Darel Hodgkins;  Surgeon: Gisele Guzman MD;  [facility];  Service: Thoracic    JOINT REPLACEMENT Right 11/10/2014    knee, Dr Katerin Meraz PA ESOPHAGOGASTRODUODENOSCOPY TRANSORAL DIAGNOSTIC N/A 3/10/2017    Procedure: ESOPHAGOGASTRODUODENOSCOPY (EGD); Surgeon: Gladys Conley MD;  [facility];  Service: Gastroenterology    PA ESOPHAGOGASTRODUODENOSCOPY TRANSORAL DIAGNOSTIC N/A 2017    Procedure: ESOPHAGOGASTRODUODENOSCOPY (EGD); Surgeon: Gladys Conley MD;  [facility];  Service: Gastroenterology    PA ESOPHAGOSCOPY FLEXIBLE TRANSORAL WITH BIOPSY N/A 2017    Procedure: ESOPHAGOGASTRODUODENOSCOPY (EGD);   Surgeon: Gisele Guzman MD;  [facility];  Service: Thoracic    PA LAP, VENTRAL HERNIA REPAIR,REDUCIBLE N/A 2019    Procedure: REPAIR HERNIA VENTRAL LAPAROSCOPIC;  Surgeon: Michael Montes DO;  [facility];  Service: General    PA REMOVAL [address] [facility] [address] [facility] [address] N/A 2017    Procedure: TRANSHIATAL ESOPHAGECTOMY;  Surgeon: Gisele Guzman MD;  [facility];  Service: Thoracic    STEROID INJECTION KNEE Right 2017    Procedure: GENICULATE NERVE BLOCKS;  Surgeon: Deni Magana DO;  [facility];  Service:            Consults have been placed to:   IP CONSULT TO CASE MANAGEMENT    Vitals:    19 0137 19 0247 19 0448 19 0713   BP:    112/66   BP Location:       Pulse:    (!) 108   Resp:    18   Temp: 99 1 °F (37 3 °C) (!) 97 1 °F (36 2 °C) 99 2 °F (37 3 °C) 98 6 °F (37 °C)   TempSrc:       SpO2:    98%   Weight:       Height:           Most recent labs:    Recent Labs     08/01/19  0447 08/01/19  1121   WBC 8 83  --    HGB 12 8  --    HCT 40 2  --     228   K 4 0  --    CALCIUM 8 5  --    BUN 20  --    CREATININE 0 87  --        Scheduled Meds:  Current Facility-Administered Medications:  acetaminophen 650 mg Oral Q6H [facility] Katiuska Amaro PA-C    heparin (porcine) 5,000 Units Subcutaneous Q8H [facility] Katiuska Amaro PA-C    HYDROmorphone 0 5 mg Intravenous Q3H PRN Mela Carolina, DO    ketorolac 15 mg Intravenous Q6H [facility] Saurabh Ochoa,     lactated ringers 125 mL/hr Intravenous Continuous Mela Carolina, DO Last Rate: 125 mL/hr (08/01/19 0550)   metoclopramide 10 mg Intravenous Q6H PRN Mela Carolina, DO    ondansetron 4 mg Intravenous Q6H PRN Mela Carolina, DO    oxyCODONE 5 mg Oral Q6H PRN Mela Carolina, DO    pantoprazole 40 mg Oral Early Morning Mela Carolina, DO    tamsulosin 0 4 mg Oral Daily With Elvis Barron PA-C      Continuous Infusions:  lactated ringers 125 mL/hr Last Rate: 125 mL/hr (08/01/19 0550)     PRN Meds:  HYDROmorphone    metoclopramide    ondansetron    oxyCODONE    Surgical procedures (if appropriate):  Procedure(s):  REPAIR HERNIA VENTRAL LAPAROSCOPIC

## 2019-08-01 NOTE — PLAN OF CARE
Problem: PHYSICAL THERAPY ADULT  Goal: Performs mobility at highest level of function for planned discharge setting  See evaluation for individualized goals  Outcome: Progressing  Note:   Prognosis: Good  Problem List: Decreased strength, Decreased endurance, Impaired balance, Decreased mobility, Decreased safety awareness, Pain  Assessment: Pt is a 78year old female with complex PMH including depression anemia CAD a-fib anxiety shingles and sciatica presenting to [facility] for incisional hernia repair on 7/31/19  Pt seen post op day 1 for high complexity PT evaluation presenting with increased pain decreased strength balance endurance and mobility requiring min(A)x1-2 for all bed mobility transfers and ambulation and with limited ambulation tolerance due to pain and fatigue  Pt instructed in rolling technique to minimize stress on abdomin and using abdominal bracing throughout evaluation for pain relief  Pt currently functioning at a level decreased from baseline and is in need of continued activity in PT to improve strength pain balance endurance safety awareness mobility transfers and ambulation to maximize current LOF  Pt unsafe to return home and would benefit from short term rehab on discharge  Recommendation: Short-term skilled PT     PT - OK to Discharge: No(when medically stable for discharge)    See flowsheet documentation for full assessment

## 2019-08-01 NOTE — PHYSICAL THERAPY NOTE
Physical Therapy Evaluation    Patient Name: Bharat Son    Today's Date: 8/1/2019     Problem List  Patient Active Problem List   Diagnosis    Osteoarthritis of knee    Joint pain, knee    Eagle's esophagus with high grade dysplasia    3-vessel CAD    Abnormal blood chemistry    A-fib ([address] )    Anemia    Atypical chest pain    Backache with radiation    Continuous left lower quadrant pain    Depression    Dysphagia    Gastroesophageal reflux disease    Hyperlipidemia    Hypothyroidism    Insomnia    Low vitamin D level    Lumbar radiculopathy    Myofascial pain    Right leg paresthesias    Sciatica    Spondylosis of 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    Stricture of esophagus    Venous insufficiency    Incisional hernia    Adrenal adenoma    Incisional hernia, without obstruction or gangrene        Past Medical History  Past Medical History:   Diagnosis Date    Abnormal blood chemistry     last assessed 03/06/2014    Achalasia, esophageal     Acute medial meniscus tear     last assessed 03/10/2014    Acute otitis externa     last assessed 03/24/2014    Adrenal nodule ([address]     Anemia     Anxiety     Arthritis     Atrial fibrillation ([address] )     resolved 01/09/2018    Atypical chest pain     last assessed 11/22/2016    Eagle's esophagus with high grade dysplasia     last assessed 01/03/2018    Cancer ([address]     esophageal    Cerumen impaction     last assessed 03/24/2014    Chronic pain of right knee     last assessed 04/06/2017    Coronary artery disease     3 vessel, resolved 01/09/2018    Depression     Dysphagia     last assessed 06/21/2017    Esophageal stricture     last assessed 06/21/2017    GERD (gastroesophageal reflux disease)     Headache     last assessed 08/01/2013    Hiatal hernia     Insomnia     last assessed 10/15/2013    Jejunostomy tube present ([address] )     resolved 2018    Low vitamin D level     resolved 2018    Osteoarthritis, knee     last assessed 2017    Pneumonia     last assessed 2013    Right leg paresthesias     last assessed 2017    Sciatica     last assessed 2015    Shingles     last assessed 2015    Spondylosis of 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     last assessed 2017    Venous insufficiency     last assessed 2013        Past Surgical History  Past Surgical History:   Procedure Laterality Date     SECTION      GASTRECTOMY      partial    GASTROJEJUNOSTOMY W/ JEJUNOSTOMY TUBE N/A 2017    Procedure: Guanako Sprout;  Surgeon: Yenni Terry MD;  [facility];  Service: Thoracic    JOINT REPLACEMENT Right 11/10/2014    knee, Dr Cristy Ty IA ESOPHAGOGASTRODUODENOSCOPY TRANSORAL DIAGNOSTIC N/A 3/10/2017    Procedure: ESOPHAGOGASTRODUODENOSCOPY (EGD); Surgeon: Fili Hennessy MD;  [facility];  Service: Gastroenterology    IA ESOPHAGOGASTRODUODENOSCOPY TRANSORAL DIAGNOSTIC N/A 2017    Procedure: ESOPHAGOGASTRODUODENOSCOPY (EGD); Surgeon: Fili Hennessy MD;  [facility];  Service: Gastroenterology    IA ESOPHAGOSCOPY FLEXIBLE TRANSORAL WITH BIOPSY N/A 2017    Procedure: ESOPHAGOGASTRODUODENOSCOPY (EGD);   Surgeon: Yenni Terry MD;  [facility];  Service: Thoracic    IA LAP, VENTRAL HERNIA REPAIR,REDUCIBLE N/A 2019    Procedure: REPAIR HERNIA VENTRAL LAPAROSCOPIC;  Surgeon: Julissa Arboleda DO;  [facility];  Service: General    IA REMOVAL [address] N/A 2017    Procedure: TRANSHIATAL ESOPHAGECTOMY;  Surgeon: Yenni Terry MD;  [facility];  Service: Thoracic    STEROID INJECTION KNEE Right 2017    Procedure: GENICULATE NERVE BLOCKS;  Surgeon: Tao Dominguez DO;  [facility];  Service:            19 0901   Note Type   Note type Eval/Treat   Pain Assessment   Pain Score 5   Pain Location Abdomen   Home Living   Type of Home Apartment  ([address])   Home Layout Able to live on main level with bedroom/bathroom; Performs ADLs on one level;Ramped entrance;Elevator   Bathroom Shower/Tub Tub/shower unit   Bathroom Toilet Standard   Bathroom Equipment Commode   Bathroom Accessibility Accessible   Home Equipment Walker   Additional Comments pt gets MOW's and has an aide 1 day a week for 1 1/2 hours   Prior Function   Level of [place] Independent with ADLs and functional mobility  ((I) ambulation SPC)   Lives With Alone   Receives Help From Family;Home health   ADL Assistance Needs assistance  (aide assists with dressing bathing and household chores )   IADLs Needs assistance   Comments pt's daughter or aide drives   Restrictions/Precautions   Weight Bearing Precautions Per Order No   Other Precautions Multiple lines;Telemetry;O2;Fall Risk;Pain;Bed Alarm; Chair Alarm   General   Family/Caregiver Present No   Cognition   Arousal/Participation Alert   Orientation Level Oriented X4   Following Commands Follows all commands and directions without difficulty   RLE Assessment   RLE Assessment WFL  (4- to 4/5)   LLE Assessment   LLE Assessment WFL  (4- to 4/5)   Coordination   Sensation WFL   Light Touch   RLE Light Touch Grossly intact   LLE Light Touch Grossly intact   Bed Mobility   Supine to Sit 4  Minimal assistance   Additional items Assist x 1;Bedrails; Increased time required;Verbal cues   Sit to Supine   (seated in chair at bedside with alarm on, bell in reach)   Additional Comments Pt requiring increased time to complete task and instructed in rolling technique to minimize stress on abdomin  Pt verbalized and demonstrated understanding   Transfers   Sit to Stand 4  Minimal assistance   Additional items Assist x 1; Increased time required;Verbal cues  (with RW)   Stand to Sit 4  Minimal assistance   Additional items Assist x 1; Increased time required;Verbal cues; Bedrails;Armrests  (with RW)   Stand pivot 4  Minimal assistance   Additional items Assist x 1; Increased time required;Verbal cues  (with RW)   Additional Comments pt with unsteadiness without A D  requiring use of RW for ambulation  Instructed pt in abdominal bracing when performing all activity increasing intraabdominal pressure  Pt with drop in spO2 from 96% to 86% on 1 5L's with 1 minute recovery time  Pt at increased fall risk   Ambulation/Elevation   Gait pattern Forward Flexion;Narrow JOSSELINE; Short stride   Gait Assistance 4  Minimal assist   Additional items Assist x 1   Assistive Device Rolling walker   Distance 75ft with RW min(A)x1 limited by pain and fatigue   Balance   Static Sitting Good   Dynamic Sitting Fair +   Static Standing Fair  (with RW)   Dynamic Standing Fair -  (with RW)   Ambulatory Fair -  (with RW)   Endurance Deficit   Endurance Deficit Yes   Endurance Deficit Description limited ambulation and activity tolerance due to pain weakness fatigue and drop in spO2   Activity Tolerance   Activity Tolerance Patient limited by fatigue;Patient limited by pain   Assessment   Prognosis Good   Problem List Decreased strength;Decreased endurance; Impaired balance;Decreased mobility; Decreased safety awareness;Pain   Assessment Pt is a 78year old female with complex PMH including depression anemia CAD a-fib anxiety shingles and sciatica presenting to [facility] for incisional hernia repair on 7/31/19  Pt seen post op day 1 for high complexity PT evaluation presenting with increased pain decreased strength balance endurance and mobility requiring min(A)x1-2 for all bed mobility transfers and ambulation and with limited ambulation tolerance due to pain and fatigue  Pt instructed in rolling technique to minimize stress on abdomin and using abdominal bracing throughout evaluation for pain relief   Pt currently functioning at a level decreased from baseline and is in need of continued activity in PT to improve strength pain balance endurance safety awareness mobility transfers and ambulation to maximize current LOF  Pt unsafe to return home and would benefit from short term rehab on discharge  Goals   Patient Goals To feel better get stronger and return home    LTG Expiration Date 08/15/19   Long Term Goal #1 Decreased pain to 2/10 at worst needed to improve ease of performance of ADL's  improve bilateral LE strength by 1/2 muscle grade to improve all bed mobility and transfers to (S) with RW   Long Term Goal #2 Improve standing and ambulatory balance to good with LRAD to improve ambulation to 250ft with LRAD (S) no drop in SpO2 on room air below 90%   Plan   Treatment/Interventions Functional transfer training;LE strengthening/ROM; Therapeutic exercise; Endurance training;Patient/family training;Bed mobility;Gait training   PT Frequency 5x/wk   Recommendation   Recommendation Short-term skilled PT   PT - OK to Discharge No  (when medically stable for discharge)   Pt with SCD's on when PT entered room  SCD's reapplied and turned on with pt seated in chair at bedside with call bell in reach and chair alarm on

## 2019-08-01 NOTE — PLAN OF CARE
Problem: Potential for Falls  Goal: Patient will remain free of falls  Description  INTERVENTIONS:  - Assess patient frequently for physical needs  -  Identify  physical deficits that affect risk of falls post anesthesia  -  [place] fall precautions while anesthesia wears off  - Educate patient/family on patient safety including physical limitations  - Instruct patient to call for assistance with activity   - Modify environment to reduce risk of injury     Outcome: Progressing     Problem: PAIN - ADULT  Goal: Verbalizes/displays adequate comfort level or baseline comfort level  Description  Interventions:  - Encourage patient to monitor pain and request assistance  - Assess pain using 0-10 pain scale  - Administer analgesics based on type and severity of pain and evaluate response  - Implement non-pharmacological measures as appropriate and evaluate response  - Notify physician/advanced practitioner if interventions unsuccessful or patient reports new pain   Outcome: Progressing     Problem: INFECTION - ADULT  Goal: Absence or prevention of progression during hospitalization  Description  INTERVENTIONS:  - Assess and monitor for signs and symptoms of infection  - Monitor temp and WBC  - Monitor IV insertion site  - [place] appropriate cooling/warming therapies per order  - Administer medications as ordered  - Instruct and encourage patient and family to use good hand hygiene technique   Outcome: Progressing     Problem: DISCHARGE PLANNING  Goal: Discharge to home or other facility with appropriate resources  Description  INTERVENTIONS:  - Identify barriers to discharge w/patient   - Arrange for needed discharge resources and transportation as appropriate  - Identify discharge learning needs (meds, wound care, etc )  - Refer to Case Management Department for coordinating discharge planning if the patient needs post-hospital services    Outcome: Progressing     Problem: Knowledge Deficit  Goal: Patient/family/caregiver demonstrates understanding of disease process, treatment plan, medications, and discharge instructions  Description  Complete learning assessment and assess knowledge base    Interventions:  - Provide teaching at level of understanding  - Provide teaching via preferred learning methods  Outcome: Progressing     Problem: GASTROINTESTINAL - ADULT  Goal: Minimal or absence of nausea and/or vomiting  Description  INTERVENTIONS:  - Administer IV fluids as ordered to ensure adequate hydration  - Administer ordered antiemetic medications as needed  - Provide nonpharmacologic comfort measures as appropriate  - Advance diet as tolerated, if ordered   Outcome: Progressing     Problem: SKIN/TISSUE INTEGRITY - ADULT  Goal: Incision(s), wounds(s) or drain site(s) healing without S/S of infection  Description  INTERVENTIONS  - Assess and document dressing and incision     Outcome: Progressing

## 2019-08-01 NOTE — OCCUPATIONAL THERAPY NOTE
Occupational Therapy Evaluation     Patient Name: Kylah Rosales  Today's Date: 8/1/2019  Problem List  Patient Active Problem List   Diagnosis    Osteoarthritis of knee    Joint pain, knee    Eagle's esophagus with high grade dysplasia    3-vessel CAD    Abnormal blood chemistry    A-fib ([address] )    Anemia    Atypical chest pain    Backache with radiation    Continuous left lower quadrant pain    Depression    Dysphagia    Gastroesophageal reflux disease    Hyperlipidemia    Hypothyroidism    Insomnia    Low vitamin D level    Lumbar radiculopathy    Myofascial pain    Right leg paresthesias    Sciatica    Spondylosis of 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    Stricture of esophagus    Venous insufficiency    Incisional hernia    Adrenal adenoma    Incisional hernia, without obstruction or gangrene     Past Medical History  Past Medical History:   Diagnosis Date    Abnormal blood chemistry     last assessed 03/06/2014    Achalasia, esophageal     Acute medial meniscus tear     last assessed 03/10/2014    Acute otitis externa     last assessed 03/24/2014    Adrenal nodule ([address] )     Anemia     Anxiety     Arthritis     Atrial fibrillation ([address] )     resolved 01/09/2018    Atypical chest pain     last assessed 11/22/2016    Eagle's esophagus with high grade dysplasia     last assessed 01/03/2018    Cancer ([address] )     esophageal    Cerumen impaction     last assessed 03/24/2014    Chronic pain of right knee     last assessed 04/06/2017    Coronary artery disease     3 vessel, resolved 01/09/2018    Depression     Dysphagia     last assessed 06/21/2017    Esophageal stricture     last assessed 06/21/2017    GERD (gastroesophageal reflux disease)     Headache     last assessed 08/01/2013    Hiatal hernia     Insomnia     last assessed 10/15/2013    Jejunostomy tube present ([address] )     resolved 01/09/2018    Low vitamin D level     resolved 01/08/2018  Osteoarthritis, knee     last assessed 2017    Pneumonia     last assessed 2013    Right leg paresthesias     last assessed 2017    Sciatica     last assessed 2015    Shingles     last assessed 2015    Spondylosis of 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     last assessed 2017    Venous insufficiency     last assessed 2013     Past Surgical History  Past Surgical History:   Procedure Laterality Date     SECTION      GASTRECTOMY      partial    GASTROJEJUNOSTOMY W/ JEJUNOSTOMY TUBE N/A 2017    Procedure: Joanie Parekh;  Surgeon: Frieda Hanna MD;  [facility];  Service: Thoracic    JOINT REPLACEMENT Right 11/10/2014    knee, Dr Vicky Lowery CA ESOPHAGOGASTRODUODENOSCOPY TRANSORAL DIAGNOSTIC N/A 3/10/2017    Procedure: ESOPHAGOGASTRODUODENOSCOPY (EGD); Surgeon: Jones Diaz MD;  [facility];  Service: Gastroenterology    CA ESOPHAGOGASTRODUODENOSCOPY TRANSORAL DIAGNOSTIC N/A 2017    Procedure: ESOPHAGOGASTRODUODENOSCOPY (EGD); Surgeon: Jones Diaz MD;  [facility];  Service: Gastroenterology    CA ESOPHAGOSCOPY FLEXIBLE TRANSORAL WITH BIOPSY N/A 2017    Procedure: ESOPHAGOGASTRODUODENOSCOPY (EGD);   Surgeon: Frieda Hanna MD;  [facility];  Service: Thoracic    CA LAP, VENTRAL HERNIA REPAIR,REDUCIBLE N/A 2019    Procedure: REPAIR HERNIA VENTRAL LAPAROSCOPIC;  Surgeon: Herve Acuña DO;  [facility];  Service: General    CA REMOVAL [address] THORACOTOMY N/A 2017    Procedure: TRANSHIATAL ESOPHAGECTOMY;  Surgeon: Frieda Hanna MD;  [facility];  Service: Thoracic    STEROID INJECTION KNEE Right 2017    Procedure: GENICULATE NERVE BLOCKS;  Surgeon: Shane Bryson DO;  [facility];  Service:              19 0816   Note Type   Note type Eval/Treat   Restrictions/Precautions   Weight Bearing Precautions Per Order No Other Precautions Bed Alarm; Chair Alarm;Multiple lines;Telemetry;O2;Fall Risk;Pain   Pain Assessment   Pain Assessment 0-10   Pain Score 5   Pain Type Surgical pain   Pain Location Abdomen   Pain Orientation Mid   Home Living   Type of Home Apartment   Home Layout One level;Performs ADLs on one level; Able to live on main level with bedroom/bathroom; Ramped entrance   Bathroom Shower/Tub Tub/shower unit   Bathroom Toilet Standard   Bathroom Equipment Commode   Bathroom Accessibility Accessible   Home Equipment Cane;Grab bars   Additional Comments reports use of SPC at baseline; pt has HHA for x1 day a week for x1hr who (A) with ADLs/IADLs   Prior Function   Level of [place] Needs assistance with ADLs and functional mobility; Needs assistance with IADLs   Lives With Alone   Receives Help From Family;Personal care attendant   ADL Assistance Needs assistance   IADLs Needs assistance   Comments pt's daughter or aide drives; pt receives MOW's   Psychosocial   Psychosocial (WDL) X   Patient Behaviors/Mood Anxious; Tearful;Flat affect   Subjective   Subjective "I am in just so much pain, I am afraid to go home"   ADL   Where Assessed Edge of bed   Grooming Assistance 5  Supervision/Setup   UB Bathing Assistance 4  Minimal Assistance   LB Bathing Assistance 2  Maximal Assistance   UB Dressing Assistance 4  Minimal Assistance   LB Dressing Assistance 2  Maximal Assistance   Additional Comments requires increased (A) at this time for ADL performance due to abdominal surgery   Bed Mobility   Supine to Sit 4  Minimal assistance   Additional items Assist x 1; Increased time required;Verbal cues   Additional Comments pt seated in chair at end of session; SPO2 ranged 85-95% during session on 1 5L O2 with SOB during functional mobility   Transfers   Sit to Stand 4  Minimal assistance   Additional items Assist x 1   Stand to Sit 4  Minimal assistance   Additional items Assist x 1   Stand pivot 4  Minimal assistance   Additional items Assist x 1   Additional Comments requires use of RW and cues for safety and increased time due to pain   Functional Mobility   Functional Mobility 4  Minimal assistance   Additional Comments x1 with RW; decreased endurance and distance ~50ft due to pain   Additional items Rolling walker   Balance   Static Sitting Good   Dynamic Sitting Fair +   Static Standing Fair   Dynamic Standing Fair -   Ambulatory Fair -   Activity Tolerance   Activity Tolerance Patient limited by fatigue;Patient limited by pain   RUE Assessment   RUE Assessment X  (WFL AROM; 4-/5 grossly)   LUE Assessment   LUE Assessment X  (WFL AROM; 4-/5 grossly)   Hand Function   Gross Motor Coordination Functional   Fine Motor Coordination Functional   Sensation   Light Touch No apparent deficits   Sharp/Dull No apparent deficits   Cognition   Overall Cognitive Status WFL   Arousal/Participation Alert   Attention Within functional limits   Orientation Level Oriented X4   Memory Within functional limits   Following Commands Follows all commands and directions without difficulty   Assessment   Limitation Decreased ADL status; Decreased UE strength;Decreased Safe judgement during ADL;Decreased endurance;Decreased self-care trans;Decreased high-level ADLs   Assessment Pt is a 78 y o  female seen for OT evaluation s/p admit to [facility] on 7/31/2019 w/ Incisional hernia, without obstruction or gangrene  Comorbidities affecting pt's functional performance at time of assessment include: previous surgery and anemia, GERD, depression, anxiety, CAD, a-fib, CA, shingles, arthritis  Personal factors affecting pt at time of IE include:steps to enter environment, limited home support, difficulty performing ADLS, difficulty performing IADLS , flat affect, decreased initiation and engagement  and health management   Prior to admission, pt was (A) with ADL and IADL performance with use of SPC during functional mobility   Upon evaluation: Pt requires (S)-max (A) with use of RW during functional mobility 2* the following deficits impacting occupational performance: weakness, decreased strength, decreased balance, decreased tolerance, impaired initiation, impaired problem solving, decreased safety awareness and increased pain  Pt to benefit from continued skilled OT tx while in the hospital to address deficits as defined above and maximize level of functional independence w ADL's and functional mobility  Occupational Performance areas to address include: grooming, bathing/shower, toilet hygiene, dressing, functional mobility, community mobility and clothing management  From OT standpoint, recommendation at time of d/c would be short term rehab  Goals   Patient Goals to go to rehab   Short Term Goal  pt will perform UE strengthening exercises seated in chair    Long Term Goal #1 pt will increase independence with toilet transfers and hygiene to (S) level    Long Term Goal #2 pt will increase independence with LB dressing to (S) level    Long Term Goal pt will demonstrate UB/LB bathing and grooming tasks seated in chair at min (A) level    Plan   Treatment Interventions ADL retraining;Functional transfer training;UE strengthening/ROM; Endurance training;Patient/family training; Activityengagement   Goal Expiration Date 08/15/19   OT Frequency 3-5x/wk   Recommendation   OT Discharge Recommendation Short Term Rehab   Barthel Index   Feeding 10   Bathing 0   Grooming Score 0   Dressing Score 5   Bladder Score 10   Bowels Score 10   Toilet Use Score 5   Transfers (Bed/Chair) Score 10   Mobility (Level Surface) Score 10   Stairs Score 0   Barthel Index Score 60     Pt will benefit from continued OT services in order to maximize (I) c ADL performance, FM c RW, and improve overall endurance/strength required to complete functional tasks in preparation for d/c  Pt left seated in chair at end of session; all needs within reach; all lines intact; scds connected and turned on

## 2019-08-02 ENCOUNTER — ANESTHESIA (INPATIENT)
Dept: PERIOP | Facility: HOSPITAL | Age: 69
DRG: 856 | End: 2019-08-02
Payer: MEDICARE

## 2019-08-02 ENCOUNTER — APPOINTMENT (INPATIENT)
Dept: RADIOLOGY | Facility: HOSPITAL | Age: 69
DRG: 353 | End: 2019-08-02
Payer: MEDICARE

## 2019-08-02 ENCOUNTER — ANESTHESIA EVENT (INPATIENT)
Dept: PERIOP | Facility: HOSPITAL | Age: 69
DRG: 856 | End: 2019-08-02
Payer: MEDICARE

## 2019-08-02 ENCOUNTER — APPOINTMENT (INPATIENT)
Dept: RADIOLOGY | Facility: HOSPITAL | Age: 69
DRG: 856 | End: 2019-08-02
Payer: MEDICARE

## 2019-08-02 ENCOUNTER — ANESTHESIA (INPATIENT)
Dept: ANESTHESIOLOGY | Facility: HOSPITAL | Age: 69
DRG: 353 | End: 2019-08-02
Payer: MEDICARE

## 2019-08-02 ENCOUNTER — ANESTHESIA EVENT (INPATIENT)
Dept: ANESTHESIOLOGY | Facility: HOSPITAL | Age: 69
DRG: 353 | End: 2019-08-02
Payer: MEDICARE

## 2019-08-02 ENCOUNTER — APPOINTMENT (INPATIENT)
Dept: NON INVASIVE DIAGNOSTICS | Facility: HOSPITAL | Age: 69
DRG: 856 | End: 2019-08-02
Payer: MEDICARE

## 2019-08-02 ENCOUNTER — HOSPITAL ENCOUNTER (INPATIENT)
Facility: HOSPITAL | Age: 69
LOS: 25 days | DRG: 856 | End: 2019-08-27
Attending: SURGERY | Admitting: SURGERY
Payer: MEDICARE

## 2019-08-02 ENCOUNTER — APPOINTMENT (INPATIENT)
Dept: NON INVASIVE DIAGNOSTICS | Facility: HOSPITAL | Age: 69
DRG: 353 | End: 2019-08-02
Payer: MEDICARE

## 2019-08-02 VITALS
SYSTOLIC BLOOD PRESSURE: 101 MMHG | BODY MASS INDEX: 23.21 KG/M2 | TEMPERATURE: 100.4 F | WEIGHT: 126.1 LBS | HEIGHT: 62 IN | OXYGEN SATURATION: 93 % | HEART RATE: 130 BPM | RESPIRATION RATE: 44 BRPM | DIASTOLIC BLOOD PRESSURE: 56 MMHG

## 2019-08-02 DIAGNOSIS — K43.2 INCISIONAL HERNIA, WITHOUT OBSTRUCTION OR GANGRENE: ICD-10-CM

## 2019-08-02 DIAGNOSIS — R13.10 DYSPHAGIA, UNSPECIFIED TYPE: ICD-10-CM

## 2019-08-02 DIAGNOSIS — N17.9 AKI (ACUTE KIDNEY INJURY) (HCC): ICD-10-CM

## 2019-08-02 DIAGNOSIS — J95.811 POSTPROCEDURAL PNEUMOTHORAX: ICD-10-CM

## 2019-08-02 DIAGNOSIS — R04.2 HEMOPTYSIS: ICD-10-CM

## 2019-08-02 DIAGNOSIS — J96.01 ACUTE RESPIRATORY FAILURE WITH HYPOXEMIA (HCC): Primary | ICD-10-CM

## 2019-08-02 DIAGNOSIS — B49 FUNGEMIA: ICD-10-CM

## 2019-08-02 DIAGNOSIS — B37.9 CANDIDA INFECTION: ICD-10-CM

## 2019-08-02 DIAGNOSIS — R65.21 SEVERE SEPSIS WITH SEPTIC SHOCK (CODE) (HCC): ICD-10-CM

## 2019-08-02 DIAGNOSIS — R23.8 SKIN BREAKDOWN: ICD-10-CM

## 2019-08-02 DIAGNOSIS — F32.0 CURRENT MILD EPISODE OF MAJOR DEPRESSIVE DISORDER WITHOUT PRIOR EPISODE (HCC): ICD-10-CM

## 2019-08-02 DIAGNOSIS — M54.9 BACKACHE WITH RADIATION: ICD-10-CM

## 2019-08-02 PROBLEM — J18.9 BILATERAL PNEUMONIA: Status: ACTIVE | Noted: 2019-08-02

## 2019-08-02 PROBLEM — R57.9 SHOCK (HCC): Status: ACTIVE | Noted: 2019-08-02

## 2019-08-02 LAB
ABO GROUP BLD: NORMAL
ALBUMIN SERPL BCP-MCNC: 1.7 G/DL (ref 3.5–5)
ALP SERPL-CCNC: 45 U/L (ref 46–116)
ALT SERPL W P-5'-P-CCNC: 11 U/L (ref 12–78)
ANCILLARY VALUES: ABNORMAL
ANION GAP SERPL CALCULATED.3IONS-SCNC: 7 MMOL/L (ref 4–13)
ANION GAP SERPL CALCULATED.3IONS-SCNC: 8 MMOL/L (ref 4–13)
ANION GAP SERPL CALCULATED.3IONS-SCNC: 9 MMOL/L (ref 4–13)
ANION GAP SERPL CALCULATED.3IONS-SCNC: 9 MMOL/L (ref 4–13)
APTT PPP: 36 SECONDS (ref 23–37)
APTT PPP: 42 SECONDS (ref 23–37)
APTT PPP: >210 SECONDS (ref 23–37)
APTT PPP: >210 SECONDS (ref 23–37)
AST SERPL W P-5'-P-CCNC: 22 U/L (ref 5–45)
ATRIAL RATE: 114 BPM
ATRIAL RATE: 119 BPM
BASE EXCESS BLDA CALC-SCNC: -4 MMOL/L (ref -2–3)
BASE EXCESS BLDA CALC-SCNC: -4.4 MMOL/L
BASE EXCESS BLDA CALC-SCNC: -5 MMOL/L (ref -2–3)
BASE EXCESS BLDA CALC-SCNC: -5 MMOL/L (ref -2–3)
BASE EXCESS BLDA CALC-SCNC: -6 MMOL/L (ref -2–3)
BASE EXCESS BLDA CALC-SCNC: -8.4 MMOL/L
BASOPHILS # BLD AUTO: 0.04 THOUSANDS/ΜL (ref 0–0.1)
BASOPHILS # BLD AUTO: 0.05 THOUSANDS/ΜL (ref 0–0.1)
BASOPHILS # BLD MANUAL: 0 THOUSAND/UL (ref 0–0.1)
BASOPHILS NFR BLD AUTO: 1 % (ref 0–1)
BASOPHILS NFR BLD AUTO: 1 % (ref 0–1)
BASOPHILS NFR MAR MANUAL: 0 % (ref 0–1)
BILIRUB SERPL-MCNC: 1.02 MG/DL (ref 0.2–1)
BLD GP AB SCN SERPL QL: NEGATIVE
BODY TEMPERATURE: 98.2 DEGREES FEHRENHEIT
BUN SERPL-MCNC: 25 MG/DL (ref 5–25)
BUN SERPL-MCNC: 30 MG/DL (ref 5–25)
BUN SERPL-MCNC: 30 MG/DL (ref 5–25)
BUN SERPL-MCNC: 31 MG/DL (ref 5–25)
CA-I BLD-SCNC: 0.98 MMOL/L (ref 1.12–1.32)
CA-I BLD-SCNC: 0.99 MMOL/L (ref 1.12–1.32)
CA-I BLD-SCNC: 1.09 MMOL/L (ref 1.12–1.32)
CA-I BLD-SCNC: 1.14 MMOL/L (ref 1.12–1.32)
CA-I BLD-SCNC: 1.14 MMOL/L (ref 1.12–1.32)
CALCIUM SERPL-MCNC: 7.1 MG/DL (ref 8.3–10.1)
CALCIUM SERPL-MCNC: 8 MG/DL (ref 8.3–10.1)
CALCIUM SERPL-MCNC: 8.1 MG/DL (ref 8.3–10.1)
CALCIUM SERPL-MCNC: 8.7 MG/DL (ref 8.3–10.1)
CHLORIDE SERPL-SCNC: 102 MMOL/L (ref 100–108)
CHLORIDE SERPL-SCNC: 105 MMOL/L (ref 100–108)
CHLORIDE SERPL-SCNC: 108 MMOL/L (ref 100–108)
CHLORIDE SERPL-SCNC: 98 MMOL/L (ref 100–108)
CO2 SERPL-SCNC: 22 MMOL/L (ref 21–32)
CO2 SERPL-SCNC: 23 MMOL/L (ref 21–32)
CO2 SERPL-SCNC: 26 MMOL/L (ref 21–32)
CO2 SERPL-SCNC: 29 MMOL/L (ref 21–32)
CREAT SERPL-MCNC: 0.84 MG/DL (ref 0.6–1.3)
CREAT SERPL-MCNC: 1.11 MG/DL (ref 0.6–1.3)
CREAT SERPL-MCNC: 1.24 MG/DL (ref 0.6–1.3)
CREAT SERPL-MCNC: 1.39 MG/DL (ref 0.6–1.3)
CREAT UR-MCNC: 152 MG/DL
DEPRECATED D DIMER PPP: 4082 NG/ML (FEU)
DOHLE BOD BLD QL SMEAR: PRESENT
DS:DELIVERY SYSTEM: AC
EOSINOPHIL # BLD AUTO: 0 THOUSAND/ΜL (ref 0–0.61)
EOSINOPHIL # BLD AUTO: 0.02 THOUSAND/ΜL (ref 0–0.61)
EOSINOPHIL # BLD MANUAL: 0 THOUSAND/UL (ref 0–0.4)
EOSINOPHIL NFR BLD AUTO: 0 % (ref 0–6)
EOSINOPHIL NFR BLD AUTO: 0 % (ref 0–6)
EOSINOPHIL NFR BLD MANUAL: 0 % (ref 0–6)
ERYTHROCYTE [DISTWIDTH] IN BLOOD BY AUTOMATED COUNT: 14.4 % (ref 11.6–15.1)
ERYTHROCYTE [DISTWIDTH] IN BLOOD BY AUTOMATED COUNT: 14.4 % (ref 11.6–15.1)
ERYTHROCYTE [DISTWIDTH] IN BLOOD BY AUTOMATED COUNT: 14.6 % (ref 11.6–15.1)
ERYTHROCYTE [DISTWIDTH] IN BLOOD BY AUTOMATED COUNT: 14.6 % (ref 11.6–15.1)
FIO2 GAS DIL.REBREATH: 100 L
GFR SERPL CREATININE-BSD FRML MDRD: 38 ML/MIN/1.73SQ M
GFR SERPL CREATININE-BSD FRML MDRD: 44 ML/MIN/1.73SQ M
GFR SERPL CREATININE-BSD FRML MDRD: 50 ML/MIN/1.73SQ M
GFR SERPL CREATININE-BSD FRML MDRD: 71 ML/MIN/1.73SQ M
GLUCOSE SERPL-MCNC: 107 MG/DL (ref 65–140)
GLUCOSE SERPL-MCNC: 113 MG/DL (ref 65–140)
GLUCOSE SERPL-MCNC: 117 MG/DL (ref 65–140)
GLUCOSE SERPL-MCNC: 128 MG/DL (ref 65–140)
GLUCOSE SERPL-MCNC: 132 MG/DL (ref 65–140)
GLUCOSE SERPL-MCNC: 133 MG/DL (ref 65–140)
GLUCOSE SERPL-MCNC: 134 MG/DL (ref 65–140)
GLUCOSE SERPL-MCNC: 135 MG/DL (ref 65–140)
GLUCOSE SERPL-MCNC: 141 MG/DL (ref 65–140)
HCO3 BLDA-SCNC: 16.4 MMOL/L (ref 22–28)
HCO3 BLDA-SCNC: 19.6 MMOL/L (ref 22–28)
HCO3 BLDA-SCNC: 20.2 MMOL/L (ref 22–28)
HCO3 BLDA-SCNC: 20.6 MMOL/L (ref 22–28)
HCO3 BLDA-SCNC: 21.7 MMOL/L (ref 22–28)
HCO3 BLDA-SCNC: 22.1 MMOL/L (ref 22–28)
HCT VFR BLD AUTO: 33.9 % (ref 34.8–46.1)
HCT VFR BLD AUTO: 35.5 % (ref 34.8–46.1)
HCT VFR BLD AUTO: 42.3 % (ref 34.8–46.1)
HCT VFR BLD AUTO: 42.5 % (ref 34.8–46.1)
HCT VFR BLD CALC: 26 % (ref 34.8–46.1)
HCT VFR BLD CALC: 27 % (ref 34.8–46.1)
HCT VFR BLD CALC: 29 % (ref 34.8–46.1)
HCT VFR BLD CALC: 31 % (ref 34.8–46.1)
HGB BLD-MCNC: 11.1 G/DL (ref 11.5–15.4)
HGB BLD-MCNC: 11.6 G/DL (ref 11.5–15.4)
HGB BLD-MCNC: 13.3 G/DL (ref 11.5–15.4)
HGB BLD-MCNC: 13.6 G/DL (ref 11.5–15.4)
HGB BLDA-MCNC: 10.5 G/DL (ref 11.5–15.4)
HGB BLDA-MCNC: 8.8 G/DL (ref 11.5–15.4)
HGB BLDA-MCNC: 9.2 G/DL (ref 11.5–15.4)
HGB BLDA-MCNC: 9.9 G/DL (ref 11.5–15.4)
HOROWITZ INDEX BLDA+IHG-RTO: 100 MM[HG]
HOROWITZ INDEX BLDA+IHG-RTO: 100 MM[HG]
IMM GRANULOCYTES # BLD AUTO: 0.02 THOUSAND/UL (ref 0–0.2)
IMM GRANULOCYTES # BLD AUTO: 0.02 THOUSAND/UL (ref 0–0.2)
IMM GRANULOCYTES NFR BLD AUTO: 0 % (ref 0–2)
IMM GRANULOCYTES NFR BLD AUTO: 1 % (ref 0–2)
INR PPP: 1.85 (ref 0.84–1.19)
INR PPP: 1.9 (ref 0.84–1.19)
INR PPP: 2 (ref 0.84–1.19)
LACTATE SERPL-SCNC: 2.7 MMOL/L (ref 0.5–2)
LACTATE SERPL-SCNC: 4.5 MMOL/L (ref 0.5–2)
LACTATE SERPL-SCNC: 4.9 MMOL/L (ref 0.5–2)
LYMPHOCYTES # BLD AUTO: 0.63 THOUSANDS/ΜL (ref 0.6–4.47)
LYMPHOCYTES # BLD AUTO: 0.69 THOUSAND/UL (ref 0.6–4.47)
LYMPHOCYTES # BLD AUTO: 1.62 THOUSANDS/ΜL (ref 0.6–4.47)
LYMPHOCYTES # BLD AUTO: 9 % (ref 14–44)
LYMPHOCYTES NFR BLD AUTO: 16 % (ref 14–44)
LYMPHOCYTES NFR BLD AUTO: 21 % (ref 14–44)
MAGNESIUM SERPL-MCNC: 2.3 MG/DL (ref 1.6–2.6)
MAGNESIUM SERPL-MCNC: 2.6 MG/DL (ref 1.6–2.6)
MCH RBC QN AUTO: 28 PG (ref 26.8–34.3)
MCH RBC QN AUTO: 28.8 PG (ref 26.8–34.3)
MCH RBC QN AUTO: 28.8 PG (ref 26.8–34.3)
MCH RBC QN AUTO: 28.9 PG (ref 26.8–34.3)
MCHC RBC AUTO-ENTMCNC: 31.4 G/DL (ref 31.4–37.4)
MCHC RBC AUTO-ENTMCNC: 32 G/DL (ref 31.4–37.4)
MCHC RBC AUTO-ENTMCNC: 32.7 G/DL (ref 31.4–37.4)
MCHC RBC AUTO-ENTMCNC: 32.7 G/DL (ref 31.4–37.4)
MCV RBC AUTO: 86 FL (ref 82–98)
MCV RBC AUTO: 88 FL (ref 82–98)
MCV RBC AUTO: 90 FL (ref 82–98)
MCV RBC AUTO: 92 FL (ref 82–98)
METAMYELOCYTES NFR BLD MANUAL: 19 % (ref 0–1)
MONOCYTES # BLD AUTO: 0.15 THOUSAND/UL (ref 0–1.22)
MONOCYTES # BLD AUTO: 0.17 THOUSAND/ΜL (ref 0.17–1.22)
MONOCYTES # BLD AUTO: 0.19 THOUSAND/ΜL (ref 0.17–1.22)
MONOCYTES NFR BLD AUTO: 2 % (ref 4–12)
MONOCYTES NFR BLD AUTO: 4 % (ref 4–12)
MONOCYTES NFR BLD: 2 % (ref 4–12)
MYELOCYTES NFR BLD MANUAL: 2 % (ref 0–1)
NEUTROPHILS # BLD AUTO: 3.05 THOUSANDS/ΜL (ref 1.85–7.62)
NEUTROPHILS # BLD AUTO: 5.89 THOUSANDS/ΜL (ref 1.85–7.62)
NEUTROPHILS # BLD MANUAL: 4.63 THOUSAND/UL (ref 1.85–7.62)
NEUTS BAND NFR BLD MANUAL: 30 % (ref 0–8)
NEUTS SEG NFR BLD AUTO: 30 % (ref 43–75)
NEUTS SEG NFR BLD AUTO: 76 % (ref 43–75)
NEUTS SEG NFR BLD AUTO: 78 % (ref 43–75)
NRBC BLD AUTO-RTO: 0 /100 WBCS
O2 CT BLDA-SCNC: 16.5 ML/DL (ref 16–23)
O2 CT BLDA-SCNC: 16.5 ML/DL (ref 16–23)
OXYHGB MFR BLDA: 95.7 % (ref 94–97)
OXYHGB MFR BLDA: 98.4 % (ref 94–97)
P AXIS: 47 DEGREES
P AXIS: 48 DEGREES
PCO2 BLD: 21 MMOL/L (ref 21–32)
PCO2 BLD: 21 MMOL/L (ref 21–32)
PCO2 BLD: 22 MMOL/L (ref 21–32)
PCO2 BLD: 23 MMOL/L (ref 21–32)
PCO2 BLD: 33.7 MM HG (ref 36–44)
PCO2 BLD: 36.9 MM HG (ref 36–44)
PCO2 BLD: 39.1 MM HG (ref 36–44)
PCO2 BLD: 47 MM HG (ref 36–44)
PCO2 BLDA: 31.8 MM HG (ref 36–44)
PCO2 BLDA: 46.9 MM HG (ref 36–44)
PEEP RESPIRATORY: 5 CM[H2O]
PEEP RESPIRATORY: 8 CM[H2O]
PH BLD: 7.27 [PH] (ref 7.35–7.45)
PH BLD: 7.32 [PH] (ref 7.35–7.45)
PH BLD: 7.33 [PH] (ref 7.35–7.45)
PH BLD: 7.39 [PH] (ref 7.35–7.45)
PH BLDA: 7.29 [PH] (ref 7.35–7.45)
PH BLDA: 7.33 [PH] (ref 7.35–7.45)
PHOSPHATE SERPL-MCNC: 2.7 MG/DL (ref 2.3–4.1)
PLATELET # BLD AUTO: 152 THOUSANDS/UL (ref 149–390)
PLATELET # BLD AUTO: 189 THOUSANDS/UL (ref 149–390)
PLATELET # BLD AUTO: 217 THOUSANDS/UL (ref 149–390)
PLATELET # BLD AUTO: 277 THOUSANDS/UL (ref 149–390)
PLATELET BLD QL SMEAR: ADEQUATE
PMV BLD AUTO: 11.1 FL (ref 8.9–12.7)
PMV BLD AUTO: 11.1 FL (ref 8.9–12.7)
PMV BLD AUTO: 11.2 FL (ref 8.9–12.7)
PMV BLD AUTO: 11.5 FL (ref 8.9–12.7)
PO2 BLD: 100 MM HG (ref 75–129)
PO2 BLD: 109 MM HG (ref 75–129)
PO2 BLD: 65 MM HG (ref 75–129)
PO2 BLD: 79 MM HG (ref 75–129)
PO2 BLDA: 157.3 MM HG (ref 75–129)
PO2 BLDA: 90.9 MM HG (ref 75–129)
POLYCHROMASIA BLD QL SMEAR: PRESENT
POTASSIUM BLD-SCNC: 3 MMOL/L (ref 3.5–5.3)
POTASSIUM BLD-SCNC: 3.2 MMOL/L (ref 3.5–5.3)
POTASSIUM BLD-SCNC: 3.2 MMOL/L (ref 3.5–5.3)
POTASSIUM BLD-SCNC: 3.3 MMOL/L (ref 3.5–5.3)
POTASSIUM SERPL-SCNC: 3.4 MMOL/L (ref 3.5–5.3)
POTASSIUM SERPL-SCNC: 3.9 MMOL/L (ref 3.5–5.3)
POTASSIUM SERPL-SCNC: 4 MMOL/L (ref 3.5–5.3)
POTASSIUM SERPL-SCNC: 4.5 MMOL/L (ref 3.5–5.3)
PR INTERVAL: 132 MS
PR INTERVAL: 134 MS
PRESSURE SETTING: 8
PROCALCITONIN SERPL-MCNC: 8.33 NG/ML
PROT SERPL-MCNC: 4.7 G/DL (ref 6.4–8.2)
PROTHROMBIN TIME: 20.9 SECONDS (ref 11.6–14.5)
PROTHROMBIN TIME: 21.3 SECONDS (ref 11.6–14.5)
PROTHROMBIN TIME: 22.9 SECONDS (ref 11.6–14.5)
QRS AXIS: -48 DEGREES
QRS AXIS: -52 DEGREES
QRSD INTERVAL: 84 MS
QRSD INTERVAL: 86 MS
QT INTERVAL: 318 MS
QT INTERVAL: 326 MS
QTC INTERVAL: 438 MS
QTC INTERVAL: 458 MS
RBC # BLD AUTO: 3.84 MILLION/UL (ref 3.81–5.12)
RBC # BLD AUTO: 4.14 MILLION/UL (ref 3.81–5.12)
RBC # BLD AUTO: 4.62 MILLION/UL (ref 3.81–5.12)
RBC # BLD AUTO: 4.73 MILLION/UL (ref 3.81–5.12)
RBC MORPH BLD: PRESENT
RESPIRATORY RATE: 20
RH BLD: POSITIVE
SAO2 % BLD FROM PO2: 89 % (ref 95–98)
SAO2 % BLD FROM PO2: 95 % (ref 95–98)
SAO2 % BLD FROM PO2: 98 % (ref 95–98)
SAO2 % BLD FROM PO2: 98 % (ref 95–98)
SODIUM 24H UR-SCNC: 34 MOL/L
SODIUM BLD-SCNC: 135 MMOL/L (ref 136–145)
SODIUM BLD-SCNC: 138 MMOL/L (ref 136–145)
SODIUM BLD-SCNC: 140 MMOL/L (ref 136–145)
SODIUM BLD-SCNC: 141 MMOL/L (ref 136–145)
SODIUM SERPL-SCNC: 134 MMOL/L (ref 136–145)
SODIUM SERPL-SCNC: 134 MMOL/L (ref 136–145)
SODIUM SERPL-SCNC: 138 MMOL/L (ref 136–145)
SODIUM SERPL-SCNC: 140 MMOL/L (ref 136–145)
SPECIMEN EXPIRATION DATE: NORMAL
SPECIMEN SOURCE: ABNORMAL
T WAVE AXIS: 51 DEGREES
T WAVE AXIS: 54 DEGREES
TROPONIN I SERPL-MCNC: <0.02 NG/ML
TROPONIN I SERPL-MCNC: <0.02 NG/ML
VARIANT LYMPHS # BLD AUTO: 8 %
VENT AC: 12
VENT AC: 12
VENT TYPE: ABNORMAL
VENT- AC: AC
VENT- AC: AC
VENTILATION VALUE: 450
VENTRICULAR RATE: 114 BPM
VENTRICULAR RATE: 119 BPM
VT SETTING VENT: 300 ML
VT SETTING VENT: 450 ML
WBC # BLD AUTO: 3.91 THOUSAND/UL (ref 4.31–10.16)
WBC # BLD AUTO: 7.38 THOUSAND/UL (ref 4.31–10.16)
WBC # BLD AUTO: 7.71 THOUSAND/UL (ref 4.31–10.16)
WBC # BLD AUTO: 7.79 THOUSAND/UL (ref 4.31–10.16)
WBC TOXIC VACUOLES BLD QL SMEAR: PRESENT

## 2019-08-02 PROCEDURE — 87106 FUNGI IDENTIFICATION YEAST: CPT | Performed by: FAMILY MEDICINE

## 2019-08-02 PROCEDURE — 93005 ELECTROCARDIOGRAM TRACING: CPT

## 2019-08-02 PROCEDURE — 82330 ASSAY OF CALCIUM: CPT | Performed by: PHYSICIAN ASSISTANT

## 2019-08-02 PROCEDURE — NC001 PR NO CHARGE: Performed by: SURGERY

## 2019-08-02 PROCEDURE — 80053 COMPREHEN METABOLIC PANEL: CPT | Performed by: PHYSICIAN ASSISTANT

## 2019-08-02 PROCEDURE — 85730 THROMBOPLASTIN TIME PARTIAL: CPT | Performed by: PHYSICIAN ASSISTANT

## 2019-08-02 PROCEDURE — 03HY32Z INSERTION OF MONITORING DEVICE INTO UPPER ARTERY, PERCUTANEOUS APPROACH: ICD-10-PCS | Performed by: ANESTHESIOLOGY

## 2019-08-02 PROCEDURE — 93308 TTE F-UP OR LMTD: CPT | Performed by: INTERNAL MEDICINE

## 2019-08-02 PROCEDURE — 71045 X-RAY EXAM CHEST 1 VIEW: CPT

## 2019-08-02 PROCEDURE — 51702 INSERT TEMP BLADDER CATH: CPT | Performed by: SURGERY

## 2019-08-02 PROCEDURE — 84484 ASSAY OF TROPONIN QUANT: CPT | Performed by: PHYSICIAN ASSISTANT

## 2019-08-02 PROCEDURE — 93308 TTE F-UP OR LMTD: CPT

## 2019-08-02 PROCEDURE — 82805 BLOOD GASES W/O2 SATURATION: CPT | Performed by: PHYSICIAN ASSISTANT

## 2019-08-02 PROCEDURE — 85730 THROMBOPLASTIN TIME PARTIAL: CPT | Performed by: FAMILY MEDICINE

## 2019-08-02 PROCEDURE — 84100 ASSAY OF PHOSPHORUS: CPT | Performed by: PHYSICIAN ASSISTANT

## 2019-08-02 PROCEDURE — 4A133B1 MONITORING OF ARTERIAL PRESSURE, PERIPHERAL, PERCUTANEOUS APPROACH: ICD-10-PCS | Performed by: SURGERY

## 2019-08-02 PROCEDURE — 84145 PROCALCITONIN (PCT): CPT | Performed by: FAMILY MEDICINE

## 2019-08-02 PROCEDURE — 86850 RBC ANTIBODY SCREEN: CPT | Performed by: PHYSICIAN ASSISTANT

## 2019-08-02 PROCEDURE — 0W9G00Z DRAINAGE OF PERITONEAL CAVITY WITH DRAINAGE DEVICE, OPEN APPROACH: ICD-10-PCS | Performed by: SURGERY

## 2019-08-02 PROCEDURE — 82948 REAGENT STRIP/BLOOD GLUCOSE: CPT

## 2019-08-02 PROCEDURE — 85027 COMPLETE CBC AUTOMATED: CPT | Performed by: PHYSICIAN ASSISTANT

## 2019-08-02 PROCEDURE — 99223 1ST HOSP IP/OBS HIGH 75: CPT | Performed by: FAMILY MEDICINE

## 2019-08-02 PROCEDURE — 88307 TISSUE EXAM BY PATHOLOGIST: CPT | Performed by: PATHOLOGY

## 2019-08-02 PROCEDURE — 0W9B30Z DRAINAGE OF LEFT PLEURAL CAVITY WITH DRAINAGE DEVICE, PERCUTANEOUS APPROACH: ICD-10-PCS | Performed by: RADIOLOGY

## 2019-08-02 PROCEDURE — 0WPF0JZ REMOVAL OF SYNTHETIC SUBSTITUTE FROM ABDOMINAL WALL, OPEN APPROACH: ICD-10-PCS | Performed by: SURGERY

## 2019-08-02 PROCEDURE — 5A1945Z RESPIRATORY VENTILATION, 24-96 CONSECUTIVE HOURS: ICD-10-PCS | Performed by: EMERGENCY MEDICINE

## 2019-08-02 PROCEDURE — 83735 ASSAY OF MAGNESIUM: CPT | Performed by: PHYSICIAN ASSISTANT

## 2019-08-02 PROCEDURE — 71275 CT ANGIOGRAPHY CHEST: CPT

## 2019-08-02 PROCEDURE — 93010 ELECTROCARDIOGRAM REPORT: CPT | Performed by: INTERNAL MEDICINE

## 2019-08-02 PROCEDURE — 0BH17EZ INSERTION OF ENDOTRACHEAL AIRWAY INTO TRACHEA, VIA NATURAL OR ARTIFICIAL OPENING: ICD-10-PCS | Performed by: EMERGENCY MEDICINE

## 2019-08-02 PROCEDURE — 85014 HEMATOCRIT: CPT

## 2019-08-02 PROCEDURE — 82570 ASSAY OF URINE CREATININE: CPT | Performed by: FAMILY MEDICINE

## 2019-08-02 PROCEDURE — P9017 PLASMA 1 DONOR FRZ W/IN 8 HR: HCPCS

## 2019-08-02 PROCEDURE — 74018 RADEX ABDOMEN 1 VIEW: CPT

## 2019-08-02 PROCEDURE — 82947 ASSAY GLUCOSE BLOOD QUANT: CPT

## 2019-08-02 PROCEDURE — 4A133B1 MONITORING OF ARTERIAL PRESSURE, PERIPHERAL, PERCUTANEOUS APPROACH: ICD-10-PCS | Performed by: ANESTHESIOLOGY

## 2019-08-02 PROCEDURE — 87106 FUNGI IDENTIFICATION YEAST: CPT

## 2019-08-02 PROCEDURE — 82805 BLOOD GASES W/O2 SATURATION: CPT | Performed by: FAMILY MEDICINE

## 2019-08-02 PROCEDURE — 94760 N-INVAS EAR/PLS OXIMETRY 1: CPT

## 2019-08-02 PROCEDURE — 97605 NEG PRS WND THER DME<=50SQCM: CPT | Performed by: SURGERY

## 2019-08-02 PROCEDURE — 36600 WITHDRAWAL OF ARTERIAL BLOOD: CPT

## 2019-08-02 PROCEDURE — 74177 CT ABD & PELVIS W/CONTRAST: CPT

## 2019-08-02 PROCEDURE — 4A133J1 MONITORING OF ARTERIAL PULSE, PERIPHERAL, PERCUTANEOUS APPROACH: ICD-10-PCS | Performed by: SURGERY

## 2019-08-02 PROCEDURE — 99024 POSTOP FOLLOW-UP VISIT: CPT | Performed by: SURGERY

## 2019-08-02 PROCEDURE — 05HN33Z INSERTION OF INFUSION DEVICE INTO LEFT INTERNAL JUGULAR VEIN, PERCUTANEOUS APPROACH: ICD-10-PCS | Performed by: ANESTHESIOLOGY

## 2019-08-02 PROCEDURE — 88300 SURGICAL PATH GROSS: CPT | Performed by: PATHOLOGY

## 2019-08-02 PROCEDURE — 94660 CPAP INITIATION&MGMT: CPT

## 2019-08-02 PROCEDURE — 02HV33Z INSERTION OF INFUSION DEVICE INTO SUPERIOR VENA CAVA, PERCUTANEOUS APPROACH: ICD-10-PCS | Performed by: SURGERY

## 2019-08-02 PROCEDURE — 31500 INSERT EMERGENCY AIRWAY: CPT

## 2019-08-02 PROCEDURE — 80048 BASIC METABOLIC PNL TOTAL CA: CPT | Performed by: PHYSICIAN ASSISTANT

## 2019-08-02 PROCEDURE — 85610 PROTHROMBIN TIME: CPT | Performed by: PHYSICIAN ASSISTANT

## 2019-08-02 PROCEDURE — 93325 DOPPLER ECHO COLOR FLOW MAPG: CPT | Performed by: INTERNAL MEDICINE

## 2019-08-02 PROCEDURE — 0DB80ZZ EXCISION OF SMALL INTESTINE, OPEN APPROACH: ICD-10-PCS | Performed by: SURGERY

## 2019-08-02 PROCEDURE — 82330 ASSAY OF CALCIUM: CPT

## 2019-08-02 PROCEDURE — 0BH17EZ INSERTION OF ENDOTRACHEAL AIRWAY INTO TRACHEA, VIA NATURAL OR ARTIFICIAL OPENING: ICD-10-PCS | Performed by: FAMILY MEDICINE

## 2019-08-02 PROCEDURE — 84295 ASSAY OF SERUM SODIUM: CPT

## 2019-08-02 PROCEDURE — 84484 ASSAY OF TROPONIN QUANT: CPT | Performed by: FAMILY MEDICINE

## 2019-08-02 PROCEDURE — 1123F ACP DISCUSS/DSCN MKR DOCD: CPT | Performed by: SURGERY

## 2019-08-02 PROCEDURE — 83605 ASSAY OF LACTIC ACID: CPT | Performed by: PHYSICIAN ASSISTANT

## 2019-08-02 PROCEDURE — 99291 CRITICAL CARE FIRST HOUR: CPT | Performed by: PHYSICIAN ASSISTANT

## 2019-08-02 PROCEDURE — 84132 ASSAY OF SERUM POTASSIUM: CPT

## 2019-08-02 PROCEDURE — 44120 REMOVAL OF SMALL INTESTINE: CPT | Performed by: SURGERY

## 2019-08-02 PROCEDURE — 94002 VENT MGMT INPAT INIT DAY: CPT

## 2019-08-02 PROCEDURE — 4A133J1 MONITORING OF ARTERIAL PULSE, PERIPHERAL, PERCUTANEOUS APPROACH: ICD-10-PCS | Performed by: ANESTHESIOLOGY

## 2019-08-02 PROCEDURE — 5A1935Z RESPIRATORY VENTILATION, LESS THAN 24 CONSECUTIVE HOURS: ICD-10-PCS | Performed by: FAMILY MEDICINE

## 2019-08-02 PROCEDURE — 83605 ASSAY OF LACTIC ACID: CPT | Performed by: SURGERY

## 2019-08-02 PROCEDURE — 85025 COMPLETE CBC W/AUTO DIFF WBC: CPT | Performed by: PHYSICIAN ASSISTANT

## 2019-08-02 PROCEDURE — 93321 DOPPLER ECHO F-UP/LMTD STD: CPT | Performed by: INTERNAL MEDICINE

## 2019-08-02 PROCEDURE — 80048 BASIC METABOLIC PNL TOTAL CA: CPT | Performed by: FAMILY MEDICINE

## 2019-08-02 PROCEDURE — 86923 COMPATIBILITY TEST ELECTRIC: CPT

## 2019-08-02 PROCEDURE — C9113 INJ PANTOPRAZOLE SODIUM, VIA: HCPCS | Performed by: PHYSICIAN ASSISTANT

## 2019-08-02 PROCEDURE — 85379 FIBRIN DEGRADATION QUANT: CPT | Performed by: PHYSICIAN ASSISTANT

## 2019-08-02 PROCEDURE — 86900 BLOOD TYPING SEROLOGIC ABO: CPT | Performed by: PHYSICIAN ASSISTANT

## 2019-08-02 PROCEDURE — 85007 BL SMEAR W/DIFF WBC COUNT: CPT | Performed by: PHYSICIAN ASSISTANT

## 2019-08-02 PROCEDURE — 0DQA0ZZ REPAIR JEJUNUM, OPEN APPROACH: ICD-10-PCS | Performed by: SURGERY

## 2019-08-02 PROCEDURE — 03HY32Z INSERTION OF MONITORING DEVICE INTO UPPER ARTERY, PERCUTANEOUS APPROACH: ICD-10-PCS | Performed by: SURGERY

## 2019-08-02 PROCEDURE — 87040 BLOOD CULTURE FOR BACTERIA: CPT | Performed by: FAMILY MEDICINE

## 2019-08-02 PROCEDURE — 86901 BLOOD TYPING SEROLOGIC RH(D): CPT | Performed by: PHYSICIAN ASSISTANT

## 2019-08-02 PROCEDURE — 82803 BLOOD GASES ANY COMBINATION: CPT

## 2019-08-02 PROCEDURE — A6550 NEG PRES WOUND THER DRSG SET: HCPCS | Performed by: SURGERY

## 2019-08-02 PROCEDURE — 84300 ASSAY OF URINE SODIUM: CPT | Performed by: FAMILY MEDICINE

## 2019-08-02 PROCEDURE — 85027 COMPLETE CBC AUTOMATED: CPT | Performed by: FAMILY MEDICINE

## 2019-08-02 RX ORDER — LIDOCAINE HYDROCHLORIDE 10 MG/ML
INJECTION, SOLUTION EPIDURAL; INFILTRATION; INTRACAUDAL; PERINEURAL
Status: COMPLETED
Start: 2019-08-02 | End: 2019-08-02

## 2019-08-02 RX ORDER — CEFAZOLIN SODIUM 1 G/3ML
INJECTION, POWDER, FOR SOLUTION INTRAMUSCULAR; INTRAVENOUS AS NEEDED
Status: DISCONTINUED | OUTPATIENT
Start: 2019-08-02 | End: 2019-08-02 | Stop reason: SURG

## 2019-08-02 RX ORDER — ROCURONIUM BROMIDE 10 MG/ML
INJECTION, SOLUTION INTRAVENOUS AS NEEDED
Status: DISCONTINUED | OUTPATIENT
Start: 2019-08-02 | End: 2019-08-02 | Stop reason: SURG

## 2019-08-02 RX ORDER — PANTOPRAZOLE SODIUM 40 MG/1
40 INJECTION, POWDER, FOR SOLUTION INTRAVENOUS
Status: DISCONTINUED | OUTPATIENT
Start: 2019-08-02 | End: 2019-08-02 | Stop reason: HOSPADM

## 2019-08-02 RX ORDER — MIDAZOLAM HYDROCHLORIDE 1 MG/ML
2 INJECTION INTRAMUSCULAR; INTRAVENOUS ONCE
Status: COMPLETED | OUTPATIENT
Start: 2019-08-02 | End: 2019-08-02

## 2019-08-02 RX ORDER — PROPOFOL 10 MG/ML
INJECTION, EMULSION INTRAVENOUS
Status: COMPLETED
Start: 2019-08-02 | End: 2019-08-02

## 2019-08-02 RX ORDER — PROPOFOL 10 MG/ML
5-50 INJECTION, EMULSION INTRAVENOUS
Status: DISCONTINUED | OUTPATIENT
Start: 2019-08-02 | End: 2019-08-03

## 2019-08-02 RX ORDER — FENTANYL CITRATE 50 UG/ML
50 INJECTION, SOLUTION INTRAMUSCULAR; INTRAVENOUS ONCE
Status: COMPLETED | OUTPATIENT
Start: 2019-08-02 | End: 2019-08-02

## 2019-08-02 RX ORDER — SODIUM CHLORIDE, SODIUM GLUCONATE, SODIUM ACETATE, POTASSIUM CHLORIDE, MAGNESIUM CHLORIDE, SODIUM PHOSPHATE, DIBASIC, AND POTASSIUM PHOSPHATE .53; .5; .37; .037; .03; .012; .00082 G/100ML; G/100ML; G/100ML; G/100ML; G/100ML; G/100ML; G/100ML
75 INJECTION, SOLUTION INTRAVENOUS CONTINUOUS
Status: DISCONTINUED | OUTPATIENT
Start: 2019-08-02 | End: 2019-08-06

## 2019-08-02 RX ORDER — VASOPRESSIN 20 U/ML
INJECTION PARENTERAL AS NEEDED
Status: DISCONTINUED | OUTPATIENT
Start: 2019-08-02 | End: 2019-08-02 | Stop reason: HOSPADM

## 2019-08-02 RX ORDER — FENTANYL CITRATE 50 UG/ML
50 INJECTION, SOLUTION INTRAMUSCULAR; INTRAVENOUS
Status: DISCONTINUED | OUTPATIENT
Start: 2019-08-02 | End: 2019-08-06

## 2019-08-02 RX ORDER — FENTANYL CITRATE-0.9 % NACL/PF 10 MCG/ML
100 PLASTIC BAG, INJECTION (ML) INTRAVENOUS CONTINUOUS
Status: DISCONTINUED | OUTPATIENT
Start: 2019-08-02 | End: 2019-08-05

## 2019-08-02 RX ORDER — MAGNESIUM HYDROXIDE 1200 MG/15ML
LIQUID ORAL AS NEEDED
Status: DISCONTINUED | OUTPATIENT
Start: 2019-08-02 | End: 2019-08-02 | Stop reason: HOSPADM

## 2019-08-02 RX ORDER — DIPHENHYDRAMINE HYDROCHLORIDE 50 MG/ML
INJECTION INTRAMUSCULAR; INTRAVENOUS AS NEEDED
Status: DISCONTINUED | OUTPATIENT
Start: 2019-08-02 | End: 2019-08-02 | Stop reason: SURG

## 2019-08-02 RX ORDER — SODIUM CHLORIDE, SODIUM GLUCONATE, SODIUM ACETATE, POTASSIUM CHLORIDE, MAGNESIUM CHLORIDE, SODIUM PHOSPHATE, DIBASIC, AND POTASSIUM PHOSPHATE .53; .5; .37; .037; .03; .012; .00082 G/100ML; G/100ML; G/100ML; G/100ML; G/100ML; G/100ML; G/100ML
1000 INJECTION, SOLUTION INTRAVENOUS ONCE
Status: COMPLETED | OUTPATIENT
Start: 2019-08-02 | End: 2019-08-02

## 2019-08-02 RX ORDER — ALBUMIN, HUMAN INJ 5% 5 %
SOLUTION INTRAVENOUS CONTINUOUS PRN
Status: DISCONTINUED | OUTPATIENT
Start: 2019-08-02 | End: 2019-08-02 | Stop reason: SURG

## 2019-08-02 RX ORDER — PANTOPRAZOLE SODIUM 40 MG/1
40 INJECTION, POWDER, FOR SOLUTION INTRAVENOUS EVERY 12 HOURS SCHEDULED
Status: DISCONTINUED | OUTPATIENT
Start: 2019-08-02 | End: 2019-08-06

## 2019-08-02 RX ORDER — FENTANYL CITRATE 50 UG/ML
50 INJECTION, SOLUTION INTRAMUSCULAR; INTRAVENOUS
Status: DISCONTINUED | OUTPATIENT
Start: 2019-08-02 | End: 2019-08-02 | Stop reason: HOSPADM

## 2019-08-02 RX ORDER — POTASSIUM CHLORIDE 29.8 MG/ML
40 INJECTION INTRAVENOUS ONCE
Status: COMPLETED | OUTPATIENT
Start: 2019-08-02 | End: 2019-08-02

## 2019-08-02 RX ORDER — SUCCINYLCHOLINE/SOD CL,ISO/PF 100 MG/5ML
SYRINGE (ML) INTRAVENOUS AS NEEDED
Status: DISCONTINUED | OUTPATIENT
Start: 2019-08-02 | End: 2019-08-02 | Stop reason: HOSPADM

## 2019-08-02 RX ORDER — LIDOCAINE HYDROCHLORIDE 10 MG/ML
INJECTION, SOLUTION INFILTRATION; PERINEURAL
Status: COMPLETED | OUTPATIENT
Start: 2019-08-02 | End: 2019-08-02

## 2019-08-02 RX ORDER — CALCIUM CHLORIDE 100 MG/ML
INJECTION INTRAVENOUS; INTRAVENTRICULAR AS NEEDED
Status: DISCONTINUED | OUTPATIENT
Start: 2019-08-02 | End: 2019-08-02 | Stop reason: SURG

## 2019-08-02 RX ORDER — SODIUM CHLORIDE 9 MG/ML
INJECTION, SOLUTION INTRAVENOUS CONTINUOUS PRN
Status: DISCONTINUED | OUTPATIENT
Start: 2019-08-02 | End: 2019-08-02 | Stop reason: SURG

## 2019-08-02 RX ORDER — HEPARIN SODIUM 10000 [USP'U]/100ML
3-30 INJECTION, SOLUTION INTRAVENOUS
Status: DISCONTINUED | OUTPATIENT
Start: 2019-08-02 | End: 2019-08-02 | Stop reason: HOSPADM

## 2019-08-02 RX ORDER — PROPOFOL 10 MG/ML
INJECTION, EMULSION INTRAVENOUS CONTINUOUS PRN
Status: DISCONTINUED | OUTPATIENT
Start: 2019-08-02 | End: 2019-08-02 | Stop reason: HOSPADM

## 2019-08-02 RX ORDER — POTASSIUM CHLORIDE 14.9 MG/ML
20 INJECTION INTRAVENOUS ONCE
Status: COMPLETED | OUTPATIENT
Start: 2019-08-02 | End: 2019-08-02

## 2019-08-02 RX ORDER — PROPOFOL 10 MG/ML
5-50 INJECTION, EMULSION INTRAVENOUS
Status: DISCONTINUED | OUTPATIENT
Start: 2019-08-02 | End: 2019-08-02

## 2019-08-02 RX ORDER — POTASSIUM CHLORIDE 14.9 MG/ML
INJECTION INTRAVENOUS CONTINUOUS PRN
Status: DISCONTINUED | OUTPATIENT
Start: 2019-08-02 | End: 2019-08-02 | Stop reason: SURG

## 2019-08-02 RX ORDER — MIDAZOLAM HYDROCHLORIDE 1 MG/ML
INJECTION INTRAMUSCULAR; INTRAVENOUS
Status: COMPLETED
Start: 2019-08-02 | End: 2019-08-02

## 2019-08-02 RX ORDER — ONDANSETRON 2 MG/ML
4 INJECTION INTRAMUSCULAR; INTRAVENOUS EVERY 4 HOURS PRN
Status: DISCONTINUED | OUTPATIENT
Start: 2019-08-02 | End: 2019-08-27 | Stop reason: HOSPADM

## 2019-08-02 RX ORDER — CHLORHEXIDINE GLUCONATE 0.12 MG/ML
15 RINSE ORAL EVERY 12 HOURS SCHEDULED
Status: DISCONTINUED | OUTPATIENT
Start: 2019-08-02 | End: 2019-08-06

## 2019-08-02 RX ORDER — PROPOFOL 10 MG/ML
INJECTION, EMULSION INTRAVENOUS CONTINUOUS PRN
Status: DISCONTINUED | OUTPATIENT
Start: 2019-08-02 | End: 2019-08-02 | Stop reason: SURG

## 2019-08-02 RX ORDER — FENTANYL CITRATE 50 UG/ML
INJECTION, SOLUTION INTRAMUSCULAR; INTRAVENOUS
Status: COMPLETED
Start: 2019-08-02 | End: 2019-08-02

## 2019-08-02 RX ORDER — LORAZEPAM 2 MG/ML
INJECTION INTRAMUSCULAR
Status: COMPLETED
Start: 2019-08-02 | End: 2019-08-02

## 2019-08-02 RX ORDER — LORAZEPAM 2 MG/ML
2 INJECTION INTRAMUSCULAR ONCE
Status: COMPLETED | OUTPATIENT
Start: 2019-08-02 | End: 2019-08-02

## 2019-08-02 RX ORDER — PROPOFOL 10 MG/ML
INJECTION, EMULSION INTRAVENOUS AS NEEDED
Status: DISCONTINUED | OUTPATIENT
Start: 2019-08-02 | End: 2019-08-02 | Stop reason: HOSPADM

## 2019-08-02 RX ORDER — HEPARIN SODIUM 1000 [USP'U]/ML
4800 INJECTION, SOLUTION INTRAVENOUS; SUBCUTANEOUS ONCE
Status: COMPLETED | OUTPATIENT
Start: 2019-08-02 | End: 2019-08-02

## 2019-08-02 RX ADMIN — PROPOFOL 20 MCG/KG/MIN: 10 INJECTION, EMULSION INTRAVENOUS at 17:27

## 2019-08-02 RX ADMIN — FENTANYL CITRATE 50 MCG: 50 INJECTION INTRAMUSCULAR; INTRAVENOUS at 13:15

## 2019-08-02 RX ADMIN — FENTANYL CITRATE 50 MCG: 50 INJECTION, SOLUTION INTRAMUSCULAR; INTRAVENOUS at 10:20

## 2019-08-02 RX ADMIN — SODIUM BICARBONATE 50 MEQ: 84 INJECTION, SOLUTION INTRAVENOUS at 13:07

## 2019-08-02 RX ADMIN — ONDANSETRON 4 MG: 2 INJECTION INTRAMUSCULAR; INTRAVENOUS at 14:00

## 2019-08-02 RX ADMIN — FENTANYL CITRATE 50 MCG: 50 INJECTION INTRAMUSCULAR; INTRAVENOUS at 10:20

## 2019-08-02 RX ADMIN — SODIUM CHLORIDE: 0.9 INJECTION, SOLUTION INTRAVENOUS at 15:05

## 2019-08-02 RX ADMIN — PHENYLEPHRINE HYDROCHLORIDE 100 MCG: 10 INJECTION INTRAVENOUS at 15:14

## 2019-08-02 RX ADMIN — PHENYLEPHRINE HYDROCHLORIDE 30 MCG/MIN: 10 INJECTION INTRAVENOUS at 09:52

## 2019-08-02 RX ADMIN — CEFAZOLIN SODIUM 1000 MG: 1 INJECTION, POWDER, FOR SOLUTION INTRAMUSCULAR; INTRAVENOUS at 15:21

## 2019-08-02 RX ADMIN — SODIUM CHLORIDE, POTASSIUM CHLORIDE, SODIUM LACTATE AND CALCIUM CHLORIDE 125 ML/HR: 600; 310; 30; 20 INJECTION, SOLUTION INTRAVENOUS at 10:58

## 2019-08-02 RX ADMIN — PHENYLEPHRINE HYDROCHLORIDE 180 MCG/MIN: 10 INJECTION INTRAVENOUS at 13:00

## 2019-08-02 RX ADMIN — MIDAZOLAM 2 MG: 1 INJECTION INTRAMUSCULAR; INTRAVENOUS at 13:22

## 2019-08-02 RX ADMIN — PROPOFOL 30 MCG: 10 INJECTION, EMULSION INTRAVENOUS at 09:12

## 2019-08-02 RX ADMIN — NOREPINEPHRINE BITARTRATE 15 MCG/MIN: 1 INJECTION INTRAVENOUS at 21:34

## 2019-08-02 RX ADMIN — IOHEXOL 100 ML: 350 INJECTION, SOLUTION INTRAVENOUS at 14:45

## 2019-08-02 RX ADMIN — EPINEPHRINE 4 MCG/MIN: 1 INJECTION, SOLUTION, CONCENTRATE INTRAVENOUS at 13:04

## 2019-08-02 RX ADMIN — HEPARIN SODIUM AND DEXTROSE 18 UNITS/KG/HR: 10000; 5 INJECTION INTRAVENOUS at 05:00

## 2019-08-02 RX ADMIN — VASOPRESSIN 0.03 UNITS/MIN: 20 INJECTION INTRAVENOUS at 09:58

## 2019-08-02 RX ADMIN — PANTOPRAZOLE SODIUM 40 MG: 40 TABLET, DELAYED RELEASE ORAL at 05:01

## 2019-08-02 RX ADMIN — HEPARIN SODIUM 4800 UNITS: 1000 INJECTION, SOLUTION INTRAVENOUS; SUBCUTANEOUS at 05:00

## 2019-08-02 RX ADMIN — PANTOPRAZOLE SODIUM 40 MG: 40 INJECTION, POWDER, FOR SOLUTION INTRAVENOUS at 20:52

## 2019-08-02 RX ADMIN — SODIUM CHLORIDE, POTASSIUM CHLORIDE, SODIUM LACTATE AND CALCIUM CHLORIDE 125 ML/HR: 600; 310; 30; 20 INJECTION, SOLUTION INTRAVENOUS at 00:59

## 2019-08-02 RX ADMIN — CHLORHEXIDINE GLUCONATE 0.12% ORAL RINSE 15 ML: 1.2 LIQUID ORAL at 14:07

## 2019-08-02 RX ADMIN — ALBUMIN (HUMAN): 12.5 SOLUTION INTRAVENOUS at 15:07

## 2019-08-02 RX ADMIN — CHLORHEXIDINE GLUCONATE 0.12% ORAL RINSE 15 ML: 1.2 LIQUID ORAL at 20:52

## 2019-08-02 RX ADMIN — FENTANYL CITRATE 100 MCG: 50 INJECTION INTRAMUSCULAR; INTRAVENOUS at 15:00

## 2019-08-02 RX ADMIN — LIDOCAINE HYDROCHLORIDE 5 ML: 10 INJECTION, SOLUTION INFILTRATION; PERINEURAL at 10:33

## 2019-08-02 RX ADMIN — VASOPRESSIN 5 UNITS: 20 INJECTION INTRAVENOUS at 09:01

## 2019-08-02 RX ADMIN — HYDROCORTISONE SODIUM SUCCINATE 50 MG: 100 INJECTION, POWDER, FOR SOLUTION INTRAMUSCULAR; INTRAVENOUS at 10:30

## 2019-08-02 RX ADMIN — HYDROCORTISONE SODIUM SUCCINATE 50 MG: 100 INJECTION, POWDER, FOR SOLUTION INTRAMUSCULAR; INTRAVENOUS at 18:19

## 2019-08-02 RX ADMIN — FENTANYL CITRATE 50 MCG: 50 INJECTION, SOLUTION INTRAMUSCULAR; INTRAVENOUS at 14:00

## 2019-08-02 RX ADMIN — OXYCODONE HYDROCHLORIDE 5 MG: 5 TABLET ORAL at 01:20

## 2019-08-02 RX ADMIN — PANTOPRAZOLE SODIUM 40 MG: 40 INJECTION, POWDER, FOR SOLUTION INTRAVENOUS at 14:07

## 2019-08-02 RX ADMIN — VASOPRESSIN 0.04 UNITS/MIN: 20 INJECTION INTRAVENOUS at 13:08

## 2019-08-02 RX ADMIN — LORAZEPAM 2 MG: 2 INJECTION INTRAMUSCULAR at 14:03

## 2019-08-02 RX ADMIN — SODIUM CHLORIDE 1000 ML: 0.9 INJECTION, SOLUTION INTRAVENOUS at 03:57

## 2019-08-02 RX ADMIN — ACETAMINOPHEN 650 MG: 325 TABLET, FILM COATED ORAL at 05:23

## 2019-08-02 RX ADMIN — POTASSIUM CHLORIDE: 200 INJECTION, SOLUTION INTRAVENOUS at 16:04

## 2019-08-02 RX ADMIN — POTASSIUM CHLORIDE 40 MEQ: 400 INJECTION, SOLUTION INTRAVENOUS at 17:26

## 2019-08-02 RX ADMIN — SODIUM CHLORIDE 1000 ML: 0.9 INJECTION, SOLUTION INTRAVENOUS at 10:05

## 2019-08-02 RX ADMIN — SODIUM CHLORIDE, SODIUM GLUCONATE, SODIUM ACETATE, POTASSIUM CHLORIDE, MAGNESIUM CHLORIDE, SODIUM PHOSPHATE, DIBASIC, AND POTASSIUM PHOSPHATE 125 ML/HR: .53; .5; .37; .037; .03; .012; .00082 INJECTION, SOLUTION INTRAVENOUS at 18:04

## 2019-08-02 RX ADMIN — NOREPINEPHRINE BITARTRATE 24 MCG/MIN: 1 INJECTION INTRAVENOUS at 17:56

## 2019-08-02 RX ADMIN — SODIUM BICARBONATE 50 MEQ: 84 INJECTION, SOLUTION INTRAVENOUS at 12:56

## 2019-08-02 RX ADMIN — FENTANYL CITRATE 50 MCG: 50 INJECTION INTRAMUSCULAR; INTRAVENOUS at 13:03

## 2019-08-02 RX ADMIN — NOREPINEPHRINE BITARTRATE 10 MCG/MIN: 1 INJECTION INTRAVENOUS at 10:20

## 2019-08-02 RX ADMIN — MIDAZOLAM HYDROCHLORIDE 2 MG: 1 INJECTION INTRAMUSCULAR; INTRAVENOUS at 13:22

## 2019-08-02 RX ADMIN — MIDAZOLAM 2 MG: 1 INJECTION INTRAMUSCULAR; INTRAVENOUS at 15:22

## 2019-08-02 RX ADMIN — Medication 500 MG: at 15:21

## 2019-08-02 RX ADMIN — SODIUM CHLORIDE 1000 ML: 0.9 INJECTION, SOLUTION INTRAVENOUS at 08:45

## 2019-08-02 RX ADMIN — Medication 100 MCG/HR: at 13:18

## 2019-08-02 RX ADMIN — PROPOFOL 30 MCG/KG/MIN: 10 INJECTION, EMULSION INTRAVENOUS at 09:01

## 2019-08-02 RX ADMIN — PIPERACILLIN SODIUM,TAZOBACTAM SODIUM 3.38 G: 3; .375 INJECTION, POWDER, FOR SOLUTION INTRAVENOUS at 08:45

## 2019-08-02 RX ADMIN — ROCURONIUM BROMIDE 50 MG: 10 INJECTION INTRAVENOUS at 15:05

## 2019-08-02 RX ADMIN — LIDOCAINE HYDROCHLORIDE 300 MG: 10 INJECTION, SOLUTION EPIDURAL; INFILTRATION; INTRACAUDAL; PERINEURAL at 08:46

## 2019-08-02 RX ADMIN — PIPERACILLIN SODIUM AND TAZOBACTAM SODIUM 3.38 G: 36; 4.5 INJECTION, POWDER, FOR SOLUTION INTRAVENOUS at 20:46

## 2019-08-02 RX ADMIN — DIPHENHYDRAMINE HYDROCHLORIDE 50 MG: 50 INJECTION, SOLUTION INTRAMUSCULAR; INTRAVENOUS at 15:25

## 2019-08-02 RX ADMIN — PROPOFOL 50 MG: 10 INJECTION, EMULSION INTRAVENOUS at 09:05

## 2019-08-02 RX ADMIN — PHENYLEPHRINE HYDROCHLORIDE 50 MCG/MIN: 10 INJECTION INTRAVENOUS at 15:16

## 2019-08-02 RX ADMIN — SODIUM CHLORIDE, SODIUM GLUCONATE, SODIUM ACETATE, POTASSIUM CHLORIDE, MAGNESIUM CHLORIDE, SODIUM PHOSPHATE, DIBASIC, AND POTASSIUM PHOSPHATE 1000 ML: .53; .5; .37; .037; .03; .012; .00082 INJECTION, SOLUTION INTRAVENOUS at 13:20

## 2019-08-02 RX ADMIN — SODIUM CHLORIDE, SODIUM GLUCONATE, SODIUM ACETATE, POTASSIUM CHLORIDE, MAGNESIUM CHLORIDE, SODIUM PHOSPHATE, DIBASIC, AND POTASSIUM PHOSPHATE 1000 ML: .53; .5; .37; .037; .03; .012; .00082 INJECTION, SOLUTION INTRAVENOUS at 13:00

## 2019-08-02 RX ADMIN — POTASSIUM CHLORIDE 20 MEQ: 200 INJECTION, SOLUTION INTRAVENOUS at 18:47

## 2019-08-02 RX ADMIN — PROPOFOL 20 MCG/KG/MIN: 10 INJECTION, EMULSION INTRAVENOUS at 16:16

## 2019-08-02 RX ADMIN — SODIUM CHLORIDE, SODIUM GLUCONATE, SODIUM ACETATE, POTASSIUM CHLORIDE, MAGNESIUM CHLORIDE, SODIUM PHOSPHATE, DIBASIC, AND POTASSIUM PHOSPHATE 1000 ML: .53; .5; .37; .037; .03; .012; .00082 INJECTION, SOLUTION INTRAVENOUS at 14:06

## 2019-08-02 RX ADMIN — PHENYLEPHRINE HYDROCHLORIDE 100 MCG: 10 INJECTION INTRAVENOUS at 15:16

## 2019-08-02 RX ADMIN — CALCIUM CHLORIDE 0.5 G: 100 INJECTION, SOLUTION INTRAVENOUS; INTRAVENTRICULAR at 16:02

## 2019-08-02 RX ADMIN — Medication 60 MG: at 09:05

## 2019-08-02 RX ADMIN — VASOPRESSIN 0.04 UNITS/MIN: 20 INJECTION INTRAVENOUS at 19:50

## 2019-08-02 RX ADMIN — LORAZEPAM 2 MG: 2 INJECTION INTRAMUSCULAR; INTRAVENOUS at 14:03

## 2019-08-02 RX ADMIN — NOREPINEPHRINE BITARTRATE 30 MCG/MIN: 1 INJECTION INTRAVENOUS at 12:50

## 2019-08-02 RX ADMIN — SODIUM CHLORIDE, POTASSIUM CHLORIDE, SODIUM LACTATE AND CALCIUM CHLORIDE 125 ML/HR: 600; 310; 30; 20 INJECTION, SOLUTION INTRAVENOUS at 09:26

## 2019-08-02 RX ADMIN — VASOPRESSIN 5 UNITS: 20 INJECTION INTRAVENOUS at 09:02

## 2019-08-02 NOTE — ANESTHESIA PROCEDURE NOTES
Arterial Line Insertion  Performed by: Tommy Ken MD  Authorized by: Tommy Ken MD   Consent: The procedure was performed in an emergent situation  Risks and benefits: risks, benefits and alternatives were discussed  Consent given by: patient  Patient understanding: patient states understanding of the procedure being performed  Patient consent: the patient's understanding of the procedure matches consent given  Procedure consent: procedure consent matches procedure scheduled  Relevant documents: relevant documents present and verified  Test results: test results available and properly labeled  Site marked: the operative site was marked  Radiology Images: Radiology Images displayed and confirmed  If images not available, report reviewed  Required items: required blood products, implants, devices, and special equipment available  Patient identity confirmed: hospital-assigned identification number  Time out: Immediately prior to procedure a "time out" was called to verify the correct patient, procedure, equipment, support staff and site/side marked as required  Preparation: Patient was prepped and draped in the usual sterile fashion    Indications: hemodynamic monitoring  Orientation:  Right  Location: radial arterylidocaine (XYLOCAINE) 1% local infiltration, 5 mL  Sedation:  Patient sedated: no    Procedure Details:  Needle gauge: 20  Number of attempts: 3    Post-procedure:  Post-procedure: dressing applied  Waveform: good waveform  Post-procedure CNS: normal  Patient tolerance: Patient tolerated the procedure well with no immediate complications

## 2019-08-02 NOTE — QUICK NOTE
After placement of Left IJ CVC the patient continued to be hypotensive with lowest systolic blood pressure recorded was 35 noted on A-Line    Oxygen saturation began to decline with a lindy of 88% despite peep of 7 and 100% Fio2     1) Daryl-Synephrine was titrated to 180 mcg/hr   2) Levophed was initiated at 15 micrograms/hour  3) Vasopressin was increased to   16  Units / minute   4) NS 1 L fluid bolus  5) CXR demonstrated large left pneumothorax  Surgery was present at bedside and a left-sided chest tube was inserted without complication  Shortly after placing chest tube and attachment to wall suction,  blood pressure began to improve along with oxygenation  Pulmonary / Surgery at bedside  [facility] team en route  Patient will be transferred to B  Dr Rajeev Nance updated

## 2019-08-02 NOTE — PROGRESS NOTES
Pt c/o R shoulder pain  She described it as a sudden aching and rated it 5/10  By the time I returned with her oxycodone she was complaining the pain was getting worse and she was diaphoretic  Oxycodone administered and hospitalist called  Inessa Garcia came to the patient room and assessed pt  He ordered a blood glucose finger stick which was done and pt blood sugar 117  He also ordered a stat CBC which was drawn  Will continue to monitor and assess

## 2019-08-02 NOTE — ASSESSMENT & PLAN NOTE
Possibly due to pneumonia vs P E given elevated D-Dimer   Obtain lower extremity venous dopplers  Obtain CTA P E protocol with abdomen/pelvis with contrast   2D echo to evaluate RV   Patient is on heparin drip since last evening  Zosyn to cover bilateral pneumonia  Pulmonary / Critical Care Consult

## 2019-08-02 NOTE — CONSULTS
Inpatient Medical Consultation - [facility] Internal Medicine    Patient Information: Claribel Chung 78 y o  female MRN: [id number]  Unit/Bed#:  Encounter: [id number]  PCP: Jo Tam DO  Date of Admission:  7/31/2019  Date of Consultation: 08/02/19  Requesting Physician: Nicolasa Bentley DO    Reason For Consultation:   Medical Management / Hypotension     Assessment/Plan:      Severe sepsis with septic shock (CODE) ([facility] [address] )  Assessment & Plan  Evidenced by Temp 100 5, HR, Renal Dysfunction   CXR reviewed and discussed with radiologist  Possible aspiration pneumonia vs  Peritonitis    Anaesthesia at bedside, patient will have a-line, central line, and will be intubated   Initiate Neosynephrine to maintain MAP > 65  Consult Pulmonary Critical Care   Zosyn 3 375 q6     * Incisional hernia, without obstruction or gangrene  Assessment & Plan  Post op day 2   Management per primary surgical team     Postprocedural pneumothorax  Assessment & Plan  Left Sided, after Left IJ placement   Chest Tube Inserted at bedside with improvement of Pneumothorax on CXR     Acute renal failure (ARF) ([address] )  Assessment & Plan  Renal function improved slightly  Obtain Urine Na and Urine Creatinine   CT A/P to assess for obstructive Uropathy  LR @ 125 cc/hr  Avoid hypotension  Consult Nephrology     Bilateral pneumonia  Assessment & Plan  Obtain blood cultures x 2   Zosyn 3 375 mg IV q 6   Trend Procalcitonin     Acute respiratory failure with hypoxemia ([address] )  Assessment & Plan  Possibly due to pneumonia vs P E given elevated D-Dimer   Obtain lower extremity venous dopplers  Obtain CTA P E protocol with abdomen/pelvis with contrast   2D echo to evaluate RV   Patient is on heparin drip since last evening  Zosyn to cover bilateral pneumonia  Pulmonary / Critical Care Consult       ·     VTE Prophylaxis: Heparin  / sequential compression device     Recommendations for Discharge:  · Transfer to tertiary care facility     Counseling / Coordination of Care Time: 1 hour  Greater than 50% of total time spent on patient counseling and coordination of care  Collaboration of Care: Were Recommendations Directly Discussed with Primary Treatment Team? - Yes     History of Present Illness:    Dre Chinchilla is a 78 y o  female who is originally admitted to the Surgical service on 7/31/2019 due to hernia repair  We are consulted for medical management  Patient is a pleasant 71-year-old female was admitted for elective hernia repair  Postoperative the patient was noted to be tachycardic and monitored overnight  Patient continue with tachycardia and subsequently was hypoxic with an elevation of her creatinine  On examination she remains hypotensive but is mentating clearly  Anesthesia is at bedside and will continue with intubation, central line placement      Review of Systems:    Review of Systems   Unable to perform ROS: Acuity of condition       Past Medical and Surgical History:     Past Medical History:   Diagnosis Date    Abnormal blood chemistry     last assessed 03/06/2014    Achalasia, esophageal     Acute medial meniscus tear     last assessed 03/10/2014    Acute otitis externa     last assessed 03/24/2014    Adrenal nodule ([facility] [address] )     Anemia     Anxiety     Arthritis     Atrial fibrillation ([address] )     resolved 01/09/2018    Atypical chest pain     last assessed 11/22/2016    Eagle's esophagus with high grade dysplasia     last assessed 01/03/2018    Cancer ([facility] [address] )     esophageal    Cerumen impaction     last assessed 03/24/2014    Chronic pain of right knee     last assessed 04/06/2017    Coronary artery disease     3 vessel, resolved 01/09/2018    Depression     Dysphagia     last assessed 06/21/2017    Esophageal stricture     last assessed 06/21/2017    GERD (gastroesophageal reflux disease)     Headache     last assessed 08/01/2013    Hiatal hernia     Insomnia     last assessed 10/15/2013    Jejunostomy tube present ([facility] [address] )     resolved 2018    Low vitamin D level     resolved 2018    Osteoarthritis, knee     last assessed 2017    Pneumonia     last assessed 2013    Right leg paresthesias     last assessed 2017    Sciatica     last assessed 2015    Shingles     last assessed 2015    Spondylosis of 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     last assessed 2017    Venous insufficiency     last assessed 2013       Past Surgical History:   Procedure Laterality Date     SECTION      GASTRECTOMY      partial    GASTROJEJUNOSTOMY W/ JEJUNOSTOMY TUBE N/A 2017    Procedure: Mani Bowden;  Surgeon: Masood Carmen MD;  [facility];  Service: Thoracic    JOINT REPLACEMENT Right 11/10/2014    knee, Dr [facility] DC ESOPHAGOGASTRODUODENOSCOPY TRANSORAL DIAGNOSTIC N/A 3/10/2017    Procedure: ESOPHAGOGASTRODUODENOSCOPY (EGD); Surgeon: Mario Alberto Gutierrez MD;  Location: [facility];  Service: Gastroenterology    DC ESOPHAGOGASTRODUODENOSCOPY TRANSORAL DIAGNOSTIC N/A 2017    Procedure: ESOPHAGOGASTRODUODENOSCOPY (EGD); Surgeon: Mario Alberto Gutierrez MD;  [facility];  Service: Gastroenterology    DC ESOPHAGOSCOPY FLEXIBLE TRANSORAL WITH BIOPSY N/A 2017    Procedure: ESOPHAGOGASTRODUODENOSCOPY (EGD);   Surgeon: Masood Carmen MD;  [facility];  Service: Thoracic    DC LAP, VENTRAL HERNIA REPAIR,REDUCIBLE N/A 2019    Procedure: REPAIR HERNIA VENTRAL LAPAROSCOPIC;  Surgeon: Tk Ricketts DO;  [facility]: [facility];  Service: General    DC REMOVAL [address] N/A 2017    Procedure: TRANSHIATAL ESOPHAGECTOMY;  Surgeon: Masood Carmen MD;  [facility];  Service: Thoracic    STEROID INJECTION KNEE Right 2017    Procedure: GENICULATE NERVE BLOCKS;  Surgeon: Nava FridayDO;  [facility]: [facility];  Service:        Meds/Allergies:    all medications and allergies reviewed    Allergies: No Known Allergies    Social History:     Marital Status: /Civil Union    Substance Use History:   Social History     Substance and Sexual Activity   Alcohol Use Not Currently    Alcohol/week: 0 0 standard drinks    Frequency: Never    Binge frequency: Never     Social History     Tobacco Use   Smoking Status Never Smoker   Smokeless Tobacco Never Used     Social History     Substance and Sexual Activity   Drug Use No       Family History:    non-contributory    Physical Exam:     Vitals:   Blood Pressure: 101/56 (08/02/19 0327)  Pulse: (!) 130 (08/02/19 0715)  Temperature: 100 4 °F (38 °C) (08/02/19 0715)  Temp Source: Tympanic (08/02/19 0715)  Respirations: (!) 44 (08/02/19 0715)  Height: 5' 2" (157 5 cm) (07/31/19 2300)  Weight - Scale: 57 2 kg (126 lb 1 7 oz) (07/31/19 2300)  SpO2: 93 % (08/02/19 0720)    Physical Exam   Constitutional: She appears well-developed  She appears distressed  HENT:   Head: Normocephalic and atraumatic  Eyes: No scleral icterus  Neck: Neck supple  No JVD present  Cardiovascular:   S1/S2 Sinus Tach    Pulmonary/Chest: She has rales  Mild to moderate respiratory distress   RR 24-28   Abdominal: Soft  She exhibits distension  There is tenderness  There is no rebound and no guarding  Musculoskeletal: She exhibits no edema  Neurological: She is alert  No cranial nerve deficit  Skin: Skin is warm  Capillary refill takes more than 3 seconds  (  Be Sure to Include Physical Exam: Delete this entire line when you have entered your exam)    Additional Data:     Lab Results: I have personally reviewed pertinent reports        Results from last 7 days   Lab Units 08/02/19  0813 08/02/19  0147   WBC Thousand/uL 7 38 7 79   HEMOGLOBIN g/dL 13 6 13 3   HEMATOCRIT % 42 5 42 3   PLATELETS Thousands/uL 217 152   NEUTROS PCT %  --  76*   LYMPHS PCT %  --  21   MONOS PCT %  --  2*   EOS PCT %  --  0     Results from last 7 days   Lab Units 08/02/19  0813   POTASSIUM mmol/L 3 9   CHLORIDE mmol/L 102   CO2 mmol/L 23   BUN mg/dL 30*   CREATININE mg/dL 1 24   CALCIUM mg/dL 8 1*     Results from last 7 days   Lab Units 08/02/19  0617   INR  2 00*       Imaging: I have personally reviewed pertinent films in PACS    Xr Chest Portable    Result Date: 8/2/2019  Narrative: CHEST INDICATION:   post central line insertion  COMPARISON:  8/2/2019 EXAM PERFORMED/VIEWS:  XR CHEST PORTABLE FINDINGS: New left-sided chest tube  Left costophrenic sulcus remains deep, but there has been significant reexpansion of the lung with only a sliver of residual pleural air visible at the left apex  Left IJ catheter with tip deviating to the right brachiocephalic vein  No evidence for posterior deviation to suggest azygos location  Enteric tube has been repositioned with side-port and tip projecting over the stomach  Unchanged endotracheal tube with tip at the mid thoracic trachea  Normal cardiac mediastinal silhouette  Pulmonary vasculature is partially obscured by patchy opacities  No jillian cephalization on this supine image  Mild postprocedural chest wall soft tissue gas related to chest tube placement  Recently seen right subdiaphragmatic lucency is not well demonstrated on this supine image  Impression: 1  Left lung reexpansion after placement of chest tube with resolution of mediastinal shift  Sliver-like pneumothorax at the apex with probable small residual anterior component at the costophrenic sulcus  2   Patchy bilateral consolidations with basilar predominance persist   Persistent small left right effusion  3   Left IJ catheter deviating peripherally into the right brachiocephalic vein  Dr Sabina Bhatia is aware  4   Adequately positioned endotracheal and enteric tubes  Workstation performed: [id number]     Xr Chest Portable    Result Date: 8/2/2019  Narrative: CHEST INDICATION:   Hypoxia  COMPARISON:  Chest CT dated 7/1/2019   EXAM PERFORMED/VIEWS:  XR CHEST PORTABLE FINDINGS: Lung volumes are decreased from prior  Increasing bibasilar consolidations  No pneumothorax  New small bilateral effusions  Heart is partially obscured but grossly unchanged  Pulmonary vasculature appears indistinct but not jillian is cephalized  Bones are unremarkable  Impression: Increasing bibasilar consolidations with tiny effusions  This could represent aspiration pneumonitis, multifocal pneumonia, or bland atelectasis related to effusions  Risk factors for aspiration include recent operation and prior gastric pull-through demonstrated on recent chest CT  Rounded focus of air under the right hemiabdomen nonspecific given recency of surgery  Potentially related to prior insufflation /surgery  Note: I personally discussed this study with Samantha Multani on 8/2/2019 at 8:41 AM   Also discussed with Concetta Ellis and Dr Jose Guadalupe Galo from surgery  Workstation performed: [id number]     Xr Abdomen 1 View Kub    Result Date: 8/2/2019  Narrative: ABDOMEN INDICATION:   abdominal distention  COMPARISON:  None VIEWS:  AP supine FINDINGS: Hernia repair mesh is present  Subcutaneous gas tracking in the abdomen and up into the lower chest attributable to insufflation  Rounded focus of gas in the right upper quadrant on recent chest rated graft is not demonstrated on the current study  Paucity of bowel gas  There does appear to be small amount of gas projecting over the rectum  No dilated loops  No pathologic calcifications or soft tissue masses  Lung bases are not well evaluated  Right pleural effusion seen on chest radiograph is partially imaged  Asymmetric right lumbarization of S1  Mild degenerative changes of the hips and thoracal lumbar spine  No acute osseous findings  Impression: Postoperative changes after laparoscopic hernia repair without evidence for ileus or obstruction   Rounded focus of lucency under the right diaphragm concerning for intra-abdominal air on recent chest radiograph is not well demonstrated on the current image  Chest radiograph finding is nonspecific in the 2nd postoperative day  Subcutaneous emphysema related to insufflation  Workstation performed: [id number]     Xr Chest Portable Icu    Result Date: 8/2/2019  Narrative: CHEST INDICATION:   post intubation/cvc placement  COMPARISON:  8/2/2019  EXAM PERFORMED/VIEWS:  XR CHEST PORTABLE ICU FINDINGS:  Left IJ catheter tip deviates superiorly into the right brachiocephalic vein  Enteric tube is coiled in the chest with tip and side port projecting over the central thorax  Review of prior CT shows the patient has a gastric pull-through  Endotracheal tube tip is in the mid thoracic trachea 2 7 cm above the heri  Deep left costophrenic sulcus with large left pneumothorax  Question mild rightward mediastinal shift, even when accounting for patient rotation  Basilar predominant consolidation within the partially collapsed left lung  Right basilar consolidations have increased and there is new patchy opacity developing in the right upper lobe, as well  Osseous structures appear within normal limits for patient age  Subdiaphragmatic lucency seen on prior is less well demonstrated  Post surgical changes from hernia repair  Mild chest wall soft tissue emphysema related to recent insufflation  Impression: 1  Large left pneumothorax with mild rightward mediastinal shift concerning for tension  2   Increasing multifocal consolidations in both lungs could represent increasing multifocal infection, edema, or developing ARDS  3   Left IJ catheter tip is located peripherally, deviating into the right brachiocephalic vein  Recommend repositioning  4   Enteric tube coiled over the patient's chest, likely within the gastric pull-through  Recommend repositioning  5   Adequate positioning of endotracheal tube    I personally discussed this study with Quinn Joyner on 8/2/2019 at 10:20 AM  Workstation performed: [id number] EKG, Pathology, and Other Studies Reviewed on Admission:   · EKG: Sinus Tach    ** Please Note: This note has been constructed using a voice recognition system   **

## 2019-08-02 NOTE — ASSESSMENT & PLAN NOTE
Evidenced by Temp 100 5, HR, Renal Dysfunction   CXR reviewed and discussed with radiologist  Possible aspiration pneumonia vs  Peritonitis    Anaesthesia at bedside, patient will have a-line, central line, and will be intubated   Initiate Neosynephrine to maintain MAP > 65  Consult Pulmonary Critical Care   Zosyn 3 375 q6

## 2019-08-02 NOTE — ANESTHESIA PREPROCEDURE EVALUATION
Review of Systems/Medical History  Patient summary reviewed  Chart reviewed  No history of anesthetic complications     Cardiovascular  EKG reviewed, Hyperlipidemia, CAD , Dysrhythmias , atrial fibrillation,   Comment: 0/8/59: Systolic function was vigorous  Ejection fraction was estimated to be 70 %  There were no regional wall motion abnormalities  ,  Pulmonary  Negative pulmonary ROS        GI/Hepatic    GERD , Esophageal disease (hx esophagectomy for Eagle's with dysplasia) ,  Hiatal hernia,   Comment: Denies reflux, c/o food sometimes sticks when going down, has not been evaluated since esophagectomy     Negative  ROS        Endo/Other  Negative endo/other ROS History of thyroid disease , hypothyroidism,   Comment: S/p Lap Ventral hernia repair 7/31    Now septic with free air  PTX after IJ placement  Now with Chest Tube  GYN  Negative gynecology ROS          Hematology  Negative hematology ROS Anemia ,     Musculoskeletal  Negative musculoskeletal ROS   Arthritis     Neurology  Negative neurology ROS   Headaches,    Psychology   Anxiety, Depression ,              Physical Exam    Airway    Mallampati score: II  TM Distance: >3 FB  Neck ROM: full     Dental       Cardiovascular      Pulmonary      Other Findings        Anesthesia Plan  ASA Score- 5 Emergent    Anesthesia Type- general with ASA Monitors  Additional Monitors:   Airway Plan: ETT  Comment: Resuscitation as needed, will return to ICU with mechanical ventilation  Patient high risk        Plan Factors-    Induction- intravenous  Postoperative Plan-     Informed Consent- Anesthetic plan and risks discussed with patient  I personally reviewed this patient with the CRNA  Discussed and agreed on the Anesthesia Plan with the CRNA  Sherre Soulier

## 2019-08-02 NOTE — OR NURSING
Patient bypassed the PHA due to being vented  SBAR report to anes by unit nurse  Patient for emergency surgery

## 2019-08-02 NOTE — RESPIRATORY THERAPY NOTE
Pt intubated at 0900, placed on vent at 0905 with size 7 0 tube ,21 at center lips  Co2 Color changer for excellent color change after 6 breaths,bilateral equal BS with auscultation, pulse ox 97%, chest xray all confirmed good tube placement  ssettings as follows: AC=12,DT=020, EFQ0=457%, peep=7  Commercial tube perez used  0950 pt BP decreased pt placed supine then peep decreased to 5, VT decreased to 400  Pulse ox 93%  1024 BP decreased again and pt placed in trendelenburg and VT decreased to 300   Chest tube placement after confirmation of pneumothorax in left chest

## 2019-08-02 NOTE — ANESTHESIA PROCEDURE NOTES
Emergent Intubation  Date/Time: 8/2/2019 9:05 AM  Urgency: emergent    Airway not difficult    General Information and Staff    Patient location during procedure: ICU  Anesthesiologist: Meliza Edwards MD  Performed: anesthesiologist     Consent for Airway (if performed for an anesthetic, see related documentation for consents)  Patient identity confirmed: hospital-assigned identification number  Consent: The procedure was performed in an emergent situation  Verbal consent not obtained  Written consent not obtained    Risks and benefits: risks, benefits and alternatives were not discussed      Indications and Patient Condition  Indications for airway management: airway protection, hypoxemia, respiratory distress and cardiovascular instability  Spontaneous ventilation: present  Preoxygenated: yes  Patient position: sniffing  MILS maintained throughout  Mask difficulty assessment: 0 - not attempted    Final Airway Details  Final airway type: endotracheal airway      Successful airway: cuffed and ETT  Cuffed: yes   Successful intubation technique: direct laryngoscopy  Facilitating devices/methods: cricoid pressure and intubating stylet  Endotracheal tube insertion site: oral  Blade: Barney  Blade size: #3  ETT size (mm): 7 0  Cormack-Lehane Classification: grade I - full view of glottis  Placement verified by: chest auscultation, capnometry, radiography and palpation of cuff   Measured from: lips  ETT to lips (cm): 21  Number of attempts at approach: 1

## 2019-08-02 NOTE — PROGRESS NOTES
Per patient's request, I called her daughter Jose Chou to advise her that patient was transferred to ICU

## 2019-08-02 NOTE — RESPIRATORY THERAPY NOTE
resp care      08/02/19 1246   Respiratory Assessment   Resp Comments Pt  ar[facility] from [facility] on vent with previous settings  Awaiting further orders at this time   ETT  Cuffed 7 mm   Placement Date/Time: 08/02/19 (c) 0905   Mask Ventilation: Mask ventilation not attempted (0)  Preoxygenated: Yes  Technique: Direct laryngoscopy  Type: Cuffed  Tube Size: 7 mm  Laryngoscope: Mac  Location: Oral  Placement Verification: Auscultation;C       Secured at (cm) 21   Measured from Lips   Secured Location Right   Secured by Commercial tube perez   Site Condition Dry   HI-LO Suction  Intermittent suction

## 2019-08-02 NOTE — NURSING NOTE
Received patient from 4th floor, patient placed on cardiac monitor, along with continuous pulse ox  SPO2 88% on 3lpm-oxygen increased up to 6lpm with SPO2 -90%  Resp therapist notified & patient placed on Hi miri  Abdomen is distended/firm with belching & no flatus or BS X 4  Abd dsgs (6) clean /dry/intact  Abd binder intact  --ILDA Jacobsen made aware  On coming RN at bedside & patient was made comfortable

## 2019-08-02 NOTE — OP NOTE
OPERATIVE REPORT  PATIENT NAME: Aura Chung    :  1950  MRN: [id number]  Pt Location: [facility] OR ROOM 04    SURGERY DATE: 2019    Surgeon(s) and Role:     * Frandy Rodríguez MD - Primary     * Kimberly Casey MD - Antionette Boyd MD - [id number] MD OLVIN - Observing    Preop Diagnosis:  * No pre-op diagnosis entered *    * No Diagnosis Codes entered *    Procedure(s) (LRB):  LAPAROTOMY EXPLORATORY W/ BOWEL RESECTION, APPLICATION OF ABTHERA VAC (N/A)    Specimen(s):  ID Type Source Tests Collected by Time Destination   1 : explanted abdominal mesh  Tissue Abdominal TISSUE EXAM Frandy Rodríguez MD 2019 1540    2 : nonproximal  Tissue Small Bowel, NOS TISSUE EXAM Frandy Rodríguez MD 2019 1602        Estimated Blood Loss:   Minimal    Drains:  Chest Tube 1 Left 12 Fr  (Active)   Function -20 cm H2O 2019 12:00 PM   Chest Tube Air Leak No 2019 12:00 PM   Drainage Description Serous 2019 12:00 PM   Dressing Status Clean;Dry; Intact 2019 12:00 PM   Site Assessment Clean;Dry; Intact 2019 12:00 PM   Surrounding Skin Dry; Intact 2019 12:00 PM   Number of days: 0       Negative Pressure Wound Therapy (V A C ) Abdomen (Active)   Blue foam- # applied 2 2019  4:20 PM   Number of days: 0       NG/OG/Enteral Tube Orogastric 16 Fr Right mouth (Active)   Placement Reverification X-ray 2019 10:30 AM   Site Assessment Clean;Dry; Intact 2019 12:00 PM   Status Clamped 2019 12:00 PM   Drainage Appearance Coffee ground;Clear 2019 12:00 PM   Number of days: 0       Gastrostomy/Enterostomy Jejunostomy 14 Fr  LUQ (Active)   Number of days: 708       Urethral Catheter Latex;Straight-tip 16 Fr   (Active)   Amt returned on insertion(mL) 200 mL 2019 11:07 AM   Reasons to continue Urinary Catheter  Accurate I&O assessment in critically ill patients (48 hr  max) 2019  2:15 PM   Site Assessment Clean;Skin intact 2019 12:00 PM   Collection Container Standard drainage bag 8/2/2019 12:00 PM   Securement Method Securing device (Describe) 8/2/2019 12:00 PM   Output (mL) 100 mL 8/2/2019 11:32 AM   Number of days: 0       Anesthesia Type:   * No anesthesia type entered *    Operative Indications:  * No pre-op diagnosis entered *      Operative Findings:  Proximal jejunum enterotomy  Succus throughout abdominal cavity    Complications:   None    Procedure and Technique:  After informed consent was obtained explaining the risks/benefits/alternatives of the procedure, the patient was taken to the OR  General anesthesia was induced as the patient was already intubated  The patient was prepped and draped in the usual sterile fashion  A time out was performed to verify correct site and procedure  A midline laparotomy incision was made with a 10 blade, dissection was carried out to the subcutaneous tissues with Bovie cautery  We dissected down to the level of the fascia, and the hernia sac was visible  Once the hernia sac was entered, an immediate flow of brown liquid came from the abdominal cavity  This was sectioned  At this point we were able to enter the abdominal cavity, above the mesh and below the level of the fascia  The fascia was then divided along the midline with Bovie cautery  The mesh was identified  Kocher clamps were used to retract the fascia on each side superiorly, and Sheila clamp was used to separate the tacks from the peritoneal surface circumferentially, along the entire perimeter of the mesh  Mesh was then removed from the abdomen  The abdomen was also filled with brown fluid  This was suctioned  There was exudate covering the anterior surface of several bowel loops and omentum  The loops of small bowel were gently  with blunt finger dissection, there were a few small adhesions that were taken down with Metzenbaum scissors  We were able then to reflect the transverse colon cephalad and eviscerated the small bowel     We quickly identified a small enterotomy in the proximal jejunum, approximately 8 cm distal to the ligament of Treitz  This was oversewn with 2 0 silk suture as we continued to run the bowel to the ileocecal valve  There were no other injuries noted  We turned our attention back to the proximal jejunum  Two BILL 75 mm loads was used to resect the portion of small bowel that was most severely inflamed, this was approximately 8 cm  Sheila clamps and 2- 0 silk ties were used to divide the mesentery of the resected bowel  The specimen was passed off the field  We Returned the small bowel to the abdominal cavity, and irrigated the abdominal cavity with 6 L of warm saline  The small bowel was then run a 2nd time, from the ligament of Treitz to the ileocecal valve  The visible ascending transverse and descending colon were inspected  All instrument counts were correct, and RF wanding was negative  We then applied abThera VAC to the abdomen, leaving the small bowel in discontinuity due to the patient being on multiple vasopressors  It was connected to 125 mmHg suction  The patient was then transported back to the ICU intubated and in critical condition  The patient was awakened and taken to the PACU without any immediate post op complications       Dr Sury Ayala was present for the procedure    Patient Disposition:  ICU    SIGNATURE: Maceo Sandifer, MD  DATE: August 2, 2019  TIME: 4:30 PM

## 2019-08-02 NOTE — PROGRESS NOTES
Report called to B ICU Dulce Maria RN, belongings will be sent home w/family - will call to  in ccu, pt transported on ventilator w/pressor drips and ivf, chest tube, a-line, cvc, pak

## 2019-08-02 NOTE — ANESTHESIA PROCEDURE NOTES
Central Line Insertion  Performed by: Nadiya Anderson MD  Authorized by: Nadiya Anderson MD   Date/Time: 8/2/2019 9:17 AM  Catheter Type:  triple lumen  Consent: The procedure was performed in an emergent situation  Risks and benefits: risks, benefits and alternatives were discussed  Consent given by: patient  Patient understanding: patient states understanding of the procedure being performed  Patient consent: the patient's understanding of the procedure matches consent given  Procedure consent: procedure consent matches procedure scheduled  Relevant documents: relevant documents present and verified  Test results: test results available and properly labeled  Site marked: the operative site was marked  Radiology Images: Radiology Images displayed and confirmed  If images not available, report reviewed  Required items: required blood products, implants, devices, and special equipment available  Patient identity confirmed: hospital-assigned identification number  Time out: Immediately prior to procedure a "time out" was called to verify the correct patient, procedure, equipment, support staff and site/side marked as required  Indications: vascular access  Location details: left internal jugular  Catheter size: 7 Fr  Patient position: Trendelenburg  Anesthesia: see MAR for details    Sedation:  Patient sedated: yes  Sedation type: (General anesthesia)  Sedatives: propofol  Vitals: Vital signs were monitored during sedation      Assessment: blood return through all ports,  placement verified by x-ray,  free fluid flow and no pneumothorax on x-ray  Preparation: skin prepped with 2% chlorhexidine  Skin prep agent dried: skin prep agent completely dried prior to procedure  Sterile barriers: all five maximum sterile barriers used - cap, mask, sterile gown, sterile gloves, and large sterile sheet  Hand hygiene: hand hygiene performed prior to central venous catheter insertion  Ultrasound guidance: yes  sterile gel and probe cover used in ultrasound-guided central venous catheter insertionultrasound permanent image saved  Pre-procedure: landmarks identified  Vessel of Catheter Tip End: Right subclavian  Number of attempts: 2  Successful placement: yes  Post-procedure: line sutured,  dressing applied and chlorhexidine patch applied  Patient tolerance: Patient tolerated the procedure well with no immediate complications

## 2019-08-02 NOTE — H&P
History and Physical - Critical Care   Jimmie Chung 78 y o  female MRN: [id number]  Unit/Bed#: ICU 02 Encounter: [id number]    Reason for Admission / Chief Complaint: Shock    History of Present Illness:  Urban Mccrary is a 78 y o  female who presented to [facility] for an elective laparoscopic ventral hernia repair with mesh  On postoperative day 1 she had appropriate incisional pain across the abdomen but throughout the day she started developing shortness of breath  Into the morning of postop day 2 she became acutely hypoxic to the 80s, hypotensive to the 80s and tachypneic requiring high-flow nasal cannula in order to maintain saturations of 90%  She was given a total of 2 L of normal saline IV bolus and transferred to [facility] ICU at 0700 hours this morning  At that time she complained of sudden onset pain in her right upper chest and difficulty taking a deep breath  Stat KUB demonstrated some retained air in the right subdiaphragmatic region which was thought to be secondary to retained air on postop day 2 from laparoscopic surgery  Chest x-ray demonstrated bibasilar consolidations with small effusions  At this time anesthesia was at the bedside patient was noted to have worsening hypoxia and was intubated and placed on mechanical ventilation  A left-sided central line was placed secondary to pressor requirements on follow-up chest x-ray she had a left pneumothorax which was resolved with a chest tube  Upon arrival to [facility] patient was on max dose Levophed, vasopressin, Daryl-Synephrine with a pressure in the 60s  She was saturating 86% on 5 of peep and 100% FiO2  Patient given 2 amps of bicarb, 1 g of calcium chloride and pushes dose epinephrine with improvement of blood pressure  Bedside ultrasound demonstrated reasonable RV LV function  Patient intubated and sedated unable to provide any history     History obtained from chart review      Past Medical History:  Past Medical History:   Diagnosis Date    Abnormal blood chemistry     last assessed 2014    Achalasia, esophageal     Acute medial meniscus tear     last assessed 03/10/2014    Acute otitis externa     last assessed 2014    [facility] nod[facility] [address]     Anemia     Anxiety     Arthritis     Atrial fibrillation (HCC)     resolved 2018    Atypical chest pain     last assessed 2016    Eagle's esophagus with high grade dysplasia     last assessed 2018    Cancer [facility] [address] [facility]     esophageal    Cerumen impaction     last assessed 2014    Chronic pain of right knee     last assessed 2017    Coronary artery disease     3 vessel, resolved 2018    Depression     Dysphagia     last assessed 2017    Esophageal stricture     last assessed 2017    GERD (gastroesophageal reflux disease)     Headache     last assessed 2013    Hiatal hernia     Insomnia     last assessed 10/15/2013    Jejunostomy tube present ([facility] [address] [facility]     resolved 2018    Low vitamin D level     resolved 2018    Osteoarthritis, knee     last assessed 2017    Pneumonia     last assessed 2013    Right leg paresthesias     last assessed 2017    Sciatica     last assessed 2015    Shingles     last assessed 2015    Spondylosis of 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     last assessed 2017    Venous insufficiency     last assessed 2013       Past Surgical History:  Past Surgical History:   Procedure Laterality Date     SECTION      GASTRECTOMY      partial    GASTROJEJUNOSTOMY W/ JEJUNOSTOMY TUBE N/A 2017    Procedure: JEJUNOSTOMY TUBE PLACEMENT;  Surgeon: Maranda Salgado MD;  Location: [facility] MAIN OR;  Service: Thoracic    JOINT REPLACEMENT Right 11/10/2014    Dr Salty sher ESOPHAGOGASTRODUODENOSCOPY TRANSORAL DIAGNOSTIC N/A 3/10/2017    Procedure: ESOPHAGOGASTRODUODENOSCOPY (EGD);   Surgeon: Paramjit Ruelas MD;  Location: [facility] MA[facility] OR;  Service: Gastroenterology    OR ESOPHAGOGASTRODUODENOSCOPY TRANSORAL DIAGNOSTIC N/A 5/19/2017    Procedure: ESOPHAGOGASTRODUODENOSCOPY (EGD); Surgeon: Venessa Brown MD;  Location: [facility];  Service: Gastroenterology    OR ESOPHAGOSCOPY FLEXIBLE TRANSORAL WITH BIOPSY N/A 8/24/2017    Procedure: ESOPHAGOGASTRODUODENOSCOPY (EGD);   Surgeon: Moriah Coffman MD;  Location:  MAIN OR;  Service: Thoracic    OR LAP, VENTRAL HERNIA REPAIR,REDUCIBLE N/A 7/31/2019    Procedure: REPAIR HERNIA VENTRAL LAPAROSCOPIC;  Surgeon: Santi Chen DO;  Location: [facility];  Service: General    OR REMOVAL [address] THORACOTOMY N/A 8/24/2017    Procedure: TRANSHIATAL ESOPHAGECTOMY;  Surgeon: Moriah Coffman MD;  Location:  MAIN OR;  Service: Thoracic    STEROID INJECTION KNEE Right 5/2/2017    Procedure: GENICULATE NERVE BLOCKS;  Surgeon: Radha Mcnally DO;  Location: [facility];  Service:        Past Family History:  Family History   Problem Relation Age of Onset    Alzheimer's disease Mother     Diabetes type II Father        Social History:  Social History     Tobacco Use   Smoking Status Never Smoker   Smokeless Tobacco Never Used     Social History     Substance and Sexual Activity   Alcohol Use Not Currently    Alcohol/week: 0 0 standard drinks    Frequency: Never    Binge frequency: Never     Social History     Substance and Sexual Activity   Drug Use No     Marital Status: /Civil Union      Medications:  Current Facility-Administered Medications   Medication Dose Route Frequency    chlorhexidine (PERIDEX) 0 12 % oral rinse 15 mL  15 mL Swish [employer] Spit Q12H [address]    EPINEPHrine 3000 mcg (STANDARD CONCENTRATION) IV in sodium chloride 0 9% 250 mL  1-10 mcg/min Intravenous Titrated    fentaNYL 1000 mcg in sodium chloride 0 9% 100mL infusion  100 mcg/hr Intravenous Continuous    fentanyl citrate (PF) 100 MCG/2ML 50 mcg  50 mcg Intravenous Q1H PRN    midazolam (VERSED) injection 2 mg  2 mg Intravenous Once    multi-electrolyte (ISOLYTE-S PH 7 4) bolus 1,000 mL  1,000 mL Intravenous Once    norepinephrine (LEVOPHED) 4 mg (STANDARD CONCENTRATION) IV in sodium chloride 0 9% 250 mL  1-30 mcg/min Intravenous Titrated    ondansetron (ZOFRAN) injection 4 mg  4 mg Intravenous Q4H PRN    pantoprazole (PROTONIX) injection 40 mg  40 mg Intravenous Q12H [address]    piperacillin-tazobactam (ZOSYN) 3 375 g in sodium chloride 0 9 % 50 mL IVPB  3 375 g Intravenous Q6H    potassium chloride 40 mEq IVPB (premix)  40 mEq Intravenous Once    Followed by    potassium chloride 20 mEq IVPB (premix)  20 mEq Intravenous Once    propofol (DIPRIVAN) 1000 mg in 100 mL infusion (premix)  5-50 mcg/kg/min Intravenous Titrated    vasopressin (PITRESSIN) 20 Units in sodium chloride 0 9 % 100 mL infusion  0 04 Units/min Intravenous Continuous     Home medications:  Prior to Admission medications    Not on File     Allergies:  No Known Allergies    ROS:   Review of Systems   Unable to perform ROS: Intubated        Vitals:  Vitals:    19 1254   Pulse: (!) 124   Resp: (!) 24   SpO2: 92%       Temperature:   Temp (24hrs), Av 6 °F (37 °C), Min:97 1 °F (36 2 °C), Max:100 4 °F (38 °C)    Current      Weights: There is no height or weight on file to calculate BMI      Non-Invasive/Invasive Ventilation Settings:  SpO2: SpO2: 98 %  Respiratory    Lab Data (Last 4 hours)       1049            pH, Arterial       7 331           pCO2, Arterial       31 8           pO2, Arterial       90 9           HCO3, Arterial       16 4           Base Excess, Arterial       -8 4                O2/Vent Data        1024   Most Recent         Vent Mode   AC/VC      Resp Rate (BPM) (BPM)   12      Vt (mL) (mL) 300  450      FIO2 (%) (%)   100      PEEP (cmH2O) (cmH2O)   8      Patient safety screen outcome:   Failed      MV   10 9                  Respiratory:  Ventilator Settings:   Vent Mode: AC/VC Lab Results   Component Value Date    PHART 7 331 (L) 08/02/2019    TSN2NSU 31 8 (L) 08/02/2019    PO2ART 90 9 08/02/2019    YNJ8AIL 16 4 (L) 08/02/2019    BEART -8 4 08/02/2019    SOURCE Line, Arterial 08/02/2019     SpO2: SpO2: 98 %     Physical Exam:  Physical Exam   Constitutional: She appears distressed  HENT:   Head: Normocephalic and atraumatic  Eyes: Pupils are equal, round, and reactive to light  Neck: No JVD present  Cardiovascular: Regular rhythm  Tachycardic rate   Pulmonary/Chest:   Tachpenic   Diminished breath sounds throughout    Abdominal:   Firm   Patient grimaces with palpation    Musculoskeletal: She exhibits no edema  Neurological:   Moves extremities spontaneously    Skin: Skin is warm and dry  Labs:  Results from last 7 days   Lab Units 08/02/19  1309 08/02/19  1300 08/02/19  0813 08/02/19  0147  08/01/19  0447   WBC Thousand/uL  --  7 71 7 38 7 79  --  8 83   HEMOGLOBIN g/dL  --  11 6 13 6 13 3  --  12 8   I STAT HEMOGLOBIN g/dl 9 9*  --   --   --   --   --    HEMATOCRIT %  --  35 5 42 5 42 3  --  40 2   HEMATOCRIT, ISTAT % 29*  --   --   --   --   --    PLATELETS Thousands/uL  --  277 217 152   < > 199   NEUTROS PCT %  --   --   --  76*  --   --    MONOS PCT %  --   --   --  2*  --   --    MONO PCT %  --   --   --   --   --  3*    < > = values in this interval not displayed        Results from last 7 days   Lab Units 08/02/19  1309 08/02/19  1300 08/02/19  0813 08/02/19  0306   POTASSIUM mmol/L  --  3 4* 3 9 4 0   CHLORIDE mmol/L  --  105 102 98*   CO2 mmol/L  --  26 23 29   CO2, I-STAT mmol/L 22  --   --   --    BUN mg/dL  --  31* 30* 30*   CREATININE mg/dL  --  1 11 1 24 1 39*   CALCIUM mg/dL  --  7 1* 8 1* 8 7   ALK PHOS U/L  --  45*  --   --    ALT U/L  --  11*  --   --    AST U/L  --  22  --   --    GLUCOSE, ISTAT mg/dl 141*  --   --   --      Results from last 7 days   Lab Units 08/02/19  1300 08/02/19  0306 08/01/19  0447   MAGNESIUM mg/dL 2 3 2 6 1 5* Results from last 7 days   Lab Units 08/02/19  1300   PHOSPHORUS mg/dL 2 7      Results from last 7 days   Lab Units 08/02/19  1300 08/02/19  1125 08/02/19  0813 08/02/19  0617   INR  1 90*  --   --  2 00*   PTT seconds 36 42* >210* >210*     Results from last 7 days   Lab Units 08/02/19  1300   LACTIC ACID mmol/L 4 9*     0   Lab Value Date/Time    TROPONINI <0 02 08/02/2019 1126    TROPONINI <0 02 08/02/2019 0406    TROPONINI <0 02 11/22/2016 1110       Imaging:   CXR- L PTX resolved s/p CT placement, bilateral patchy opacifications in bases, LIJ remains in R brachiocephalic   KUB- Post op changes without evidence of ileus or obstruction   I have personally reviewed pertinent reports  and I have personally reviewed pertinent films in PACS  EKG: Pending   Micro:  No results found for: Rolena Prudent, Earna Petties    ______________________________________________________________________    Assessment:   Principal Problem:    Shock ([facility] [address] )  Active Problems:    Incisional hernia    Severe sepsis with septic shock (CODE) ([facility] [address] )    Acute respiratory failure with hypoxemia ([facility] [address] )    Acute renal failure (ARF) (HCC)    Postprocedural pneumothorax      Plan:    Neuro:   · Sedation: propofol  · RASS goal: 0 Alert and Calm  · Analgesia with: Fentanyl Gtt and PRN fentanyl   · Delirium Precautions: CAM ICU daily, regulate sleep/wake cycle, avoid benzos and opiates as able  · Trend neuro exam    CV:   · Shock- undifferentiated  · Cardiac infusions: Epinephrine, 4 mcg/min, Levophed, 30 mcg/min, Neosynephrine, 200 mcg/min, Vasopressin, 0 04 Units/min  · Wean as able  · MAP goal > 65   · Keep Arterial line  · Rhythm: Sinus Tachycardia  · Follow rhythm on telemetry  · Concern for PE given history and presentation STAT bedside ECHO to evaluate RV/LV for function and regional wall motion abornmalities, unable to obtain CTA secondary to instability        Lung:   · SBT plan: Not appropriate  · Chlorhexidine ordered: yes  · Wean FiO2 as tolerated, Increase PEEP to 8  · SpO2 goal >92%    GI:   · PPI for GIB  · Bowel regiment None currently    FEN:   · Nutrition/diet plan: NPO  · Replete electrolytes with goals: K >4 0, Mag >2 0, and Phos >3 0    :   · Indwelling Mendoza present: yes   · Keep Mendoza  · Trend UOP and BUN/creat    ID:   · Source of infection: Unclear  · Abx ordered: Zosyn  · Trend temps and WBC count    Heme:   · Trend hgb and plts  · Transfuse as needed for goal hgb >7 0    Endo:   · Glycemic control plan:    MSK/Skin:  · Mobility goal:   · PT consult: not applicable  · OT consult: not applicable  · Frequent turning and pressure off-loading  · Local wound care as needed    Disposition:  Continue ICU care    ______________________________________________________________________    Invasive lines and devices: Invasive Devices     Central Venous Catheter Line            CVC Central Lines 08/02/19 Triple less than 1 day          Peripheral Intravenous Line            Peripheral IV 07/31/19 Left 2 days    Peripheral IV 08/02/19 Right Antecubital less than 1 day          Arterial Line            Arterial Line 08/02/19 Right Radial less than 1 day          Drain            Gastrostomy/Enterostomy Jejunostomy 14 Fr   days    Chest Tube 1 Left 12 Fr  less than 1 day    NG/OG/Enteral Tube Orogastric 16 Fr Right mouth less than 1 day    Urethral Catheter Latex;Straight-tip 16 Fr  less than 1 day          Airway            ETT  Cuffed 7 mm less than 1 day                Code Status: Level 1 - Full Code  POA:    POLST:      Given critical illness, patient length of stay will require greater than two midnights  Counseling / Coordination of Care  Total Critical Care time spent 35 minutes excluding procedures, teaching and family updates  Portions of the record may have been created with voice recognition software    Occasional wrong word or "sound a like" substitutions may have occurred due to the inherent limitations of voice recognition software  Read the chart carefully and recognize, using context, where substitutions have occurred        Celina Barron PA-C

## 2019-08-02 NOTE — PROGRESS NOTES
Surgical present in ccu, abd binder removed from pt r/t firm and distended abd - no bowel sounds noted x 4 quads, heparin drip stopped r/t ptt >210  Pt increased wob w/spo2 88% will put on hi-miri nc

## 2019-08-02 NOTE — PROGRESS NOTES
Progress Note - General Surgery   Ovidio Chung 78 y o  female MRN: [id number]  Unit/Bed#:  Encounter: [id number]    Assessment:  Postop day 2  Status post laparoscopic repair of ventral hernia  Patient this morning became tachycardic, tachypneic, hypotensive, and hypoxic  She required transfer to critical care early this morning  She has complaints of pain in her right upper anterior chest with deep inspiration  D-dimer markedly elevated, but she is postoperative  Suspicion for acute pulmonary embolism  Possible aspiration pneumonia  No leukocytosis  Portable upright chest x-ray and KUB was performed  There was a pocket of intra-abdominal air in the right subhepatic space but probably residual from laparoscopic insufflation, however bowel perforation not excluded  Portable chest x-ray showed multifocal bibasilar consolidations with small effusions, possible multifocal pneumonia or pneumonitis  Plan:  Management was personally discussed several times via telephone conversation with Dr Amber Eagle, and  In person with the attending hospitalist physician Dr Loco Sinclair, and Dr Simi Gutierrez in Radiology  Patient needs central line placement, Anesthesia is recommending patient be intubated and placed on ventilator  Mendoza catheter was placed by this provider  She will need an NG tube  Repeat APTT  Right now the heparin drip has been on hold as greater than 200 around 7:00 a m  Walker Reanna Repeat metabolic profile, GFR is now 44  Will proceed with a CTA of the chest abdomen and pelvis, contrast to be given through NG tube per Dr Raphael Loredo  Need to rule out pulmonary embolism or possible bowel injury  Venous duplex and echocardiogram have already been ordered  Abdominal binder was removed, leave off  The patient is critically ill, Dr Raphael Loredo verbally reported that he spoke with the patient's daughter     Further disposition with regard to transfer to tertiary higher level of care once the CTA results obtained  Subjective/Objective   Chief Complaint:  Patient became very short winded just before 7:00 a m  today with complaints of chest pain in the right upper anterior chest with deep inspiration  Subjective:  Early this morning the patient became hypoxic  She is now postop day 2  Status post laparoscopic ventral hernia repair  She had to be transferred to CCU just before 7:00 a m  This morning  O2 saturation was 88% on 3 L of O2 and then she was placed on high-flow oxygen with O2 sats 90%  Nursing noted that the patient's abdomen was distended with absent bowel sounds and the patient was hypotensive with blood pressure 85 systolic  Patient was tachypneic just before 7:00 a m  with respiratory rate in the 40s  The patient was awake and was able to verbally answer questions  She mostly complained of discomfort in her right upper chest with inspiration  No hemoptysis or wheezing  Upon inquiry she did not specifically admit to abdominal pain  The patient had an abdominal binder across her abdominal girth since surgery  This was removed  The dressings were all clean and dry  The attending hospitalist physician also saw the patient at the same time just after 7:00 a m  This morning  Concerned that the patient was given a bolus of IV heparin and then the PTT was drawn which was over 200 seconds  Heparin drip had to be held and then PTT to be repeated  Patient has complaints of nausea but no vomiting  Nursing noted absence of bowel sounds  Also a D-dimer was ordered which was over 4000  Patient was transferred to CCU because of tachypnea, tachycardia, and hypoxia  Lactic acid level was ordered at 8:00 a m  but not  drawn yet  First troponin was normal at 0 02  Her white count remains normal     Objective:     Blood pressure 101/56, pulse (!) 130, temperature 100 4 °F (38 °C), temperature source Tympanic, resp   rate (!) 44, height 5' 2" (1 575 m), weight 57 2 kg (126 lb 1 7 oz), SpO2 93 % ,Body mass index is 23 06 kg/m²  Intake/Output Summary (Last 24 hours) at 8/2/2019 0847  Last data filed at 8/2/2019 0111  Gross per 24 hour   Intake 1720 ml   Output 400 ml   Net 1320 ml       Invasive Devices     Peripheral Intravenous Line            Peripheral IV 07/31/19 Left 1 day          Drain            Gastrostomy/Enterostomy Jejunostomy 14 Fr   days              Physical Exam:  Patient examined in CCU  She is awake  She is in 30 degree semi-Figueroa position  The patient appears tachypneic and in acute distress  She is on high-flow O2 and saturation just 90% at present  Patient is breathing through pursed lips  Oral mucosa is dry  Skin decreased turgor  No rash or jaundice  Head normocephalic  PERRLA, EOMI  Neck supple  No increased JVP  Chest thin, symmetric  Breast atrophic bilaterally  Heart tachycardia, regular rate and rhythm  No definite murmur or gallop  Lungs clear but the patient with poor inspiratory effort because of discomfort in her right upper anterior chest with inspiration  Back mild kyphotic curvature  Abdomen appears distended  Bowel sounds in 4 quadrants are completely absent  Four small dressings all clean and dry without visible drainage  These were not removed  The abdomen is tympanic to percussion  She has discomfort around the perimeter of all 4 quadrants without specific guarding or rebound  No definite masses are palpable  No peripheral edema  No tremor noted  She is oriented and awake  Lab, Imaging and other studies:  I have personally reviewed pertinent lab results  D-dimer, quantitative   Order: [id number]   Status:  Final result   Visible to patient:  No (Not Released)   Next appt:  None    Ref Range & Units 8/2/19 0406   D-Dimer, Quant <500 ng/ml (FEU) 4,082High     Comment:    Reference and upper limits to exclude DVT and PE are the same  Do not use to exclude if clinical symptoms are present              Troponin I   Order: [id number]   Status:  Final result   Visible to patient:  No (Not Released)   Next appt:  None    Ref Range & Units 8/2/19 0406   Troponin I <=0 04 ng/mL <0 02            CBC:   Lab Results   Component Value Date    WBC 7 38 08/02/2019    HGB 13 6 08/02/2019    HCT 42 5 08/02/2019    MCV 90 08/02/2019     08/02/2019    MCH 28 8 08/02/2019    MCHC 32 0 08/02/2019    RDW 14 4 08/02/2019    MPV 11 1 08/02/2019    NRBC 0 08/02/2019   , CMP:   Lab Results   Component Value Date    SODIUM 134 (L) 08/02/2019    K 3 9 08/02/2019     08/02/2019    CO2 23 08/02/2019    BUN 30 (H) 08/02/2019    CREATININE 1 24 08/02/2019    CALCIUM 8 1 (L) 08/02/2019    EGFR 44 08/02/2019   , Coagulation:   Lab Results   Component Value Date    INR 2 00 (H) 08/02/2019   , Urinalysis: No results found for: Jessicaen Cope, SPECGRAV, PHUR, LEUKOCYTESUR, NITRITE, Marshell Dubonnet, BLOODU     APTT   Order: [id number]   Status:  Final result   Visible to patient:  No (Not Released)   Next appt:  None    Ref Range & Units 8/2/19 0813   PTT 23 - 37 seconds >210High Panic     Comment: Therapeutic Heparin Range =  60-90 seconds         Specimen Collected: 08/02/19 08:13 Last Resulted: 08/02/19 08:48           ABDOMEN     INDICATION:   abdominal distention      COMPARISON:  None     VIEWS:  AP supine        FINDINGS:     Hernia repair mesh is present  Subcutaneous gas tracking in the abdomen and up into the lower chest attributable to insufflation      Rounded focus of gas in the right upper quadrant on recent chest rated graft is not demonstrated on the current study      Paucity of bowel gas  There does appear to be small amount of gas projecting over the rectum  No dilated loops      No pathologic calcifications or soft tissue masses       Lung bases are not well evaluated  Right pleural effusion seen on chest radiograph is partially imaged      Asymmetric right lumbarization of S1    Mild degenerative changes of the hips and thoracal lumbar spine  No acute osseous findings      IMPRESSION:     Postoperative changes after laparoscopic hernia repair without evidence for ileus or obstruction      Rounded focus of lucency under the right diaphragm concerning for intra-abdominal air on recent chest radiograph is not well demonstrated on the current image  Chest radiograph finding is nonspecific in the 2nd postoperative day      Subcutaneous emphysema related to insufflation       CHEST      INDICATION:   Hypoxia      COMPARISON:  Chest CT dated 7/1/2019      EXAM PERFORMED/VIEWS:  XR CHEST PORTABLE        FINDINGS:     Lung volumes are decreased from prior  Increasing bibasilar consolidations  No pneumothorax  New small bilateral effusions      Heart is partially obscured but grossly unchanged  Pulmonary vasculature appears indistinct but not jillian is cephalized      Bones are unremarkable      IMPRESSION:     Increasing bibasilar consolidations with tiny effusions  This could represent aspiration pneumonitis, multifocal pneumonia, or bland atelectasis related to effusions  Risk factors for aspiration include recent operation and prior gastric pull-through   demonstrated on recent chest CT      Rounded focus of air under the right hemiabdomen nonspecific given recency of surgery  Potentially related to prior insufflation /surgery         Note: I personally discussed this study with Samantha Multani on 8/2/2019 at 8:41 AM   Also discussed with Concetta Ellis and Dr Jose Guadalupe Galo from surgery        VTE Pharmacologic Prophylaxis: Reason for no pharmacologic prophylaxis Heparin drip held elevated PTT    VTE Mechanical Prophylaxis: sequential compression device     Bartolo Mitchell PA-C

## 2019-08-02 NOTE — PROCEDURES
Surgeon: Santi Chen DO     Assistants: none    Anesthesia: Local anesthesia 1% buffered lidocaine    ASA Class: 4E    Specimen:  None    Blood loss:  Minimal    Complications:  None    Indication:  Patient critically ill status post laparoscopic ventral hernia pair with mesh with recent transferred ICU for tachycardia hypoxia working diagnosis PE/aspiration pneumonia versus potential intra-abdominal pathology  Patient became more severely hypotensive and hypoxic required intubation and central line placement  A left IJ central line was placed under ultrasound guidance  On follow-up x-ray there is noted to be a large left-sided pneumothorax  In the interim patient was noted become more hypoxic and also with worsening hypotension despite substantial pressor requirements  Thus it was decided emergently to place a NG tube  Patient's daughter was at the bedside and is aware of the need for chest tube placement and thus a verbal consent was quickly given to allow for this placement  Description of procedure  A patient is currently sedated and intubated  Left arm was then placed above the patient's head  The breast was pulled medially  The patient was then prepped and draped usual sterile fashion  Quick time-out was instituted to verify correct patient procedure position, and site  Images were reviewed to confirm this was a left-sided pneumothorax  Along the 4th rib space approximately at the level the nipple the rib was palpable  The skin and underlying rib was infiltrated with 1% lidocaine  A finer needle was then inserted over the top of the rib and a a immediate gush of air with noted bubbles was witnessed  A guidewire was then placed at through the finer needle without any complication  The needle was then removed  Using the scalpel the larger skin incisions made  Dilator was then used to dilate the tract  A 12 Lithuanian percutaneous catheter was then inserted using Seldinger technique    There is no resistance with placement of the tube  The tube subsequently was then connected to a pleural VAC which at that time revealed a small amount of straw-colored fluid in addition to numerous bubbles to indicate we were in the left lung space  The tube was then secured to the skin with a 2 0 nylon stitch  The connector was then taped  And then the chest tube was also taped along the chest wall to secure the tube in place  The Pleur-Evac was then placed to suction  Patient tolerated procedure well  Repeat x-ray showed satisfactory position of the chest tube and resume resolution of the left pneumothorax

## 2019-08-02 NOTE — ANESTHESIA POSTPROCEDURE EVALUATION
Post-Op Assessment Note    CV Status:  Stable  Pain Score: 0    Pain management: adequate     Mental Status:  Somnolent   Hydration Status:  Euvolemic   PONV Controlled:  Controlled   Airway Patency:  Patent and adequate  Airway: intubated   Post Op Vitals Reviewed: Yes      Staff: CRNA           BP   147/68   Temp   36 5   Pulse 89   Resp   16   SpO2   97%

## 2019-08-02 NOTE — RESPIRATORY THERAPY NOTE
RT Protocol Note  Marie Chung 78 y o  female MRN: [id number]  Unit/Bed#: ICU 02 Encounter: [id number]    Assessment    Active Problems:    * No active hospital problems   *      Home Pulmonary Medications:  none       Past Medical History:   Diagnosis Date    Abnormal blood chemistry     last assessed 03/06/2014    Achalasia, esophageal     Acute medial meniscus tear     last assessed 03/10/2014    Acute otitis externa     last assessed 03/24/2014    Adrenal nodule ([facility] )     Anemia     Anxiety     Arthritis     Atrial fibrillation (HCC)     resolved 01/09/2018    Atypical chest pain     last assessed 11/22/2016    Eagle's esophagus with high grade dysplasia     last assessed 01/03/2018    Cancer ([facility] )     esophageal    Cerumen impaction     last assessed 03/24/2014    Chronic pain of right knee     last assessed 04/06/2017    Coronary artery disease     3 vessel, resolved 01/09/2018    Depression     Dysphagia     last assessed 06/21/2017    Esophageal stricture     last assessed 06/21/2017    GERD (gastroesophageal reflux disease)     Headache     last assessed 08/01/2013    Hiatal hernia     Insomnia     last assessed 10/15/2013    Jejunostomy tube present ([facility] )     resolved 01/09/2018    Low vitamin D level     resolved 01/08/2018    Osteoarthritis, knee     last assessed 04/06/2017    Pneumonia     last assessed 05/06/2013    Right leg paresthesias     last assessed 02/23/2017    Sciatica     last assessed 05/08/2015    Shingles     last assessed 05/12/2015    Spondylosis of 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     last assessed 04/06/2017    Venous insufficiency     last assessed 12/06/2013     Social History     Socioeconomic History    Marital status: /Civil Union     Spouse name: Not on file    Number of children: Not on file    Years of education: Not on file    Highest education level: Not on file   Occupational History    Not on file Social Needs    Financial resource strain: Not on file    Food insecurity:     Worry: Not on file     Inability: Not on file    Transportation needs:     Medical: Not on file     Non-medical: Not on file   Tobacco Use    Smoking status: Never Smoker    Smokeless tobacco: Never Used   Substance and Sexual Activity    Alcohol use: Not Currently     Alcohol/week: 0 0 standard drinks     Frequency: Never     Binge frequency: Never    Drug use: No    Sexual activity: Not Currently     Partners: Male   Lifestyle    Physical activity:     Days per week: Not on file     Minutes per session: Not on file    Stress: Not on file   Relationships    Social connections:     Talks on phone: Not on file     Gets together: Not on file     Attends Evangelical service: Not on file     Active member of club or organization: Not on file     Attends meetings of clubs or organizations: Not on file     Relationship status: Not on file    Intimate partner violence:     Fear of current or ex partner: Not on file     Emotionally abused: Not on file     Physically abused: Not on file     Forced sexual activity: Not on file   Other Topics Concern    Not on file   Social History Narrative    Patient has living will       Subjective         Objective    Physical Exam:   Assessment Type: Assess only  General Appearance: (P) Sedated  Respiratory Pattern: (P) Assisted  Chest Assessment: (P) Chest expansion symmetrical  Bilateral Breath Sounds: (P) Coarse    Vitals: There were no vitals taken for this visit  Results from last 7 days   Lab Units 08/02/19  1049   PH ART  7 331*   PCO2 ART mm Hg 31 8*   PO2 ART mm Hg 90 9   HCO3 ART mmol/L 16 4*   BASE EXC ART mmol/L -8 4   O2 CONTENT ART mL/dL 16 5   O2 HGB, ARTERIAL % 95 7   ABG SOURCE  Line, Arterial       Imaging and other studies: I have personally reviewed pertinent reports              Plan    Respiratory Plan: Vent/NIV/HFNC        Resp Comments: (P) Pt  with no pulm hx  no meds at home  Pt  currently intubated on ventilator  No resp  therapy indicated at this time   Will follow while on ventilator

## 2019-08-02 NOTE — PLAN OF CARE
Received pt from 4th floor, 2LNC, ivf, placed in ccu bed and on monitor, vs taken, assessment completed  Heparin Drip initiated on 4th floor - transfer of drip verified by Len Moscoso and Linton Primrose RN, abdominal binder in place

## 2019-08-02 NOTE — ASSESSMENT & PLAN NOTE
Renal function improved slightly  Obtain Urine Na and Urine Creatinine   CT A/P to assess for obstructive Uropathy  LR @ 125 cc/hr  Avoid hypotension  Consult Nephrology

## 2019-08-02 NOTE — PROGRESS NOTES
INTERVAL PROGRESS NOTE:    79 y/o F s/p Lap ventral hernia defect for recurrent incisional hernia on 7/31 with an approximately 25 x 15 cm ventral light Bard mesh is a recent transfer after rapid decompensation from [facility]  She presented in shock on pressors and intubated  There was high suspicion for saddle PE as source of shock state, however bedside cardiac US with hyperdynamic ventricle and no right heart strain  Her ETT, NGT, and left thoracostomy confirmed in position and her resuscitation continued with crystalloids  She required escalation of pressors on her arrival but was able to titrate off Daryl and Epi  Her abdomen is mildly distended and tender on exam with some rigidity  Intraoperatively from recent surgery required adhesiolysis from anterior abdominal wall and therefore concern she may have an enterotomy as a source of septic shock  It was decided to take her emergently to the OR for diagnostic laparotomy, possible SBR, mesh explantation, washout, and wound vac application as she remains very critical with increasing pressor requirements despite adequate resuscitation  Family was update and consent attained

## 2019-08-02 NOTE — RESPIRATORY THERAPY NOTE
Called to room 208 for decreased pulse oxand increased  on 6 l/m , sat 88-90%   Requested to nurse to get me a HFNC order, pt placed on HFNC with flow 50 and 85% to maintain pulse ox at 93%

## 2019-08-02 NOTE — NURSING NOTE
Patients daughter and her  present at bedside to visit and aware that her mother is going to [facility] via helicopter

## 2019-08-02 NOTE — DISCHARGE SUMMARY
Discharge Summary - Barrie Chung 78 y o  female MRN: [id number]    Unit/Bed#:  Encounter: [id number]    Admission Date:   Admission Orders (From admission, onward)     Ordered        08/01/19 1042  Inpatient Admission  Once                     Admitting Diagnosis: Incisional hernia, without obstruction or gangrene [K43 2]    HPI:  59-year-old white female was admitted postoperatively after undergoing laparoscopic incisional ventral hernia repair with mesh on July 31st   Postoperatively on day 1 she had incisional pain across the abdomen  Throughout the day she developed shortness of breath and into the morning hours of the 2nd postop day became hypoxic, hypotensive, and tachypneic  She required high-flow oxygen to maintain a O2 saturation of 90%  She was afebrile  The patient had no leukocytosis  Her creatinine was noted to be elevated  The patient was hypotensive with systolic blood pressure of 85  She was given IV normal saline fluid bolus  She was transferred to CCU on the morning of August 2nd just before 7:00 a m     The patient was complaining of sudden onset of pain in her right upper chest and difficulty to take a deep breath  Also heard abdomen was noted be distended without bowel sounds  Stat KUB of the abdomen as well as chest x-ray in upright position both performed on a portable basis  She had bilateral infiltrates noted and small effusions seen  Also there was some retained air in the right sub diaphragm which was thought to represent probable retained air after laparoscopic surgery 2 days ago  She had worsening hypoxia and anesthesia intubated the patient patient was placed on mechanical ventilation  A left sided central line was placed by the anesthesiologist   She had follow-up chest x-ray which showed a left pneumothorax after procedure  Chest tube was inserted by Dr Todd Duran, general surgeon  Patient required vasopressors for blood pressure support  Hospitalist attending physician as well as pulmonary critical care evaluated the patient  The patient did not have a CTA of the chest abdomen pelvis performed here 's  Arrangements were for transport via helicopter to [place] to [address] there  Procedures Performed:   Orders Placed This Encounter   Procedures    Bladder catheterization    Chest Tube Insertion       Summary of Hospital Course:  Patient underwent laparoscopic incisional hernia repair done on 07/31/2019  Significant Findings, Care, Treatment and Services Provided:  Acute hypoxia, tachypnea and hypotension in the postoperative setting required critical-care transfer on the morning of August 2nd  The patient needed to be intubated and required IV vasopressors for blood pressure support  He is placed on mechanical ventilation and a chest tube needed to be inserted after a spontaneous left pneumothorax status post insertion of central line  Portable x-ray the abdomen on August 2nd showed:  Postoperative changes after laparoscopic hernia repair without evidence for ileus or obstruction      Rounded focus of lucency under the right diaphragm concerning for intra-abdominal air on recent chest radiograph is not well demonstrated on the current image  Chest radiograph finding is nonspecific in the 2nd postoperative day      Subcutaneous emphysema related to insufflation  Portable chest x-ray status post intubation in CVC placement on August 2nd:    1  Large left pneumothorax with mild rightward mediastinal shift concerning for tension      2  Increasing multifocal consolidations in both lungs could represent increasing multifocal infection, edema, or developing ARDS      3   Left IJ catheter tip is located peripherally, deviating into the right brachiocephalic vein  Recommend repositioning      4   Enteric tube coiled over the patient's chest, likely within the gastric pull-through  Recommend repositioning      5    Adequate positioning of endotracheal tube          Complications:  Bilateral lung infiltrates and small effusions, possible multi lobular aspiration pneumonia, possible free air in the abdomen  Large left pneumothorax  Discharge Diagnosis:  Acute respiratory failure, hypoxia  Multi lobular pneumonia, evolving sepsis  Resolved Problems  Date Reviewed: 6/26/2019    None        Condition at Discharge: fair       Discharge instructions/Information to patient and family:   See after visit summary for information provided to patient and family  Provisions for Follow-Up Care:  See after visit summary for information related to follow-up care and any pertinent home health orders  PCP: Tamar Sykes DO    Disposition: Patient being transported via helicopter to [facility] for higher level of care  Planned Readmission: Yes transferred to [place]    Discharge Statement   I spent 20 minutes discharging the patient  This time was spent on the day of discharge  I had direct contact with the patient on the day of discharge  Additional documentation is required if more than 30 minutes were spent on discharge  Discharge Medications:  See after visit summary for reconciled discharge medications provided to patient and family          Kings Decker PA-C

## 2019-08-02 NOTE — CONSULTS
Consultation - General Surgery   Jimmie Chung 78 y o  female MRN: [id number]  Unit/Bed#: ICU 02 Encounter: [id number]    Assessment/Plan     Assessment:  79 yo F s/p lap ventral hernia repair 7/31 with respiratory decompensation, presumed septic shock, and peritonitis  Plan:  Obtain CTA chest (r/o PE) and CTAP w/ PO & IV contrast (r/o bowel perf/enterotomy)  Continue pressor support per ICU  Repeat stat labs, including type and screen  Broad-spectrum antibiotics  Will proceed to OR if CTAP shows evidence of abdominal source      History of Present Illness     HPI:  Urban Mccrary is a 78 y o  female with history of transhiatal esophagectomy (2017 Burfeind) and multiple ventral hernias, status post laparoscopic ventral hernia repair on 07/31 ([facility] Saw[facility]) who began to have worsening pain in the early morning hours of this morning, 8/2  By 7:00 a m  She had respiratory decompensation to the point where she was placed on high-flow nasal cannula  It was noted at this time that her abdomen was distended and firm, with belching  She was transferred to the ICU  Because she was tachycardic, tachypneic, hypotensive, and hypoxic, there was suspicion for acute PE  Unfortunately, she had to be intubated around 10:00 a m  Due to further respiratory decompensation  A left sided IJ was placed for central access, however unfortunately there was a large left pneumothorax after the procedure, so a chest tube was placed  X-ray had also shown a small amount of free air in the right hemidiaphragm, thought to be retained after laparoscopic surgery  Due to the patient's worsening hemodynamic instability, she was transferred to [facility]  She was on multiple pressors at the time of arrival  LA 4 5          Consults    Review of Systems   Unable to perform ROS: Intubated       Historical Information   Past Medical History:   Diagnosis Date    Abnormal blood chemistry     last assessed 03/06/2014  Achalasia, esophageal     Acute medial meniscus tear     last assessed 03/10/2014    Acute otitis externa     last assessed 2014    Adrenal nodule ([facility] )     Anemia     Anxiety     Arthritis     Atrial fibrillation (HCC)     resolved 2018    Atypical chest pain     last assessed 2016    Eagle's esophagus with high grade dysplasia     last assessed 2018    Cancer ([facility] )     esophageal    Cerumen impaction     last assessed 2014    Chronic pain of right knee     last assessed 2017    Coronary artery disease     3 vessel, resolved 2018    Depression     Dysphagia     last assessed 2017    Esophageal stricture     last assessed 2017    GERD (gastroesophageal reflux disease)     Headache     last assessed 2013    Hiatal hernia     Insomnia     last assessed 10/15/2013    Jejunostomy tube present ([facility] )     resolved 2018    Low vitamin D level     resolved 2018    Osteoarthritis, knee     last assessed 2017    Pneumonia     last assessed 2013    Right leg paresthesias     last assessed 2017    Sciatica     last assessed 2015    Shingles     last assessed 2015    Spondylosis of 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     last assessed 2017    Venous insufficiency     last assessed 2013     Past Surgical History:   Procedure Laterality Date     SECTION      GASTRECTOMY      partial    GASTROJEJUNOSTOMY W/ JEJUNOSTOMY TUBE N/A 2017    Procedure: JEJUNOSTOMY TUBE PLACEMENT;  Surgeon: Frieda Hanna MD;  Location:  MAIN OR;  Service: Thoracic    JOINT REPLACEMENT Right 11/10/2014    Dr Mino sher ESOPHAGOGASTRODUODENOSCOPY TRANSORAL DIAGNOSTIC N/A 3/10/2017    Procedure: ESOPHAGOGASTRODUODENOSCOPY (EGD);   Surgeon: Jones Diaz MD;  Location: [facility] MA[facility];  Service: Gastroenterology    PA ESOPHAGOGASTRODUODENOSCOPY TRANSORAL DIAGNOSTIC N/A 5/19/2017    Procedure: ESOPHAGOGASTRODUODENOSCOPY (EGD); Surgeon: Nhi Mejia MD;  Location: [facility];  Service: Gastroenterology    FL ESOPHAGOSCOPY FLEXIBLE TRANSORAL WITH BIOPSY N/A 8/24/2017    Procedure: ESOPHAGOGASTRODUODENOSCOPY (EGD);   Surgeon: Pelon Ferreira MD;  Location: [facility] MAIN [facility];  Service: Thoracic    FL LAP, VENTRAL HERNIA REPAIR,REDUCIBLE N/A 7/31/2019    Procedure: REPAIR HERNIA VENTRAL LAPAROSCOPIC;  Surgeon: Penny Salguero DO;  Location: [facility];  Service: General    FL REMOVAL [address] THORACOTOMY N/A 8/24/2017    Procedure: TRANSHIATAL ESOPHAGECTOMY;  Surgeon: Pelon Ferreira MD;  Location: [facility];  Service: Thoracic    STEROID INJECTION KNEE Right 5/2/2017    Procedure: GENICULATE NERVE BLOCKS;  Surgeon: Caty Adames DO;  Location: [facility];  Service:      Social History   Social History     Substance and Sexual Activity   Alcohol Use Not Currently    Alcohol/week: 0 0 standard drinks    Frequency: Never    Binge frequency: Never     Social History     Substance and Sexual Activity   Drug Use No     Social History     Tobacco Use   Smoking Status Never Smoker   Smokeless Tobacco Never Used     Family History:   Family History   Problem Relation Age of Onset    Alzheimer's disease Mother     Diabetes type II Father        Meds/Allergies   all current active meds have been reviewed, current meds:   Current Facility-Administered Medications   Medication Dose Route Frequency    chlorhexidine (PERIDEX) 0 12 % oral rinse 15 mL  15 mL Swish & Spit Q12H [address]    EPINEPHrine 3000 mcg (STANDARD CONCENTRATION) IV in sodium chloride 0 9% 250 mL  1-10 mcg/min Intravenous Titrated    fentaNYL 1000 mcg in sodium chloride 0 9% 100mL infusion  100 mcg/hr Intravenous Continuous    fentanyl citrate (PF) 100 MCG/2ML 50 mcg  50 mcg Intravenous Q1H PRN    midazolam (VERSED) injection 2 mg  2 mg Intravenous Once    midazolam (VERSED) injection 2 mg  2 mg Intravenous Once    multi-electrolyte (ISOLYTE-S PH 7 4) bolus 1,000 mL  1,000 mL Intravenous Once    norepinephrine (LEVOPHED) 4 mg (STANDARD CONCENTRATION) IV in sodium chloride 0 9% 250 mL  1-30 mcg/min Intravenous Titrated    pantoprazole (PROTONIX) injection 40 mg  40 mg Intravenous Q12H [facility]    piperacillin-tazobactam (ZOSYN) 3 375 g in sodium chloride 0 9 % 50 mL IVPB  3 375 g Intravenous Q6H    propofol (DIPRIVAN) 1000 mg in 100 mL infusion (premix)  5-50 mcg/kg/min Intravenous Titrated    sodium bicarbonate 8 4 % injection 50 mEq  50 mEq Intravenous Once    vasopressin (PITRESSIN) 20 Units in sodium chloride 0 9 % 100 mL infusion  0 04 Units/min Intravenous Continuous    and PTA meds:   None     No Known Allergies    Objective   First Vitals:   Pulse: (!) 124 (08/02/19 1254)  Respirations: (!) 24 (08/02/19 1254)  SpO2: 92 % (08/02/19 1254)    Current Vitals:   Pulse: (!) 124 (08/02/19 1254)  Respirations: (!) 24 (08/02/19 1254)  SpO2: 92 % (08/02/19 1254)    No intake or output data in the 24 hours ending 08/02/19 1319    Invasive Devices     Central Venous Catheter Line            CVC Central Lines 08/02/19 Triple less than 1 day          Peripheral Intravenous Line            Peripheral IV 07/31/19 Left 2 days    Peripheral IV 08/02/19 Right Antecubital less than 1 day          Arterial Line            Arterial Line 08/02/19 Right Radial less than 1 day          Drain            Gastrostomy/Enterostomy Jejunostomy 14 Fr   days    Chest Tube 1 Left 12 Fr  less than 1 day    NG/OG/Enteral Tube Orogastric 16 Fr Right mouth less than 1 day    Urethral Catheter Latex;Straight-tip 16 Fr  less than 1 day          Airway            ETT  Cuffed 7 mm less than 1 day                Physical Exam   Constitutional: She appears well-developed and well-nourished  She appears listless  She appears ill  Eyes: Pupils are equal, round, and reactive to light   EOM are normal    Cardiovascular: Normal rate, regular rhythm, normal heart sounds and intact distal pulses  Pulmonary/Chest: Effort normal and breath sounds normal    Abdominal: She exhibits distension  There is tenderness  There is guarding  Diffuse abdominal pain with palpation, grimaces with guarding diffusely   Neurological: She appears listless  GCS 11T   Skin: Skin is intact  Capillary refill takes 2 to 3 seconds  There is pallor  Lab Results:   CBC:   Lab Results   Component Value Date    WBC 7 71 08/02/2019    HGB 9 9 (L) 08/02/2019    HCT 29 (L) 08/02/2019    MCV 86 08/02/2019     08/02/2019    MCH 28 0 08/02/2019    MCHC 32 7 08/02/2019    RDW 14 4 08/02/2019    MPV 11 2 08/02/2019    NRBC 0 08/02/2019   , CMP:   Lab Results   Component Value Date    SODIUM 134 (L) 08/02/2019    K 3 9 08/02/2019     08/02/2019    CO2 22 08/02/2019    BUN 30 (H) 08/02/2019    CREATININE 1 24 08/02/2019    GLUCOSE 141 (H) 08/02/2019    CALCIUM 8 1 (L) 08/02/2019    EGFR 44 08/02/2019   , Coagulation:   Lab Results   Component Value Date    INR 1 90 (H) 08/02/2019     Imaging: I have personally reviewed pertinent reports  EKG, Pathology, and Other Studies: I have personally reviewed pertinent reports  Counseling / Coordination of Care  Total floor / unit time spent today 45 minutes  Greater than 50% of total time was spent with the patient and / or family counseling and / or coordination of care  A description of the counseling / coordination of care: Gurdeep Peters

## 2019-08-02 NOTE — ASSESSMENT & PLAN NOTE
Left Sided, after Left IJ placement   Chest Tube Inserted at bedside with improvement of Pneumothorax on CXR

## 2019-08-02 NOTE — PROCEDURES
Patient became hypotensive with blood pressure 85 systolic just before 7:27 a m  Heart rate was tachycardic, O2 saturation was 90% on high volume nasal cannula O2  She was given 2 L of normal saline IV fluid bolus  Her GFR improved to 44  Patient had to be intubated and placed on mechanical ventilation  Mendoza catheter was asked to be inserted by the anesthesiologist to assess urine output in critically ill patient  [de-identified] [place] Mendoza catheter was successfully placed on 1st attempt by this provider under sterile technique, balloon inflated after good urine flow was confirmed  The initial urine output was approximately 150 mL of dark yellow concentrated urine  Catheter tubing was affixed to the left thigh  Mendoza catheter insertion partner was Alondra Buckner, GLEN Chaparro PA-C

## 2019-08-03 ENCOUNTER — ANESTHESIA EVENT (INPATIENT)
Dept: PERIOP | Facility: HOSPITAL | Age: 69
DRG: 856 | End: 2019-08-03
Payer: MEDICARE

## 2019-08-03 ENCOUNTER — ANESTHESIA (INPATIENT)
Dept: PERIOP | Facility: HOSPITAL | Age: 69
DRG: 856 | End: 2019-08-03
Payer: MEDICARE

## 2019-08-03 PROBLEM — Z98.890 STATUS POST EXPLORATORY LAPAROTOMY: Status: ACTIVE | Noted: 2019-08-02

## 2019-08-03 PROBLEM — Z98.890 S/P REPAIR OF RECURRENT VENTRAL HERNIA: Status: ACTIVE | Noted: 2019-07-31

## 2019-08-03 PROBLEM — Z87.19 S/P REPAIR OF RECURRENT VENTRAL HERNIA: Status: ACTIVE | Noted: 2019-07-31

## 2019-08-03 LAB
ABO GROUP BLD BPU: NORMAL
ANION GAP SERPL CALCULATED.3IONS-SCNC: 10 MMOL/L (ref 4–13)
ANION GAP SERPL CALCULATED.3IONS-SCNC: 10 MMOL/L (ref 4–13)
ARTERIAL PATENCY WRIST A: NO
BASE EXCESS BLDA CALC-SCNC: -10.3 MMOL/L
BASE EXCESS BLDA CALC-SCNC: -4 MMOL/L (ref -2–3)
BASE EXCESS BLDA CALC-SCNC: -5.2 MMOL/L
BASE EXCESS BLDA CALC-SCNC: -5.4 MMOL/L
BASE EXCESS BLDA CALC-SCNC: -7 MMOL/L
BASOPHILS # BLD AUTO: 0.06 THOUSANDS/ΜL (ref 0–0.1)
BASOPHILS NFR BLD AUTO: 1 % (ref 0–1)
BODY TEMPERATURE: 100.2 DEGREES FEHRENHEIT
BODY TEMPERATURE: 99.1 DEGREES FEHRENHEIT
BODY TEMPERATURE: 99.3 DEGREES FEHRENHEIT
BODY TEMPERATURE: 99.7 DEGREES FEHRENHEIT
BPU ID: NORMAL
BUN SERPL-MCNC: 21 MG/DL (ref 5–25)
BUN SERPL-MCNC: 21 MG/DL (ref 5–25)
CA-I BLD-SCNC: 1.07 MMOL/L (ref 1.12–1.32)
CA-I BLD-SCNC: 1.11 MMOL/L (ref 1.12–1.32)
CALCIUM SERPL-MCNC: 7.4 MG/DL (ref 8.3–10.1)
CALCIUM SERPL-MCNC: 7.6 MG/DL (ref 8.3–10.1)
CHLORIDE SERPL-SCNC: 113 MMOL/L (ref 100–108)
CHLORIDE SERPL-SCNC: 114 MMOL/L (ref 100–108)
CO2 SERPL-SCNC: 20 MMOL/L (ref 21–32)
CO2 SERPL-SCNC: 23 MMOL/L (ref 21–32)
CREAT SERPL-MCNC: 0.56 MG/DL (ref 0.6–1.3)
CREAT SERPL-MCNC: 0.6 MG/DL (ref 0.6–1.3)
EOSINOPHIL # BLD AUTO: 0.01 THOUSAND/ΜL (ref 0–0.61)
EOSINOPHIL NFR BLD AUTO: 0 % (ref 0–6)
ERYTHROCYTE [DISTWIDTH] IN BLOOD BY AUTOMATED COUNT: 14.9 % (ref 11.6–15.1)
GFR SERPL CREATININE-BSD FRML MDRD: 93 ML/MIN/1.73SQ M
GFR SERPL CREATININE-BSD FRML MDRD: 95 ML/MIN/1.73SQ M
GLUCOSE SERPL-MCNC: 70 MG/DL (ref 65–140)
GLUCOSE SERPL-MCNC: 89 MG/DL (ref 65–140)
GLUCOSE SERPL-MCNC: 89 MG/DL (ref 65–140)
HCO3 BLDA-SCNC: 14.8 MMOL/L (ref 22–28)
HCO3 BLDA-SCNC: 18.8 MMOL/L (ref 22–28)
HCO3 BLDA-SCNC: 20.8 MMOL/L (ref 22–28)
HCO3 BLDA-SCNC: 20.8 MMOL/L (ref 22–28)
HCO3 BLDA-SCNC: 21.5 MMOL/L (ref 22–28)
HCT VFR BLD AUTO: 32.2 % (ref 34.8–46.1)
HCT VFR BLD CALC: 28 % (ref 34.8–46.1)
HGB BLD-MCNC: 10.4 G/DL (ref 11.5–15.4)
HGB BLDA-MCNC: 9.5 G/DL (ref 11.5–15.4)
HOROWITZ INDEX BLDA+IHG-RTO: 60 MM[HG]
IMM GRANULOCYTES # BLD AUTO: 0.12 THOUSAND/UL (ref 0–0.2)
IMM GRANULOCYTES NFR BLD AUTO: 2 % (ref 0–2)
LACTATE SERPL-SCNC: 0.8 MMOL/L (ref 0.5–2)
LACTATE SERPL-SCNC: 1.3 MMOL/L (ref 0.5–2)
LACTATE SERPL-SCNC: 1.7 MMOL/L (ref 0.5–2)
LYMPHOCYTES # BLD AUTO: 0.79 THOUSANDS/ΜL (ref 0.6–4.47)
LYMPHOCYTES NFR BLD AUTO: 11 % (ref 14–44)
MAGNESIUM SERPL-MCNC: 2.7 MG/DL (ref 1.6–2.6)
MCH RBC QN AUTO: 29 PG (ref 26.8–34.3)
MCHC RBC AUTO-ENTMCNC: 32.3 G/DL (ref 31.4–37.4)
MCV RBC AUTO: 90 FL (ref 82–98)
MONOCYTES # BLD AUTO: 0.33 THOUSAND/ΜL (ref 0.17–1.22)
MONOCYTES NFR BLD AUTO: 5 % (ref 4–12)
NEUTROPHILS # BLD AUTO: 5.7 THOUSANDS/ΜL (ref 1.85–7.62)
NEUTS SEG NFR BLD AUTO: 81 % (ref 43–75)
NRBC BLD AUTO-RTO: 0 /100 WBCS
O2 CT BLDA-SCNC: 12.9 ML/DL (ref 16–23)
O2 CT BLDA-SCNC: 14.9 ML/DL (ref 16–23)
O2 CT BLDA-SCNC: 16 ML/DL (ref 16–23)
O2 CT BLDA-SCNC: 16.1 ML/DL (ref 16–23)
OXYHGB MFR BLDA: 98.4 % (ref 94–97)
OXYHGB MFR BLDA: 98.4 % (ref 94–97)
OXYHGB MFR BLDA: 98.5 % (ref 94–97)
OXYHGB MFR BLDA: 98.9 % (ref 94–97)
PCO2 BLD: 23 MMOL/L (ref 21–32)
PCO2 BLD: 40.5 MM HG (ref 36–44)
PCO2 BLDA: 29.9 MM HG (ref 36–44)
PCO2 BLDA: 38.6 MM HG (ref 36–44)
PCO2 BLDA: 42.7 MM HG (ref 36–44)
PCO2 BLDA: 43.2 MM HG (ref 36–44)
PCO2 TEMP ADJ BLDA: 30.4 MM HG (ref 36–44)
PCO2 TEMP ADJ BLDA: 43.3 MM HG (ref 36–44)
PCO2 TEMP ADJ BLDA: 44.9 MM HG (ref 36–44)
PEEP RESPIRATORY: 8 CM[H2O]
PH BLD: 7.29 [PH] (ref 7.35–7.45)
PH BLD: 7.3 [PH] (ref 7.35–7.45)
PH BLD: 7.31 [PH] (ref 7.35–7.45)
PH BLD: 7.33 [PH] (ref 7.35–7.45)
PH BLDA: 7.3 [PH] (ref 7.35–7.45)
PH BLDA: 7.3 [PH] (ref 7.35–7.45)
PH BLDA: 7.31 [PH] (ref 7.35–7.45)
PH BLDA: 7.31 [PH] (ref 7.35–7.45)
PHOSPHATE SERPL-MCNC: 2.4 MG/DL (ref 2.3–4.1)
PLATELET # BLD AUTO: 183 THOUSANDS/UL (ref 149–390)
PMV BLD AUTO: 12.2 FL (ref 8.9–12.7)
PO2 BLD: 152 MM HG (ref 75–129)
PO2 BLD: 170.1 MM HG (ref 75–129)
PO2 BLD: 176.6 MM HG (ref 75–129)
PO2 BLD: 179.4 MM HG (ref 75–129)
PO2 BLDA: 168.4 MM HG (ref 75–129)
PO2 BLDA: 174.3 MM HG (ref 75–129)
PO2 BLDA: 174.4 MM HG (ref 75–129)
PO2 BLDA: 178.5 MM HG (ref 75–129)
POTASSIUM BLD-SCNC: 4.2 MMOL/L (ref 3.5–5.3)
POTASSIUM SERPL-SCNC: 3.9 MMOL/L (ref 3.5–5.3)
POTASSIUM SERPL-SCNC: 4.1 MMOL/L (ref 3.5–5.3)
PS CM H2O: 5
PS VENT FIO2: 50
PS VENT PEEP: 5
RBC # BLD AUTO: 3.59 MILLION/UL (ref 3.81–5.12)
SAO2 % BLD FROM PO2: 99 % (ref 95–98)
SODIUM BLD-SCNC: 141 MMOL/L (ref 136–145)
SODIUM SERPL-SCNC: 144 MMOL/L (ref 136–145)
SODIUM SERPL-SCNC: 146 MMOL/L (ref 136–145)
SPECIMEN SOURCE: ABNORMAL
UNIT DISPENSE STATUS: NORMAL
UNIT PRODUCT CODE: NORMAL
UNIT RH: NORMAL
VENT - PS: ABNORMAL
VENT AC: 12
VENT AC: 14
VENT AC: 14
VENT- AC: AC
VT SETTING VENT: 450 ML
WBC # BLD AUTO: 7.01 THOUSAND/UL (ref 4.31–10.16)

## 2019-08-03 PROCEDURE — 82805 BLOOD GASES W/O2 SATURATION: CPT | Performed by: PHYSICIAN ASSISTANT

## 2019-08-03 PROCEDURE — 82803 BLOOD GASES ANY COMBINATION: CPT

## 2019-08-03 PROCEDURE — 0D1A0ZA BYPASS JEJUNUM TO JEJUNUM, OPEN APPROACH: ICD-10-PCS | Performed by: SURGERY

## 2019-08-03 PROCEDURE — 82805 BLOOD GASES W/O2 SATURATION: CPT | Performed by: EMERGENCY MEDICINE

## 2019-08-03 PROCEDURE — 85014 HEMATOCRIT: CPT

## 2019-08-03 PROCEDURE — 83605 ASSAY OF LACTIC ACID: CPT | Performed by: EMERGENCY MEDICINE

## 2019-08-03 PROCEDURE — 85025 COMPLETE CBC W/AUTO DIFF WBC: CPT | Performed by: PHYSICIAN ASSISTANT

## 2019-08-03 PROCEDURE — NC001 PR NO CHARGE: Performed by: SURGERY

## 2019-08-03 PROCEDURE — 44120 REMOVAL OF SMALL INTESTINE: CPT | Performed by: SURGERY

## 2019-08-03 PROCEDURE — 84132 ASSAY OF SERUM POTASSIUM: CPT

## 2019-08-03 PROCEDURE — 84100 ASSAY OF PHOSPHORUS: CPT | Performed by: PHYSICIAN ASSISTANT

## 2019-08-03 PROCEDURE — 99291 CRITICAL CARE FIRST HOUR: CPT | Performed by: SURGERY

## 2019-08-03 PROCEDURE — 83735 ASSAY OF MAGNESIUM: CPT | Performed by: PHYSICIAN ASSISTANT

## 2019-08-03 PROCEDURE — 82947 ASSAY GLUCOSE BLOOD QUANT: CPT

## 2019-08-03 PROCEDURE — 80048 BASIC METABOLIC PNL TOTAL CA: CPT | Performed by: PHYSICIAN ASSISTANT

## 2019-08-03 PROCEDURE — 97605 NEG PRS WND THER DME<=50SQCM: CPT | Performed by: SURGERY

## 2019-08-03 PROCEDURE — 84295 ASSAY OF SERUM SODIUM: CPT

## 2019-08-03 PROCEDURE — 83605 ASSAY OF LACTIC ACID: CPT | Performed by: PHYSICIAN ASSISTANT

## 2019-08-03 PROCEDURE — 94760 N-INVAS EAR/PLS OXIMETRY 1: CPT

## 2019-08-03 PROCEDURE — 94003 VENT MGMT INPAT SUBQ DAY: CPT

## 2019-08-03 PROCEDURE — 82330 ASSAY OF CALCIUM: CPT | Performed by: PHYSICIAN ASSISTANT

## 2019-08-03 PROCEDURE — 82330 ASSAY OF CALCIUM: CPT

## 2019-08-03 PROCEDURE — C9113 INJ PANTOPRAZOLE SODIUM, VIA: HCPCS | Performed by: PHYSICIAN ASSISTANT

## 2019-08-03 RX ORDER — LIDOCAINE HYDROCHLORIDE 10 MG/ML
INJECTION, SOLUTION EPIDURAL; INFILTRATION; INTRACAUDAL; PERINEURAL
Status: COMPLETED
Start: 2019-08-03 | End: 2019-08-03

## 2019-08-03 RX ORDER — SODIUM CHLORIDE 9 MG/ML
INJECTION, SOLUTION INTRAVENOUS CONTINUOUS PRN
Status: DISCONTINUED | OUTPATIENT
Start: 2019-08-03 | End: 2019-08-03 | Stop reason: SURG

## 2019-08-03 RX ORDER — SODIUM CHLORIDE, SODIUM GLUCONATE, SODIUM ACETATE, POTASSIUM CHLORIDE, MAGNESIUM CHLORIDE, SODIUM PHOSPHATE, DIBASIC, AND POTASSIUM PHOSPHATE .53; .5; .37; .037; .03; .012; .00082 G/100ML; G/100ML; G/100ML; G/100ML; G/100ML; G/100ML; G/100ML
1000 INJECTION, SOLUTION INTRAVENOUS ONCE
Status: COMPLETED | OUTPATIENT
Start: 2019-08-03 | End: 2019-08-03

## 2019-08-03 RX ORDER — HEPARIN SODIUM 5000 [USP'U]/ML
5000 INJECTION, SOLUTION INTRAVENOUS; SUBCUTANEOUS EVERY 8 HOURS SCHEDULED
Status: DISCONTINUED | OUTPATIENT
Start: 2019-08-03 | End: 2019-08-27 | Stop reason: HOSPADM

## 2019-08-03 RX ORDER — MAGNESIUM HYDROXIDE 1200 MG/15ML
LIQUID ORAL AS NEEDED
Status: DISCONTINUED | OUTPATIENT
Start: 2019-08-03 | End: 2019-08-03 | Stop reason: HOSPADM

## 2019-08-03 RX ORDER — ROCURONIUM BROMIDE 10 MG/ML
INJECTION, SOLUTION INTRAVENOUS AS NEEDED
Status: DISCONTINUED | OUTPATIENT
Start: 2019-08-03 | End: 2019-08-03 | Stop reason: SURG

## 2019-08-03 RX ORDER — SODIUM CHLORIDE, SODIUM GLUCONATE, SODIUM ACETATE, POTASSIUM CHLORIDE, MAGNESIUM CHLORIDE, SODIUM PHOSPHATE, DIBASIC, AND POTASSIUM PHOSPHATE .53; .5; .37; .037; .03; .012; .00082 G/100ML; G/100ML; G/100ML; G/100ML; G/100ML; G/100ML; G/100ML
1000 INJECTION, SOLUTION INTRAVENOUS ONCE
Status: DISCONTINUED | OUTPATIENT
Start: 2019-08-03 | End: 2019-08-03

## 2019-08-03 RX ORDER — ACETAMINOPHEN 650 MG/1
650 SUPPOSITORY RECTAL EVERY 6 HOURS PRN
Status: DISCONTINUED | OUTPATIENT
Start: 2019-08-03 | End: 2019-08-06

## 2019-08-03 RX ADMIN — PIPERACILLIN SODIUM AND TAZOBACTAM SODIUM 3.38 G: 36; 4.5 INJECTION, POWDER, FOR SOLUTION INTRAVENOUS at 17:19

## 2019-08-03 RX ADMIN — PIPERACILLIN SODIUM AND TAZOBACTAM SODIUM 3.38 G: 36; 4.5 INJECTION, POWDER, FOR SOLUTION INTRAVENOUS at 23:43

## 2019-08-03 RX ADMIN — CHLORHEXIDINE GLUCONATE 0.12% ORAL RINSE 15 ML: 1.2 LIQUID ORAL at 11:57

## 2019-08-03 RX ADMIN — HEPARIN SODIUM 5000 UNITS: 5000 INJECTION INTRAVENOUS; SUBCUTANEOUS at 14:10

## 2019-08-03 RX ADMIN — PIPERACILLIN SODIUM AND TAZOBACTAM SODIUM 3.38 G: 36; 4.5 INJECTION, POWDER, FOR SOLUTION INTRAVENOUS at 02:53

## 2019-08-03 RX ADMIN — CALCIUM GLUCONATE 1 G: 98 INJECTION, SOLUTION INTRAVENOUS at 20:17

## 2019-08-03 RX ADMIN — Medication 100 MCG/HR: at 21:07

## 2019-08-03 RX ADMIN — PROPOFOL 20 MCG/KG/MIN: 10 INJECTION, EMULSION INTRAVENOUS at 05:42

## 2019-08-03 RX ADMIN — SODIUM CHLORIDE, SODIUM GLUCONATE, SODIUM ACETATE, POTASSIUM CHLORIDE, MAGNESIUM CHLORIDE, SODIUM PHOSPHATE, DIBASIC, AND POTASSIUM PHOSPHATE 1000 ML: .53; .5; .37; .037; .03; .012; .00082 INJECTION, SOLUTION INTRAVENOUS at 21:44

## 2019-08-03 RX ADMIN — SODIUM CHLORIDE, SODIUM GLUCONATE, SODIUM ACETATE, POTASSIUM CHLORIDE, MAGNESIUM CHLORIDE, SODIUM PHOSPHATE, DIBASIC, AND POTASSIUM PHOSPHATE 1000 ML: .53; .5; .37; .037; .03; .012; .00082 INJECTION, SOLUTION INTRAVENOUS at 18:28

## 2019-08-03 RX ADMIN — SODIUM CHLORIDE, SODIUM GLUCONATE, SODIUM ACETATE, POTASSIUM CHLORIDE, MAGNESIUM CHLORIDE, SODIUM PHOSPHATE, DIBASIC, AND POTASSIUM PHOSPHATE 125 ML/HR: .53; .5; .37; .037; .03; .012; .00082 INJECTION, SOLUTION INTRAVENOUS at 02:07

## 2019-08-03 RX ADMIN — NOREPINEPHRINE BITARTRATE 8 MCG/MIN: 1 INJECTION INTRAVENOUS at 03:31

## 2019-08-03 RX ADMIN — HEPARIN SODIUM 5000 UNITS: 5000 INJECTION INTRAVENOUS; SUBCUTANEOUS at 21:23

## 2019-08-03 RX ADMIN — HYDROCORTISONE SODIUM SUCCINATE 50 MG: 100 INJECTION, POWDER, FOR SOLUTION INTRAMUSCULAR; INTRAVENOUS at 14:10

## 2019-08-03 RX ADMIN — CHLORHEXIDINE GLUCONATE 0.12% ORAL RINSE 15 ML: 1.2 LIQUID ORAL at 20:19

## 2019-08-03 RX ADMIN — HYDROCORTISONE SODIUM SUCCINATE 50 MG: 100 INJECTION, POWDER, FOR SOLUTION INTRAMUSCULAR; INTRAVENOUS at 21:07

## 2019-08-03 RX ADMIN — PROPOFOL 20 MCG/KG/MIN: 10 INJECTION, EMULSION INTRAVENOUS at 00:16

## 2019-08-03 RX ADMIN — SODIUM CHLORIDE, SODIUM GLUCONATE, SODIUM ACETATE, POTASSIUM CHLORIDE, MAGNESIUM CHLORIDE, SODIUM PHOSPHATE, DIBASIC, AND POTASSIUM PHOSPHATE 125 ML/HR: .53; .5; .37; .037; .03; .012; .00082 INJECTION, SOLUTION INTRAVENOUS at 21:44

## 2019-08-03 RX ADMIN — SODIUM CHLORIDE, SODIUM GLUCONATE, SODIUM ACETATE, POTASSIUM CHLORIDE, MAGNESIUM CHLORIDE, SODIUM PHOSPHATE, DIBASIC, AND POTASSIUM PHOSPHATE 1000 ML: .53; .5; .37; .037; .03; .012; .00082 INJECTION, SOLUTION INTRAVENOUS at 07:04

## 2019-08-03 RX ADMIN — Medication 50 MCG/HR: at 00:10

## 2019-08-03 RX ADMIN — SODIUM CHLORIDE, SODIUM GLUCONATE, SODIUM ACETATE, POTASSIUM CHLORIDE, MAGNESIUM CHLORIDE, SODIUM PHOSPHATE, DIBASIC, AND POTASSIUM PHOSPHATE 125 ML/HR: .53; .5; .37; .037; .03; .012; .00082 INJECTION, SOLUTION INTRAVENOUS at 12:05

## 2019-08-03 RX ADMIN — PANTOPRAZOLE SODIUM 40 MG: 40 INJECTION, POWDER, FOR SOLUTION INTRAVENOUS at 11:57

## 2019-08-03 RX ADMIN — ACETAMINOPHEN 650 MG: 650 SUPPOSITORY RECTAL at 17:19

## 2019-08-03 RX ADMIN — ROCURONIUM BROMIDE 50 MG: 10 INJECTION, SOLUTION INTRAVENOUS at 08:54

## 2019-08-03 RX ADMIN — SODIUM CHLORIDE: 0.9 INJECTION, SOLUTION INTRAVENOUS at 08:45

## 2019-08-03 RX ADMIN — LIDOCAINE HYDROCHLORIDE 10 MG: 10 INJECTION, SOLUTION EPIDURAL; INFILTRATION; INTRACAUDAL; PERINEURAL at 20:11

## 2019-08-03 RX ADMIN — NOREPINEPHRINE BITARTRATE 5 MCG/MIN: 1 INJECTION INTRAVENOUS at 18:30

## 2019-08-03 RX ADMIN — HYDROCORTISONE SODIUM SUCCINATE 50 MG: 100 INJECTION, POWDER, FOR SOLUTION INTRAMUSCULAR; INTRAVENOUS at 00:10

## 2019-08-03 RX ADMIN — DEXMEDETOMIDINE 0.2 MCG/KG/HR: 100 INJECTION, SOLUTION, CONCENTRATE INTRAVENOUS at 17:41

## 2019-08-03 RX ADMIN — PIPERACILLIN SODIUM AND TAZOBACTAM SODIUM 3.38 G: 36; 4.5 INJECTION, POWDER, FOR SOLUTION INTRAVENOUS at 12:00

## 2019-08-03 RX ADMIN — CALCIUM GLUCONATE 1 G: 98 INJECTION, SOLUTION INTRAVENOUS at 19:46

## 2019-08-03 RX ADMIN — SODIUM CHLORIDE, SODIUM GLUCONATE, SODIUM ACETATE, POTASSIUM CHLORIDE, MAGNESIUM CHLORIDE, SODIUM PHOSPHATE, DIBASIC, AND POTASSIUM PHOSPHATE 1000 ML: .53; .5; .37; .037; .03; .012; .00082 INJECTION, SOLUTION INTRAVENOUS at 19:48

## 2019-08-03 RX ADMIN — PANTOPRAZOLE SODIUM 40 MG: 40 INJECTION, POWDER, FOR SOLUTION INTRAVENOUS at 20:19

## 2019-08-03 RX ADMIN — HYDROCORTISONE SODIUM SUCCINATE 50 MG: 100 INJECTION, POWDER, FOR SOLUTION INTRAMUSCULAR; INTRAVENOUS at 05:42

## 2019-08-03 RX ADMIN — FENTANYL CITRATE 50 MCG: 50 INJECTION INTRAMUSCULAR; INTRAVENOUS at 15:04

## 2019-08-03 RX ADMIN — VASOPRESSIN 0.04 UNITS/MIN: 20 INJECTION INTRAVENOUS at 05:15

## 2019-08-03 RX ADMIN — POTASSIUM PHOSPHATE, MONOBASIC AND POTASSIUM PHOSPHATE, DIBASIC 12 MMOL: 224; 236 INJECTION, SOLUTION INTRAVENOUS at 19:57

## 2019-08-03 NOTE — OP NOTE
OPERATIVE REPORT  PATIENT NAME: Paco Chung    :  1950  MRN: [id number]  Pt Location: [facility] OR ROOM 08    SURGERY DATE: 8/3/2019    Surgeon(s) and Role:     * Simone Olmos, DO - Primary     * Dylan Polanco MD - Sally Ruffin MD - Assisting    Preop Diagnosis:  Severe sepsis with septic shock (CODE) ([address] ) [R65 21]    Post-Op Diagnosis Codes: * Severe sepsis with septic shock (CODE) ([facility]) [R65 21]    Procedure(s) (LRB):  LAPAROTOMY EXPLORATORY, reanastomosis of proximal small bowel, placement of vac dressing (N/A)    Specimen(s):  * No specimens in log *    Estimated Blood Loss:   100 mL    Drains:  Chest Tube 1 Left 12 Fr  (Active)   Function -20 cm H2O 8/3/2019  8:00 AM   Chest Tube Air Leak Yes 8/3/2019  8:00 AM   Patency Intervention Milked 8/3/2019  8:00 AM   Drainage Description Serous; Yellow 8/3/2019  8:00 AM   Dressing Status Clean;Dry; Intact 8/3/2019  8:00 AM   Site Assessment Clean;Dry; Intact 8/3/2019  8:00 AM   Surrounding Skin Dry; Intact 8/3/2019  8:00 AM   Output (mL) 90 mL 8/3/2019  6:01 AM   Number of days: 1       Negative Pressure Wound Therapy (V A C ) Abdomen (Active)   Black foam- # applied 1 8/3/2019 10:32 AM   Blue foam- # applied 2 2019  4:20 PM   Cycle Continuous 8/3/2019  8:00 AM   Target Pressure (mmHg) 125 8/3/2019  8:00 AM   Canister Changed Yes 8/3/2019 10:32 AM   Dressing Status Clean;Dry; Intact 8/3/2019  8:00 AM   Blue foam- # removed 2 8/3/2019  8:50 AM   Output (mL) 450 mL 8/3/2019  8:00 AM   Number of days: 1       NG/OG/Enteral Tube Orogastric 16 Fr Right mouth (Active)   Placement Reverification Auscultation 8/3/2019  8:00 AM   Site Assessment Clean;Dry; Intact 8/3/2019  8:00 AM   Status Suction-low continuous 8/3/2019  8:00 AM   Drainage Appearance Bile;Green 8/3/2019  8:00 AM   Intake (mL) 30 mL 8/3/2019  8:00 AM   Output (mL) 50 mL 8/3/2019  6:01 AM   Number of days: 1       Gastrostomy/Enterostomy Jejunostomy 14 Fr  LUQ (Active)   Number of days: 709       Urethral Catheter Latex;Straight-tip 16 Fr  (Active)   Amt returned on insertion(mL) 200 mL 8/2/2019 11:07 AM   Reasons to continue Urinary Catheter  Accurate I&O assessment in critically ill patients (48 hr  max) 8/3/2019 10:50 AM   Site Assessment Clean;Skin intact 8/3/2019  8:00 AM   Collection Container Standard drainage bag 8/3/2019  8:00 AM   Securement Method Securing device (Describe) 8/3/2019  8:00 AM   Output (mL) 100 mL 8/3/2019  8:00 AM   Number of days: 1       Anesthesia Type:   General    Operative Indications:  Severe sepsis with septic shock (CODE) ([facility]) [R65 21]    Operative Findings:  Healthy proximal and distal ends of previously resected site  No evidence of additional bowel injury    Complications:   None    Procedure and Technique:  The patient was brought to the operating room identified by name and armband  After placement in the supine position, the previous ABThera VAC sponge was removed  The abdomen was prepped and draped in the usual sterile fashion using Betadine  A time-out was performed confirming the patient procedure  The omentum was carefully dissected free bluntly from the adjacent small bowel abdominal wall  The transverse colon was identified and retracted cephalad to examined the proximal small bowel the ligament Treitz  The proximal jejunum was carefully inspected and appeared healthy at the previous staple line  Attention was then turned to finding the distal end of the previous staple line  The after identification the small bowel was run from proximal to distal to the ileocecal valve  There was no evidence of further small bowel injury  There is no evidence of any additional purulence or infected appearing ascites within the abdominal cavity  We then chose to perform a side-to-side anastomosis  The proximal jejunum and distal jejunum were approximated with a serosal silk stitch to ensure reapproximation of the anti mesenteric borders    Two enterotomies were made in the corner of each limb and each arm of the BILL 75 stapler was passed down each limb  This was reapproximated and the staple was fired creating a side-to-side anastomosis  The common channel enterotomy was then closed with a 2nd load of the BILL 75 stapler in transverse fashion  The transverse staple line was then imbricated with silk Lembert sutures  The mesenteric defect was reapproximated with Vicryl  The small bowel and omentum was returned back to its normal anatomic position  Attention was then turned to closure of the abdominal cavity  The fascia was debrided back to healthy edge and appeared adequate for primary closure  The abdomen was then closed with looped PDS in running fashion with good reapproximation  The soft tissue was then copiously irrigated and nylon interrupted sutures were placed with intention for delayed primary closure  A fat was then placed within the incision using a total of 1 black sponge  This was placed to 125 low continuous suction  All counts were correct at the end of the procedure  RF wanding was negative for retained foreign body  The patient was then transferred back to ICU in stable but guarded condition  The attending physician was present for all portions of the procedure         Patient Disposition:  Critical Care Unit    SIGNATURE: Santo Miranda MD  DATE: August 3, 2019  TIME: 11:09 AM

## 2019-08-03 NOTE — RESPIRATORY THERAPY NOTE
resp care      08/03/19 1600   Respiratory Assessment   Resp Comments Pt  placed on ps wean per PA orders  Pt  tolerating well  Will wean as tolerated  ETT  Cuffed 7 mm   Placement Date/Time: 08/02/19 (c) 0905   Mask Ventilation: Mask ventilation not attempted (0)  Preoxygenated: Yes  Technique: Direct laryngoscopy  Type: Cuffed  Tube Size: 7 mm  Laryngoscope: Mac  Location: Oral  Placement Verification: Auscultation;C       Secured at (cm) 21   Measured from Lips   Secured Location Right   Secured by Commercial tube perez   Site Condition Dry   HI-LO Suction  Intermittent suction

## 2019-08-03 NOTE — PROGRESS NOTES
Progress Note - General Surgery   Jimmie Pacheco [place]ft 78 y o  female MRN: [id number]  Unit/Bed#: ICU 02 Encounter: [id number]    Assessment:  66yo female s/p laparoscopic repair of multiple ventral hernias with iatrogenic bowel injury now s/p exlap, bowel resection, abthera VAC placement  Pressor requirements improving and she has cleared her lactic acidosis    Plan:  NPO  Fluid resuscitation  NGT  OR today for re-exploration and possible restoration of bowel continuity  Wean vent  Wean pressors  Management per ICU      Subjective/Objective   Chief Complaint:     Subjective: No acute events overnight  Objective:     Blood pressure 112/64, pulse 82, temperature 99 7 °F (37 6 °C), resp  rate 13, height 5' 4" (1 626 m), weight 74 9 kg (165 lb 2 oz), SpO2 99 %  ,Body mass index is 28 34 kg/m²        Intake/Output Summary (Last 24 hours) at 8/3/2019 0748  Last data filed at 8/3/2019 0601  Gross per 24 hour   Intake 8912 76 ml   Output 2620 ml   Net 6292 76 ml       Invasive Devices     Central Venous Catheter Line            CVC Central Lines 08/02/19 Triple less than 1 day          Peripheral Intravenous Line            Peripheral IV 07/31/19 Left 2 days    Peripheral IV 08/02/19 Right Antecubital 1 day          Arterial Line            Arterial Line 08/02/19 Right Radial less than 1 day          Drain            Gastrostomy/Enterostomy Jejunostomy 14 Fr   days    Chest Tube 1 Left 12 Fr  less than 1 day    NG/OG/Enteral Tube Orogastric 16 Fr Right mouth less than 1 day    Negative Pressure Wound Therapy (V A C ) Abdomen less than 1 day    Urethral Catheter Latex;Straight-tip 16 Fr  less than 1 day          Airway            ETT  Cuffed 7 mm less than 1 day                Physical Exam:   NAD  RRR  nonlabored respirations on vent  Abdomen soft, abthera in place with good seal and serosang drainage    Lab, Imaging and other studies:  CBC:   Lab Results   Component Value Date    WBC 3 91 (L) 08/02/2019    HGB 11 1 (L) 08/02/2019    HCT 33 9 (L) 08/02/2019    MCV 88 08/02/2019     08/02/2019    MCH 28 9 08/02/2019    MCHC 32 7 08/02/2019    RDW 14 6 08/02/2019    MPV 11 5 08/02/2019    NRBC 0 08/02/2019   , CMP:   Lab Results   Component Value Date    SODIUM 144 08/03/2019    K 4 1 08/03/2019     (H) 08/03/2019    CO2 20 (L) 08/03/2019    CO2 21 08/02/2019    BUN 21 08/03/2019    CREATININE 0 56 (L) 08/03/2019    GLUCOSE 132 08/02/2019    CALCIUM 7 4 (L) 08/03/2019    AST 22 08/02/2019    ALT 11 (L) 08/02/2019    ALKPHOS 45 (L) 08/02/2019    EGFR 95 08/03/2019   , Coagulation:   Lab Results   Component Value Date    INR 1 85 (H) 08/02/2019   , Urinalysis: No results found for: COLORU, CLARITYU, SPECGRAV, PHUR, LEUKOCYTESUR, NITRITE, PROTEINUA, GLUCOSEU, KETONESU, BILIRUBINUR, BLOODU, Amylase: No results found for: AMYLASE, Lipase: No results found for: LIPASE  VTE Pharmacologic Prophylaxis: Sequential compression device (Venodyne)   VTE Mechanical Prophylaxis: sequential compression device

## 2019-08-03 NOTE — ANESTHESIA POSTPROCEDURE EVALUATION
Post-Op Assessment Note    CV Status:  Stable       Post-procedure mental status: sedated     Hydration Status:  Euvolemic   PONV Controlled:  Controlled   Airway Patency:  Patent   Post Op Vitals Reviewed: Yes      Staff: CRNA           BP   144/68   Temp   98   Pulse  100   Resp   14 on vent   SpO2   100 on vent

## 2019-08-03 NOTE — PROGRESS NOTES
Progress Note - [address] 78 y o  female MRN: [id number]  Unit/Bed#: ICU 02 Encounter: [id number]    Assessment:   Principal Problem:    Shock ([address] )  Active Problems:    Incisional hernia    Severe sepsis with septic shock (CODE) ([facility])    Jejunal enterotomy s/p SBO     Acute respiratory failure with hypoxemia (HCC)    Acute renal failure (ARF) ([facility])    Postprocedural pneumothorax          Plan:          Neuro:    - Sedation: Propofol @ 20mcg   - Analgesia: Fentanyl @ 50 mcg   - Delirium precautions - CAM ICU daily, regulate sleep/wake cycle   - trend neuro exam                CV:    - Shock -  Undifferentiated     - Levo @ 7 mcg/in, Vasopressing   04 U/min -- titrate down as possible    - Epi, neosynephrine d/c     - MAP goal > 65    - Maintain A line   - BE -10 4 this am from 5 4 -- 1L of isolyte ordered   - ECHO (): EF 70%, normal RV function, no R heart strain   - CTA negative for PE                 Lung:    - SBT - not appropriate given OR today   - L chest tube in place for pneumothorax from central line placement -- 270cc serosanguinous drainage overnight   - Vent settings: VC 12/450/60/8 - wean FiO2 and peep as tolerated   - AB 3/29 2/14  8    Rate changed from 14 to 12 bpm this am                  GI:    - s/p laparoscopic Ventral hernia repair complicated by Jejunal enterotomy -- POD #3   - s/p small bowel resection w/ wound Vac in place - POD 1, plan for OR today for closure   - continue PPI post surgery                  FEN:    - NPO   - replete Electrolytes prn                 :    - pak catheter in place for accurate I/Os   - Trend UOP, Cr, BUN                 ID:    - continue Zosyn - day 2   - monitor fever curve and WBC                 Heme:    - trend Hgb    - Hold DVT ppx given OR today   - SCDs b/l                 Endo:    - Monitor BG                            Msk/Skin:    - frequent repositioning and offloading                 Disposition: ICU     Chief Complaint: intubated, sedated     HPI/24hr events:   - s/p small bowel resection for jejunal enterotomy w/ abdominal wound vac in place  - L chest tube in place for pneumothorax  - Decrease in vasopressor requirement overnight    Physical Exam: Physical Exam   Constitutional: She appears well-developed and well-nourished  No distress  HENT:   Head: Normocephalic and atraumatic  Right Ear: External ear normal    Left Ear: External ear normal    Mouth/Throat: No oropharyngeal exudate  Eyes: Pupils are equal, round, and reactive to light  Conjunctivae are normal    Neck: Normal range of motion  Neck supple  Cardiovascular: Normal rate, regular rhythm, normal heart sounds and intact distal pulses  Exam reveals no gallop and no friction rub  No murmur heard  Pulmonary/Chest: Effort normal and breath sounds normal  No respiratory distress  She has no wheezes  She has no rales  Abdominal: Soft  She exhibits no distension  There is no tenderness  There is no guarding  Wound vac in place  No drainage around site   Genitourinary:   Genitourinary Comments: Mendoza in place     Musculoskeletal: Normal range of motion  She exhibits no edema, tenderness or deformity  Lymphadenopathy:     She has no cervical adenopathy  Neurological:   Sedated   Skin: Skin is warm and dry  No rash noted  Psychiatric: She has a normal mood and affect  Nursing note and vitals reviewed  Vitals:    19 0100 19 0200 19 0300 19 0338   BP:       BP Location:       Pulse: 84 82 82    Resp: 13 13 13    Temp: 99 7 °F (37 6 °C) 99 7 °F (37 6 °C) 99 7 °F (37 6 °C)    TempSrc:       SpO2: 99% 99% 99% 99%   Weight:       Height:         Arterial Line BP: 106/52  Arterial Line MAP (mmHg): 74 mmHg    Temperature:   Temp (24hrs), Av 3 °F (37 4 °C), Min:97 5 °F (36 4 °C), Max:100 4 °F (38 °C)    Current: Temperature: 99 7 °F (37 6 °C)    Weights:   IBW: 54 7 kg    Body mass index is 28 34 kg/m²    Weight (last 2 days)     Date/Time   Weight    08/02/19 1246   74 9 (165 12)              Hemodynamic Monitoring:  N/A     Non-Invasive/Invasive Ventilation Settings:  Respiratory    Lab Data (Last 4 hours)      08/03 0528            pH, Arterial       7 313           pCO2, Arterial       29 9           pO2, Arterial       174 4           HCO3, Arterial       14 8           Base Excess, Arterial       -10 3                O2/Vent Data       08/03 0338   Most Recent         Vent Mode AC/VC  AC/VC      Resp Rate (BPM) (BPM) 14  14      Vt (mL) (mL) 450  450      FIO2 (%) (%) 60  60      PEEP (cmH2O) (cmH2O) 8  8      MV 6 3  6 3                Lab Results   Component Value Date    PHART 7 313 (L) 08/03/2019    QAC5AYX 29 9 (LL) 08/03/2019    PO2ART 174 4 (H) 08/03/2019    DOF1CWK 14 8 (L) 08/03/2019    BEART -10 3 08/03/2019    SOURCE Line, Arterial 08/03/2019     SpO2: SpO2: 99 %    Intake and Outputs:  I/O       08/01 0701 - 08/02 0700 08/02 0701 - 08/03 0700    I V  (mL/kg)  7012 8 (93 6)    Blood  280    NG/GT  320    IV Piggyback  1300    Total Intake(mL/kg)  8912 8 (119)    Urine (mL/kg/hr)  705    Emesis/NG output  800    Drains  625    Chest Tube  490    Total Output  2620    Net  +6292 8                Nutrition:        Diet Orders   (From admission, onward)             Start     Ordered    08/02/19 1247  Diet NPO  Diet effective now     Question Answer Comment   Diet Type NPO    RD to adjust diet per protocol?  Yes        08/02/19 1249                  Labs:   Results from last 7 days   Lab Units 08/02/19  1811 08/02/19  1620 08/02/19  1556  08/02/19  1300 08/02/19  0813 08/02/19  0147   WBC Thousand/uL 3 91*  --   --   --  7 71 7 38 7 79   HEMOGLOBIN g/dL 11 1*  --   --   --  11 6 13 6 13 3   I STAT HEMOGLOBIN g/dl  --  9 2* 8 8*   < >  --   --   --    HEMATOCRIT % 33 9*  --   --   --  35 5 42 5 42 3   HEMATOCRIT, ISTAT %  --  27* 26*   < >  --   --   --    PLATELETS Thousands/uL 189  --   --   --  277 217 152 NEUTROS PCT % 78*  --   --   --   --   --  76*   MONOS PCT % 4  --   --   --   --   --  2*   MONO PCT %  --   --   --   --  2*  --   --     < > = values in this interval not displayed  Results from last 7 days   Lab Units 08/03/19  0528 08/02/19  1811 08/02/19  1620 08/02/19  1556 08/02/19  1506  08/02/19  1300   SODIUM mmol/L 144 138  --   --   --   --  140   POTASSIUM mmol/L 4 1 4 5  --   --   --   --  3 4*   CHLORIDE mmol/L 114* 108  --   --   --   --  105   CO2 mmol/L 20* 22  --   --   --   --  26   CO2, I-STAT mmol/L  --   --  21 21 23   < >  --    BUN mg/dL 21 25  --   --   --   --  31*   CREATININE mg/dL 0 56* 0 84  --   --   --   --  1 11   CALCIUM mg/dL 7 4* 8 0*  --   --   --   --  7 1*   ALK PHOS U/L  --   --   --   --   --   --  45*   ALT U/L  --   --   --   --   --   --  11*   AST U/L  --   --   --   --   --   --  22   GLUCOSE, ISTAT mg/dl  --   --  132 128 134   < >  --     < > = values in this interval not displayed  Results from last 7 days   Lab Units 08/02/19  1300 08/02/19  0306 08/01/19  0447   MAGNESIUM mg/dL 2 3 2 6 1 5*     Results from last 7 days   Lab Units 08/02/19  1300   PHOSPHORUS mg/dL 2 7      Results from last 7 days   Lab Units 08/02/19  1811 08/02/19  1300 08/02/19  1125 08/02/19  0813 08/02/19  0617   INR  1 85* 1 90*  --   --  2 00*   PTT seconds  --  36 42* >210* >210*     Results from last 7 days   Lab Units 08/03/19  0000   LACTIC ACID mmol/L 1 7     0   Lab Value Date/Time    TROPONINI <0 02 08/02/2019 1126    TROPONINI <0 02 08/02/2019 0406    TROPONINI <0 02 11/22/2016 1110       Imaging:  I have personally reviewed pertinent reports        EKG:     Micro:  No results found for: Gertrudis Pittman, WOUNDCULT, SPUTUMCULTUR    Allergies: No Known Allergies    Medications:   Scheduled Meds:  Current Facility-Administered Medications:  chlorhexidine 15 mL [employer]ish [employer] Spit Q12H [address] Libra Kerr PA-C    epinephrine 1-10 mcg/min Intravenous Titrated Libra Kerr PA-C Last Rate: Stopped (08/02/19 1637)   fentaNYL 50 mcg/hr Intravenous Continuous Mahala Franc, DO Last Rate: 50 mcg/hr (08/03/19 0010)   fentanyl citrate (PF) 50 mcg Intravenous Q1H PRN Nadine Carbone PA-C    hydrocortisone sodium succinate 100 mg Intravenous Once Nadine Carbone PA-C    hydrocortisone sodium succinate 50 mg Intravenous Q6H [address] Nadine Carbone PA-C    multi-electrolyte 125 mL/hr Intravenous Continuous Louvella Doheny Petit-Me Last Rate: 125 mL/hr (08/03/19 0207)   multi-electrolyte 1,000 mL Intravenous Once Mahrose Franc, DO    norepinephrine 1-30 mcg/min Intravenous Titrated Nadine Carbone PA-C Last Rate: 7 mcg/min (08/03/19 0551)   ondansetron 4 mg Intravenous Q4H PRN Nadine Carbone PA-C    pantoprazole 40 mg Intravenous Q12H [address] Cash Hicks PA-C    piperacillin-tazobactam 3 375 g Intravenous Q6H Nadine Carbone PA-C Last Rate: Stopped (08/03/19 0331)   propofol 5-50 mcg/kg/min Intravenous Titrated Nadine Carbone PA-C Last Rate: 20 mcg/kg/min (08/03/19 0542)   vasopressin (PITRESSIN) in 0 9 % sodium chloride 100 mL 0 04 Units/min Intravenous Continuous Nadine Carbone PA-C Last Rate: 0 04 Units/min (08/03/19 0515)     Continuous Infusions:  epinephrine 1-10 mcg/min Last Rate: Stopped (08/02/19 1637)   fentaNYL 50 mcg/hr Last Rate: 50 mcg/hr (08/03/19 0010)   multi-electrolyte 125 mL/hr Last Rate: 125 mL/hr (08/03/19 0207)   norepinephrine 1-30 mcg/min Last Rate: 7 mcg/min (08/03/19 0551)   propofol 5-50 mcg/kg/min Last Rate: 20 mcg/kg/min (08/03/19 0542)   vasopressin (PITRESSIN) in 0 9 % sodium chloride 100 mL 0 04 Units/min Last Rate: 0 04 Units/min (08/03/19 0515)     PRN Meds:    fentanyl citrate (PF) 50 mcg Q1H PRN   ondansetron 4 mg Q4H PRN       VTE Pharmacologic Prophylaxis: Reason for no pharmacologic prophylaxis OR today  VTE Mechanical Prophylaxis: sequential compression device    Invasive lines and devices:   Invasive Devices     Central Venous Catheter Line            CVC Central Lines 08/02/19 Triple less than 1 day          Peripheral Intravenous Line            Peripheral IV 07/31/19 Left 2 days    Peripheral IV 08/02/19 Right Antecubital less than 1 day          Arterial Line            Arterial Line 08/02/19 Right Radial less than 1 day          Drain            Gastrostomy/Enterostomy Jejunostomy 14 Fr   days    Chest Tube 1 Left 12 Fr  less than 1 day    NG/OG/Enteral Tube Orogastric 16 Fr Right mouth less than 1 day    Negative Pressure Wound Therapy (V A C ) Abdomen less than 1 day    Urethral Catheter Latex;Straight-tip 16 Fr  less than 1 day          Airway            ETT  Cuffed 7 mm less than 1 day                   Counseling / Coordination of Care  Total Critical Care time spent 30 minutes excluding procedures, teaching and family updates  Code Status: Level 1 - Full Code     Portions of the record may have been created with voice recognition software  Occasional wrong word or "sound a like" substitutions may have occurred due to the inherent limitations of voice recognition software  Read the chart carefully and recognize, using context, where substitutions have occurred       Tanya Alvarado,

## 2019-08-03 NOTE — PROGRESS NOTES
PGY2 Post-Op Check Note    S: Patient is resting in bed on a sedation wean and on a pressure support wean  She is awake in bed  Patient was off pressors after OR for some time however at 6:30 was placed back on levophed  O:   Vitals:    08/03/19 1924   BP:    Pulse:    Resp:    Temp:    SpO2: 99%       I/O last 3 completed shifts: In: 57426 4 [I V :55262 4; Blood:280; NG/GT:440; IV CDHJJVCXI:0955]  Out: 1341 [Urine:1430; Emesis/NG output:1000; Drains:1075; Blood:100; Chest Tube:540]  No intake/output data recorded      Physical Exam: General: Alert, Intubated  Head: normocephalic, atraumatic  Neck: supple, trachea midline  Respiratory: BS b/l  Abdomen: soft, non tender, abdomen with incisional VAC in place  Heart: RRR, S1s2  Ext: Warm no cyanosis   Pulse: 2+ radial    Lab Results   Component Value Date    WBC 7 01 08/03/2019    HGB 9 5 (L) 08/03/2019    HCT 28 (L) 08/03/2019    MCV 90 08/03/2019     08/03/2019     Lab Results   Component Value Date    GLUCOSE 89 08/03/2019    CALCIUM 7 6 (L) 08/03/2019     07/23/2015    K 3 9 08/03/2019    CO2 23 08/03/2019     (H) 08/03/2019    BUN 21 08/03/2019    CREATININE 0 60 08/03/2019         A/P: 78 y o  F w/ Severe sepsis and bowel injury s/p ex lap, reanastomosis of proximal small bowel and VAC placement  - Continue Care per Surgical Critical Care  - Monitor abdominal incision and worry for dehiscence  - wean pressors  - wean vent  - SQH/SCDs

## 2019-08-03 NOTE — ANESTHESIA PREPROCEDURE EVALUATION
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) ,  Hiatal hernia,   Comment: Denies reflux, c/o food sometimes sticks when going down, has not been evaluated since esophagectomy     Negative  ROS        Endo/Other  Negative endo/other ROS History of thyroid disease , hypothyroidism,   Comment: S/p Lap Ventral hernia repair     Now septic with free air  PTX after IJ placement  Now with Chest Tube  GYN  Negative gynecology ROS          Hematology  Negative hematology ROS Anemia ,     Musculoskeletal  Negative musculoskeletal ROS   Arthritis     Neurology  Negative neurology ROS   Headaches,    Psychology   Anxiety, Depression ,              Physical Exam    Airway       Dental       Cardiovascular      Pulmonary      Other Findings  ETT IN SITU    Study date:  02-Aug-2019     Patient: Devin Brody  MR number: [id number]  Account number: [de-identified]  : 1950  Age: 78 years  Gender: Female  Status: Inpatient  Location: Bedside  Height: 62 in  Weight: 126 lb  BP: 143/ 73 mmHg     Indications: Pulmonary embolism     Diagnoses: I26 99 - Other pulmonary embolism without acute cor pulmonale     Sonographer:  DIETER Lombardi, RDCS  Primary Physician:  Lilliam Michaels DO  Referring Physician:  Nas Ennis MD  Group:  [facility]  Interpreting Physician:  Yolis Fuller MD     SUMMARY     LEFT VENTRICLE:  Systolic function was vigorous  Ejection fraction was estimated to be 70 %    There were no regional wall motion abnormalities      RIGHT VENTRICLE:  The size was normal   Systolic function was normal      PERICARDIUM:  There was a large right pleural effusion  There was a large left pleural effusion      HISTORY: PRIOR HISTORY: 3 v CAD; Atrial fibrillation; Chest pain; Hyperlipidemia; Hypothryoid; Sepsis with septic shock; Cancer     PROCEDURE: The procedure was performed at the bedside  This was a stat study  The transthoracic approach was used  The study included limited 2D imaging, M-mode, limited spectral Doppler, and color Doppler  The heart rate was 130 bpm, at  the start of the study  Images were obtained from the parasternal and apical acoustic windows  Echocardiographic views were limited due to surgical dressings, poor acoustic window availability, lung interference, and patient on mechanical  ventilator  This was a technically difficult study      LEFT VENTRICLE: Size was normal  Systolic function was vigorous  Ejection fraction was estimated to be 70 %  There were no regional wall motion abnormalities  DOPPLER: Due to tachycardia, there was fusion of early and atrial contributions  to ventricular filling      RIGHT VENTRICLE: The size was normal  Systolic function was normal      MITRAL VALVE: There was normal leaflet separation      TRICUSPID VALVE: There was normal leaflet separation  DOPPLER: There was trace regurgitation  Pulmonary artery systolic pressure was within the normal range  Estimated peak PA pressure was 31 mmHg      PERICARDIUM: There was a large right pleural effusion  There was a large left pleural effusion  Anesthesia Plan  ASA Score- 4     Anesthesia Type- general with ASA Monitors  Additional Monitors:   Airway Plan: ETT  Comment:  IGOR Hart , have personally seen and evaluated the patient prior to anesthetic care  I have reviewed the pre-anesthetic record, and other medical records if appropriate to the anesthetic care    If a CRNA is involved in the case, I have reviewed the CRNA assessment, if present, and agree  Risks/benefits and alternatives discussed with patient including possible PONV, sore throat, and possibility of rare anesthetic and surgical emergencies        Plan Factors- Patient instructed to abstain from smoking on day of procedure  Patient did not smoke on day of surgery  Induction- inhalational     Postoperative Plan-     Informed Consent- Anesthetic plan and risks discussed with patient  I personally reviewed this patient with the CRNA  Discussed and agreed on the Anesthesia Plan with the CRNA  Sanjuanita Miller

## 2019-08-03 NOTE — RESPIRATORY THERAPY NOTE
resp care      08/03/19 1100   Respiratory Assessment   Assessment Type Assess only   General Appearance Sedated   Respiratory Pattern Assisted   Chest Assessment Chest expansion symmetrical   Bilateral Breath Sounds Diminished   Resp Comments Pt  arrived from OR  PLaced on vent with previous settings   No changes at this time

## 2019-08-03 NOTE — PROGRESS NOTES
Surgical Critical Care Post-Operative Note:  Patient seen and examined post-operative from re-exploration, small bowel reanastomosis, and abdominal closure  Skin open to [facility] therapy   Patient weaning off of vasopressors       Vitals:    08/03/19 1100   BP: 142/77   Pulse: 104   Resp: 14   Temp: 99 1 °F (37 3 °C)   SpO2: 100%     Patient sedated on propofol and fentanyl infusions   Sinus tach on the monitor  Levo at 2, Vasopressin at 0 4  Abdominal mildly taught   Midline wound with wound VAC in place  Mendoza catheter in place with clear yellow urine     Assessment/Plan:    Check ABG  Wean sedation  Continue fentanyl infusion  Attempt PSV trial today  Wean off of vasopressors   Decreased steroids to Q8  Continue Zosyn   Limit IVF   Start heparin for VTE chemoprophylaxis   Continue to monitor closely in the ICU    Kd Negron PA-C

## 2019-08-03 NOTE — PROGRESS NOTES
Pt returned from or into ICU 2  On vent support and pressors in progress  Daughter at bedside with pt  And updated by dr Lala Snyder on pt  Status and plan of care  Levo and vasopressin off at this time  Propofol decreased to 18 mcg  U/o remains adequate

## 2019-08-03 NOTE — PROGRESS NOTES
Md assesses pt  Pt  Weaning on ps/cpap as ordered  Pressors and propofol discontinued with adequate BP noted  Pt  Awake and following simple commands , sinus tach noted and md made aware, tylenol to be given as ordered   precedex will be started as ordered

## 2019-08-03 NOTE — RESPIRATORY THERAPY NOTE
RT Ventilator Management Note  Linsey Chung 78 y o  female MRN: [id number]  Unit/Bed#: ICU 02 Encounter: [id number]      Daily Screen       8/2/2019  1246             Patient safety screen outcome[de-identified]  Failed    Not Ready for Weaning due to[de-identified]  Underline problem not resolved;FiO2 >60%            Physical Exam:   Assessment Type: (P) Assess only  Bilateral Breath Sounds: (P) Clear, Diminished      Resp Comments: (P) Pt  doing well on A/C  No changes throughout the night

## 2019-08-03 NOTE — SOCIAL WORK
Cm attempted to meet with pt and she was in OR   CM called pt daughter Rosanna Ward [phone]  Pt  resides by herself in a 5th floor apt w/ 0 steps inside apt, elevator access to enter apt, and 3 steps to enter bldg  Pt's daughter Curt Morel  is primary contact  Pt reported that at baseline she is independent w/ performing her ADLS, but uses a cane to ambulate within her home and a rolling walker to ambulate outside  Pt reported additional DME in home consisting of a portable commode and hand rails in bath  Pt reported past hx of [address] [facility] in 2014 w/ [facility]   Pt was at [facility] in the past    Pt reported her PCP is Dr Tamar Sykes through [facility] located in [facility]  Pt uses [facility] located in [facility] for her Rx needs  Pt  is retired and receives [employer] benefits as her only source of income  Pt reported she does have a Living Will which legally pre-designates her daughter as her medical POA appointed for her in emergencies  Pt daughter states if pt needs rehab she would like referral to [place] and [facility] referrals in in as such        CM reviewed d/c planning process including the following: identifying help at home, patient preference for d/c planning needs, Discharge Lounge, [facility] Meds to Bed program, availability of treatment team to discuss questions or concerns patient and/or family may have regarding understanding medications and recognizing signs and symptoms once discharged  CM also encouraged patient to follow up with all recommended appointments after discharge  Patient advised of importance for patient and family to participate in managing patients medical well being

## 2019-08-04 ENCOUNTER — APPOINTMENT (INPATIENT)
Dept: RADIOLOGY | Facility: HOSPITAL | Age: 69
DRG: 856 | End: 2019-08-04
Payer: MEDICARE

## 2019-08-04 LAB
ANION GAP SERPL CALCULATED.3IONS-SCNC: 11 MMOL/L (ref 4–13)
BASE EXCESS BLDA CALC-SCNC: -5.3 MMOL/L
BASOPHILS # BLD MANUAL: 0 THOUSAND/UL (ref 0–0.1)
BASOPHILS NFR MAR MANUAL: 0 % (ref 0–1)
BUN SERPL-MCNC: 19 MG/DL (ref 5–25)
CALCIUM SERPL-MCNC: 8 MG/DL (ref 8.3–10.1)
CHLORIDE SERPL-SCNC: 110 MMOL/L (ref 100–108)
CO2 SERPL-SCNC: 22 MMOL/L (ref 21–32)
CREAT SERPL-MCNC: 0.52 MG/DL (ref 0.6–1.3)
EOSINOPHIL # BLD MANUAL: 0.11 THOUSAND/UL (ref 0–0.4)
EOSINOPHIL NFR BLD MANUAL: 1 % (ref 0–6)
ERYTHROCYTE [DISTWIDTH] IN BLOOD BY AUTOMATED COUNT: 15.4 % (ref 11.6–15.1)
GFR SERPL CREATININE-BSD FRML MDRD: 97 ML/MIN/1.73SQ M
GLUCOSE SERPL-MCNC: 76 MG/DL (ref 65–140)
HCO3 BLDA-SCNC: 21.1 MMOL/L (ref 22–28)
HCT VFR BLD AUTO: 30.5 % (ref 34.8–46.1)
HGB BLD-MCNC: 9.3 G/DL (ref 11.5–15.4)
LYMPHOCYTES # BLD AUTO: 1.52 THOUSAND/UL (ref 0.6–4.47)
LYMPHOCYTES # BLD AUTO: 14 % (ref 14–44)
MCH RBC QN AUTO: 28.2 PG (ref 26.8–34.3)
MCHC RBC AUTO-ENTMCNC: 30.5 G/DL (ref 31.4–37.4)
MCV RBC AUTO: 92 FL (ref 82–98)
MONOCYTES # BLD AUTO: 0.22 THOUSAND/UL (ref 0–1.22)
MONOCYTES NFR BLD: 2 % (ref 4–12)
NEUTROPHILS # BLD MANUAL: 8.91 THOUSAND/UL (ref 1.85–7.62)
NEUTS BAND NFR BLD MANUAL: 1 % (ref 0–8)
NEUTS SEG NFR BLD AUTO: 81 % (ref 43–75)
NRBC BLD AUTO-RTO: 0 /100 WBCS
O2 CT BLDA-SCNC: 15 ML/DL (ref 16–23)
OXYHGB MFR BLDA: 99 % (ref 94–97)
PCO2 BLDA: 44.5 MM HG (ref 36–44)
PH BLDA: 7.29 [PH] (ref 7.35–7.45)
PLATELET # BLD AUTO: 188 THOUSANDS/UL (ref 149–390)
PLATELET BLD QL SMEAR: ADEQUATE
PMV BLD AUTO: 11.3 FL (ref 8.9–12.7)
PO2 BLDA: 203.5 MM HG (ref 75–129)
POTASSIUM SERPL-SCNC: 3.7 MMOL/L (ref 3.5–5.3)
RBC # BLD AUTO: 3.3 MILLION/UL (ref 3.81–5.12)
RBC MORPH BLD: NORMAL
SODIUM SERPL-SCNC: 143 MMOL/L (ref 136–145)
TOTAL CELLS COUNTED SPEC: 100
VARIANT LYMPHS # BLD AUTO: 1 %
WBC # BLD AUTO: 10.87 THOUSAND/UL (ref 4.31–10.16)

## 2019-08-04 PROCEDURE — 94003 VENT MGMT INPAT SUBQ DAY: CPT

## 2019-08-04 PROCEDURE — 85007 BL SMEAR W/DIFF WBC COUNT: CPT | Performed by: PHYSICIAN ASSISTANT

## 2019-08-04 PROCEDURE — NC001 PR NO CHARGE: Performed by: SURGERY

## 2019-08-04 PROCEDURE — C9113 INJ PANTOPRAZOLE SODIUM, VIA: HCPCS | Performed by: PHYSICIAN ASSISTANT

## 2019-08-04 PROCEDURE — 82805 BLOOD GASES W/O2 SATURATION: CPT | Performed by: PHYSICIAN ASSISTANT

## 2019-08-04 PROCEDURE — 80048 BASIC METABOLIC PNL TOTAL CA: CPT | Performed by: PHYSICIAN ASSISTANT

## 2019-08-04 PROCEDURE — 94760 N-INVAS EAR/PLS OXIMETRY 1: CPT

## 2019-08-04 PROCEDURE — 99291 CRITICAL CARE FIRST HOUR: CPT | Performed by: SURGERY

## 2019-08-04 PROCEDURE — 36620 INSERTION CATHETER ARTERY: CPT

## 2019-08-04 PROCEDURE — 71045 X-RAY EXAM CHEST 1 VIEW: CPT

## 2019-08-04 PROCEDURE — 85027 COMPLETE CBC AUTOMATED: CPT | Performed by: PHYSICIAN ASSISTANT

## 2019-08-04 RX ORDER — POTASSIUM CHLORIDE 29.8 MG/ML
40 INJECTION INTRAVENOUS ONCE
Status: COMPLETED | OUTPATIENT
Start: 2019-08-04 | End: 2019-08-04

## 2019-08-04 RX ORDER — PROPOFOL 10 MG/ML
INJECTION, EMULSION INTRAVENOUS
Status: COMPLETED
Start: 2019-08-04 | End: 2019-08-04

## 2019-08-04 RX ORDER — PROPOFOL 10 MG/ML
5-50 INJECTION, EMULSION INTRAVENOUS
Status: DISCONTINUED | OUTPATIENT
Start: 2019-08-04 | End: 2019-08-06

## 2019-08-04 RX ADMIN — PROPOFOL 10 MCG/KG/MIN: 10 INJECTION, EMULSION INTRAVENOUS at 15:10

## 2019-08-04 RX ADMIN — CHLORHEXIDINE GLUCONATE 0.12% ORAL RINSE 15 ML: 1.2 LIQUID ORAL at 20:57

## 2019-08-04 RX ADMIN — CHLORHEXIDINE GLUCONATE 0.12% ORAL RINSE 15 ML: 1.2 LIQUID ORAL at 08:10

## 2019-08-04 RX ADMIN — PANTOPRAZOLE SODIUM 40 MG: 40 INJECTION, POWDER, FOR SOLUTION INTRAVENOUS at 20:57

## 2019-08-04 RX ADMIN — Medication 100 MCG/HR: at 04:50

## 2019-08-04 RX ADMIN — PIPERACILLIN SODIUM AND TAZOBACTAM SODIUM 3.38 G: 36; 4.5 INJECTION, POWDER, FOR SOLUTION INTRAVENOUS at 17:50

## 2019-08-04 RX ADMIN — POTASSIUM CHLORIDE 40 MEQ: 400 INJECTION, SOLUTION INTRAVENOUS at 08:10

## 2019-08-04 RX ADMIN — HEPARIN SODIUM 5000 UNITS: 5000 INJECTION INTRAVENOUS; SUBCUTANEOUS at 13:11

## 2019-08-04 RX ADMIN — FENTANYL CITRATE 50 MCG: 50 INJECTION INTRAMUSCULAR; INTRAVENOUS at 23:59

## 2019-08-04 RX ADMIN — NOREPINEPHRINE BITARTRATE 4 MCG/MIN: 1 INJECTION INTRAVENOUS at 04:52

## 2019-08-04 RX ADMIN — FENTANYL CITRATE 50 MCG: 50 INJECTION INTRAMUSCULAR; INTRAVENOUS at 20:58

## 2019-08-04 RX ADMIN — HYDROCORTISONE SODIUM SUCCINATE 50 MG: 100 INJECTION, POWDER, FOR SOLUTION INTRAMUSCULAR; INTRAVENOUS at 05:20

## 2019-08-04 RX ADMIN — PIPERACILLIN SODIUM AND TAZOBACTAM SODIUM 3.38 G: 36; 4.5 INJECTION, POWDER, FOR SOLUTION INTRAVENOUS at 13:00

## 2019-08-04 RX ADMIN — HEPARIN SODIUM 5000 UNITS: 5000 INJECTION INTRAVENOUS; SUBCUTANEOUS at 05:21

## 2019-08-04 RX ADMIN — SODIUM CHLORIDE, SODIUM GLUCONATE, SODIUM ACETATE, POTASSIUM CHLORIDE, MAGNESIUM CHLORIDE, SODIUM PHOSPHATE, DIBASIC, AND POTASSIUM PHOSPHATE 125 ML/HR: .53; .5; .37; .037; .03; .012; .00082 INJECTION, SOLUTION INTRAVENOUS at 07:09

## 2019-08-04 RX ADMIN — FENTANYL CITRATE 50 MCG: 50 INJECTION INTRAMUSCULAR; INTRAVENOUS at 13:10

## 2019-08-04 RX ADMIN — HEPARIN SODIUM 5000 UNITS: 5000 INJECTION INTRAVENOUS; SUBCUTANEOUS at 21:02

## 2019-08-04 RX ADMIN — PANTOPRAZOLE SODIUM 40 MG: 40 INJECTION, POWDER, FOR SOLUTION INTRAVENOUS at 08:10

## 2019-08-04 RX ADMIN — PIPERACILLIN SODIUM AND TAZOBACTAM SODIUM 3.38 G: 36; 4.5 INJECTION, POWDER, FOR SOLUTION INTRAVENOUS at 05:21

## 2019-08-04 NOTE — RESPIRATORY THERAPY NOTE
resp care      08/04/19 1527   Respiratory Assessment   Resp Comments Pt  resting comfortably on current vent settings  No changes at this time   ETT  Cuffed 7 mm   Placement Date/Time: 08/02/19 (c) 0905   Mask Ventilation: Mask ventilation not attempted (0)  Preoxygenated: Yes  Technique: Direct laryngoscopy  Type: Cuffed  Tube Size: 7 mm  Laryngoscope: Mac  Location: Oral  Placement Verification: Auscultation;C       Secured at (cm) 21   Measured from Lips   Secured Location Left   Secured by Commercial tube perez   Site Condition Dry   HI-LO Suction  Intermittent suction   Additional Assessments   SpO2 100 %

## 2019-08-04 NOTE — RESPIRATORY THERAPY NOTE
resp care      08/04/19 1132   Respiratory Assessment   Resp Comments Attempted to wean pt again on ps  Pt  minute volume was 2 and placed back on [facility] settings   ETT  Cuffed 7 mm   Placement Date/Time: 08/02/19 (c) 0905   Mask Ventilation: Mask ventilation not attempted (0)  Preoxygenated: Yes  Technique: Direct laryngoscopy  Type: Cuffed  Tube Size: 7 mm  Laryngoscope: Mac  Location: Oral  Placement Verification: Auscultation;C       Secured at (cm) 21   Measured from Lips   Secured Location Left   Secured by Commercial tube perez   Site Condition Dry   HI-LO Suction  Intermittent suction   Additional Assessments   SpO2 100 %

## 2019-08-04 NOTE — RESPIRATORY THERAPY NOTE
RT Ventilator Management Note  Tara Chung 78 y o  female MRN: [id number]  Unit/Bed#: ICU 02 Encounter: [id number]      Daily Screen       8/2/2019  1246 8/3/2019  1058          Patient safety screen outcome[de-identified]  Failed  Failed      Not Ready for Weaning due to[de-identified]  Underline problem not resolved;FiO2 >60%  Underline problem not resolved              Physical Exam:   Assessment Type: (P) Assess only      Resp Comments: (P) Pt  placed back on A/C secondary to prolonged low minute volume on CPAP  Pt  tolerated CPAP well with only transient episodes of hypoventilation prior to this event  Will continue to monitor

## 2019-08-04 NOTE — RESPIRATORY THERAPY NOTE
RT Ventilator Management Note  Tim Chung 78 y o  female MRN: [id number]  Unit/Bed#: ICU 02 Encounter: [id number]      Daily Screen       8/3/2019  1058 8/4/2019  0813          Patient safety screen outcome[de-identified]  Failed  Passed      Not Ready for Weaning due to[de-identified]  Underline problem not resolved        Spont breathing trial % for 30 min:    Yes      Spont breathing trial outcome[de-identified]    Failed      Spont breathing trial reason failed:    RR < 8 bpm;Tidal volume < 4ml/Kg of ideal body weight or VE <5 or  >15 LPM      Previous settings resumed:    Yes              Physical Exam:   Assessment Type: Assess only      Resp Comments: (P) Pt was trialed on CPAP/PS wean but failed to complete trial  Pt was then switched back to previous AC settings  Will continue to monitor throughout shift

## 2019-08-04 NOTE — PROGRESS NOTES
Progress Note - [address] 78 y o  female MRN: [id number]  Unit/Bed#: ICU 02 Encounter: [id number]    Assessment:   Principal Problem:    Shock ([facility] [address] )  Active Problems:    Incisional hernia    Severe sepsis with septic shock (CODE) ([facility] [address] )    Jejunal enterotomy s/p SBO      Small bowel re-anastomosis     Acute respiratory failure with hypoxemia (HCC)    Acute renal failure (ARF) (HCC)    Postprocedural pneumothorax     Plan:   Neuro:               - Sedation: Precedex at  2 mg              - Analgesia: Fentanyl @ 100 mcg              - Delirium precautions - CAM ICU daily, regulate sleep/wake cycle              - trend neuro exam - following commands this am                 CV:               - Shock -  Undifferentiated                           - Levo restarted at 6:30 pm yesterday  Currently at 2, Continue to wean as tolerated for MAP >65                          - Maintain A line              - ECHO (): EF 70%, normal RV function, no R heart strain              - CTA negative for PE                 Lung:               - On Pressure support overnight  Placed back on AC around 4am for episodes of low minute ventilation  Continue to wean               - L chest tube in place for pneumothorax from central line placement -- 250cc serosanguinous drainage overnight  Continued air leak  Keep to suction               - Vent settings: VC 12/450/60/8 - wean FiO2 and peep as tolerated              - AB 3/29 2/14  8  Rate changed from 14 to 12 bpm this am                             GI:               - s/p laparoscopic Ventral hernia repair complicated by Jejunal enterotomy -- POD #4              - POD 1 from small bowel reanastomosis w/ incisional drain    450cc drainage past 24 hrs                - continue PPI post surgery    - NPO while intubated                            FEN:            - patient received 3L of isolyte since OR yesterday              - replete Electrolytes prn - 40mEQ of K given this am                 :               - pak catheter in place for accurate I/Os              - Trend UOP, Cr, BUN                 ID:               - continue Zosyn - day 3              - monitor fever curve and WBC                 Heme:               - trend Hgb               - SubQ heparin and SCDs b/l                 Endo:    - continue Hydrocortisone 50mg q 8 hrs for 1 more day  - Monitor BG                            Msk/Skin:               - frequent repositioning and offloading                 Disposition: ICU       Chief Complaint: No complaints this am    HPI/24hr events:   - restarted on Levo around 6:30 pm   Currently at 2  - s/p OR yesterday for small bowel reanastamosis and incision drain placement  - placed on AC around 4:00 am for low minute ventilation     Physical Exam:   Physical Exam   Constitutional: She appears well-developed and well-nourished  HENT:   Head: Normocephalic and atraumatic  Right Ear: External ear normal    Left Ear: External ear normal    Mouth/Throat: No oropharyngeal exudate  Eyes: Pupils are equal, round, and reactive to light  Conjunctivae are normal    Neck: Normal range of motion  Neck supple  Cardiovascular: Normal rate, regular rhythm, normal heart sounds and intact distal pulses  Exam reveals no gallop and no friction rub  No murmur heard  Pulmonary/Chest: Effort normal and breath sounds normal  No respiratory distress  She has no wheezes  She has no rales  Intubated  Left chest tube in place   Abdominal: Soft  She exhibits no distension  There is no tenderness  There is no guarding  Incision drain clean, dry, intact   Genitourinary:   Genitourinary Comments: Pak in place     Musculoskeletal: Normal range of motion  She exhibits no edema, tenderness or deformity  Lymphadenopathy:     She has no cervical adenopathy  Neurological: She is alert  No cranial nerve deficit or sensory deficit  She exhibits normal muscle tone  Follows commands  Responds to questioning    Skin: Skin is warm and dry  Psychiatric: She has a normal mood and affect  Nursing note and vitals reviewed  Vitals:    19 0200 19 0245 19 0300 19 0400   BP:       BP Location:       Pulse: 102  90 82   Resp:       Temp: 97 9 °F (36 6 °C)  97 9 °F (36 6 °C) 98 2 °F (36 8 °C)   TempSrc:       SpO2: 99% 99% 99% 100%   Weight:       Height:         Arterial Line BP: 94/50  Arterial Line MAP (mmHg): 68 mmHg    Temperature:   Temp (24hrs), Av 3 °F (37 4 °C), Min:97 9 °F (36 6 °C), Max:100 8 °F (38 2 °C)    Current: Temperature: 98 2 °F (36 8 °C)    Weights:   IBW: 54 7 kg    Body mass index is 28 34 kg/m²    Weight (last 2 days)     Date/Time   Weight    19 1246   74 9 (165 12)              Hemodynamic Monitoring:  N/A     Non-Invasive/Invasive Ventilation Settings:  Respiratory    Lab Data (Last 4 hours)       0550            pH, Arterial       7 293           pCO2, Arterial       44 5           pO2, Arterial       203 5           HCO3, Arterial       21 1           Base Excess, Arterial       -5 3                O2/Vent Data     None              Lab Results   Component Value Date    PHART 7 293 (L) 2019    JXM8KOL 44 5 (H) 2019    PO2ART 203 5 (H) 2019    UGP5TLP 21 1 (L) 2019    BEART -5 3 2019    SOURCE Line, Arterial 2019     SpO2: SpO2: 100 %    Intake and Outputs:  I/O        07 -  0700  07 -  0700    I V  (mL/kg) 7012 8 (93 6) 7868 1 (105)    Blood 280     NG/ 120    IV Piggyback 1300 450    Total Intake(mL/kg) 8912 8 (119) 8438 1 (112 7)    Urine (mL/kg/hr) 705 1375 (0 8)    Emesis/NG output 800 200    Drains 625 450    Blood  100    Chest Tube 490 50    Total Output 2620 2175    Net +6292 8 +6263 1              Nutrition:        Diet Orders   (From admission, onward)             Start     Ordered    19 1247  Diet NPO  Diet effective now Question Answer Comment   Diet Type NPO    RD to adjust diet per protocol? Yes        08/02/19 1249                Labs:   Results from last 7 days   Lab Units 08/04/19  0528 08/03/19  0909 08/03/19  0528 08/02/19  1811  08/02/19  1300  08/02/19  0147   WBC Thousand/uL 10 87*  --  7 01 3 91*  --  7 71   < > 7 79   HEMOGLOBIN g/dL 9 3*  --  10 4* 11 1*  --  11 6   < > 13 3   I STAT HEMOGLOBIN g/dl  --  9 5*  --   --    < >  --   --   --    HEMATOCRIT % 30 5*  --  32 2* 33 9*  --  35 5   < > 42 3   HEMATOCRIT, ISTAT %  --  28*  --   --    < >  --   --   --    PLATELETS Thousands/uL 188  --  183 189  --  277   < > 152   NEUTROS PCT %  --   --  81* 78*  --   --   --  76*   MONOS PCT %  --   --  5 4  --   --   --  2*   MONO PCT %  --   --   --   --   --  2*  --   --     < > = values in this interval not displayed  Results from last 7 days   Lab Units 08/04/19  0528 08/03/19  1822 08/03/19  0909 08/03/19  0528  08/02/19  1620 08/02/19  1556  08/02/19  1300   SODIUM mmol/L 143 146*  --  144   < >  --   --   --  140   POTASSIUM mmol/L 3 7 3 9  --  4 1   < >  --   --   --  3 4*   CHLORIDE mmol/L 110* 113*  --  114*   < >  --   --   --  105   CO2 mmol/L 22 23  --  20*   < >  --   --   --  26   CO2, I-STAT mmol/L  --   --  23  --   --  21 21   < >  --    BUN mg/dL 19 21  --  21   < >  --   --   --  31*   CREATININE mg/dL 0 52* 0 60  --  0 56*   < >  --   --   --  1 11   CALCIUM mg/dL 8 0* 7 6*  --  7 4*   < >  --   --   --  7 1*   ALK PHOS U/L  --   --   --   --   --   --   --   --  45*   ALT U/L  --   --   --   --   --   --   --   --  11*   AST U/L  --   --   --   --   --   --   --   --  22   GLUCOSE, ISTAT mg/dl  --   --  89  --   --  132 128   < >  --     < > = values in this interval not displayed       Results from last 7 days   Lab Units 08/03/19 1822 08/02/19  1300 08/02/19  0306   MAGNESIUM mg/dL 2 7* 2 3 2 6     Results from last 7 days   Lab Units 08/03/19 1822 08/02/19  1300   PHOSPHORUS mg/dL 2 4 2 7 Results from last 7 days   Lab Units 08/02/19  1811 08/02/19  1300 08/02/19  1125 08/02/19  0813 08/02/19  0617   INR  1 85* 1 90*  --   --  2 00*   PTT seconds  --  36 42* >210* >210*     Results from last 7 days   Lab Units 08/03/19  2144   LACTIC ACID mmol/L 0 8     0   Lab Value Date/Time    TROPONINI <0 02 08/02/2019 1126    TROPONINI <0 02 08/02/2019 0406    TROPONINI <0 02 11/22/2016 1110       Imaging: I have personally reviewed pertinent reports        EKG:     Micro:  Lab Results   Component Value Date    BLOODCX No Growth at 24 hrs  08/02/2019    BLOODCX No Growth at 24 hrs  08/02/2019       Allergies: No Known Allergies    Medications:   Scheduled Meds:  Current Facility-Administered Medications:  acetaminophen 650 mg Rectal Q6H PRN Guillermo Olmos PA-C    chlorhexidine 15 mL Swish & Spit Q12H [facility] Diane Ventura PA-C    dexmedetomidine 0 1-0 7 mcg/kg/hr Intravenous Titrated Guillermo Olmos PA-C Last Rate: 0 2 mcg/kg/hr (08/04/19 0210)   fentaNYL 100 mcg/hr Intravenous Continuous Dhruv Lathe Petit-Me Last Rate: 100 mcg/hr (08/04/19 0450)   fentanyl citrate (PF) 50 mcg Intravenous Q1H PRN Diane Ventura PA-C    heparin (porcine) 5,000 Units Subcutaneous Q8H [facility] Guillermo Olmos PA-C    hydrocortisone sodium succinate 50 mg Intravenous Q8H [facility] Guillermo Olmos PA-C    multi-electrolyte 125 mL/hr Intravenous Continuous Dhruv Lathe Petit-Me Last Rate: 125 mL/hr (08/03/19 2144)   norepinephrine 1-30 mcg/min Intravenous Titrated Dhruv Lathe Petit-Me Last Rate: 2 mcg/min (08/04/19 0538)   ondansetron 4 mg Intravenous Q4H PRN Diane Ventura PA-C    pantoprazole 40 mg Intravenous Q12H [facility] Cash Hicks PA-C    piperacillin-tazobactam 3 375 g Intravenous Q6H Diane Ventura PA-C Last Rate: 3 375 g (08/04/19 0521)   potassium chloride 40 mEq Intravenous Once Lubna Ochoa DO      Continuous Infusions:  dexmedetomidine 0 1-0 7 mcg/kg/hr Last Rate: 0 2 mcg/kg/hr (08/04/19 0210)   fentaNYL 100 mcg/hr Last Rate: 100 mcg/hr (08/04/19 0450)   multi-electrolyte 125 mL/hr Last Rate: 125 mL/hr (08/03/19 5514)   norepinephrine 1-30 mcg/min Last Rate: 2 mcg/min (08/04/19 1646)     PRN Meds:    acetaminophen 650 mg Q6H PRN   fentanyl citrate (PF) 50 mcg Q1H PRN   ondansetron 4 mg Q4H PRN       VTE Pharmacologic Prophylaxis: Heparin  VTE Mechanical Prophylaxis: sequential compression device    Invasive lines and devices: Invasive Devices     Central Venous Catheter Line            CVC Central Lines 08/02/19 Triple 1 day          Arterial Line            Arterial Line 08/03/19 Left Radial less than 1 day          Drain            Gastrostomy/Enterostomy Jejunostomy 14 Fr   days    Chest Tube 1 Left 12 Fr  1 day    NG/OG/Enteral Tube Orogastric 16 Fr Right mouth 1 day    Negative Pressure Wound Therapy (V A C ) Abdomen 1 day    Urethral Catheter Latex;Straight-tip 16 Fr  1 day          Airway            ETT  Cuffed 7 mm 1 day                   Counseling / Coordination of Care  Total Critical Care time spent 30 minutes excluding procedures, teaching and family updates  Code Status: Level 1 - Full Code     Portions of the record may have been created with voice recognition software  Occasional wrong word or "sound a like" substitutions may have occurred due to the inherent limitations of voice recognition software  Read the chart carefully and recognize, using context, where substitutions have occurred       Fito Luong, DO

## 2019-08-04 NOTE — NUTRITION
If unable to extubate and advance PO diet in next 48 hrs then consider an alternate means of nutrition support and reconsult RD for recs  Check Ionized Ca

## 2019-08-05 ENCOUNTER — APPOINTMENT (INPATIENT)
Dept: RADIOLOGY | Facility: HOSPITAL | Age: 69
DRG: 856 | End: 2019-08-05
Payer: MEDICARE

## 2019-08-05 ENCOUNTER — TRANSITIONAL CARE MANAGEMENT (OUTPATIENT)
Dept: FAMILY MEDICINE CLINIC | Facility: CLINIC | Age: 69
End: 2019-08-05

## 2019-08-05 LAB
ABO GROUP BLD BPU: NORMAL
ANION GAP SERPL CALCULATED.3IONS-SCNC: 11 MMOL/L (ref 4–13)
ARTERIAL PATENCY WRIST A: YES
ATRIAL RATE: 126 BPM
BASE EXCESS BLDA CALC-SCNC: -2.2 MMOL/L
BASOPHILS # BLD AUTO: 0.11 THOUSANDS/ΜL (ref 0–0.1)
BASOPHILS NFR BLD AUTO: 1 % (ref 0–1)
BPU ID: NORMAL
BUN SERPL-MCNC: 17 MG/DL (ref 5–25)
CALCIUM SERPL-MCNC: 8.1 MG/DL (ref 8.3–10.1)
CHLORIDE SERPL-SCNC: 112 MMOL/L (ref 100–108)
CO2 SERPL-SCNC: 22 MMOL/L (ref 21–32)
CREAT SERPL-MCNC: 0.53 MG/DL (ref 0.6–1.3)
CROSSMATCH: NORMAL
EOSINOPHIL # BLD AUTO: 0.15 THOUSAND/ΜL (ref 0–0.61)
EOSINOPHIL NFR BLD AUTO: 1 % (ref 0–6)
ERYTHROCYTE [DISTWIDTH] IN BLOOD BY AUTOMATED COUNT: 15.7 % (ref 11.6–15.1)
GFR SERPL CREATININE-BSD FRML MDRD: 97 ML/MIN/1.73SQ M
GLUCOSE SERPL-MCNC: 71 MG/DL (ref 65–140)
HCO3 BLDA-SCNC: 20.3 MMOL/L (ref 22–28)
HCT VFR BLD AUTO: 30.2 % (ref 34.8–46.1)
HGB BLD-MCNC: 9.6 G/DL (ref 11.5–15.4)
HOROWITZ INDEX BLDA+IHG-RTO: 50 MM[HG]
IMM GRANULOCYTES # BLD AUTO: >0.5 THOUSAND/UL (ref 0–0.2)
IMM GRANULOCYTES NFR BLD AUTO: 8 % (ref 0–2)
LYMPHOCYTES # BLD AUTO: 1.55 THOUSANDS/ΜL (ref 0.6–4.47)
LYMPHOCYTES NFR BLD AUTO: 10 % (ref 14–44)
MCH RBC QN AUTO: 28.3 PG (ref 26.8–34.3)
MCHC RBC AUTO-ENTMCNC: 31.8 G/DL (ref 31.4–37.4)
MCV RBC AUTO: 89 FL (ref 82–98)
MONOCYTES # BLD AUTO: 0.46 THOUSAND/ΜL (ref 0.17–1.22)
MONOCYTES NFR BLD AUTO: 3 % (ref 4–12)
NEUTROPHILS # BLD AUTO: 12.59 THOUSANDS/ΜL (ref 1.85–7.62)
NEUTS SEG NFR BLD AUTO: 77 % (ref 43–75)
NRBC BLD AUTO-RTO: 0 /100 WBCS
O2 CT BLDA-SCNC: 14.6 ML/DL (ref 16–23)
OXYHGB MFR BLDA: 97.6 % (ref 94–97)
P AXIS: 79 DEGREES
PCO2 BLDA: 27.4 MM HG (ref 36–44)
PEEP RESPIRATORY: 5 CM[H2O]
PH BLDA: 7.49 [PH] (ref 7.35–7.45)
PLATELET # BLD AUTO: 216 THOUSANDS/UL (ref 149–390)
PMV BLD AUTO: 10.8 FL (ref 8.9–12.7)
PO2 BLDA: 102.4 MM HG (ref 75–129)
POTASSIUM SERPL-SCNC: 3.4 MMOL/L (ref 3.5–5.3)
PR INTERVAL: 125 MS
QRS AXIS: -51 DEGREES
QRSD INTERVAL: 79 MS
QT INTERVAL: 329 MS
QTC INTERVAL: 477 MS
RBC # BLD AUTO: 3.39 MILLION/UL (ref 3.81–5.12)
SODIUM SERPL-SCNC: 145 MMOL/L (ref 136–145)
SPECIMEN SOURCE: ABNORMAL
T WAVE AXIS: 100 DEGREES
UNIT DISPENSE STATUS: NORMAL
UNIT PRODUCT CODE: NORMAL
UNIT RH: NORMAL
VENT AC: 12
VENT- AC: AC
VENTRICULAR RATE: 126 BPM
VT SETTING VENT: 450 ML
WBC # BLD AUTO: 16.14 THOUSAND/UL (ref 4.31–10.16)

## 2019-08-05 PROCEDURE — 71045 X-RAY EXAM CHEST 1 VIEW: CPT

## 2019-08-05 PROCEDURE — 99291 CRITICAL CARE FIRST HOUR: CPT | Performed by: EMERGENCY MEDICINE

## 2019-08-05 PROCEDURE — C9113 INJ PANTOPRAZOLE SODIUM, VIA: HCPCS | Performed by: PHYSICIAN ASSISTANT

## 2019-08-05 PROCEDURE — 94003 VENT MGMT INPAT SUBQ DAY: CPT

## 2019-08-05 PROCEDURE — 85025 COMPLETE CBC W/AUTO DIFF WBC: CPT | Performed by: EMERGENCY MEDICINE

## 2019-08-05 PROCEDURE — 80048 BASIC METABOLIC PNL TOTAL CA: CPT | Performed by: EMERGENCY MEDICINE

## 2019-08-05 PROCEDURE — 82805 BLOOD GASES W/O2 SATURATION: CPT | Performed by: EMERGENCY MEDICINE

## 2019-08-05 PROCEDURE — 36600 WITHDRAWAL OF ARTERIAL BLOOD: CPT

## 2019-08-05 PROCEDURE — 93010 ELECTROCARDIOGRAM REPORT: CPT | Performed by: INTERNAL MEDICINE

## 2019-08-05 PROCEDURE — NC001 PR NO CHARGE: Performed by: SURGERY

## 2019-08-05 PROCEDURE — 94760 N-INVAS EAR/PLS OXIMETRY 1: CPT

## 2019-08-05 RX ORDER — POTASSIUM CHLORIDE 29.8 MG/ML
40 INJECTION INTRAVENOUS ONCE
Status: COMPLETED | OUTPATIENT
Start: 2019-08-05 | End: 2019-08-05

## 2019-08-05 RX ADMIN — PIPERACILLIN SODIUM AND TAZOBACTAM SODIUM 3.38 G: 36; 4.5 INJECTION, POWDER, FOR SOLUTION INTRAVENOUS at 05:21

## 2019-08-05 RX ADMIN — CHLORHEXIDINE GLUCONATE 0.12% ORAL RINSE 15 ML: 1.2 LIQUID ORAL at 21:19

## 2019-08-05 RX ADMIN — PIPERACILLIN SODIUM AND TAZOBACTAM SODIUM 3.38 G: 36; 4.5 INJECTION, POWDER, FOR SOLUTION INTRAVENOUS at 00:09

## 2019-08-05 RX ADMIN — HEPARIN SODIUM 5000 UNITS: 5000 INJECTION INTRAVENOUS; SUBCUTANEOUS at 21:19

## 2019-08-05 RX ADMIN — PANTOPRAZOLE SODIUM 40 MG: 40 INJECTION, POWDER, FOR SOLUTION INTRAVENOUS at 21:19

## 2019-08-05 RX ADMIN — POTASSIUM CHLORIDE 40 MEQ: 400 INJECTION, SOLUTION INTRAVENOUS at 06:56

## 2019-08-05 RX ADMIN — HEPARIN SODIUM 5000 UNITS: 5000 INJECTION INTRAVENOUS; SUBCUTANEOUS at 16:28

## 2019-08-05 RX ADMIN — ACETAMINOPHEN 650 MG: 650 SUPPOSITORY RECTAL at 17:54

## 2019-08-05 RX ADMIN — PIPERACILLIN SODIUM AND TAZOBACTAM SODIUM 3.38 G: 36; 4.5 INJECTION, POWDER, FOR SOLUTION INTRAVENOUS at 11:59

## 2019-08-05 RX ADMIN — HEPARIN SODIUM 5000 UNITS: 5000 INJECTION INTRAVENOUS; SUBCUTANEOUS at 05:34

## 2019-08-05 RX ADMIN — FENTANYL CITRATE 50 MCG: 50 INJECTION INTRAMUSCULAR; INTRAVENOUS at 11:59

## 2019-08-05 RX ADMIN — PIPERACILLIN SODIUM AND TAZOBACTAM SODIUM 3.38 G: 36; 4.5 INJECTION, POWDER, FOR SOLUTION INTRAVENOUS at 17:44

## 2019-08-05 RX ADMIN — PANTOPRAZOLE SODIUM 40 MG: 40 INJECTION, POWDER, FOR SOLUTION INTRAVENOUS at 08:55

## 2019-08-05 RX ADMIN — FENTANYL CITRATE 50 MCG: 50 INJECTION INTRAMUSCULAR; INTRAVENOUS at 05:16

## 2019-08-05 RX ADMIN — SODIUM CHLORIDE, SODIUM GLUCONATE, SODIUM ACETATE, POTASSIUM CHLORIDE, MAGNESIUM CHLORIDE, SODIUM PHOSPHATE, DIBASIC, AND POTASSIUM PHOSPHATE 125 ML/HR: .53; .5; .37; .037; .03; .012; .00082 INJECTION, SOLUTION INTRAVENOUS at 10:48

## 2019-08-05 RX ADMIN — FENTANYL CITRATE 50 MCG: 50 INJECTION INTRAMUSCULAR; INTRAVENOUS at 06:56

## 2019-08-05 RX ADMIN — SODIUM CHLORIDE, SODIUM GLUCONATE, SODIUM ACETATE, POTASSIUM CHLORIDE, MAGNESIUM CHLORIDE, SODIUM PHOSPHATE, DIBASIC, AND POTASSIUM PHOSPHATE 125 ML/HR: .53; .5; .37; .037; .03; .012; .00082 INJECTION, SOLUTION INTRAVENOUS at 00:12

## 2019-08-05 RX ADMIN — SODIUM CHLORIDE, SODIUM GLUCONATE, SODIUM ACETATE, POTASSIUM CHLORIDE, MAGNESIUM CHLORIDE, SODIUM PHOSPHATE, DIBASIC, AND POTASSIUM PHOSPHATE 125 ML/HR: .53; .5; .37; .037; .03; .012; .00082 INJECTION, SOLUTION INTRAVENOUS at 17:48

## 2019-08-05 RX ADMIN — CHLORHEXIDINE GLUCONATE 0.12% ORAL RINSE 15 ML: 1.2 LIQUID ORAL at 08:55

## 2019-08-05 RX ADMIN — PROPOFOL 10 MCG/KG/MIN: 10 INJECTION, EMULSION INTRAVENOUS at 05:22

## 2019-08-05 RX ADMIN — PIPERACILLIN SODIUM AND TAZOBACTAM SODIUM 3.38 G: 36; 4.5 INJECTION, POWDER, FOR SOLUTION INTRAVENOUS at 23:31

## 2019-08-05 NOTE — RESPIRATORY THERAPY NOTE
RT Ventilator Management Note  Radha Chung 78 y o  female MRN: [id number]  Unit/Bed#: ICU 02 Encounter: [id number]      Daily Screen       8/3/2019  1058 8/4/2019  0813          Patient safety screen outcome[de-identified]  Failed  Passed      Not Ready for Weaning due to[de-identified]  Underline problem not resolved        Spont breathing trial % for 30 min:    Yes      Spont breathing trial outcome[de-identified]    Failed      Spont breathing trial reason failed:    RR < 8 bpm;Tidal volume < 4ml/Kg of ideal body weight or VE <5 or  >15 LPM      Previous settings resumed:    Yes              Physical Exam:   Assessment Type: (P) Assess only  Bilateral Breath Sounds: Clear      Resp Comments: (P) Pt  doing well on A/C  No changes throughout the night

## 2019-08-05 NOTE — PROGRESS NOTES
Wound team to evaluate pt  , remains on vent support on ps/cpap , looks uncomfortable   o2 sats adequate, BP stable  Fentanyl given to pt, this am   Daughter at bedside and updated on pt, status  Pressors off  U/o adequate

## 2019-08-05 NOTE — PROGRESS NOTES
Progress Note - [address] [place] 78 y o  female MRN: [id number]  Unit/Bed#: ICU 02 Encounter: [id number]    Assessment:   Principal Problem:    OR Exploratory laparotomy, SBR secondary to iatrogenic enterotomy   Active Problems:    Incisional hernia    Severe sepsis with septic shock (CODE) ([facility])    Acute respiratory failure with hypoxemia (HCC)    Acute renal failure (ARF) ([facility])    Postprocedural pneumothorax    Shock ([address] )    OR Laparoscopic ventral hernia repair        Plan: Critical Care Management as outlined below:     Neuro:  Awake and alert  Follows commands briskly  No focal neurologic deficit  Domitila Cunning Appears uncomfortable  More comfortable after changed to pressure support on ventilator  Remains on propofol at 10  Fentanyl discontinued yesterday  Continue frequent neurologic exams, daily CAM ICU, delirium precautions  Maintain adequate sleep-wake cycle  CV:  Remains hemodynamically stable  Mild tachycardia intermittently  Shock resolved and endpoints of resuscitation previously met  Has been off pressors since yesterday  Continue to monitor closely  Left IJ TLC with tip appearing in right brachiocephalic vein  Lung:  Acute respiratory failure with hypoxia  Tachypnea, hypocarbia, respiratory alkalosis this morning  Was on AC overnight  Switched to pressure support 10/5 this morning and appears more comfortable  Left pneumothorax following central line placement  Air leak continues in chest tube  Maintain on suction  220 mL serosanguineous output  Follow-up daily chest x-ray  GI:  Status post remote esophagectomy with gastric conduit and J-tube placement 2017  More recently status post laparoscopic ventral hernia repair with iatrogenic bowel injury status post exploratory laparotomy, bowel resection, ABThera VAC placement and subsequent reexploration, bowel anastomosis and abdominal closure  VAC remains on skin with 100 mL serosanguineous output    Possible delayed primary closure of skin this morning  Remains NPO for now  If not extubated, consider tube feeds  FEN:  2 3 L positive in 24 hours  14 5 L positive since admission  Moderate hypokalemia, will replete  Remaining electrolytes unremarkable  Continue to monitor and replete as needed  :  Good urine output  BUN and creatinine normal   Discontinue Mendoza catheter today  ID:  Afebrile  Worsening leukocytosis at 16,000, increased from 11,000  No bandemia  Continue Zosyn for intra-abdominal infection  Heme:  H&H, platelets stable  Continue to monitor closely  Continue subcutaneous heparin and SCDs for DVT prophylaxis  Endo:  Blood glucose stable  Steroids discontinued  Continue to monitor  Msk/Skin:  No skin breakdown  Abdominal wound VAC intact  Continue frequent repositioning and offloading  Disposition:  Continue ICU management     ______________________________________________________________________    Chief Complaint:  None given      HPI/24hr events:  Weaned off pressors yesterday  Endpoints of resuscitation met  Remains on low-dose propofol  Unable to extubate yesterday due to respiratory distress  ______________________________________________________________________    Physical Exam:   Physical Exam   Constitutional: She appears well-developed  She is cooperative  Non-toxic appearance  She has a sickly appearance  She appears ill  She appears distressed  She is sedated, intubated and restrained  Eyes: Pupils are equal, round, and reactive to light  Conjunctivae are normal    Cardiovascular: Regular rhythm, intact distal pulses and normal pulses  Tachycardia present  Pulmonary/Chest: Accessory muscle usage present  Tachypnea noted  She is intubated  She is in respiratory distress  She has no decreased breath sounds  She has no wheezes  She has rhonchi (Scattered)  She has no rales  Abdominal: Soft  She exhibits no distension   There is generalized tenderness  There is no rigidity and no guarding  Neurological: She is alert  She has normal strength  GCS eye subscore is 4  GCS verbal subscore is 1  GCS motor subscore is 6  Skin: Skin is warm and dry              ______________________________________________________________________  Vitals:    19 0300 19 0400 19 0403 19 0500   BP:       BP Location:       Pulse: 104 104  102   Resp:       Temp: 100 °F (37 8 °C) 100 4 °F (38 °C)  100 4 °F (38 °C)   TempSrc:       SpO2: 100% 100% 100% 99%   Weight:       Height:         Arterial Line BP: 120/56  Arterial Line MAP (mmHg): 80 mmHg     Temperature:   Temp (24hrs), Av 2 °F (37 3 °C), Min:98 2 °F (36 8 °C), Max:100 4 °F (38 °C)    Current Temperature: 100 4 °F (38 °C)  Weights:   IBW: 54 7 kg    Body mass index is 28 34 kg/m²    Weight (last 2 days)     Date/Time   Weight    19 1515   74 9 (165 12)            Hemodynamic Monitoring:  N/A     Non-Invasive/Invasive Ventilation Settings:  Respiratory    Lab Data (Last 4 hours)       0546            pH, Arterial       7 487           pCO2, Arterial       27 4           pO2, Arterial       102 4           HCO3, Arterial       20 3           Base Excess, Arterial       -2 2                O2/Vent Data        0403   Most Recent         Vent Mode AC/VC  AC/VC      Resp Rate (BPM) (BPM) 12  12      Vt (mL) (mL) 450  450      FIO2 (%) (%) 50  50      PEEP (cmH2O) (cmH2O) 5  5      MV 8 1  8 1                Lab Results   Component Value Date    PHART 7 487 (H) 2019    GBN5XNE 27 4 (LL) 2019    PO2ART 102 4 2019    ZEZ5YDM 20 3 (L) 2019    BEART -2 2 2019    SOURCE Radial, Right 2019     SpO2: SpO2: 99 %, SpO2 Activity: SpO2 Activity: At Rest, SpO2 Device: O2 Device: Other (comment)  Intake and Outputs:  I/O       701 -  - 700    I V  (mL/kg) 7868 1 (105) 3552 3 (47 4)    NG/     IV Piggyback 450 350    Total Intake(mL/kg) 8438 1 (112 7) 3902 3 (52 1)    Urine (mL/kg/hr) 1375 (0 8) 1215 (0 7)    Emesis/NG output 300 100    Drains 450 100    Blood 100     Chest Tube 300 220    Total Output 2525 1635    Net +5913 1 +2267 3              UOP:  0 7 mL/Kg/hour   Nutrition:        Diet Orders   (From admission, onward)             Start     Ordered    08/02/19 1247  Diet NPO  Diet effective now     Question Answer Comment   Diet Type NPO    RD to adjust diet per protocol? Yes        08/02/19 1249                Labs:   Results from last 7 days   Lab Units 08/05/19 0526 08/04/19  0528 08/03/19  0909 08/03/19  0528 08/02/19  1811   WBC Thousand/uL 16 14* 10 87*  --  7 01 3 91*   HEMOGLOBIN g/dL 9 6* 9 3*  --  10 4* 11 1*   I STAT HEMOGLOBIN g/dl  --   --  9 5*  --   --    HEMATOCRIT % 30 2* 30 5*  --  32 2* 33 9*   HEMATOCRIT, ISTAT %  --   --  28*  --   --    PLATELETS Thousands/uL 216 188  --  183 189   NEUTROS PCT % 77*  --   --  81* 78*   MONOS PCT % 3*  --   --  5 4   MONO PCT %  --  2*  --   --   --     Results from last 7 days   Lab Units 08/05/19 0526 08/04/19 0528 08/03/19  1822 08/03/19  0909  08/02/19  1620 08/02/19  1556  08/02/19  1300   POTASSIUM mmol/L 3 4* 3 7 3 9  --    < >  --   --   --  3 4*   CHLORIDE mmol/L 112* 110* 113*  --    < >  --   --   --  105   CO2 mmol/L 22 22 23  --    < >  --   --   --  26   CO2, I-STAT mmol/L  --   --   --  23  --  21 21   < >  --    BUN mg/dL 17 19 21  --    < >  --   --   --  31*   CREATININE mg/dL 0 53* 0 52* 0 60  --    < >  --   --   --  1 11   CALCIUM mg/dL 8 1* 8 0* 7 6*  --    < >  --   --   --  7 1*   ALK PHOS U/L  --   --   --   --   --   --   --   --  45*   ALT U/L  --   --   --   --   --   --   --   --  11*   AST U/L  --   --   --   --   --   --   --   --  22   GLUCOSE, ISTAT mg/dl  --   --   --  89  --  132 128   < >  --     < > = values in this interval not displayed       Results from last 7 days   Lab Units 08/03/19  1822 08/02/19  1300 08/02/19  0306   MAGNESIUM mg/dL 2 7* 2 3 2 6     Results from last 7 days   Lab Units 08/03/19  1822 08/02/19  1300   PHOSPHORUS mg/dL 2 4 2 7      Results from last 7 days   Lab Units 08/02/19  1811 08/02/19  1300 08/02/19  1125 08/02/19  0813 08/02/19  0617   INR  1 85* 1 90*  --   --  2 00*   PTT seconds  --  36 42* >210* >210*     Results from last 7 days   Lab Units 08/03/19  2144   LACTIC ACID mmol/L 0 8     0   Lab Value Date/Time    TROPONINI <0 02 08/02/2019 1126    TROPONINI <0 02 08/02/2019 0406    TROPONINI <0 02 11/22/2016 1110     Imaging:  Portable chest x-ray pending  I have personally reviewed pertinent reports     and I have personally reviewed pertinent films in PACS    Micro:  Lab Results   Component Value Date    BLOODCX No Growth at 48 hrs  08/02/2019    BLOODCX No Growth at 48 hrs  08/02/2019     Allergies: No Known Allergies  Medications:   Scheduled Meds:  Current Facility-Administered Medications:  acetaminophen 650 mg Rectal Q6H PRN Ro Cordero PA-C    chlorhexidine 15 mL Swish & Spit Q12H [address] Dion Edgar PA-C    dexmedetomidine 0 1-0 7 mcg/kg/hr Intravenous Titrated Ro Cordero PA-C Last Rate: Stopped (08/04/19 1515)   fentaNYL 100 mcg/hr Intravenous Continuous Reji Evanss Petit-Me Last Rate: Stopped (08/04/19 0954)   fentanyl citrate (PF) 50 mcg Intravenous Q1H PRN Dion Edgar PA-C    heparin (porcine) 5,000 Units Subcutaneous Q8H [address] Ro Cordero PA-C    multi-electrolyte 125 mL/hr Intravenous Continuous Reji Law Petit-Me Last Rate: 125 mL/hr (08/05/19 0012)   norepinephrine 1-30 mcg/min Intravenous Titrated Reji Hughs Petit-Me Last Rate: Stopped (08/04/19 4787)   ondansetron 4 mg Intravenous Q4H PRN Dion Edgar PA-C    pantoprazole 40 mg Intravenous Q12H [address] Cash Hicks PA-C    piperacillin-tazobactam 3 375 g Intravenous Q6H Dion Edgar PA-C Last Rate: Stopped (08/05/19 0558)   propofol 5-50 mcg/kg/min Intravenous Titrated Karrie Rodríguez PA-C Last Rate: 10 mcg/kg/min (08/05/19 0522)     Continuous Infusions:  dexmedetomidine 0 1-0 7 mcg/kg/hr Last Rate: Stopped (08/04/19 1515)   fentaNYL 100 mcg/hr Last Rate: Stopped (08/04/19 0954)   multi-electrolyte 125 mL/hr Last Rate: 125 mL/hr (08/05/19 0012)   norepinephrine 1-30 mcg/min Last Rate: Stopped (08/04/19 2337)   propofol 5-50 mcg/kg/min Last Rate: 10 mcg/kg/min (08/05/19 0522)     PRN Meds:    acetaminophen 650 mg Q6H PRN   fentanyl citrate (PF) 50 mcg Q1H PRN   ondansetron 4 mg Q4H PRN     VTE Pharmacologic Prophylaxis: Heparin  VTE Mechanical Prophylaxis: sequential compression device  Invasive lines and devices: Invasive Devices     Central Venous Catheter Line            CVC Central Lines 08/02/19 Triple 2 days          Arterial Line            Arterial Line 08/03/19 Left Radial 1 day          Drain            Gastrostomy/Enterostomy Jejunostomy 14 Fr   days    Chest Tube 1 Left 12 Fr  2 days    NG/OG/Enteral Tube Orogastric 16 Fr Right mouth 2 days    Negative Pressure Wound Therapy (V A C ) Abdomen 2 days    Urethral Catheter Latex;Straight-tip 16 Fr  2 days          Airway            ETT  Cuffed 7 mm 2 days                   Counseling / Coordination of Care  Total Critical Care time spent 41 minutes excluding procedures, teaching and family updates  Code Status: Level 1 - Full Code    Portions of the record may have been created with voice recognition software  Occasional wrong word or "sound a like" substitutions may have occurred due to the inherent limitations of voice recognition software  Read the chart carefully and recognize, using context, where substitutions have occurred      Lacho Do PA-C

## 2019-08-05 NOTE — DISCHARGE INSTR - OTHER ORDERS
Skin care plans:  1-Hydraguard to bilateral sacrum, buttock and heels BID and PRN  2-Elevate heels to offload pressure  3-Ehob cushion in chair when out of bed  4-Moisturize skin daily with skin nourishing cream   5-Turn/reposition q2h or when medically stable for pressure re-distribution on skin     6  Right and left abdominal partial thickness wounds - cleanse with soap and water then apply 3 M no sting to the dry areas and apply maxorb robe to the left upper skin tear daily

## 2019-08-05 NOTE — PROGRESS NOTES
Progress Note - General Surgery   Jimmie Chung 78 y o  female MRN: [id number]  Unit/Bed#: ICU 02 Encounter: [id number]    Assessment:  77 yo F w/ septic shock s/p ventral hernia repair, now s/p Ex lap w/ SBR for iatrogenic jejunal injury  Plan: Will tie down retention sutures today, remove wound vac  Extubation / SBT per ICU  Continue IV antibiotics  If unable to extubate today, consider initiation TF by NGT  Continue CT to suction    Subjective/Objective   Chief Complaint:     Subjective: No acute issues  Off pressors since 11 pm last night  Failed SBT yesterday    Objective:     Blood pressure 103/54, pulse (!) 108, temperature 100 4 °F (38 °C), temperature source Rectal, resp  rate (!) 26, height 5' 4" (1 626 m), weight 74 9 kg (165 lb 2 oz), SpO2 98 %  ,Body mass index is 28 34 kg/m²  Intake/Output Summary (Last 24 hours) at 8/5/2019 1117  Last data filed at 8/5/2019 1000  Gross per 24 hour   Intake 3796 98 ml   Output 1845 ml   Net 1951 98 ml       Invasive Devices     Central Venous Catheter Line            CVC Central Lines 08/02/19 Triple 3 days          Arterial Line            Arterial Line 08/03/19 Left Radial 1 day          Drain            Gastrostomy/Enterostomy Jejunostomy 14 Fr   days    Chest Tube 1 Left 12 Fr  3 days    NG/OG/Enteral Tube Orogastric 16 Fr Right mouth 3 days    Urethral Catheter Latex;Straight-tip 16 Fr  3 days    Negative Pressure Wound Therapy (V A C ) Abdomen 2 days          Airway            ETT  Cuffed 7 mm 3 days                Physical Exam:   Gen: A&O, NAD  Cardio: RRR  Lungs: CTAB  Abd: Soft, non distended, non tender   Vac in place w/ retention sutures      Lab, Imaging and other studies:  CBC:   Lab Results   Component Value Date    WBC 16 14 (H) 08/05/2019    HGB 9 6 (L) 08/05/2019    HCT 30 2 (L) 08/05/2019    MCV 89 08/05/2019     08/05/2019    MCH 28 3 08/05/2019    MCHC 31 8 08/05/2019    RDW 15 7 (H) 08/05/2019    MPV 10 8 08/05/2019 NRBC 0 08/05/2019   , CMP:   Lab Results   Component Value Date    SODIUM 145 08/05/2019    K 3 4 (L) 08/05/2019     (H) 08/05/2019    CO2 22 08/05/2019    BUN 17 08/05/2019    CREATININE 0 53 (L) 08/05/2019    CALCIUM 8 1 (L) 08/05/2019    EGFR 97 08/05/2019   , Coagulation: No results found for: PT, INR, APTT, Urinalysis: No results found for: COLORU, CLARITYU, SPECGRAV, PHUR, LEUKOCYTESUR, NITRITE, PROTEINUA, GLUCOSEU, KETONESU, BILIRUBINUR, BLOODU  VTE Pharmacologic Prophylaxis: Heparin  VTE Mechanical Prophylaxis: sequential compression device

## 2019-08-05 NOTE — RESPIRATORY THERAPY NOTE
RT Ventilator Management Note  Paco Chung 78 y o  female MRN: [id number]  Unit/Bed#: ICU 02 Encounter: [id number]      Daily Screen       8/4/2019  0813 8/5/2019  0800          Patient safety screen outcome[de-identified]  Passed  Passed      Spont breathing trial % for 30 min:  Yes  Yes      Spont breathing trial outcome[de-identified]  Failed  Passed  (Pended)       Spont breathing trial reason failed:  RR < 8 bpm;Tidal volume < 4ml/Kg of ideal body weight or VE <5 or  >15 LPM        Previous settings resumed:  Yes  Yes  (Pended)       RSBI:    80  (Pended)               Physical Exam:   Assessment Type: Assess only      Resp Comments: Pt  found on CPAP/PS

## 2019-08-05 NOTE — PROGRESS NOTES
Pt  Weaned on ps/cpap and extubated to nasal cannula as ordered  o2 sats adequate, no respiratory distress noted  Family at bedside with pt

## 2019-08-05 NOTE — QUICK NOTE
Wound vac was taken down  There was one black sponge removed  The skin was closed with nylon sutures which were already in place  Steristrips also applied  Sutures to stay for 2 weeks  Pt tolerated the procedure well and there were no complications

## 2019-08-05 NOTE — PROGRESS NOTES
08/05/19 [address]   Spiritual Beliefs/Perceptions   Support Systems Children   Stress Factors   Family Stress Factors Health changes   Coping Responses   Family Coping Sadness; Anxiety   Plan of Care   Comments Cultivated a relationship of care and support with patient's daughter  Listened empathetically about pt's helath situation     Assessment Completed by: Unit visit

## 2019-08-06 ENCOUNTER — APPOINTMENT (INPATIENT)
Dept: RADIOLOGY | Facility: HOSPITAL | Age: 69
DRG: 856 | End: 2019-08-06
Payer: MEDICARE

## 2019-08-06 LAB
ANION GAP SERPL CALCULATED.3IONS-SCNC: 10 MMOL/L (ref 4–13)
ANION GAP SERPL CALCULATED.3IONS-SCNC: 16 MMOL/L (ref 4–13)
ANION GAP SERPL CALCULATED.3IONS-SCNC: 8 MMOL/L (ref 4–13)
BASOPHILS # BLD AUTO: 0.03 THOUSANDS/ΜL (ref 0–0.1)
BASOPHILS # BLD AUTO: 0.05 THOUSANDS/ΜL (ref 0–0.1)
BASOPHILS NFR BLD AUTO: 0 % (ref 0–1)
BASOPHILS NFR BLD AUTO: 0 % (ref 0–1)
BUN SERPL-MCNC: 8 MG/DL (ref 5–25)
BUN SERPL-MCNC: 9 MG/DL (ref 5–25)
BUN SERPL-MCNC: 9 MG/DL (ref 5–25)
CALCIUM SERPL-MCNC: 6.5 MG/DL (ref 8.3–10.1)
CALCIUM SERPL-MCNC: 7.5 MG/DL (ref 8.3–10.1)
CALCIUM SERPL-MCNC: 7.7 MG/DL (ref 8.3–10.1)
CHLORIDE SERPL-SCNC: 107 MMOL/L (ref 100–108)
CHLORIDE SERPL-SCNC: 108 MMOL/L (ref 100–108)
CHLORIDE SERPL-SCNC: 109 MMOL/L (ref 100–108)
CO2 SERPL-SCNC: 22 MMOL/L (ref 21–32)
CO2 SERPL-SCNC: 26 MMOL/L (ref 21–32)
CO2 SERPL-SCNC: 26 MMOL/L (ref 21–32)
CREAT SERPL-MCNC: 0.3 MG/DL (ref 0.6–1.3)
CREAT SERPL-MCNC: 0.41 MG/DL (ref 0.6–1.3)
CREAT SERPL-MCNC: 0.42 MG/DL (ref 0.6–1.3)
EOSINOPHIL # BLD AUTO: 0.08 THOUSAND/ΜL (ref 0–0.61)
EOSINOPHIL # BLD AUTO: 0.1 THOUSAND/ΜL (ref 0–0.61)
EOSINOPHIL NFR BLD AUTO: 1 % (ref 0–6)
EOSINOPHIL NFR BLD AUTO: 1 % (ref 0–6)
ERYTHROCYTE [DISTWIDTH] IN BLOOD BY AUTOMATED COUNT: 15.5 % (ref 11.6–15.1)
ERYTHROCYTE [DISTWIDTH] IN BLOOD BY AUTOMATED COUNT: 15.6 % (ref 11.6–15.1)
GFR SERPL CREATININE-BSD FRML MDRD: 104 ML/MIN/1.73SQ M
GFR SERPL CREATININE-BSD FRML MDRD: 105 ML/MIN/1.73SQ M
GFR SERPL CREATININE-BSD FRML MDRD: 116 ML/MIN/1.73SQ M
GLUCOSE SERPL-MCNC: 105 MG/DL (ref 65–140)
GLUCOSE SERPL-MCNC: 105 MG/DL (ref 65–140)
GLUCOSE SERPL-MCNC: 66 MG/DL (ref 65–140)
GLUCOSE SERPL-MCNC: 76 MG/DL (ref 65–140)
HCT VFR BLD AUTO: 26.1 % (ref 34.8–46.1)
HCT VFR BLD AUTO: 30.5 % (ref 34.8–46.1)
HGB BLD-MCNC: 8.3 G/DL (ref 11.5–15.4)
HGB BLD-MCNC: 9.9 G/DL (ref 11.5–15.4)
IMM GRANULOCYTES # BLD AUTO: >0.5 THOUSAND/UL (ref 0–0.2)
IMM GRANULOCYTES # BLD AUTO: >0.5 THOUSAND/UL (ref 0–0.2)
IMM GRANULOCYTES NFR BLD AUTO: 10 % (ref 0–2)
IMM GRANULOCYTES NFR BLD AUTO: 9 % (ref 0–2)
LACTATE SERPL-SCNC: 0.7 MMOL/L (ref 0.5–2)
LYMPHOCYTES # BLD AUTO: 1.52 THOUSANDS/ΜL (ref 0.6–4.47)
LYMPHOCYTES # BLD AUTO: 1.73 THOUSANDS/ΜL (ref 0.6–4.47)
LYMPHOCYTES NFR BLD AUTO: 10 % (ref 14–44)
LYMPHOCYTES NFR BLD AUTO: 9 % (ref 14–44)
MCH RBC QN AUTO: 28 PG (ref 26.8–34.3)
MCH RBC QN AUTO: 28.1 PG (ref 26.8–34.3)
MCHC RBC AUTO-ENTMCNC: 31.8 G/DL (ref 31.4–37.4)
MCHC RBC AUTO-ENTMCNC: 32.5 G/DL (ref 31.4–37.4)
MCV RBC AUTO: 86 FL (ref 82–98)
MCV RBC AUTO: 89 FL (ref 82–98)
MONOCYTES # BLD AUTO: 0.32 THOUSAND/ΜL (ref 0.17–1.22)
MONOCYTES # BLD AUTO: 0.39 THOUSAND/ΜL (ref 0.17–1.22)
MONOCYTES NFR BLD AUTO: 2 % (ref 4–12)
MONOCYTES NFR BLD AUTO: 2 % (ref 4–12)
NEUTROPHILS # BLD AUTO: 12.79 THOUSANDS/ΜL (ref 1.85–7.62)
NEUTROPHILS # BLD AUTO: 13.97 THOUSANDS/ΜL (ref 1.85–7.62)
NEUTS SEG NFR BLD AUTO: 78 % (ref 43–75)
NEUTS SEG NFR BLD AUTO: 78 % (ref 43–75)
NRBC BLD AUTO-RTO: 0 /100 WBCS
NRBC BLD AUTO-RTO: 0 /100 WBCS
PLATELET # BLD AUTO: 182 THOUSANDS/UL (ref 149–390)
PLATELET # BLD AUTO: 204 THOUSANDS/UL (ref 149–390)
PMV BLD AUTO: 10.7 FL (ref 8.9–12.7)
PMV BLD AUTO: 10.7 FL (ref 8.9–12.7)
POTASSIUM SERPL-SCNC: 2.9 MMOL/L (ref 3.5–5.3)
POTASSIUM SERPL-SCNC: 3.1 MMOL/L (ref 3.5–5.3)
POTASSIUM SERPL-SCNC: 3.3 MMOL/L (ref 3.5–5.3)
RBC # BLD AUTO: 2.95 MILLION/UL (ref 3.81–5.12)
RBC # BLD AUTO: 3.53 MILLION/UL (ref 3.81–5.12)
SODIUM SERPL-SCNC: 141 MMOL/L (ref 136–145)
SODIUM SERPL-SCNC: 144 MMOL/L (ref 136–145)
SODIUM SERPL-SCNC: 147 MMOL/L (ref 136–145)
WBC # BLD AUTO: 16.42 THOUSAND/UL (ref 4.31–10.16)
WBC # BLD AUTO: 17.93 THOUSAND/UL (ref 4.31–10.16)

## 2019-08-06 PROCEDURE — 97163 PT EVAL HIGH COMPLEX 45 MIN: CPT

## 2019-08-06 PROCEDURE — 71045 X-RAY EXAM CHEST 1 VIEW: CPT

## 2019-08-06 PROCEDURE — 82948 REAGENT STRIP/BLOOD GLUCOSE: CPT

## 2019-08-06 PROCEDURE — 94760 N-INVAS EAR/PLS OXIMETRY 1: CPT

## 2019-08-06 PROCEDURE — G8996 SWALLOW CURRENT STATUS: HCPCS

## 2019-08-06 PROCEDURE — G8988 SELF CARE GOAL STATUS: HCPCS

## 2019-08-06 PROCEDURE — G8978 MOBILITY CURRENT STATUS: HCPCS

## 2019-08-06 PROCEDURE — 80048 BASIC METABOLIC PNL TOTAL CA: CPT | Performed by: STUDENT IN AN ORGANIZED HEALTH CARE EDUCATION/TRAINING PROGRAM

## 2019-08-06 PROCEDURE — G8979 MOBILITY GOAL STATUS: HCPCS

## 2019-08-06 PROCEDURE — 85025 COMPLETE CBC W/AUTO DIFF WBC: CPT | Performed by: SURGERY

## 2019-08-06 PROCEDURE — 80048 BASIC METABOLIC PNL TOTAL CA: CPT | Performed by: GENERAL PRACTICE

## 2019-08-06 PROCEDURE — 83605 ASSAY OF LACTIC ACID: CPT | Performed by: SURGERY

## 2019-08-06 PROCEDURE — G8997 SWALLOW GOAL STATUS: HCPCS

## 2019-08-06 PROCEDURE — NC001 PR NO CHARGE: Performed by: SURGERY

## 2019-08-06 PROCEDURE — 87081 CULTURE SCREEN ONLY: CPT | Performed by: GENERAL PRACTICE

## 2019-08-06 PROCEDURE — 99233 SBSQ HOSP IP/OBS HIGH 50: CPT | Performed by: SURGERY

## 2019-08-06 PROCEDURE — 80048 BASIC METABOLIC PNL TOTAL CA: CPT | Performed by: SURGERY

## 2019-08-06 PROCEDURE — G8987 SELF CARE CURRENT STATUS: HCPCS

## 2019-08-06 PROCEDURE — 92610 EVALUATE SWALLOWING FUNCTION: CPT

## 2019-08-06 PROCEDURE — 97167 OT EVAL HIGH COMPLEX 60 MIN: CPT

## 2019-08-06 RX ORDER — PRAVASTATIN SODIUM 20 MG
20 TABLET ORAL
Status: DISCONTINUED | OUTPATIENT
Start: 2019-08-06 | End: 2019-08-07

## 2019-08-06 RX ORDER — FLUCONAZOLE 2 MG/ML
400 INJECTION, SOLUTION INTRAVENOUS EVERY 24 HOURS
Status: DISCONTINUED | OUTPATIENT
Start: 2019-08-07 | End: 2019-08-07

## 2019-08-06 RX ORDER — GABAPENTIN 100 MG/1
100 CAPSULE ORAL
Status: DISCONTINUED | OUTPATIENT
Start: 2019-08-06 | End: 2019-08-27 | Stop reason: HOSPADM

## 2019-08-06 RX ORDER — DEXTROSE AND SODIUM CHLORIDE 5; .45 G/100ML; G/100ML
75 INJECTION, SOLUTION INTRAVENOUS CONTINUOUS
Status: DISCONTINUED | OUTPATIENT
Start: 2019-08-06 | End: 2019-08-06

## 2019-08-06 RX ORDER — ESCITALOPRAM OXALATE 10 MG/1
10 TABLET ORAL DAILY
Status: DISCONTINUED | OUTPATIENT
Start: 2019-08-06 | End: 2019-08-07

## 2019-08-06 RX ORDER — ACETAMINOPHEN 325 MG/1
650 TABLET ORAL EVERY 6 HOURS PRN
Status: DISCONTINUED | OUTPATIENT
Start: 2019-08-06 | End: 2019-08-27 | Stop reason: HOSPADM

## 2019-08-06 RX ORDER — DEXTROSE, SODIUM CHLORIDE, AND POTASSIUM CHLORIDE 5; .45; .15 G/100ML; G/100ML; G/100ML
75 INJECTION INTRAVENOUS CONTINUOUS
Status: DISCONTINUED | OUTPATIENT
Start: 2019-08-06 | End: 2019-08-07

## 2019-08-06 RX ORDER — OXYCODONE HYDROCHLORIDE 5 MG/1
5 TABLET ORAL EVERY 4 HOURS PRN
Status: DISCONTINUED | OUTPATIENT
Start: 2019-08-06 | End: 2019-08-27 | Stop reason: HOSPADM

## 2019-08-06 RX ORDER — PANTOPRAZOLE SODIUM 40 MG/1
40 TABLET, DELAYED RELEASE ORAL
Status: DISCONTINUED | OUTPATIENT
Start: 2019-08-06 | End: 2019-08-06

## 2019-08-06 RX ORDER — POTASSIUM CHLORIDE 29.8 MG/ML
40 INJECTION INTRAVENOUS ONCE
Status: COMPLETED | OUTPATIENT
Start: 2019-08-06 | End: 2019-08-07

## 2019-08-06 RX ORDER — POTASSIUM CHLORIDE 29.8 MG/ML
40 INJECTION INTRAVENOUS ONCE
Status: COMPLETED | OUTPATIENT
Start: 2019-08-06 | End: 2019-08-06

## 2019-08-06 RX ORDER — MAGNESIUM SULFATE HEPTAHYDRATE 40 MG/ML
2 INJECTION, SOLUTION INTRAVENOUS ONCE
Status: COMPLETED | OUTPATIENT
Start: 2019-08-06 | End: 2019-08-06

## 2019-08-06 RX ORDER — POTASSIUM CHLORIDE 20MEQ/15ML
40 LIQUID (ML) ORAL ONCE
Status: COMPLETED | OUTPATIENT
Start: 2019-08-06 | End: 2019-08-06

## 2019-08-06 RX ORDER — FENTANYL CITRATE 50 UG/ML
25 INJECTION, SOLUTION INTRAMUSCULAR; INTRAVENOUS EVERY 2 HOUR PRN
Status: DISCONTINUED | OUTPATIENT
Start: 2019-08-06 | End: 2019-08-07

## 2019-08-06 RX ORDER — FOLIC ACID 1 MG/1
1 TABLET ORAL DAILY
Status: DISCONTINUED | OUTPATIENT
Start: 2019-08-07 | End: 2019-08-07

## 2019-08-06 RX ORDER — POTASSIUM CHLORIDE 20 MEQ/1
40 TABLET, EXTENDED RELEASE ORAL ONCE
Status: DISCONTINUED | OUTPATIENT
Start: 2019-08-06 | End: 2019-08-06

## 2019-08-06 RX ORDER — FLUCONAZOLE 200 MG/100ML
800 INJECTION, SOLUTION INTRAVENOUS ONCE
Status: COMPLETED | OUTPATIENT
Start: 2019-08-06 | End: 2019-08-06

## 2019-08-06 RX ORDER — PANTOPRAZOLE SODIUM 40 MG/1
40 TABLET, DELAYED RELEASE ORAL
Status: DISCONTINUED | OUTPATIENT
Start: 2019-08-06 | End: 2019-08-07

## 2019-08-06 RX ORDER — SODIUM CHLORIDE, SODIUM GLUCONATE, SODIUM ACETATE, POTASSIUM CHLORIDE, MAGNESIUM CHLORIDE, SODIUM PHOSPHATE, DIBASIC, AND POTASSIUM PHOSPHATE .53; .5; .37; .037; .03; .012; .00082 G/100ML; G/100ML; G/100ML; G/100ML; G/100ML; G/100ML; G/100ML
1000 INJECTION, SOLUTION INTRAVENOUS ONCE
Status: COMPLETED | OUTPATIENT
Start: 2019-08-06 | End: 2019-08-06

## 2019-08-06 RX ORDER — OXYCODONE HYDROCHLORIDE 5 MG/1
2.5 TABLET ORAL EVERY 4 HOURS PRN
Status: DISCONTINUED | OUTPATIENT
Start: 2019-08-06 | End: 2019-08-27 | Stop reason: HOSPADM

## 2019-08-06 RX ORDER — LANOLIN ALCOHOL/MO/W.PET/CERES
3 CREAM (GRAM) TOPICAL
Status: DISCONTINUED | OUTPATIENT
Start: 2019-08-06 | End: 2019-08-07

## 2019-08-06 RX ADMIN — GABAPENTIN 100 MG: 100 CAPSULE ORAL at 21:53

## 2019-08-06 RX ADMIN — Medication 800 MG: at 08:00

## 2019-08-06 RX ADMIN — PANTOPRAZOLE SODIUM 40 MG: 40 TABLET, DELAYED RELEASE ORAL at 16:51

## 2019-08-06 RX ADMIN — POTASSIUM CHLORIDE 40 MEQ: 400 INJECTION, SOLUTION INTRAVENOUS at 08:30

## 2019-08-06 RX ADMIN — OXYCODONE HYDROCHLORIDE 5 MG: 5 TABLET ORAL at 23:48

## 2019-08-06 RX ADMIN — DEXTROSE, SODIUM CHLORIDE, AND POTASSIUM CHLORIDE 75 ML/HR: 5; .45; .15 INJECTION INTRAVENOUS at 09:16

## 2019-08-06 RX ADMIN — ACETAMINOPHEN 650 MG: 650 SUPPOSITORY RECTAL at 05:05

## 2019-08-06 RX ADMIN — POTASSIUM CHLORIDE 40 MEQ: 400 INJECTION, SOLUTION INTRAVENOUS at 19:15

## 2019-08-06 RX ADMIN — MELATONIN 3 MG: 3 TAB ORAL at 21:53

## 2019-08-06 RX ADMIN — POTASSIUM CHLORIDE 40 MEQ: 400 INJECTION, SOLUTION INTRAVENOUS at 16:51

## 2019-08-06 RX ADMIN — HEPARIN SODIUM 5000 UNITS: 5000 INJECTION INTRAVENOUS; SUBCUTANEOUS at 05:05

## 2019-08-06 RX ADMIN — PIPERACILLIN SODIUM AND TAZOBACTAM SODIUM 3.38 G: 36; 4.5 INJECTION, POWDER, FOR SOLUTION INTRAVENOUS at 23:55

## 2019-08-06 RX ADMIN — HEPARIN SODIUM 5000 UNITS: 5000 INJECTION INTRAVENOUS; SUBCUTANEOUS at 14:17

## 2019-08-06 RX ADMIN — PIPERACILLIN SODIUM AND TAZOBACTAM SODIUM 3.38 G: 36; 4.5 INJECTION, POWDER, FOR SOLUTION INTRAVENOUS at 12:15

## 2019-08-06 RX ADMIN — ESCITALOPRAM OXALATE 10 MG: 10 TABLET ORAL at 13:00

## 2019-08-06 RX ADMIN — PANTOPRAZOLE SODIUM 40 MG: 40 TABLET, DELAYED RELEASE ORAL at 09:41

## 2019-08-06 RX ADMIN — PIPERACILLIN SODIUM AND TAZOBACTAM SODIUM 3.38 G: 36; 4.5 INJECTION, POWDER, FOR SOLUTION INTRAVENOUS at 05:05

## 2019-08-06 RX ADMIN — FENTANYL CITRATE 25 MCG: 50 INJECTION, SOLUTION INTRAMUSCULAR; INTRAVENOUS at 21:53

## 2019-08-06 RX ADMIN — MAGNESIUM SULFATE HEPTAHYDRATE 2 G: 40 INJECTION, SOLUTION INTRAVENOUS at 09:00

## 2019-08-06 RX ADMIN — OXYCODONE HYDROCHLORIDE 2.5 MG: 5 TABLET ORAL at 15:16

## 2019-08-06 RX ADMIN — SODIUM CHLORIDE, SODIUM GLUCONATE, SODIUM ACETATE, POTASSIUM CHLORIDE, MAGNESIUM CHLORIDE, SODIUM PHOSPHATE, DIBASIC, AND POTASSIUM PHOSPHATE 125 ML/HR: .53; .5; .37; .037; .03; .012; .00082 INJECTION, SOLUTION INTRAVENOUS at 04:03

## 2019-08-06 RX ADMIN — FENTANYL CITRATE 25 MCG: 50 INJECTION, SOLUTION INTRAMUSCULAR; INTRAVENOUS at 16:49

## 2019-08-06 RX ADMIN — POTASSIUM CHLORIDE 40 MEQ: 20 SOLUTION ORAL at 09:46

## 2019-08-06 RX ADMIN — SODIUM CHLORIDE, SODIUM GLUCONATE, SODIUM ACETATE, POTASSIUM CHLORIDE, MAGNESIUM CHLORIDE, SODIUM PHOSPHATE, DIBASIC, AND POTASSIUM PHOSPHATE 1000 ML: .53; .5; .37; .037; .03; .012; .00082 INJECTION, SOLUTION INTRAVENOUS at 00:43

## 2019-08-06 RX ADMIN — HEPARIN SODIUM 5000 UNITS: 5000 INJECTION INTRAVENOUS; SUBCUTANEOUS at 21:53

## 2019-08-06 RX ADMIN — PIPERACILLIN SODIUM AND TAZOBACTAM SODIUM 3.38 G: 36; 4.5 INJECTION, POWDER, FOR SOLUTION INTRAVENOUS at 18:50

## 2019-08-06 RX ADMIN — PRAVASTATIN SODIUM 20 MG: 20 TABLET ORAL at 16:51

## 2019-08-06 NOTE — PLAN OF CARE
Problem: PHYSICAL THERAPY ADULT  Goal: Performs mobility at highest level of function for planned discharge setting  See evaluation for individualized goals  Description  Treatment/Interventions: LE strengthening/ROM, Functional transfer training, Therapeutic exercise, Endurance training, Bed mobility, Spoke to nursing, OT          See flowsheet documentation for full assessment, interventions and recommendations  Note:   Prognosis: Fair  Problem List: Decreased strength, Decreased range of motion, Decreased endurance, Impaired balance, Decreased mobility, Decreased cognition, Pain  Assessment: Pt is a 77 yo female presenting to [facility] from [facility] on 8/2/19 s/p elective laparoscopic ventral hernia repair with mesh  Pt has had complicated post operative course including septic shock, SOB and acute hypoxia requiring intubation  Pt extubated on PT evaluation  Pt  has a past medical history of Abnormal blood chemistry, Achalasia, esophageal, Acute medial meniscus tear, Acute otitis externa, Adrenal nodule (HCC), Anemia, Anxiety, Arthritis, Atrial fibrillation (HCC), Atypical chest pain, Eagle's esophagus with high grade dysplasia, Cancer ([facility]), Cerumen impaction, Chronic pain of right knee, Coronary artery disease, Depression, Dysphagia, Esophageal stricture, GERD (gastroesophageal reflux disease), Headache, Hiatal hernia, Insomnia, Jejunostomy tube present (HCC), Low vitamin D level, Osteoarthritis, knee, Pneumonia, Right leg paresthesias, Sciatica, Shingles, Spondylosis of 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, and Venous insufficiency  Pt is supine at start of session and agreeable to therapy  Pt is lethargic and requires increased time for processing but is able to follow all commands  Pt transferred supine <> sit with maxA x2  Upon sitting EOB pt requires maxA x1 for static sitting   Pt transferred sit <> stand with maxA x2 and hand held assist  Pt presents with significant retropulsion in standing and unable to maintain static stand > 5 seconds  Transfer to chair is deferred at this time 2/2 poor standing tolerance  Pt returned to supine and remained supine with bed in chair position at end of session  PT to recommend inpt rehab to maximize safety and independence with functional mobility  PT to follow  Barriers to Discharge: Decreased caregiver support     Recommendation: (S) Post acute IP rehab     PT - OK to Discharge: (S) Yes(To rehab when medically stable)    See flowsheet documentation for full assessment        Gold Ingram, PT, DPT

## 2019-08-06 NOTE — RESTORATIVE TECHNICIAN NOTE
Restorative Specialist Mobility Note       Activity: Dangle, Stand at bedside(Assisted PT, please refer to PT notes for all information  Patient returned BTB)     Assistive Device: Other (Comment)(HHAx2)        Repositioned: Sitting(sitting in chair position in bed )           Range of Motion: Active, All extremities  Anti-Embolism Device On: Bilateral, Sequential compression devices, below knee         Patient left resting comfortably in bed, with call bell/table within reach and RN Damian Garcia present in room

## 2019-08-06 NOTE — PLAN OF CARE
Problem: OCCUPATIONAL THERAPY ADULT  Goal: Performs self-care activities at highest level of function for planned discharge setting  See evaluation for individualized goals  Description  Treatment Interventions: ADL retraining, Functional transfer training, UE strengthening/ROM, Endurance training, Cognitive reorientation, Patient/family training, Equipment evaluation/education, Compensatory technique education, Fine motor coordination activities, Energy conservation, Activityengagement          See flowsheet documentation for full assessment, interventions and recommendations  Note:   Limitation: Decreased ADL status, Decreased UE ROM, Decreased UE strength, Decreased Safe judgement during ADL, Decreased cognition, Decreased endurance, Decreased fine motor control, Decreased self-care trans, Decreased high-level ADLs  Prognosis: Fair, Good  Assessment: 79 YO Female SEEN FOR INITIAL OCCUPATIONAL THERAPY EVALUATION FOLLOWING TXF FROM [facility] S/P ELECTIVE LAPAROSCOPIC VENTRAL HERNIA REPAIR WITH MESH WITH POST OP HYPOXIA, HYPOTENSION AND SEPTIC SHOCK REQUIRING INTUBATION AND PLACED ON MECHANICAL VENT  PROBLEMS LIST INCLUDES ANEMIA, ARTHRITIS, A-FIB, LENNON'S ESOPHAGUS WITH HIGH GRADE DYSPLASIA, CA, CHRONIC R KNEE PAIN, CAD, DEPRESSION, SCIATICA, SPONDYLOSIS AND CHEST TUBE TO WATER SEAL  PT IS FROM AN APT ALONE WHERE SHE REPORTS BEING INDEPENDENT WITH ADLS/LT IADLS/DRIVING PTA  PT CURRENTLY REQUIRES OVERALL MAX-TOTAL A WITH ADLS, AND MAX A X2 FOR ROLLING R/L IN BED AND SUPINE<->SIT TRANSFERS  PT SAT EOB FOR ~5 MINUTES WITH MAX A  PT RETURNED TO BED AND REPOSITIONED WITH B/L UE ELEVATED ON PILLOWS TO ASSIST WITH EDEMA MANAGEMENT- PT NOT APPROPRIATE FOR FURTHER MOBILITY/ACTIVITY AT THIS TIME   PT IS LIMITED 2' PAIN, FATIGUE, IMPAIRED BALANCE, FALL RISK , LIMITED SAFETY AWARENESS/INSIGHT/JUDGEMENT, SLOW TO PROCESS/RESPOND, OVERALL WEAKNESS/DECONDITIONING , DIZZINESS WITH CHANGE OF POSITIONING , LIMITED FAMILY/FRIEND SUPPORT , INACCESSIBLE HOME ENVIRONMENT and OVERALL LIMITED ACTIVITY TOLERANCE  PT EDUCATED ON DEEP BREATHING TECHNIQUES T/O ACTIVITY, INCREASED FAMILY SUPPORT and CONTINUE PARTICIPATION IN SELF-CARE/MOBILITY WITH STAFF WHILE IN [facility]   FROM AN OCCUPATIONAL THERAPY PERSPECTIVE, PT WOULD BENEFIT FROM ADDITIONAL OT SERVICES IN AN INPT REHAB SETTING UPON D/C  WILL CONT TO FOLLOW TO ADDRESS THE BELOW DESCRIBED GOALS  Recommendation: Geriatric Consult(MAY BENEFIT FROM NEUROPSYCH CONSULT FOR COPING)  OT Discharge Recommendation: Short Term Rehab  OT - OK to Discharge:  Yes

## 2019-08-06 NOTE — PROGRESS NOTES
Progress Note - [address] [place] 78 y o  female MRN: [id number]  Unit/Bed#: ICU 02 Encounter: [id number]    Assessment:   Principal Problem:    OR Exploratory laparotomy, SBR secondary to iatrogenic enterotomy   Active Problems:    Incisional hernia    Severe sepsis with septic shock (CODE) (HCC)    Acute respiratory failure with hypoxemia (HCC)    Acute renal failure (ARF) (HCC)    Postprocedural pneumothorax    Shock ([address] )    OR Laparoscopic ventral hernia repair  Resolved Problems:    * No resolved hospital problems   *      Plan  Neuro:   Pain  · Pain controlled with: prn fentanyl, stop today and transition to PO oxy ir regime prn  · Delirium Precautions  · CAM ICU per protocol  · Regulate sleep/wake cycle+  · Melatonin qhs  · Trend neuro exam  CV:   S/p septic shock, remains off pressors >24 hours  Hx of triple vessel CAD  · MAP goal > 65  · Rhythm: NSR  · Follow rhythm on telemetry  · Review home meds (not listed in chart) and restart today  Lung:   Pulmonary insufficiency  Left apical Pneumothorax with left chest tube on suction  Mild bilateral pleural effusions, improving  · CXR this am improved left apical PTX, plan for switch left chest tube to water seal  · CXR tomorrow am  · Continue NC for goal spo2 >90%  · IS  · OOB to chair  · Deep coughing  · Pulmonary toileting  GI:   NPO  POD 3 S/p ex lap with reanastomosis prox small bowel and placement of wound vac, now wound vac off and midline incision closed  POD 4 s/p ex lap with ventral mesh removal bowel washout and bowel resection  Recent ventral hernia repair with mesh  Hx of Esophagectomy with gastric conduit and J tube placement 2017  · NPO currently, advance diet today  · Speech Eval   · Transition to po meds  · Stress ulcer prophylaxis: for stress ulcer prophylaxis , stop today after diet start  · Bowel regimen: None currently start today  · Monitor abdominal dressing  · Serial abdominal exams  · 5 days planned zosyn, day 4 today  FEN:   · Goal 24 hour fluid balance: even   Fluid/Diuretic plan: decrease IVF to 75 cc /hr isolyte, stop once diet started  · Nutrition/diet plan: NPO  · Replete electrolytes with goals: K >4 0, Mag >2 0, and Phos >3 0  :   YUNG resolved  · Indwelling Pak present: yes, remove today  · Trend UOP and BUN/creat  · Strict I and O  ID:   · Abx ordered: Piperacillin and Tazobactam  · Day 4/5 today  · Trend temps and WBC count  · Yeast 1 of 2 in blood, consider contaminant, monitor further  Heme:   Bandemia  Anemia of blood loss and of disease  · Trend hgb and plts  · Transfuse as needed for goal hgb >7  · Bandemia  Endo:   · Glycemic control plan: Blood glucose controlled on current regimen  · Monitor for goal 140-180  MSK/Skin:  · Mobility goal: OOB to chair today  · PT consult: yes  · OT consult: yes  · Frequent turning and pressure off-loading  Family:  · Family updated within 24 hours: yes   VTE Prophylaxis:  · Pharmacologic Prophylaxis:Heparin  · Mechanical Prophylaxis: sequential compression device    Disposition: Continue ICU care    Goals: PT/OT consult, Speech to eval, bed side swallow and start gently PO diet and oral meds, OOB to chair, switch chest tube to water seal  Remove pak and a line  ______________________________________________________________________  Chief Complaint: offers no complaints, "doing ok"      HPI/24hr events: extubated yesterday am, pressors remained off, fever overnight, t max 101 5 at 7pm, Chest tube placed back to suction  No arrhythmia       Review of Systems   Constitutional: Positive for fatigue and fever  Negative for chills and diaphoresis  HENT: Negative  Respiratory: Negative for cough, shortness of breath and wheezing  Cardiovascular: Negative for chest pain, palpitations and leg swelling  Gastrointestinal: Positive for abdominal distention and abdominal pain  Negative for constipation, diarrhea and vomiting  Genitourinary: Negative      Musculoskeletal: Negative  Neurological: Negative  Hematological: Negative  Psychiatric/Behavioral: Negative       ______________________________________________________________________  Temperature:   Temp (24hrs), Av 7 °F (38 2 °C), Min:100 °F (37 8 °C), Max:101 5 °F (38 6 °C)    Current Temperature: 100 4 °F (38 °C)    Temp:  [100 °F (37 8 °C)-101 5 °F (38 6 °C)] 100 4 °F (38 °C)  HR:  [] 92  Resp:  [17-26] 24  BP: (110-123)/(58-68) 110/58  Arterial Line BP: (124-168)/(60-76) 124/60    Vitals:    19 0400 19 0500 19 0554 19 0600   BP:  110/58     BP Location:       Pulse: 92 96  92   Resp:       Temp: 100 4 °F (38 °C) 100 4 °F (38 °C)  100 4 °F (38 °C)   TempSrc: Rectal      SpO2: 96% 97%  97%   Weight:   78 3 kg (172 lb 9 9 oz)    Height:         Arterial Line BP: 124/60  Arterial Line MAP (mmHg): 84 mmHg     Weights:   IBW: 54 7 kg    Body mass index is 29 63 kg/m²  Weight (last 2 days)     Date/Time   Weight    19 0554   78 3 (172 62)    19 1515   74 9 (165 12)            Height: 5' 4" (162 6 cm)      No results found for: PHART, NUO0WCH, PO2ART, LMI1NIY, A6GUDRMJ, BEART, SOURCE  SpO2: SpO2: 97 %  ______________________________________________________________________  Physical Exam:  Farris Agitation Sedation Scale (RASS): Agitated  Physical Exam   Constitutional: She appears well-developed  HENT:   Head: Normocephalic and atraumatic  Nose: Nose normal    Eyes: Pupils are equal, round, and reactive to light  Conjunctivae and EOM are normal    Neck: No JVD present  No tracheal deviation present  Left CVC   Cardiovascular: Normal rate, regular rhythm, normal heart sounds and intact distal pulses  Pulmonary/Chest: Effort normal  No respiratory distress  She has no wheezes  Decreased BS throughout, scattered rhonchi anteriorly  Left chest tube on suction -20cm/h20, without air leak   Abdominal: Soft  She exhibits no distension  There is no tenderness   There is no rebound and no guarding  Midline incision without evidence of drainage, erythema   Genitourinary:   Genitourinary Comments: pak   Musculoskeletal: She exhibits edema  She exhibits no deformity  Neurological: She is alert  No cranial nerve deficit or sensory deficit  GCS eye subscore is 4  GCS verbal subscore is 5  GCS motor subscore is 6  No slurred speech  Knows 5 nickels in 1 quarter  Moves all 4 extremities without deficit, 5/5 strength   Skin: Skin is warm  Capillary refill takes less than 2 seconds  She is not diaphoretic  No erythema  pak  Left arterial line  Left cvc IJ  ______________________________________________________________________  Intake and Outputs:  I/O       08/04 0701 - 08/05 0700 08/05 0701 - 08/06 0700    I V  (mL/kg) 3552 3 (47 4) 4036 8 (51 6)    NG/GT  60    IV Piggyback 450 100    Total Intake(mL/kg) 4002 3 (53 4) 4196 8 (53 6)    Urine (mL/kg/hr) 1215 (0 7) 3525 (1 9)    Emesis/NG output 100     Drains 100     Chest Tube 220 525    Total Output 1635 4050    Net +2367 3 +146 8                Nutrition:        Diet Orders   (From admission, onward)             Start     Ordered    08/02/19 1247  Diet NPO  Diet effective now     Question Answer Comment   Diet Type NPO    RD to adjust diet per protocol?  Yes        08/02/19 1249                Labs:   Results from last 7 days   Lab Units 08/06/19 0447 08/06/19  0051 08/05/19  0526   WBC Thousand/uL 17 93* 16 42* 16 14*   HEMOGLOBIN g/dL 9 9* 8 3* 9 6*   HEMATOCRIT % 30 5* 26 1* 30 2*   PLATELETS Thousands/uL 204 182 216   NEUTROS PCT % 78* 78* 77*   MONOS PCT % 2* 2* 3*    Results from last 7 days   Lab Units 08/06/19  0447 08/06/19  0051 08/05/19  0526  08/02/19  1300   SODIUM mmol/L 144 147* 145   < > 140   POTASSIUM mmol/L 2 9* 3 3* 3 4*   < > 3 4*   CHLORIDE mmol/L 108 109* 112*   < > 105   CO2 mmol/L 26 22 22   < > 26   CO2, I-STAT   --   --   --    < >  --    BUN mg/dL 9 9 17   < > 31*   CREATININE mg/dL 0 41* 0 30* 0 53* < > 1 11   CALCIUM mg/dL 7 5* 6 5* 8 1*   < > 7 1*   ALK PHOS U/L  --   --   --   --  45*   ALT U/L  --   --   --   --  11*   AST U/L  --   --   --   --  22   GLUCOSE RANDOM mg/dL 76 66 71   < > 135    < > = values in this interval not displayed  Results from last 7 days   Lab Units 08/03/19  1822 08/02/19  1300 08/02/19  0306   MAGNESIUM mg/dL 2 7* 2 3 2 6     Results from last 7 days   Lab Units 08/03/19  1822 08/02/19  1300   PHOSPHORUS mg/dL 2 4 2 7      Results from last 7 days   Lab Units 08/02/19  1811 08/02/19  1300 08/02/19  1125 08/02/19  0813 08/02/19  0617   INR  1 85* 1 90*  --   --  2 00*   PTT seconds  --  36 42* >210* >210*     Results from last 7 days   Lab Units 08/06/19  0051   LACTIC ACID mmol/L 0 7     0   Lab Value Date/Time    TROPONINI <0 02 08/02/2019 1126    TROPONINI <0 02 08/02/2019 0406    TROPONINI <0 02 11/22/2016 1110     Imaging: CXR with improving left apical pneumothorax I have personally reviewed pertinent reports      EKG: NSR with PVC  Micro:  Lab Results   Component Value Date    BLOODCX No Growth at 72 hrs  08/02/2019     Allergies: No Known Allergies  Medications:   Scheduled Meds:  Current Facility-Administered Medications:  acetaminophen 650 mg Rectal Q6H PRN Sarah Rosales PA-C    fentanyl citrate (PF) 50 mcg Intravenous Q1H PRN Algie IMELDA Aguirre    heparin (porcine) 5,000 Units Subcutaneous Q8H [address] Sarah Rosales PA-C    multi-electrolyte 75 mL/hr Intravenous Continuous CATHLEEN NietoC Last Rate: 125 mL/hr (08/06/19 0403)   ondansetron 4 mg Intravenous Q4H PRN Algie ILDA Aguirre-BAR    pantoprazole 40 mg Intravenous Q12H [address] Cash Hicks PA-C    piperacillin-tazobactam 3 375 g Intravenous Q6H Ebony Jewell PA-C Last Rate: 3 375 g (08/06/19 4282)     Continuous Infusions:  multi-electrolyte 75 mL/hr Last Rate: 125 mL/hr (08/06/19 5993)     PRN Meds:    acetaminophen 650 mg Q6H PRN   fentanyl citrate (PF) 50 mcg Q1H PRN   ondansetron 4 mg Q4H PRN Invasive lines and devices: Invasive Devices     Central Venous Catheter Line            CVC Central Lines 08/02/19 Triple 3 days          Arterial Line            Arterial Line 08/03/19 Left Radial 2 days          Drain            Gastrostomy/Enterostomy Jejunostomy 14 Fr   days    Chest Tube 1 Left 12 Fr  3 days    Urethral Catheter Latex;Straight-tip 16 Fr  3 days                   Counseling / Coordination of Care  Total Critical Care time spent 35 minutes excluding procedures, teaching and family updates  Code Status: Level 1 - Full Code    Portions of the record may have been created with voice recognition software  Occasional wrong word or "sound a like" substitutions may have occurred due to the inherent limitations of voice recognition software  Read the chart carefully and recognize, using context, where substitutions have occurred      Shant Stringer PA-C

## 2019-08-06 NOTE — SPEECH THERAPY NOTE
Speech-Language Pathology Bedside Swallow Evaluation      Patient Name: Enrrique Leon    CKTZY'H Date: 8/6/2019     Problem List  Patient Active Problem List   Diagnosis    Osteoarthritis of knee    Joint pain, knee    Eagle's esophagus with high grade dysplasia    3-vessel CAD    Abnormal blood chemistry    A-fib ([address] )    Anemia    Atypical chest pain    Backache with radiation    Continuous left lower quadrant pain    Depression    Dysphagia    Gastroesophageal reflux disease    Hyperlipidemia    Hypothyroidism    Insomnia    Low vitamin D level    Lumbar radiculopathy    Myofascial pain    Right leg paresthesias    Sciatica    Spondylosis of 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    Stricture of esophagus    Venous insufficiency    Incisional hernia    Adrenal adenoma    Incisional hernia, without obstruction or gangrene    [facility]    Acute respiratory failure with hypoxemia ([facility])    Bilateral pneumonia    Acute renal failure (ARF) ([facility])    Postprocedural pneumothorax    Shock ([address] )    OR Exploratory laparotomy, SBR secondary to iatrogenic enterotomy     OR Laparoscopic ventral hernia repair       Past Medical History  Past Medical History:   Diagnosis Date    Abnormal blood chemistry     last assessed 03/06/2014    Achalasia, esophageal     Acute medial meniscus tear     last assessed 03/10/2014    Acute otitis externa     last assessed 03/24/2014    Adrenal nodule ([address] )     Anemia     Anxiety     Arthritis     Atrial fibrillation ([address] )     resolved 01/09/2018    Atypical chest pain     last assessed 11/22/2016    Eagle's esophagus with high grade dysplasia     last assessed 01/03/2018    Cancer ([address] )     esophageal    Cerumen impaction     last assessed 03/24/2014    Chronic pain of right knee     last assessed 04/06/2017    Coronary artery disease     3 vessel, resolved 01/09/2018    Depression     Dysphagia     last assessed 2017    Esophageal stricture     last assessed 2017    GERD (gastroesophageal reflux disease)     Headache     last assessed 2013    Hiatal hernia     Insomnia     last assessed 10/15/2013    [facility] tube present [address] [address]     resolved 2018    Low vitamin D level     resolved 2018    Osteoarthritis, knee     last assessed 2017    Pneumonia     last assessed 2013    Right leg paresthesias     last assessed 2017    Sciatica     last assessed 2015    Shingles     last assessed 2015    Spondylosis of 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     last assessed 2017    Venous insufficiency     last assessed 2013       Past Surgical History  Past Surgical History:   Procedure Laterality Date     SECTION      EXPLORATORY LAPAROTOMY W/ BOWEL RESECTION N/A 2019    Procedure: LAPAROTOMY EXPLORATORY W/ BOWEL RESECTION, APPLICATION OF Arno Cypher;  Surgeon: Nida Rajan DO;  [facility];  Service: General    GASTRECTOMY      partial    GASTROJEJUNOSTOMY W/ JEJUNOSTOMY TUBE N/A 2017    Procedure: JEJUNOSTOMY TUBE PLACEMENT;  Surgeon: Savannah Head MD;  Location: [facility];  Service: Thoracic    JOINT REPLACEMENT Right 11/10/2014    knee, Dr Randy Vale N/A 8/3/2019    Procedure: LAPAROTOMY EXPLORATORY, reanastomosis of proximal small bowel, placement of vac dressing;  Surgeon: Nida Rajan DO;  [facility]: [facility];  Service: General    CA ESOPHAGOGASTRODUODENOSCOPY TRANSORAL DIAGNOSTIC N/A 3/10/2017    Procedure: ESOPHAGOGASTRODUODENOSCOPY (EGD); Surgeon: Zenaida Arndt MD;  [facility]: [facility];  Service: Gastroenterology    CA ESOPHAGOGASTRODUODENOSCOPY TRANSORAL DIAGNOSTIC N/A 2017    Procedure: ESOPHAGOGASTRODUODENOSCOPY (EGD);   Surgeon: Zenaida Arndt MD;  [facility]: [facility];  Service: Gastroenterology    CA ESOPHAGOSCOPY FLEXIBLE TRANSORAL WITH BIOPSY N/A 8/24/2017    Procedure: ESOPHAGOGASTRODUODENOSCOPY (EGD); Surgeon: Opal Wolf MD;  Location:  MAIN OR;  Service: Thoracic    NV LAP, VENTRAL HERNIA REPAIR,REDUCIBLE N/A 7/31/2019    Procedure: REPAIR HERNIA VENTRAL LAPAROSCOPIC;  Surgeon: Jerzy Murillo DO;  [facility]: [facility];  Service: General    NV REMOVAL [address] N/A 8/24/2017    Procedure: TRANSHIATAL ESOPHAGECTOMY;  Surgeon: Opal Wolf MD;  [facility];  Service: Thoracic    STEROID INJECTION KNEE Right 5/2/2017    Procedure: GENICULATE NERVE BLOCKS;  Surgeon: Susie Concepcion DO;  Location: [facility];  Service:        Summary   Patient presented with a mild oropharyngeal dysphagia with limited materials  Suspect lethargy impacting overall swallow function  Patient with reduced oral control and coordination with large sips of thin liquid, minimal anterior labial leakage, and immediate s/s with large sips  Patient with improved control and coordination for small sips of thin liquid with no overt s/s aspiration  Patient recommended to remain NPO with small sips of thin water only when awake, alert, and sitting upright 90 degrees  Provide sips of thin water sparingly for comfort -- if s/s aspiration noted, discontinue trials  SLP to re-assess 8/7 as able/as appropriate  Recommendations: NPO and except small sips of H20 when awake and alert and upright 90 degrees     Recommended Form of Meds: whole with liquid / as tolerated     Aspiration precautions and compensatory swallowing strategies: frequent oral care        Current Medical Status  Pt is a 78 y o  female who presented to [facility] for an elective laparoscopic ventral hernia repair with mesh  On postoperative day 1 she had appropriate incisional pain across the abdomen but throughout the day she started developing shortness of breath    Into the morning of postop day 2 she became acutely hypoxic to the 80s, hypotensive to the 80s and tachypneic requiring high-flow nasal cannula in order to maintain saturations of 90%  She was given a total of 2 L of normal saline IV bolus and transferred to [facility] at 0700 hours this morning  At that time she complained of sudden onset pain in her right upper chest and difficulty taking a deep breath  Stat KUB demonstrated some retained air in the right subdiaphragmatic region which was thought to be secondary to retained air on postop day 2 from laparoscopic surgery  Chest x-ray demonstrated bibasilar consolidations with small effusions  At this time anesthesia was at the bedside patient was noted to have worsening hypoxia and was intubated and placed on mechanical ventilation  A left-sided central line was placed secondary to pressor requirements on follow-up chest x-ray she had a left pneumothorax which was resolved with a chest tube  Patient extubated 8/5 and referred for swallowing evaluation  Swallowing evaluation completed at bedside  Current Precautions:  N/A    Past medical history:  esophagectomy 2017, dysphagia,   Please see H&P for details    Special Studies:  CXR:   1  Improved small left apical pneumothorax  2   Increased right pleural effusion and stable left basilar consolidation/ effusion  Social/Education/Vocational Hx:  Pt lives alone      Swallow Information   Current Risks for Dysphagia & Aspiration: known history of dysphagia and recent intubation, h/o esophagectomy     Current Symptoms/Concerns: n/a    Current Diet: NPO except medications      Baseline Diet: regular diet and thin liquids per patient however, reports she cuts foods up and take small meals       Baseline Assessment   Behavior/Cognition: lethargic, cooperative    Speech/Language Status: able to participate in conversation and able to follow commands    Patient Positioning: upright in bed    Pain Status/Interventions/Response to Interventions:   No report of or nonverbal indications of pain         Swallow Mechanism Exam   Facial: symmetrical  Labial: WFL  Lingual: WFL  Velum: unable to visualize  Mandible: adequate ROM  Dentition: adequate, some missing dentition   Vocal quality:hoarse   Volitional Cough: strong/productive   Tracheostomy: n/a      Consistencies Assessed and Performance   Consistencies Administered: thin liquids  Materials administered included thin water    Oral Stage: mild dysphagia with limited materials  Reduced oral control and coordination with large sips of thin liquid via straw, minimal anterior escape  Suspect improved coordination with small single sips via straw  Patient unable to manipulate cup to mouth  Pharyngeal Stage: mild dysphagia with limited materials  Swallow Mechanics:  Swallowing initiation appeared delayed  Laryngeal rise was palpated and judged to be sluggish  Immediate cough with large sip, no overt s/s noted with single small sips  Esophageal Concerns: h/o esophagectomy       Summary and Recommendations (see above)    Results Reviewed with: patient, RN and MD     Treatment Recommended: Skilled speech therapy services to ensure tolerance of least restrictive diet with no overt s/s aspiration  Frequency of treatment: 3-4x/week    Dysphagia Goals per SLP: Patient will tolerate least restrictive diet with no overt s/s aspiration  Pt/Family Education: initiated  Pt and caregivers would benefit from/require continued education      Speech Therapy Prognosis   Prognosis: fair    Prognosis Considerations: cognitive status

## 2019-08-06 NOTE — RESTORATIVE TECHNICIAN NOTE
Restorative Specialist Mobility Note       Activity: Dangle(Assisted OT, please refer to OT notes for all information  Patient returned to supine in bed )     Assistive Device: Other (Comment)(HHAx2)        Repositioned: Supine           Range of Motion: Active, All extremities  Anti-Embolism Device On: Bilateral, Sequential compression devices, below knee       Patient left resting comfortably in bed, with bed alarm activated and call bell within reach

## 2019-08-06 NOTE — OCCUPATIONAL THERAPY NOTE
[address] Evaluation     Patient Name: Ulises Hernandez  Today's Date: 8/6/2019  Problem List  Patient Active Problem List   Diagnosis    Osteoarthritis of knee    Joint pain, knee    Eagle's esophagus with high grade dysplasia    3-vessel CAD    Abnormal blood chemistry    A-fib ([address] )    Anemia    Atypical chest pain    Backache with radiation    Continuous left lower quadrant pain    Depression    Dysphagia    Gastroesophageal reflux disease    Hyperlipidemia    Hypothyroidism    Insomnia    Low vitamin D level    Lumbar radiculopathy    Myofascial pain    Right leg paresthesias    Sciatica    Spondylosis of 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    Stricture of esophagus    Venous insufficiency    Incisional hernia    Adrenal adenoma    Incisional hernia, without obstruction or gangrene    Severe sepsis with septic shock (CODE) ([facility])    Acute respiratory failure with hypoxemia ([facility])    Bilateral pneumonia    Acute renal failure (ARF) ([facility])    Postprocedural pneumothorax    Shock ([address]    OR Exploratory laparotomy, SBR secondary to iatrogenic enterotomy     OR Laparoscopic ventral hernia repair     Past Medical History  Past Medical History:   Diagnosis Date    Abnormal blood chemistry     last assessed 03/06/2014    Achalasia, esophageal     Acute medial meniscus tear     last assessed 03/10/2014    Acute otitis externa     last assessed 03/24/2014    Adrenal nodule ([address] )     Anemia     Anxiety     Arthritis     Atrial fibrillation ([address] )     resolved 01/09/2018    Atypical chest pain     last assessed 11/22/2016    Eagle's esophagus with high grade dysplasia     last assessed 01/03/2018    Cancer ([address] )     esophageal    Cerumen impaction     last assessed 03/24/2014    Chronic pain of right knee     last assessed 04/06/2017    Coronary artery disease     3 vessel, resolved 01/09/2018    Depression     Dysphagia     last assessed 2017    Esophageal stricture     last assessed 2017    GERD (gastroesophageal reflux disease)     Headache     last assessed 2013    Hiatal hernia     Insomnia     last assessed 10/15/2013    [facility] tube [facility]     resolved 2018    Low vitamin D level     resolved 2018    Osteoarthritis, knee     last assessed 2017    Pneumonia     last assessed 2013    Right leg paresthesias     last assessed 2017    Sciatica     last assessed 2015    Shingles     last assessed 2015    Spondylosis of 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     last assessed 2017    Venous insufficiency     last assessed 2013     Past Surgical History  Past Surgical History:   Procedure Laterality Date     SECTION      EXPLORATORY LAPAROTOMY W/ BOWEL RESECTION N/A 2019    Procedure: LAPAROTOMY EXPLORATORY W/ BOWEL RESECTION, APPLICATION OF Alexandre Dine;  Surgeon: Sheron Alexis DO;  Location:  MAIN OR;  Service: General    GASTRECTOMY      partial    GASTROJEJUNOSTOMY W/ JEJUNOSTOMY TUBE N/A 2017    Procedure: JEJUNOSTOMY TUBE PLACEMENT;  Surgeon: Maranda Salgado MD;  Location: [facility];  Service: Thoracic    JOINT REPLACEMENT Right 11/10/2014    knee, Dr Ami Mcfaddne N/A 8/3/2019    Procedure: LAPAROTOMY EXPLORATORY, reanastomosis of proximal small bowel, placement of vac dressing;  Surgeon: Sheron Alexis DO;  Location:  MAIN OR;  Service: General    WY ESOPHAGOGASTRODUODENOSCOPY TRANSORAL DIAGNOSTIC N/A 3/10/2017    Procedure: ESOPHAGOGASTRODUODENOSCOPY (EGD); Surgeon: Paramjit Ruelas MD;  [facility];  Service: Gastroenterology    WY ESOPHAGOGASTRODUODENOSCOPY TRANSORAL DIAGNOSTIC N/A 2017    Procedure: ESOPHAGOGASTRODUODENOSCOPY (EGD);   Surgeon: Paramjit Ruelas MD;  [facility];  Service: Gastroenterology    WY ESOPHAGOSCOPY FLEXIBLE TRANSORAL WITH BIOPSY N/A 2017 Procedure: ESOPHAGOGASTRODUODENOSCOPY (EGD); Surgeon: Hollis Nicole MD;  Location: [facility];  Service: Thoracic    OK LAP, VENTRAL HERNIA REPAIR,REDUCIBLE N/A 7/31/2019    Procedure: REPAIR HERNIA VENTRAL LAPAROSCOPIC;  Surgeon: Jacobo Hernández DO;  [facility];  Service: General    OK REMOVAL [address] THORACOTOMY N/A 8/24/2017    Procedure: TRANSHIATAL ESOPHAGECTOMY;  Surgeon: Hollis Nicole MD;  [facility]: [facility];  Service: Thoracic    STEROID INJECTION KNEE Right 5/2/2017    Procedure: GENICULATE NERVE BLOCKS;  Surgeon: Joan Hoang DO;  [facility];  Service:          08/06/19 1345   Note Type   Note type Eval/Treat   Restrictions/Precautions   Weight Bearing Precautions Per Order No   Other Precautions Cognitive;Multiple lines; Fall Risk;Pain  (CHEST TUBE )   Pain Assessment   Pain Assessment 0-10   Pain Score 5   Pain Location Abdomen;Generalized   Hospital Pain Intervention(s) Repositioned;Distraction; Emotional support   Response to Interventions TOLERATED    Home Living   Type of Home Apartment   Home Layout One level  (0 EUN )   Home Equipment Cane   Additional Comments PT REPORTS USE OF SPC    Prior Function   Level of [place] Independent with ADLs and functional mobility   Lives With Alone   Receives Help From Family   ADL Assistance Independent   IADLs Independent   Falls in the last 6 months 0   Vocational Retired   Lifestyle   Autonomy PT [address] ADLS/IADLS/DRIVING PTA    Reciprocal Relationships LIVES ALONE  LIMITED FAMILY SUPPORT AVAILABLE    Service to Others RETIRED   Intrinsic Gratification ENJOYS SPENDING TIME WITH HER GRANDCHILDREN    Psychosocial   Psychosocial (WDL) WDL   ADL   Eating Assistance 3  Moderate Assistance   Grooming Assistance 3  Moderate Assistance   UB Bathing Assistance 3  Moderate Assistance   LB Bathing Assistance 1  Total Assistance   [address] 2  Maximal [address] Total Assistance   Toileting Assistance  2  Maximal Assistance   Functional Assistance 2  Maximal Assistance   Bed Mobility   Rolling R 2  Maximal assistance   Additional items Increased time required;Verbal cues;LE management;Assist x 2   Rolling L 2  Maximal assistance   Additional items Increased time required;Verbal cues;LE management;Assist x 2   Supine to Sit 2  Maximal assistance   Additional items Assist x 2; Increased time required;Verbal cues;LE management   Sit to Supine 2  Maximal assistance   Additional items Assist x 2; Increased time required;Verbal cues;LE management   Balance   Static Sitting Poor -   Dynamic Sitting Poor -   Activity Tolerance   Activity Tolerance Patient limited by fatigue;Patient limited by pain;Treatment limited secondary to medical complications (Comment)   Medical Staff Made Aware RESTORATIVE, [address]    Nurse Made Aware APPROPRIATE TO SEE PER PIA CARROLL   RUBLESSING Assessment   RUE Assessment X  (LIMITED SHOULDER/ELBOW/WRIST 2' OVERALL WEAKNESS + EDEMA )   LUE Assessment   LUE Assessment X  (LIMITED SHOULDER/ELBOW/WRIST 2' OVERALL WEAKNESS + EDEMA)   Hand Function   Gross Motor Coordination Impaired  (B/L)   Fine Motor Coordination   (B/L)   Sensation   Light Touch No apparent deficits   Cognition   Overall Cognitive Status Impaired   Arousal/Participation Responsive; Cooperative   Attention Attends with cues to redirect   Orientation Level Oriented to person;Oriented to place;Oriented to situation;Disoriented to time   Memory Decreased short term memory;Decreased recall of recent events   Following Commands Follows one step commands with increased time or repetition   Comments PT IS AROUSABLE, PLEASANT, COOPERATIVE AND MOTIVATED TO PARTICIPATE IN THERAPY  PT IS SLOW TO PROCESS/RESPOND  RECOMMEND LIGHTS ON/BLINDS OPEN DURING DAY-TIME HOURS TO PROMOTE PROPER SLEEP-WAKE CYCLE  ABLE TO APPROPRIATELY IDENTIFY CALL WAITE  Assessment   Limitation Decreased ADL status; Decreased UE ROM;Decreased UE strength;Decreased Safe judgement during ADL;Decreased cognition;Decreased endurance;Decreased fine motor control;Decreased self-care trans;Decreased high-level ADLs   Prognosis Fair;Good   Assessment 79 YO Female SEEN FOR INITIAL OCCUPATIONAL THERAPY EVALUATION FOLLOWING TXF FROM [facility] S/P ELECTIVE LAPAROSCOPIC VENTRAL HERNIA REPAIR WITH MESH WITH POST OP HYPOXIA, HYPOTENSION AND SEPTIC SHOCK REQUIRING INTUBATION AND PLACED ON MECHANICAL VENT  PROBLEMS LIST INCLUDES ANEMIA, ARTHRITIS, A-FIB, LENNON'S ESOPHAGUS WITH HIGH GRADE DYSPLASIA, CA, CHRONIC R KNEE PAIN, CAD, DEPRESSION, SCIATICA, SPONDYLOSIS AND CHEST TUBE TO WATER SEAL  PT IS FROM AN APT ALONE WHERE SHE REPORTS BEING INDEPENDENT WITH ADLS/LT IADLS/DRIVING PTA  PT CURRENTLY REQUIRES OVERALL MAX-TOTAL A WITH ADLS, AND MAX A X2 FOR ROLLING R/L IN BED AND SUPINE<->SIT TRANSFERS  PT SAT EOB FOR ~5 MINUTES WITH MAX A  PT RETURNED TO BED AND REPOSITIONED WITH B/L UE ELEVATED ON PILLOWS TO ASSIST WITH EDEMA MANAGEMENT- PT NOT APPROPRIATE FOR FURTHER MOBILITY/ACTIVITY AT THIS TIME  PT IS LIMITED 2' PAIN, FATIGUE, IMPAIRED BALANCE, FALL RISK , LIMITED SAFETY AWARENESS/INSIGHT/JUDGEMENT, SLOW TO PROCESS/RESPOND, OVERALL WEAKNESS/DECONDITIONING , DIZZINESS WITH CHANGE OF POSITIONING , LIMITED FAMILY/FRIEND SUPPORT , INACCESSIBLE HOME ENVIRONMENT and OVERALL LIMITED ACTIVITY TOLERANCE  PT EDUCATED ON DEEP BREATHING TECHNIQUES T/O ACTIVITY, INCREASED FAMILY SUPPORT and CONTINUE PARTICIPATION IN SELF-CARE/MOBILITY WITH STAFF WHILE IN [facility]   FROM AN OCCUPATIONAL THERAPY PERSPECTIVE, PT WOULD BENEFIT FROM ADDITIONAL OT SERVICES IN AN INPT REHAB SETTING UPON D/C  WILL CONT TO FOLLOW TO ADDRESS THE BELOW DESCRIBED GOALS  Goals   Patient Goals TO GET STRONGER    LTG Time Frame 10-14   Long Term Goal #1 SEE BELOW    Plan   Treatment Interventions ADL retraining;Functional transfer training;UE strengthening/ROM; Endurance training;Cognitive reorientation;Patient/family training;Equipment evaluation/education; Compensatory technique education; Fine motor coordination activities; Energy conservation; Activityengagement   Goal Expiration Date 08/20/19   OT Frequency 3-5x/wk   Recommendation   Recommendation Geriatric Consult  (MAY BENEFIT FROM [facility] CONSULT FOR COPING)   OT Discharge Recommendation Short Term Rehab   OT - OK to Discharge Yes   Barthel Index   Feeding 5   Bathing 0   Grooming Score 0   Dressing Score 0   Bladder Score 0   Bowels Score 5   Toilet Use Score 0   Transfers (Bed/Chair) Score 5   Mobility (Level Surface) Score 0   Stairs Score 0   Barthel Index Score 15   Modified Morton Scale   Modified Morton Scale 4       OCCUPATIONAL THERAPY GOALS TO BE MET WITHIN 10-14 DAYS:    -Pt will participate in fine motor coordination/strenthening/deterity exercises to G in order to increase participation in functional activities  -Pt will improve B/L UE strength 1/2 MMT via AROM/AAROM/PROM in all planes as tolerated in order to participate in functional activities  -Pt will increase bed mobility to MOD A to participate in functional activities with F+ tolerance and balance  -Pt will increase independence in UB ADLS to MIN A while seated EOB  -Pt will increase independence in LB ADLS to MOD A while supine/seated EOB with G carry over of learned compensatory techniques  -Pt will improve activity tolerance to G for 30 min txment sessions w/ G carry over of learned energy conservation techniques   -Pt will demonstrate G carryover of learned safety techniques and proper body mechanics in functional and leisure activities with use of DME   -Pt will complete additional cognitive assessment with 100% attention to task in order to assist with safe d/c plan  -Pt will follow 100% simple 2-step commands and be A&O x4 consistently with environmental cues to increase participation in functional activities     -Pt will identify 2-3 coping strategies that promote G mental health that can be completed within the hospital and carry over to d/c environment    -Pt will identify 2-3 leisure activities that promote G mental health that can be completed within the hospital and carry over to d/c environment       OTR TO SEE WHEN APPROPRIATE FOR TXF/FUNCTIONAL MOBILITY    Documentation completed by GIOVANNI Pearson, OTR/L

## 2019-08-06 NOTE — PLAN OF CARE
Problem: SLP ADULT - SWALLOWING, IMPAIRED  Goal: Initial SLP swallow eval performed  Outcome: Progressing  Note:   Swallowing evaluation completed  Recommend NPO with small single sips of H20 sparingly when awake, alert, and sitting upright 90 degrees  SLP to re-assess 8/7 as able/as appropriate

## 2019-08-06 NOTE — PHYSICAL THERAPY NOTE
Physical Therapy Evaluation     Patient's Name: Marquis Justin Chung    Admitting Diagnosis  Incisional hernia [K43 2]    Problem List  Patient Active Problem List   Diagnosis    Osteoarthritis of knee    Joint pain, knee    Eagle's esophagus with high grade dysplasia    3-vessel CAD    Abnormal blood chemistry    A-fib ([address] )    Anemia    Atypical chest pain    Backache with radiation    Continuous left lower quadrant pain    Depression    Dysphagia    Gastroesophageal reflux disease    Hyperlipidemia    Hypothyroidism    Insomnia    Low vitamin D level    Lumbar radiculopathy    Myofascial pain    Right leg paresthesias    Sciatica    Spondylosis of 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    Stricture of esophagus    Venous insufficiency    Incisional hernia    Adrenal adenoma    Incisional hernia, without obstruction or gangrene    [facility]    Acute respiratory failure with hypoxemia ([facility])    Bilateral pneumonia    [facility] failure (ARF) ([facility])    Postprocedural pneumothorax    Shock ([address] )    OR Exploratory laparotomy, SBR secondary to iatrogenic enterotomy     OR Laparoscopic ventral hernia repair       Past Medical History  Past Medical History:   Diagnosis Date    Abnormal blood chemistry     last assessed 03/06/2014    Achalasia, esophageal     Acute medial meniscus tear     last assessed 03/10/2014    Acute otitis externa     last assessed 03/24/2014    Adrenal nodule ([address] )     Anemia     Anxiety     Arthritis     Atrial fibrillation ([address] )     resolved 01/09/2018    Atypical chest pain     last assessed 11/22/2016    Eagle's esophagus with high grade dysplasia     last assessed 01/03/2018    Cancer ([address] )     esophageal    Cerumen impaction     last assessed 03/24/2014    Chronic pain of right knee     last assessed 04/06/2017    Coronary artery disease     3 vessel, resolved 01/09/2018    Depression     Dysphagia     last assessed 2017    Esophageal stricture     last assessed 2017    GERD (gastroesophageal reflux disease)     Headache     last assessed 2013    Hiatal hernia     Insomnia     last assessed 10/15/2013    Jejunostomy tube present ([address] )     resolved 2018    Low vitamin D level     resolved 2018    Osteoarthritis, knee     last assessed 2017    Pneumonia     last assessed 2013    Right leg paresthesias     last assessed 2017    Sciatica     last assessed 2015    Shingles     last assessed 2015    Spondylosis of 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     last assessed 2017    Venous insufficiency     last assessed 2013       Past Surgical History  Past Surgical History:   Procedure Laterality Date     SECTION      EXPLORATORY LAPAROTOMY W/ BOWEL RESECTION N/A 2019    Procedure: LAPAROTOMY EXPLORATORY W/ BOWEL RESECTION, APPLICATION OF Lynann Andry;  Surgeon: Frantz Martínez, DO;  [facility];  Service: General    GASTRECTOMY      partial    GASTROJEJUNOSTOMY W/ JEJUNOSTOMY TUBE N/A 2017    Procedure: JEJUNOSTOMY TUBE PLACEMENT;  Surgeon: Jerzy Faustin MD;  [facility];  Service: Thoracic    JOINT REPLACEMENT Right 11/10/2014    knee, Dr Brian Res N/A 8/3/2019    Procedure: LAPAROTOMY EXPLORATORY, reanastomosis of proximal small bowel, placement of vac dressing;  Surgeon: Frantz Martínez DO;  [facility];  Service: General    KS ESOPHAGOGASTRODUODENOSCOPY TRANSORAL DIAGNOSTIC N/A 3/10/2017    Procedure: ESOPHAGOGASTRODUODENOSCOPY (EGD); Surgeon: Tanesha Olvera MD;  [facility];  Service: Gastroenterology    KS ESOPHAGOGASTRODUODENOSCOPY TRANSORAL DIAGNOSTIC N/A 2017    Procedure: ESOPHAGOGASTRODUODENOSCOPY (EGD);   Surgeon: Tanesha Olvera MD;  [facility];  Service: Gastroenterology    KS ESOPHAGOSCOPY FLEXIBLE TRANSORAL WITH BIOPSY N/A 8/24/2017    Procedure: ESOPHAGOGASTRODUODENOSCOPY (EGD); Surgeon: Mendy Whitman MD;  Location:  MAIN OR;  Service: Thoracic    [place] LAP, VENTRAL HERNIA REPAIR,REDUCIBLE N/A 7/31/2019    Procedure: REPAIR HERNIA VENTRAL LAPAROSCOPIC;  Surgeon: Mela Caldera DO;  [facility];  Service: General    [place] REMOVAL [address] N/A 8/24/2017    Procedure: TRANSHIATAL ESOPHAGECTOMY;  Surgeon: Mendy Whitman MD;  [facility];  Service: Thoracic    STEROID INJECTION KNEE Right 5/2/2017    Procedure: GENICULATE NERVE BLOCKS;  Surgeon: Margret Preciado DO;  [facility];  Service:           08/06/19 1018   Note Type   Note type Eval only   Pain Assessment   Pain Assessment FLACC   Pain Rating: FLACC (Rest) - Face 0   Pain Rating: FLACC (Rest) - Legs 0   Pain Rating: FLACC (Rest) - Activity 0   Pain Rating: FLACC (Rest) - Cry 0   Pain Rating: FLACC (Rest) - Consolability 0   Score: FLACC (Rest) 0   Pain Rating: FLACC (Activity) - Face 1   Pain Rating: FLACC (Activity) - Legs 0   Pain Rating: FLACC (Activity) - Activity 1   Pain Rating: FLACC (Activity) - Cry 1   Pain Rating: FLACC (Activity) - Consolability 1   Score: FLACC (Activity) 4   Home Living   Type of Home Apartment  (0 EUN)   Home Layout One level;Performs ADLs on one level   Home Equipment Cane  (Pt reports use of SPC for all mobility)   Prior Function   Level of [place] Independent with ADLs and functional mobility   Lives With Alone  (No friends/family nearby)   ADL Assistance Independent   IADLs Independent   Falls in the last 6 months 0   Restrictions/Precautions   Weight Bearing Precautions Per Order No   Other Precautions Cognitive; Bed Alarm;Telemetry;Multiple lines; Fall Risk;Pain;O2   General   Family/Caregiver Present No   Cognition   Overall Cognitive Status Impaired   Arousal/Participation Cooperative   Attention Attends with cues to redirect   Orientation Level Oriented X4   Following Commands Follows one step commands with increased time or repetition   Comments  Pt is lethargic and requires increased time for processing but is able to follow all commands   Strength RLE   R Hip Flexion 2/5   R Knee Extension 2/5   R Ankle Dorsiflexion 2/5   R Ankle Plantar Flexion 2/5   Strength LLE   L Hip Flexion 2/5   L Knee Extension 2/5   L Ankle Dorsiflexion 2/5   L Ankle Plantar Flexion 2/5   Light Touch   RLE Light Touch Grossly intact   LLE Light Touch Grossly intact   Bed Mobility   Rolling R 2  Maximal assistance   Additional items Assist x 2;Bedrails; Increased time required;Verbal cues;LE management   Rolling L 2  Maximal assistance   Additional items Assist x 2;Bedrails; Increased time required;Verbal cues;LE management   Supine to Sit 2  Maximal assistance   Additional items Assist x 2; Increased time required;Verbal cues;LE management   Sit to Supine 2  Maximal assistance   Additional items Assist x 2; Increased time required;Verbal cues;LE management   Transfers   Sit to Stand 2  Maximal assistance   Additional items Assist x 2; Increased time required;Verbal cues   Stand to Sit 2  Maximal assistance   Additional items Assist x 2; Increased time required;Verbal cues   Ambulation/Elevation   Gait pattern   (Deferred 2/2 poor standing tolerance)   Balance   Static Sitting Poor   Dynamic Sitting Poor -   Static Standing Poor -   Endurance Deficit   Endurance Deficit Yes   Endurance Deficit Description Gross deconditioning   Activity Tolerance   Activity Tolerance Patient limited by fatigue;Patient limited by pain   Nurse Made Aware Yes Desean   Assessment   Prognosis Fair   Problem List Decreased strength;Decreased range of motion;Decreased endurance; Impaired balance;Decreased mobility; Decreased cognition;Pain   Assessment Pt is a 77 yo female presenting to [facility] from [facility] on 8/[facility]/19 s/p elective laparoscopic ventral hernia repair with mesh   Pt has had complicated post operative course including septic shock, SOB and acute hypoxia requiring intubation  Pt extubated on PT evaluation  Pt  has a past medical history of Abnormal blood chemistry, Achalasia, esophageal, Acute medial meniscus tear, Acute otitis externa, Adrenal nodule (HCC), Anemia, Anxiety, Arthritis, Atrial fibrillation (HCC), Atypical chest pain, Eagle's esophagus with high grade dysplasia, Cancer (HCC), Cerumen impaction, Chronic pain of right knee, Coronary artery disease, Depression, Dysphagia, Esophageal stricture, GERD (gastroesophageal reflux disease), Headache, Hiatal hernia, Insomnia, Jejunostomy tube present (HCC), Low vitamin D level, Osteoarthritis, knee, Pneumonia, Right leg paresthesias, Sciatica, Shingles, Spondylosis of 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, and Venous insufficiency  Pt is supine at start of session and agreeable to therapy  Pt is lethargic and requires increased time for processing but is able to follow all commands  Pt transferred supine <> sit with maxA x2  Upon sitting EOB pt requires maxA x1 for static sitting  Pt transferred sit <> stand with maxA x2 and hand held assist  Pt presents with significant retropulsion in standing and unable to maintain static stand > 5 seconds  Transfer to chair is deferred at this time 2/2 poor standing tolerance  Pt returned to supine and remained supine with bed in chair position at end of session  PT to recommend inpt rehab to maximize safety and independence with functional mobility  PT to follow   Barriers to Discharge Decreased caregiver support   Goals   Patient Goals To get stronger   STG Expiration Date 08/20/19   Short Term Goal #1 In 10 sessions pt will: 1  Transfer supine <> sit with modA  2  Sit EOB >20 minutes with Isak for static/dynamic balance  3  Transfer sit <> stand with modA and LRAD  4  Transfer to bedside chair with [address] and [address]  5  Ambulate >15 ft with maxA   6  Perform LE exercise program    Treatment Day 0   Plan   Treatment/Interventions LE strengthening/ROM; Functional transfer training; Therapeutic exercise; Endurance training;Bed mobility;Spoke to nursing;OT   PT Frequency   (3-5x/wk)   Recommendation   Recommendation Post acute IP rehab   PT - OK to Discharge Yes  (To rehab when medically stable)   Modified [place] Scale   Modified [place] Scale 4   Barthel Index   Feeding 5   Bathing 0   Grooming Score 0   Dressing Score 0   Bladder Score 0   Bowels Score 10   Toilet Use Score 5   Transfers (Bed/Chair) Score 5   Mobility (Level Surface) Score 0   Stairs Score 0   Barthel Index Score 25         Mindy Iglesias, PT, DPT

## 2019-08-07 ENCOUNTER — APPOINTMENT (INPATIENT)
Dept: RADIOLOGY | Facility: HOSPITAL | Age: 69
DRG: 856 | End: 2019-08-07
Payer: MEDICARE

## 2019-08-07 ENCOUNTER — APPOINTMENT (INPATIENT)
Dept: GASTROENTEROLOGY | Facility: HOSPITAL | Age: 69
DRG: 856 | End: 2019-08-07
Payer: MEDICARE

## 2019-08-07 LAB
ABO GROUP BLD: NORMAL
ALBUMIN SERPL BCP-MCNC: 1.5 G/DL (ref 3.5–5)
ALP SERPL-CCNC: 106 U/L (ref 46–116)
ALT SERPL W P-5'-P-CCNC: 15 U/L (ref 12–78)
ANION GAP SERPL CALCULATED.3IONS-SCNC: 3 MMOL/L (ref 4–13)
ANION GAP SERPL CALCULATED.3IONS-SCNC: 5 MMOL/L (ref 4–13)
APTT PPP: 27 SECONDS (ref 23–37)
ARTERIAL PATENCY WRIST A: YES
AST SERPL W P-5'-P-CCNC: 25 U/L (ref 5–45)
BACTERIA BLD CULT: NORMAL
BASE EXCESS BLDA CALC-SCNC: 1.8 MMOL/L
BASOPHILS # BLD AUTO: 0.13 THOUSANDS/ΜL (ref 0–0.1)
BASOPHILS # BLD MANUAL: 0 THOUSAND/UL (ref 0–0.1)
BASOPHILS NFR BLD AUTO: 1 % (ref 0–1)
BASOPHILS NFR MAR MANUAL: 0 % (ref 0–1)
BILIRUB DIRECT SERPL-MCNC: 0.25 MG/DL (ref 0–0.2)
BILIRUB SERPL-MCNC: 0.53 MG/DL (ref 0.2–1)
BLD GP AB SCN SERPL QL: NEGATIVE
BUN SERPL-MCNC: 7 MG/DL (ref 5–25)
BUN SERPL-MCNC: 8 MG/DL (ref 5–25)
BURR CELLS BLD QL SMEAR: PRESENT
CALCIUM SERPL-MCNC: 7.1 MG/DL (ref 8.3–10.1)
CALCIUM SERPL-MCNC: 7.5 MG/DL (ref 8.3–10.1)
CHLORIDE SERPL-SCNC: 103 MMOL/L (ref 100–108)
CHLORIDE SERPL-SCNC: 106 MMOL/L (ref 100–108)
CO2 SERPL-SCNC: 27 MMOL/L (ref 21–32)
CO2 SERPL-SCNC: 30 MMOL/L (ref 21–32)
CREAT SERPL-MCNC: 0.36 MG/DL (ref 0.6–1.3)
CREAT SERPL-MCNC: 0.41 MG/DL (ref 0.6–1.3)
EOSINOPHIL # BLD AUTO: 0.18 THOUSAND/ΜL (ref 0–0.61)
EOSINOPHIL # BLD MANUAL: 0.42 THOUSAND/UL (ref 0–0.4)
EOSINOPHIL NFR BLD AUTO: 1 % (ref 0–6)
EOSINOPHIL NFR BLD MANUAL: 1 % (ref 0–6)
ERYTHROCYTE [DISTWIDTH] IN BLOOD BY AUTOMATED COUNT: 15.4 % (ref 11.6–15.1)
ERYTHROCYTE [DISTWIDTH] IN BLOOD BY AUTOMATED COUNT: 15.7 % (ref 11.6–15.1)
GFR SERPL CREATININE-BSD FRML MDRD: 105 ML/MIN/1.73SQ M
GFR SERPL CREATININE-BSD FRML MDRD: 110 ML/MIN/1.73SQ M
GIANT PLATELETS BLD QL SMEAR: PRESENT
GLUCOSE SERPL-MCNC: 129 MG/DL (ref 65–140)
GLUCOSE SERPL-MCNC: 130 MG/DL (ref 65–140)
GLUCOSE SERPL-MCNC: 144 MG/DL (ref 65–140)
HCO3 BLDA-SCNC: 25.8 MMOL/L (ref 22–28)
HCT VFR BLD AUTO: 28.7 % (ref 34.8–46.1)
HCT VFR BLD AUTO: 30.9 % (ref 34.8–46.1)
HGB BLD-MCNC: 10 G/DL (ref 11.5–15.4)
HGB BLD-MCNC: 9.4 G/DL (ref 11.5–15.4)
HOROWITZ INDEX BLDA+IHG-RTO: 50 MM[HG]
IMM GRANULOCYTES # BLD AUTO: >0.5 THOUSAND/UL (ref 0–0.2)
IMM GRANULOCYTES NFR BLD AUTO: 13 % (ref 0–2)
INR PPP: 1.4 (ref 0.84–1.19)
LIPASE SERPL-CCNC: 272 U/L (ref 73–393)
LYMPHOCYTES # BLD AUTO: 2.21 THOUSANDS/ΜL (ref 0.6–4.47)
LYMPHOCYTES # BLD AUTO: 2.53 THOUSAND/UL (ref 0.6–4.47)
LYMPHOCYTES # BLD AUTO: 6 % (ref 14–44)
LYMPHOCYTES NFR BLD AUTO: 10 % (ref 14–44)
MCH RBC QN AUTO: 28.3 PG (ref 26.8–34.3)
MCH RBC QN AUTO: 28.7 PG (ref 26.8–34.3)
MCHC RBC AUTO-ENTMCNC: 32.4 G/DL (ref 31.4–37.4)
MCHC RBC AUTO-ENTMCNC: 32.8 G/DL (ref 31.4–37.4)
MCV RBC AUTO: 86 FL (ref 82–98)
MCV RBC AUTO: 89 FL (ref 82–98)
METAMYELOCYTES NFR BLD MANUAL: 9 % (ref 0–1)
MONOCYTES # BLD AUTO: 0.42 THOUSAND/UL (ref 0–1.22)
MONOCYTES # BLD AUTO: 0.68 THOUSAND/ΜL (ref 0.17–1.22)
MONOCYTES NFR BLD AUTO: 3 % (ref 4–12)
MONOCYTES NFR BLD: 1 % (ref 4–12)
MYELOCYTES NFR BLD MANUAL: 4 % (ref 0–1)
NEUTROPHILS # BLD AUTO: 16.86 THOUSANDS/ΜL (ref 1.85–7.62)
NEUTROPHILS # BLD MANUAL: 32.08 THOUSAND/UL (ref 1.85–7.62)
NEUTS BAND NFR BLD MANUAL: 26 % (ref 0–8)
NEUTS SEG NFR BLD AUTO: 50 % (ref 43–75)
NEUTS SEG NFR BLD AUTO: 72 % (ref 43–75)
NRBC BLD AUTO-RTO: 0 /100 WBCS
NRBC BLD AUTO-RTO: 0 /100 WBCS
O2 CT BLDA-SCNC: 14.9 ML/DL (ref 16–23)
OXYHGB MFR BLDA: 95.2 % (ref 94–97)
PCO2 BLDA: 38.3 MM HG (ref 36–44)
PEEP RESPIRATORY: 5 CM[H2O]
PH BLDA: 7.45 [PH] (ref 7.35–7.45)
PLATELET # BLD AUTO: 231 THOUSANDS/UL (ref 149–390)
PLATELET # BLD AUTO: 304 THOUSANDS/UL (ref 149–390)
PLATELET BLD QL SMEAR: ADEQUATE
PMV BLD AUTO: 10.3 FL (ref 8.9–12.7)
PMV BLD AUTO: 11.5 FL (ref 8.9–12.7)
PO2 BLDA: 77.8 MM HG (ref 75–129)
POIKILOCYTOSIS BLD QL SMEAR: PRESENT
POLYCHROMASIA BLD QL SMEAR: PRESENT
POTASSIUM SERPL-SCNC: 3.7 MMOL/L (ref 3.5–5.3)
POTASSIUM SERPL-SCNC: 4 MMOL/L (ref 3.5–5.3)
PROCALCITONIN SERPL-MCNC: 1.02 NG/ML
PROT SERPL-MCNC: 4.8 G/DL (ref 6.4–8.2)
PROTHROMBIN TIME: 16.7 SECONDS (ref 11.6–14.5)
RBC # BLD AUTO: 3.32 MILLION/UL (ref 3.81–5.12)
RBC # BLD AUTO: 3.49 MILLION/UL (ref 3.81–5.12)
RBC MORPH BLD: PRESENT
RH BLD: POSITIVE
SODIUM SERPL-SCNC: 135 MMOL/L (ref 136–145)
SODIUM SERPL-SCNC: 139 MMOL/L (ref 136–145)
SPECIMEN EXPIRATION DATE: NORMAL
SPECIMEN SOURCE: ABNORMAL
TOXIC GRANULES BLD QL SMEAR: PRESENT
VARIANT LYMPHS # BLD AUTO: 3 %
VENT AC: 14
VENT- AC: AC
VT SETTING VENT: 450 ML
WBC # BLD AUTO: 23.06 THOUSAND/UL (ref 4.31–10.16)
WBC # BLD AUTO: 42.21 THOUSAND/UL (ref 4.31–10.16)
WBC TOXIC VACUOLES BLD QL SMEAR: PRESENT

## 2019-08-07 PROCEDURE — 31500 INSERT EMERGENCY AIRWAY: CPT | Performed by: EMERGENCY MEDICINE

## 2019-08-07 PROCEDURE — 0BH17EZ INSERTION OF ENDOTRACHEAL AIRWAY INTO TRACHEA, VIA NATURAL OR ARTIFICIAL OPENING: ICD-10-PCS | Performed by: EMERGENCY MEDICINE

## 2019-08-07 PROCEDURE — 0B9B8ZZ DRAINAGE OF LEFT LOWER LOBE BRONCHUS, VIA NATURAL OR ARTIFICIAL OPENING ENDOSCOPIC: ICD-10-PCS | Performed by: EMERGENCY MEDICINE

## 2019-08-07 PROCEDURE — 94002 VENT MGMT INPAT INIT DAY: CPT

## 2019-08-07 PROCEDURE — 99233 SBSQ HOSP IP/OBS HIGH 50: CPT | Performed by: EMERGENCY MEDICINE

## 2019-08-07 PROCEDURE — 74177 CT ABD & PELVIS W/CONTRAST: CPT

## 2019-08-07 PROCEDURE — C9113 INJ PANTOPRAZOLE SODIUM, VIA: HCPCS | Performed by: PHYSICIAN ASSISTANT

## 2019-08-07 PROCEDURE — 5A1945Z RESPIRATORY VENTILATION, 24-96 CONSECUTIVE HOURS: ICD-10-PCS | Performed by: EMERGENCY MEDICINE

## 2019-08-07 PROCEDURE — NC001 PR NO CHARGE: Performed by: SURGERY

## 2019-08-07 PROCEDURE — 0W9B30Z DRAINAGE OF LEFT PLEURAL CAVITY WITH DRAINAGE DEVICE, PERCUTANEOUS APPROACH: ICD-10-PCS | Performed by: EMERGENCY MEDICINE

## 2019-08-07 PROCEDURE — 85027 COMPLETE CBC AUTOMATED: CPT | Performed by: PHYSICIAN ASSISTANT

## 2019-08-07 PROCEDURE — 85025 COMPLETE CBC W/AUTO DIFF WBC: CPT | Performed by: PHYSICIAN ASSISTANT

## 2019-08-07 PROCEDURE — 87103 BLOOD FUNGUS CULTURE: CPT | Performed by: PHYSICIAN ASSISTANT

## 2019-08-07 PROCEDURE — 71260 CT THORAX DX C+: CPT

## 2019-08-07 PROCEDURE — 86900 BLOOD TYPING SEROLOGIC ABO: CPT | Performed by: PHYSICIAN ASSISTANT

## 2019-08-07 PROCEDURE — 80048 BASIC METABOLIC PNL TOTAL CA: CPT | Performed by: EMERGENCY MEDICINE

## 2019-08-07 PROCEDURE — 80048 BASIC METABOLIC PNL TOTAL CA: CPT | Performed by: PHYSICIAN ASSISTANT

## 2019-08-07 PROCEDURE — 82948 REAGENT STRIP/BLOOD GLUCOSE: CPT

## 2019-08-07 PROCEDURE — 86901 BLOOD TYPING SEROLOGIC RH(D): CPT | Performed by: PHYSICIAN ASSISTANT

## 2019-08-07 PROCEDURE — 71045 X-RAY EXAM CHEST 1 VIEW: CPT

## 2019-08-07 PROCEDURE — 88304 TISSUE EXAM BY PATHOLOGIST: CPT | Performed by: PATHOLOGY

## 2019-08-07 PROCEDURE — NC001 PR NO CHARGE: Performed by: RADIOLOGY

## 2019-08-07 PROCEDURE — 36556 INSERT NON-TUNNEL CV CATH: CPT | Performed by: SURGERY

## 2019-08-07 PROCEDURE — NC001 PR NO CHARGE: Performed by: EMERGENCY MEDICINE

## 2019-08-07 PROCEDURE — 84145 PROCALCITONIN (PCT): CPT | Performed by: INTERNAL MEDICINE

## 2019-08-07 PROCEDURE — 80076 HEPATIC FUNCTION PANEL: CPT | Performed by: PHYSICIAN ASSISTANT

## 2019-08-07 PROCEDURE — 36556 INSERT NON-TUNNEL CV CATH: CPT | Performed by: EMERGENCY MEDICINE

## 2019-08-07 PROCEDURE — 85730 THROMBOPLASTIN TIME PARTIAL: CPT | Performed by: PHYSICIAN ASSISTANT

## 2019-08-07 PROCEDURE — 83690 ASSAY OF LIPASE: CPT | Performed by: PHYSICIAN ASSISTANT

## 2019-08-07 PROCEDURE — 32551 INSERTION OF CHEST TUBE: CPT | Performed by: SURGERY

## 2019-08-07 PROCEDURE — 85007 BL SMEAR W/DIFF WBC COUNT: CPT | Performed by: PHYSICIAN ASSISTANT

## 2019-08-07 PROCEDURE — 92526 ORAL FUNCTION THERAPY: CPT

## 2019-08-07 PROCEDURE — 36600 WITHDRAWAL OF ARTERIAL BLOOD: CPT

## 2019-08-07 PROCEDURE — 87040 BLOOD CULTURE FOR BACTERIA: CPT | Performed by: PHYSICIAN ASSISTANT

## 2019-08-07 PROCEDURE — 94760 N-INVAS EAR/PLS OXIMETRY 1: CPT

## 2019-08-07 PROCEDURE — 82805 BLOOD GASES W/O2 SATURATION: CPT | Performed by: PHYSICIAN ASSISTANT

## 2019-08-07 PROCEDURE — 99291 CRITICAL CARE FIRST HOUR: CPT | Performed by: EMERGENCY MEDICINE

## 2019-08-07 PROCEDURE — 85610 PROTHROMBIN TIME: CPT | Performed by: PHYSICIAN ASSISTANT

## 2019-08-07 PROCEDURE — 99223 1ST HOSP IP/OBS HIGH 75: CPT | Performed by: INTERNAL MEDICINE

## 2019-08-07 PROCEDURE — 0BCB8ZZ EXTIRPATION OF MATTER FROM LEFT LOWER LOBE BRONCHUS, VIA NATURAL OR ARTIFICIAL OPENING ENDOSCOPIC: ICD-10-PCS | Performed by: EMERGENCY MEDICINE

## 2019-08-07 PROCEDURE — 86850 RBC ANTIBODY SCREEN: CPT | Performed by: PHYSICIAN ASSISTANT

## 2019-08-07 RX ORDER — NOREPINEPHRINE BITARTRATE 1 MG/ML
INJECTION, SOLUTION INTRAVENOUS
Status: DISPENSED
Start: 2019-08-07 | End: 2019-08-08

## 2019-08-07 RX ORDER — SODIUM CHLORIDE, SODIUM GLUCONATE, SODIUM ACETATE, POTASSIUM CHLORIDE, MAGNESIUM CHLORIDE, SODIUM PHOSPHATE, DIBASIC, AND POTASSIUM PHOSPHATE .53; .5; .37; .037; .03; .012; .00082 G/100ML; G/100ML; G/100ML; G/100ML; G/100ML; G/100ML; G/100ML
500 INJECTION, SOLUTION INTRAVENOUS ONCE
Status: COMPLETED | OUTPATIENT
Start: 2019-08-07 | End: 2019-08-08

## 2019-08-07 RX ORDER — GUAIFENESIN 600 MG
600 TABLET, EXTENDED RELEASE 12 HR ORAL EVERY 12 HOURS SCHEDULED
Status: DISCONTINUED | OUTPATIENT
Start: 2019-08-07 | End: 2019-08-13

## 2019-08-07 RX ORDER — PROPOFOL 10 MG/ML
5-50 INJECTION, EMULSION INTRAVENOUS
Status: DISCONTINUED | OUTPATIENT
Start: 2019-08-07 | End: 2019-08-08

## 2019-08-07 RX ORDER — CHLORHEXIDINE GLUCONATE 0.12 MG/ML
15 RINSE ORAL EVERY 12 HOURS SCHEDULED
Status: DISCONTINUED | OUTPATIENT
Start: 2019-08-07 | End: 2019-08-09

## 2019-08-07 RX ORDER — SODIUM CHLORIDE, SODIUM GLUCONATE, SODIUM ACETATE, POTASSIUM CHLORIDE, MAGNESIUM CHLORIDE, SODIUM PHOSPHATE, DIBASIC, AND POTASSIUM PHOSPHATE .53; .5; .37; .037; .03; .012; .00082 G/100ML; G/100ML; G/100ML; G/100ML; G/100ML; G/100ML; G/100ML
1000 INJECTION, SOLUTION INTRAVENOUS ONCE
Status: COMPLETED | OUTPATIENT
Start: 2019-08-07 | End: 2019-08-07

## 2019-08-07 RX ORDER — LANOLIN ALCOHOL/MO/W.PET/CERES
3 CREAM (GRAM) TOPICAL
Status: DISCONTINUED | OUTPATIENT
Start: 2019-08-07 | End: 2019-08-21

## 2019-08-07 RX ORDER — PRAVASTATIN SODIUM 20 MG
20 TABLET ORAL
Status: DISCONTINUED | OUTPATIENT
Start: 2019-08-08 | End: 2019-08-21

## 2019-08-07 RX ORDER — PANTOPRAZOLE SODIUM 40 MG/1
40 INJECTION, POWDER, FOR SOLUTION INTRAVENOUS EVERY 12 HOURS SCHEDULED
Status: DISCONTINUED | OUTPATIENT
Start: 2019-08-07 | End: 2019-08-09

## 2019-08-07 RX ORDER — LIDOCAINE HYDROCHLORIDE 10 MG/ML
20 INJECTION, SOLUTION INFILTRATION; PERINEURAL ONCE
Status: COMPLETED | OUTPATIENT
Start: 2019-08-07 | End: 2019-08-07

## 2019-08-07 RX ORDER — POTASSIUM CHLORIDE 20MEQ/15ML
40 LIQUID (ML) ORAL ONCE
Status: COMPLETED | OUTPATIENT
Start: 2019-08-07 | End: 2019-08-07

## 2019-08-07 RX ORDER — FENTANYL CITRATE 50 UG/ML
50 INJECTION, SOLUTION INTRAMUSCULAR; INTRAVENOUS
Status: DISCONTINUED | OUTPATIENT
Start: 2019-08-07 | End: 2019-08-08

## 2019-08-07 RX ORDER — MAGNESIUM SULFATE HEPTAHYDRATE 40 MG/ML
2 INJECTION, SOLUTION INTRAVENOUS ONCE
Status: COMPLETED | OUTPATIENT
Start: 2019-08-07 | End: 2019-08-07

## 2019-08-07 RX ORDER — ESCITALOPRAM OXALATE 10 MG/1
10 TABLET ORAL DAILY
Status: DISCONTINUED | OUTPATIENT
Start: 2019-08-08 | End: 2019-08-21

## 2019-08-07 RX ORDER — FOLIC ACID 1 MG/1
1 TABLET ORAL DAILY
Status: DISCONTINUED | OUTPATIENT
Start: 2019-08-08 | End: 2019-08-21

## 2019-08-07 RX ORDER — LIDOCAINE HYDROCHLORIDE 10 MG/ML
INJECTION, SOLUTION EPIDURAL; INFILTRATION; INTRACAUDAL; PERINEURAL
Status: COMPLETED
Start: 2019-08-07 | End: 2019-08-07

## 2019-08-07 RX ORDER — FENTANYL CITRATE 50 UG/ML
100 INJECTION, SOLUTION INTRAMUSCULAR; INTRAVENOUS ONCE
Status: COMPLETED | OUTPATIENT
Start: 2019-08-07 | End: 2019-08-07

## 2019-08-07 RX ORDER — SODIUM CHLORIDE, SODIUM GLUCONATE, SODIUM ACETATE, POTASSIUM CHLORIDE, MAGNESIUM CHLORIDE, SODIUM PHOSPHATE, DIBASIC, AND POTASSIUM PHOSPHATE .53; .5; .37; .037; .03; .012; .00082 G/100ML; G/100ML; G/100ML; G/100ML; G/100ML; G/100ML; G/100ML
75 INJECTION, SOLUTION INTRAVENOUS CONTINUOUS
Status: DISCONTINUED | OUTPATIENT
Start: 2019-08-07 | End: 2019-08-08

## 2019-08-07 RX ORDER — FENTANYL CITRATE-0.9 % NACL/PF 10 MCG/ML
50 PLASTIC BAG, INJECTION (ML) INTRAVENOUS CONTINUOUS
Status: DISCONTINUED | OUTPATIENT
Start: 2019-08-07 | End: 2019-08-08

## 2019-08-07 RX ORDER — FENTANYL CITRATE-0.9 % NACL/PF 10 MCG/ML
PLASTIC BAG, INJECTION (ML) INTRAVENOUS
Status: COMPLETED
Start: 2019-08-07 | End: 2019-08-07

## 2019-08-07 RX ORDER — FENTANYL CITRATE 50 UG/ML
50 INJECTION, SOLUTION INTRAMUSCULAR; INTRAVENOUS ONCE
Status: COMPLETED | OUTPATIENT
Start: 2019-08-07 | End: 2019-08-07

## 2019-08-07 RX ADMIN — IOHEXOL 100 ML: 350 INJECTION, SOLUTION INTRAVENOUS at 16:21

## 2019-08-07 RX ADMIN — FLUCONAZOLE, SODIUM CHLORIDE 400 MG: 2 INJECTION INTRAVENOUS at 08:19

## 2019-08-07 RX ADMIN — DEXTROSE, SODIUM CHLORIDE, AND POTASSIUM CHLORIDE 75 ML/HR: 5; .45; .15 INJECTION INTRAVENOUS at 12:30

## 2019-08-07 RX ADMIN — Medication 50 MCG/HR: at 14:00

## 2019-08-07 RX ADMIN — GUAIFENESIN 600 MG: 600 TABLET, EXTENDED RELEASE ORAL at 10:11

## 2019-08-07 RX ADMIN — DEXMEDETOMIDINE 0.4 MCG/KG/HR: 100 INJECTION, SOLUTION, CONCENTRATE INTRAVENOUS at 20:36

## 2019-08-07 RX ADMIN — EPINEPHRINE 1 MG: 1 INJECTION, SOLUTION, CONCENTRATE INTRAVENOUS at 14:28

## 2019-08-07 RX ADMIN — METRONIDAZOLE 500 MG: 500 INJECTION, SOLUTION INTRAVENOUS at 16:47

## 2019-08-07 RX ADMIN — FOLIC ACID 1 MG: 1 TABLET ORAL at 08:16

## 2019-08-07 RX ADMIN — ESCITALOPRAM OXALATE 10 MG: 10 TABLET ORAL at 10:00

## 2019-08-07 RX ADMIN — FENTANYL CITRATE 50 MCG: 50 INJECTION, SOLUTION INTRAMUSCULAR; INTRAVENOUS at 22:50

## 2019-08-07 RX ADMIN — FENTANYL CITRATE 50 MCG: 50 INJECTION, SOLUTION INTRAMUSCULAR; INTRAVENOUS at 13:18

## 2019-08-07 RX ADMIN — POTASSIUM CHLORIDE 40 MEQ: 20 SOLUTION ORAL at 06:29

## 2019-08-07 RX ADMIN — HEPARIN SODIUM 5000 UNITS: 5000 INJECTION INTRAVENOUS; SUBCUTANEOUS at 21:07

## 2019-08-07 RX ADMIN — Medication 50 MCG/HR: at 21:03

## 2019-08-07 RX ADMIN — PANTOPRAZOLE SODIUM 40 MG: 40 INJECTION, POWDER, FOR SOLUTION INTRAVENOUS at 21:07

## 2019-08-07 RX ADMIN — LIDOCAINE HYDROCHLORIDE 20 ML: 10 INJECTION, SOLUTION INFILTRATION; PERINEURAL at 10:50

## 2019-08-07 RX ADMIN — PROPOFOL 30 MCG/KG/MIN: 10 INJECTION, EMULSION INTRAVENOUS at 16:32

## 2019-08-07 RX ADMIN — PROPOFOL 40 MCG/KG/MIN: 10 INJECTION, EMULSION INTRAVENOUS at 13:30

## 2019-08-07 RX ADMIN — MAGNESIUM SULFATE HEPTAHYDRATE 2 G: 40 INJECTION, SOLUTION INTRAVENOUS at 06:29

## 2019-08-07 RX ADMIN — LIDOCAINE HYDROCHLORIDE 10 ML: 10 INJECTION, SOLUTION EPIDURAL; INFILTRATION; INTRACAUDAL; PERINEURAL at 13:38

## 2019-08-07 RX ADMIN — CEFEPIME HYDROCHLORIDE 2000 MG: 2 INJECTION, POWDER, FOR SOLUTION INTRAVENOUS at 16:47

## 2019-08-07 RX ADMIN — FENTANYL CITRATE 100 MCG: 50 INJECTION, SOLUTION INTRAMUSCULAR; INTRAVENOUS at 18:33

## 2019-08-07 RX ADMIN — PIPERACILLIN SODIUM AND TAZOBACTAM SODIUM 3.38 G: 36; 4.5 INJECTION, POWDER, FOR SOLUTION INTRAVENOUS at 05:59

## 2019-08-07 RX ADMIN — PANTOPRAZOLE SODIUM 40 MG: 40 TABLET, DELAYED RELEASE ORAL at 06:00

## 2019-08-07 RX ADMIN — MICAFUNGIN SODIUM 100 MG: 10 INJECTION, POWDER, LYOPHILIZED, FOR SOLUTION INTRAVENOUS at 16:32

## 2019-08-07 RX ADMIN — HEPARIN SODIUM 5000 UNITS: 5000 INJECTION INTRAVENOUS; SUBCUTANEOUS at 05:59

## 2019-08-07 RX ADMIN — PRAVASTATIN SODIUM 20 MG: 20 TABLET ORAL at 16:47

## 2019-08-07 RX ADMIN — OXYCODONE HYDROCHLORIDE 5 MG: 5 TABLET ORAL at 10:11

## 2019-08-07 RX ADMIN — SODIUM CHLORIDE, SODIUM GLUCONATE, SODIUM ACETATE, POTASSIUM CHLORIDE AND MAGNESIUM CHLORIDE 100 ML/HR: 526; 502; 368; 37; 30 INJECTION, SOLUTION INTRAVENOUS at 20:38

## 2019-08-07 RX ADMIN — NOREPINEPHRINE BITARTRATE 8 MCG/MIN: 1 INJECTION INTRAVENOUS at 14:00

## 2019-08-07 RX ADMIN — PIPERACILLIN SODIUM AND TAZOBACTAM SODIUM 3.38 G: 36; 4.5 INJECTION, POWDER, FOR SOLUTION INTRAVENOUS at 12:30

## 2019-08-07 RX ADMIN — FENTANYL CITRATE 25 MCG: 50 INJECTION, SOLUTION INTRAMUSCULAR; INTRAVENOUS at 02:22

## 2019-08-07 RX ADMIN — DEXTROSE, SODIUM CHLORIDE, AND POTASSIUM CHLORIDE 75 ML/HR: 5; .45; .15 INJECTION INTRAVENOUS at 00:12

## 2019-08-07 RX ADMIN — VANCOMYCIN HYDROCHLORIDE 2000 MG: 10 INJECTION, POWDER, LYOPHILIZED, FOR SOLUTION INTRAVENOUS at 16:48

## 2019-08-07 RX ADMIN — LIDOCAINE HYDROCHLORIDE 20 ML: 10 INJECTION, SOLUTION EPIDURAL; INFILTRATION; INTRACAUDAL; PERINEURAL at 10:50

## 2019-08-07 RX ADMIN — SODIUM CHLORIDE, SODIUM GLUCONATE, SODIUM ACETATE, POTASSIUM CHLORIDE AND MAGNESIUM CHLORIDE 1000 ML: 526; 502; 368; 37; 30 INJECTION, SOLUTION INTRAVENOUS at 14:00

## 2019-08-07 NOTE — PROCEDURES
Chest Tube Insertion  Date/Time: 8/7/2019 7:37 PM  Performed by: Leandro Martin PA-C  Authorized by: Leandro Martin PA-C     Patient location:  Bedside  Other Assisting Provider: Yes (comment) (Dr Jose Costa Me)    Consent:     Consent obtained:  Verbal and written    Consent given by:  Healthcare agent (Daughter)    Risks discussed:  Bleeding, damage to surrounding structures, infection, pain, nerve damage and incomplete drainage    Alternatives discussed:  No treatment, delayed treatment, alternative treatment and observation  Universal protocol:     Patient identity confirmed:  Arm band  Pre-procedure details:     Skin preparation:  ChloraPrep    Preparation: Patient was prepped and draped in the usual sterile fashion    Indications:     Indications: pneumothroax    Sedation:     Sedation type: Anxiolysis and moderate (conscious) sedation (See separate Procedural Sedation form) (fentanyl and propofol)  Anesthesia (see MAR for exact dosages): Anesthesia method:  Local infiltration    Local anesthetic:  Lidocaine 1% w/o epi  Procedure details:     Placement location:  Lateral    Laterality:  Left    Approach:  Open    Scalpel size:  15    Tube size (Fr):  32    Dissection instrument:  Sheila clamp and finger    Tube connected to:  Suction    Drainage characteristics:  Serosanguinous    Suture material:  0 silk (x2)    Dressing:  4x4 sterile gauze and Xeroform gauze  Post-procedure details:     Post-insertion x-ray findings: tube in good position      Patient tolerance of procedure:   Tolerated well, no immediate complications  Comments:      Updated daughter

## 2019-08-07 NOTE — OCCUPATIONAL THERAPY NOTE
OT CANCEL NOTE    Pt chart reviewed  Pt is now intubated & not appropriate for skilled OT services at this time  Will hold OT treatment  Will continue to follow pt on caseload and see pt when medically stable and as clinically appropriate      Corina DAVILA, OTR/L

## 2019-08-07 NOTE — PROCEDURES
Intubation  Date/Time: 8/7/2019 12:50 PM  Performed by: Jennifer Trejo DO  Authorized by: Jennifer Trejo DO     Patient location:  Bedside  Other Assisting Provider: No    Consent:     Consent obtained:  Emergent situation  Universal protocol:     Patient identity confirmed:  Arm band  Pre-procedure details:     Patient status:  Awake    Mallampati score:  2    Pretreatment medications:  Etomidate    Paralytics:  Succinylcholine  Indications:     Indications for intubation: airway protection and hypoxemia    Procedure details:     Preoxygenation:  Bag valve mask    CPR in progress: no      Intubation method:  Oral    Oral intubation technique:  Direct    Laryngoscope blade: Mac 4    Tube type:  Cuffed    Number of attempts:  2    Ventilation between attempts: yes      Cricoid pressure: yes      Tube visualized through cords: yes    Placement assessment:     ETT to lip:  21    Tube secured with:  ETT perez    Breath sounds:  Equal    Placement verification: chest rise, condensation, CXR verification, direct visualization, equal breath sounds and ETCO2 detector      CXR findings:  ETT in proper place (Tube pulled back 2 cm to 21cm at lip )  Post-procedure details:     Patient tolerance of procedure:   Tolerated well, no immediate complications

## 2019-08-07 NOTE — CONSULTS
Consultation - Infectious Disease   Stanislav Chung 78 y o  female MRN: [id number]  Unit/Bed#: ICU 02 Encounter: [id number]      IMPRESSION & RECOMMENDATIONS:   1  Systemic inflammatory response syndrome-possibly all secondary to the events of today with the development of hemoptysis  Possibly related the patient's fungemia  Possibly secondary to an occult growing abdominal process  The patient is requiring low-dose vasopressor support  At this point while waiting additional data favor broad antibiotics and antifungals   -discontinue Zosyn  -vancomycin 1250 mg IV q 12 hours  -cefepime 2 g IV q 12 hours  -Flagyl 500 mg IV q 8 hours  -continue micafungin at current dose  -recheck blood cultures x2 sets now  -check CT of the chest abdomen and pelvis  -recheck procalcitonin level now and tomorrow a m   -check CBC with diff and CMP tomorrow  -supportive care    2  Fungemia-possibly all translocation across the gut wall in the setting of peritonitis  Possible catheter related bacteremia although this is less likely as the blood cultures were from around the time of admission   -continue micafungin as above  -recheck blood cultures x2 sets confirm clearance  -check echocardiogram  -change central lines  -will eventually need ophthalmology consult to rule out endophthalmitis    3  Peritonitis-status post exploration and repair  No cultures obtained at the time of the surgery  Patient does have significant turbid drainage from the superior aspect of the wound  Patient has been maintained on broad antibiotics  Rising white count noted  -broad antibiotics and antifungals as above for now  -await repeat CT the abdomen and pelvis  -serial abdominal exams  -may need wound opened up and cultures sent  -close surgical follow-up    4  Acute hypoxic respiratory failure-possibly secondary to hemoptysis  Possibly complicated by an aspiration event  Less likely pneumonia as the chest x-ray seems to have improved    She is now requiring high-level ventilatory support   -ventilatory support  -monitor respiratory status  -await repeat CT of the chest  -close critical care follow-up    Extensive review of the medical records including notes, cultures, and radiographic results from the [facility] [address]:  Reason for Consult:  Fungemia  HPI: Para Viviana is a 78y o  year old female admitted to [facility] in [facility] after being transferred from [facility] my nurse for management of septic shock in the setting of peritonitis, who I am asked to assist with management of fungemia  The patient had been admitted to [address] [place] [address] on the 31st of July for laparoscopic incisional ventral hernia repair  Postoperatively she developed some pain across the abdomen and subsequently developed shortness of breath  She became hypoxemic and hypotensive and tachypneic  She was resuscitated, intubated, and placed on piperacillin tazobactam and was given a dose of Flagyl as well as cefazolin  Patient was urgently transferred to [facility] in [facility] for critical care support  She underwent imaging which revealed evidence of a bowel perforation and therefore was taken urgently to the operating room and underwent repair of the leak and had a VAC dressing placed  She was maintained on Zosyn and treated supportively  She developed a left-sided pneumothorax and a chest tube placed  She was eventually extubated and weaned off vasopressor support  Her blood cultures came back positive for yeast and therefore high-dose fluconazole was started yesterday  Today the patient began having respiratory compromise after going an attempted left-sided chest tube replacement  She apparently developed some hemoptysis and some blood from the left-sided chest tube, and deteriorated from respiratory standpoint    She was intubated and underwent bronchoscopy that revealed significant clot in the airway that was extracted  She has been placed back on low-dose pressors  Patient's white count is increased but he she has remained afebrile    Because of the patient's sedated and intubated state the entire history is through chart review and discussion with the nursing staff    REVIEW OF SYSTEMS:  Not obtainable due the patient's current sedated state    PAST MEDICAL HISTORY:  Past Medical History:   Diagnosis Date    Abnormal blood chemistry     last assessed 2014    Achalasia, esophageal     Acute medial meniscus tear     last assessed 03/10/2014    Acute otitis externa     last assessed 2014    Adrenal nodule ([address] )     Anemia     Anxiety     Arthritis     Atrial fibrillation (HCC)     resolved 2018    Atypical chest pain     last assessed 2016    Eagle's esophagus with high grade dysplasia     last assessed 2018    Cancer ([facility]     esophageal    Cerumen impaction     last assessed 2014    Chronic pain of right knee     last assessed 2017    Coronary artery disease     3 vessel, resolved 2018    Depression     Dysphagia     last assessed 2017    Esophageal stricture     last assessed 2017    GERD (gastroesophageal reflux disease)     Headache     last assessed 2013    Hiatal hernia     Insomnia     last assessed 10/15/2013    Jejunostomy tube present ([facility] )     resolved 2018    Low vitamin D level     resolved 2018    Osteoarthritis, knee     last assessed 2017    Pneumonia     last assessed 2013    Right leg paresthesias     last assessed 2017    Sciatica     last assessed 2015    Shingles     last assessed 2015    Spondylosis of 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     last assessed 2017    Venous insufficiency     last assessed 2013     Past Surgical History:   Procedure Laterality Date     SECTION      EXPLORATORY LAPAROTOMY W/ BOWEL RESECTION N/A 8/2/2019    Procedure: LAPAROTOMY EXPLORATORY W/ BOWEL RESECTION, APPLICATION OF ABTHERA VAC;  Surgeon: Deepa Graham DO;  [facility];  Service: General    GASTRECTOMY      partial    GASTROJEJUNOSTOMY W/ JEJUNOSTOMY TUBE N/A 8/24/2017    Procedure: Eric Zena TUBE PLACEMENT;  Surgeon: Alcon Richter MD;  [facility];  Service: Thoracic    JOINT REPLACEMENT Right 11/10/2014    knee, Dr Morgan Levin N/A 8/3/2019    Procedure: LAPAROTOMY EXPLORATORY, reanastomosis of proximal small bowel, placement of vac dressing;  Surgeon: Deepa Graham DO;  [facility];  Service: General    WY ESOPHAGOGASTRODUODENOSCOPY TRANSORAL DIAGNOSTIC N/A 3/10/2017    Procedure: ESOPHAGOGASTRODUODENOSCOPY (EGD); Surgeon: Laila Farrar MD;  [facility];  Service: Gastroenterology    WY ESOPHAGOGASTRODUODENOSCOPY TRANSORAL DIAGNOSTIC N/A 5/19/2017    Procedure: ESOPHAGOGASTRODUODENOSCOPY (EGD); Surgeon: Laila Farrar MD;  [facility];  Service: Gastroenterology    WY ESOPHAGOSCOPY FLEXIBLE TRANSORAL WITH BIOPSY N/A 8/24/2017    Procedure: ESOPHAGOGASTRODUODENOSCOPY (EGD);   Surgeon: Alcon Richter MD;  [facility];  Service: Thoracic    WY LAP, VENTRAL HERNIA REPAIR,REDUCIBLE N/A 7/31/2019    Procedure: REPAIR HERNIA VENTRAL LAPAROSCOPIC;  Surgeon: Steven Feliz DO;  [facility];  Service: General    WY REMOVAL [address] N/A 8/24/2017    Procedure: TRANSHIATAL ESOPHAGECTOMY;  Surgeon: Alcon Richter MD;  [facility];  Service: Thoracic    STEROID INJECTION KNEE Right 5/2/2017    Procedure: GENICULATE NERVE BLOCKS;  Surgeon: Linden Marcos DO;  [facility];  Service:        FAMILY HISTORY:  Non-contributory    SOCIAL HISTORY:  Social History   Social History     Substance and Sexual Activity   Alcohol Use Not Currently    Alcohol/week: 0 0 standard drinks    Frequency: Never    Binge frequency: Never     Social History     Substance and Sexual Activity   Drug Use No     Social History     Tobacco Use   Smoking Status Never Smoker   Smokeless Tobacco Never Used       ALLERGIES:  No Known Allergies    MEDICATIONS:  All current active medications have been reviewed  Antibiotics:  Zosyn 5, antifungals 2, micafungin 1    PHYSICAL EXAM:  Temp:  [98 2 °F (36 8 °C)-99 7 °F (37 6 °C)] 98 2 °F (36 8 °C)  HR:  [] 98  Resp:  [26] 26  BP: (110-147)/(57-84) 131/76  SpO2:  [93 %-98 %] 95 %  Temp (24hrs), Av 8 °F (37 1 °C), Min:98 2 °F (36 8 °C), Max:99 7 °F (37 6 °C)  Current: Temperature: 98 2 °F (36 8 °C)    Intake/Output Summary (Last 24 hours) at 2019 1557  Last data filed at 2019 0400  Gross per 24 hour   Intake 1310 ml   Output 1150 ml   Net 160 ml       General Appearance:  Sedated on the ventilator, no acute distress   Head:  Normocephalic, without obvious abnormality, atraumatic   Eyes:  Conjunctiva pale and sclera anicteric, both eyes   Nose: Nares normal, mucosa normal, no drainage   Throat: Oropharynx moist without lesions  Endotracheal tube in place   Neck: Supple, symmetrical, no adenopathy, no tenderness/mass/nodules   Back:   Symmetric, no curvature, ROM normal, no CVA tenderness   Lungs:   Decreased breath sounds bilaterally, respirations unlabored   Chest Wall:  No tenderness or deformity   Heart:  Tachycardia; no murmur, rub or gallop   Abdomen:   Soft, non-tender, decreased bowel sounds, abdominal incision with some turbid drainage from the superior aspect of the wound  Extremities: No cyanosis, clubbing or edema   Skin: No rashes or lesions  No draining wounds noted  Lymph nodes: Cervical, supraclavicular nodes normal   Neurologic: Alert and oriented times 3, extremity strength 5/5 and symmetric       LABS, IMAGING, & OTHER STUDIES:  Lab Results:  I have personally reviewed pertinent labs    Results from last 7 days   Lab Units 19  8486 19  2848 08/06/19  0051   WBC Thousand/uL 23 06* 17 93* 16 42*   HEMOGLOBIN g/dL 9 4* 9 9* 8 3*   PLATELETS Thousands/uL 231 204 182     Results from last 7 days   Lab Units 08/07/19  0453 08/06/19  1552 08/06/19  0447  08/03/19  0909  08/02/19  1300   SODIUM mmol/L 139 141 144   < >  --    < > 140   POTASSIUM mmol/L 3 7 3 1* 2 9*   < >  --    < > 3 4*   CHLORIDE mmol/L 106 107 108   < >  --    < > 105   CO2 mmol/L 30 26 26   < >  --    < > 26   CO2, I-STAT mmol/L  --   --   --   --  23   < >  --    BUN mg/dL 8 8 9   < >  --    < > 31*   CREATININE mg/dL 0 41* 0 42* 0 41*   < >  --    < > 1 11   EGFR ml/min/1 73sq m 105 104 105   < >  --    < > 50   GLUCOSE, ISTAT mg/dl  --   --   --   --  89   < >  --    CALCIUM mg/dL 7 5* 7 7* 7 5*   < >  --    < > 7 1*   AST U/L  --   --   --   --   --   --  22   ALT U/L  --   --   --   --   --   --  11*   ALK PHOS U/L  --   --   --   --   --   --  45*    < > = values in this interval not displayed  Results from last 7 days   Lab Units 08/06/19  0051 08/02/19  1129 08/02/19  1128   BLOOD CULTURE   --  No Growth After 5 Days  Candida glabrata*   GRAM STAIN RESULT   --   --  Yeast*   MRSA CULTURE ONLY  Culture results to follow    --   --      Results from last 7 days   Lab Units 08/02/19  1125   PROCALCITONIN ng/ml 8 33*       Imaging Studies:     Chest x-ray-improving basilar airspace opacities, decreased left pneumothorax    Images personally reviewed by me in PACS

## 2019-08-07 NOTE — PROGRESS NOTES
Vancomycin IV Pharmacy-to-Dose Consultation    Heaht Herzog is a 78 y o  female who is currently receiving vancomycin IV with management by the Pharmacy Consult service  Relevant clinical data and objective history reviewed:  Temp Readings from Last 3 Encounters:   08/07/19 98 2 °F (36 8 °C) (Oral)   08/02/19 100 4 °F (38 °C) (Tympanic)   06/26/19 99 6 °F (37 6 °C)     /76   Pulse 98   Temp 98 2 °F (36 8 °C) (Oral)   Resp (!) 26   Ht 5' 4" (1 626 m)   Wt 85 7 kg (188 lb 15 oz)   SpO2 95%   BMI 32 43 kg/m²     I/O last 3 completed shifts:   In: 5203 7 [P O :50; I V :4243 7; IV XETDODKAS:498]  Out: 8027 [Urine:3675; Chest Tube:575]    Lab Results   Component Value Date/Time    BUN 8 08/07/2019 04:53 AM    BUN 12 07/23/2015 07:10 AM    WBC 23 06 (H) 08/07/2019 04:53 AM    WBC 13 00 (H) 11/11/2014 04:30 AM    HGB 9 4 (L) 08/07/2019 04:53 AM    HGB 10 6 (L) 11/11/2014 04:30 AM    HCT 28 7 (L) 08/07/2019 04:53 AM    HCT 33 8 (L) 11/11/2014 04:30 AM    MCV 86 08/07/2019 04:53 AM    MCV 92 11/11/2014 04:30 AM     08/07/2019 04:53 AM     11/11/2014 04:30 AM     Creatinine   Date Value Ref Range Status   08/07/2019 0 41 (L) 0 60 - 1 30 mg/dL Final     Comment:     Standardized to IDMS reference method   08/06/2019 0 42 (L) 0 60 - 1 30 mg/dL Final     Comment:     Standardized to IDMS reference method   07/23/2015 0 69 0 60 - 1 30 mg/dL Final     Comment:     Standardized to IDMS reference method   11/11/2014 0 77 0 60 - 1 30 mg/dL Final     Comment:     Standardized to IDMS reference method         Vancomycin Assessment:  Indication: other sepsis  Status: started on norepinephrine this afternoon unclear if 2/2 to hemoptysis or fungemia, Infectious Disease following  Micro:   8/2 blood cx: candida glabrata  8/6 MRSA cx: negative  8/7 blood cx: needs to be collected  8/7 fungal cx: needs to be collected  Procalcitonin: none ordered  Renal Function: stable, Scr 0 41 today, UOP 0 9 mL/kg/hr  Potential Nephrotoxicity Factors:  Medications: previously on Zosyn (now on abi, cefepime, flagyl), now on norepinephrine 8 mcg/min  Patient-Factors: elderly, hypotension  Days of Therapy: 1  Current Dose: 1250 mg IV q12h (no doses given)  Goal Trough: 15-20 (appropriate for most indications)   Goal AUC(24h): 400-600  Last Level: n/a  Predicated Pharmacokinetic Analysis: ke 0 0828, t1/2 8 4      Vancomycin Plan:  New Dosing: change to 2000 mg x1 then 1250 mg IV q12h (next dose: STAT)  Predicted Trough / AUC: 15 4 / 600  Next Level: trough Friday 8/7 at 0500  Renal Function Monitoring: daily BMP and UOP assessment       Pharmacy will continue to follow closely for s/sx of nephrotoxicity, infusion reactions and appropriateness of therapy  BMP and CBC will be ordered per protocol  We will continue to follow the patient's culture results and clinical progress daily         Thank you,  Merrill Krishnamurthy, PharmD, [facility] Pharmacist  [phone]

## 2019-08-07 NOTE — CONSULTS
IR consulted to place a left chest tube  Patient has a left chest tube placed at an outside hospital with an air leak and a PTX which developed over the last day  IR will plan on placing a new anterior chest tube tomorrow and leave in the existing tube after discussion with Jad Avila  Patient currently intubated and stable

## 2019-08-07 NOTE — SPEECH THERAPY NOTE
Speech Language/Pathology      Subjective:  "It feels like I have to go    "    Objective:  Patient seen for dysphagia treatment session  MD OK for patient to begin clear liquid diet  Patient much more awake and alert today  Patient took thin liquid via straw with adequate suck from straw with suspect reduced oral control  Patient took puree with reduced oral acceptance to spoon, manipulation, and transfer  Patient took small whole pill given by RN with thin liquid with adequate manipulation and transfer however, c/o discomfort  Patient took remainder of meds given by RN, crushed in puree without difficulty  Patient presented with a mildly delayed pharyngeal swallow initiation, no overt s/s aspiration or anterior leakage observed with materials presented  Patient noted with frequent belching throughout       Assessment:  Patient appears appropriate for diet initiation of puree solids and thin liquids when appropriate/cleared per MD  SLP to re-assess for solids once appropriate per MD      Plan/Recommendations:  · Clear liquid diet per MD  · Once appropriate per MD, recommend initiation of puree diet and thin liquids  · SLP to re-assess for solids once appropriate/cleared by MD  · Meds crushed in puree  · Upright 90 degrees with po intake  · Alternate purees and liquids

## 2019-08-07 NOTE — NUTRITION
Awaiting results of abd CT scan  If unable to extubate and advance PO diet in next 24 hrs then consider an alternate means of nutrition support and reconsult RD for recs

## 2019-08-07 NOTE — PLAN OF CARE
Problem: SLP ADULT - SWALLOWING, IMPAIRED  Goal: Advance to least restrictive diet without signs or symptoms of aspiration for planned discharge setting  See evaluation for individualized goals  Description  Outcome: Progressing  Note:   Patient seen for dysphagia treatment session  Once appropriate per MD - recommend diet initiation of puree with thin liquids  SLP to re-assess for solids per MD discretion

## 2019-08-07 NOTE — QUICK NOTE
The patient was consented for a percutaneous chest tube 16fr by her daughter by phone for indication of worsening pneumothorax despite prior chest tube placement, on suction x 24 hours  The existing chest tube was without air leak or tidaling to suction  The existing small bore chest tube appeared with 90 degree angle to the tube, this was repositioned  She was draped and prepared in typical sterile fashion  She was placed in recumbent position and continued on 3 L NC  She remained hemodynamically stable  She was conversational and without new complaint, she had been coughing throughout the day with poor mucus clearance  The procedure was started and 5cc 1% lidocaine was instilled to the superficial skin of the superior aspect of the left T4 rib space, anterior axillary line  Then 5 cc lidocaine injected into the subcutaneous tissue  5cc cc more was injected further to the intercostal muscles and last 5cc injected over the t4 rib  Total of 20mL lidocaine injected  Careful attention to stay just above the rib was taken with my non dominant finger in place palpating the rib the entire time  The patient began coughing but remained without complaint  The introducer needle was slowly advanced with negative suction to the syringe filled with sterile water  Her coughing continued  spo2 was 99%  At a depth of less than 1 inch, about 1 cc of blood was noted in the syringe  At that time the needle was withdrawn  The assistant helping hold retraction noted liquid cough output  The sterile field was lifted and hemoptysis was noted with continued effluent and coughing blood and clots  The bleeding was bright red blood initially then turned to clots  She became hypoxic to spo2 72%  We then started bagging the patient with 100% fio2  Saturations improved to 80-90%  With ongoing hemoptysis the patient was not handling secretions, The patient was intubated with RSI etomidate and succinylcholine for airway protection   Saturation improved to 100%  The existing chest tube was noted to have air leak  Serous sanguinous drainage from the chest tube suction was noted, <100cc, which stopped within 10-15min  Family updated by phone with all events  All questions answered

## 2019-08-07 NOTE — PROGRESS NOTES
Vancomycin IV Pharmacy-to-Dose Consultation    Zbigniew Singh is a 78 y o  female who is currently receiving vancomycin IV with management by the Pharmacy Consult service  Relevant clinical data and objective history reviewed:  Temp Readings from Last 3 Encounters:   08/07/19 98 2 °F (36 8 °C) (Oral)   08/02/19 100 4 °F (38 °C) (Tympanic)   06/26/19 99 6 °F (37 6 °C)     /76   Pulse 98   Temp 98 2 °F (36 8 °C) (Oral)   Resp (!) 26   Ht 5' 4" (1 626 m)   Wt 85 7 kg (188 lb 15 oz)   SpO2 95%   BMI 32 43 kg/m²     I/O last 3 completed shifts: In: 5203 7 [P O :50; I V :4243 7; IV [id number]:032]  Out: 8161 [Urine:3675; Chest Tube:575]    Lab Results   Component Value Date/Time    BUN 8 08/07/2019 04:53 AM    BUN 12 07/23/2015 07:10 AM    WBC 23 06 (H) 08/07/2019 04:53 AM    WBC 13 00 (H) 11/11/2014 04:30 AM    HGB 9 4 (L) 08/07/2019 04:53 AM    HGB 10 6 (L) 11/11/2014 04:30 AM    HCT 28 7 (L) 08/07/2019 04:53 AM    HCT 33 8 (L) 11/11/2014 04:30 AM    MCV 86 08/07/2019 04:53 AM    MCV 92 11/11/2014 04:30 AM     08/07/2019 04:53 AM     11/11/2014 04:30 AM     Creatinine   Date Value Ref Range Status   08/07/2019 0 41 (L) 0 60 - 1 30 mg/dL Final     Comment:     Standardized to IDMS reference method   08/06/2019 0 42 (L) 0 60 - 1 30 mg/dL Final     Comment:     Standardized to IDMS reference method   07/23/2015 0 69 0 60 - 1 30 mg/dL Final     Comment:     Standardized to IDMS reference method   11/11/2014 0 77 0 60 - 1 30 mg/dL Final     Comment:     Standardized to IDMS reference method         Vancomycin Assessment:  1  Indication: other sepsis  · Status: started on norepinephrine this afternoon unclear if 2/2 to hemoptysis or fungemia, WBC increasing, tmax 101 5, Infectious Disease following  · Micro:   · 8/2 blood cx (1/2): candida glabrata  · 8/6 MRSA cx: negative  · 8/7 blood cx: needs to be collected  · 8/7 fungal cx: needs to be collected  · Procalcitonin: none ordered  2   Renal Function: stable, Scr 0 41 today, UOP 0 9 mL/kg/hr  3  Potential Nephrotoxicity Factors:  · Medications: previously on Zosyn (now on abi, cefepime, flagyl), norepinephrine at 8 mcg/min  · Patient-Factors: elderly, hypotension  4  Days of Therapy: 1  5  Current Dose: 1250 mg IV q12h (no doses given)  6  Goal Trough: 15-20 (appropriate for most indications)   7  Goal AUC(24h): 400-600  8  Last Level: n/a  9  Predicated Pharmacokinetic Analysis: ke 0 0828, t1/2 8 4      Vancomycin Plan:  1  New Dosing: change to 2000 mg x1 then 1250 mg IV q12h (next dose: STAT)  · Predicted Trough / AUC: 15 4 / 600  2  Next Level: trough prior to 4th dose Friday 8/9 at 0500  3  Renal Function Monitoring: daily BMP and UOP assessment       Pharmacy will continue to follow closely for s/sx of nephrotoxicity, infusion reactions and appropriateness of therapy  BMP and CBC will be ordered per protocol  We will continue to follow the patient's culture results and clinical progress daily         Thank you,  Lacey Dee, PharmD, [facility] 6 Pharmacist  [phone]

## 2019-08-07 NOTE — PROGRESS NOTES
Progress Note - General Surgery   Aminta Samuels Hartranft 78 y o  female MRN: [id number]  Unit/Bed#: ICU 02 Encounter: [id number]    Assessment:  79 yo F w/ septic shock s/p ventral hernia repair, now s/p Ex lap w/ SBR for iatrogenic jejunal injury  Pt extubated yesterday and well-appearin since that time, saturating adequately on RA  Wound vac removed and skin sutured closed  Interval increase in left sided pneumothorax  Plan:  Daily dressing changes  Increasing white count (23 from 17 from 16 last 2 days) - would add antifungal therapy given one culture bottle positive for yeast yesterday  Consider ID consult  Recommend gaining peripheral access and removing central line  Abdomen non-ttp  Surgical wound draining from superior-most aspect  Consider removing sutures in that area to allow drainage  Pt does not appear septic  Supplemental O2 prn  Interval increase in left sided pneumothorax - place back to suction  No signs of water leak  Replete electrolytes  Subjective/Objective   Chief Complaint:     Subjective: Pt states she is feeling well  Requests to use bed pan to move bowels when seen  Denies pain or discomfort at this time  Objective:     Blood pressure 135/75, pulse 100, temperature 98 4 °F (36 9 °C), temperature source Oral, resp  rate (!) 26, height 5' 4" (1 626 m), weight 85 7 kg (188 lb 15 oz), SpO2 93 %  ,Body mass index is 32 43 kg/m²        Intake/Output Summary (Last 24 hours) at 8/7/2019 0730  Last data filed at 8/7/2019 0400  Gross per 24 hour   Intake 2649 58 ml   Output 2075 ml   Net 574 58 ml       Invasive Devices     Central Venous Catheter Line            CVC Central Lines 08/02/19 Triple 4 days          Drain            Gastrostomy/Enterostomy Jejunostomy 14 Fr   days    Chest Tube 1 Left 12 Fr  4 days    External Urinary Catheter less than 1 day                Physical Exam:   Gen: A&O, NAD  Cardio: RRR  Lungs: CTAB, Unlabored, Left sided CT in place on suction without an air leak  Abd: Soft, non distended, non tender  Sutures c/d/i - minimal serosanguinous d/c  Dressing changed        Lab, Imaging and other studies:  CBC:   Lab Results   Component Value Date    WBC 23 06 (H) 08/07/2019    HGB 9 4 (L) 08/07/2019    HCT 28 7 (L) 08/07/2019    MCV 86 08/07/2019     08/07/2019    MCH 28 3 08/07/2019    MCHC 32 8 08/07/2019    RDW 15 4 (H) 08/07/2019    MPV 10 3 08/07/2019    NRBC 0 08/07/2019   , CMP:   Lab Results   Component Value Date    SODIUM 139 08/07/2019    K 3 7 08/07/2019     08/07/2019    CO2 30 08/07/2019    BUN 8 08/07/2019    CREATININE 0 41 (L) 08/07/2019    CALCIUM 7 5 (L) 08/07/2019    EGFR 105 08/07/2019   , Coagulation: No results found for: PT, INR, APTT, Urinalysis: No results found for: COLORU, CLARITYU, SPECGRAV, PHUR, LEUKOCYTESUR, NITRITE, PROTEINUA, GLUCOSEU, KETONESU, BILIRUBINUR, BLOODU  VTE Pharmacologic Prophylaxis: Heparin  VTE Mechanical Prophylaxis: sequential compression device

## 2019-08-07 NOTE — PROGRESS NOTES
Progress Note - [address] Hartranft 78 y o  female MRN: [id number]  Unit/Bed#: ICU 02 Encounter: [id number]    Assessment:   Principal Problem:    OR Exploratory laparotomy, SBR secondary to iatrogenic enterotomy   Active Problems:    Incisional hernia    Severe sepsis with septic shock (CODE) (HCC)    Acute respiratory failure with hypoxemia (HCC)    Acute renal failure (ARF) ([facility])    Postprocedural pneumothorax    Shock ([address] )    OR Laparoscopic ventral hernia repair  Resolved Problems:    * No resolved hospital problems   *      Plan  Neuro:   Pain  Anxiety  · Pain controlled with: prn tylenol, prn oxy IR, prn fentanyl  · Continue home lexapro and nightly gabapentin  · Delirium Precautions  · CAM ICU per protocol  · Regulate sleep/wake cycle+  · melatonin  · Trend neuro exam  CV:   Underlying CAD  HLD  · Continue home statin  · MAP goal > 65  · Rhythm: NSR  · Follow rhythm on telemetry  Lung:   Pulmonary insufficiency  Left apical pneumothorax with left open chest tube on suction  Bilateral pleural effusion  · Continue NC for spo2 goal 90%  · Aggressive IS and pulmonary toilet today, get oob to chair  · Chest tube to suction, continues without air leak  · Wean NC as able  GI:   NPO sips with meds  POD 4 ex lap with reanastomosis proximal small bowel and wound vac placement, wound vac removed on  8/5  POD 5 ex lap with ventral mesh removal bowel washout and small bowel resection  Recent ventral hernia repair with mesh  Hx of barretts esophagectomy and gastric conduit 2017  · Now with flatus, monitor for BM  · Plan to advance diet today slowly to clears  · Continue to monitor abdominal incision, now with more brownish drainage superiorly  · Day 5 of 5 zosyn, with increasing WBC + bands consider increase duration of abx  · Continue Fluconazole today, consider ID consult with yeast blood cx positive x 1  · Stress ulcer prophylaxis: for GERD/ barretts, continue PPI BID  · Bowel regimen: None currently awaiting return of bowel function  FEN:   · Goal 24 hour fluid balance: even   Fluid/Diuretic plan: D5 1/2 with 20kcl at 75cc/hr, stop once clears tolerated  · Nutrition/diet plan: NPO sips, plan for sips  · Replete electrolytes with goals: K >4 0, Mag >2 0, and Phos >3 0  :   · Indwelling Pak present: no   · Trend UOP and BUN/creat  · Strict I and O  ID:   · Abx ordered: Piperacillin and Tazobactam  · Day 5/5 today  · Day 2 of fluconazole, Plan for 14 days fluconazole after negative blood cx positive if candidemia  · Send surveillance cx today  · consider ID consultation and consider switch to micafungin to cover c  glabrata and c  krusei if condition worsens  · may need optho eval if blood cx results with candida, Trend temps and WBC count  · Monitor abdominal incision  Heme:   · Trend hgb and plts  · Transfuse as needed for goal hgb >7  · Monitor wbc and bands  Endo:   · Glycemic control plan: Blood glucose controlled on current regimen  · Controlled  · Goal 140-180  MSK/Skin:  · Mobility goal: oob to chair  · PT consult: yes  · OT consult: yes  · Frequent turning and pressure off-loading  Family:  · Family updated within 24 hours: yes   Lines:  · Central venous access: triple lumen catheter - 16 cm   · Goal to remove line today after establishing peripheral IV  VTE Prophylaxis:  · Pharmacologic Prophylaxis:Heparin  · Mechanical Prophylaxis: sequential compression device    Disposition: continue ICU care this am, if remains stable consider step down 2 status pm today      ______________________________________________________________________  Chief Complaint: "doing ok" appears more awake alert this am      HPI/24hr events: no events overnight, no fevers no arrhythmia  BP stable  Positive flatus this am  Feels urge for BM, going to try now      Yesterday pak removed, sat up on side of bed, Chest tube placed back to suction, started on sips with meds      Review of Systems   Constitutional: Negative for chills, diaphoresis, fatigue and fever  HENT: Negative  Eyes: Negative for photophobia and visual disturbance  Respiratory: Positive for cough  Negative for shortness of breath and wheezing  Cardiovascular: Negative  Gastrointestinal: Positive for abdominal distention and abdominal pain  Negative for constipation, diarrhea, nausea and vomiting  Genitourinary: Negative  Musculoskeletal: Negative  Skin: Negative  Neurological: Negative  Hematological: Negative  Psychiatric/Behavioral: Negative       ______________________________________________________________________  Temperature:   Temp (24hrs), Av 6 °F (37 6 °C), Min:98 4 °F (36 9 °C), Max:100 4 °F (38 °C)    Current Temperature: 98 4 °F (36 9 °C)    Temp:  [98 4 °F (36 9 °C)-100 4 °F (38 °C)] 98 4 °F (36 9 °C)  HR:  [] 98  Resp:  [20-28] 26  BP: (110-135)/(57-74) 122/70  Arterial Line BP: (128-134)/(58-60) 130/58    Vitals:    19 0300 19 0400 19 0500 19 0550   BP: 135/68 130/74 122/70    BP Location:  Right arm     Pulse: 94 104 98    Resp:       Temp:  98 4 °F (36 9 °C)     TempSrc:  Oral     SpO2: 97% 96% 96%    Weight:    85 7 kg (188 lb 15 oz)   Height:         Arterial Line BP: 130/58  Arterial Line MAP (mmHg): 86 mmHg     Weights:   IBW: 54 7 kg    Body mass index is 32 43 kg/m²  Weight (last 2 days)     Date/Time   Weight    19 0550   85 7 (188 93)    19 0554   78 3 (172 62)            Height: 5' 4" (162 6 cm)         No results found for: PHART, YFU0WGE, PO2ART, CKX7MIG, Z1FEFFUH, BEART, SOURCE  SpO2: SpO2: 96 %  ______________________________________________________________________  Physical Exam:  Farris Agitation Sedation Scale (RASS): Agitated  Physical Exam   Constitutional: She appears well-developed  No distress  HENT:   Head: Normocephalic and atraumatic  Eyes: Pupils are equal, round, and reactive to light  Conjunctivae and EOM are normal    Neck: No JVD present  Left cvc   Cardiovascular: Normal rate, regular rhythm, normal heart sounds and intact distal pulses  Pulmonary/Chest: Effort normal  She has no wheezes  She has no rales  3L NC  Left chest tube on suction without air leak, serous effluent in chamber   Abdominal: Soft  She exhibits no distension  There is tenderness  There is no rebound and no guarding  Tender midline  Dressing in tact with drainage, slightly darker today, brownish   Musculoskeletal: She exhibits edema  She exhibits no deformity  Neurological: She is alert  No cranial nerve deficit or sensory deficit  GCS eye subscore is 4  GCS verbal subscore is 5  GCS motor subscore is 6  Strength weak, 5/5 bilaterally  No deficits   Skin: Skin is warm  Capillary refill takes less than 2 seconds  She is not diaphoretic  No erythema  Nursing note and vitals reviewed  ______________________________________________________________________  Intake and Outputs:  I/O       08/05 0701 - 08/06 0700 08/06 0701 - 08/07 0700    P  O   50    I V  (mL/kg) 4036 8 (51 6) 1739 6 (20 3)    NG/GT 60     IV Piggyback 100 860    Total Intake(mL/kg) 4196 8 (53 6) 2649 6 (30 9)    Urine (mL/kg/hr) 3525 (1 9) 1775 (0 9)    Chest Tube 525 300    Total Output 4050 2075    Net +146 8 +574 6                Nutrition:        Diet Orders   (From admission, onward)             Start     Ordered    08/06/19 0851  Diet NPO; Sips with meds  Diet effective now     Question Answer Comment   Diet Type NPO    NPO Except: Sips with meds    RD to adjust diet per protocol?  Yes        08/06/19 0852                Labs:   Results from last 7 days   Lab Units 08/07/19  0453 08/06/19  0447 08/06/19  0051   WBC Thousand/uL 23 06* 17 93* 16 42*   HEMOGLOBIN g/dL 9 4* 9 9* 8 3*   HEMATOCRIT % 28 7* 30 5* 26 1*   PLATELETS Thousands/uL 231 204 182   NEUTROS PCT % 72 78* 78*   MONOS PCT % 3* 2* 2*    Results from last 7 days   Lab Units 08/07/19  0453 08/06/19  1552 08/06/19  0447  08/02/19  1300 SODIUM mmol/L 139 141 144   < > 140   POTASSIUM mmol/L 3 7 3 1* 2 9*   < > 3 4*   CHLORIDE mmol/L 106 107 108   < > 105   CO2 mmol/L 30 26 26   < > 26   CO2, I-STAT   --   --   --    < >  --    BUN mg/dL 8 8 9   < > 31*   CREATININE mg/dL 0 41* 0 42* 0 41*   < > 1 11   CALCIUM mg/dL 7 5* 7 7* 7 5*   < > 7 1*   ALK PHOS U/L  --   --   --   --  45*   ALT U/L  --   --   --   --  11*   AST U/L  --   --   --   --  22   GLUCOSE RANDOM mg/dL 144* 105 76   < > 135    < > = values in this interval not displayed  Results from last 7 days   Lab Units 08/03/19  1822 08/02/19  1300 08/02/19  0306   MAGNESIUM mg/dL 2 7* 2 3 2 6     Results from last 7 days   Lab Units 08/03/19  1822 08/02/19  1300   PHOSPHORUS mg/dL 2 4 2 7      Results from last 7 days   Lab Units 08/02/19  1811 08/02/19  1300 08/02/19  1125 08/02/19  0813 08/02/19  0617   INR  1 85* 1 90*  --   --  2 00*   PTT seconds  --  36 42* >210* >210*     Results from last 7 days   Lab Units 08/06/19  0051   LACTIC ACID mmol/L 0 7     0   Lab Value Date/Time    TROPONINI <0 02 08/02/2019 1126    TROPONINI <0 02 08/02/2019 0406    TROPONINI <0 02 11/22/2016 1110     Imaging: CXR with persisting left apical ptx I have personally reviewed pertinent reports  EKG: NSR  Micro:  Lab Results   Component Value Date    BLOODCX No Growth After 4 Days   08/02/2019     Allergies: No Known Allergies  Medications:   Scheduled Meds:  Current Facility-Administered Medications:  acetaminophen 650 mg Oral Q6H PRN Lauren Hicks PA-C    dextrose 5 % and sodium chloride 0 45 % with KCl 20 mEq/L 75 mL/hr Intravenous Continuous Lillie Hinton PA-C Last Rate: 75 mL/hr (08/07/19 0012)   escitalopram 10 mg Oral Daily Cash Hicks PA-C    fentanyl citrate (PF) 25 mcg Intravenous Q2H PRN Terri Jalloh PA-C    fluconazole 400 mg Intravenous Q24H Eduarda Mckeon Petit-Me    folic acid 1 mg Oral Daily Cash Hicks PA-C    gabapentin 100 mg Oral HS Lillie Hinton PA-C    heparin (porcine) 5,000 Units Subcutaneous [facility] Bryce Kim PA-C    magnesium sulfate 2 g Intravenous Once Horacio Gavin PA-C    melatonin 3 mg Oral HS Jesse Hicks PA-C    ondansetron 4 mg Intravenous Q4H PRN Horacio Gavin PA-C    oxyCODONE 2 5 mg Oral Q4H PRN [facility] [place], PA-C    Or        oxyCODONE 5 mg Oral Q4H PRN [address] [place] MarbleIMELDA    pantoprazole 40 mg Oral BID AC Cash Hicks PA-C    piperacillin-tazobactam 3 375 g Intravenous Q6H Rachel Rangel PA-C Last Rate: 3 375 g (08/07/19 0559)   potassium chloride 40 mEq Oral Once Horacio Gavin PA-C    pravastatin 20 mg Oral Daily With Manuel Hicks PA-C      Continuous Infusions:  dextrose 5 % and sodium chloride 0 45 % with KCl 20 mEq/L 75 mL/hr Last Rate: 75 mL/hr (08/07/19 0012)     PRN Meds:    acetaminophen 650 mg Q6H PRN   fentanyl citrate (PF) 25 mcg Q2H PRN   ondansetron 4 mg Q4H PRN   oxyCODONE 2 5 mg Q4H PRN   Or     oxyCODONE 5 mg Q4H PRN       Invasive lines and devices: Invasive Devices     Central Venous Catheter Line            CVC Central Lines 08/02/19 Triple 4 days          Drain            Gastrostomy/Enterostomy Jejunostomy 14 Fr   days    Chest Tube 1 Left 12 Fr  4 days    External Urinary Catheter less than 1 day                   Counseling / Coordination of Care  Total Critical Care time spent 35 minutes excluding procedures, teaching and family updates  Code Status: Level 1 - Full Code    Portions of the record may have been created with voice recognition software  Occasional wrong word or "sound a like" substitutions may have occurred due to the inherent limitations of voice recognition software  Read the chart carefully and recognize, using context, where substitutions have occurred      Horacio Gavin PA-C

## 2019-08-07 NOTE — PROCEDURES
Central Line Insertion  Date/Time: 8/7/2019 7:26 PM  Performed by: Tacho Navarrete  Authorized by: Tacho Navarrete     Patient location:  Bedside  Other Assisting Provider: No    Consent:     Consent obtained:  Written    Consent given by: Daughter     Risks discussed:  Pneumothorax, infection, bleeding and arterial puncture  Universal protocol:     Procedure explained and questions answered to patient or proxy's satisfaction: yes      Relevant documents present and verified: yes      Test results available and properly labeled: yes      Radiology Images displayed and confirmed  If images not available, report reviewed: yes      Required blood products, implants, devices, and special equipment available: yes      Site/side marked: yes      Immediately prior to procedure, a time out was called: yes      Patient identity confirmed:  Arm band and hospital-assigned identification number  Pre-procedure details:     Hand hygiene: Hand hygiene performed prior to insertion      Sterile barrier technique: All elements of maximal sterile technique followed      Skin preparation:  ChloraPrep  Indications:     Central line indications: medications requiring central line, hemodynamic monitoring and no peripheral vascular access    Sedation:     Sedation type: Anxiolysis and moderate (conscious) sedation  Procedure details:     Location:  Left subclavian    Vessel type: vein      Laterality:  Left    Approach: percutaneous technique used      Patient position:  Flat    Catheter type:  Triple lumen 20cm    Landmarks identified: yes      Ultrasound guidance: no      Number of attempts:  1    Successful placement: yes    Post-procedure details:     Post-procedure:  Dressing applied    Assessment:  Blood return through all ports    Post-procedure complications: none      Patient tolerance of procedure:   Tolerated well, no immediate complications

## 2019-08-08 ENCOUNTER — APPOINTMENT (INPATIENT)
Dept: RADIOLOGY | Facility: HOSPITAL | Age: 69
DRG: 856 | End: 2019-08-08
Payer: MEDICARE

## 2019-08-08 ENCOUNTER — APPOINTMENT (INPATIENT)
Dept: GASTROENTEROLOGY | Facility: HOSPITAL | Age: 69
DRG: 856 | End: 2019-08-08
Payer: MEDICARE

## 2019-08-08 PROBLEM — B49 FUNGEMIA: Status: ACTIVE | Noted: 2019-08-08

## 2019-08-08 LAB
ALBUMIN SERPL BCP-MCNC: 1.2 G/DL (ref 3.5–5)
ALP SERPL-CCNC: 116 U/L (ref 46–116)
ALT SERPL W P-5'-P-CCNC: 11 U/L (ref 12–78)
ANION GAP SERPL CALCULATED.3IONS-SCNC: 5 MMOL/L (ref 4–13)
AST SERPL W P-5'-P-CCNC: 22 U/L (ref 5–45)
BASE EXCESS BLDA CALC-SCNC: 2.2 MMOL/L
BASE EXCESS BLDA CALC-SCNC: 3.5 MMOL/L
BASOPHILS # BLD AUTO: 0.05 THOUSANDS/ΜL (ref 0–0.1)
BASOPHILS NFR BLD AUTO: 0 % (ref 0–1)
BILIRUB DIRECT SERPL-MCNC: 0.29 MG/DL (ref 0–0.2)
BILIRUB SERPL-MCNC: 0.59 MG/DL (ref 0.2–1)
BODY TEMPERATURE: 100.9 DEGREES FEHRENHEIT
BODY TEMPERATURE: 101.1 DEGREES FEHRENHEIT
BUN SERPL-MCNC: 8 MG/DL (ref 5–25)
CALCIUM SERPL-MCNC: 7.2 MG/DL (ref 8.3–10.1)
CHLORIDE SERPL-SCNC: 103 MMOL/L (ref 100–108)
CO2 SERPL-SCNC: 27 MMOL/L (ref 21–32)
CREAT SERPL-MCNC: 0.35 MG/DL (ref 0.6–1.3)
EOSINOPHIL # BLD AUTO: 0.41 THOUSAND/ΜL (ref 0–0.61)
EOSINOPHIL NFR BLD AUTO: 1 % (ref 0–6)
ERYTHROCYTE [DISTWIDTH] IN BLOOD BY AUTOMATED COUNT: 15.5 % (ref 11.6–15.1)
GFR SERPL CREATININE-BSD FRML MDRD: 111 ML/MIN/1.73SQ M
GLUCOSE SERPL-MCNC: 112 MG/DL (ref 65–140)
GLUCOSE SERPL-MCNC: 120 MG/DL (ref 65–140)
GLUCOSE SERPL-MCNC: 121 MG/DL (ref 65–140)
GLUCOSE SERPL-MCNC: 152 MG/DL (ref 65–140)
GLUCOSE SERPL-MCNC: 156 MG/DL (ref 65–140)
GLUCOSE SERPL-MCNC: 159 MG/DL (ref 65–140)
HCO3 BLDA-SCNC: 26.2 MMOL/L (ref 22–28)
HCO3 BLDA-SCNC: 27.4 MMOL/L (ref 22–28)
HCT VFR BLD AUTO: 27 % (ref 34.8–46.1)
HGB BLD-MCNC: 8.8 G/DL (ref 11.5–15.4)
HOROWITZ INDEX BLDA+IHG-RTO: 50 MM[HG]
HOROWITZ INDEX BLDA+IHG-RTO: 50 MM[HG]
IMM GRANULOCYTES # BLD AUTO: >0.5 THOUSAND/UL (ref 0–0.2)
IMM GRANULOCYTES NFR BLD AUTO: 13 % (ref 0–2)
LACTATE SERPL-SCNC: 1.2 MMOL/L (ref 0.5–2)
LACTATE SERPL-SCNC: 1.2 MMOL/L (ref 0.5–2)
LYMPHOCYTES # BLD AUTO: 3.92 THOUSANDS/ΜL (ref 0.6–4.47)
LYMPHOCYTES NFR BLD AUTO: 10 % (ref 14–44)
MAGNESIUM SERPL-MCNC: 2.1 MG/DL (ref 1.6–2.6)
MCH RBC QN AUTO: 28.5 PG (ref 26.8–34.3)
MCHC RBC AUTO-ENTMCNC: 32.6 G/DL (ref 31.4–37.4)
MCV RBC AUTO: 87 FL (ref 82–98)
MONOCYTES # BLD AUTO: 0.9 THOUSAND/ΜL (ref 0.17–1.22)
MONOCYTES NFR BLD AUTO: 2 % (ref 4–12)
MRSA NOSE QL CULT: NORMAL
NEUTROPHILS # BLD AUTO: 30.47 THOUSANDS/ΜL (ref 1.85–7.62)
NEUTS SEG NFR BLD AUTO: 74 % (ref 43–75)
NRBC BLD AUTO-RTO: 0 /100 WBCS
O2 CT BLDA-SCNC: 13.1 ML/DL (ref 16–23)
O2 CT BLDA-SCNC: 13.5 ML/DL (ref 16–23)
OXYHGB MFR BLDA: 97.8 % (ref 94–97)
OXYHGB MFR BLDA: 98.3 % (ref 94–97)
PCO2 BLDA: 38.2 MM HG (ref 36–44)
PCO2 BLDA: 38.7 MM HG (ref 36–44)
PCO2 TEMP ADJ BLDA: 40.4 MM HG (ref 36–44)
PEEP RESPIRATORY: 5 CM[H2O]
PEEP RESPIRATORY: 5 CM[H2O]
PH BLD: 7.43 [PH] (ref 7.35–7.45)
PH BLDA: 7.45 [PH] (ref 7.35–7.45)
PH BLDA: 7.47 [PH] (ref 7.35–7.45)
PHOSPHATE SERPL-MCNC: 1.5 MG/DL (ref 2.3–4.1)
PLATELET # BLD AUTO: 305 THOUSANDS/UL (ref 149–390)
PMV BLD AUTO: 11 FL (ref 8.9–12.7)
PO2 BLD: 115.6 MM HG (ref 75–129)
PO2 BLDA: 107.3 MM HG (ref 75–129)
PO2 BLDA: 118.2 MM HG (ref 75–129)
POTASSIUM SERPL-SCNC: 3.8 MMOL/L (ref 3.5–5.3)
PROCALCITONIN SERPL-MCNC: 1.04 NG/ML
PROT SERPL-MCNC: 4.5 G/DL (ref 6.4–8.2)
RBC # BLD AUTO: 3.09 MILLION/UL (ref 3.81–5.12)
SODIUM SERPL-SCNC: 135 MMOL/L (ref 136–145)
SPECIMEN SOURCE: ABNORMAL
SPECIMEN SOURCE: ABNORMAL
VENT AC: 14
VENT AC: 14
VENT- AC: AC
VENT- AC: AC
VT SETTING VENT: 450 ML
VT SETTING VENT: 450 ML
WBC # BLD AUTO: 40.9 THOUSAND/UL (ref 4.31–10.16)

## 2019-08-08 PROCEDURE — 31622 DX BRONCHOSCOPE/WASH: CPT | Performed by: EMERGENCY MEDICINE

## 2019-08-08 PROCEDURE — NC001 PR NO CHARGE: Performed by: SURGERY

## 2019-08-08 PROCEDURE — 94003 VENT MGMT INPAT SUBQ DAY: CPT

## 2019-08-08 PROCEDURE — 36620 INSERTION CATHETER ARTERY: CPT | Performed by: SURGERY

## 2019-08-08 PROCEDURE — 87070 CULTURE OTHR SPECIMN AEROBIC: CPT | Performed by: STUDENT IN AN ORGANIZED HEALTH CARE EDUCATION/TRAINING PROGRAM

## 2019-08-08 PROCEDURE — 83735 ASSAY OF MAGNESIUM: CPT | Performed by: PHYSICIAN ASSISTANT

## 2019-08-08 PROCEDURE — 83605 ASSAY OF LACTIC ACID: CPT | Performed by: STUDENT IN AN ORGANIZED HEALTH CARE EDUCATION/TRAINING PROGRAM

## 2019-08-08 PROCEDURE — 87106 FUNGI IDENTIFICATION YEAST: CPT | Performed by: STUDENT IN AN ORGANIZED HEALTH CARE EDUCATION/TRAINING PROGRAM

## 2019-08-08 PROCEDURE — 80048 BASIC METABOLIC PNL TOTAL CA: CPT | Performed by: PHYSICIAN ASSISTANT

## 2019-08-08 PROCEDURE — C9113 INJ PANTOPRAZOLE SODIUM, VIA: HCPCS | Performed by: PHYSICIAN ASSISTANT

## 2019-08-08 PROCEDURE — 99233 SBSQ HOSP IP/OBS HIGH 50: CPT | Performed by: INTERNAL MEDICINE

## 2019-08-08 PROCEDURE — 99291 CRITICAL CARE FIRST HOUR: CPT | Performed by: EMERGENCY MEDICINE

## 2019-08-08 PROCEDURE — 74018 RADEX ABDOMEN 1 VIEW: CPT

## 2019-08-08 PROCEDURE — 0B988ZZ DRAINAGE OF LEFT UPPER LOBE BRONCHUS, VIA NATURAL OR ARTIFICIAL OPENING ENDOSCOPIC: ICD-10-PCS | Performed by: EMERGENCY MEDICINE

## 2019-08-08 PROCEDURE — 87102 FUNGUS ISOLATION CULTURE: CPT | Performed by: STUDENT IN AN ORGANIZED HEALTH CARE EDUCATION/TRAINING PROGRAM

## 2019-08-08 PROCEDURE — 82948 REAGENT STRIP/BLOOD GLUCOSE: CPT

## 2019-08-08 PROCEDURE — 71045 X-RAY EXAM CHEST 1 VIEW: CPT

## 2019-08-08 PROCEDURE — 0B958ZZ DRAINAGE OF RIGHT MIDDLE LOBE BRONCHUS, VIA NATURAL OR ARTIFICIAL OPENING ENDOSCOPIC: ICD-10-PCS | Performed by: EMERGENCY MEDICINE

## 2019-08-08 PROCEDURE — 80076 HEPATIC FUNCTION PANEL: CPT | Performed by: SURGERY

## 2019-08-08 PROCEDURE — 76705 ECHO EXAM OF ABDOMEN: CPT

## 2019-08-08 PROCEDURE — 85025 COMPLETE CBC W/AUTO DIFF WBC: CPT | Performed by: PHYSICIAN ASSISTANT

## 2019-08-08 PROCEDURE — 87252 VIRUS INOCULATION TISSUE: CPT | Performed by: STUDENT IN AN ORGANIZED HEALTH CARE EDUCATION/TRAINING PROGRAM

## 2019-08-08 PROCEDURE — 84100 ASSAY OF PHOSPHORUS: CPT | Performed by: PHYSICIAN ASSISTANT

## 2019-08-08 PROCEDURE — 94760 N-INVAS EAR/PLS OXIMETRY 1: CPT

## 2019-08-08 PROCEDURE — 94669 MECHANICAL CHEST WALL OSCILL: CPT

## 2019-08-08 PROCEDURE — 82805 BLOOD GASES W/O2 SATURATION: CPT | Performed by: STUDENT IN AN ORGANIZED HEALTH CARE EDUCATION/TRAINING PROGRAM

## 2019-08-08 PROCEDURE — 84145 PROCALCITONIN (PCT): CPT | Performed by: SURGERY

## 2019-08-08 PROCEDURE — 0B948ZZ DRAINAGE OF RIGHT UPPER LOBE BRONCHUS, VIA NATURAL OR ARTIFICIAL OPENING ENDOSCOPIC: ICD-10-PCS | Performed by: EMERGENCY MEDICINE

## 2019-08-08 RX ORDER — PROPOFOL 10 MG/ML
INJECTION, EMULSION INTRAVENOUS
Status: COMPLETED
Start: 2019-08-08 | End: 2019-08-08

## 2019-08-08 RX ORDER — LIDOCAINE HYDROCHLORIDE 10 MG/ML
8 INJECTION, SOLUTION EPIDURAL; INFILTRATION; INTRACAUDAL; PERINEURAL ONCE
Status: COMPLETED | OUTPATIENT
Start: 2019-08-08 | End: 2019-08-08

## 2019-08-08 RX ORDER — BISACODYL 10 MG
10 SUPPOSITORY, RECTAL RECTAL DAILY PRN
Status: DISCONTINUED | OUTPATIENT
Start: 2019-08-08 | End: 2019-08-27 | Stop reason: HOSPADM

## 2019-08-08 RX ORDER — HYDROMORPHONE HCL/PF 1 MG/ML
0.2 SYRINGE (ML) INJECTION EVERY 4 HOURS PRN
Status: DISCONTINUED | OUTPATIENT
Start: 2019-08-08 | End: 2019-08-27 | Stop reason: HOSPADM

## 2019-08-08 RX ORDER — LIDOCAINE HYDROCHLORIDE 10 MG/ML
INJECTION, SOLUTION EPIDURAL; INFILTRATION; INTRACAUDAL; PERINEURAL
Status: DISPENSED
Start: 2019-08-08 | End: 2019-08-08

## 2019-08-08 RX ADMIN — SODIUM CHLORIDE, SODIUM GLUCONATE, SODIUM ACETATE, POTASSIUM CHLORIDE AND MAGNESIUM CHLORIDE 75 ML/HR: 526; 502; 368; 37; 30 INJECTION, SOLUTION INTRAVENOUS at 15:56

## 2019-08-08 RX ADMIN — HEPARIN SODIUM 5000 UNITS: 5000 INJECTION INTRAVENOUS; SUBCUTANEOUS at 05:21

## 2019-08-08 RX ADMIN — PROPOFOL 20 MCG/KG/MIN: 10 INJECTION, EMULSION INTRAVENOUS at 12:00

## 2019-08-08 RX ADMIN — HEPARIN SODIUM 5000 UNITS: 5000 INJECTION INTRAVENOUS; SUBCUTANEOUS at 21:04

## 2019-08-08 RX ADMIN — VANCOMYCIN HYDROCHLORIDE 1250 MG: 10 INJECTION, POWDER, LYOPHILIZED, FOR SOLUTION INTRAVENOUS at 16:50

## 2019-08-08 RX ADMIN — GUAIFENESIN 600 MG: 600 TABLET, EXTENDED RELEASE ORAL at 08:53

## 2019-08-08 RX ADMIN — FOLIC ACID 1 MG: 1 TABLET ORAL at 08:52

## 2019-08-08 RX ADMIN — MICAFUNGIN SODIUM 100 MG: 10 INJECTION, POWDER, LYOPHILIZED, FOR SOLUTION INTRAVENOUS at 15:38

## 2019-08-08 RX ADMIN — ACETAMINOPHEN 650 MG: 325 TABLET ORAL at 15:37

## 2019-08-08 RX ADMIN — POTASSIUM & SODIUM PHOSPHATES POWDER PACK 280-160-250 MG 2 PACKET: 280-160-250 PACK at 08:00

## 2019-08-08 RX ADMIN — OXYCODONE HYDROCHLORIDE 5 MG: 5 TABLET ORAL at 19:50

## 2019-08-08 RX ADMIN — GABAPENTIN 100 MG: 100 CAPSULE ORAL at 21:04

## 2019-08-08 RX ADMIN — VANCOMYCIN HYDROCHLORIDE 1250 MG: 10 INJECTION, POWDER, LYOPHILIZED, FOR SOLUTION INTRAVENOUS at 05:21

## 2019-08-08 RX ADMIN — LIDOCAINE HYDROCHLORIDE 8 ML: 10 INJECTION, SOLUTION EPIDURAL; INFILTRATION; INTRACAUDAL; PERINEURAL at 16:54

## 2019-08-08 RX ADMIN — METRONIDAZOLE 500 MG: 500 INJECTION, SOLUTION INTRAVENOUS at 01:24

## 2019-08-08 RX ADMIN — CEFEPIME HYDROCHLORIDE 2000 MG: 2 INJECTION, POWDER, FOR SOLUTION INTRAVENOUS at 04:42

## 2019-08-08 RX ADMIN — CHLORHEXIDINE GLUCONATE 0.12% ORAL RINSE 15 ML: 1.2 LIQUID ORAL at 21:04

## 2019-08-08 RX ADMIN — CEFEPIME HYDROCHLORIDE 2000 MG: 2 INJECTION, POWDER, FOR SOLUTION INTRAVENOUS at 16:45

## 2019-08-08 RX ADMIN — POTASSIUM & SODIUM PHOSPHATES POWDER PACK 280-160-250 MG 2 PACKET: 280-160-250 PACK at 13:00

## 2019-08-08 RX ADMIN — PANTOPRAZOLE SODIUM 40 MG: 40 INJECTION, POWDER, FOR SOLUTION INTRAVENOUS at 08:54

## 2019-08-08 RX ADMIN — CHLORHEXIDINE GLUCONATE 0.12% ORAL RINSE 15 ML: 1.2 LIQUID ORAL at 01:14

## 2019-08-08 RX ADMIN — POTASSIUM & SODIUM PHOSPHATES POWDER PACK 280-160-250 MG 2 PACKET: 280-160-250 PACK at 21:04

## 2019-08-08 RX ADMIN — METRONIDAZOLE 500 MG: 500 INJECTION, SOLUTION INTRAVENOUS at 08:54

## 2019-08-08 RX ADMIN — HEPARIN SODIUM 5000 UNITS: 5000 INJECTION INTRAVENOUS; SUBCUTANEOUS at 13:29

## 2019-08-08 RX ADMIN — GUAIFENESIN 600 MG: 600 TABLET, EXTENDED RELEASE ORAL at 21:04

## 2019-08-08 RX ADMIN — POTASSIUM & SODIUM PHOSPHATES POWDER PACK 280-160-250 MG 2 PACKET: 280-160-250 PACK at 15:36

## 2019-08-08 RX ADMIN — PRAVASTATIN SODIUM 20 MG: 20 TABLET ORAL at 15:37

## 2019-08-08 RX ADMIN — CHLORHEXIDINE GLUCONATE 0.12% ORAL RINSE 15 ML: 1.2 LIQUID ORAL at 08:39

## 2019-08-08 RX ADMIN — ESCITALOPRAM OXALATE 10 MG: 10 TABLET ORAL at 08:40

## 2019-08-08 RX ADMIN — SODIUM CHLORIDE, SODIUM GLUCONATE, SODIUM ACETATE, POTASSIUM CHLORIDE AND MAGNESIUM CHLORIDE 500 ML: 526; 502; 368; 37; 30 INJECTION, SOLUTION INTRAVENOUS at 00:00

## 2019-08-08 RX ADMIN — POTASSIUM PHOSPHATE, MONOBASIC AND POTASSIUM PHOSPHATE, DIBASIC 21 MMOL: 224; 236 INJECTION, SOLUTION INTRAVENOUS at 08:00

## 2019-08-08 RX ADMIN — MELATONIN 3 MG: 3 TAB ORAL at 21:04

## 2019-08-08 RX ADMIN — ACETAMINOPHEN 650 MG: 325 TABLET ORAL at 08:53

## 2019-08-08 RX ADMIN — OXYCODONE HYDROCHLORIDE 2.5 MG: 5 TABLET ORAL at 11:16

## 2019-08-08 RX ADMIN — HYDROMORPHONE HYDROCHLORIDE 0.2 MG: 1 INJECTION, SOLUTION INTRAMUSCULAR; INTRAVENOUS; SUBCUTANEOUS at 21:06

## 2019-08-08 RX ADMIN — FENTANYL CITRATE 50 MCG: 50 INJECTION, SOLUTION INTRAMUSCULAR; INTRAVENOUS at 13:19

## 2019-08-08 RX ADMIN — PANTOPRAZOLE SODIUM 40 MG: 40 INJECTION, POWDER, FOR SOLUTION INTRAVENOUS at 20:53

## 2019-08-08 RX ADMIN — NOREPINEPHRINE BITARTRATE 5 MCG/MIN: 1 INJECTION INTRAVENOUS at 13:00

## 2019-08-08 RX ADMIN — METRONIDAZOLE 500 MG: 500 INJECTION, SOLUTION INTRAVENOUS at 16:45

## 2019-08-08 RX ADMIN — SODIUM CHLORIDE, SODIUM GLUCONATE, SODIUM ACETATE, POTASSIUM CHLORIDE AND MAGNESIUM CHLORIDE 75 ML/HR: 526; 502; 368; 37; 30 INJECTION, SOLUTION INTRAVENOUS at 01:22

## 2019-08-08 NOTE — RESPIRATORY THERAPY NOTE
Pt  extubated after weaning on CPAP/PS 5/5 50%  No resp distress or stridor noted at this time  Pt  placed on nasal cannula at 6l/m  O2 sat 99%

## 2019-08-08 NOTE — PHYSICAL THERAPY NOTE
Physical Therapy Cancellation Note      Pt is currently intubated/sedated and not appropriate for PT intervention at this time   PT to follow     Kaylen Urrutia, PT, DPT

## 2019-08-08 NOTE — PROGRESS NOTES
08/08/19 [address] Involvement Patient active with [employer]   Hindu Leader Aware/Contacted Leader contacted by phone   Spiritual Beliefs/Perceptions   Support Systems Children   Stress Factors   Family Stress Factors Health changes   Coping Responses   Family Coping Anxiety;Open/discussion   Plan of Care   Comments Facilitated story telling about surgery and hospital transfer, listened empathetically, explored relational support system, offered prayer, contacted home parish for support and left message, cultivated relationship of care and support   Assessment Completed by: Unit visit

## 2019-08-08 NOTE — PROGRESS NOTES
Progress Note - General Surgery   Karilyn Shock Hartranft 78 y o  female MRN: [id number]  Unit/Bed#: ICU 02 Encounter: [id number]    Assessment:  79 yo F s/p Ex lap w/ SBR after iatrogenic jejunal injury during laparoscopic ventral hernia repair  Left iatrogenic pneumothorax 2/2 IJ catheterization  Currently in septic shock 2/2 C glabrata fungemia  Hypoxic respiratory failure 2/2 hemoptysis and mucous plug  Plan:  Antibiotics and f/u ID  Trend end-points of resuscitation  Strict I/O  Daily wound check and dressing changes  Follow up general surgery daily  Advance diet as tolerated  Gentle fluid hydration  Consider repeat bronchoscopy  Chest tube to suction  Wean from the vent as tolerated  Replete electrolytes prn  Pulmonary toilet  Subjective/Objective   Chief Complaint:   Pt re-intubated yesterday for resp failure 2/2 hemoptysis during perc CT placement  Blood clot/mucous plug retrived during bronchoscopy  Pigtail removed and formal chest tube placed for increased Pneumothorax  BCx growing out C glabrata - antifungal and broad spectrum abx initiated  ID consult placed  Old central line removed and new one placed  Pt on pressors overnight for septic shock  Subjective: Pt unable to communicate due to endotracheal intubation  Seen lying in bed, unresponsive to verbal commands  Objective:     Blood pressure (!) 84/47, pulse 86, temperature 99 2 °F (37 3 °C), temperature source Tympanic, resp  rate 18, height 5' 4" (1 626 m), weight 85 7 kg (188 lb 15 oz), SpO2 97 %  ,Body mass index is 32 43 kg/m²        Intake/Output Summary (Last 24 hours) at 8/8/2019 0133  Last data filed at 8/7/2019 2201  Gross per 24 hour   Intake 4961 77 ml   Output 2065 ml   Net 2896 77 ml       Invasive Devices     Central Venous Catheter Line            CVC Central Lines 08/07/19 Triple 20cm less than 1 day          Arterial Line            Arterial Line 08/08/19 Radial less than 1 day          Drain Gastrostomy/Enterostomy Jejunostomy 14 Fr   days    External Urinary Catheter 1 day    Chest Tube Left less than 1 day    NG/OG/Enteral Tube Nasogastric 16 Fr Right mouth less than 1 day          Airway            ETT  Cuffed less than 1 day                Physical Exam:   Gen: Ill-appearing  Cardio: RRR  Lungs: CTAB, Intubated, Left sided CT in place on suction with an air leak  Abd: Soft, non distended, non tender  Serosanguinous discharge from the surgical wound  : Mendoza in place    Neuro: Sedated, brain stem reflexes intact, not breathing over the vent, unresponsive to verbal       Lab, Imaging and other studies:  CBC:   Lab Results   Component Value Date    WBC 42 21 (HH) 08/07/2019    HGB 10 0 (L) 08/07/2019    HCT 30 9 (L) 08/07/2019    MCV 89 08/07/2019     08/07/2019    MCH 28 7 08/07/2019    MCHC 32 4 08/07/2019    RDW 15 7 (H) 08/07/2019    MPV 11 5 08/07/2019    NRBC 0 08/07/2019   , CMP:   Lab Results   Component Value Date    SODIUM 135 (L) 08/07/2019    K 4 0 08/07/2019     08/07/2019    CO2 27 08/07/2019    BUN 7 08/07/2019    CREATININE 0 36 (L) 08/07/2019    CALCIUM 7 1 (L) 08/07/2019    AST 25 08/07/2019    ALT 15 08/07/2019    ALKPHOS 106 08/07/2019    EGFR 110 08/07/2019   , Coagulation:   Lab Results   Component Value Date    INR 1 40 (H) 08/07/2019   , Urinalysis: No results found for: COLORU, CLARITYU, SPECGRAV, PHUR, LEUKOCYTESUR, NITRITE, PROTEINUA, GLUCOSEU, KETONESU, BILIRUBINUR, BLOODU  VTE Pharmacologic Prophylaxis: Heparin  VTE Mechanical Prophylaxis: sequential compression device

## 2019-08-08 NOTE — PROGRESS NOTES
Progress Note - Infectious Disease   Sarbjit KAHN [place]ear[place]ft 78 y o  female MRN: [id number]  Unit/Bed#: ICU 02 Encounter: [id number]      Impression/Plan:  1  Systemic inflammatory response syndrome-possibly all secondary to the events of today with the development of hemoptysis  Possibly related the patient's fungemia  Possibly secondary to an ongoing abdominal process  The patient is no longer requiring vasopressor support  At this point while waiting additional data favor broad antibiotics and antifungals  Procalcitonin level is elevated but has decreased since admission  -continue vancomycin, cefepime, Flagyl, and micafungin for now  -follow up repeat blood cultures  -low threshold to tap ascites and sent for cultures  -recheck procalcitonin level   -check CBC with diff and CMP tomorrow  -supportive care     2  Fungemia-possibly all translocation across the gut wall in the setting of peritonitis  Possible catheter related bacteremia although this is less likely as the blood cultures were from around the time of admission  The central line has been changed  -continue micafungin as above  -follow up repeat blood cultures  -follow up sensitivities and adjust antifungals as needed  -recheck echocardiogram if repeat blood cultures positive for yeast  -will eventually need ophthalmology consult to rule out endophthalmitis     3  Peritonitis-status post exploration and repair  No cultures obtained at the time of the surgery  Patient does have significant turbid drainage from the superior aspect of the wound  Patient has been maintained on broad antibiotics  Rising white count noted  Significant ascites noted  -broad antibiotics and antifungals as above for now  -low threshold to tap ascites and sent for culture  -serial abdominal exams  -may need wound opened up and cultures sent  -close surgical follow-up     4  Acute hypoxic respiratory failure-possibly secondary to hemoptysis    Possibly complicated by an aspiration event  Perhaps pneumothorax is playing a role  Less likely pneumonia as the chest x-ray seems to have improved  She is now requiring high-level ventilatory support   -ventilatory support  -monitor respiratory status  -close critical care follow-up    5  Pneumothorax-status post new chest tube placement  Decreased pneumothorax on repeat chest x-ray  -chest tube drainage  -radiographic follow-up    Discussed the above antibiotic and antifungal plan with the critical care service    Antibiotics:  Vancomycin 2  Cefepime 2  Flagyl 2  Micafungin 2  Antifungals 3  Antibiotics 6    Subjective:  Patient with low-grade fever; she continues to require ventilatory support; she is off vasopressor support; she remains sedated and is nonverbal; no reported diarrhea or vomiting  She had a new left-sided chest tube placed yesterday  She had her central line removed and a new central line was placed    Objective:  Vitals:  Temp:  [98 8 °F (37 1 °C)-101 1 °F (38 4 °C)] 100 4 °F (38 °C)  HR:  [] 98  Resp:  [18-32] 18  BP: ()/(44-84) 126/62  SpO2:  [94 %-100 %] 97 %  Temp (24hrs), Av 6 °F (38 1 °C), Min:98 8 °F (37 1 °C), Max:101 1 °F (38 4 °C)  Current: Temperature: 100 4 °F (38 °C)    Physical Exam:   General Appearance:  Sedated on the ventilator in no acute distress   Throat: Oropharynx moist without lesions  Endotracheal tube in place   Lungs:   Decreased breath sounds bilaterally; no wheezes, rhonchi or rales; respirations unlabored  Left-sided chest tube in place   Heart:  Tachycardia; no murmur, rub or gallop   Abdomen:   Soft, decreased bowel sounds, midline incision with some turbid drainage but no surrounding erythema  Extremities: No clubbing, cyanosis or edema   Skin: No new rashes or lesions  No draining wounds noted         Labs, Imaging, & Other studies:   All pertinent labs and imaging studies were personally reviewed  Results from last 7 days   Lab Units 19  3843 08/07/19  1813 08/07/19  0453   WBC Thousand/uL 40 90* 42 21* 23 06*   HEMOGLOBIN g/dL 8 8* 10 0* 9 4*   PLATELETS Thousands/uL 305 304 231     Results from last 7 days   Lab Units 08/08/19  0526 08/07/19  1826 08/07/19  1812 08/07/19  0453  08/03/19  0909  08/02/19  1300   SODIUM mmol/L 135*  --  135* 139   < >  --    < > 140   POTASSIUM mmol/L 3 8  --  4 0 3 7   < >  --    < > 3 4*   CHLORIDE mmol/L 103  --  103 106   < >  --    < > 105   CO2 mmol/L 27  --  27 30   < >  --    < > 26   CO2, I-STAT mmol/L  --   --   --   --   --  23   < >  --    BUN mg/dL 8  --  7 8   < >  --    < > 31*   CREATININE mg/dL 0 35*  --  0 36* 0 41*   < >  --    < > 1 11   EGFR ml/min/1 73sq m 111  --  110 105   < >  --    < > 50   GLUCOSE, ISTAT mg/dl  --   --   --   --   --  89   < >  --    CALCIUM mg/dL 7 2*  --  7 1* 7 5*   < >  --    < > 7 1*   AST U/L 22 25  --   --   --   --   --  22   ALT U/L 11* 15  --   --   --   --   --  11*   ALK PHOS U/L 116 106  --   --   --   --   --  45*    < > = values in this interval not displayed  Results from last 7 days   Lab Units 08/06/19  0051 08/02/19  1129 08/02/19  1128   BLOOD CULTURE   --  No Growth After 5 Days  Candida glabrata*   GRAM STAIN RESULT   --   --  Yeast*   MRSA CULTURE ONLY  Culture results to follow  --   --      Results from last 7 days   Lab Units 08/08/19  0526 08/07/19  1826 08/02/19  1125   PROCALCITONIN ng/ml 1 04* 1 02* 8 33*     CT chest abdomen pelvis-mucosal thickening of the small bowel near the anastomotic line  Ascites with peritoneal enhancement    Basilar consolidation and left-sided pneumothorax    Images personally reviewed by me in PACS

## 2019-08-08 NOTE — OCCUPATIONAL THERAPY NOTE
OT CANCEL NOTE    Pt chart reviewed  Pt is currently intubated/sedated and not appropriate to engage in skilled OT services at this time  Will hold OT treatment  Will continue to follow pt on caseload and see pt when medically stable and as clinically appropriate      Corina DAVILA, OTR/L

## 2019-08-08 NOTE — PROCEDURES
Procedures Bronchoscopy Procedure Note    Date of Operation: 8/8/2019    Pre-op Diagnosis: Hemoptysis with resorptive atelectasis     Post-op Diagnosis: Same    Surgeon: Missy Navarrete    Anesthesia: Intubated and sedated     Operation: Flexible fiberoptic bronchoscopy, diagnostic bronchoalveolar lavage     Findings: Large mucoid clot at left inferior lobar bronchi  Friable mucosa to right upper lobar bronchi without active bleeding     Specimen: RUL and LLL BAL    Estimated Blood Loss: 1mL     Complications: None    Indications and History:  The patient is a 78 y o  female s/p septic shock with fungemia and peritonitis s/p ex lap, washout and SBR with eventual closure had sudden onset of moderate hemoptysis resulting in emergent intubation for airway protection  Clot removed from terminal bronchioles following intubation and now plan to reassess airways prior to extubation today  The risks, benefits, complications, treatment options and expected outcomes were discussed with the patient and daughter  Description of Procedure: The patient was ICU 2 intubated and mildly sedated  She was identified as Kristel Chung and the procedure verified as Flexible Fiberoptic Bronchoscopy with possible BAL  A Time Out was held and the above information confirmed  Sedation increased in preparation for procedure  She was positioned and the bronchoscope was passed through the ET tube  The heri were visualized 4mL administered was topically placed onto the cords  The cords was then passed and careful inspection done  The scope was sequentially passed into the left main and then left upper and lower bronchi and segmental bronchi   The scope was then withdrawn and advanced into the right main bronchus and then into the RUL, RML, and RLL bronchi and segmental bronchi was done and specimen taken    Endobronchial findings: Please see above      The Patient tolerated the procedure well     Attestation: Dr Marni Multani provided indirect supervision   BAL specimen sent     Marino Navarrete

## 2019-08-08 NOTE — PROGRESS NOTES
Vancomycin IV Pharmacy-to-Dose Consultation    Yanni Guajardo is a 78 y o  female who is currently receiving vancomycin IV with management by the Pharmacy Consult service for the treatment of other sepsis  Assessment/Plan:    The patient's chart was reviewed  Renal function is stable  There are no signs or symptoms of nephrotoxicity and/or infusion reactions documented  The following nephrotoxicity factors are present:  Medications: norepinephrine 7 mcg/min  Patient-Factors: elderly, hypotension    Based on today's assessment, will continue current vancomycin (Day # 2) dosing of 1250 mg IV q12h, with a plan for trough to be drawn tomorrow, Friday 8/9 at approximately 0500  We will continue to follow the patient's culture results and clinical progress daily      Thank you,  Sancho Fajardo, PharmD, [place] [id number] Pharmacist  [phone]

## 2019-08-08 NOTE — PROGRESS NOTES
INTERVAL PROGRESS NOTE:    Daughter contacted to update on patient's clinical status following emergent intubation for hemoptysis requiring urgent intubation for airway protection  She tolerated intubation without issues and underwent bronchoscopy with alveolar lavage to evacuate residual blood and clot  A STAT CT scan of chest/abd/pelvis done to r/o any hemothorax or intraabdominal source of persistent leukocytosis  She was made aware that CT scan findings were expected after recent surgery and taht there was no evidence for leak or abscess  She was transitioned to new antibiotic regimen following culture results per ID recs  She will remain intubated with plan for possible extubation tomorrow  She remains critical at this time  A subclavian central line and left chest tube completed following phone consent         Eligio Hardy  Surgical Critical Care fellow

## 2019-08-08 NOTE — RESPIRATORY THERAPY NOTE
RT Ventilator Management Note  Alise Chung 78 y o  female MRN: [id number]  Unit/Bed#: ICU 02 Encounter: [id number]      Daily Screen       8/5/2019  0800 8/8/2019  0736          Patient safety screen outcome[de-identified]  Passed  Passed      Spont breathing trial % for 30 min:  Yes  Yes      Spont breathing trial outcome[de-identified]  Passed  Passed      Previous settings resumed: Yes        RSBI:  80                Physical Exam:   Assessment Type: Assess only  General Appearance: Sedated  Respiratory Pattern: Assisted  Chest Assessment: Chest expansion symmetrical  Bilateral Breath Sounds: Clear  R Breath Sounds: Clear  L Breath Sounds: Clear  Cough: None  Suction: ET Tube  O2 Device: vent      Resp Comments: BS clear no rx needed  Pt  placed on tube comp

## 2019-08-08 NOTE — SPEECH THERAPY NOTE
Speech Language/Pathology    Speech/Language Pathology Progress Note    Patient Name: Lori CHESTER Date: 8/8/2019     Pt now intubated  Please re-consult speech therapy when extubated and appropriate for re-evaluation of swallowing

## 2019-08-08 NOTE — PROCEDURES
Arterial Line Insertion  Date/Time: 8/8/2019 1:09 AM  Performed by: Lina Gonzalez MD  Authorized by: Lina Gonzalez MD     Patient location:  Bedside  Other Assisting Provider: Yes (comment) [facility] Fellow Carly Mora)    Consent:     Consent obtained:  Emergent situation  Universal protocol:     Patient identity confirmed:  Arm band and anonymous protocol, patient vented/unresponsive  Indications:     Indications: hemodynamic monitoring and continuous blood pressure monitoring    Pre-procedure details:     Skin preparation:  Chlorhexidine  Anesthesia (see MAR for exact dosages): Anesthesia method:  None  Procedure details:     Location / Tip of Catheter:  Radial    Laterality:  Left    Rene's test performed: yes      Rene's test abnormal: no      Needle gauge:  22 G    Placement technique:  Percutaneous and ultrasound guided    Number of attempts:  2    Successful placement: yes      Transducer: waveform confirmed    Post-procedure details:     Post-procedure:  Sterile dressing applied and sutured    CMS:  Unable to assess    Patient tolerance of procedure:   Tolerated well, no immediate complications

## 2019-08-08 NOTE — RESPIRATORY THERAPY NOTE
RT Ventilator Management Note  Adah Check Sheldon 78 y o  female MRN: [id number]  Unit/Bed#: ICU 02 Encounter: [id number]      Daily Screen       8/4/2019  0813 8/5/2019  0800          Patient safety screen outcome[de-identified]  Passed  Passed      Spont breathing trial % for 30 min:  Yes  Yes      Spont breathing trial outcome[de-identified]  Failed  Passed      Spont breathing trial reason failed:  RR < 8 bpm;Tidal volume < 4ml/Kg of ideal body weight or VE <5 or  >15 LPM        Previous settings resumed:  Yes  Yes      RSBI:    80              Physical Exam:   Assessment Type: Assess only  General Appearance: Sedated  Respiratory Pattern: Normal, Assisted, Symmetrical  Chest Assessment: Chest expansion symmetrical  Bilateral Breath Sounds: Clear  R Breath Sounds: Clear  L Breath Sounds: Clear  Cough: None  Suction: ET Tube  O2 Device: ventilator      Resp Comments: Pt tolerating current vent settings and appears to be resting comfortably  Fio2 decreased to 50%  Based on a Spo2 of 100%  Will continue to monitor and assess per resp protocol

## 2019-08-08 NOTE — PROGRESS NOTES
Progress Note - [address] [place] 78 y o  female MRN: [id number]  Unit/Bed#: ICU 02 Encounter: [id number]    Assessment:   Principal Problem:    OR Exploratory laparotomy, SBR secondary to iatrogenic enterotomy   Active Problems:    Incisional hernia    Severe sepsis with septic shock (CODE) (Formerly [facility])    Acute respiratory failure with hypoxemia (HCC)    Acute renal failure (ARF) (Formerly [facility])    Postprocedural pneumothorax    Shock ([address] )    OR Laparoscopic ventral hernia repair  Resolved Problems:    * No resolved hospital problems   *      Plan  Neuro:   Pain  Sedation  · Monitor neuro status, follows commands x 4 extremities on precedex  · Pain controlled with: fentanyl gtt, decrease to 50 then to 25 mcg/hr, prn 50 mcg q1 hr for break through  · Sedation plan/Daily sedation holiday: Precedex 0 2 mcg/kg/hr  · RASS goal: 0 Alert and Calm  · Delirium Precautions  · CAM ICU per protocol  · Regulate sleep/wake cycle+  · Trend neuro exam  CV:   Septic Shock  Underlying CAD  HLD  · Levophed for MAP >65  · Lactic stable and with base excess overnight  · Hemodynamic infusions: Levophed, 8 mcg/min  · Wean pressors as able  · MAP goal > 65  · Rhythm: NSR  · Follow rhythm on telemetry  Lung:   Acute respiratory failure, hypoxic  Hemoptysis  Left pneumothorax  · Continue vent support AC VC 14 450 50% 5, peak 22  · Weaning sedation this am for SBT and SAT  · Consider bronchoscopy today prior to extubation to identify any further tracheal/bronchial bleeding  · Extubate today pending SBT, no further hemoptysis noted  · Continue chest tube to suction  · Mucinex bid  · Ocillatory chest physiotherapy TID  ·  Chlorhexidine/HOB>30degrees ordered: yes  · Pulmonary toileting  GI:   POD 5 s/p ex lap with reanastomoss prox small bowel and wound vac placement, now removed since 8/5  POD 6 ex lap with ventral mesh removal bowel washout and small bowel resection  Hx of Barretts esophagectomy and gastric conduit 2017  · Monitor abdominal incision and exam  · Continue on broad spectrum abx Vanc/Cefepime/Flagyl and Micafungin  · Continue low dose trophic feeding (hold prior to extubation), titrate to goal feeding vs restart post extubation  · Stress ulcer prophylaxis: hx barretts, protonix bid  · Bowel regimen: None currently  FEN:   · Goal 24 hour fluid balance: even   Fluid/Diuretic plan: isolyte maintence at 75cc/hr  · Nutrition/diet plan: jevity 1 2 trophic feeding  · Replete electrolytes with goals: K >4 0, Mag >2 0, and Phos >3 0  :   · Indwelling Mendoza present: yes   · Trend UOP and BUN/creat  · Strict I and O  ID:   Fungemia  S/p Peritonitis with small bowel perforation  · Abx ordered: Cefepime, Metronidazole and Vancomycin  · Day 2 of this regime, prior was 5 days zosyn  Day 6 abx  · Continue micafungin day 2  · surveillence blood cultures and fungal cx drawn yesterday  · Trend temps and WBC count  · procal 1 07 yesterday  Heme:   Leukocytosis  Anemia of blood loss  · Trend hgb and plts  · Transfuse as needed for goal hgb >7  · abx as above  · Cbc daily  Endo:   · Glycemic control plan: Blood glucose controlled on current regimen  · Continue serial monitoring goal 140-180  MSK/Skin:  · Mobility goal: as tolerated by critical illness  · PT consult: yes  · OT consult: yes  · Frequent turning and pressure off-loading  Family:  · Family updated within 24 hours: yes   Lines:  · Central venous access: triple lumen catheter - 20 cm day 2  · Keep central line today for meds requiring central line  · Arterial line: Assessed  Continued for the following reasons BP monitoring    day 1  · NG tube day 1  · Mendoza day 1  · Left chest tube, day 2  VTE Prophylaxis:  · Pharmacologic Prophylaxis:Heparin  · Mechanical Prophylaxis: sequential compression device    Disposition: Continue ICU care      ______________________________________________________________________  Chief Complaint: shakes head yes/ no , denies pain, intubated sedated      HPI/24hr events: yesterday with hemoptysis and concern for airway protection + hypoxia was intubated, bronchoscopy performed showing left lower lobe large blood clot mixed with mucus  Right upper oozing  Localized epinephrine used  Left IJ removed after clean left subclavian placed  Left open chest tube placed and then repositioned to 12cm at chest wall  Continues with intermittent air leak and consistent tidaling  Existing left chest tube removed  Febrile  No arrythmia  Levophed restarted and arterial line placed for hypotension  Review of Systems   Unable to perform ROS: Intubated     ______________________________________________________________________  Temperature:   Temp (24hrs), Av 4 °F (38 °C), Min:98 2 °F (36 8 °C), Max:101 1 °F (38 4 °C)    Current Temperature: (!) 100 8 °F (38 2 °C)    Temp:  [98 2 °F (36 8 °C)-101 1 °F (38 4 °C)] 100 8 °F (38 2 °C)  HR:  [] 84  Resp:  [18-32] 18  BP: ()/(44-84) 121/59  Arterial Line BP: ()/(42-72) 120/52    Vitals:    19 0500 19 0515 19 0530 19 0545   BP: 117/55  121/59    Pulse: 86 88 88 84   Resp:       Temp: (!) 100 8 °F (38 2 °C) (!) 100 8 °F (38 2 °C) (!) 100 8 °F (38 2 °C) (!) 100 8 °F (38 2 °C)   TempSrc:       SpO2: 100% 100% 100% 100%   Weight:       Height:         Arterial Line BP: 120/52  Arterial Line MAP (mmHg): 78 mmHg     Weights:   IBW: 54 7 kg    Body mass index is 32 43 kg/m²    Weight (last 2 days)     Date/Time   Weight    19 0550   85 7 (188 93)    19 0554   78 3 (172 62)            Height: 5' 4" (162 6 cm)   Noninvasive/Ventilator Settings:  Ventilator Settings:   Vent Mode: AC/VC    Settings  Resp Rate (BPM): 14 BPM  Vt (mL): 450 mL  FIO2 (%): 50 %  PEEP (cmH2O): 5 cmH2O  Flow (LPM): 60 L/min      Lab Results   Component Value Date    PHART 7 454 (H) 2019    WCA7CXT 38 2 2019    PO2ART 107 3 2019    BAH4WQU 26 2 2019    BEART 2 2 2019    SOURCE Line, Arterial 08/08/2019     SpO2: SpO2: 100 %  ______________________________________________________________________  Physical Exam:  Farris Agitation Sedation Scale (RASS): Drowsy  Physical Exam   Constitutional: She appears well-developed  No distress  HENT:   Head: Normocephalic and atraumatic  Nose: Nose normal    Eyes: Pupils are equal, round, and reactive to light  Conjunctivae and EOM are normal    Tracks midline   Neck: No JVD present  Cardiovascular: Normal rate, regular rhythm, normal heart sounds and intact distal pulses  Pulmonary/Chest: Effort normal    Mechanically ventilated  Rhonchi anteriorly  Left chest tube to suction with intermittent air leak, positive tidaling   Abdominal: Soft  Bowel sounds are normal  She exhibits no distension  There is no tenderness  Musculoskeletal: She exhibits no edema  Neurological: No sensory deficit  GCS eye subscore is 3  GCS verbal subscore is 1  GCS motor subscore is 6  Follows commands x 4 extremities  No tics/ tremors   Skin: Skin is warm  Capillary refill takes less than 2 seconds  She is not diaphoretic  No erythema  Vitals reviewed  left subclavian cvc  Left chest tube to suction, 12cm at chest wall  Left radial arterial line  ______________________________________________________________________  Intake and Outputs:  I/O       08/06 0701 - 08/07 0700 08/07 0701 - 08/08 0700    P  O  50 50    I V  (mL/kg) 1739 6 (20 3) 4047 3 (47 2)    NG/GT  1030    IV Piggyback 860 800    Total Intake(mL/kg) 2649 6 (30 9) 5927 3 (69 2)    Urine (mL/kg/hr) 1775 (0 9) 2375 (1 2)    Emesis/NG output  100    Chest Tube 300 340    Total Output 2075 2815    Net +574 6 +3112 3          Unmeasured Urine Occurrence  1 x        Nutrition:        Diet Orders   (From admission, onward)             Start     Ordered    08/07/19 2435  Diet Enteral/Parenteral; Tube Feeding No Oral Diet; Jevity 1 2 Donovan; Continuous; 15  Diet effective now     Question Answer Comment   Diet Type Enteral/Parenteral    Enteral/Parenteral Tube Feeding No Oral Diet    Tube Feeding Formula: Jevity 1 2 Donovan    Bolus/Cyclic/Continuous Continuous    Tube Feeding Goal Rate (mL/hr): 15    RD to adjust diet per protocol? Yes        08/07/19 1645                Labs:   Results from last 7 days   Lab Units 08/08/19  0526 08/07/19 1813 08/07/19  0453 08/06/19  0447   WBC Thousand/uL 40 90* 42 21* 23 06* 17 93*   HEMOGLOBIN g/dL 8 8* 10 0* 9 4* 9 9*   HEMATOCRIT % 27 0* 30 9* 28 7* 30 5*   PLATELETS Thousands/uL 305 304 231 204   NEUTROS PCT % 74  --  72 78*   MONOS PCT % 2*  --  3* 2*   MONO PCT %  --  1*  --   --       Results from last 7 days   Lab Units 08/07/19 1826 08/07/19 1812 08/07/19  0453 08/06/19  1552  08/02/19  1300   SODIUM mmol/L  --  135* 139 141   < > 140   POTASSIUM mmol/L  --  4 0 3 7 3 1*   < > 3 4*   CHLORIDE mmol/L  --  103 106 107   < > 105   CO2 mmol/L  --  27 30 26   < > 26   CO2, I-STAT   --   --   --   --    < >  --    BUN mg/dL  --  7 8 8   < > 31*   CREATININE mg/dL  --  0 36* 0 41* 0 42*   < > 1 11   CALCIUM mg/dL  --  7 1* 7 5* 7 7*   < > 7 1*   ALK PHOS U/L 106  --   --   --   --  45*   ALT U/L 15  --   --   --   --  11*   AST U/L 25  --   --   --   --  22   GLUCOSE RANDOM mg/dL  --  129 144* 105   < > 135    < > = values in this interval not displayed       Results from last 7 days   Lab Units 08/03/19 1822 08/02/19  1300 08/02/19  0306   MAGNESIUM mg/dL 2 7* 2 3 2 6     Results from last 7 days   Lab Units 08/03/19 1822 08/02/19  1300   PHOSPHORUS mg/dL 2 4 2 7      Results from last 7 days   Lab Units 08/07/19 2028 08/02/19  1811 08/02/19  1300 08/02/19  1125   INR  1 40* 1 85* 1 90*  --    PTT seconds 27  --  36 42*     Results from last 7 days   Lab Units 08/08/19  0526   LACTIC ACID mmol/L 1 2     0   Lab Value Date/Time    TROPONINI <0 02 08/02/2019 1126    TROPONINI <0 02 08/02/2019 0406    TROPONINI <0 02 11/22/2016 1110     Imaging: CXR am, CT C/A/P I have personally reviewed pertinent reports  EKG: NSR  Micro:  Lab Results   Component Value Date    BLOODCX No Growth After 5 Days   08/02/2019    BLOODCX Candida glabrata (A) 08/02/2019     Allergies: No Known Allergies  Medications:   Scheduled Meds:    Current Facility-Administered Medications:  acetaminophen 650 mg Oral Q6H PRN Alicia Conklin PA-C    cefepime 2,000 mg Intravenous Q12H Karo Land MD Last Rate: Stopped (08/08/19 0521)   chlorhexidine 15 mL Swish & Spit Q12H [facility] Alicia Conklin PA-C    dexmedetomidine 0 1-0 7 mcg/kg/hr Intravenous Titrated Fluor IMELDA Coyne Last Rate: Stopped (08/08/19 0237)   escitalopram 10 mg Per G Tube Daily Alicia Conklin PA-C    fentaNYL 50 mcg/hr Intravenous Continuous Alicia Conklin PA-C Last Rate: 25 mcg/hr (08/08/19 0528)   fentanyl citrate (PF) 50 mcg Intravenous Q1H PRN Alicia Conklin PA-C    folic acid 1 mg Per G Tube Daily Alicia Conklin PA-C    gabapentin 100 mg Oral HS Alicia Conklin PA-C    guaiFENesin 600 mg Oral Q12H [facility] Cash Hicks PA-C    heparin (porcine) 5,000 Units Subcutaneous Q8H [facility] Jeana Barr PA-C    iohexol 50 mL Oral 90 min pre-procedure Phil Coyne PA-C    melatonin 3 mg Per G Tube HS Alicia Conklin PA-C    metroNIDAZOLE 500 mg Intravenous Q8H Karo Land MD Last Rate: Stopped (08/08/19 0200)   micafungin 100 mg Intravenous Q24H Thom Springer DO Last Rate: Stopped (08/07/19 1740)   multi-electrolyte 75 mL/hr Intravenous Continuous Alicia Conklin PA-C Last Rate: 75 mL/hr (08/08/19 0122)   norepinephrine 1-30 mcg/min Intravenous Titrated Alicia Conklin PA-C Last Rate: 7 mcg/min (08/08/19 0559)   ondansetron 4 mg Intravenous Q4H PRN Alicia Conklin PA-C    oxyCODONE 2 5 mg Oral Q4H PRN [employer] CorporationIMELDA    Or        oxyCODONE 5 mg Oral Q4H PRN [employer]IMELDA    pantoprazole 40 mg Intravenous Q12H [facility] Cash Hicks PA-C    pravastatin 20 mg Per G Tube Daily With [facility] Utilities, IMELDA    vancomycin 1,250 mg Intravenous Q12H Sarah Wolf MD Last Rate: 1,250 mg (08/08/19 0521)     Continuous Infusions:    dexmedetomidine 0 1-0 7 mcg/kg/hr Last Rate: Stopped (08/08/19 0237)   fentaNYL 50 mcg/hr Last Rate: 25 mcg/hr (08/08/19 0528)   multi-electrolyte 75 mL/hr Last Rate: 75 mL/hr (08/08/19 0122)   norepinephrine 1-30 mcg/min Last Rate: 7 mcg/min (08/08/19 0559)     PRN Meds:    acetaminophen 650 mg Q6H PRN   fentanyl citrate (PF) 50 mcg Q1H PRN   ondansetron 4 mg Q4H PRN   oxyCODONE 2 5 mg Q4H PRN   Or     oxyCODONE 5 mg Q4H PRN       Invasive lines and devices: Invasive Devices     Central Venous Catheter Line            CVC Central Lines 08/07/19 Triple 20cm less than 1 day          Arterial Line            Arterial Line 08/08/19 Radial less than 1 day          Drain            Gastrostomy/Enterostomy Jejunostomy 14 Fr   days    Chest Tube Left less than 1 day    NG/OG/Enteral Tube Nasogastric Right nares less than 1 day    Urethral Catheter Temperature probe less than 1 day          Airway            ETT  Cuffed less than 1 day                   Counseling / Coordination of Care  Total Critical Care time spent 45 minutes excluding procedures, teaching and family updates  Code Status: Level 1 - Full Code    Portions of the record may have been created with voice recognition software  Occasional wrong word or "sound a like" substitutions may have occurred due to the inherent limitations of voice recognition software  Read the chart carefully and recognize, using context, where substitutions have occurred      Diane Ventura PA-C

## 2019-08-09 ENCOUNTER — APPOINTMENT (INPATIENT)
Dept: RADIOLOGY | Facility: HOSPITAL | Age: 69
DRG: 856 | End: 2019-08-09
Payer: MEDICARE

## 2019-08-09 PROBLEM — N17.9 ACUTE RENAL FAILURE (ARF) (HCC): Status: RESOLVED | Noted: 2019-08-02 | Resolved: 2019-08-09

## 2019-08-09 PROBLEM — J96.01 ACUTE RESPIRATORY FAILURE WITH HYPOXEMIA (HCC): Status: RESOLVED | Noted: 2019-08-02 | Resolved: 2019-08-09

## 2019-08-09 PROBLEM — R65.21 SEVERE SEPSIS WITH SEPTIC SHOCK (CODE) (HCC): Status: RESOLVED | Noted: 2019-08-02 | Resolved: 2019-08-09

## 2019-08-09 PROBLEM — R57.9 SHOCK (HCC): Status: RESOLVED | Noted: 2019-08-02 | Resolved: 2019-08-09

## 2019-08-09 LAB
ANION GAP SERPL CALCULATED.3IONS-SCNC: 7 MMOL/L (ref 4–13)
ANISOCYTOSIS BLD QL SMEAR: PRESENT
BASOPHILS # BLD MANUAL: 0 THOUSAND/UL (ref 0–0.1)
BASOPHILS NFR MAR MANUAL: 0 % (ref 0–1)
BUN SERPL-MCNC: 9 MG/DL (ref 5–25)
CALCIUM SERPL-MCNC: 7.5 MG/DL (ref 8.3–10.1)
CHLORIDE SERPL-SCNC: 107 MMOL/L (ref 100–108)
CO2 SERPL-SCNC: 28 MMOL/L (ref 21–32)
CREAT SERPL-MCNC: 0.22 MG/DL (ref 0.6–1.3)
EOSINOPHIL # BLD MANUAL: 0 THOUSAND/UL (ref 0–0.4)
EOSINOPHIL NFR BLD MANUAL: 0 % (ref 0–6)
ERYTHROCYTE [DISTWIDTH] IN BLOOD BY AUTOMATED COUNT: 15.6 % (ref 11.6–15.1)
GFR SERPL CREATININE-BSD FRML MDRD: 129 ML/MIN/1.73SQ M
GLUCOSE SERPL-MCNC: 110 MG/DL (ref 65–140)
GLUCOSE SERPL-MCNC: 120 MG/DL (ref 65–140)
GLUCOSE SERPL-MCNC: 127 MG/DL (ref 65–140)
HCT VFR BLD AUTO: 25 % (ref 34.8–46.1)
HGB BLD-MCNC: 8 G/DL (ref 11.5–15.4)
LYMPHOCYTES # BLD AUTO: 1.71 THOUSAND/UL (ref 0.6–4.47)
LYMPHOCYTES # BLD AUTO: 5 % (ref 14–44)
MAGNESIUM SERPL-MCNC: 2 MG/DL (ref 1.6–2.6)
MCH RBC QN AUTO: 28 PG (ref 26.8–34.3)
MCHC RBC AUTO-ENTMCNC: 32 G/DL (ref 31.4–37.4)
MCV RBC AUTO: 87 FL (ref 82–98)
METAMYELOCYTES NFR BLD MANUAL: 3 % (ref 0–1)
MONOCYTES # BLD AUTO: 0.34 THOUSAND/UL (ref 0–1.22)
MONOCYTES NFR BLD: 1 % (ref 4–12)
NEUTROPHILS # BLD MANUAL: 30.73 THOUSAND/UL (ref 1.85–7.62)
NEUTS BAND NFR BLD MANUAL: 4 % (ref 0–8)
NEUTS SEG NFR BLD AUTO: 86 % (ref 43–75)
NRBC BLD AUTO-RTO: 0 /100 WBCS
PHOSPHATE SERPL-MCNC: 3 MG/DL (ref 2.3–4.1)
PLATELET # BLD AUTO: 257 THOUSANDS/UL (ref 149–390)
PLATELET BLD QL SMEAR: ADEQUATE
PMV BLD AUTO: 11.1 FL (ref 8.9–12.7)
POIKILOCYTOSIS BLD QL SMEAR: PRESENT
POTASSIUM SERPL-SCNC: 3.8 MMOL/L (ref 3.5–5.3)
PROCALCITONIN SERPL-MCNC: 0.88 NG/ML
RBC # BLD AUTO: 2.86 MILLION/UL (ref 3.81–5.12)
RBC MORPH BLD: PRESENT
SODIUM SERPL-SCNC: 142 MMOL/L (ref 136–145)
TSH SERPL DL<=0.05 MIU/L-ACNC: 3.6 UIU/ML (ref 0.36–3.74)
VANCOMYCIN TROUGH SERPL-MCNC: 12.9 UG/ML (ref 10–20)
VARIANT LYMPHS # BLD AUTO: 1 %
WBC # BLD AUTO: 34.14 THOUSAND/UL (ref 4.31–10.16)

## 2019-08-09 PROCEDURE — NC001 PR NO CHARGE: Performed by: SURGERY

## 2019-08-09 PROCEDURE — 71045 X-RAY EXAM CHEST 1 VIEW: CPT

## 2019-08-09 PROCEDURE — 94669 MECHANICAL CHEST WALL OSCILL: CPT

## 2019-08-09 PROCEDURE — 84443 ASSAY THYROID STIM HORMONE: CPT | Performed by: PHYSICIAN ASSISTANT

## 2019-08-09 PROCEDURE — 84100 ASSAY OF PHOSPHORUS: CPT | Performed by: PHYSICIAN ASSISTANT

## 2019-08-09 PROCEDURE — 85027 COMPLETE CBC AUTOMATED: CPT | Performed by: PHYSICIAN ASSISTANT

## 2019-08-09 PROCEDURE — 94760 N-INVAS EAR/PLS OXIMETRY 1: CPT

## 2019-08-09 PROCEDURE — 99232 SBSQ HOSP IP/OBS MODERATE 35: CPT | Performed by: SURGERY

## 2019-08-09 PROCEDURE — 82948 REAGENT STRIP/BLOOD GLUCOSE: CPT

## 2019-08-09 PROCEDURE — 97535 SELF CARE MNGMENT TRAINING: CPT

## 2019-08-09 PROCEDURE — 80202 ASSAY OF VANCOMYCIN: CPT | Performed by: INTERNAL MEDICINE

## 2019-08-09 PROCEDURE — 84145 PROCALCITONIN (PCT): CPT | Performed by: INTERNAL MEDICINE

## 2019-08-09 PROCEDURE — 97110 THERAPEUTIC EXERCISES: CPT

## 2019-08-09 PROCEDURE — 99232 SBSQ HOSP IP/OBS MODERATE 35: CPT | Performed by: INTERNAL MEDICINE

## 2019-08-09 PROCEDURE — 92610 EVALUATE SWALLOWING FUNCTION: CPT

## 2019-08-09 PROCEDURE — 83735 ASSAY OF MAGNESIUM: CPT | Performed by: PHYSICIAN ASSISTANT

## 2019-08-09 PROCEDURE — 97530 THERAPEUTIC ACTIVITIES: CPT

## 2019-08-09 PROCEDURE — 85007 BL SMEAR W/DIFF WBC COUNT: CPT | Performed by: PHYSICIAN ASSISTANT

## 2019-08-09 PROCEDURE — 80048 BASIC METABOLIC PNL TOTAL CA: CPT | Performed by: PHYSICIAN ASSISTANT

## 2019-08-09 RX ORDER — POTASSIUM CHLORIDE 20MEQ/15ML
40 LIQUID (ML) ORAL ONCE
Status: COMPLETED | OUTPATIENT
Start: 2019-08-09 | End: 2019-08-09

## 2019-08-09 RX ORDER — DOCUSATE SODIUM 100 MG/1
100 CAPSULE, LIQUID FILLED ORAL 2 TIMES DAILY
Status: DISCONTINUED | OUTPATIENT
Start: 2019-08-09 | End: 2019-08-13

## 2019-08-09 RX ORDER — METRONIDAZOLE 500 MG/1
500 TABLET ORAL EVERY 8 HOURS SCHEDULED
Status: DISCONTINUED | OUTPATIENT
Start: 2019-08-09 | End: 2019-08-12

## 2019-08-09 RX ORDER — MAGNESIUM SULFATE HEPTAHYDRATE 40 MG/ML
2 INJECTION, SOLUTION INTRAVENOUS ONCE
Status: COMPLETED | OUTPATIENT
Start: 2019-08-09 | End: 2019-08-09

## 2019-08-09 RX ADMIN — HYDROMORPHONE HYDROCHLORIDE 0.2 MG: 1 INJECTION, SOLUTION INTRAMUSCULAR; INTRAVENOUS; SUBCUTANEOUS at 09:28

## 2019-08-09 RX ADMIN — VANCOMYCIN HYDROCHLORIDE 1500 MG: 10 INJECTION, POWDER, LYOPHILIZED, FOR SOLUTION INTRAVENOUS at 15:02

## 2019-08-09 RX ADMIN — METRONIDAZOLE 500 MG: 500 TABLET ORAL at 13:04

## 2019-08-09 RX ADMIN — OXYCODONE HYDROCHLORIDE 5 MG: 5 TABLET ORAL at 00:29

## 2019-08-09 RX ADMIN — CEFEPIME HYDROCHLORIDE 2000 MG: 2 INJECTION, POWDER, FOR SOLUTION INTRAVENOUS at 04:27

## 2019-08-09 RX ADMIN — OXYCODONE HYDROCHLORIDE 5 MG: 5 TABLET ORAL at 21:28

## 2019-08-09 RX ADMIN — ONDANSETRON 4 MG: 2 INJECTION INTRAMUSCULAR; INTRAVENOUS at 10:00

## 2019-08-09 RX ADMIN — METRONIDAZOLE 500 MG: 500 INJECTION, SOLUTION INTRAVENOUS at 00:29

## 2019-08-09 RX ADMIN — ESCITALOPRAM OXALATE 10 MG: 10 TABLET ORAL at 09:29

## 2019-08-09 RX ADMIN — FOLIC ACID 1 MG: 1 TABLET ORAL at 09:28

## 2019-08-09 RX ADMIN — HEPARIN SODIUM 5000 UNITS: 5000 INJECTION INTRAVENOUS; SUBCUTANEOUS at 05:04

## 2019-08-09 RX ADMIN — GABAPENTIN 100 MG: 100 CAPSULE ORAL at 21:28

## 2019-08-09 RX ADMIN — PRAVASTATIN SODIUM 20 MG: 20 TABLET ORAL at 17:09

## 2019-08-09 RX ADMIN — GUAIFENESIN 600 MG: 600 TABLET, EXTENDED RELEASE ORAL at 09:28

## 2019-08-09 RX ADMIN — GUAIFENESIN 600 MG: 600 TABLET, EXTENDED RELEASE ORAL at 21:28

## 2019-08-09 RX ADMIN — METRONIDAZOLE 500 MG: 500 TABLET ORAL at 21:28

## 2019-08-09 RX ADMIN — VANCOMYCIN HYDROCHLORIDE 1250 MG: 10 INJECTION, POWDER, LYOPHILIZED, FOR SOLUTION INTRAVENOUS at 05:04

## 2019-08-09 RX ADMIN — MELATONIN 3 MG: 3 TAB ORAL at 21:28

## 2019-08-09 RX ADMIN — METRONIDAZOLE 500 MG: 500 INJECTION, SOLUTION INTRAVENOUS at 09:29

## 2019-08-09 RX ADMIN — POTASSIUM CHLORIDE 40 MEQ: 20 SOLUTION ORAL at 07:43

## 2019-08-09 RX ADMIN — ONDANSETRON 4 MG: 2 INJECTION INTRAMUSCULAR; INTRAVENOUS at 18:51

## 2019-08-09 RX ADMIN — Medication 20 MG: at 15:03

## 2019-08-09 RX ADMIN — Medication 20 MG: at 07:44

## 2019-08-09 RX ADMIN — MAGNESIUM SULFATE HEPTAHYDRATE 2 G: 40 INJECTION, SOLUTION INTRAVENOUS at 07:44

## 2019-08-09 RX ADMIN — OXYCODONE HYDROCHLORIDE 5 MG: 5 TABLET ORAL at 07:52

## 2019-08-09 RX ADMIN — HEPARIN SODIUM 5000 UNITS: 5000 INJECTION INTRAVENOUS; SUBCUTANEOUS at 21:28

## 2019-08-09 RX ADMIN — HEPARIN SODIUM 5000 UNITS: 5000 INJECTION INTRAVENOUS; SUBCUTANEOUS at 13:04

## 2019-08-09 RX ADMIN — MICAFUNGIN SODIUM 100 MG: 10 INJECTION, POWDER, LYOPHILIZED, FOR SOLUTION INTRAVENOUS at 15:02

## 2019-08-09 RX ADMIN — CEFEPIME HYDROCHLORIDE 2000 MG: 2 INJECTION, POWDER, FOR SOLUTION INTRAVENOUS at 17:09

## 2019-08-09 NOTE — PROGRESS NOTES
Vancomycin IV Pharmacy-to-Dose Consultation    Marcio Gaming is a 78 y o  female who is currently receiving vancomycin IV with management by the Pharmacy Consult service  Relevant clinical data and objective history reviewed:  Temp Readings from Last 3 Encounters:   08/09/19 97 6 °F (36 4 °C) (Oral)   08/02/19 100 4 °F (38 °C) (Tympanic)   06/26/19 99 6 °F (37 6 °C)     /70   Pulse (!) 106   Temp 97 6 °F (36 4 °C) (Oral)   Resp 22   Ht 5' 4" (1 626 m)   Wt 86 6 kg (190 lb 14 7 oz)   SpO2 95%   BMI 32 77 kg/m²     I/O last 3 completed shifts: In: 5438 4 [I V :3505 4; NG/GT:300; IV Piggyback:1300;  Feedings:333]  Out: 8795 [Urine:3225; Chest Tube:930]    Lab Results   Component Value Date/Time    BUN 9 08/09/2019 04:51 AM    BUN 12 07/23/2015 07:10 AM    WBC 34 14 (HH) 08/09/2019 04:51 AM    WBC 13 00 (H) 11/11/2014 04:30 AM    HGB 8 0 (L) 08/09/2019 04:51 AM    HGB 10 6 (L) 11/11/2014 04:30 AM    HCT 25 0 (L) 08/09/2019 04:51 AM    HCT 33 8 (L) 11/11/2014 04:30 AM    MCV 87 08/09/2019 04:51 AM    MCV 92 11/11/2014 04:30 AM     08/09/2019 04:51 AM     11/11/2014 04:30 AM     Creatinine   Date Value Ref Range Status   08/09/2019 0 22 (L) 0 60 - 1 30 mg/dL Final     Comment:     Standardized to IDMS reference method   08/08/2019 0 35 (L) 0 60 - 1 30 mg/dL Final     Comment:     Standardized to IDMS reference method   07/23/2015 0 69 0 60 - 1 30 mg/dL Final     Comment:     Standardized to IDMS reference method   11/11/2014 0 77 0 60 - 1 30 mg/dL Final     Comment:     Standardized to IDMS reference method     Vancomycin Tr   Date Value Ref Range Status   08/09/2019 12 9 10 0 - 20 0 ug/mL Final         Vancomycin Assessment:  Indication: septic arthritis sepsis  Status: critically ill, leukocytosis trending down, afebrile previous 24 hours  Micro:   8/2 blood cx: 1 out of 2 candida glabrata, waiting for sensitivities  F/U 8/7 & 8/8 blood cx: NGTD  8/6 MRSA cx: negative  Procalcitonin: trending down from yesterday 1 04 to 0 88 today  Renal Function: stable, Scr 0 22, good UOP 1 2 mL/kg/min  Potential Nephrotoxicity Factors:  Medications: none at this time  Patient-Factors: elderly  Days of Therapy: 3  Current Dose: 1250 mg IV q12h (this AM prior to level)  Goal Trough: 15-20 (appropriate for most indications)   Goal AUC(24h): 400-600  Last Level: 12 9  Pharmacokinetic Analysis: ke 0 0897, t1/2 7 7      Vancomycin Plan:  New Dosing: change to 1500 mg IV q12h (next dose: tonight at 1600)  Predicted Trough / AUC: 631 / 15 7  Next Level: trough Sunday 8/11 at 1530  Renal Function Monitoring: daily BMP and UOP assessment      Pharmacy will continue to follow closely for s/sx of nephrotoxicity, infusion reactions and appropriateness of therapy  BMP and CBC will be ordered per protocol  We will continue to follow the patient's culture results and clinical progress daily         Thank you,  Sandra Barone, PharmD, [address] Pharmacist  [phone]

## 2019-08-09 NOTE — QUICK NOTE
Left chest tube removed at end exhalation  Xeroform + gauze applied and taped securely  She tolerated the procedure well  Vitals stable, spo2 98%  Obtain CXR stat

## 2019-08-09 NOTE — NUTRITION
08/09/19 1456   Recommendations/Interventions   Summary Patient remains NPO secondary to aspiration risk  EN to provide 100% nutritional needs at this time  Generalized +2 edema noted  Nursing skin care plan reviewed  Interventions EN change formula/rate   Nutrition Recommendations Tube Feeding Recommendation provided;Lab - consider order (specify)  (Suggest increase EN to provide 100% nutritional needs  Rec: Jevity 1 2 kcal @ a goal rate of 60 ml/hr with 100 ml free h2o flush every 4 hours (1728 kcal, 79 grams pro, 1762 ml tv)   Monitor electrolytes, phosphorus  )

## 2019-08-09 NOTE — SPEECH THERAPY NOTE
Bedside Swallow Evaluation:    Summary:  Pt presents w/ mild oropharyngeal dysphagia characterized by prolonged a-p transfer with delayed swallow initiation time with all consistencies assessed  Patient was being followed by ST and was on a clear liquid diet (recommend puree with thin liquids when cleared)  However, after last ST session, the patient required intubation and ST orders were discontinued  From H&P: Shandra Cui is a 78 y o  female who presented to [facility] for an elective laparoscopic ventral hernia repair with mesh  On postoperative day 1 she had appropriate incisional pain across the abdomen but throughout the day she started developing shortness of breath  Into the morning of postop day 2 she became acutely hypoxic to the 80s, hypotensive to the 80s and tachypneic requiring high-flow nasal cannula in order to maintain saturations of 90%  She was given a total of 2 L of normal saline IV bolus and transferred to [facility] ICU at 0700 hours this morning  At that time she complained of sudden onset pain in her right upper chest and difficulty taking a deep breath  Stat KUB demonstrated some retained air in the right subdiaphragmatic region which was thought to be secondary to retained air on postop day 2 from laparoscopic surgery    Chest x-ray demonstrated bibasilar consolidations with small effusions    Recommendations:  Diet: NPO but may have ice chips   Meds: Non oral   Supervision: Full supervision with ice chips  Positioning:Upright  Oral care: Frequent  Aspiration precautions    Therapy Prognosis: Good  Prognosis considerations: Ability to maintain alertness   Frequency: 3-5x week    Patient's goal: "I want ice water"     Consider consult w/:  None at this time     Reason for consult:  R/o aspiration  Determine safest and least restrictive diet  respiratory compromise  Nursing reported cough w/ oral care  h/o dysphagia     Precautions:  None    Current diet:  NPO  Premorbid diet[de-identified]  Puree with thin liquids (last recommended by ST)  Previous VBS:  None  O2 requirement:  NC  Voice/Speech:  Weak and dysphonic   Follows commands:  WFL                        Cognitive Status:  Awake and alert   Oral [facility] exam:  Dentition: Natural   Labial strength and ROM: WFL  Lingual strength and ROM: Decreased ROM and speed  Mandibular strength and ROM: WFL  Velum: WFL  Secretion management: WFL  Oral care provided     Items administered:  Puree, honey thick liquid, ice chips  Liquids were taken by tsp  Oral stage: Mild  Lip closure: WFL  Mastication: N/A  Bolus formation: WFL  Bolus control: WFL  Transfer: Prolonged  Oral residue: No  Pocketing: No    Pharyngeal stage: Mild  Swallow promptness: Minimally delayed with all   Laryngeal rise: WFL  Wet voice: No  Throat clear: No  Cough: No  Secondary swallows: No  Audible swallows: No    Esophageal stage:  No s/s reported    Aspiration precautions posted    Results d/w:  Pt, nursing, physician    Goal(s):  Pt will tolerate least restrictive diet w/out s/s aspiration or oral/pharyngeal difficulties

## 2019-08-09 NOTE — PHYSICAL THERAPY NOTE
PHYSICAL THERAPY NOTE          Patient Name: Renae Chung  [id number] Date: 8/9/2019 08/09/19 1045   Pain Assessment   Pain Assessment FLACC   Pain Rating: FLACC (Rest) - Face 0   Pain Rating: FLACC (Rest) - Legs 0   Pain Rating: FLACC (Rest) - Activity 0   Pain Rating: FLACC (Rest) - Cry 0   Pain Rating: FLACC (Rest) - Consolability 0   Score: FLACC (Rest) 0   Pain Rating: FLACC (Activity) - Face 1   Pain Rating: FLACC (Activity) - Legs 0   Pain Rating: FLACC (Activity) - Activity 1   Pain Rating: FLACC (Activity) - Cry 1   Pain Rating: FLACC (Activity) - Consolability 1   Score: FLACC (Activity) 4   Restrictions/Precautions   Weight Bearing Precautions Per Order No   Other Precautions Cognitive; Bed Alarm;Multiple lines;Telemetry;O2;Fall Risk;Pain  (NGT, chest tube to suction)   General   Chart Reviewed Yes   Additional Pertinent History Pt was reintubated on 8/7/19, extubated on 8/8/19   Response to Previous Treatment Patient with no complaints from previous session  Family/Caregiver Present No   Cognition   Overall Cognitive Status Impaired   Arousal/Participation Cooperative;Lethargic   Attention Attends with cues to redirect   Orientation Level Oriented X4   Following Commands Follows one step commands with increased time or repetition   Comments Pt is lethargic but able to converse during session   Subjective   Subjective "I am tired"   Bed Mobility   Supine to Sit 2  Maximal assistance   Additional items Assist x 2; Increased time required;Verbal cues;LE management   Sit to Supine 2  Maximal assistance   Additional items Assist x 2; Increased time required;Verbal cues;LE management   Balance   Static Sitting Poor -   Dynamic Sitting Poor -   Endurance Deficit   Endurance Deficit Yes   Endurance Deficit Description Gross deconditioning, fatigue   Activity Tolerance   Activity Tolerance Patient limited by fatigue;Patient limited by pain   Medical Staff Made Aware OT   Nurse Made Aware Yes Rachelle Rachel present to reinforce chest tube bandages   Exercises   Quad Sets Supine;20 reps;AROM; Bilateral   Glute Sets Supine;20 reps;AROM; Bilateral   Ankle Pumps Supine;20 reps;AROM; Bilateral   Balance training   Pt sat EOB ~10 minutes with maxA x1 for static/dynamc balance  Pt presents with significant left sided lean and required constant verbal cues for proper use of B/L UE for support  Pt is easily fatigued with activity at bedside with decreased sitting balance at session progressed  Assessment   Prognosis Fair   Problem List Decreased strength;Decreased endurance; Impaired balance;Decreased mobility; Decreased range of motion;Decreased coordination;Decreased cognition;Pain   Assessment Pt seen for physical therapy session focused on therapeutic exercise and therapeutic activity  Pt is supine at start of session and agreeable to therapy  Pt is lethargic but able to converse during session  Pt performed LE exercises as noted above  Pt transferred supine <> sit with maxA x2  Chest tube dressing is noted to be loose, GLEN Butler notified and present to reinforce bandages  Pt sat EOB ~10 minutes with maxA x1 for static/dynamc balance  Pt presents with significant left sided lean and required constant verbal cues for proper use of B/L UE for support  Pt is easily fatigued with activity at bedside with decreased sitting balance at session progressed  Pt returned to supine and boosted in bed  Pt remained supine with all needs within reach at end of session  Pt presents with significant deficits in balance, endurance and strength  PT to recommend inpt rehab to maximize safety and independence with functional mobility  PT to follow   Goals   Patient Goals To rest   STG Expiration Date 08/20/19   Treatment Day 1   Plan   Treatment/Interventions Functional transfer training;LE strengthening/ROM; Therapeutic exercise; Endurance training;Bed mobility;Spoke to nursing;OT   Progress Slow progress, decreased activity tolerance   PT Frequency (3-5x/wk)   Recommendation   Recommendation Post acute IP rehab   PT - OK to Discharge Yes  (To rehab when medically stable)     Mindy Iglesias, PT, DPT

## 2019-08-09 NOTE — PROGRESS NOTES
Progress Note - Infectious Disease   Sarbjit KAHN Hartranft 78 y o  female MRN: [id number]  Unit/Bed#: ICU 02 Encounter: [id number]      Impression/Plan:  1  Systemic inflammatory response syndrome-possibly all secondary to the events of today with the development of hemoptysis   Possibly related the patient's fungemia   Possibly secondary to an ongoing abdominal process   The patient is no longer requiring vasopressor support   At this point while waiting additional data favor broad antibiotics and antifungals  Procalcitonin level is elevated but has continued to decrease since admission  -continue vancomycin, cefepime, Flagyl, and micafungin for now  -follow up repeat blood cultures  -low threshold to tap ascites and sent for cultures  -recheck procalcitonin level   -check CBC with diff and CMP tomorrow  -supportive care     2  Fungemia-possibly all translocation across the gut wall in the setting of peritonitis   Possible catheter related bacteremia although this is less likely as the blood cultures were from around the time of admission  The central line has been changed  -continue micafungin as above  -follow up repeat blood cultures  -follow up sensitivities and adjust antifungals as needed  -recheck echocardiogram if repeat blood cultures positive for yeast  -consult ophthalmology     3  Peritonitis-status post exploration and repair   No cultures obtained at the time of the surgery   Patient does have significant turbid drainage from the superior aspect of the wound   Patient has been maintained on broad antibiotics   Rising white count noted  Significant ascites noted  -broad antibiotics and antifungals as above for now  -low threshold to tap ascites and sent for culture  -serial abdominal exams  -may need wound opened up and cultures sent  -close surgical follow-up     4  Acute hypoxic respiratory failure-possibly secondary to hemoptysis   Possibly complicated by an aspiration event    Perhaps pneumothorax is playing a role  Less likely pneumonia as the chest x-ray seems to have improved  She is now extubated and on O2 by nasal cannula  -oxygen support  -monitor respiratory status  -close critical care follow-up  -followup bronchoscopy cultures     5  Pneumothorax-status post new chest tube placement  Decreased pneumothorax on repeat chest x-ray  The chest tube is now been removed  Follow-up chest x-ray without progressive pneumothorax  -monitor respiratory status  -radiographic follow-up     Discussed the above antibiotic and antifungal plan with the critical care service    Antibiotics:  Vancomycin 3  Cefepime 3  Flagyl 3  Micafungin 3  Antibiotics 7    Subjective:  Patient has no fever, chills, sweats; no nausea, vomiting, diarrhea; no cough, shortness of breath; no pain  No new symptoms  Patient is no longer requiring vasopressor support  She was extubated yesterday and remains on O2 by nasal cannula  Her chest tube was removed today    Objective:  Vitals:  Temp:  [97 6 °F (36 4 °C)-99 2 °F (37 3 °C)] 97 8 °F (36 6 °C)  HR:  [] 96  BP: (139-144)/(70-77) 142/77  SpO2:  [88 %-100 %] 98 %  Temp (24hrs), Av 1 °F (36 7 °C), Min:97 6 °F (36 4 °C), Max:99 2 °F (37 3 °C)  Current: Temperature: 97 8 °F (36 6 °C)    Physical Exam:   General Appearance:  Alert, interactive, nontoxic, no acute distress  Throat: Oropharynx moist without lesions  Lungs:   Decreased breath sounds bilaterally; no wheezes, rhonchi or rales; respirations unlabored   Heart:  Tachycardia; no murmur, rub or gallop   Abdomen:   Soft, non-tender, non-distended, midline incision with turbid drainage that is decreased, positive bowel sounds  Extremities: No clubbing, cyanosis  2+ lower extremity edema   Skin: No new rashes or lesions  No draining wounds noted         Labs, Imaging, & Other studies:   All pertinent labs and imaging studies were personally reviewed  Results from last 7 days   Lab Units 19  0459 19  9242 08/07/19  1813   WBC Thousand/uL 34 14* 40 90* 42 21*   HEMOGLOBIN g/dL 8 0* 8 8* 10 0*   PLATELETS Thousands/uL 257 305 304     Results from last 7 days   Lab Units 08/09/19  0451 08/08/19  0526 08/07/19  1826  08/07/19  1812 08/03/19  0909   SODIUM mmol/L 142 135*  --   --  135*   < >  --    POTASSIUM mmol/L 3 8 3 8  --   --  4 0   < >  --    CHLORIDE mmol/L 107 103  --   --  103   < >  --    CO2 mmol/L 28 27  --   --  27   < >  --    CO2, I-STAT mmol/L  --   --   --   --   --   --  23   BUN mg/dL 9 8  --   --  7   < >  --    CREATININE mg/dL 0 22* 0 35*  --   --  0 36*   < >  --    EGFR ml/min/1 73sq m 129 111  --   --  110   < >  --    GLUCOSE, ISTAT mg/dl  --   --   --   --   --   --  89   CALCIUM mg/dL 7 5* 7 2*  --   --  7 1*   < >  --    AST U/L  --  22 25  --   --   --   --    ALT U/L  --  11* 15   < >  --   --   --    ALK PHOS U/L  --  116 106   < >  --   --   --     < > = values in this interval not displayed  Results from last 7 days   Lab Units 08/08/19  1245 08/07/19  1825 08/07/19  1814 08/06/19  0051   BLOOD CULTURE   --  No Growth at 24 hrs  No Growth at 24 hrs   --    GRAM STAIN RESULT  3+ Polys  No bacteria seen  --   --   --    MRSA CULTURE ONLY   --   --   --  No Methicillin Resistant Staphlyococcus aureus (MRSA) isolated     Results from last 7 days   Lab Units 08/09/19  0451 08/08/19  0526 08/07/19  1826   PROCALCITONIN ng/ml 0 88* 1 04* 1 02*     Chest x-ray-bilateral basilar consolidation      Images personally reviewed by me in PACS    Await formal radiology interpretation

## 2019-08-09 NOTE — PLAN OF CARE
Problem: Prexisting or High Potential for Compromised Skin Integrity  Goal: Skin integrity is maintained or improved  Description  INTERVENTIONS:  - Identify patients at risk for skin breakdown  - Assess and monitor skin integrity  - Assess and monitor nutrition and hydration status  - Monitor labs (i e  albumin)  - Turn and reposition patient  - Assist with mobility/ambulation  - Relieve pressure over bony prominences  - Avoid friction and shearing  - Provide appropriate hygiene as needed including keeping skin clean and dry  - Evaluate need for skin moisturizer/barrier cream  - Collaborate with interdisciplinary team (i e  Nutrition, Rehabilitation, etc )   - Patient/family teaching   Outcome: Progressing     Problem: Potential for Falls  Goal: Patient will remain free of falls  Description  INTERVENTIONS:  - Assess patient frequently for physical needs  -  Identify cognitive and physical deficits and behaviors that affect risk of falls    -  [place] fall precautions as indicated by assessment   - Educate patient/family on patient safety including physical limitations  - Instruct patient to call for assistance with activity based on assessment  - Modify environment to reduce risk of injury  - Consider OT/PT consult to assist with strengthening/mobility  Outcome: Progressing     Problem: CARDIOVASCULAR - ADULT  Goal: Maintains optimal cardiac output and hemodynamic stability  Description  INTERVENTIONS:  - Monitor I/O, vital signs and rhythm  - Monitor for S/S and trends of decreased cardiac output i e  bleeding, hypotension  - Administer and titrate ordered vasoactive medications to optimize hemodynamic stability  - Assess quality of pulses, skin color and temperature  - Instruct patient to report change in severity of symptoms   Outcome: Progressing     Problem: RESPIRATORY - ADULT  Goal: Achieves optimal ventilation and oxygenation  Description  INTERVENTIONS:  - Assess for changes in respiratory status  - Assess for changes in mentation and behavior  - Position to facilitate oxygenation and minimize respiratory effort  - Oxygen administration by appropriate delivery method based on oxygen saturation (per order) or ABGs  - Encourage broncho-pulmonary hygiene including cough, deep breathe, Incentive Spirometry  - Assess the need for suctioning and aspirate as neede  - Respiratory Therapy support as indicated   Outcome: Progressing     Problem: GASTROINTESTINAL - ADULT  Goal: Maintains or returns to baseline bowel function  Description  INTERVENTIONS:  - Assess bowel function  - Encourage oral fluids to ensure adequate hydration  - Administer IV fluids as ordered to ensure adequate hydration  - Administer ordered medications as needed  - Encourage mobilization and activity  - Nutrition services referral to assist patient with appropriate food choices  Outcome: Progressing  Goal: Maintains adequate nutritional intake  Description  INTERVENTIONS:  Npo   - Identify factors contributing to decreased intake, treat as appropriate  - Monitor I&O, WT and lab values  - Obtain nutrition services referral as needed   Outcome: Progressing     Problem: GENITOURINARY - ADULT  Goal: Urinary catheter remains patent  Description  INTERVENTIONS:  - Assess patency of urinary catheter  - If patient has a chronic pak, consider changing catheter if non-functioning     Outcome: Progressing     Problem: METABOLIC, FLUID AND ELECTROLYTES - ADULT  Goal: Electrolytes maintained within normal limits  Description  INTERVENTIONS:  - Monitor labs and assess patient for signs and symptoms of electrolyte imbalances  - Administer electrolyte replacement as ordered  - Monitor response to electrolyte replacements, including repeat lab results as appropriate  - Instruct patient on fluid and nutrition as appropriate  Outcome: Progressing  Goal: Fluid balance maintained  Description  INTERVENTIONS:  - Monitor labs and assess for signs and symptoms of volume excess or deficit  - Monitor I/O and WT  - Instruct patient on fluid and nutrition as appropriate  Outcome: Progressing     Problem: SKIN/TISSUE INTEGRITY - ADULT  Goal: Skin integrity remains intact  Description  INTERVENTIONS  - Identify patients at risk for skin breakdown  - Assess and monitor skin integrity  - Assess and monitor nutrition and hydration statu  - Turn and reposition patient  - Assist with mobility/ambulation  - Relieve pressure over bony prominences  - Avoid friction and shearing  - Provide appropriate hygiene as needed including keeping skin clean and dry  - Evaluate need for skin moisturizer/barrier cream  - Collaborate with interdisciplinary team (i e  Nutrition, Rehabilitation, etc )   - Patient/family teaching   Outcome: Progressing  Goal: Oral mucous membranes remain intact  Description  INTERVENTIONS  - Assess oral mucosa and hygiene practices  - Implement preventative oral hygiene regimen  - Implement oral medicated treatments as ordered  - Initiate Nutrition services referral as needed  Outcome: Progressing     Problem: MUSCULOSKELETAL - ADULT  Goal: Maintain or return mobility to safest level of function  Description  INTERVENTIONS:  - Assess patient's ability to carry out ADLs; assess patient's baseline for ADL function and identify physical deficits which impact ability to perform ADLs (bathing, care of mouth/teeth, toileting, grooming, dressing, etc )  - Assess/evaluate cause of self-care deficits   - Assess range of motion  - Assess patient's mobility; develop plan if impaired  - Assess patient's need for assistive devices and provide as appropriate  - Encourage maximum independence but intervene and supervise when necessary  - Involve family in performance of ADLs  - Assess for home care needs following discharge   - Request OT consult to assist with ADL evaluation and planning for discharge  - Provide patient education as appropriate  Outcome: Progressing     Problem: Nutrition/Hydration-ADULT  Goal: Nutrient/Hydration intake appropriate for improving, restoring or maintaining nutritional needs  Description  Monitor and assess patient's nutrition/hydration status for malnutrition (ex- brittle hair, bruises, dry skin, pale skin and conjunctiva, muscle wasting, smooth red tongue, and disorientation)  Collaborate with interdisciplinary team and initiate plan and interventions as ordered  Monitor patient's weight and dietary intake as ordered or per policy  Utilize nutrition screening tool and intervene per policy  Determine patient's food preferences and provide high-protein, high-caloric foods as appropriate       INTERVENTIONS:  - Monitor oral intake, urinary output, labs, and treatment plans  - Assess nutrition and hydration status and recommend course of action  - Evaluate amount of meals eaten  - Assist patient with eating if necessary   - Allow adequate time for meals  - Recommend/ encourage appropriate diets, oral nutritional supplements, and vitamin/mineral supplements  - Order, calculate, and assess calorie counts as needed  - Recommend, monitor, and adjust tube feedings and TPN/PPN based on assessed needs  - Assess need for intravenous fluids  - Provide specific nutrition/hydration education as appropriate  - Include patient/family/caregiver in decisions related to nutrition  Outcome: Progressing     Problem: COPING  Goal: Pt/Family able to verbalize concerns and demonstrate effective coping strategies  Description  INTERVENTIONS:  - Assist patient/family to identify coping skills, available support systems and cultural and spiritual values  - Provide emotional support, including active listening and acknowledgement of concerns of patient and caregivers  - Reduce environmental stimuli, as able  - Provide patient education  - Assess for spiritual pain/suffering and initiate spiritual care, including notification of Pastoral Care or [place] based community as needed  - Assess effectiveness of coping strategies  Outcome: Progressing  Goal: Will report anxiety at manageable levels  Description  INTERVENTIONS:  - Administer medication as ordered  - Teach and encourage coping skills  - Provide emotional support  - Assess patient/family for anxiety and ability to cope  Outcome: Progressing

## 2019-08-09 NOTE — PROGRESS NOTES
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  On broad spectrum abx, off pressors, shock resolved  Hypoxic respiratory failure 2/2 hemoptysis and mucous plug - extubated yesterday  Plan:  Continue with broad spectrum abx and f/u ID  Daily wound check and dressing changes  Follow up general surgery daily  Increase tube feeds to goal  Speech and swallow today  Chest tube to water seal   Replete electrolytes prn  Pulmonary toilet  Analgesia prn  F/u ophtho  Subjective/Objective     Subjective:   States pain is well controlled  Denies respiratory difficulty  Endorses bowel movements yesterday  Denies nausea/vomiting  Objective:     Blood pressure 126/62, pulse 104, temperature 97 8 °F (36 6 °C), temperature source Oral, resp  rate 22, height 5' 4" (1 626 m), weight 86 6 kg (190 lb 14 7 oz), SpO2 93 %  ,Body mass index is 32 77 kg/m²  Intake/Output Summary (Last 24 hours) at 8/9/2019 0828  Last data filed at 8/9/2019 0601  Gross per 24 hour   Intake 2742  45 ml   Output 2700 ml   Net 42 45 ml       Invasive Devices     Central Venous Catheter Line            CVC Central Lines 08/07/19 Triple 20cm 1 day          Arterial Line            Arterial Line 08/08/19 Radial 1 day          Drain            Gastrostomy/Enterostomy Jejunostomy 14 Fr   days    Chest Tube Left 1 day    NG/OG/Enteral Tube Nasogastric Right nares 1 day    External Urinary Catheter less than 1 day                Physical Exam:   Gen: NAD  Cardio: RRR  Lungs: CTAB,Left sided CT in place on suction without air leak    Abd: Soft, non distended, non tender  Light serosanguinous discharge from the surgical wound  : Mendoza in place    Neuro: Non-focal     Lab, Imaging and other studies:  CBC:   Lab Results   Component Value Date    WBC 34 14 (HH) 08/09/2019    HGB 8 0 (L) 08/09/2019    HCT 25 0 (L) 08/09/2019    MCV 87 08/09/2019     08/09/2019    MCH 28 0 08/09/2019    MCHC 32 0 08/09/2019    RDW 15 6 (H) 08/09/2019    MPV 11 1 08/09/2019    NRBC 0 08/09/2019   , CMP:   Lab Results   Component Value Date    SODIUM 142 08/09/2019    K 3 8 08/09/2019     08/09/2019    CO2 28 08/09/2019    BUN 9 08/09/2019    CREATININE 0 22 (L) 08/09/2019    CALCIUM 7 5 (L) 08/09/2019    EGFR 129 08/09/2019   , Coagulation:   No results found for: PT, INR, APTT, Urinalysis: No results found for: COLORU, CLARITYU, SPECGRAV, PHUR, LEUKOCYTESUR, NITRITE, PROTEINUA, GLUCOSEU, KETONESU, BILIRUBINUR, BLOODU  VTE Pharmacologic Prophylaxis: Heparin  VTE Mechanical Prophylaxis: sequential compression device

## 2019-08-09 NOTE — PLAN OF CARE
Problem: PHYSICAL THERAPY ADULT  Goal: Performs mobility at highest level of function for planned discharge setting  See evaluation for individualized goals  Description  Treatment/Interventions: LE strengthening/ROM, Functional transfer training, Therapeutic exercise, Endurance training, Bed mobility, Spoke to nursing, OT          See flowsheet documentation for full assessment, interventions and recommendations  Outcome: Progressing  Note:   Prognosis: Fair  Problem List: Decreased strength, Decreased endurance, Impaired balance, Decreased mobility, Decreased range of motion, Decreased coordination, Decreased cognition, Pain  Assessment: Pt seen for physical therapy session focused on therapeutic exercise and therapeutic activity  Pt is supine at start of session and agreeable to therapy  Pt is lethargic but able to converse during session  Pt performed LE exercises as noted above  Pt transferred supine <> sit with maxA x2  Chest tube dressing is noted to be loose, RN Biju Sarabia notified and present to reinforce bandages  Pt sat EOB ~10 minutes with maxA x1 for static/dynamc balance  Pt presents with significant left sided lean and required constant verbal cues for proper use of B/L UE for support  Pt is easily fatigued with activity at bedside with decreased sitting balance at session progressed  Pt returned to supine and boosted in bed  Pt remained supine with all needs within reach at end of session  Pt presents with significant deficits in balance, endurance and strength  PT to recommend inpt rehab to maximize safety and independence with functional mobility  PT to follow  Barriers to Discharge: Decreased caregiver support     Recommendation: (S) Post acute IP rehab     PT - OK to Discharge: (S) Yes(To rehab when medically stable)    See flowsheet documentation for full assessment        Dami Sanchez, PT, DPT

## 2019-08-09 NOTE — PROGRESS NOTES
Progress Note - [address] [place] 78 y o  female MRN: [id number]  Unit/Bed#: ICU 02 Encounter: [id number]    Assessment:   Principal Problem:    OR Exploratory laparotomy, SBR secondary to iatrogenic enterotomy   Active Problems:    Incisional hernia    Severe sepsis with septic shock (CODE) ([facility])    Acute respiratory failure with hypoxemia (HCC)    Acute renal failure (ARF) ([facility])    Postprocedural pneumothorax    Shock ([address] )    OR Laparoscopic ventral hernia repair    Fungemia  Resolved Problems:    * No resolved hospital problems   *      Plan  Neuro:   Pain  · Pain controlled with: prn medications tylenol/oxy IR and dilaudid for breakthrough  · Continue home gabapentin  · Continue lexapro  · Delirium Precautions  · CAM ICU per protocol  · Regulate sleep/wake cycle+  · Melatonin qhs  · Trend neuro exam  CV:   S/p septic shock  · Remained of pressors x 24 hrs  · MAP goal > 65  · Rhythm: NSR  · Follow rhythm on telemetry  · Continue statin  · Removed arterial line today  Lung:   · Continue NC for goal spo2 >90%  · IS and pulmonary toilet  · Oscillatory physiotherapy and mucinex  · Continue chest tube to suction am, with air leak on coughing, consider to water seal, repeat am cxr  GI:   · Monitor abdomen with serial exams  · Monitor wound drainage  · Abdominal wound care per wound care and surgery  · Advance tube feeds towards goal  · Speech to see bedside today  · Stress ulcer prophylaxis: for GERD and Hx of barretts - PPI BID  · Bowel regimen: hold  FEN:   · Goal 24 hour fluid balance: even   Fluid/Diuretic plan: none  · Nutrition/diet plan: jevity at 15cc/hr, advance today to goal  · Replete electrolytes with goals: K >4 0, Mag >2 0, and Phos >3 0  :   · Indwelling Mendoza present: yes   · Trend UOP and BUN/creat  · Strict I and O  · Continue pure wick  ID:   · Abx ordered: Cefepime, Metronidazole and Vancomycin  · Day 3 of broad spectrum  · MRSA nares negative - discontinue vanco today  · Continue cefepime and flagyl  · Day 4 antifungals, day 3 of micafungin, continue this  · Optho consult placed  · Trend temps and WBC count  · Follow up ID recommendations  · Follow up surveillence cultures  Heme:   · Trend hgb and plts  · Transfuse as needed for goal hgb >7  · Monitor cbc daily  Endo:   · Glycemic control plan: Blood glucose controlled on current regimen  · SSI prn goal 140-180  MSK/Skin:  · Mobility goal: as tolerated with PT /OT  · PT consult: yes  · OT consult: yes  · Frequent turning and pressure off-loading  · Wound care  Family:  · Family updated within 24 hours: yes   Lines:  · NGT, left subclavian, left chest tube, left a line - goal remove today  VTE Prophylaxis:  · Pharmacologic Prophylaxis:Heparin  · Mechanical Prophylaxis: sequential compression device    Disposition: Continue ICU care      ______________________________________________________________________  Chief Complaint: no new complaints      HPI/24hr events: afebrile overnight, no arrhythmia, chest tube on suction  Yesterday with bronchoscopy and extubated pm      Review of Systems   Constitutional: Positive for fatigue  Negative for chills, diaphoresis and fever  HENT: Negative  Eyes: Negative  Respiratory: Negative  Cardiovascular: Negative  Gastrointestinal: Positive for abdominal distention  Negative for abdominal pain, diarrhea, nausea and vomiting  Genitourinary: Negative  Musculoskeletal: Negative      Neurological: Negative       ______________________________________________________________________  Temperature:   Temp (24hrs), Av 2 °F (37 3 °C), Min:97 8 °F (36 6 °C), Max:100 4 °F (38 °C)    Current Temperature: 97 8 °F (36 6 °C)    Temp:  [97 8 °F (36 6 °C)-100 4 °F (38 °C)] 97 8 °F (36 6 °C)  HR:  [] 104  Resp:  [22-28] 22  BP: (126)/(62) 126/62  Arterial Line BP: (104-170)/(50-76) 130/76    Vitals:    19 0300 19 0400 19 0500 19 0600   BP:       Pulse: (!) 108 104 (!) 110 104   Resp:       Temp:  97 8 °F (36 6 °C)     TempSrc:  Oral     SpO2: 96% 97% 94% 93%   Weight:    86 6 kg (190 lb 14 7 oz)   Height:         Arterial Line BP: 130/76  Arterial Line MAP (mmHg): 100 mmHg     Weights:   IBW: 54 7 kg    Body mass index is 32 77 kg/m²  Weight (last 2 days)     Date/Time   Weight    08/09/19 0600   86 6 (190 92)    08/08/19 0800   86 7 (191 14)    08/07/19 0550   85 7 (188 93)            Height: 5' 4" (162 6 cm)      No results found for: PHART, PNP6VTS, PO2ART, AOR2HTV, B9JXTOYJ, BEART, SOURCE  SpO2: SpO2: 93 %  ______________________________________________________________________  Physical Exam:  Farris Agitation Sedation Scale (RASS): Light sedation  Physical Exam   Constitutional: She appears well-developed  No distress  HENT:   Head: Normocephalic and atraumatic  Nose: Nose normal    Dry mucous membranes   Eyes: Pupils are equal, round, and reactive to light  Conjunctivae and EOM are normal    Neck: No JVD present  No tracheal deviation present  Cardiovascular: Regular rhythm, normal heart sounds and intact distal pulses  No murmur heard  100-110   Pulmonary/Chest: Effort normal  She has no wheezes  She has no rales  Coarse BS   Abdominal: Soft  She exhibits no distension  There is no tenderness  There is no guarding  Musculoskeletal: She exhibits edema  She exhibits no deformity  Neurological: GCS eye subscore is 4  GCS verbal subscore is 5  GCS motor subscore is 6  Phonates, no slurred speech  Moves 4 extremities to command  No deficits   Skin: She is not diaphoretic  Nursing note and vitals reviewed  Left chest tube with air leak with coughing, serous drainage  Left arterial line  Left sc line  ______________________________________________________________________  Intake and Outputs:  I/O       08/07 0701 - 08/08 0700 08/08 0701 - 08/09 0700    P  O  50 0    I V  (mL/kg) 4264 8 (49 8) 1237 5 (14 3)    NG/GT 1030 300    IV Piggyback 850 1150    Feedings 38 295    Total Intake(mL/kg) 6232 8 (72 7) 2982 5 (34 4)    Urine (mL/kg/hr) 2400 (1 2) 2400 (1 2)    Emesis/NG output 100     Stool  0    Chest Tube 380 650    Total Output 2880 3050    Net +3352 8 -67 6          Unmeasured Urine Occurrence 1 x 1 x    Unmeasured Stool Occurrence  2 x        Nutrition:        Diet Orders   (From admission, onward)             Start     Ordered    08/07/19 1645  Diet Enteral/Parenteral; Tube Feeding No Oral Diet; Jevity 1 2 Donovan; Continuous; 15  Diet effective now     Question Answer Comment   Diet Type Enteral/Parenteral    Enteral/Parenteral Tube Feeding No Oral Diet    Tube Feeding Formula: Jevity 1 2 Donovan    Bolus/Cyclic/Continuous Continuous    Tube Feeding Goal Rate (mL/hr): 15    RD to adjust diet per protocol? Yes        08/07/19 1645                Labs:   Results from last 7 days   Lab Units 08/08/19  0526 08/07/19  1813 08/07/19  0453 08/06/19  0447   WBC Thousand/uL 40 90* 42 21* 23 06* 17 93*   HEMOGLOBIN g/dL 8 8* 10 0* 9 4* 9 9*   HEMATOCRIT % 27 0* 30 9* 28 7* 30 5*   PLATELETS Thousands/uL 305 304 231 204   NEUTROS PCT % 74  --  72 78*   MONOS PCT % 2*  --  3* 2*   MONO PCT %  --  1*  --   --     Results from last 7 days   Lab Units 08/09/19  0451 08/08/19  0526 08/07/19  1826 08/07/19  1812  08/02/19  1300   SODIUM mmol/L 142 135*  --  135*   < > 140   POTASSIUM mmol/L 3 8 3 8  --  4 0   < > 3 4*   CHLORIDE mmol/L 107 103  --  103   < > 105   CO2 mmol/L 28 27  --  27   < > 26   CO2, I-STAT   --   --   --   --    < >  --    BUN mg/dL 9 8  --  7   < > 31*   CREATININE mg/dL 0 22* 0 35*  --  0 36*   < > 1 11   CALCIUM mg/dL 7 5* 7 2*  --  7 1*   < > 7 1*   ALK PHOS U/L  --  116 106  --   --  45*   ALT U/L  --  11* 15  --   --  11*   AST U/L  --  22 25  --   --  22   GLUCOSE RANDOM mg/dL 110 112  --  129   < > 135    < > = values in this interval not displayed       Results from last 7 days   Lab Units 08/09/19  0451 08/08/19  0526 08/03/19  1828 MAGNESIUM mg/dL 2 0 2 1 2 7*     Results from last 7 days   Lab Units 08/09/19  0451 08/08/19  0526 08/03/19  1822   PHOSPHORUS mg/dL 3 0 1 5* 2 4      Results from last 7 days   Lab Units 08/07/19  2028 08/02/19  1811 08/02/19  1300 08/02/19  1125   INR  1 40* 1 85* 1 90*  --    PTT seconds 27  --  36 42*     Results from last 7 days   Lab Units 08/08/19  0526   LACTIC ACID mmol/L 1 2     0   Lab Value Date/Time    TROPONINI <0 02 08/02/2019 1126    TROPONINI <0 02 08/02/2019 0406    TROPONINI <0 02 11/22/2016 1110     Imaging: CXR am, left ct and lines in place, no pneumothorax I have personally reviewed pertinent reports  EKG: NSR  Micro:  Lab Results   Component Value Date    BLOODCX No Growth at 24 hrs  08/07/2019    BLOODCX No Growth at 24 hrs  08/07/2019    BLOODCX No Growth After 5 Days   08/02/2019     Allergies: No Known Allergies  Medications:   Scheduled Meds:  Current Facility-Administered Medications:  acetaminophen 650 mg Oral Q6H PRN ILDA Edward-C    bisacodyl 10 mg Rectal Daily PRN Zhane Ford PA-C    cefepime 2,000 mg Intravenous Q12H Talisha Quinn MD Last Rate: Stopped (08/09/19 0504)   chlorhexidine 15 mL Swish & Spit Q12H [address] Darus ILDA Caballero-C    escitalopram 10 mg Per G Tube Daily Darus ILDA Caballero-C    folic acid 1 mg Per G Tube Daily Darus Ada PA-C    gabapentin 100 mg Oral HS Darus Ada PA-C    guaiFENesin 600 mg Oral Q12H [address] Cash Hicks PA-C    heparin (porcine) 5,000 Units Subcutaneous Q8H [address] Chinmay Jensen PA-C    HYDROmorphone 0 2 mg Intravenous Q4H PRN Zhane Ford PA-C    iohexol 50 mL Oral 90 min pre-procedure [employer]IMELDA    melatonin 3 mg Per G Tube HS DarILDA Velasquez-BAR    metroNIDAZOLE 500 mg Intravenous Q8H Talisha Quinn MD Last Rate: Stopped (08/09/19 0101)   micafungin 100 mg Intravenous Q24H Kevin Varner DO Last Rate: Stopped (08/08/19 1645)   norepinephrine 1-30 mcg/min Intravenous Titrated Kristal Caballero, IMELDA Last Rate: Stopped (08/08/19 1500)   ondansetron 4 mg Intravenous Q4H PRN Romayne Pounds, PA-C    oxyCODONE 2 5 mg Oral Q4H PRN [employer]IMELDA    Or        oxyCODONE 5 mg Oral Q4H PRN [employer]DA    pantoprazole 40 mg Intravenous Q12H [facility] Romayne Pounds, PA-C    pravastatin 20 mg Per G Tube Daily With [email or web address]ELDA    vancomycin 1,250 mg Intravenous Q12H Shanti Alexandre MD Last Rate: Stopped (08/09/19 0601)     Continuous Infusions:  norepinephrine 1-30 mcg/min Last Rate: Stopped (08/08/19 1500)     PRN Meds:    acetaminophen 650 mg Q6H PRN   bisacodyl 10 mg Daily PRN   HYDROmorphone 0 2 mg Q4H PRN   ondansetron 4 mg Q4H PRN   oxyCODONE 2 5 mg Q4H PRN   Or     oxyCODONE 5 mg Q4H PRN       Invasive lines and devices: Invasive Devices     Central Venous Catheter Line            CVC Central Lines 08/07/19 Triple 20cm 1 day          Arterial Line            Arterial Line 08/08/19 Radial 1 day          Drain            Gastrostomy/Enterostomy Jejunostomy 14 Fr   days    Chest Tube Left 1 day    NG/OG/Enteral Tube Nasogastric Right nares 1 day    External Urinary Catheter less than 1 day                   Counseling / Coordination of Care  Total Critical Care time spent 40 minutes excluding procedures, teaching and family updates  Code Status: Level 1 - Full Code    Portions of the record may have been created with voice recognition software  Occasional wrong word or "sound a like" substitutions may have occurred due to the inherent limitations of voice recognition software  Read the chart carefully and recognize, using context, where substitutions have occurred      Romayne Pounds, PA-C

## 2019-08-09 NOTE — PLAN OF CARE
Problem: OCCUPATIONAL THERAPY ADULT  Goal: Performs self-care activities at highest level of function for planned discharge setting  See evaluation for individualized goals  Description  Treatment Interventions: ADL retraining, Functional transfer training, UE strengthening/ROM, Endurance training, Cognitive reorientation, Patient/family training, Equipment evaluation/education, Compensatory technique education, Fine motor coordination activities, Energy conservation, Activityengagement          See flowsheet documentation for full assessment, interventions and recommendations  Outcome: Progressing  Note:   Limitation: Decreased ADL status, Decreased UE ROM, Decreased UE strength, Decreased Safe judgement during ADL, Decreased cognition, Decreased endurance, Decreased fine motor control, Decreased self-care trans, Decreased high-level ADLs  Prognosis: Fair, Good  Assessment: Patient participated in Skilled OT session 8/9/19 with interventions consisting of ADL re training with the use of correct body mechnaics, Energy Conservation techniques, deep breathing technique, safety awareness and fall prevention techniques, therapeutic exercise to: increase functional use of BUEs, increase BUE muscle strength ,  therapeutic activities to: increase activity tolerance, increase dynamic sit/ stand balance during functional activity , increase postural control and increase trunk control   Patient agreeable to OT treatment session, upon arrival patient was found supine in bed  In comparison to previous session, patient with improvements in bed mobility, EOB sitting tolerance, grooming and UB dressing  Patient requiring verbal cues for safety, verbal cues for correct technique, verbal cues for pacing thru activity steps, cognitive assistance to anticipate next step, one step directives and frequent rest periods   Patient continues to be functioning below baseline level, occupational performance remains limited secondary to factors listed above and increased risk for falls and injury  From OT standpoint, recommendation at time of d/c would be Short Term Rehab when medically stable  Patient to benefit from continued Occupational Therapy treatment while in the hospital to address deficits as defined above and maximize level of functional independence with ADLs and functional mobility    Recommendation: Geriatric Consult(MAY BENEFIT FROM NEUROPSYCH CONSULT FOR COPING)  OT Discharge Recommendation: Short Term Rehab  OT - OK to Discharge: Yes(when medically stable)     Anai Corado MS, OTR/L

## 2019-08-09 NOTE — OCCUPATIONAL THERAPY NOTE
Occupational Therapy Treatment Note      Kristel Kelloggjam    8/9/2019    Patient Active Problem List   Diagnosis    Osteoarthritis of knee    Joint pain, knee    Eagle's esophagus with high grade dysplasia    3-vessel CAD    Abnormal blood chemistry    A-fib (HCC)    Anemia    Atypical chest pain    Backache with radiation    Continuous left lower quadrant pain    Depression    Dysphagia    Gastroesophageal reflux disease    Hyperlipidemia    Hypothyroidism    Insomnia    Low vitamin D level    Lumbar radiculopathy    Myofascial pain    Right leg paresthesias    Sciatica    Spondylosis of 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    Stricture of esophagus    Venous insufficiency    Incisional hernia    Adrenal adenoma    Incisional hernia, without obstruction or gangrene    Bilateral pneumonia    Postprocedural pneumothorax    OR Exploratory laparotomy, SBR secondary to iatrogenic enterotomy     OR Laparoscopic ventral hernia repair    Fungemia       Past Medical History:   Diagnosis Date    Abnormal blood chemistry     last assessed 03/06/2014    Achalasia, esophageal     Acute medial meniscus tear     last assessed 03/10/2014    Acute otitis externa     last assessed 03/24/2014    Adrenal nodule ([address] )     Anemia     Anxiety     Arthritis     Atrial fibrillation ([address] )     resolved 01/09/2018    Atypical chest pain     last assessed 11/22/2016    Eagle's esophagus with high grade dysplasia     last assessed 01/03/2018    Cancer ([address] )     esophageal    Cerumen impaction     last assessed 03/24/2014    Chronic pain of right knee     last assessed 04/06/2017    Coronary artery disease     3 vessel, resolved 01/09/2018    Depression     Dysphagia     last assessed 06/21/2017    Esophageal stricture     last assessed 06/21/2017    GERD (gastroesophageal reflux disease)     Headache     last assessed 08/01/2013    Hiatal hernia     Insomnia     last assessed 10/15/2013    [address]junostomy tube present [address] [address] )     resolved 2018    Low vitamin D level     resolved 2018    Osteoarthritis, knee     last assessed 2017    Pneumonia     last assessed 2013    Right leg paresthesias     last assessed 2017    Sciatica     last assessed 2015    Shingles     last assessed 2015    Spondylosis of 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     last assessed 2017    Venous insufficiency     last assessed 2013       Past Surgical History:   Procedure Laterality Date     SECTION      EXPLORATORY LAPAROTOMY W/ BOWEL RESECTION N/A 2019    Procedure: LAPAROTOMY EXPLORATORY W/ BOWEL RESECTION, APPLICATION OF Erica Rockers;  Surgeon: Alvarez Medina DO;  [facility];  Service: General    GASTRECTOMY      partial    GASTROJEJUNOSTOMY W/ JEJUNOSTOMY TUBE N/A 2017    Procedure: Louise Conklin;  Surgeon: Fabiano Mejia MD;  [facility];  Service: Thoracic    JOINT REPLACEMENT Right 11/10/2014    knee, Dr Ru Valdez N/A 8/3/2019    Procedure: LAPAROTOMY EXPLORATORY, reanastomosis of proximal small bowel, placement of vac dressing;  Surgeon: Alvarez Medina DO;  [facility];  Service: General    OR ESOPHAGOGASTRODUODENOSCOPY TRANSORAL DIAGNOSTIC N/A 3/10/2017    Procedure: ESOPHAGOGASTRODUODENOSCOPY (EGD); Surgeon: Jennifer Campbell MD;  [facility];  Service: Gastroenterology    OR ESOPHAGOGASTRODUODENOSCOPY TRANSORAL DIAGNOSTIC N/A 2017    Procedure: ESOPHAGOGASTRODUODENOSCOPY (EGD); Surgeon: Jennifer Campbell MD;  [facility];  Service: Gastroenterology    OR ESOPHAGOSCOPY FLEXIBLE TRANSORAL WITH BIOPSY N/A 2017    Procedure: ESOPHAGOGASTRODUODENOSCOPY (EGD);   Surgeon: Fabiano Mejia MD;  [facility];  Service: Thoracic    OR LAP, VENTRAL HERNIA REPAIR,REDUCIBLE N/A 2019    Procedure: REPAIR HERNIA VENTRAL LAPAROSCOPIC;  Surgeon: Elaine Diego DO;  [facility];  Service: General    CA REMOVAL [address] THORACOTOMY N/A 8/24/2017    Procedure: TRANSHIATAL ESOPHAGECTOMY;  Surgeon: Eliza Elkins MD;  Location:  MAIN OR;  Service: Thoracic    STEROID INJECTION KNEE Right 5/2/2017    Procedure: GENICULATE NERVE BLOCKS;  Surgeon: Sola Pineda DO;  [facility];  Service:         08/09/19 1047   Restrictions/Precautions   Weight Bearing Precautions Per Order No   Other Precautions Cognitive; Bed Alarm;Multiple lines;Telemetry; Fall Risk;Pain;O2  (NGT; chest tube to suction)   Lifestyle   Autonomy PT REPORTS BEING INDEPENDENT WITH ADLS/IADLS/DRIVING PTA    Reciprocal Relationships LIVES ALONE  LIMITED FAMILY SUPPORT AVAILABLE    Service to Others RETIRED   Intrinsic Gratification ENJOYS SPENDING TIME WITH HER GRANDCHILDREN    Pain Assessment   Pain Assessment FLACC   Pain Rating: FLACC (Rest) - Face 0   Pain Rating: FLACC (Rest) - Legs 0   Pain Rating: FLACC (Rest) - Activity 0   Pain Rating: FLACC (Rest) - Cry 0   Pain Rating: FLACC (Rest) - Consolability 0   Score: FLACC (Rest) 0   Pain Rating: FLACC (Activity) - Face 1   Pain Rating: FLACC (Activity) - Legs 0   Pain Rating: FLACC (Activity) - Activity 1   Pain Rating: FLACC (Activity) - Cry 1   Pain Rating: FLACC (Activity) - Consolability 1   Score: FLACC (Activity) 4   ADL   Grooming Assistance 2  Maximal Assistance   Grooming Deficit Setup;Verbal cueing;Supervision/safety; Increased time to complete; Teeth care;Wash/dry face   Grooming Comments Pt completed grooming while seated EOB w/ setup  Pt able to  toothebrush; requries assist to apply toothepaste and hand over hand assist to bring toothebrush to mouth  Pt also requires hand over hand assist to bring rag to face/mouth  UB Dressing Assistance 2  Maximal Assistance   UB Dressing Deficit Setup;Verbal cueing;Supervision/safety; Increased time to complete; Thread RUE; Thread LUE;Pull around back; Fasteners   UB Dressing Comments Pt required Max A to don gown while supine in bed  Pt able to elevate B/L UE's to assist     LB Dressing Assistance 1  Total Assistance   LB Dressing Deficit Don/doff R sock; Don/doff L sock   LB Dressing Comments Pt requires total A to don socks while supine in bed; pt able to elevate heals off bed to assist   Bed Mobility   Supine to Sit 2  Maximal assistance   Additional items Assist x 2;HOB elevated; Increased time required;LE management;Verbal cues   Sit to Supine 2  Maximal assistance   Additional items Assist x 2; Increased time required;Verbal cues;LE management   Additional Comments Pt went from supine<>sit w/ Max AX 2 for UB support and LE management, HOB elevated for assist  Pt sat EOBw / Max A X1 for sitting balance/trunk control  Cognition   Overall Cognitive Status Impaired   Arousal/Participation Responsive; Cooperative   Attention Attends with cues to redirect   Orientation Level Oriented X4   Memory Decreased recall of precautions   Following Commands Follows one step commands without difficulty   Comments Pt is pleasant and cooperative; requires cues for safety; has decreased understanding of deficits   Activity Tolerance   Activity Tolerance Patient limited by fatigue   Medical Staff Made Aware PT and RN   Assessment   Assessment Patient participated in Skilled OT session 8/9/19 with interventions consisting of ADL re training with the use of correct body mechnaics, Energy Conservation techniques, deep breathing technique, safety awareness and fall prevention techniques, therapeutic exercise to: increase functional use of BUEs, increase BUE muscle strength ,  therapeutic activities to: increase activity tolerance, increase dynamic sit/ stand balance during functional activity , increase postural control and increase trunk control   Patient agreeable to OT treatment session, upon arrival patient was found supine in bed    In comparison to previous session, patient with improvements in bed mobility, EOB sitting tolerance, grooming and UB dressing  Patient requiring verbal cues for safety, verbal cues for correct technique, verbal cues for pacing thru activity steps, cognitive assistance to anticipate next step, one step directives and frequent rest periods  Patient continues to be functioning below baseline level, occupational performance remains limited secondary to factors listed above and increased risk for falls and injury  From OT standpoint, recommendation at time of d/c would be Short Term Rehab when medically stable  Patient to benefit from continued Occupational Therapy treatment while in the hospital to address deficits as defined above and maximize level of functional independence with ADLs and functional mobility  Plan   Treatment Interventions ADL retraining;Functional transfer training;UE strengthening/ROM; Endurance training;Cognitive reorientation;Patient/family training;Equipment evaluation/education; Compensatory technique education;Continued evaluation; Energy conservation; Activityengagement; Fine motor coordination activities   Goal Expiration Date 08/20/19   Treatment Day 1   OT Frequency 3-5x/wk   Recommendation   OT Discharge Recommendation Short Term Rehab   OT - OK to Discharge Yes  (when medically stable)   Barthel Index   Feeding 0   Bathing 0   Grooming Score 0   Dressing Score 0   Bladder Score 0   Bowels Score 5   Toilet Use Score 5   Transfers (Bed/Chair) Score 5   Mobility (Level Surface) Score 0   Stairs Score 0   Barthel Index Score 15   Modified Lane Scale   Modified Lane Scale 4       Corina Keita MS, OTR/L

## 2019-08-10 ENCOUNTER — APPOINTMENT (INPATIENT)
Dept: RADIOLOGY | Facility: HOSPITAL | Age: 69
DRG: 856 | End: 2019-08-10
Payer: MEDICARE

## 2019-08-10 LAB
ANION GAP SERPL CALCULATED.3IONS-SCNC: 5 MMOL/L (ref 4–13)
ANISOCYTOSIS BLD QL SMEAR: PRESENT
BACTERIA BRONCH AEROBE CULT: NORMAL
BASOPHILS # BLD MANUAL: 0 THOUSAND/UL (ref 0–0.1)
BASOPHILS NFR MAR MANUAL: 0 % (ref 0–1)
BUN SERPL-MCNC: 8 MG/DL (ref 5–25)
CALCIUM SERPL-MCNC: 7.5 MG/DL (ref 8.3–10.1)
CHLORIDE SERPL-SCNC: 100 MMOL/L (ref 100–108)
CO2 SERPL-SCNC: 30 MMOL/L (ref 21–32)
CREAT SERPL-MCNC: 0.25 MG/DL (ref 0.6–1.3)
EOSINOPHIL # BLD MANUAL: 0 THOUSAND/UL (ref 0–0.4)
EOSINOPHIL NFR BLD MANUAL: 0 % (ref 0–6)
ERYTHROCYTE [DISTWIDTH] IN BLOOD BY AUTOMATED COUNT: 15.9 % (ref 11.6–15.1)
GFR SERPL CREATININE-BSD FRML MDRD: 124 ML/MIN/1.73SQ M
GLUCOSE SERPL-MCNC: 106 MG/DL (ref 65–140)
GLUCOSE SERPL-MCNC: 109 MG/DL (ref 65–140)
GLUCOSE SERPL-MCNC: 120 MG/DL (ref 65–140)
GLUCOSE SERPL-MCNC: 121 MG/DL (ref 65–140)
GLUCOSE SERPL-MCNC: 148 MG/DL (ref 65–140)
GRAM STN SPEC: NORMAL
GRAM STN SPEC: NORMAL
HCT VFR BLD AUTO: 26.6 % (ref 34.8–46.1)
HGB BLD-MCNC: 8.6 G/DL (ref 11.5–15.4)
LYMPHOCYTES # BLD AUTO: 1.38 THOUSAND/UL (ref 0.6–4.47)
LYMPHOCYTES # BLD AUTO: 4 % (ref 14–44)
MAGNESIUM SERPL-MCNC: 1.9 MG/DL (ref 1.6–2.6)
MCH RBC QN AUTO: 27.8 PG (ref 26.8–34.3)
MCHC RBC AUTO-ENTMCNC: 32.3 G/DL (ref 31.4–37.4)
MCV RBC AUTO: 86 FL (ref 82–98)
METAMYELOCYTES NFR BLD MANUAL: 2 % (ref 0–1)
MONOCYTES # BLD AUTO: 0.35 THOUSAND/UL (ref 0–1.22)
MONOCYTES NFR BLD: 1 % (ref 4–12)
MYELOCYTES NFR BLD MANUAL: 2 % (ref 0–1)
NEUTROPHILS # BLD MANUAL: 31.43 THOUSAND/UL (ref 1.85–7.62)
NEUTS BAND NFR BLD MANUAL: 2 % (ref 0–8)
NEUTS SEG NFR BLD AUTO: 89 % (ref 43–75)
NRBC BLD AUTO-RTO: 0 /100 WBCS
PLATELET # BLD AUTO: 372 THOUSANDS/UL (ref 149–390)
PLATELET BLD QL SMEAR: ADEQUATE
PMV BLD AUTO: 11.3 FL (ref 8.9–12.7)
POLYCHROMASIA BLD QL SMEAR: PRESENT
POTASSIUM SERPL-SCNC: 3.5 MMOL/L (ref 3.5–5.3)
PROCALCITONIN SERPL-MCNC: 0.69 NG/ML
RBC # BLD AUTO: 3.09 MILLION/UL (ref 3.81–5.12)
RBC MORPH BLD: PRESENT
SODIUM SERPL-SCNC: 135 MMOL/L (ref 136–145)
WBC # BLD AUTO: 34.54 THOUSAND/UL (ref 4.31–10.16)

## 2019-08-10 PROCEDURE — 92526 ORAL FUNCTION THERAPY: CPT

## 2019-08-10 PROCEDURE — 99232 SBSQ HOSP IP/OBS MODERATE 35: CPT | Performed by: EMERGENCY MEDICINE

## 2019-08-10 PROCEDURE — 85007 BL SMEAR W/DIFF WBC COUNT: CPT | Performed by: PHYSICIAN ASSISTANT

## 2019-08-10 PROCEDURE — NC001 PR NO CHARGE: Performed by: SURGERY

## 2019-08-10 PROCEDURE — 99233 SBSQ HOSP IP/OBS HIGH 50: CPT | Performed by: INTERNAL MEDICINE

## 2019-08-10 PROCEDURE — 83735 ASSAY OF MAGNESIUM: CPT | Performed by: PHYSICIAN ASSISTANT

## 2019-08-10 PROCEDURE — 71045 X-RAY EXAM CHEST 1 VIEW: CPT

## 2019-08-10 PROCEDURE — 82948 REAGENT STRIP/BLOOD GLUCOSE: CPT

## 2019-08-10 PROCEDURE — 85027 COMPLETE CBC AUTOMATED: CPT | Performed by: PHYSICIAN ASSISTANT

## 2019-08-10 PROCEDURE — 80048 BASIC METABOLIC PNL TOTAL CA: CPT | Performed by: PHYSICIAN ASSISTANT

## 2019-08-10 PROCEDURE — 84145 PROCALCITONIN (PCT): CPT | Performed by: INTERNAL MEDICINE

## 2019-08-10 PROCEDURE — 94669 MECHANICAL CHEST WALL OSCILL: CPT

## 2019-08-10 PROCEDURE — 94760 N-INVAS EAR/PLS OXIMETRY 1: CPT

## 2019-08-10 RX ORDER — LIDOCAINE 50 MG/G
1 PATCH TOPICAL DAILY
Status: DISCONTINUED | OUTPATIENT
Start: 2019-08-10 | End: 2019-08-27 | Stop reason: HOSPADM

## 2019-08-10 RX ORDER — SACCHAROMYCES BOULARDII 250 MG
250 CAPSULE ORAL 2 TIMES DAILY
Status: DISCONTINUED | OUTPATIENT
Start: 2019-08-10 | End: 2019-08-27 | Stop reason: HOSPADM

## 2019-08-10 RX ORDER — POTASSIUM CHLORIDE 20MEQ/15ML
40 LIQUID (ML) ORAL ONCE
Status: COMPLETED | OUTPATIENT
Start: 2019-08-10 | End: 2019-08-10

## 2019-08-10 RX ORDER — METHOCARBAMOL 500 MG/1
500 TABLET, FILM COATED ORAL EVERY 6 HOURS PRN
Status: DISCONTINUED | OUTPATIENT
Start: 2019-08-10 | End: 2019-08-27 | Stop reason: HOSPADM

## 2019-08-10 RX ADMIN — ACETAMINOPHEN 650 MG: 325 TABLET ORAL at 08:44

## 2019-08-10 RX ADMIN — OXYCODONE HYDROCHLORIDE 5 MG: 5 TABLET ORAL at 22:49

## 2019-08-10 RX ADMIN — FOLIC ACID 1 MG: 1 TABLET ORAL at 08:43

## 2019-08-10 RX ADMIN — CEFEPIME HYDROCHLORIDE 2000 MG: 2 INJECTION, POWDER, FOR SOLUTION INTRAVENOUS at 17:22

## 2019-08-10 RX ADMIN — Medication 20 MG: at 17:31

## 2019-08-10 RX ADMIN — METRONIDAZOLE 500 MG: 500 TABLET ORAL at 22:05

## 2019-08-10 RX ADMIN — ONDANSETRON 4 MG: 2 INJECTION INTRAMUSCULAR; INTRAVENOUS at 14:20

## 2019-08-10 RX ADMIN — HYDROMORPHONE HYDROCHLORIDE 0.2 MG: 1 INJECTION, SOLUTION INTRAMUSCULAR; INTRAVENOUS; SUBCUTANEOUS at 10:39

## 2019-08-10 RX ADMIN — Medication 250 MG: at 17:24

## 2019-08-10 RX ADMIN — HEPARIN SODIUM 5000 UNITS: 5000 INJECTION INTRAVENOUS; SUBCUTANEOUS at 14:20

## 2019-08-10 RX ADMIN — METRONIDAZOLE 500 MG: 500 TABLET ORAL at 14:19

## 2019-08-10 RX ADMIN — METRONIDAZOLE 500 MG: 500 TABLET ORAL at 05:01

## 2019-08-10 RX ADMIN — ACETAMINOPHEN 650 MG: 325 TABLET ORAL at 04:45

## 2019-08-10 RX ADMIN — ESCITALOPRAM OXALATE 10 MG: 10 TABLET ORAL at 08:40

## 2019-08-10 RX ADMIN — Medication 20 MG: at 08:00

## 2019-08-10 RX ADMIN — VANCOMYCIN HYDROCHLORIDE 1500 MG: 10 INJECTION, POWDER, LYOPHILIZED, FOR SOLUTION INTRAVENOUS at 04:47

## 2019-08-10 RX ADMIN — HYDROMORPHONE HYDROCHLORIDE 0.2 MG: 1 INJECTION, SOLUTION INTRAMUSCULAR; INTRAVENOUS; SUBCUTANEOUS at 05:28

## 2019-08-10 RX ADMIN — OXYCODONE HYDROCHLORIDE 5 MG: 5 TABLET ORAL at 09:25

## 2019-08-10 RX ADMIN — HEPARIN SODIUM 5000 UNITS: 5000 INJECTION INTRAVENOUS; SUBCUTANEOUS at 05:01

## 2019-08-10 RX ADMIN — ONDANSETRON 4 MG: 2 INJECTION INTRAMUSCULAR; INTRAVENOUS at 10:39

## 2019-08-10 RX ADMIN — OXYCODONE HYDROCHLORIDE 5 MG: 5 TABLET ORAL at 14:19

## 2019-08-10 RX ADMIN — HYDROMORPHONE HYDROCHLORIDE 0.2 MG: 1 INJECTION, SOLUTION INTRAMUSCULAR; INTRAVENOUS; SUBCUTANEOUS at 19:26

## 2019-08-10 RX ADMIN — GUAIFENESIN 600 MG: 600 TABLET, EXTENDED RELEASE ORAL at 08:43

## 2019-08-10 RX ADMIN — PRAVASTATIN SODIUM 20 MG: 20 TABLET ORAL at 17:31

## 2019-08-10 RX ADMIN — VANCOMYCIN HYDROCHLORIDE 1500 MG: 10 INJECTION, POWDER, LYOPHILIZED, FOR SOLUTION INTRAVENOUS at 17:23

## 2019-08-10 RX ADMIN — METHOCARBAMOL 500 MG: 500 TABLET, FILM COATED ORAL at 22:05

## 2019-08-10 RX ADMIN — LIDOCAINE 1 PATCH: 50 PATCH CUTANEOUS at 10:07

## 2019-08-10 RX ADMIN — OXYCODONE HYDROCHLORIDE 5 MG: 5 TABLET ORAL at 05:06

## 2019-08-10 RX ADMIN — ONDANSETRON 4 MG: 2 INJECTION INTRAMUSCULAR; INTRAVENOUS at 05:32

## 2019-08-10 RX ADMIN — CEFEPIME HYDROCHLORIDE 2000 MG: 2 INJECTION, POWDER, FOR SOLUTION INTRAVENOUS at 04:08

## 2019-08-10 RX ADMIN — Medication 250 MG: at 12:00

## 2019-08-10 RX ADMIN — HEPARIN SODIUM 5000 UNITS: 5000 INJECTION INTRAVENOUS; SUBCUTANEOUS at 22:00

## 2019-08-10 RX ADMIN — GABAPENTIN 100 MG: 100 CAPSULE ORAL at 22:06

## 2019-08-10 RX ADMIN — GUAIFENESIN 600 MG: 600 TABLET, EXTENDED RELEASE ORAL at 21:00

## 2019-08-10 RX ADMIN — MICAFUNGIN SODIUM 100 MG: 10 INJECTION, POWDER, LYOPHILIZED, FOR SOLUTION INTRAVENOUS at 15:43

## 2019-08-10 RX ADMIN — POTASSIUM CHLORIDE 40 MEQ: 20 SOLUTION ORAL at 10:07

## 2019-08-10 RX ADMIN — MELATONIN 3 MG: 3 TAB ORAL at 22:06

## 2019-08-10 NOTE — PROGRESS NOTES
Progress Note - General Surgery   Kraig Denver Hartranft 78 y o  female MRN: [id number]  Unit/Bed#: ICU 02 Encounter: [id number]    Assessment:  77 y/o F s/p recent laparoscopic ventral hernia repair with iatrogenic small bowel injury on 7/31, now s/p ex-lap, mesh explantation, small bowel resection on 8/2, abdominal closure on 8/3, also with Candida glabrata fungemia    Plan:  --NPO/NGT  --Pack in between sutures along midline wound per Surgery team  --May restart TF as tolerated  --Continue Abx/antifungals per ID recs  --OOB, PT/OT     Subjective/Objective     Subjective:     No acute events overnight  Pt c/o 5/10 mid-back pain and nausea  Objective:     Blood pressure 132/67, pulse 100, temperature 98 °F (36 7 °C), temperature source Oral, resp  rate 22, height 5' 4" (1 626 m), weight 86 6 kg (190 lb 14 7 oz), SpO2 94 %  ,Body mass index is 32 77 kg/m²  Intake/Output Summary (Last 24 hours) at 8/10/2019 0430  Last data filed at 8/10/2019 0000  Gross per 24 hour   Intake 1219 ml   Output 2250 ml   Net -1031 ml       Invasive Devices     Central Venous Catheter Line            CVC Central Lines 08/07/19 Triple 20cm 2 days          Drain            Gastrostomy/Enterostomy Jejunostomy 14 Fr   days    NG/OG/Enteral Tube Nasogastric Right nares 2 days    External Urinary Catheter 1 day                Physical Exam:    GEN: NAD  HEENT: MMM, NGT in place  CV: RRR  Lung: normal effort  Ab: Soft, ND, midline incision clean and dry with sections of packing, incisional tenderness  Extrem: No CCE  Neuro:  A+Ox3, motor and sensation grossly intact    Lab, Imaging and other studies:  CBC:   Lab Results   Component Value Date    WBC 34 14 (HH) 08/09/2019    HGB 8 0 (L) 08/09/2019    HCT 25 0 (L) 08/09/2019    MCV 87 08/09/2019     08/09/2019    MCH 28 0 08/09/2019    MCHC 32 0 08/09/2019    RDW 15 6 (H) 08/09/2019    MPV 11 1 08/09/2019    NRBC 0 08/09/2019   , CMP:   Lab Results   Component Value Date SODIUM 142 08/09/2019    K 3 8 08/09/2019     08/09/2019    CO2 28 08/09/2019    BUN 9 08/09/2019    CREATININE 0 22 (L) 08/09/2019    CALCIUM 7 5 (L) 08/09/2019    EGFR 129 08/09/2019   , Coagulation: No results found for: PT, INR, APTT, Urinalysis: No results found for: COLORU, CLARITYU, SPECGRAV, PHUR, LEUKOCYTESUR, NITRITE, PROTEINUA, GLUCOSEU, KETONESU, BILIRUBINUR, BLOODU, Amylase: No results found for: AMYLASE, Lipase: No results found for: LIPASE  VTE Pharmacologic Prophylaxis: Heparin  VTE Mechanical Prophylaxis: sequential compression device

## 2019-08-10 NOTE — PROGRESS NOTES
Progress Note - [address] Hartranft 78 y o  female MRN: [id number]  Unit/Bed#: ICU 02 Encounter: [id number]    Assessment:   Principal Problem:    OR Exploratory laparotomy, SBR secondary to iatrogenic enterotomy   Active Problems:    Incisional hernia    Postprocedural pneumothorax    Shock ([address] )    OR Laparoscopic ventral hernia repair    Fungemia  Resolved Problems:    Septic shock   Acute respiratory failure w/ hypoxemia   Acute renal failure    Plan:          Neuro:    - Pain controlled w/: prn tylenol/oxy, dilaudid for breakthrough   - continue home gabapentin   - continue home lexapro   - Delirium precautions -- CAM ICU, regulate sleep/wake cycle, melatonin q hs                 CV:    - Septic shock - resolved, off pressors x 48 hrs    - MAP goal > 65   - Telemetry   - A line d/c yesterday                Lung:    - Continue NC for goal spO2 > 92%   - Chest tube pulled yesterday after sentinel hole found to be outstide thoracic cavity  Follow up CXR this am stable  Remained stable overnight   - IS and pulmonary toilet   - Mucinex and chest PT                  GI:    - s/p Bowel resection and abdominal closure   - Tube feed goal 60cc/hr w/ 100mg free h2o flushes q 4 hrs   - Speech recommended NPO w/ ice chips yesterday 2/2 aspiration risk  Follow up of reccomendations   - continue to increase tube feeds to goal of 60/hr                 FEN:    - Strict I/Os, -1,027 past 24 hrs  - Sodium 135 from 142 yesterday  Continue to monitor, decrease free water flushes if it continues to decrease   - K 3 5 this am, repleted  :    - Perwick catheter in place  Continue   - Trend UOP and BUN/Cr                 ID:    - ID following:     -continue Cefepime, Vanc and Metronidazole - Day 4    - most recent blood cultures negative at 48 hrs    - fungal cultures + for candida glabrata - continue Micafungin, day 4 (total day 5)  Repeat fungal cultures pending    Repeat echo if (+)    - bronchial cultures negative   - Afebrile overngight   - WBC ______                Heme:    - Hgb stable at 8 6, continue to trend   - Heparin subQ and SCDs                 Endo:    - continue to monitor blood glucose                            Msk/Skin:    - frequent repositioning and off-loading   - PT/OT consult                 Disposition:    -ICU    Chief Complaint: Low back pain, controlled with pain medications    HPI/24hr events: Chest tube pulled, repeat CXR stable    Physical Exam:   Physical Exam   Constitutional: She is oriented to person, place, and time  She is easily aroused  HENT:   Head: Normocephalic and atraumatic  Right Ear: External ear normal    Left Ear: External ear normal    Mouth/Throat: No oropharyngeal exudate  Eyes: Pupils are equal, round, and reactive to light  Conjunctivae and EOM are normal    Neck: Normal range of motion  Neck supple  Cardiovascular: Normal rate, regular rhythm, normal heart sounds and intact distal pulses  Exam reveals no gallop and no friction rub  No murmur heard  Pulmonary/Chest: Effort normal and breath sounds normal  No respiratory distress  She has no wheezes  She has no rales  Abdominal: Soft  She exhibits no distension  There is no tenderness  There is no guarding  Midline incision clean, dry, intact   Musculoskeletal: Normal range of motion  She exhibits edema (b/l hands and feet)  She exhibits no tenderness or deformity  Lymphadenopathy:     She has no cervical adenopathy  Neurological: She is alert, oriented to person, place, and time and easily aroused  No cranial nerve deficit or sensory deficit  She exhibits normal muscle tone  Skin: Skin is warm and dry  Psychiatric: She has a normal mood and affect  Nursing note and vitals reviewed        Vitals:    08/10/19 0200 08/10/19 0300 08/10/19 0400 08/10/19 0500   BP: 119/65 133/67 132/67 146/65   BP Location: Right arm Right arm Right arm Right arm   Pulse: 90 92 100 102   Resp: 19 21 22    Temp:   98 °F (36 7 °C)    TempSrc:   Oral    SpO2: 98% 97% 94% 95%   Weight:       Height:         Arterial Line BP: 156/82  Arterial Line MAP (mmHg): 110 mmHg    Temperature:   Temp (24hrs), Av °F (36 7 °C), Min:97 6 °F (36 4 °C), Max:98 9 °F (37 2 °C)    Current: Temperature: 98 °F (36 7 °C)    Weights:   IBW: 54 7 kg    Body mass index is 32 77 kg/m²  Weight (last 2 days)     Date/Time   Weight    19 0600   86 6 (190 92)    19 0800   86 7 (191 14)              Hemodynamic Monitoring:  N/A     Non-Invasive/Invasive Ventilation Settings:  Respiratory    Lab Data (Last 4 hours)    None         O2/Vent Data (Last 4 hours)    None              No results found for: PHART, QQI0TRH, PO2ART, OXE6IYQ, T7YJXRTQ, BEART, SOURCE  SpO2: SpO2: 96 %    Intake and Outputs:  I/O        07 -  07 - 08/10 0700    P  O  0     I V  (mL/kg) 1237 5 (14 3) 100 (1 2)    NG/ 280    IV Piggyback 1150 600    Feedings 295 118    Total Intake(mL/kg) 2982 5 (34 4) 1098 (12 7)    Urine (mL/kg/hr) 2400 (1 2) 1975 (1)    Emesis/NG output  0    Stool 0 0    Chest Tube 650 150    Total Output 3050 2125    Net -67 6 -1027          Unmeasured Urine Occurrence 1 x 1 x    Unmeasured Stool Occurrence 2 x 6 x    Unmeasured Emesis Occurrence  1 x          Nutrition:        Diet Orders   (From admission, onward)             Start     Ordered    19 0955  Diet Enteral/Parenteral; Tube Feeding No Oral Diet; Jevity 1 2 Donovan; Continuous; 10; Prosource Protein Liquid - One Packet  Diet effective now     Question Answer Comment   Diet Type Enteral/Parenteral    Enteral/Parenteral Tube Feeding No Oral Diet    Tube Feeding Formula: Jevity 1 2 Donovan    Bolus/Cyclic/Continuous Continuous    Tube Feeding Goal Rate (mL/hr): 10    Prosource Protein Liquid - No Carb Prosource Protein Liquid - One Packet    RD to adjust diet per protocol?  Yes        19 0954              TF currently running at 10/hour with a goal of 60  Formula: Jevity 1 2    Labs:   Results from last 7 days   Lab Units 08/09/19 0451 08/08/19  0526 08/07/19  1813 08/07/19  0453 08/06/19  0447   WBC Thousand/uL 34 14* 40 90* 42 21* 23 06* 17 93*   HEMOGLOBIN g/dL 8 0* 8 8* 10 0* 9 4* 9 9*   HEMATOCRIT % 25 0* 27 0* 30 9* 28 7* 30 5*   PLATELETS Thousands/uL 257 305 304 231 204   NEUTROS PCT %  --  74  --  72 78*   MONOS PCT %  --  2*  --  3* 2*   MONO PCT % 1*  --  1*  --   --     Results from last 7 days   Lab Units 08/10/19  0439 08/09/19  0451 08/08/19  0526 08/07/19  1826  08/03/19  0909   SODIUM mmol/L 135* 142 135*  --    < >  --    POTASSIUM mmol/L 3 5 3 8 3 8  --    < >  --    CHLORIDE mmol/L 100 107 103  --    < >  --    CO2 mmol/L 30 28 27  --    < >  --    CO2, I-STAT mmol/L  --   --   --   --   --  23   BUN mg/dL 8 9 8  --    < >  --    CREATININE mg/dL 0 25* 0 22* 0 35*  --    < >  --    CALCIUM mg/dL 7 5* 7 5* 7 2*  --    < >  --    ALK PHOS U/L  --   --  116 106  --   --    ALT U/L  --   --  11* 15  --   --    AST U/L  --   --  22 25  --   --    GLUCOSE, ISTAT mg/dl  --   --   --   --   --  89    < > = values in this interval not displayed  Results from last 7 days   Lab Units 08/10/19  0439 08/09/19 0451 08/08/19  0526   MAGNESIUM mg/dL 1 9 2 0 2 1     Results from last 7 days   Lab Units 08/09/19 0451 08/08/19  0526 08/03/19  1822   PHOSPHORUS mg/dL 3 0 1 5* 2 4      Results from last 7 days   Lab Units 08/07/19  2028   INR  1 40*   PTT seconds 27     Results from last 7 days   Lab Units 08/08/19  0526   LACTIC ACID mmol/L 1 2     0   Lab Value Date/Time    TROPONINI <0 02 08/02/2019 1126    TROPONINI <0 02 08/02/2019 0406    TROPONINI <0 02 11/22/2016 1110       Imaging:  I have personally reviewed pertinent reports  EKG: I have personally reviewed pertinent reports    Micro:  Lab Results   Component Value Date    BLOODCX No Growth at 48 hrs  08/07/2019    BLOODCX No Growth at 48 hrs   08/07/2019    BLOODCX No Growth After 5 Days  08/02/2019       Allergies: No Known Allergies    Medications:   Scheduled Meds:  Current Facility-Administered Medications:  acetaminophen 650 mg Oral Q6H PRN Yamilet Hollins PA-C    bisacodyl 10 mg Rectal Daily PRN Zhane Ford PA-C    cefepime 2,000 mg Intravenous Q12H Familia Menendez MD Last Rate: Stopped (08/10/19 0438)   docusate sodium 100 mg Oral BID Devin Montenegro PA-C    escitalopram 10 mg Per G Tube Daily Yamilet Hollins PA-C    folic acid 1 mg Per G Tube Daily Yamilet Hollins PA-C    gabapentin 100 mg Oral HS Yamilet GenIMELDA marcelino    guaiFENesin 600 mg Oral Q12H [facility] Cash Hicks PA-C    heparin (porcine) 5,000 Units Subcutaneous Q8H [facility] Jacinto Barrera PA-C    HYDROmorphone 0 2 mg Intravenous Q4H PRN Zhane Ford PA-C    iohexol 50 mL Oral 90 min pre-procedure Felipa Marie PA-C    melatonin 3 mg Per G Tube HS Yamilet Hollins PA-C    metroNIDAZOLE 500 mg Oral Q8H [facility] Daria Clinton PA-C    micafungin 100 mg Intravenous Q24H [place] Sand, DO Last Rate: Stopped (08/09/19 1600)   omeprazole (PRILOSEC) suspension 2 mg/mL 20 mg Oral BID AC Cash Hicks PA-C    ondansetron 4 mg Intravenous Q4H PRN Yamilet Hollins PA-C    oxyCODONE 2 5 mg Oral Q4H PRN Felipa Marie PA-C    Or        oxyCODONE 5 mg Oral Q4H PRN Felipa Marie PA-C    pravastatin 20 mg Per G Tube Daily With Ener-G-RotorsIMELDA    vancomycin 1,500 mg Intravenous Q12H Familia Menendez MD Last Rate: 1,500 mg (08/10/19 0447)     Continuous Infusions:   PRN Meds:    acetaminophen 650 mg Q6H PRN   bisacodyl 10 mg Daily PRN   HYDROmorphone 0 2 mg Q4H PRN   ondansetron 4 mg Q4H PRN   oxyCODONE 2 5 mg Q4H PRN   Or     oxyCODONE 5 mg Q4H PRN       VTE Pharmacologic Prophylaxis: Heparin  VTE Mechanical Prophylaxis: sequential compression device    Invasive lines and devices:   Invasive Devices     Central Venous Catheter Line            CVC Central Lines 08/07/19 Triple 20cm 2 days          Drain Gastrostomy/Enterostomy Jejunostomy 14 Fr   days    NG/OG/Enteral Tube Nasogastric Right nares 2 days    External Urinary Catheter 1 day                   Counseling / Coordination of Care  Total Critical Care time spent 30 minutes excluding procedures, teaching and family updates  Code Status: Level 1 - Full Code     Portions of the record may have been created with voice recognition software  Occasional wrong word or "sound a like" substitutions may have occurred due to the inherent limitations of voice recognition software  Read the chart carefully and recognize, using context, where substitutions have occurred       Иван Jurado DO

## 2019-08-10 NOTE — PROGRESS NOTES
Vancomycin IV Pharmacy-to-Dose Consultation    Roby Tam is a 78 y o  female who is currently receiving Vancomycin IV with management by the Pharmacy Consult service for the treatment of sepsis    Assessment/Plan:    The patient's chart was reviewed  Renal function is stable  There are no signs or symptoms of nephrotoxicity and/or infusion reactions documented  The following nephrotoxicity factors are present:  Medications: none present  Patient-Factors: elderly    Based on todays assessment, will continue current vancomycin (Day # 4) dosing of 1500 mg IV q12h, with a plan for trough to be drawn at 1530 on 8/11 (prior to the 5th dose)  We will continue to follow the patients culture results and clinical progress daily      Lisa Christian, PharmD, [address] [facility] Clinical Pharmacist  [phone]

## 2019-08-10 NOTE — PROGRESS NOTES
Progress Note - Infectious Disease   Adra Norris Chung 78 y o  female MRN: [id number]  Unit/Bed#: ICU 02 Encounter: [id number]      Impression/Plan:  1  Systemic inflammatory response syndrome-possibly all secondary to the events of today with the development of hemoptysis   Possibly related the patient's fungemia   Possibly secondary to an ongoing abdominal process   The patient is no longer requiring vasopressor support   At this point while waiting additional data favor broad antibiotics and antifungals   Procalcitonin level is elevated but has continued to decrease since admission  The leukocytosis has persisted  -continue vancomycin, cefepime, Flagyl, and micafungin for now  -possibly discontinue the vancomycin, cefepime, and Flagyl after last dose tomorrow  -follow up repeat blood cultures  -low threshold to tap ascites and sent for cultures  -recheck procalcitonin level   -check CBC with diff and CMP tomorrow  -supportive care     2  Fungemia-possibly all translocation across the gut wall in the setting of peritonitis   Possible catheter related bacteremia although this is less likely as the blood cultures were from around the time of admission   The central line has been changed  -continue micafungin as above  -follow up repeat blood cultures  -follow up sensitivities and adjust antifungals as needed  -recheck echocardiogram if repeat blood cultures positive for yeast  -consult ophthalmology   -okay to place PICC once blood cultures are negative times 72 hours     3  Peritonitis-status post exploration and repair   No cultures obtained at the time of the surgery   Patient does have significant turbid drainage from the superior aspect of the wound   Patient has been maintained on broad antibiotics   Rising white count noted   Significant ascites noted    -broad antibiotics and antifungals as above for now  -low threshold to tap ascites and sent for culture  -serial abdominal exams  -close surgical follow-up     4  Acute hypoxic respiratory failure-possibly secondary to hemoptysis   Possibly complicated by an aspiration event   Perhaps pneumothorax is playing a role  Less likely pneumonia as the chest x-ray seems to have improved  She is now extubated and on O2 by nasal cannula  Repeat chest x-ray with slightly increased volume  Bronchoscopy cultures without any resistant organisms  -oxygen support  -monitor respiratory status  -close critical care follow-up     5  Pneumothorax-status post new chest tube placement   Decreased pneumothorax on repeat chest x-ray  The chest tube is now been removed  Follow-up chest x-ray without progressive pneumothorax  -monitor respiratory status  -radiographic follow-up     Discussed the above antibiotic and antifungal plan with the critical care service    Antibiotics:  Vancomycin 4  Cefepime 4  Flagyl 4  Micafungin 5  Antibiotics 9     Subjective:  Patient has no fever, chills, sweats; no nausea, vomiting, diarrhea although she did have some emesis yesterday and some loose stool yesterday; no increase cough, or shortness of breath; no increased abdominal pain  No new symptoms  She remains hemodynamically stable off vasopressor support  Objective:  Vitals:  Temp:  [97 7 °F (36 5 °C)-98 9 °F (37 2 °C)] 97 8 °F (36 6 °C)  HR:  [] 90  Resp:  [18-25] 22  BP: (119-167)/(65-86) 140/66  SpO2:  [93 %-99 %] 96 %  Temp (24hrs), Av °F (36 7 °C), Min:97 7 °F (36 5 °C), Max:98 9 °F (37 2 °C)  Current: Temperature: 97 8 °F (36 6 °C)    Physical Exam:   General Appearance:  Alert, interactive, nontoxic, no acute distress  Throat: Oropharynx dry without lesions  NG tube in place   Lungs:   Decreased breath sounds bilaterally; no wheezes, rhonchi or rales; respirations unlabored   Heart:  RRR; no murmur, rub or gallop   Abdomen:   Soft, mildly tender, non-distended, positive bowel sounds  Midline incision with decreased drainage    No surrounding erythema Extremities: No clubbing, cyanosis or edema   Skin: No new rashes or lesions  No draining wounds noted  Labs, Imaging, & Other studies:   All pertinent labs and imaging studies were personally reviewed  Results from last 7 days   Lab Units 08/10/19  0439 08/09/19  0451 08/08/19  0526   WBC Thousand/uL 34 54* 34 14* 40 90*   HEMOGLOBIN g/dL 8 6* 8 0* 8 8*   PLATELETS Thousands/uL 372 257 305     Results from last 7 days   Lab Units 08/10/19  0439 08/09/19  0451 08/08/19  0526 08/07/19  1826   SODIUM mmol/L 135* 142 135*  --    POTASSIUM mmol/L 3 5 3 8 3 8  --    CHLORIDE mmol/L 100 107 103  --    CO2 mmol/L 30 28 27  --    BUN mg/dL 8 9 8  --    CREATININE mg/dL 0 25* 0 22* 0 35*  --    EGFR ml/min/1 73sq m 124 129 111  --    CALCIUM mg/dL 7 5* 7 5* 7 2*  --    AST U/L  --   --  22 25   ALT U/L  --   --  11* 15   ALK PHOS U/L  --   --  116 106     Results from last 7 days   Lab Units 08/08/19  1245 08/07/19  1825 08/07/19  1814 08/06/19  0051   BLOOD CULTURE   --  No Growth at 48 hrs  No Growth at 48 hrs   --    GRAM STAIN RESULT  3+ Polys  No bacteria seen  --   --   --    MRSA CULTURE ONLY   --   --   --  No Methicillin Resistant Staphlyococcus aureus (MRSA) isolated     Results from last 7 days   Lab Units 08/10/19  0439 08/09/19  0451 08/08/19  0526 08/07/19  1826   PROCALCITONIN ng/ml 0 69* 0 88* 1 04* 1 02*     Chest x-ray-possible slight increased in volume right greater than left    Otherwise relatively stable    Images personally reviewed by me in PACS

## 2019-08-11 ENCOUNTER — APPOINTMENT (INPATIENT)
Dept: RADIOLOGY | Facility: HOSPITAL | Age: 69
DRG: 856 | End: 2019-08-11
Payer: MEDICARE

## 2019-08-11 LAB
ANION GAP SERPL CALCULATED.3IONS-SCNC: 3 MMOL/L (ref 4–13)
BACTERIA BLD CULT: ABNORMAL
BASOPHILS # BLD AUTO: 0.06 THOUSANDS/ΜL (ref 0–0.1)
BASOPHILS NFR BLD AUTO: 0 % (ref 0–1)
BUN SERPL-MCNC: 9 MG/DL (ref 5–25)
C DIFF TOX GENS STL QL NAA+PROBE: NORMAL
CALCIUM SERPL-MCNC: 7.6 MG/DL (ref 8.3–10.1)
CHLORIDE SERPL-SCNC: 100 MMOL/L (ref 100–108)
CO2 SERPL-SCNC: 33 MMOL/L (ref 21–32)
CREAT SERPL-MCNC: 0.32 MG/DL (ref 0.6–1.3)
EOSINOPHIL # BLD AUTO: 0.13 THOUSAND/ΜL (ref 0–0.61)
EOSINOPHIL NFR BLD AUTO: 0 % (ref 0–6)
ERYTHROCYTE [DISTWIDTH] IN BLOOD BY AUTOMATED COUNT: 16.1 % (ref 11.6–15.1)
GFR SERPL CREATININE-BSD FRML MDRD: 114 ML/MIN/1.73SQ M
GLUCOSE SERPL-MCNC: 114 MG/DL (ref 65–140)
GLUCOSE SERPL-MCNC: 133 MG/DL (ref 65–140)
GLUCOSE SERPL-MCNC: 135 MG/DL (ref 65–140)
GLUCOSE SERPL-MCNC: 164 MG/DL (ref 65–140)
GLUCOSE SERPL-MCNC: 179 MG/DL (ref 65–140)
GRAM STN SPEC: ABNORMAL
HCT VFR BLD AUTO: 28.1 % (ref 34.8–46.1)
HGB BLD-MCNC: 8.9 G/DL (ref 11.5–15.4)
IMM GRANULOCYTES # BLD AUTO: >0.5 THOUSAND/UL (ref 0–0.2)
IMM GRANULOCYTES NFR BLD AUTO: 6 % (ref 0–2)
LYMPHOCYTES # BLD AUTO: 2.34 THOUSANDS/ΜL (ref 0.6–4.47)
LYMPHOCYTES NFR BLD AUTO: 7 % (ref 14–44)
MAGNESIUM SERPL-MCNC: 1.9 MG/DL (ref 1.6–2.6)
MCH RBC QN AUTO: 27.7 PG (ref 26.8–34.3)
MCHC RBC AUTO-ENTMCNC: 31.7 G/DL (ref 31.4–37.4)
MCV RBC AUTO: 88 FL (ref 82–98)
MONOCYTES # BLD AUTO: 1.82 THOUSAND/ΜL (ref 0.17–1.22)
MONOCYTES NFR BLD AUTO: 5 % (ref 4–12)
NEUTROPHILS # BLD AUTO: 27.16 THOUSANDS/ΜL (ref 1.85–7.62)
NEUTS SEG NFR BLD AUTO: 82 % (ref 43–75)
NRBC BLD AUTO-RTO: 0 /100 WBCS
PLATELET # BLD AUTO: 504 THOUSANDS/UL (ref 149–390)
PMV BLD AUTO: 10.9 FL (ref 8.9–12.7)
POTASSIUM SERPL-SCNC: 3.5 MMOL/L (ref 3.5–5.3)
PROCALCITONIN SERPL-MCNC: 0.81 NG/ML
RBC # BLD AUTO: 3.21 MILLION/UL (ref 3.81–5.12)
SODIUM SERPL-SCNC: 136 MMOL/L (ref 136–145)
WBC # BLD AUTO: 33.46 THOUSAND/UL (ref 4.31–10.16)

## 2019-08-11 PROCEDURE — 99233 SBSQ HOSP IP/OBS HIGH 50: CPT | Performed by: INTERNAL MEDICINE

## 2019-08-11 PROCEDURE — NC001 PR NO CHARGE: Performed by: SURGERY

## 2019-08-11 PROCEDURE — 94669 MECHANICAL CHEST WALL OSCILL: CPT

## 2019-08-11 PROCEDURE — 71045 X-RAY EXAM CHEST 1 VIEW: CPT

## 2019-08-11 PROCEDURE — 92526 ORAL FUNCTION THERAPY: CPT

## 2019-08-11 PROCEDURE — 83735 ASSAY OF MAGNESIUM: CPT | Performed by: PHYSICIAN ASSISTANT

## 2019-08-11 PROCEDURE — 84145 PROCALCITONIN (PCT): CPT | Performed by: INTERNAL MEDICINE

## 2019-08-11 PROCEDURE — 87493 C DIFF AMPLIFIED PROBE: CPT | Performed by: PHYSICIAN ASSISTANT

## 2019-08-11 PROCEDURE — 85025 COMPLETE CBC W/AUTO DIFF WBC: CPT | Performed by: PHYSICIAN ASSISTANT

## 2019-08-11 PROCEDURE — 80048 BASIC METABOLIC PNL TOTAL CA: CPT | Performed by: PHYSICIAN ASSISTANT

## 2019-08-11 PROCEDURE — 82948 REAGENT STRIP/BLOOD GLUCOSE: CPT

## 2019-08-11 PROCEDURE — 99232 SBSQ HOSP IP/OBS MODERATE 35: CPT | Performed by: EMERGENCY MEDICINE

## 2019-08-11 RX ORDER — HYDROMORPHONE HCL/PF 1 MG/ML
0.5 SYRINGE (ML) INJECTION ONCE
Status: COMPLETED | OUTPATIENT
Start: 2019-08-11 | End: 2019-08-11

## 2019-08-11 RX ORDER — XYLITOL/YERBA SANTA
5 AEROSOL, SPRAY WITH PUMP (ML) MUCOUS MEMBRANE AS NEEDED
Status: DISCONTINUED | OUTPATIENT
Start: 2019-08-11 | End: 2019-08-27 | Stop reason: HOSPADM

## 2019-08-11 RX ORDER — MAGNESIUM SULFATE HEPTAHYDRATE 40 MG/ML
2 INJECTION, SOLUTION INTRAVENOUS ONCE
Status: COMPLETED | OUTPATIENT
Start: 2019-08-11 | End: 2019-08-11

## 2019-08-11 RX ORDER — POTASSIUM CHLORIDE 29.8 MG/ML
40 INJECTION INTRAVENOUS ONCE
Status: COMPLETED | OUTPATIENT
Start: 2019-08-11 | End: 2019-08-11

## 2019-08-11 RX ADMIN — HYDROMORPHONE HYDROCHLORIDE 0.5 MG: 1 INJECTION, SOLUTION INTRAMUSCULAR; INTRAVENOUS; SUBCUTANEOUS at 02:05

## 2019-08-11 RX ADMIN — OXYCODONE HYDROCHLORIDE 5 MG: 5 TABLET ORAL at 05:06

## 2019-08-11 RX ADMIN — METRONIDAZOLE 500 MG: 500 TABLET ORAL at 13:35

## 2019-08-11 RX ADMIN — HYDROMORPHONE HYDROCHLORIDE 0.2 MG: 1 INJECTION, SOLUTION INTRAMUSCULAR; INTRAVENOUS; SUBCUTANEOUS at 08:29

## 2019-08-11 RX ADMIN — MAGNESIUM SULFATE HEPTAHYDRATE 2 G: 40 INJECTION, SOLUTION INTRAVENOUS at 08:18

## 2019-08-11 RX ADMIN — Medication 20 MG: at 18:22

## 2019-08-11 RX ADMIN — PRAVASTATIN SODIUM 20 MG: 20 TABLET ORAL at 17:33

## 2019-08-11 RX ADMIN — METHOCARBAMOL 500 MG: 500 TABLET, FILM COATED ORAL at 19:49

## 2019-08-11 RX ADMIN — OXYCODONE HYDROCHLORIDE 2.5 MG: 5 TABLET ORAL at 16:05

## 2019-08-11 RX ADMIN — HEPARIN SODIUM 5000 UNITS: 5000 INJECTION INTRAVENOUS; SUBCUTANEOUS at 05:07

## 2019-08-11 RX ADMIN — HEPARIN SODIUM 5000 UNITS: 5000 INJECTION INTRAVENOUS; SUBCUTANEOUS at 21:22

## 2019-08-11 RX ADMIN — OXYCODONE HYDROCHLORIDE 5 MG: 5 TABLET ORAL at 10:58

## 2019-08-11 RX ADMIN — CEFEPIME HYDROCHLORIDE 2000 MG: 2 INJECTION, POWDER, FOR SOLUTION INTRAVENOUS at 17:32

## 2019-08-11 RX ADMIN — HEPARIN SODIUM 5000 UNITS: 5000 INJECTION INTRAVENOUS; SUBCUTANEOUS at 13:35

## 2019-08-11 RX ADMIN — ACETAMINOPHEN 650 MG: 325 TABLET ORAL at 05:07

## 2019-08-11 RX ADMIN — METHOCARBAMOL 500 MG: 500 TABLET, FILM COATED ORAL at 10:58

## 2019-08-11 RX ADMIN — FOLIC ACID 1 MG: 1 TABLET ORAL at 08:18

## 2019-08-11 RX ADMIN — MICAFUNGIN SODIUM 100 MG: 10 INJECTION, POWDER, LYOPHILIZED, FOR SOLUTION INTRAVENOUS at 21:23

## 2019-08-11 RX ADMIN — OXYCODONE HYDROCHLORIDE 5 MG: 5 TABLET ORAL at 19:55

## 2019-08-11 RX ADMIN — GUAIFENESIN 600 MG: 600 TABLET, EXTENDED RELEASE ORAL at 08:18

## 2019-08-11 RX ADMIN — Medication 20 MG: at 08:18

## 2019-08-11 RX ADMIN — ESCITALOPRAM OXALATE 10 MG: 10 TABLET ORAL at 08:19

## 2019-08-11 RX ADMIN — LIDOCAINE 1 PATCH: 50 PATCH CUTANEOUS at 08:18

## 2019-08-11 RX ADMIN — Medication 250 MG: at 17:33

## 2019-08-11 RX ADMIN — METRONIDAZOLE 500 MG: 500 TABLET ORAL at 21:22

## 2019-08-11 RX ADMIN — GABAPENTIN 100 MG: 100 CAPSULE ORAL at 21:21

## 2019-08-11 RX ADMIN — METRONIDAZOLE 500 MG: 500 TABLET ORAL at 05:07

## 2019-08-11 RX ADMIN — VANCOMYCIN HYDROCHLORIDE 1500 MG: 10 INJECTION, POWDER, LYOPHILIZED, FOR SOLUTION INTRAVENOUS at 04:53

## 2019-08-11 RX ADMIN — GUAIFENESIN 600 MG: 600 TABLET, EXTENDED RELEASE ORAL at 21:22

## 2019-08-11 RX ADMIN — CEFEPIME HYDROCHLORIDE 2000 MG: 2 INJECTION, POWDER, FOR SOLUTION INTRAVENOUS at 04:53

## 2019-08-11 RX ADMIN — POTASSIUM CHLORIDE 40 MEQ: 400 INJECTION, SOLUTION INTRAVENOUS at 08:17

## 2019-08-11 RX ADMIN — Medication 250 MG: at 08:19

## 2019-08-11 RX ADMIN — MELATONIN 3 MG: 3 TAB ORAL at 21:22

## 2019-08-11 NOTE — CONSULTS
Vancomycin IV Pharmacy-to-Dose Consultation    Bharat Son is a 78 y o  female who was receiving Vancomycin IV with management by the Pharmacy Consult service for treatment of sepsis  The patients Vancomycin therapy has been discontinued per ID  Thank you for allowing us to take part in this patient's care  Pharmacy will sign-off now; please call or re-consult if there are any questions        Yolanda Carrasco, PharmD, [address] [facility] Clinical Pharmacist  [phone]

## 2019-08-11 NOTE — PLAN OF CARE
Patient accepted a small amount of trials this session  Limited amount was trialed  She is not yet appropriate for an initiation of a diet  ST to continue to follow

## 2019-08-11 NOTE — PROGRESS NOTES
Progress Note - Infectious Disease   Lester KAHN Hartranft 78 y o  female MRN: [id number]  Unit/Bed#: ICU 02 Encounter: [id number]      Impression/Plan:  1  Systemic inflammatory response syndrome- Possibly related the patient's fungemia   Possibly secondary to an ongoing abdominal process  Possibly secondary to ongoing peritonitis   The patient is no longer requiring vasopressor support   At this point while waiting additional data favor broad antibiotics and antifungals   Procalcitonin level has decreased and now plateaued  The leukocytosis has persisted  -continue cefepime, Flagyl, and micafungin for now  -discontinue vancomycin for now  -follow up repeat blood cultures  -check stool for C diff  -recheck CT of the chest abdomen pelvis if leukocytosis persists without a new source  -low threshold to tap ascites and sent for cultures  -recheck procalcitonin level   -check CBC with diff and CMP tomorrow  -supportive care     2  Fungemia-possibly all translocation across the gut wall in the setting of peritonitis   Possible catheter related bacteremia although this is less likely as the blood cultures were from around the time of admission   The central line has been changed  Repeat blood cultures are negative thus far  -continue micafungin as above  -follow up repeat blood cultures  -follow up sensitivities and adjust antifungals as needed  -recheck echocardiogram if repeat blood cultures positive for yeast  -consult ophthalmology   -okay to place PICC once blood cultures are negative times 72 hours     3  Peritonitis-status post exploration and repair   No cultures obtained at the time of the surgery   Patient does have significant turbid drainage from the superior aspect of the wound   Patient has been maintained on broad antibiotics  The patient continues to have a brisk leukocytosis   Significant ascites noted    -broad antibiotics and antifungals as above for now  -if no source of ongoing leukocytosis found, recommend repeat CT of the chest abdomen and pelvis as above  -low threshold to tap ascites and sent for culture  -serial abdominal exams  -close surgical follow-up     4  Acute hypoxic respiratory failure-possibly secondary to hemoptysis   Possibly complicated by an aspiration event   Perhaps pneumothorax is playing a role  Less likely pneumonia as the chest x-ray seems to have improved   She is now extubated and on O2 by nasal cannula  Repeat chest x-ray with slightly increased volume  Bronchoscopy cultures without any resistant organisms  Repeat chest x-ray with increased edema  -oxygen support  -monitor respiratory status  -close critical care follow-up  -may need repeat CT as above     5  Pneumothorax-status post new chest tube placement   Decreased pneumothorax on repeat chest x-ray   The chest tube is now been removed   Follow-up chest x-ray without progressive pneumothorax  -monitor respiratory status  -radiographic follow-up     Discussed the above antibiotic and antifungal plan with the critical care service    Antibiotics:  Vancomycin 5  Cefepime 5  Flagyl 5  Micafungin 6  Antibiotics 10    Subjective:  Patient has no fever, chills, sweats; no reported vomiting, but still having some diarrhea; she continues to require O2 by nasal cannula; some abdominal  No new symptoms  She remains hemodynamically stable off vasopressor support    Objective:  Vitals:  Temp:  [98 2 °F (36 8 °C)-98 8 °F (37 1 °C)] 98 6 °F (37 °C)  HR:  [] 96  Resp:  [14-26] 17  BP: (123-162)/(65-86) 151/73  SpO2:  [93 %-98 %] 96 %  Temp (24hrs), Av 5 °F (36 9 °C), Min:98 2 °F (36 8 °C), Max:98 8 °F (37 1 °C)  Current: Temperature: 98 6 °F (37 °C)    Physical Exam:   General Appearance:  Alert, interactive, nontoxic, no acute distress  Throat: Oropharynx moist without lesions      Lungs:   Decreased breath sounds bilaterally; no wheezes, rhonchi or rales; respirations unlabored   Heart:  RRR; no murmur, rub or gallop Abdomen:   Soft, mildly tender, non-distended, positive bowel sounds  Incision with scant drainage but without surrounding erythema   Extremities: No clubbing, cyanosis or edema   Skin: No new rashes or lesions  No draining wounds noted  Labs, Imaging, & Other studies:   All pertinent labs and imaging studies were personally reviewed  Results from last 7 days   Lab Units 08/11/19  0453 08/10/19  0439 08/09/19  0451   WBC Thousand/uL 33 46* 34 54* 34 14*   HEMOGLOBIN g/dL 8 9* 8 6* 8 0*   PLATELETS Thousands/uL 504* 372 257     Results from last 7 days   Lab Units 08/11/19  0453 08/10/19  0439 08/09/19  0451 08/08/19  0526 08/07/19  1826   SODIUM mmol/L 136 135* 142 135*  --    POTASSIUM mmol/L 3 5 3 5 3 8 3 8  --    CHLORIDE mmol/L 100 100 107 103  --    CO2 mmol/L 33* 30 28 27  --    BUN mg/dL 9 8 9 8  --    CREATININE mg/dL 0 32* 0 25* 0 22* 0 35*  --    EGFR ml/min/1 73sq m 114 124 129 111  --    CALCIUM mg/dL 7 6* 7 5* 7 5* 7 2*  --    AST U/L  --   --   --  22 25   ALT U/L  --   --   --  11* 15   ALK PHOS U/L  --   --   --  116 106     Results from last 7 days   Lab Units 08/08/19  1245 08/07/19  1825 08/07/19  1814 08/06/19  0051   BLOOD CULTURE   --  No Growth at 72 hrs   No Growth at 72 hrs   --    GRAM STAIN RESULT  3+ Polys  No bacteria seen  --   --   --    MRSA CULTURE ONLY   --   --   --  No Methicillin Resistant Staphlyococcus aureus (MRSA) isolated     Results from last 7 days   Lab Units 08/11/19  0453 08/10/19  0439 08/09/19  0451 08/08/19  0526 08/07/19  1826   PROCALCITONIN ng/ml 0 81* 0 69* 0 88* 1 04* 1 02*     Chest x-ray-increasing pleural effusions and possible pulmonary edema    Images personally reviewed by me in PACS    Await formal radiology review

## 2019-08-11 NOTE — SPEECH THERAPY NOTE
Speech Language/Pathology    Speech/Language Pathology Progress Note    Patient Name: Enrrique Leon  Today's Date: 8/11/2019     Problem List  Patient Active Problem List   Diagnosis    Osteoarthritis of knee    Joint pain, knee    Eagle's esophagus with high grade dysplasia    3-vessel CAD    Abnormal blood chemistry    A-fib ([address] )    Anemia    Atypical chest pain    Backache with radiation    Continuous left lower quadrant pain    Depression    Dysphagia    Gastroesophageal reflux disease    Hyperlipidemia    Hypothyroidism    Insomnia    Low vitamin D level    Lumbar radiculopathy    Myofascial pain    Right leg paresthesias    Sciatica    Spondylosis of 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    Stricture of esophagus    Venous insufficiency    Incisional hernia    Adrenal adenoma    Incisional hernia, without obstruction or gangrene    Bilateral pneumonia    Postprocedural pneumothorax    OR Exploratory laparotomy, SBR secondary to iatrogenic enterotomy     OR Laparoscopic ventral hernia repair    Fungemia        Past Medical History  Past Medical History:   Diagnosis Date    Abnormal blood chemistry     last assessed 03/06/2014    Achalasia, esophageal     Acute medial meniscus tear     last assessed 03/10/2014    Acute otitis externa     last assessed 03/24/2014    Adrenal nodule ([address]     Anemia     Anxiety     Arthritis     Atrial fibrillation ([address] )     resolved 01/09/2018    Atypical chest pain     last assessed 11/22/2016    Eagle's esophagus with high grade dysplasia     last assessed 01/03/2018    Cancer ([address] )     esophageal    Cerumen impaction     last assessed 03/24/2014    Chronic pain of right knee     last assessed 04/06/2017    Coronary artery disease     3 vessel, resolved 01/09/2018    Depression     Dysphagia     last assessed 06/21/2017    Esophageal stricture     last assessed 06/21/2017    GERD (gastroesophageal reflux disease)     Headache     last assessed 2013    Hiatal hernia     Insomnia     last assessed 10/15/2013    Jejunostomy tube present ([facility] [address]     resolved 2018    Low vitamin D level     resolved 2018    Osteoarthritis, knee     last assessed 2017    Pneumonia     last assessed 2013    Right leg paresthesias     last assessed 2017    Sciatica     last assessed 2015    Shingles     last assessed 2015    Spondylosis of 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     last assessed 2017    Venous insufficiency     last assessed 2013        Past Surgical History  Past Surgical History:   Procedure Laterality Date     SECTION      EXPLORATORY LAPAROTOMY W/ BOWEL RESECTION N/A 2019    Procedure: LAPAROTOMY EXPLORATORY W/ BOWEL RESECTION, APPLICATION OF Lizet Go;  Surgeon: Simone Olmos DO;  [facility];  Service: General    GASTRECTOMY      partial    GASTROJEJUNOSTOMY W/ JEJUNOSTOMY TUBE N/A 2017    Procedure: Yane Werner;  Surgeon: Kinga Dial MD;  [facility];  Service: Thoracic    JOINT REPLACEMENT Right 11/10/2014    knee, Dr Cooper Oleary N/A 8/3/2019    Procedure: LAPAROTOMY EXPLORATORY, reanastomosis of proximal small bowel, placement of vac dressing;  Surgeon: Simone Olmos DO;  [facility];  Service: General    ME ESOPHAGOGASTRODUODENOSCOPY TRANSORAL DIAGNOSTIC N/A 3/10/2017    Procedure: ESOPHAGOGASTRODUODENOSCOPY (EGD); Surgeon: Nidhi Bowden MD;  [facility];  Service: Gastroenterology    ME ESOPHAGOGASTRODUODENOSCOPY TRANSORAL DIAGNOSTIC N/A 2017    Procedure: ESOPHAGOGASTRODUODENOSCOPY (EGD); Surgeon: Nidhi Bowden MD;  [facility];  Service: Gastroenterology    ME ESOPHAGOSCOPY FLEXIBLE TRANSORAL WITH BIOPSY N/A 2017    Procedure: ESOPHAGOGASTRODUODENOSCOPY (EGD);   Surgeon: Kinga Dial MD;  Location:  MAIN OR; Service: Thoracic    WA LAP, VENTRAL HERNIA REPAIR,REDUCIBLE N/A 7/31/2019    Procedure: REPAIR HERNIA VENTRAL LAPAROSCOPIC;  Surgeon: Sandra Liao DO;  [facility];  Service: General    WA REMOVAL Avril Gupta 28 THORACOTOMY N/A 8/24/2017    Procedure: TRANSHIATAL ESOPHAGECTOMY;  Surgeon: Manas Yoon MD;  [facility];  Service: Thoracic    STEROID INJECTION KNEE Right 5/2/2017    Procedure: GENICULATE NERVE BLOCKS;  Surgeon: Hetal Mehta DO;  [facility];  Service:          Subjective:  Patient was alert and upright in her bed with her family at the bedside  Nursing reported that she will be moving to 23-14-20-09 and they were preparing to transport her at the end of the speech therapy session  She stated "can I have another ice chip"    Objective:  SLP adjusted patient's positioning in bed to ensure safe trials of PO  Nursing reported that she has been giving her ice chips frequently throughout the day today and she has been tolerating the ice chips  The SLP provided patient with 2 large ice chips initially  She would manipulate them in her mouth and let them melt slowly  Patient appeared to tolerate the trials of ice chips  SLP then presented patient with trials of HTL  She accepted two 1/2 tsp of honey thick water (lemon flavor)  Delay was noted in transfer and swallow initiation  She expressed dislike of the HTL water and requested lemonade  Delayed coughing noted after the HTL  SLP then provided patient with trials of HTL lemonade  Patient accepted 1/2 tsp x2  Same findings as with the HTL water  Patient did state that she liked the lemonade  Patient then declined any further trials of PO  SLP provided education to family and patient that a larger sample size needs to be addressed to determine tolerance of various textures to initiate a diet  Patient and family expressed understanding  Family did report she was not eating much prior to this hospitalization   She did request another ice chip at the end of the session  Assessment:  Patient accepted a limited amount of PO trials in today's session  Patient continues to exhibit delay in transfer and swallow initiation with the HTL  Delayed coughing noted after trials of ice chips and HTL  Patient fatigues quickly and is unable to tolerate a larger sample size at this time  She continues to be at a high risk for aspiration  Plan/Recommendations:  Continue NPO with alternative means of nutrition  ST to continue to follow  OK to continue ice chips with nursing

## 2019-08-11 NOTE — PROGRESS NOTES
Progress Note - General Surgery   Neptali Chung 78 y o  female MRN: [id number]  Unit/Bed#: ICU 02 Encounter: [id number]    Assessment:  68yo female s/p recent laparoscopic ventral hernia repair with iatrogenic small bowel injury on 7/31 now s/p ex-lap, mesh explantation, small bowel resection and eventual abdominal closure  Candida glabrata fungemia  Protein calorie malnutrition    Plan:  Advance feeds to goal  Antibiotics/antifungals per ID  Mobilize patient  Speech evaluation for oral feeds  Check C diff  Wound care  Dressing changes prn  Can transfer out of ICU    Subjective/Objective   Chief Complaint:     Subjective: No events overnight  Afebrile past 24 hours  Pain is controlled    Objective:     Blood pressure 151/73, pulse 96, temperature 98 6 °F (37 °C), temperature source Oral, resp  rate 17, height 5' 4" (1 626 m), weight 86 6 kg (190 lb 14 7 oz), SpO2 96 %  ,Body mass index is 32 77 kg/m²        Intake/Output Summary (Last 24 hours) at 8/11/2019 0738  Last data filed at 8/11/2019 0600  Gross per 24 hour   Intake 1577 ml   Output 1850 ml   Net -273 ml       Invasive Devices     Central Venous Catheter Line            CVC Central Lines 08/07/19 Triple 20cm 3 days          Drain            Gastrostomy/Enterostomy Jejunostomy 14 Fr   days    NG/OG/Enteral Tube Nasogastric Right nares 3 days    External Urinary Catheter 2 days    Rectal Tube less than 1 day                Physical Exam:  NAD  Slightly tachy to 90s  Abdomen soft, midline wound draining some purulent material      Lab, Imaging and other studies:  CBC:   Lab Results   Component Value Date    WBC 33 46 (HH) 08/11/2019    HGB 8 9 (L) 08/11/2019    HCT 28 1 (L) 08/11/2019    MCV 88 08/11/2019     (H) 08/11/2019    MCH 27 7 08/11/2019    MCHC 31 7 08/11/2019    RDW 16 1 (H) 08/11/2019    MPV 10 9 08/11/2019    NRBC 0 08/11/2019   , CMP:   Lab Results   Component Value Date    SODIUM 136 08/11/2019    K 3 5 08/11/2019     08/11/2019    CO2 33 (H) 08/11/2019    BUN 9 08/11/2019    CREATININE 0 32 (L) 08/11/2019    CALCIUM 7 6 (L) 08/11/2019    EGFR 114 08/11/2019   , Coagulation: No results found for: PT, INR, APTT, Urinalysis: No results found for: COLORU, CLARITYU, SPECGRAV, PHUR, LEUKOCYTESUR, NITRITE, PROTEINUA, GLUCOSEU, KETONESU, BILIRUBINUR, BLOODU, Amylase: No results found for: AMYLASE, Lipase: No results found for: LIPASE  VTE Pharmacologic Prophylaxis: Sequential compression device (Venodyne)   VTE Mechanical Prophylaxis: sequential compression device

## 2019-08-12 LAB
ANION GAP SERPL CALCULATED.3IONS-SCNC: 4 MMOL/L (ref 4–13)
BACTERIA BLD CULT: NORMAL
BACTERIA BLD CULT: NORMAL
BASOPHILS # BLD AUTO: 0.09 THOUSANDS/ΜL (ref 0–0.1)
BASOPHILS NFR BLD AUTO: 0 % (ref 0–1)
BUN SERPL-MCNC: 12 MG/DL (ref 5–25)
CALCIUM SERPL-MCNC: 7.9 MG/DL (ref 8.3–10.1)
CHLORIDE SERPL-SCNC: 103 MMOL/L (ref 100–108)
CO2 SERPL-SCNC: 35 MMOL/L (ref 21–32)
CREAT SERPL-MCNC: 0.44 MG/DL (ref 0.6–1.3)
EOSINOPHIL # BLD AUTO: 0.23 THOUSAND/ΜL (ref 0–0.61)
EOSINOPHIL NFR BLD AUTO: 1 % (ref 0–6)
ERYTHROCYTE [DISTWIDTH] IN BLOOD BY AUTOMATED COUNT: 17.1 % (ref 11.6–15.1)
GFR SERPL CREATININE-BSD FRML MDRD: 103 ML/MIN/1.73SQ M
GLUCOSE SERPL-MCNC: 124 MG/DL (ref 65–140)
GLUCOSE SERPL-MCNC: 126 MG/DL (ref 65–140)
GLUCOSE SERPL-MCNC: 131 MG/DL (ref 65–140)
GLUCOSE SERPL-MCNC: 133 MG/DL (ref 65–140)
HCT VFR BLD AUTO: 27.5 % (ref 34.8–46.1)
HGB BLD-MCNC: 8.7 G/DL (ref 11.5–15.4)
IMM GRANULOCYTES # BLD AUTO: >0.5 THOUSAND/UL (ref 0–0.2)
IMM GRANULOCYTES NFR BLD AUTO: 6 % (ref 0–2)
LYMPHOCYTES # BLD AUTO: 2.56 THOUSANDS/ΜL (ref 0.6–4.47)
LYMPHOCYTES NFR BLD AUTO: 9 % (ref 14–44)
MAGNESIUM SERPL-MCNC: 2.6 MG/DL (ref 1.6–2.6)
MCH RBC QN AUTO: 27.9 PG (ref 26.8–34.3)
MCHC RBC AUTO-ENTMCNC: 31.6 G/DL (ref 31.4–37.4)
MCV RBC AUTO: 88 FL (ref 82–98)
MONOCYTES # BLD AUTO: 2.25 THOUSAND/ΜL (ref 0.17–1.22)
MONOCYTES NFR BLD AUTO: 8 % (ref 4–12)
NEUTROPHILS # BLD AUTO: 23.32 THOUSANDS/ΜL (ref 1.85–7.62)
NEUTS SEG NFR BLD AUTO: 76 % (ref 43–75)
NRBC BLD AUTO-RTO: 0 /100 WBCS
PLATELET # BLD AUTO: 559 THOUSANDS/UL (ref 149–390)
PMV BLD AUTO: 10.5 FL (ref 8.9–12.7)
POTASSIUM SERPL-SCNC: 3.5 MMOL/L (ref 3.5–5.3)
RBC # BLD AUTO: 3.12 MILLION/UL (ref 3.81–5.12)
SODIUM SERPL-SCNC: 142 MMOL/L (ref 136–145)
WBC # BLD AUTO: 30.2 THOUSAND/UL (ref 4.31–10.16)

## 2019-08-12 PROCEDURE — 97110 THERAPEUTIC EXERCISES: CPT

## 2019-08-12 PROCEDURE — 82948 REAGENT STRIP/BLOOD GLUCOSE: CPT

## 2019-08-12 PROCEDURE — 85025 COMPLETE CBC W/AUTO DIFF WBC: CPT | Performed by: PHYSICIAN ASSISTANT

## 2019-08-12 PROCEDURE — 99232 SBSQ HOSP IP/OBS MODERATE 35: CPT | Performed by: INTERNAL MEDICINE

## 2019-08-12 PROCEDURE — 94760 N-INVAS EAR/PLS OXIMETRY 1: CPT

## 2019-08-12 PROCEDURE — 83735 ASSAY OF MAGNESIUM: CPT | Performed by: PHYSICIAN ASSISTANT

## 2019-08-12 PROCEDURE — 80048 BASIC METABOLIC PNL TOTAL CA: CPT | Performed by: PHYSICIAN ASSISTANT

## 2019-08-12 PROCEDURE — 94669 MECHANICAL CHEST WALL OSCILL: CPT

## 2019-08-12 PROCEDURE — 99024 POSTOP FOLLOW-UP VISIT: CPT | Performed by: SURGERY

## 2019-08-12 PROCEDURE — 92526 ORAL FUNCTION THERAPY: CPT

## 2019-08-12 PROCEDURE — NC001 PR NO CHARGE: Performed by: SURGERY

## 2019-08-12 RX ORDER — DEXTROSE, SODIUM CHLORIDE, AND POTASSIUM CHLORIDE 5; .45; .15 G/100ML; G/100ML; G/100ML
75 INJECTION INTRAVENOUS CONTINUOUS
Status: DISCONTINUED | OUTPATIENT
Start: 2019-08-12 | End: 2019-08-15

## 2019-08-12 RX ADMIN — MELATONIN 3 MG: 3 TAB ORAL at 22:11

## 2019-08-12 RX ADMIN — HYDROMORPHONE HYDROCHLORIDE 0.2 MG: 1 INJECTION, SOLUTION INTRAMUSCULAR; INTRAVENOUS; SUBCUTANEOUS at 02:56

## 2019-08-12 RX ADMIN — HYDROMORPHONE HYDROCHLORIDE 0.2 MG: 1 INJECTION, SOLUTION INTRAMUSCULAR; INTRAVENOUS; SUBCUTANEOUS at 20:37

## 2019-08-12 RX ADMIN — Medication 20 MG: at 17:11

## 2019-08-12 RX ADMIN — MORPHINE SULFATE 1 MG: 2 INJECTION, SOLUTION INTRAMUSCULAR; INTRAVENOUS at 10:25

## 2019-08-12 RX ADMIN — GUAIFENESIN 600 MG: 600 TABLET, EXTENDED RELEASE ORAL at 22:11

## 2019-08-12 RX ADMIN — HEPARIN SODIUM 5000 UNITS: 5000 INJECTION INTRAVENOUS; SUBCUTANEOUS at 14:14

## 2019-08-12 RX ADMIN — METHOCARBAMOL 500 MG: 500 TABLET, FILM COATED ORAL at 19:58

## 2019-08-12 RX ADMIN — HYDROMORPHONE HYDROCHLORIDE 0.2 MG: 1 INJECTION, SOLUTION INTRAMUSCULAR; INTRAVENOUS; SUBCUTANEOUS at 14:27

## 2019-08-12 RX ADMIN — ONDANSETRON 4 MG: 2 INJECTION INTRAMUSCULAR; INTRAVENOUS at 02:56

## 2019-08-12 RX ADMIN — GABAPENTIN 100 MG: 100 CAPSULE ORAL at 22:11

## 2019-08-12 RX ADMIN — ALTEPLASE 2 MG: 2.2 INJECTION, POWDER, LYOPHILIZED, FOR SOLUTION INTRAVENOUS at 17:18

## 2019-08-12 RX ADMIN — HEPARIN SODIUM 5000 UNITS: 5000 INJECTION INTRAVENOUS; SUBCUTANEOUS at 22:11

## 2019-08-12 RX ADMIN — HEPARIN SODIUM 5000 UNITS: 5000 INJECTION INTRAVENOUS; SUBCUTANEOUS at 05:01

## 2019-08-12 RX ADMIN — Medication 250 MG: at 17:11

## 2019-08-12 RX ADMIN — MORPHINE SULFATE 1 MG: 2 INJECTION, SOLUTION INTRAMUSCULAR; INTRAVENOUS at 06:27

## 2019-08-12 RX ADMIN — DEXTROSE, SODIUM CHLORIDE, AND POTASSIUM CHLORIDE 75 ML/HR: 5; .45; .15 INJECTION INTRAVENOUS at 17:09

## 2019-08-12 RX ADMIN — CEFEPIME HYDROCHLORIDE 2000 MG: 2 INJECTION, POWDER, FOR SOLUTION INTRAVENOUS at 05:01

## 2019-08-12 RX ADMIN — PRAVASTATIN SODIUM 20 MG: 20 TABLET ORAL at 17:11

## 2019-08-12 RX ADMIN — LIDOCAINE 1 PATCH: 50 PATCH CUTANEOUS at 08:39

## 2019-08-12 RX ADMIN — MICAFUNGIN SODIUM 100 MG: 10 INJECTION, POWDER, LYOPHILIZED, FOR SOLUTION INTRAVENOUS at 15:27

## 2019-08-12 NOTE — PLAN OF CARE
Problem: PHYSICAL THERAPY ADULT  Goal: Performs mobility at highest level of function for planned discharge setting  See evaluation for individualized goals  Description  Treatment/Interventions: LE strengthening/ROM, Functional transfer training, Therapeutic exercise, Endurance training, Bed mobility, Spoke to nursing, OT          See flowsheet documentation for full assessment, interventions and recommendations  Note:   Prognosis: Fair  Problem List: Decreased strength, Decreased endurance, Impaired balance, Decreased mobility  Assessment: Pt presents to therapy today with decreased mobility, limited endurance and decreased B LE strength  These impairments limit the patient by requiring increased assistance for mobility, assistance for ROM, assistance for sitting EOB  Pt would benefit from continued skilled therapy while in the hospital to improve functional mobility and activity tolerance  Recommend rehab  At end of session patient was left supine with call bell within reach  Pt's son and daughter were present during therapy session  Barriers to Discharge: Decreased caregiver support     Recommendation: (rehab)     PT - OK to Discharge: Yes    See flowsheet documentation for full assessment

## 2019-08-12 NOTE — WOUND OSTOMY CARE
Progress Note - Wound   Kristel KAHN Hartranft 78 y o  female MRN: [id number]  Unit/Bed#: [facility] 824-01 Encounter: [id number]      Assessment:   Patient seen for continued skin and wound care  She is awake, alert in bed unable to turn self and max assist of one for turning  She is incontinent with external female catheter in  Place, no stool incontinence noted  Patient agreeable for assessment  Findings:  1-Sacrum, buttock remains intact with non macerated healthy skin  2-Bilateral heels intact with mild blanching erythema generalized dry skin  3-Abdomen with multiple scabbed partial thickness wound secondary to tape and blistering from volume overload  All partial thickness wounds to abdomen are scabbed, stable with no erythema or signs of infection  Abdomen with large surgical incision open to air, non draining or erythemic  Plan:   1-Continue with hydraguard to bilateral sacro-buttock regions and heels BID and PRN  2-Continue offloading pressure to skin with turns, elevation of heels and use of cushion when in bed  3-Continue moisturizing skin daily with skin nourishing cream  3-Continue with 3M no sting skin prep if any blister occurs, leave scabbed wounds open to air to continue natural healing process  Vitals: Blood pressure 149/79, pulse 93, temperature 98 3 °F (36 8 °C), resp  rate 18, height 5' 4" (1 626 m), weight 86 8 kg (191 lb 5 8 oz), SpO2 98 %  ,Body mass index is 32 85 kg/m²  Wound 07/31/19 Incision Abdomen N/A (Active)   Wound Description Clean;Dry; Intact 8/12/2019 12:00 AM   Emely-wound Assessment Clean;Dry; Intact 8/9/2019  4:00 PM   Closure Hystocryl 8/12/2019 12:00 AM   Drainage Amount None 8/9/2019  4:00 PM   Dressing Dry dressing 8/9/2019  4:00 PM   Dressing Status Clean;Dry; Intact 8/12/2019 12:00 AM       Wound 08/03/19 Incision Abdomen N/A (Active)   Wound Description Intact;Fragile 8/12/2019  5:01 AM   Emely-wound Assessment Fragile 8/12/2019  5:01 AM   Closure Sutures 8/12/2019  5:01 AM   Drainage Amount Moderate 8/11/2019  6:00 AM   Drainage Description Serosanguineous 8/12/2019 12:00 AM   Treatments Irrigation with NSS;Cleansed;Site care 8/11/2019  8:00 AM   Dressing ABD 8/12/2019  5:01 AM   Wound packed? Yes 8/12/2019  5:01 AM   Packing- # removed 3 8/11/2019  6:00 AM   Packing- # inserted 3 8/11/2019  6:00 AM   Dressing Changed Changed by provider 8/12/2019  5:01 AM   Patient Tolerance Tolerated well 8/12/2019  5:01 AM   Dressing Status Clean;Dry; Intact 8/12/2019  5:01 AM       Wound 08/05/19 Skin tear Abdomen Left;Upper (Active)   Wound Description Clean;Dry; Intact 8/12/2019  5:01 AM   Emely-wound Assessment Clean;Dry; Intact 8/12/2019  5:01 AM   Wound Length (cm) 8 cm 8/5/2019  3:25 PM   Wound Width (cm) 14 cm 8/5/2019  3:25 PM   Wound Depth (cm) 0 1 8/5/2019  3:25 PM   Calculated Wound Area (cm^2) 112 cm^2 8/5/2019  3:25 PM   Calculated Wound Volume (cm^3) 11 2 cm^3 8/5/2019  3:25 PM   Closure Open to air 8/12/2019  5:01 AM   Drainage Amount None 8/12/2019  5:01 AM   Drainage Description Serous 8/9/2019  8:00 PM   Non-staged Wound Description Partial thickness 8/11/2019  4:00 AM   Treatments Site care;Cleansed 8/10/2019  8:00 PM   Dressing Calcium Alginate with Silver 8/11/2019  4:00 AM   Wound packed? No 8/11/2019  4:00 AM   Dressing Changed New 8/10/2019  8:00 PM   Patient Tolerance Tolerated well 8/10/2019  8:00 PM   Dressing Status Clean;Dry; Intact 8/11/2019  4:00 AM       Wound 08/05/19 Skin tear Abdomen Medial;Right;Upper (Active)   Wound Description Clean;Fragile 8/12/2019  5:01 AM   Emely-wound Assessment Clean;Dry; Intact 8/12/2019  5:01 AM   Wound Length (cm) 8 cm 8/5/2019  3:25 PM   Wound Width (cm) 7 cm 8/5/2019  3:25 PM   Wound Depth (cm) 0 1 8/5/2019  3:25 PM   Calculated Wound Area (cm^2) 56 cm^2 8/5/2019  3:25 PM   Calculated Wound Volume (cm^3) 5 6 cm^3 8/5/2019  3:25 PM   Closure Open to air 8/12/2019  5:01 AM   Drainage Amount None 8/11/2019  4:00 AM Non-staged Wound Description Partial thickness 8/11/2019  4:00 AM   Treatments Cleansed;Site care 8/10/2019  8:00 PM   Dressing Calcium Alginate with Silver 8/11/2019  4:00 AM   Wound packed? No 8/11/2019  4:00 AM   Dressing Changed New 8/10/2019  8:00 PM   Patient Tolerance Tolerated well 8/10/2019  8:00 PM   Dressing Status Other (Comment); Clean;Dry; Intact 8/11/2019  4:00 AM         Wound care is signing off, please call ext [phone] or [phone] with questions or concerns        Sury Diaz RN, BSN, [facility]

## 2019-08-12 NOTE — PROGRESS NOTES
Progress Note - General Surgery   Kristel KAHN Hartranft 78 y o  female MRN: [id number]  Unit/Bed#: [facility] 824-01 Encounter: [id number]    Assessment:  68yo female s/p recent laparoscopic ventral hernia repair with iatrogenic small bowel injury on 7/31 now s/p ex-lap, mesh explantation, small bowel resection and eventual abdominal closure  Candida glabrata fungemia  Protein calorie malnutrition     Afebrile  Tachycardia to 107 overnight, otherwise VSS  NGT pulled out by patient overnight  Plan:  -NPO  -Dobhoff tube for nutrition   -Continue antifungals per ID, 2 weeks from 1st neg blood culture  -Abx completed  -PT/OT  -Continue with speech evaluations- current recommendations are NPO & ice chips with nursing   -Wound care  Change midline abdominal packing every day      Subjective/Objective     Subjective: Patient pulled out her NGT overnight  She woke up confused and does not remember pulling it out  She did have nausea and abdominal pain after pulling NGT out and was given Zofran and Dilaudid  This morning, patient denies nausea and abdominal pain  +flatulence/BM  No fevers, chills, or any other complaints at this time  Objective:     Blood pressure 149/79, pulse 93, temperature 98 3 °F (36 8 °C), resp  rate 18, height 5' 4" (1 626 m), weight 86 8 kg (191 lb 5 8 oz), SpO2 98 %  ,Body mass index is 32 85 kg/m²  Intake/Output Summary (Last 24 hours) at 8/12/2019 0911  Last data filed at 8/12/2019 0735  Gross per 24 hour   Intake 60 ml   Output 1868 ml   Net -1808 ml       Invasive Devices     Central Venous Catheter Line            CVC Central Lines 08/07/19 Triple 20cm 4 days          Drain             717 days     4 days    External Urinary Catheter 3 days                  NAD, alert and oriented x3  Normocephalic, atraumatic  MMM, EOMI, PERRLA  Norm resp effort on NC  RRR  Abd soft, Tenderness to palpation on R side of abdomen  Nondistended   Midline incision with packing at superior, middle and inferior portions of the incisions  Serous discharge noted   Dressing to LUQ c/d/i  No calf tenderness or peripheral edema  Motor/sensation intact in distal extremities  CN grossly intact  -rash/lesions      Lab, Imaging and other studies:  CBC:   Lab Results   Component Value Date    WBC 30 20 (HH) 08/12/2019    HGB 8 7 (L) 08/12/2019    HCT 27 5 (L) 08/12/2019    MCV 88 08/12/2019     (H) 08/12/2019    MCH 27 9 08/12/2019    MCHC 31 6 08/12/2019    RDW 17 1 (H) 08/12/2019    MPV 10 5 08/12/2019    NRBC 0 08/12/2019   , CMP:   Lab Results   Component Value Date    SODIUM 142 08/12/2019    K 3 5 08/12/2019     08/12/2019    CO2 35 (H) 08/12/2019    BUN 12 08/12/2019    CREATININE 0 44 (L) 08/12/2019    CALCIUM 7 9 (L) 08/12/2019    EGFR 103 08/12/2019     VTE Pharmacologic Prophylaxis: Heparin  VTE Mechanical Prophylaxis: sequential compression device

## 2019-08-12 NOTE — NURSING NOTE
Patient pulled out NGT, she stated she woke up feeling confused, in pain, and pulled ngt out  She is complaining of 7/10 pain in upper abdomen and nausea  Dilaudid and zofran given  Red surgery notified, per resident Krystyna, wait until morning rounds to reassess for re-insertion but continue to monitor nausea and notify if it worsens  Patient is sitting up, awake, and alert

## 2019-08-12 NOTE — PHYSICAL THERAPY NOTE
Physical Therapy Treatment Note     Patient Name: Eri Garcia    Today's Date: 8/12/2019     Problem List  Patient Active Problem List   Diagnosis    Osteoarthritis of knee    Joint pain, knee    Eagle's esophagus with high grade dysplasia    3-vessel CAD    Abnormal blood chemistry    A-fib ([address] )    Anemia    Atypical chest pain    Backache with radiation    Continuous left lower quadrant pain    Depression    Dysphagia    Gastroesophageal reflux disease    Hyperlipidemia    Hypothyroidism    Insomnia    Low vitamin D level    Lumbar radiculopathy    Myofascial pain    Right leg paresthesias    Sciatica    Spondylosis of 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    Stricture of esophagus    Venous insufficiency    Incisional hernia    Adrenal adenoma    Incisional hernia, without obstruction or gangrene    Bilateral pneumonia    Postprocedural pneumothorax    OR Exploratory laparotomy, SBR secondary to iatrogenic enterotomy     OR Laparoscopic ventral hernia repair    Fungemia        Past Medical History  Past Medical History:   Diagnosis Date    Abnormal blood chemistry     last assessed 03/06/2014    Achalasia, esophageal     Acute medial meniscus tear     last assessed 03/10/2014    Acute otitis externa     last assessed 03/24/2014    Adrenal nodule ([address]     Anemia     Anxiety     Arthritis     Atrial fibrillation ([address] )     resolved 01/09/2018    Atypical chest pain     last assessed 11/22/2016    Eagle's esophagus with high grade dysplasia     last assessed 01/03/2018    Cancer ([address] )     esophageal    Cerumen impaction     last assessed 03/24/2014    Chronic pain of right knee     last assessed 04/06/2017    Coronary artery disease     3 vessel, resolved 01/09/2018    Depression     Dysphagia     last assessed 06/21/2017    Esophageal stricture     last assessed 06/21/2017    GERD (gastroesophageal reflux disease)     Headache     last assessed 2013    Hiatal hernia     Insomnia     last assessed 10/15/2013    [address]jun[address] tube present [address] [address]     resolved 2018    Low vitamin D level     resolved 2018    Osteoarthritis, knee     last assessed 2017    Pneumonia     last assessed 2013    Right leg paresthesias     last assessed 2017    Sciatica     last assessed 2015    Shingles     last assessed 2015    Spondylosis of 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     last assessed 2017    Venous insufficiency     last assessed 2013        Past Surgical History  Past Surgical History:   Procedure Laterality Date     SECTION      EXPLORATORY LAPAROTOMY W/ BOWEL RESECTION N/A 2019    Procedure: LAPAROTOMY EXPLORATORY W/ BOWEL RESECTION, APPLICATION OF Faythe Amble;  Surgeon: Anna Hall DO;  [facility];  Service: General    GASTRECTOMY      partial    GASTROJEJUNOSTOMY W/ JEJUNOSTOMY TUBE N/A 2017    Procedure: JEJUNOSTOMY TUBE PLACEMENT;  Surgeon: Salome Cain MD;  Location:  MAIN OR;  Service: Thoracic    JOINT REPLACEMENT Right 11/10/2014    knee, Dr Alysha Pena N/A 8/3/2019    Procedure: LAPAROTOMY EXPLORATORY, reanastomosis of proximal small bowel, placement of vac dressing;  Surgeon: Anna Hall DO;  Location:  MAIN OR;  Service: General    [place] ESOPHAGOGASTRODUODENOSCOPY TRANSORAL DIAGNOSTIC N/A 3/10/2017    Procedure: ESOPHAGOGASTRODUODENOSCOPY (EGD); Surgeon: Julieth Ramires MD;  [facility];  Service: Gastroenterology    [place] ESOPHAGOGASTRODUODENOSCOPY TRANSORAL DIAGNOSTIC N/A 2017    Procedure: ESOPHAGOGASTRODUODENOSCOPY (EGD); Surgeon: Julieth Ramires MD;  [facility];  Service: Gastroenterology    [place] ESOPHAGOSCOPY FLEXIBLE TRANSORAL WITH BIOPSY N/A 2017    Procedure: ESOPHAGOGASTRODUODENOSCOPY (EGD);   Surgeon: Salome Cain MD; [facility];  Service: Thoracic    [place] LAP, VENTRAL HERNIA REPAIR,REDUCIBLE N/A 7/31/2019    Procedure: REPAIR HERNIA VENTRAL LAPAROSCOPIC;  Surgeon: Russell Clayton DO;  [facility];  Service: General    VA REMOVAL [address] THORACOTOMY N/A 8/24/2017    Procedure: TRANSHIATAL ESOPHAGECTOMY;  Surgeon: Fortino Martin MD;  [facility];  Service: Thoracic    STEROID INJECTION KNEE Right 5/2/2017    Procedure: GENICULATE NERVE BLOCKS;  Surgeon: Sukhi Murphy DO;  [facility];  Service:            08/12/19 1526   Pain Assessment   Pain Assessment Marquez-Baker FACES   Marquez-Baker FACES Pain Rating 0   Restrictions/Precautions   Weight Bearing Precautions Per Order No   Other Precautions Cognitive;O2;Fall Risk   General   Chart Reviewed Yes   Family/Caregiver Present Yes   Cognition   Overall Cognitive Status Impaired   Arousal/Participation Alert   Attention Within functional limits   Memory Within functional limits   Following Commands Follows all commands and directions without difficulty   Subjective   Subjective "I feel stiff"   Bed Mobility   Rolling L 2  Maximal assistance   Additional items Assist x 1   Supine to Sit 2  Maximal assistance   Additional items Assist x 2   Sit to Supine 2  Maximal assistance   Additional items Assist x 2   Balance   Static Sitting Poor -   Dynamic Sitting Poor -   Activity Tolerance   Activity Tolerance Patient limited by fatigue   Medical Staff Made Aware nurse made aware of pts participation in therapy   Nurse Made Aware nurse approved therapy session   Exercises   Glute Sets Sitting;Bilateral  (30 reps x 1)   Hip Flexion Bilateral;Sitting  (12 reps x 2 sets AAROM, SLR 12 rep x 2 sets AAROM)   Knee PROM Flexion Supine;Bilateral  (12 reps x 3 sets AAROM)   Ankle Pumps Supine;Bilateral  (30 reps x 2 sets )   Balance training  sitting EOB for 10 minutes with max A    Assessment   Prognosis Fair   Problem List Decreased strength;Decreased endurance; Impaired balance;Decreased mobility   Assessment Pt presents to therapy today with decreased mobility, limited endurance and decreased B LE strength  These impairments limit the patient by requiring increased assistance for mobility, assistance for ROM, assistance for sitting EOB  Pt would benefit from continued skilled therapy while in the hospital to improve functional mobility and activity tolerance  Recommend rehab  At end of session patient was left supine with call bell within reach  Pt's son and daughter were present during therapy session  Goals   STG Expiration Date 08/20/19   Short Term Goal #1 continue to progress towards goals    Treatment Day 2   Plan   Treatment/Interventions Functional transfer training;LE strengthening/ROM; Therapeutic exercise; Endurance training;Bed mobility;Gait training   Progress Progressing toward goals   PT Frequency   (3-5x wk)   Recommendation   Recommendation   (rehab)   PT - OK to Discharge Yes   Additional Comments when medically clear to rehab   Jose Ahr, Pt, DPT

## 2019-08-12 NOTE — PROGRESS NOTES
Changed dressing under left breast - multiple skin tears noted under breast and upper abdomen  All cleansed with soap and water, rinsed  3M barrier applied around open areas and maxsorb robe applied then covered with 4x4 and ABD  Lateral left breast with large slit like opening - draining serosanguinous drainage  Under right breast is a large circular area where it appears was a blister at one point    Dry dressing placed under this breast

## 2019-08-12 NOTE — SPEECH THERAPY NOTE
Speech Language/Pathology    Speech/Language Pathology Progress Note    Patient Name: Zbigniew Singh  Today's Date: 8/12/2019     Subjective:  Pt is awake, alert, pulled out keofeed  Objective:  Pt remains npo since pulling keofeed last night  Nursing has been giving her ice per red surgery request   Pt seen for ongoing dx dysphagia tx for possible po  Pt was trialed w/ puree, soft solid, semi hard solid, nt by cup/straw, thin by cup straw, 2 ice chips  Pt w/ c/o dry oral mucosa  Vocal quality is low, mild hoarse but intelligible & improved by end of session  Pt w/ fair bolus retrieval, slow manipulation but adequate transfer of puree  Prolonged manipulation of all solids w/ mild lingual residue  Pt declined more than 2 trials of each solid, stating they were too hard  Adequate seal on cup rim & draw from straw, fair swallows that varied in promptness  +delayed coughing following thin by cup/straw- cough is weak, pt states she cannot bring any material up & out  No overt difficulty w/ any trials of the nt  No difficulty w/ the ice  Assessment:  Pt tolerating puree, nt wo overt s/s aspiration  She is able to also tolerate ice chips at this time      Plan/Recommendations:  Begin puree/nt  Ok for ice chips one at a time during the day  Mult swallows throughout meals  Speech to upgrade to solids & thin

## 2019-08-12 NOTE — CONSULTS
Consultation - Ophthalmology   Toby Chung 78 y o  female MRN: [id number]  Unit/Bed#: [facility] 824-01 Encounter: [id number]       Assessment / Plan:  Patient Active Problem List   Diagnosis    Osteoarthritis of knee    Joint pain, knee    Eagle's esophagus with high grade dysplasia    3-vessel CAD    Abnormal blood chemistry    [facility] ([facility] [address]    Anemia    Atypical chest pain    Backache with radiation    Continuous left lower quadrant pain    Depression    Dysphagia    Gastroesophageal reflux disease    Hyperlipidemia    Hypothyroidism    Insomnia    Low vitamin D level    Lumbar radiculopathy    Myofascial pain    Right leg paresthesias    Sciatica    Spondylosis of 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    Stricture of esophagus    Venous insufficiency    Incisional hernia    Adrenal adenoma    Incisional hernia, without obstruction or gangrene    Bilateral pneumonia    Postprocedural pneumothorax    OR Exploratory laparotomy, SBR secondary to iatrogenic enterotomy     OR Laparoscopic ventral hernia repair    Fungemia     1   Fungemia - without fungal lesions in retina and vitreous both eyes      History of Present Illness   Physician Requesting Consult: Caitlyn Leon MD  Reason for Consult / Principal Problem: fungemia  HPI: Elliott Hale is a 78y o  year old female who presents with fungemia    Consults    Historical Information   Past Medical History:   Diagnosis Date    Abnormal blood chemistry     last assessed 03/06/2014    Achalasia, esophageal     Acute medial meniscus tear     last assessed 03/10/2014    Acute otitis externa     last assessed 03/24/2014    Adrenal nodule ([address]     Anemia     Anxiety     Arthritis     Atrial fibrillation ([address] )     resolved 01/09/2018    Atypical chest pain     last assessed 11/22/2016    Eagle's esophagus with high grade dysplasia     last assessed 01/03/2018    Cancer ([facility]     esophageal    Cerumen impaction     last assessed 2014    Chronic pain of right knee     last assessed 2017    Coronary artery disease     3 vessel, resolved 2018    Depression     Dysphagia     last assessed 2017    Esophageal stricture     last assessed 2017    GERD (gastroesophageal reflux disease)     Headache     last assessed 2013    Hiatal hernia     Insomnia     last assessed 10/15/2013    Jejunostomy tube present ([address] )     resolved 2018    Low vitamin D level     resolved 2018    Osteoarthritis, knee     last assessed 2017    Pneumonia     last assessed 2013    Right leg paresthesias     last assessed 2017    Sciatica     last assessed 2015    Shingles     last assessed 2015    Spondylosis of 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     last assessed 2017    Venous insufficiency     last assessed 2013     Past Ocular History:   Past Surgical History:   Procedure Laterality Date     SECTION      EXPLORATORY LAPAROTOMY W/ BOWEL RESECTION N/A 2019    Procedure: LAPAROTOMY EXPLORATORY W/ BOWEL RESECTION, APPLICATION OF ABTHERA VAC;  Surgeon: Stephan Arellano DO;  [facility];  Service: General    GASTRECTOMY      partial    GASTROJEJUNOSTOMY W/ JEJUNOSTOMY TUBE N/A 2017    Procedure: JEJUNOSTOMY TUBE PLACEMENT;  Surgeon: Ramon Marshall MD;  [facility];  Service: Thoracic    JOINT REPLACEMENT Right 11/10/2014    knee, Dr Mikaela Joseph N/A 8/3/2019    Procedure: LAPAROTOMY EXPLORATORY, reanastomosis of proximal small bowel, placement of vac dressing;  Surgeon: Stephan Arellano DO;  [facility];  Service: General    [place] ESOPHAGOGASTRODUODENOSCOPY TRANSORAL DIAGNOSTIC N/A 3/10/2017    Procedure: ESOPHAGOGASTRODUODENOSCOPY (EGD);   Surgeon: Carolynn Mccormick MD;  [facility];  Service: Gastroenterology    [place] ESOPHAGOGASTRODUODENOSCOPY TRANSORAL DIAGNOSTIC N/A 5/19/2017    Procedure: ESOPHAGOGASTRODUODENOSCOPY (EGD); Surgeon: Paramjit Ruelas MD;  [facility];  Service: Gastroenterology    [place] ESOPHAGOSCOPY FLEXIBLE TRANSORAL WITH BIOPSY N/A 8/24/2017    Procedure: ESOPHAGOGASTRODUODENOSCOPY (EGD); Surgeon: Maranda Salgado MD;  Location:  MAIN OR;  Service: Thoracic    [place] LAP, VENTRAL HERNIA REPAIR,REDUCIBLE N/A 7/31/2019    Procedure: REPAIR HERNIA VENTRAL LAPAROSCOPIC;  Surgeon: Jose Guadalupe Galo DO;  [facility];  Service: General    [place] REMOVAL [address]OR[address] N/A 8/24/2017    Procedure: TRANSHIATAL ESOPHAGECTOMY;  Surgeon: Maranda Salgado MD;  Location:  MAIN OR;  Service: Thoracic    STEROID INJECTION KNEE Right 5/2/2017    Procedure: GENICULATE NERVE BLOCKS;  Surgeon: Ez Bentley DO;  [facility];  Service:      Social History   Social History     Substance and Sexual Activity   Alcohol Use Not Currently    Alcohol/week: 0 0 standard drinks    Frequency: Never    Binge frequency: Never     Social History     Substance and Sexual Activity   Drug Use No     Social History     Tobacco Use   Smoking Status Never Smoker   Smokeless Tobacco Never Used     Family History:   Family History   Problem Relation Age of Onset    Alzheimer's disease Mother     Diabetes type II Father        Meds/Allergies   all current active meds have been reviewed    No Known Allergies    Objective   Vitals: Blood pressure 147/79, pulse (!) 107, temperature 98 5 °F (36 9 °C), resp  rate 20, height 5' 4" (1 626 m), weight 86 8 kg (191 lb 5 8 oz), SpO2 (P) 99 %  Eyes:    PERRL, conjunctiva/corneas clear, EOM's intact  Penlight exam:  wnl both eyes  Posterior exam:  Optic nerve large cup both eyes, peripapillary atrophy left eye, posterior pole and periphery clear of any lesions both eyes2    Lab Results: I have personally reviewed pertinent lab results  Imaging Studies: I have personally reviewed pertinent reports      Pathology, and Other Studies: I have personally reviewed pertinent reports        Code Status: Level 1 - Full Code  Advance Directive and Living Will: Yes    Power of :    POLST:      Fortino Mccoy MD

## 2019-08-12 NOTE — SOCIAL WORK
Cm met with patient therapy notes recommending STR  CM gave her a list of STR near where she lives  CM requested she discuss with family and provide CM with three choices  CM will follow up in am for choices

## 2019-08-12 NOTE — PROGRESS NOTES
Attempted to place peripheral iv site so CVC could be discontinued but I was not successful  Pt's arms very edematous    Reported to MD   Also reported that morphine given earlier was no effective in easing pain

## 2019-08-12 NOTE — PLAN OF CARE
Problem: Prexisting or High Potential for Compromised Skin Integrity  Goal: Skin integrity is maintained or improved  Description  INTERVENTIONS:  - Identify patients at risk for skin breakdown  - Assess and monitor skin integrity  - Assess and monitor nutrition and hydration status  - Monitor labs (i e  albumin)  - Turn and reposition patient  - Assist with mobility/ambulation  - Relieve pressure over bony prominences  - Avoid friction and shearing  - Provide appropriate hygiene as needed including keeping skin clean and dry  - Evaluate need for skin moisturizer/barrier cream  - Collaborate with interdisciplinary team (i e  Nutrition, Rehabilitation, etc )   - Patient/family teaching   Outcome: Progressing     Problem: Potential for Falls  Goal: Patient will remain free of falls  Description  INTERVENTIONS:  - Assess patient frequently for physical needs  -  Identify cognitive and physical deficits and behaviors that affect risk of falls    -  [place] fall precautions as indicated by assessment   - Educate patient/family on patient safety including physical limitations  - Instruct patient to call for assistance with activity based on assessment  - Modify environment to reduce risk of injury  - Consider OT/PT consult to assist with strengthening/mobility  Outcome: Progressing     Problem: CARDIOVASCULAR - ADULT  Goal: Maintains optimal cardiac output and hemodynamic stability  Description  INTERVENTIONS:  - Monitor I/O, vital signs and rhythm  - Monitor for S/S and trends of decreased cardiac output i e  bleeding, hypotension  - Administer and titrate ordered vasoactive medications to optimize hemodynamic stability  - Assess quality of pulses, skin color and temperature  - Instruct patient to report change in severity of symptoms   Outcome: Progressing     Problem: RESPIRATORY - ADULT  Goal: Achieves optimal ventilation and oxygenation  Description  INTERVENTIONS:  - Assess for changes in respiratory status  - Assess for changes in mentation and behavior  - Position to facilitate oxygenation and minimize respiratory effort  - Oxygen administration by appropriate delivery method based on oxygen saturation (per order) or ABGs  - Encourage broncho-pulmonary hygiene including cough, deep breathe, Incentive Spirometry  - Assess the need for suctioning and aspirate as neede  - Respiratory Therapy support as indicated   Outcome: Progressing     Problem: GASTROINTESTINAL - ADULT  Goal: Maintains or returns to baseline bowel function  Description  INTERVENTIONS:  - Assess bowel function  - Encourage oral fluids to ensure adequate hydration  - Administer IV fluids as ordered to ensure adequate hydration  - Administer ordered medications as needed  - Encourage mobilization and activity  - Nutrition services referral to assist patient with appropriate food choices  Outcome: Progressing  Goal: Maintains adequate nutritional intake  Description  INTERVENTIONS:  Npo   - Identify factors contributing to decreased intake, treat as appropriate  - Monitor I&O, WT and lab values  - Obtain nutrition services referral as needed   Outcome: Progressing     Problem: METABOLIC, FLUID AND ELECTROLYTES - ADULT  Goal: Electrolytes maintained within normal limits  Description  INTERVENTIONS:  - Monitor labs and assess patient for signs and symptoms of electrolyte imbalances  - Administer electrolyte replacement as ordered  - Monitor response to electrolyte replacements, including repeat lab results as appropriate  - Instruct patient on fluid and nutrition as appropriate  Outcome: Progressing  Goal: Fluid balance maintained  Description  INTERVENTIONS:  - Monitor labs and assess for signs and symptoms of volume excess or deficit  - Monitor I/O and WT  - Instruct patient on fluid and nutrition as appropriate  Outcome: Progressing     Problem: SKIN/TISSUE INTEGRITY - ADULT  Goal: Skin integrity remains intact  Description  INTERVENTIONS  - Identify patients at risk for skin breakdown  - Assess and monitor skin integrity  - Assess and monitor nutrition and hydration statu  - Turn and reposition patient  - Assist with mobility/ambulation  - Relieve pressure over bony prominences  - Avoid friction and shearing  - Provide appropriate hygiene as needed including keeping skin clean and dry  - Evaluate need for skin moisturizer/barrier cream  - Collaborate with interdisciplinary team (i e  Nutrition, Rehabilitation, etc )   - Patient/family teaching   Outcome: Progressing  Goal: Oral mucous membranes remain intact  Description  INTERVENTIONS  - Assess oral mucosa and hygiene practices  - Implement preventative oral hygiene regimen  - Implement oral medicated treatments as ordered  - Initiate Nutrition services referral as needed  Outcome: Progressing     Problem: MUSCULOSKELETAL - ADULT  Goal: Maintain or return mobility to safest level of function  Description  INTERVENTIONS:  - Assess patient's ability to carry out ADLs; assess patient's baseline for ADL function and identify physical deficits which impact ability to perform ADLs (bathing, care of mouth/teeth, toileting, grooming, dressing, etc )  - Assess/evaluate cause of self-care deficits   - Assess range of motion  - Assess patient's mobility; develop plan if impaired  - Assess patient's need for assistive devices and provide as appropriate  - Encourage maximum independence but intervene and supervise when necessary  - Involve family in performance of ADLs  - Assess for home care needs following discharge   - Request OT consult to assist with ADL evaluation and planning for discharge  - Provide patient education as appropriate  Outcome: Progressing     Problem: Nutrition/Hydration-ADULT  Goal: Nutrient/Hydration intake appropriate for improving, restoring or maintaining nutritional needs  Description  Monitor and assess patient's nutrition/hydration status for malnutrition (ex- brittle hair, bruises, dry skin, pale skin and conjunctiva, muscle wasting, smooth red tongue, and disorientation)  Collaborate with interdisciplinary team and initiate plan and interventions as ordered  Monitor patient's weight and dietary intake as ordered or per policy  Utilize nutrition screening tool and intervene per policy  Determine patient's food preferences and provide high-protein, high-caloric foods as appropriate       INTERVENTIONS:  - Monitor oral intake, urinary output, labs, and treatment plans  - Assess nutrition and hydration status and recommend course of action  - Evaluate amount of meals eaten  - Assist patient with eating if necessary   - Allow adequate time for meals  - Recommend/ encourage appropriate diets, oral nutritional supplements, and vitamin/mineral supplements  - Order, calculate, and assess calorie counts as needed  - Recommend, monitor, and adjust tube feedings and TPN/PPN based on assessed needs  - Assess need for intravenous fluids  - Provide specific nutrition/hydration education as appropriate  - Include patient/family/caregiver in decisions related to nutrition  Outcome: Progressing     Problem: COPING  Goal: Pt/Family able to verbalize concerns and demonstrate effective coping strategies  Description  INTERVENTIONS:  - Assist patient/family to identify coping skills, available support systems and cultural and spiritual values  - Provide emotional support, including active listening and acknowledgement of concerns of patient and caregivers  - Reduce environmental stimuli, as able  - Provide patient education  - Assess for spiritual pain/suffering and initiate spiritual care, including notification of Pastoral Care or [place] based community as needed  - Assess effectiveness of coping strategies  Outcome: Progressing  Goal: Will report anxiety at manageable levels  Description  INTERVENTIONS:  - Administer medication as ordered  - Teach and encourage coping skills  - Provide emotional support  - Assess patient/family for anxiety and ability to cope  Outcome: Progressing     Problem: GENITOURINARY - ADULT  Goal: Urinary catheter remains patent  Description  INTERVENTIONS:  - Assess patency of urinary catheter  - If patient has a chronic pak, consider changing catheter if non-functioning     Outcome: Completed

## 2019-08-12 NOTE — PROGRESS NOTES
Progress Note - Infectious Disease   Kristel Chung 78 y o  female MRN: [id number]  Unit/Bed#: [facility] 824-01 Encounter: [id number]      Impression/Plan:  1  Systemic inflammatory response syndrome- Possibly related the patient's fungemia   Possibly secondary to an ongoing abdominal process  Possibly secondary to ongoing peritonitis   The patient is no longer requiring vasopressor support   At this point while waiting additional data favor broad antibiotics and antifungals   Procalcitonin level has decreased and now plateaued   The leukocytosis has persisted  Stool for C diff is negative  -trial discontinue cefepime and Flagyl as below and monitor off antibiotics  -continue antifungals as below  -follow up repeat blood cultures  -recheck CT of the chest abdomen pelvis if leukocytosis persists without a new source  -low threshold to tap ascites and sent for cultures  -recheck procalcitonin level   -check CBC with diff and CMP tomorrow  -supportive care     2  Fungemia-possibly all translocation across the gut wall in the setting of peritonitis   Possible catheter related bacteremia although this is less likely as the blood cultures were from around the time of admission   The central line has been changed  Repeat blood cultures are negative thus far  The organism is not resistant to fluconazole   -discontinue micafungin  -fluconazole 800 mg p o  Q 24 hours through 8/22/2019 to complete 14 days from the 1st negative blood cultures  -follow up repeat blood cultures  -okay to place PICC if needed with follow-up blood cultures negative  -need to monitor liver function tests at least weekly on high-dose fluconazole     3  Peritonitis-status post exploration and repair   No cultures obtained at the time of the surgery   Patient does have significant turbid drainage from the superior aspect of the wound   Patient has been maintained on broad antibiotics    The patient continues to have a brisk leukocytosis   Significant ascites noted   -trial discontinue cefepime and Flagyl and monitor off all antibiotics  -if no source of ongoing leukocytosis found, recommend repeat CT of the chest abdomen and pelvis as above  -low threshold to tap ascites and sent for culture  -serial abdominal exams  -close surgical follow-up     4  Acute hypoxic respiratory failure-possibly secondary to hemoptysis   Possibly complicated by an aspiration event   Perhaps pneumothorax is playing a role  Less likely pneumonia as the chest x-ray seems to have improved   She is now extubated and on O2 by nasal cannula   Repeat chest x-ray with slightly increased volume   Bronchoscopy cultures without any resistant organisms  Her respiratory status is stable  -oxygen support  -monitor respiratory status  -close critical care follow-up  -may need repeat CT as above     5  Pneumothorax-status post new chest tube placement   Decreased pneumothorax on repeat chest x-ray   The chest tube is now been removed   Follow-up chest x-ray without progressive pneumothorax  Respiratory status stable  -monitor respiratory status  -radiographic follow-up    Discussed the above antibiotic plan with the primary service    Antibiotics:  Cefepime 6  Flagyl 6  Micafungin 7  Antibiotics 11    Subjective:  Patient has no fever, chills, sweats; no nausea, vomiting, diarrhea; no cough, shortness of breath; no pain  No new symptoms  Objective:  Vitals:  Temp:  [97 9 °F (36 6 °C)-98 9 °F (37 2 °C)] 98 3 °F (36 8 °C)  HR:  [] 93  Resp:  [16-20] 18  BP: (144-149)/(69-84) 149/79  SpO2:  [90 %-99 %] 98 %  Temp (24hrs), Av 4 °F (36 9 °C), Min:97 9 °F (36 6 °C), Max:98 9 °F (37 2 °C)  Current: Temperature: 98 3 °F (36 8 °C)    Physical Exam:   General Appearance:  Alert, interactive, nontoxic, no acute distress  Throat: Oropharynx dry without lesions      Lungs:   Decreased breath sounds bilaterally; no wheezes, rhonchi or rales; respirations unlabored Heart:  RRR; no murmur, rub or gallop   Abdomen:   Soft, non-tender, non-distended, positive bowel sounds  Incision without purulence or surrounding erythema    Extremities: No clubbing, cyanosis or edema   Skin: No new rashes or lesions  No draining wounds noted  Labs, Imaging, & Other studies:   All pertinent labs and imaging studies were personally reviewed  Results from last 7 days   Lab Units 08/12/19  0505 08/11/19  0453 08/10/19  0439   WBC Thousand/uL 30 20* 33 46* 34 54*   HEMOGLOBIN g/dL 8 7* 8 9* 8 6*   PLATELETS Thousands/uL 559* 504* 372     Results from last 7 days   Lab Units 08/12/19  0505 08/11/19  0453 08/10/19  0439  08/08/19  0526 08/07/19  1826   SODIUM mmol/L 142 136 135*   < > 135*  --    POTASSIUM mmol/L 3 5 3 5 3 5   < > 3 8  --    CHLORIDE mmol/L 103 100 100   < > 103  --    CO2 mmol/L 35* 33* 30   < > 27  --    BUN mg/dL 12 9 8   < > 8  --    CREATININE mg/dL 0 44* 0 32* 0 25*   < > 0 35*  --    EGFR ml/min/1 73sq m 103 114 124   < > 111  --    CALCIUM mg/dL 7 9* 7 6* 7 5*   < > 7 2*  --    AST U/L  --   --   --   --  22 25   ALT U/L  --   --   --   --  11* 15   ALK PHOS U/L  --   --   --   --  116 106    < > = values in this interval not displayed  Results from last 7 days   Lab Units 08/11/19  1034 08/08/19  1245 08/07/19  1825 08/07/19  1814 08/06/19  0051   BLOOD CULTURE   --   --  No Growth After 4 Days  No Growth After 4 Days    --    GRAM STAIN RESULT   --  3+ Polys  No bacteria seen  --   --   --    MRSA CULTURE ONLY   --   --   --   --  No Methicillin Resistant Staphlyococcus aureus (MRSA) isolated   C DIFF TOXIN B  NEGATIVE for C difficle toxin by PCR    --   --   --   --      Results from last 7 days   Lab Units 08/11/19  0453 08/10/19  0439 08/09/19  0451 08/08/19  0526 08/07/19  1826   PROCALCITONIN ng/ml 0 81* 0 69* 0 88* 1 04* 1 02*

## 2019-08-13 LAB
ALBUMIN SERPL BCP-MCNC: 1.6 G/DL (ref 3.5–5)
ALP SERPL-CCNC: 163 U/L (ref 46–116)
ALT SERPL W P-5'-P-CCNC: 16 U/L (ref 12–78)
ANION GAP SERPL CALCULATED.3IONS-SCNC: 4 MMOL/L (ref 4–13)
ANISOCYTOSIS BLD QL SMEAR: PRESENT
AST SERPL W P-5'-P-CCNC: 29 U/L (ref 5–45)
BASOPHILS # BLD MANUAL: 0.26 THOUSAND/UL (ref 0–0.1)
BASOPHILS NFR MAR MANUAL: 1 % (ref 0–1)
BILIRUB SERPL-MCNC: 0.43 MG/DL (ref 0.2–1)
BUN SERPL-MCNC: 9 MG/DL (ref 5–25)
CALCIUM SERPL-MCNC: 8.1 MG/DL (ref 8.3–10.1)
CHLORIDE SERPL-SCNC: 107 MMOL/L (ref 100–108)
CO2 SERPL-SCNC: 37 MMOL/L (ref 21–32)
CREAT SERPL-MCNC: 0.43 MG/DL (ref 0.6–1.3)
EOSINOPHIL # BLD MANUAL: 0 THOUSAND/UL (ref 0–0.4)
EOSINOPHIL NFR BLD MANUAL: 0 % (ref 0–6)
ERYTHROCYTE [DISTWIDTH] IN BLOOD BY AUTOMATED COUNT: 17.8 % (ref 11.6–15.1)
GFR SERPL CREATININE-BSD FRML MDRD: 103 ML/MIN/1.73SQ M
GLUCOSE SERPL-MCNC: 119 MG/DL (ref 65–140)
GLUCOSE SERPL-MCNC: 130 MG/DL (ref 65–140)
GLUCOSE SERPL-MCNC: 132 MG/DL (ref 65–140)
GLUCOSE SERPL-MCNC: 149 MG/DL (ref 65–140)
GLUCOSE SERPL-MCNC: 158 MG/DL (ref 65–140)
HCT VFR BLD AUTO: 27.5 % (ref 34.8–46.1)
HGB BLD-MCNC: 8.6 G/DL (ref 11.5–15.4)
LYMPHOCYTES # BLD AUTO: 10 % (ref 14–44)
LYMPHOCYTES # BLD AUTO: 2.59 THOUSAND/UL (ref 0.6–4.47)
MAGNESIUM SERPL-MCNC: 2.3 MG/DL (ref 1.6–2.6)
MCH RBC QN AUTO: 28.6 PG (ref 26.8–34.3)
MCHC RBC AUTO-ENTMCNC: 31.3 G/DL (ref 31.4–37.4)
MCV RBC AUTO: 91 FL (ref 82–98)
MONOCYTES # BLD AUTO: 1.3 THOUSAND/UL (ref 0–1.22)
MONOCYTES NFR BLD: 5 % (ref 4–12)
MYELOCYTES NFR BLD MANUAL: 1 % (ref 0–1)
NEUTROPHILS # BLD MANUAL: 21.53 THOUSAND/UL (ref 1.85–7.62)
NEUTS SEG NFR BLD AUTO: 83 % (ref 43–75)
NRBC BLD AUTO-RTO: 0 /100 WBCS
PHOSPHATE SERPL-MCNC: 2.7 MG/DL (ref 2.3–4.1)
PLATELET # BLD AUTO: 577 THOUSANDS/UL (ref 149–390)
PLATELET BLD QL SMEAR: ABNORMAL
PMV BLD AUTO: 10.9 FL (ref 8.9–12.7)
POLYCHROMASIA BLD QL SMEAR: PRESENT
POTASSIUM SERPL-SCNC: 3.6 MMOL/L (ref 3.5–5.3)
PROT SERPL-MCNC: 5.6 G/DL (ref 6.4–8.2)
RBC # BLD AUTO: 3.01 MILLION/UL (ref 3.81–5.12)
RBC MORPH BLD: PRESENT
SODIUM SERPL-SCNC: 148 MMOL/L (ref 136–145)
WBC # BLD AUTO: 25.94 THOUSAND/UL (ref 4.31–10.16)

## 2019-08-13 PROCEDURE — 99024 POSTOP FOLLOW-UP VISIT: CPT | Performed by: SURGERY

## 2019-08-13 PROCEDURE — 92526 ORAL FUNCTION THERAPY: CPT

## 2019-08-13 PROCEDURE — 99232 SBSQ HOSP IP/OBS MODERATE 35: CPT | Performed by: INTERNAL MEDICINE

## 2019-08-13 PROCEDURE — 97535 SELF CARE MNGMENT TRAINING: CPT

## 2019-08-13 PROCEDURE — 80053 COMPREHEN METABOLIC PANEL: CPT | Performed by: STUDENT IN AN ORGANIZED HEALTH CARE EDUCATION/TRAINING PROGRAM

## 2019-08-13 PROCEDURE — 85027 COMPLETE CBC AUTOMATED: CPT | Performed by: STUDENT IN AN ORGANIZED HEALTH CARE EDUCATION/TRAINING PROGRAM

## 2019-08-13 PROCEDURE — 83735 ASSAY OF MAGNESIUM: CPT | Performed by: STUDENT IN AN ORGANIZED HEALTH CARE EDUCATION/TRAINING PROGRAM

## 2019-08-13 PROCEDURE — 85007 BL SMEAR W/DIFF WBC COUNT: CPT | Performed by: STUDENT IN AN ORGANIZED HEALTH CARE EDUCATION/TRAINING PROGRAM

## 2019-08-13 PROCEDURE — 82948 REAGENT STRIP/BLOOD GLUCOSE: CPT

## 2019-08-13 PROCEDURE — 84100 ASSAY OF PHOSPHORUS: CPT | Performed by: STUDENT IN AN ORGANIZED HEALTH CARE EDUCATION/TRAINING PROGRAM

## 2019-08-13 PROCEDURE — 97530 THERAPEUTIC ACTIVITIES: CPT

## 2019-08-13 PROCEDURE — 94760 N-INVAS EAR/PLS OXIMETRY 1: CPT

## 2019-08-13 PROCEDURE — 94668 MNPJ CHEST WALL SBSQ: CPT

## 2019-08-13 RX ORDER — GUAIFENESIN 100 MG/5ML
200 SOLUTION ORAL EVERY 4 HOURS PRN
Status: DISCONTINUED | OUTPATIENT
Start: 2019-08-13 | End: 2019-08-27 | Stop reason: HOSPADM

## 2019-08-13 RX ORDER — FLUCONAZOLE 200 MG/1
800 TABLET ORAL DAILY
Status: DISCONTINUED | OUTPATIENT
Start: 2019-08-13 | End: 2019-08-13

## 2019-08-13 RX ORDER — FLUCONAZOLE 200 MG/1
800 TABLET ORAL DAILY
Status: DISCONTINUED | OUTPATIENT
Start: 2019-08-13 | End: 2019-08-16

## 2019-08-13 RX ADMIN — HYDROMORPHONE HYDROCHLORIDE 0.2 MG: 1 INJECTION, SOLUTION INTRAMUSCULAR; INTRAVENOUS; SUBCUTANEOUS at 03:18

## 2019-08-13 RX ADMIN — FLUCONAZOLE 800 MG: 200 TABLET ORAL at 08:47

## 2019-08-13 RX ADMIN — GABAPENTIN 100 MG: 100 CAPSULE ORAL at 22:17

## 2019-08-13 RX ADMIN — OXYCODONE HYDROCHLORIDE 5 MG: 5 TABLET ORAL at 22:17

## 2019-08-13 RX ADMIN — FOLIC ACID 1 MG: 1 TABLET ORAL at 08:45

## 2019-08-13 RX ADMIN — OXYCODONE HYDROCHLORIDE 5 MG: 5 TABLET ORAL at 08:56

## 2019-08-13 RX ADMIN — Medication 250 MG: at 08:45

## 2019-08-13 RX ADMIN — OXYCODONE HYDROCHLORIDE 5 MG: 5 TABLET ORAL at 04:54

## 2019-08-13 RX ADMIN — Medication 20 MG: at 07:57

## 2019-08-13 RX ADMIN — HEPARIN SODIUM 5000 UNITS: 5000 INJECTION INTRAVENOUS; SUBCUTANEOUS at 07:32

## 2019-08-13 RX ADMIN — DEXTROSE, SODIUM CHLORIDE, AND POTASSIUM CHLORIDE 75 ML/HR: 5; .45; .15 INJECTION INTRAVENOUS at 21:14

## 2019-08-13 RX ADMIN — Medication 250 MG: at 18:19

## 2019-08-13 RX ADMIN — DEXTROSE, SODIUM CHLORIDE, AND POTASSIUM CHLORIDE 75 ML/HR: 5; .45; .15 INJECTION INTRAVENOUS at 06:57

## 2019-08-13 RX ADMIN — OXYCODONE HYDROCHLORIDE 5 MG: 5 TABLET ORAL at 12:48

## 2019-08-13 RX ADMIN — METHOCARBAMOL 500 MG: 500 TABLET, FILM COATED ORAL at 15:51

## 2019-08-13 RX ADMIN — LIDOCAINE 1 PATCH: 50 PATCH CUTANEOUS at 08:45

## 2019-08-13 RX ADMIN — DOCUSATE SODIUM 100 MG: 100 CAPSULE, LIQUID FILLED ORAL at 08:45

## 2019-08-13 RX ADMIN — Medication 20 MG: at 18:19

## 2019-08-13 RX ADMIN — MELATONIN 3 MG: 3 TAB ORAL at 22:17

## 2019-08-13 RX ADMIN — HEPARIN SODIUM 5000 UNITS: 5000 INJECTION INTRAVENOUS; SUBCUTANEOUS at 22:17

## 2019-08-13 RX ADMIN — ESCITALOPRAM OXALATE 10 MG: 10 TABLET ORAL at 08:45

## 2019-08-13 RX ADMIN — PRAVASTATIN SODIUM 20 MG: 20 TABLET ORAL at 18:19

## 2019-08-13 RX ADMIN — HEPARIN SODIUM 5000 UNITS: 5000 INJECTION INTRAVENOUS; SUBCUTANEOUS at 13:28

## 2019-08-13 RX ADMIN — MORPHINE SULFATE 1 MG: 2 INJECTION, SOLUTION INTRAMUSCULAR; INTRAVENOUS at 03:59

## 2019-08-13 NOTE — PLAN OF CARE
Problem: OCCUPATIONAL THERAPY ADULT  Goal: Performs self-care activities at highest level of function for planned discharge setting  See evaluation for individualized goals  Description  Treatment Interventions: ADL retraining, Functional transfer training, UE strengthening/ROM, Endurance training, Cognitive reorientation, Patient/family training, Equipment evaluation/education, Compensatory technique education, Fine motor coordination activities, Energy conservation, Activityengagement          See flowsheet documentation for full assessment, interventions and recommendations  Note:   Limitation: Decreased ADL status, Decreased UE ROM, Decreased UE strength, Decreased Safe judgement during ADL, Decreased cognition, Decreased endurance, Decreased fine motor control, Decreased self-care trans, Decreased high-level ADLs  Prognosis: Fair, Good  Assessment: Patient participated in Skilled OT session this date with interventions consisting of grooming, self feeding tasks   Patient agreeable to OT treatment session, upon arrival patient was found seated OOB to Chair  In comparison to previous session, patient with improvements in grooming tasks, pt continues to be limited by fatigue, decreased activity tolerance, and UB strength   Patient requiring verbal cues for pacing thru activity steps, cognitive assistance to anticipate next step and frequent rest periods  Patient continues to be functioning below baseline level, occupational performance remains limited secondary to factors listed above and increased risk for falls and injury  From OT standpoint, recommendation at time of d/c would be Short Term Rehab  Patient to benefit from continued Occupational Therapy treatment while in the hospital to address deficits as defined above and maximize level of functional independence with ADLs and functional mobility     Recommendation: Geriatric Consult(MAY BENEFIT FROM NEUROPSYCH CONSULT FOR COPING)  OT Discharge Recommendation: Short Term Rehab  OT - OK to Discharge:  Yes

## 2019-08-13 NOTE — OCCUPATIONAL THERAPY NOTE
[address] Progress Note     Patient Name: Kylah Beckley  Today's Date: 8/13/2019  Problem List  Patient Active Problem List   Diagnosis    Osteoarthritis of knee    Joint pain, knee    Eagle's esophagus with high grade dysplasia    3-vessel CAD    Abnormal blood chemistry    A-fib ([address] )    Anemia    Atypical chest pain    Backache with radiation    Continuous left lower quadrant pain    Depression    Dysphagia    Gastroesophageal reflux disease    Hyperlipidemia    Hypothyroidism    Insomnia    Low vitamin D level    Lumbar radiculopathy    Myofascial pain    Right leg paresthesias    Sciatica    Spondylosis of 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    Stricture of esophagus    Venous insufficiency    Incisional hernia    Adrenal adenoma    Incisional hernia, without obstruction or gangrene    Bilateral pneumonia    Postprocedural pneumothorax    OR Exploratory laparotomy, SBR secondary to iatrogenic enterotomy     OR Laparoscopic ventral hernia repair    Fungemia           08/13/19 1125   Restrictions/Precautions   Weight Bearing Precautions Per Order No   Other Precautions Cognitive;O2;Fall Risk;Pain;Multiple lines;Telemetry  (2L 02)   Lifestyle   Autonomy PT REPORTS BEING INDEPENDENT WITH ADLS/IADLS/DRIVING PTA    Reciprocal Relationships LIVES ALONE  LIMITED FAMILY SUPPORT AVAILABLE    Service to Others RETIRED   Intrinsic Gratification ENJOYS SPENDING TIME WITH HER GRANDCHILDREN    Pain Assessment   Pain Assessment 0-10   Pain Score 8   Pain Type Chronic pain   Pain Location Back   Hospital Pain Intervention(s) Repositioned; Rest   ADL   Where Assessed Chair   Eating Assistance 2  Maximal Assistance   Eating Deficit   (able to grasp water cup with [place] assist for set-up and bring to mouth with min a first attempt, S 2nd attempt)   Grooming Assistance 1  Total Assistance   Grooming Deficit Brushing hair;Wash/dry face  (able to grossly grasp washcloth- S/set-up wash face)   Grooming Comments with R hand able to grossly grasp to comb- unable to manipulate in hand and bring to forehead  UB Bathing Assistance 1  Total Assistance   LB Bathing Assistance 1  Total Assistance   [address] 2  Maximal Assistance   UB Dressing Deficit Setup   LB Dressing Assistance 1  Total Assistance   LB Dressing Deficit Don/doff R sock; Don/doff L sock   LB Dressing Comments unable to cross legs/forward functional reach   Bed Mobility   Rolling R 2  Maximal assistance   Additional items Assist x 2   Rolling L 2  Maximal assistance   Additional items Assist x 2  ([place] assistance to reach for bedrail to assist)   Transfers   Sit to Stand Unable to assess   Additional Comments pt received in chair-per RN/restorative team pt was an assist x 3 to scoot from bed>drop arm chair, performed long slide board in supine position from chair>bed due to fatigue  Functional Mobility   Additional Comments unable to assess   Coordination   Gross Motor Slow motor planning -able to grasp cup, comb in right hand not left hand   Fine Motor impaired unable to manipulate comb in R hand, unable to attempt in left   Dexterity impaired   In Hand Manipulation impaired edema L>R   Cognition   Overall Cognitive Status Impaired   Arousal/Participation Alert;Lethargic   Attention Attends with cues to redirect   Orientation Level Oriented X4  (grossly oriented to date, place, situation)   Memory Within functional limits   Following Commands Follows multistep commands without difficulty   Comments slow processing with treatment session-extended time to answer orientation, daily schedule questions  Activity Tolerance   Activity Tolerance Patient limited by fatigue   Medical Staff Made Aware RN cleared pt for therapy   Assessment   Assessment Patient participated in Skilled OT session this date with interventions consisting of grooming, self feeding tasks    Patient agreeable to OT treatment session, upon arrival patient was found seated OOB to Chair  In comparison to previous session, patient with improvements in grooming tasks, pt continues to be limited by fatigue, decreased activity tolerance, and UB strength   Patient requiring verbal cues for pacing thru activity steps, cognitive assistance to anticipate next step and frequent rest periods  Patient continues to be functioning below baseline level, occupational performance remains limited secondary to factors listed above and increased risk for falls and injury  From OT standpoint, recommendation at time of d/c would be Short Term Rehab  Patient to benefit from continued Occupational Therapy treatment while in the hospital to address deficits as defined above and maximize level of functional independence with ADLs and functional mobility  Plan   Treatment Interventions ADL retraining; Endurance training;Patient/family training;Equipment evaluation/education; Fine motor coordination activities; Compensatory technique education; Energy conservation; Activityengagement;UE strengthening/ROM; Functional transfer training   Goal Expiration Date 08/20/19   Treatment Day 2   OT Frequency 3-5x/wk   Recommendation   OT Discharge Recommendation Short Term Rehab   OT - OK to Discharge Yes     Neil Yeh MOT, OTR/L

## 2019-08-13 NOTE — SOCIAL WORK
Cm updated patients daughter Shania Harris  Gave her a SNF rehab list in [facility], [place] and [place]

## 2019-08-13 NOTE — PROGRESS NOTES
Progress Note - General Surgery   Kristel KAHN [place] 78 y o  female MRN: [id number]  Unit/Bed#: [facility] 824-01 Encounter: [id number]    Assessment:  68yo female s/p recent laparoscopic ventral hernia repair with iatrogenic small bowel injury on 7/31 now s/p ex-lap, mesh explantation, small bowel resection and eventual abdominal closure  Candida glabrata fungemia  Protein calorie malnutrition     Afebrile  Tachycardia to 107 overnight, otherwise VSS  NGT pulled out yesterday, on puree diet and tolerating  Endorsing lower back pain, positional in nature which she attributes to lying in bed  Plan:  -continue diet and advance to regular diet as tolerated  -encourage po hydration as sodium level is slightly elevated today  -WBC still decreasing - Fluconazole x 2 wk per ID  Other antibiotics completed  -PT/OT  -Continue wit S&S evaluations  -Surgical wound care with packing and dressing changes daily  -xeroform and gauze dressing for skin tears related to dressing tape  -analgesia prn  -dvt px        Subjective/Objective     No acute issues overnight, pt remained stable  She complains of positional lower back pain, improved with turning and working with PT  Denies fevers, chills, n/v/d, cp, sob  Objective:     Blood pressure 139/80, pulse (!) 107, temperature 98 4 °F (36 9 °C), resp  rate 18, height 5' 4" (1 626 m), weight 86 8 kg (191 lb 5 8 oz), SpO2 95 %  ,Body mass index is 32 85 kg/m²  Intake/Output Summary (Last 24 hours) at 8/13/2019 0700  Last data filed at 8/13/2019 0656  Gross per 24 hour   Intake 1233 75 ml   Output 3250 ml   Net -2016 25 ml       Invasive Devices     Central Venous Catheter Line            CVC Central Lines 08/07/19 Triple 20cm 4 days          Drain             717 days     4 days    External Urinary Catheter 3 days                  NAD, alert and oriented x3  Normocephalic, atraumatic  MMM, EOMI, PERRLA  Norm resp effort on NC  RRR  Abd soft, non-tender, non-distended   Midline incision with packing at superior, middle and inferior portions of the incisions  Serous discharge noted   Dressing to LUQ c/d/i  No calf tenderness or peripheral edema  Motor/sensation intact in distal extremities  CN grossly intact  -rash/lesions 2/2 to dressing adhesives      Lab, Imaging and other studies:  CBC:   Lab Results   Component Value Date    WBC 25 94 (H) 08/13/2019    HGB 8 6 (L) 08/13/2019    HCT 27 5 (L) 08/13/2019    MCV 91 08/13/2019     (H) 08/13/2019    MCH 28 6 08/13/2019    MCHC 31 3 (L) 08/13/2019    RDW 17 8 (H) 08/13/2019    MPV 10 9 08/13/2019    NRBC 0 08/13/2019   , CMP:   Lab Results   Component Value Date    SODIUM 148 (H) 08/13/2019    K 3 6 08/13/2019     08/13/2019    CO2 37 (H) 08/13/2019    BUN 9 08/13/2019    CREATININE 0 43 (L) 08/13/2019    CALCIUM 8 1 (L) 08/13/2019    AST 29 08/13/2019    ALT 16 08/13/2019    ALKPHOS 163 (H) 08/13/2019    EGFR 103 08/13/2019     VTE Pharmacologic Prophylaxis: Heparin  VTE Mechanical Prophylaxis: sequential compression device

## 2019-08-13 NOTE — RESPIRATORY THERAPY NOTE
repiratory      08/13/19 1446   Respiratory Assessment   Resp Comments Pt cont  to refuse vest therapy due to pain  I attempted flutter valve as an alternative  Pt was only able to trigger the flutter a few times  She appeared very weak  Her cough seems strong  She also does poorly with IS  I spoke to her about the condition of her lungs and the need to keep working on these devices to clear out her lungs  She states she is trying  We will cont  to work with her      Additional Assessments   SpO2 94 %

## 2019-08-13 NOTE — SOCIAL WORK
CM spoke with patients daughter Brittany Fair per patients request  Gretel discussed with Brittany Fair that the [facility] is recomendeing a lower level of care or LTACH  GRETEL briefly explained LTACH to Brittany Fair she agreed to a referral  Cm sent referral also updated the referral to [facility]  Brittany Fair plans on coming into the hospital this afternoon  CM will follow up with Brittany Fair during her visit

## 2019-08-13 NOTE — SPEECH THERAPY NOTE
Speech/Language Pathology Progress Note    Patient Name: Barrie Chung  Today's Date: 8/13/2019     Subjective:  Pt awake and alert, sitting upright in bed  Objective:  Pt seen for diagnostic swallow tx for analysis of tolerance of thin liquids and mechanical soft texture snack  Assessed with ice water and cookies crushed/mixed with pudding  Initial trials of thin liquid appeared to have delayed swallow, however noted afterwards pt appeared to be savoring the water, swishing it in her mouth  Multiple trials of thin via both cup and straw tolerated without s/s aspiration  Clear vocal quality maintained throughout trials  Cookies/pudding resulted in prolonged mastication, with incomplete oral clearance/diffuse oral residual of the cookie bits  Pt in agreement with increased difficulty in mastication and said "It takes a while " Able to clear oral residual with multiple liquid washes  Assessment: Thin liquids tolerated WFL at this time, puree still felt to be most appropriate diet  Plan/Recommendations:  Upgrade liquids to thin, continue puree diet  ST will continue to follow for potential to advance

## 2019-08-13 NOTE — PROGRESS NOTES
Progress Note - Infectious Disease   Kristel Chung 78 y o  female MRN: [id number]  Unit/Bed#: [facility] 824-01 Encounter: [id number]      Impression/Plan:  1  Systemic inflammatory response syndrome- Possibly related the patient's fungemia   Possibly secondary to an ongoing abdominal process   Possibly secondary to ongoing peritonitis   The patient is now improved and moved out of the intensive care unit   Procalcitonin level has decreased and now plateaued   The leukocytosis has persisted  Stool for C diff is negative  The patient completed more than 10 days of intravenous antibacterials  The white blood cell count has decreased   -trial discontinue cefepime and Flagyl as below and monitor off antibiotics  -continue antifungals as below  -follow up repeat blood cultures  -recheck CT of the chest abdomen pelvis if leukocytosis does not continue to improve without a new source  -low threshold to tap ascites and sent for cultures  -recheck procalcitonin level   -check CBC with diff and CMP tomorrow  -supportive care     2  Fungemia-possibly all translocation across the gut wall in the setting of peritonitis   Possible catheter related bacteremia although this is less likely as the blood cultures were from around the time of admission   The central line has been changed  Fungemia has cleared  The organism is not resistant to fluconazole   -continue fluconazole through 8/22/2019 to complete 14 days from the 1st negative blood cultures  -okay to place PICC if needed with follow-up blood cultures negative  -need to monitor liver function tests at least weekly on high-dose fluconazole     3  Peritonitis-status post exploration and repair   No cultures obtained at the time of the surgery   Patient does have significant turbid drainage from the superior aspect of the wound   Patient has been maintained on broad antibiotics   The patient continues to have a brisk leukocytosis   Significant ascites noted    She completed more than 10 days of intravenous antibiotics  Patient is stable off all antibiotics in the white cell count has come down a bit   -monitor off all antibiotics  -if leukocytosis does not continue to improve, recommend repeat CT of the chest abdomen and pelvis as above  -low threshold to tap ascites and sent for culture  -serial abdominal exams  -close surgical follow-up     4  Acute hypoxic respiratory failure-likely secondary to hemoptysis aspiration and a pneumothorax  Less likely pneumonia  Leslie Sada is now extubated and on O2 by nasal cannula   Repeat chest x-ray with slightly increased volume   Bronchoscopy cultures without any resistant organisms  Her respiratory status is improved  -oxygen support  -monitor respiratory status  -close critical care follow-up     5  Pneumothorax-status post new chest tube placement   Decreased pneumothorax on repeat chest x-ray   The chest tube is now been removed   Follow-up chest x-ray without progressive pneumothorax  Respiratory status stable  -monitor respiratory status  -radiographic follow-up    Antibiotics:  Fluconazole 1  Status post 11 days of intravenous antibiotics    Subjective:  Patient has no fever, chills, sweats; no nausea, vomiting, diarrhea; no cough, shortness of breath; no increased pain  No new symptoms  Objective:  Vitals:  Temp:  [97 7 °F (36 5 °C)-98 8 °F (37 1 °C)] 97 7 °F (36 5 °C)  HR:  [] 108  Resp:  [16-22] 22  BP: (139-160)/(80-87) 160/82  SpO2:  [95 %-98 %] 98 %  Temp (24hrs), Av 1 °F (36 7 °C), Min:97 7 °F (36 5 °C), Max:98 8 °F (37 1 °C)  Current: Temperature: 97 7 °F (36 5 °C)    Physical Exam:   General Appearance:  Alert, interactive, nontoxic, no acute distress  Throat: Oropharynx moist without lesions  Lungs:   Decreased breath sounds bilaterally; no wheezes, rhonchi or rales; respirations unlabored   Heart:  Tachycardic; no murmur, rub or gallop   Abdomen:   Soft, non-tender, non-distended, positive bowel sounds  Incision with packing between some of the staples but no erythema or purulence  Extremities: No clubbing, cyanosis or edema   Skin: No new rashes or lesions  No draining wounds noted  Labs, Imaging, & Other studies:   All pertinent labs and imaging studies were personally reviewed  Results from last 7 days   Lab Units 08/13/19  0520 08/12/19  0505 08/11/19  0453   WBC Thousand/uL 25 94* 30 20* 33 46*   HEMOGLOBIN g/dL 8 6* 8 7* 8 9*   PLATELETS Thousands/uL 577* 559* 504*     Results from last 7 days   Lab Units 08/13/19  0520 08/12/19  0505 08/11/19  0453  08/08/19  0526 08/07/19  1826   SODIUM mmol/L 148* 142 136   < > 135*  --    POTASSIUM mmol/L 3 6 3 5 3 5   < > 3 8  --    CHLORIDE mmol/L 107 103 100   < > 103  --    CO2 mmol/L 37* 35* 33*   < > 27  --    BUN mg/dL 9 12 9   < > 8  --    CREATININE mg/dL 0 43* 0 44* 0 32*   < > 0 35*  --    EGFR ml/min/1 73sq m 103 103 114   < > 111  --    CALCIUM mg/dL 8 1* 7 9* 7 6*   < > 7 2*  --    AST U/L 29  --   --   --  22 25   ALT U/L 16  --   --   --  11* 15   ALK PHOS U/L 163*  --   --   --  116 106    < > = values in this interval not displayed  Results from last 7 days   Lab Units 08/11/19  1034 08/08/19  1245 08/07/19  1825 08/07/19  1814   BLOOD CULTURE   --   --  No Growth After 5 Days  No Growth After 5 Days     GRAM STAIN RESULT   --  3+ Polys  No bacteria seen  --   --    C DIFF TOXIN B  NEGATIVE for C difficle toxin by PCR    --   --   --      Results from last 7 days   Lab Units 08/11/19  0453 08/10/19  0439 08/09/19  0451 08/08/19  0526 08/07/19  1826   PROCALCITONIN ng/ml 0 81* 0 69* 0 88* 1 04* 1 02*

## 2019-08-14 ENCOUNTER — APPOINTMENT (INPATIENT)
Dept: RADIOLOGY | Facility: HOSPITAL | Age: 69
DRG: 856 | End: 2019-08-14
Payer: MEDICARE

## 2019-08-14 LAB
ALBUMIN SERPL BCP-MCNC: 1.5 G/DL (ref 3.5–5)
ALP SERPL-CCNC: 158 U/L (ref 46–116)
ALT SERPL W P-5'-P-CCNC: 14 U/L (ref 12–78)
ANION GAP SERPL CALCULATED.3IONS-SCNC: 3 MMOL/L (ref 4–13)
AST SERPL W P-5'-P-CCNC: 26 U/L (ref 5–45)
BILIRUB SERPL-MCNC: 0.35 MG/DL (ref 0.2–1)
BUN SERPL-MCNC: 9 MG/DL (ref 5–25)
CALCIUM SERPL-MCNC: 7.8 MG/DL (ref 8.3–10.1)
CHLORIDE SERPL-SCNC: 102 MMOL/L (ref 100–108)
CO2 SERPL-SCNC: 34 MMOL/L (ref 21–32)
CREAT SERPL-MCNC: 0.68 MG/DL (ref 0.6–1.3)
ERYTHROCYTE [DISTWIDTH] IN BLOOD BY AUTOMATED COUNT: 18.2 % (ref 11.6–15.1)
GFR SERPL CREATININE-BSD FRML MDRD: 89 ML/MIN/1.73SQ M
GLUCOSE SERPL-MCNC: 109 MG/DL (ref 65–140)
GLUCOSE SERPL-MCNC: 118 MG/DL (ref 65–140)
GLUCOSE SERPL-MCNC: 128 MG/DL (ref 65–140)
GLUCOSE SERPL-MCNC: 128 MG/DL (ref 65–140)
GLUCOSE SERPL-MCNC: 129 MG/DL (ref 65–140)
HCT VFR BLD AUTO: 27.7 % (ref 34.8–46.1)
HGB BLD-MCNC: 8.5 G/DL (ref 11.5–15.4)
MAGNESIUM SERPL-MCNC: 1.9 MG/DL (ref 1.6–2.6)
MCH RBC QN AUTO: 28 PG (ref 26.8–34.3)
MCHC RBC AUTO-ENTMCNC: 30.7 G/DL (ref 31.4–37.4)
MCV RBC AUTO: 91 FL (ref 82–98)
PHOSPHATE SERPL-MCNC: 3.1 MG/DL (ref 2.3–4.1)
PLATELET # BLD AUTO: 644 THOUSANDS/UL (ref 149–390)
PMV BLD AUTO: 10.3 FL (ref 8.9–12.7)
POTASSIUM SERPL-SCNC: 3.8 MMOL/L (ref 3.5–5.3)
PROCALCITONIN SERPL-MCNC: 0.33 NG/ML
PROT SERPL-MCNC: 5.9 G/DL (ref 6.4–8.2)
RBC # BLD AUTO: 3.04 MILLION/UL (ref 3.81–5.12)
SODIUM SERPL-SCNC: 139 MMOL/L (ref 136–145)
WBC # BLD AUTO: 29.96 THOUSAND/UL (ref 4.31–10.16)

## 2019-08-14 PROCEDURE — 99024 POSTOP FOLLOW-UP VISIT: CPT | Performed by: SURGERY

## 2019-08-14 PROCEDURE — 83735 ASSAY OF MAGNESIUM: CPT | Performed by: STUDENT IN AN ORGANIZED HEALTH CARE EDUCATION/TRAINING PROGRAM

## 2019-08-14 PROCEDURE — 97530 THERAPEUTIC ACTIVITIES: CPT

## 2019-08-14 PROCEDURE — 85027 COMPLETE CBC AUTOMATED: CPT | Performed by: STUDENT IN AN ORGANIZED HEALTH CARE EDUCATION/TRAINING PROGRAM

## 2019-08-14 PROCEDURE — 02HV33Z INSERTION OF INFUSION DEVICE INTO SUPERIOR VENA CAVA, PERCUTANEOUS APPROACH: ICD-10-PCS | Performed by: SURGERY

## 2019-08-14 PROCEDURE — 84100 ASSAY OF PHOSPHORUS: CPT | Performed by: STUDENT IN AN ORGANIZED HEALTH CARE EDUCATION/TRAINING PROGRAM

## 2019-08-14 PROCEDURE — 99232 SBSQ HOSP IP/OBS MODERATE 35: CPT | Performed by: INTERNAL MEDICINE

## 2019-08-14 PROCEDURE — 97110 THERAPEUTIC EXERCISES: CPT

## 2019-08-14 PROCEDURE — 82948 REAGENT STRIP/BLOOD GLUCOSE: CPT

## 2019-08-14 PROCEDURE — 80053 COMPREHEN METABOLIC PANEL: CPT | Performed by: STUDENT IN AN ORGANIZED HEALTH CARE EDUCATION/TRAINING PROGRAM

## 2019-08-14 PROCEDURE — 36569 INSJ PICC 5 YR+ W/O IMAGING: CPT

## 2019-08-14 PROCEDURE — 94668 MNPJ CHEST WALL SBSQ: CPT

## 2019-08-14 PROCEDURE — C1751 CATH, INF, PER/CENT/MIDLINE: HCPCS

## 2019-08-14 PROCEDURE — 71045 X-RAY EXAM CHEST 1 VIEW: CPT

## 2019-08-14 PROCEDURE — 84145 PROCALCITONIN (PCT): CPT | Performed by: INTERNAL MEDICINE

## 2019-08-14 RX ADMIN — METHOCARBAMOL 500 MG: 500 TABLET, FILM COATED ORAL at 22:02

## 2019-08-14 RX ADMIN — GABAPENTIN 100 MG: 100 CAPSULE ORAL at 21:29

## 2019-08-14 RX ADMIN — Medication 20 MG: at 05:45

## 2019-08-14 RX ADMIN — MELATONIN 3 MG: 3 TAB ORAL at 21:28

## 2019-08-14 RX ADMIN — Medication 250 MG: at 09:14

## 2019-08-14 RX ADMIN — Medication 20 MG: at 15:40

## 2019-08-14 RX ADMIN — FOLIC ACID 1 MG: 1 TABLET ORAL at 09:14

## 2019-08-14 RX ADMIN — PRAVASTATIN SODIUM 20 MG: 20 TABLET ORAL at 15:33

## 2019-08-14 RX ADMIN — Medication 250 MG: at 17:12

## 2019-08-14 RX ADMIN — HEPARIN SODIUM 5000 UNITS: 5000 INJECTION INTRAVENOUS; SUBCUTANEOUS at 14:15

## 2019-08-14 RX ADMIN — OXYCODONE HYDROCHLORIDE 5 MG: 5 TABLET ORAL at 15:33

## 2019-08-14 RX ADMIN — OXYCODONE HYDROCHLORIDE 5 MG: 5 TABLET ORAL at 21:25

## 2019-08-14 RX ADMIN — HEPARIN SODIUM 5000 UNITS: 5000 INJECTION INTRAVENOUS; SUBCUTANEOUS at 05:42

## 2019-08-14 RX ADMIN — DEXTROSE, SODIUM CHLORIDE, AND POTASSIUM CHLORIDE 75 ML/HR: 5; .45; .15 INJECTION INTRAVENOUS at 12:36

## 2019-08-14 RX ADMIN — ESCITALOPRAM OXALATE 10 MG: 10 TABLET ORAL at 09:14

## 2019-08-14 RX ADMIN — LIDOCAINE 1 PATCH: 50 PATCH CUTANEOUS at 09:15

## 2019-08-14 RX ADMIN — HEPARIN SODIUM 5000 UNITS: 5000 INJECTION INTRAVENOUS; SUBCUTANEOUS at 21:25

## 2019-08-14 RX ADMIN — DEXTROSE, SODIUM CHLORIDE, AND POTASSIUM CHLORIDE 75 ML/HR: 5; .45; .15 INJECTION INTRAVENOUS at 22:01

## 2019-08-14 RX ADMIN — FLUCONAZOLE 800 MG: 200 TABLET ORAL at 09:15

## 2019-08-14 NOTE — PHYSICAL THERAPY NOTE
PHYSICAL THERAPY TREATMENT NOTE    Patient Name: Rafe Romberg  YFLCM'T Date: 19 1532   Pain Assessment   Pain Assessment 0-10   Pain Score 6   Pain Location Leg   Pain Orientation Bilateral   Hospital Pain Intervention(s) Repositioned   Response to Interventions tolerated   Restrictions/Precautions   Weight Bearing Precautions Per Order No   Other Precautions O2;Cognitive; Fall Risk;Telemetry;Pain;Multiple lines  (1 L O2 NC)   General   Chart Reviewed Yes   Response to Previous Treatment Patient with no complaints from previous session  Family/Caregiver Present No   Cognition   Overall Cognitive Status Impaired   Arousal/Participation Alert; Cooperative   Attention Attends with cues to redirect   Orientation Level Oriented X4   Comments Pt identified by name and   Subjective   Subjective Pt agrees to PT treatment  "I'm so stiff all over "   Bed Mobility   Rolling R 2  Maximal assistance   Additional items Assist x 1;Bedrails; Increased time required;Verbal cues;LE management   Rolling L 2  Maximal assistance   Additional items Assist x 1; Increased time required; Bedrails;Verbal cues;LE management   Supine to Sit 2  Maximal assistance   Additional items Assist x 2;HOB elevated; Bedrails; Increased time required;Verbal cues;LE management   Sit to Supine 2  Maximal assistance   Additional items Assist x 2;HOB elevated; Bedrails; Increased time required;Verbal cues;LE management   Additional Comments Able to sit EOB for ~ 5` with variable support from mod to brief periods of supervision     Transfers   Sit to Stand Unable to assess  (not appropriate at this time)   Balance   Static Sitting   (varies from Poor to brief periods of Fair)   Endurance Deficit   Endurance Deficit Yes   Endurance Deficit Description limited sitting tolerance, fatigue, SOB   Activity Tolerance   Activity Tolerance Patient limited by fatigue;Patient limited by pain   Nurse Made Aware Per RN, pt appropriate to treat   Exercises   Heelslides Supine;15 reps;AAROM; Bilateral  (x2)   Hip Abduction Supine;15 reps;AAROM; Bilateral  (x2)   Hip Adduction Supine;15 reps;AAROM; Bilateral  (x2)   Knee AROM Short Arc Quad Supine;10 reps;AROM; Bilateral  (x2)   Knee AROM Long Arc Quad Sitting;10 reps;AROM; Bilateral  (x2)   Ankle Pumps Sitting;Supine;20 reps;AROM; Bilateral  (x2)   Neuro-Developmental Treatment Bilateral  (Internal/external rotation in supine 2x15)   Assessment   Prognosis Fair   Problem List Decreased strength;Decreased endurance; Impaired balance;Decreased mobility   Assessment Pt tolerated treatment well and is progressing with sitting tolerance as well as balance at EOB  Tolerates supine LE exercises with A/AAROM fairly, however reports increased pain due to "stiffness"  Able to sit EOB with brief periods of Fair balance with bilateral UE support and constant verbal cues  Limited primarily by pain, however pt is cooperative and pleasant throughout session  Will continue to benefit from ongoing skilled PT to maximize her functional mobility and increase her level of independence  Recommending rehab at time of discharge when medically appropriate  Goals   Patient Goals to be able to tolerate more   STG Expiration Date 08/20/19   Treatment Day 3   Plan   Treatment/Interventions Functional transfer training;LE strengthening/ROM; Therapeutic exercise; Endurance training;Patient/family training;Equipment eval/education; Bed mobility   Progress Slow progress, decreased activity tolerance   PT Frequency   (3-5x/week)   Recommendation   Recommendation Short-term skilled PT   Equipment Recommended   (pending progress)   Daljit Clark, PT,DPT

## 2019-08-14 NOTE — PLAN OF CARE
Problem: Prexisting or High Potential for Compromised Skin Integrity  Goal: Skin integrity is maintained or improved  Description  INTERVENTIONS:  - Identify patients at risk for skin breakdown  - Assess and monitor skin integrity  - Assess and monitor nutrition and hydration status  - Monitor labs (i e  albumin)  - Turn and reposition patient  - Assist with mobility/ambulation  - Relieve pressure over bony prominences  - Avoid friction and shearing  - Provide appropriate hygiene as needed including keeping skin clean and dry  - Evaluate need for skin moisturizer/barrier cream  - Collaborate with interdisciplinary team (i e  Nutrition, Rehabilitation, etc )   - Patient/family teaching   Outcome: Progressing     Problem: Potential for Falls  Goal: Patient will remain free of falls  Description  INTERVENTIONS:  - Assess patient frequently for physical needs  -  Identify cognitive and physical deficits and behaviors that affect risk of falls    -  Currie fall precautions as indicated by assessment   - Educate patient/family on patient safety including physical limitations  - Instruct patient to call for assistance with activity based on assessment  - Modify environment to reduce risk of injury  - Consider OT/PT consult to assist with strengthening/mobility  Outcome: Progressing     Problem: CARDIOVASCULAR - ADULT  Goal: Maintains optimal cardiac output and hemodynamic stability  Description  INTERVENTIONS:  - Monitor I/O, vital signs and rhythm  - Monitor for S/S and trends of decreased cardiac output i e  bleeding, hypotension  - Administer and titrate ordered vasoactive medications to optimize hemodynamic stability  - Assess quality of pulses, skin color and temperature  - Instruct patient to report change in severity of symptoms   Outcome: Progressing     Problem: RESPIRATORY - ADULT  Goal: Achieves optimal ventilation and oxygenation  Description  INTERVENTIONS:  - Assess for changes in respiratory status  - Assess for changes in mentation and behavior  - Position to facilitate oxygenation and minimize respiratory effort  - Oxygen administration by appropriate delivery method based on oxygen saturation (per order) or ABGs  - Encourage broncho-pulmonary hygiene including cough, deep breathe, Incentive Spirometry  - Assess the need for suctioning and aspirate as neede  - Respiratory Therapy support as indicated   Outcome: Progressing     Problem: GASTROINTESTINAL - ADULT  Goal: Maintains or returns to baseline bowel function  Description  INTERVENTIONS:  - Assess bowel function  - Encourage oral fluids to ensure adequate hydration  - Administer IV fluids as ordered to ensure adequate hydration  - Administer ordered medications as needed  - Encourage mobilization and activity  - Nutrition services referral to assist patient with appropriate food choices  Outcome: Progressing  Goal: Maintains adequate nutritional intake  Description  INTERVENTIONS:  Npo   - Identify factors contributing to decreased intake, treat as appropriate  - Monitor I&O, WT and lab values  - Obtain nutrition services referral as needed   Outcome: Progressing     Problem: METABOLIC, FLUID AND ELECTROLYTES - ADULT  Goal: Electrolytes maintained within normal limits  Description  INTERVENTIONS:  - Monitor labs and assess patient for signs and symptoms of electrolyte imbalances  - Administer electrolyte replacement as ordered  - Monitor response to electrolyte replacements, including repeat lab results as appropriate  - Instruct patient on fluid and nutrition as appropriate  Outcome: Progressing  Goal: Fluid balance maintained  Description  INTERVENTIONS:  - Monitor labs and assess for signs and symptoms of volume excess or deficit  - Monitor I/O and WT  - Instruct patient on fluid and nutrition as appropriate  Outcome: Progressing     Problem: SKIN/TISSUE INTEGRITY - ADULT  Goal: Skin integrity remains intact  Description  INTERVENTIONS  - Identify patients at risk for skin breakdown  - Assess and monitor skin integrity  - Assess and monitor nutrition and hydration statu  - Turn and reposition patient  - Assist with mobility/ambulation  - Relieve pressure over bony prominences  - Avoid friction and shearing  - Provide appropriate hygiene as needed including keeping skin clean and dry  - Evaluate need for skin moisturizer/barrier cream  - Collaborate with interdisciplinary team (i e  Nutrition, Rehabilitation, etc )   - Patient/family teaching   Outcome: Progressing  Goal: Oral mucous membranes remain intact  Description  INTERVENTIONS  - Assess oral mucosa and hygiene practices  - Implement preventative oral hygiene regimen  - Implement oral medicated treatments as ordered  - Initiate Nutrition services referral as needed  Outcome: Progressing     Problem: MUSCULOSKELETAL - ADULT  Goal: Maintain or return mobility to safest level of function  Description  INTERVENTIONS:  - Assess patient's ability to carry out ADLs; assess patient's baseline for ADL function and identify physical deficits which impact ability to perform ADLs (bathing, care of mouth/teeth, toileting, grooming, dressing, etc )  - Assess/evaluate cause of self-care deficits   - Assess range of motion  - Assess patient's mobility; develop plan if impaired  - Assess patient's need for assistive devices and provide as appropriate  - Encourage maximum independence but intervene and supervise when necessary  - Involve family in performance of ADLs  - Assess for home care needs following discharge   - Request OT consult to assist with ADL evaluation and planning for discharge  - Provide patient education as appropriate  Outcome: Progressing     Problem: Nutrition/Hydration-ADULT  Goal: Nutrient/Hydration intake appropriate for improving, restoring or maintaining nutritional needs  Description  Monitor and assess patient's nutrition/hydration status for malnutrition (ex- brittle hair, bruises, dry skin, pale skin and conjunctiva, muscle wasting, smooth red tongue, and disorientation)  Collaborate with interdisciplinary team and initiate plan and interventions as ordered  Monitor patient's weight and dietary intake as ordered or per policy  Utilize nutrition screening tool and intervene per policy  Determine patient's food preferences and provide high-protein, high-caloric foods as appropriate       INTERVENTIONS:  - Monitor oral intake, urinary output, labs, and treatment plans  - Assess nutrition and hydration status and recommend course of action  - Evaluate amount of meals eaten  - Assist patient with eating if necessary   - Allow adequate time for meals  - Recommend/ encourage appropriate diets, oral nutritional supplements, and vitamin/mineral supplements  - Order, calculate, and assess calorie counts as needed  - Recommend, monitor, and adjust tube feedings and TPN/PPN based on assessed needs  - Assess need for intravenous fluids  - Provide specific nutrition/hydration education as appropriate  - Include patient/family/caregiver in decisions related to nutrition  Outcome: Progressing     Problem: COPING  Goal: Pt/Family able to verbalize concerns and demonstrate effective coping strategies  Description  INTERVENTIONS:  - Assist patient/family to identify coping skills, available support systems and cultural and spiritual values  - Provide emotional support, including active listening and acknowledgement of concerns of patient and caregivers  - Reduce environmental stimuli, as able  - Provide patient education  - Assess for spiritual pain/suffering and initiate spiritual care, including notification of Pastoral Care or [place] based community as needed  - Assess effectiveness of coping strategies  Outcome: Progressing  Goal: Will report anxiety at manageable levels  Description  INTERVENTIONS:  - Administer medication as ordered  - Teach and encourage coping skills  - Provide emotional support  - Assess patient/family for anxiety and ability to cope  Outcome: Progressing

## 2019-08-14 NOTE — PROGRESS NOTES
Progress Note - Infectious Disease   Kristel Chung 78 y o  female MRN: [id number]  Unit/Bed#: [facility] 824-01 Encounter: [id number]      Impression/Plan:  1  Systemic inflammatory response syndrome- Possibly related the patient's fungemia   Possibly secondary to peritonitis   The patient is now improved and moved out of the intensive care unit  The leukocytosis has persisted   Stool for C diff is negative  The patient completed more than 10 days of intravenous antibacterials  The white blood cell count has decreased and now slightly increased  The procalcitonin level has decreased  -no additional antibacterials for now  -continue antifungals as below  -recheck CT of the chest abdomen pelvis if leukocytosis does not continue to improve without a new source  -low threshold to tap ascites and sent for cultures  -recheck procalcitonin level   -check CBC with diff and CMP tomorrow  -recommend discontinuation of triple-lumen catheter and attempt to get peripheral IV  -supportive care     2  Fungemia-possibly all translocation across the gut wall in the setting of peritonitis   Possible catheter related bacteremia although this is less likely as the blood cultures were from around the time of admission   The central line has been changed  Fungemia has cleared   The organism is not resistant to fluconazole   -continue fluconazole through 8/21/2019 to complete 14 days from the 1st negative blood cultures  -need to monitor liver function tests at least weekly on high-dose fluconazole     3  Peritonitis-status post exploration and repair   No cultures obtained at the time of the surgery   Patient does have significant turbid drainage from the superior aspect of the wound   Patient has been maintained on broad antibiotics   The patient continues to have a brisk leukocytosis   Significant ascites noted  She completed more than 10 days of intravenous antibiotics    Patient is stable off all antibiotics in the white cell count has remained elevated  -monitor off all antibiotics  -if leukocytosis does not continue to improve, recommend repeat CT of the chest abdomen and pelvis as above  -low threshold to tap ascites and sent for culture  -serial abdominal exams  -close surgical follow-up     4  Acute hypoxic respiratory failure-likely secondary to hemoptysis aspiration and a pneumothorax  Less likely pneumonia  Madie Solis is now extubated and on O2 by nasal cannula   Repeat chest x-ray with slightly increased volume   Bronchoscopy cultures without any resistant organisms   Her respiratory status is improved  She is off oxygen support  -oxygen support as needed  -monitor respiratory status  -close critical care follow-up    Antibiotics:  Fluconazole 2  Antifungals 7 days from 1st negative blood culture    Subjective:  Patient has no fever, chills, sweats; no nausea, vomiting, diarrhea; no cough, shortness of breath; decreased abdominal pain  No new symptoms  She is no longer requiring oxygen support  Objective:  Vitals:  Temp:  [97 7 °F (36 5 °C)-99 1 °F (37 3 °C)] 99 °F (37 2 °C)  HR:  [] 102  Resp:  [16-20] 20  BP: (140-162)/(78-92) 145/81  SpO2:  [93 %-98 %] 93 %  Temp (24hrs), Av 6 °F (37 °C), Min:97 7 °F (36 5 °C), Max:99 1 °F (37 3 °C)  Current: Temperature: 99 °F (37 2 °C)    Physical Exam:   General Appearance:  Alert, interactive, nontoxic, no acute distress  Throat: Oropharynx moist without lesions  Lungs:   Decreased breath sounds bilaterally; no wheezes, rhonchi or rales; respirations unlabored   Heart:  RRR; no murmur, rub or gallop   Abdomen:   Soft, non-tender, non-distended, positive bowel sounds  Midline incision without purulence but still with some drainage  Extremities: No clubbing, cyanosis or edema   Skin: No new rashes or lesions  No draining wounds noted         Labs, Imaging, & Other studies:   All pertinent labs and imaging studies were personally reviewed  Results from last 7 days   Lab Units 08/14/19  0547 08/13/19  0520 08/12/19  0505   WBC Thousand/uL 29 96* 25 94* 30 20*   HEMOGLOBIN g/dL 8 5* 8 6* 8 7*   PLATELETS Thousands/uL 644* 577* 559*     Results from last 7 days   Lab Units 08/14/19  0547 08/13/19  0520 08/12/19  0505  08/08/19  0526   SODIUM mmol/L 139 148* 142   < > 135*   POTASSIUM mmol/L 3 8 3 6 3 5   < > 3 8   CHLORIDE mmol/L 102 107 103   < > 103   CO2 mmol/L 34* 37* 35*   < > 27   BUN mg/dL 9 9 12   < > 8   CREATININE mg/dL 0 68 0 43* 0 44*   < > 0 35*   EGFR ml/min/1 73sq m 89 103 103   < > 111   CALCIUM mg/dL 7 8* 8 1* 7 9*   < > 7 2*   AST U/L 26 29  --   --  22   ALT U/L 14 16  --   --  11*   ALK PHOS U/L 158* 163*  --   --  116    < > = values in this interval not displayed  Results from last 7 days   Lab Units 08/11/19  1034 08/08/19  1245 08/07/19  1825 08/07/19  1814   BLOOD CULTURE   --   --  No Growth After 5 Days  No Growth After 5 Days     GRAM STAIN RESULT   --  3+ Polys  No bacteria seen  --   --    C DIFF TOXIN B  NEGATIVE for C difficle toxin by PCR    --   --   --      Results from last 7 days   Lab Units 08/14/19  0547 08/11/19  0453 08/10/19  0439 08/09/19  0451 08/08/19  0526 08/07/19  1826   PROCALCITONIN ng/ml 0 33* 0 81* 0 69* 0 88* 1 04* 1 02*

## 2019-08-14 NOTE — PLAN OF CARE
Problem: PHYSICAL THERAPY ADULT  Goal: Performs mobility at highest level of function for planned discharge setting  See evaluation for individualized goals  Description  Treatment/Interventions: LE strengthening/ROM, Functional transfer training, Therapeutic exercise, Endurance training, Bed mobility, Spoke to nursing, OT          See flowsheet documentation for full assessment, interventions and recommendations  Outcome: Progressing  Note:   Prognosis: Fair  Problem List: Decreased strength, Decreased endurance, Impaired balance, Decreased mobility  Assessment: Pt tolerated treatment well and is progressing with sitting tolerance as well as balance at EOB  Tolerates supine LE exercises with A/AAROM fairly, however reports increased pain due to "stiffness"  Able to sit EOB with brief periods of Fair balance with bilateral UE support and constant verbal cues  Limited primarily by pain, however pt is cooperative and pleasant throughout session  Will continue to benefit from ongoing skilled PT to maximize her functional mobility and increase her level of independence  Recommending rehab at time of discharge when medically appropriate  Barriers to Discharge: Decreased caregiver support     Recommendation: Short-term skilled PT     PT - OK to Discharge: Yes    See flowsheet documentation for full assessment

## 2019-08-14 NOTE — PROGRESS NOTES
Progress Note - Willie KAHN Hartearnestft 78 y o  female MRN: [id number]    Unit/Bed#: [facility] 824-01 Encounter: [id number]      Assessment:  66yo female s/p recent laparoscopic ventral hernia repair with iatrogenic small bowel injury on 7/31 now s/p ex-lap, mesh explantation, small bowel resection and eventual abdominal closure  Candida glabrata fungemia  Protein calorie malnutrition    Mild intermittent tachycardia  Otherwise VSS  Afebrile  Abd, soft/ nontender/ nondistended  Plan:  -Thin liquids  Puree diet  Per speech recs  -Triple lumen catheter day 7  Potentially d/c and place PICC if needed for blood draws    -continue fluconazole to 8/22 per ID, appreciate recs  -f/u am labs    Subjective:   Denied fever, chills, chest pain, shortness of breath, nausea, vomiting, or abdominal pain this morning  Objective:     Vitals: Blood pressure 145/80, pulse 98, temperature 99 1 °F (37 3 °C), resp  rate 18, height 5' 4" (1 626 m), weight 84 kg (185 lb 1 6 oz), SpO2 93 %  ,Body mass index is 31 77 kg/m²  Intake/Output Summary (Last 24 hours) at 8/14/2019 0545  Last data filed at 8/14/2019 0231  Gross per 24 hour   Intake 2625 ml   Output 2000 ml   Net 625 ml       Physical Exam  General: NAD  HEENT: NC/AT  MMM  Cv: RRR  Lungs: normal effort  Ab: Soft, NT/ND  Incision cl and dry  Skin wound repacked w 3 small pieces of iodoform packing  Ex: no CCE  Neuro: AAOx3      Invasive Devices     Central Venous Catheter Line            CVC Central Lines 08/07/19 Triple 20cm 6 days          Drain            External Urinary Catheter 5 days                Lab, Imaging and other studies: I have personally reviewed pertinent reports      VTE Pharmacologic Prophylaxis: Heparin  VTE Mechanical Prophylaxis: sequential compression device

## 2019-08-15 LAB
ANION GAP SERPL CALCULATED.3IONS-SCNC: 3 MMOL/L (ref 4–13)
BASOPHILS # BLD AUTO: 0.07 THOUSANDS/ΜL (ref 0–0.1)
BASOPHILS NFR BLD AUTO: 0 % (ref 0–1)
BUN SERPL-MCNC: 13 MG/DL (ref 5–25)
CALCIUM SERPL-MCNC: 7.6 MG/DL (ref 8.3–10.1)
CHLORIDE SERPL-SCNC: 99 MMOL/L (ref 100–108)
CO2 SERPL-SCNC: 33 MMOL/L (ref 21–32)
CREAT SERPL-MCNC: 0.93 MG/DL (ref 0.6–1.3)
EOSINOPHIL # BLD AUTO: 0.09 THOUSAND/ΜL (ref 0–0.61)
EOSINOPHIL NFR BLD AUTO: 0 % (ref 0–6)
ERYTHROCYTE [DISTWIDTH] IN BLOOD BY AUTOMATED COUNT: 18.2 % (ref 11.6–15.1)
GFR SERPL CREATININE-BSD FRML MDRD: 62 ML/MIN/1.73SQ M
GLUCOSE SERPL-MCNC: 102 MG/DL (ref 65–140)
HCT VFR BLD AUTO: 25.4 % (ref 34.8–46.1)
HGB BLD-MCNC: 7.9 G/DL (ref 11.5–15.4)
IMM GRANULOCYTES # BLD AUTO: >0.5 THOUSAND/UL (ref 0–0.2)
IMM GRANULOCYTES NFR BLD AUTO: 3 % (ref 0–2)
LYMPHOCYTES # BLD AUTO: 3.37 THOUSANDS/ΜL (ref 0.6–4.47)
LYMPHOCYTES NFR BLD AUTO: 13 % (ref 14–44)
MAGNESIUM SERPL-MCNC: 1.9 MG/DL (ref 1.6–2.6)
MCH RBC QN AUTO: 28 PG (ref 26.8–34.3)
MCHC RBC AUTO-ENTMCNC: 31.1 G/DL (ref 31.4–37.4)
MCV RBC AUTO: 90 FL (ref 82–98)
MONOCYTES # BLD AUTO: 1.79 THOUSAND/ΜL (ref 0.17–1.22)
MONOCYTES NFR BLD AUTO: 7 % (ref 4–12)
NEUTROPHILS # BLD AUTO: 19.13 THOUSANDS/ΜL (ref 1.85–7.62)
NEUTS SEG NFR BLD AUTO: 77 % (ref 43–75)
NRBC BLD AUTO-RTO: 0 /100 WBCS
PHOSPHATE SERPL-MCNC: 3.5 MG/DL (ref 2.3–4.1)
PLATELET # BLD AUTO: 547 THOUSANDS/UL (ref 149–390)
PMV BLD AUTO: 10.4 FL (ref 8.9–12.7)
POTASSIUM SERPL-SCNC: 4 MMOL/L (ref 3.5–5.3)
PROCALCITONIN SERPL-MCNC: 0.26 NG/ML
RBC # BLD AUTO: 2.82 MILLION/UL (ref 3.81–5.12)
SODIUM SERPL-SCNC: 135 MMOL/L (ref 136–145)
WBC # BLD AUTO: 25.32 THOUSAND/UL (ref 4.31–10.16)

## 2019-08-15 PROCEDURE — 97530 THERAPEUTIC ACTIVITIES: CPT

## 2019-08-15 PROCEDURE — 97535 SELF CARE MNGMENT TRAINING: CPT

## 2019-08-15 PROCEDURE — 80048 BASIC METABOLIC PNL TOTAL CA: CPT | Performed by: STUDENT IN AN ORGANIZED HEALTH CARE EDUCATION/TRAINING PROGRAM

## 2019-08-15 PROCEDURE — 85025 COMPLETE CBC W/AUTO DIFF WBC: CPT | Performed by: STUDENT IN AN ORGANIZED HEALTH CARE EDUCATION/TRAINING PROGRAM

## 2019-08-15 PROCEDURE — 83735 ASSAY OF MAGNESIUM: CPT | Performed by: STUDENT IN AN ORGANIZED HEALTH CARE EDUCATION/TRAINING PROGRAM

## 2019-08-15 PROCEDURE — 94668 MNPJ CHEST WALL SBSQ: CPT

## 2019-08-15 PROCEDURE — 97110 THERAPEUTIC EXERCISES: CPT

## 2019-08-15 PROCEDURE — 92526 ORAL FUNCTION THERAPY: CPT

## 2019-08-15 PROCEDURE — 84145 PROCALCITONIN (PCT): CPT | Performed by: INTERNAL MEDICINE

## 2019-08-15 PROCEDURE — 84100 ASSAY OF PHOSPHORUS: CPT | Performed by: STUDENT IN AN ORGANIZED HEALTH CARE EDUCATION/TRAINING PROGRAM

## 2019-08-15 PROCEDURE — 99024 POSTOP FOLLOW-UP VISIT: CPT | Performed by: SURGERY

## 2019-08-15 PROCEDURE — 99232 SBSQ HOSP IP/OBS MODERATE 35: CPT | Performed by: INTERNAL MEDICINE

## 2019-08-15 RX ADMIN — ESCITALOPRAM OXALATE 10 MG: 10 TABLET ORAL at 09:18

## 2019-08-15 RX ADMIN — MELATONIN 3 MG: 3 TAB ORAL at 21:02

## 2019-08-15 RX ADMIN — OXYCODONE HYDROCHLORIDE 5 MG: 5 TABLET ORAL at 02:18

## 2019-08-15 RX ADMIN — PRAVASTATIN SODIUM 20 MG: 20 TABLET ORAL at 16:14

## 2019-08-15 RX ADMIN — FOLIC ACID 1 MG: 1 TABLET ORAL at 09:18

## 2019-08-15 RX ADMIN — FLUCONAZOLE 800 MG: 200 TABLET ORAL at 10:34

## 2019-08-15 RX ADMIN — LIDOCAINE 1 PATCH: 50 PATCH CUTANEOUS at 09:18

## 2019-08-15 RX ADMIN — Medication 250 MG: at 16:14

## 2019-08-15 RX ADMIN — Medication 20 MG: at 05:47

## 2019-08-15 RX ADMIN — Medication 250 MG: at 09:18

## 2019-08-15 RX ADMIN — GABAPENTIN 100 MG: 100 CAPSULE ORAL at 21:02

## 2019-08-15 RX ADMIN — HEPARIN SODIUM 5000 UNITS: 5000 INJECTION INTRAVENOUS; SUBCUTANEOUS at 13:48

## 2019-08-15 RX ADMIN — HEPARIN SODIUM 5000 UNITS: 5000 INJECTION INTRAVENOUS; SUBCUTANEOUS at 21:02

## 2019-08-15 RX ADMIN — OXYCODONE HYDROCHLORIDE 5 MG: 5 TABLET ORAL at 16:14

## 2019-08-15 RX ADMIN — DEXTROSE, SODIUM CHLORIDE, AND POTASSIUM CHLORIDE 75 ML/HR: 5; .45; .15 INJECTION INTRAVENOUS at 10:43

## 2019-08-15 RX ADMIN — HEPARIN SODIUM 5000 UNITS: 5000 INJECTION INTRAVENOUS; SUBCUTANEOUS at 05:50

## 2019-08-15 NOTE — OCCUPATIONAL THERAPY NOTE
[address] Progress Note     Patient Name: Lino Elaine  Today's Date: 8/15/2019  Problem List  Patient Active Problem List   Diagnosis    Osteoarthritis of knee    Joint pain, knee    Eagle's esophagus with high grade dysplasia    3-vessel CAD    Abnormal blood chemistry    A-fib ([address] )    Anemia    Atypical chest pain    Backache with radiation    Continuous left lower quadrant pain    Depression    Dysphagia    Gastroesophageal reflux disease    Hyperlipidemia    Hypothyroidism    Insomnia    Low vitamin D level    Lumbar radiculopathy    Myofascial pain    Right leg paresthesias    Sciatica    Spondylosis of 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    Stricture of esophagus    Venous insufficiency    Incisional hernia    Adrenal adenoma    Incisional hernia, without obstruction or gangrene    Bilateral pneumonia    Postprocedural pneumothorax    OR Exploratory laparotomy, SBR secondary to iatrogenic enterotomy     OR Laparoscopic ventral hernia repair    Fungemia             08/15/19 1046   Restrictions/Precautions   Weight Bearing Precautions Per Order No   Other Precautions Cognitive; Bed Alarm; Fall Risk;Pain;Telemetry;Multiple lines   General   Response to Previous Treatment Patient with no complaints from previous session   Pain Assessment   Pain Assessment 0-10   Pain Score 8   Pain Location Back   Hospital Pain Intervention(s) Repositioned; Ambulation/increased activity; Emotional support   Response to Interventions tolerated   ADL   Eating Assistance 5  Supervision/Setup  (beverage management only)   Eating Deficit Setup;Supervision/safety   Eating Comments able to grasp cup with lid/straw and bring to mouth after set up   Grooming Assistance 2  Maximal Assistance   Grooming Deficit Setup;Supervision/safety; Increased time to complete;Brushing hair;Oral care   Grooming Comments in bed w/ HOB elevated; able to grasp comb with R and LUE and comb anterior hair, assist to comb posterior hair; able to wash face after set up; able to brush teeth with [facility] assist to apply toothpaste and [place] assist to rinse with water   LB Dressing Assistance 1  Total Assistance   LB Dressing Deficit Don/doff R sock; Don/doff L sock   Bed Mobility   Supine to Sit 2  Maximal assistance   Additional items Assist x 2;HOB elevated; Bedrails; Increased time required;Verbal cues;LE management   Sit to Supine 2  Maximal assistance   Additional items Assist x 2;HOB elevated; Increased time required;Verbal cues;LE management; Bedrails   Additional Comments Able to sit EOB ~5 minutes  Variable levels of support required, briefly able to maintain sitting balance with MIN A, requiring MAX A with fatigue (noted leaning posteriorly)   Coordination   Gross Motor slow, able to grasp cup & comb with R&LUE and bring to face/hair   Fine Motor impaired, able to open toothpaste container with increased time, however decreased dexterity noted with comb and required assist to apply toothpaste   Cognition   Overall Cognitive Status Impaired   Arousal/Participation Alert; Cooperative   Attention Attends with cues to redirect   Following Commands Follows one step commands without difficulty   Activity Tolerance   Activity Tolerance Patient limited by fatigue;Patient limited by pain   Medical Staff Made Aware Spoke to RN - pt appropriate to be seen   Assessment   Assessment Patient participated in Skilled OT session this date with interventions consisting of bed mobility, sitting balance/tolerance, ADL re-training  Patient agreeable to OT treatment session, upon arrival patient was found supine in bed  In comparison to previous session, patient with improvements in grooming tasks - pt able to grasp comb and bring to hair with R and LUE during today's session, continues to have decreased fine motor control/dexterity, and continues require assist to comb back of hair   Pt also able to bring toothbrush to mouth during today's session, continues to require hand over hand assist to apply toothpaste  Patient continues to be functioning below baseline level, occupational performance remains limited secondary to factors listed above and increased risk for falls and injury  From OT standpoint, recommendation at time of d/c would be Short Term Rehab  Patient to benefit from continued Occupational Therapy treatment while in the hospital to address deficits as defined above and maximize level of functional independence with ADLs and functional mobility  Plan   Treatment Interventions ADL retraining;Functional transfer training;UE strengthening/ROM; Patient/family training;Fine motor coordination activities   Goal Expiration Date 08/20/19   Treatment Day 3   OT Frequency 3-5x/wk   Recommendation   OT Discharge Recommendation Short Term Rehab   OT - OK to Discharge Yes  (when medically appropriate)       Sahil Hernández, OT

## 2019-08-15 NOTE — PLAN OF CARE
Problem: PHYSICAL THERAPY ADULT  Goal: Performs mobility at highest level of function for planned discharge setting  See evaluation for individualized goals  Description  Treatment/Interventions: LE strengthening/ROM, Functional transfer training, Therapeutic exercise, Endurance training, Bed mobility, Spoke to nursing, OT          See flowsheet documentation for full assessment, interventions and recommendations  Outcome: Progressing  Note:   Prognosis: Fair  Problem List: Decreased strength, Decreased endurance, Impaired balance, Decreased mobility  Assessment: Pt tolerated treatment well and seems to be able to actively participate more with supine AAROM  Decreased LE edema noted  Continues to require max A x2 for bed mobility and lacks sitting endurance due to fatigue, pain, and SOB  Requires consistent verbal cues for encouragement and posture at EOB  Education provided on HEP  Pt left in bed in chair position after session  Good participation overall and will benefit from ongoing skilled PT to maximize her functional mobility and increase her level of independence  Recommending rehab at time of discharge when medically appropriate  Barriers to Discharge: Decreased caregiver support     Recommendation: Short-term skilled PT     PT - OK to Discharge: Yes    See flowsheet documentation for full assessment

## 2019-08-15 NOTE — PROGRESS NOTES
08/15/19 1500   Clinical Encounter Type   Visited With Patient   Sacramental Encounters   Sacrament of Sick-Anointing Anointed

## 2019-08-15 NOTE — PROGRESS NOTES
Progress Note - Infectious Disease   Kristel Chung 78 y o  female MRN: [id number]  [facility] Encounter: [id number]      Impression/Plan:  1  Systemic inflammatory response syndrome- Possibly related the patient's fungemia   Possibly secondary to peritonitis   The patient is now improved and moved out of the intensive care unit  The leukocytosis has persisted   Stool for C diff is negative   The patient completed more than 10 days of intravenous antibacterials   The white blood cell count has decreased but remains elevated  The procalcitonin level has continued to decrease  -no additional antibacterials for now  -continue antifungals as below  -recheck CT of the chest abdomen pelvis if leukocytosis does not continue to improve without a new source  -low threshold to tap ascites and sent for cultures  -recheck procalcitonin level   -check CBC with diff tomorrow  -supportive care     2   Fungemia-possibly all translocation across the gut wall in the setting of peritonitis   Possible catheter related bacteremia although this is less likely as the blood cultures were from around the time of admission   The central line has been changed   Fungemia has cleared   The organism is not resistant to fluconazole   -continue fluconazole through 8/21/2019 to complete 14 days from the 1st negative blood cultures  -need to monitor liver function tests at least weekly on high-dose fluconazole     3  Peritonitis-status post exploration and repair   No cultures obtained at the time of the surgery   Patient does have significant turbid drainage from the superior aspect of the wound   Patient has been maintained on broad antibiotics   The patient continues to have a brisk leukocytosis   Significant ascites noted   She completed more than 10 days of intravenous antibiotics   Patient is stable off all antibiotics in the white cell count has decreased slightly over the last 24 hours  -monitor off all antibiotics  -recheck CBC with diff  -if leukocytosis does not continue to improve, recommend repeat CT of the chest abdomen and pelvis as above  -low threshold to tap ascites and sent for culture  -serial abdominal exams  -close surgical follow-up     4  Acute hypoxic respiratory failure-likely secondary to hemoptysis aspiration and a pneumothorax   Less likely pneumonia  Shruthi García is now extubated and on O2 by nasal cannula   Repeat chest x-ray with slightly increased volume   Bronchoscopy cultures without any resistant organisms   Her respiratory status is improved  She is off oxygen support  -oxygen support as needed  -monitor respiratory status    Antibiotics:  Fluconazole 3  Antifungals 8 since the 1st negative blood culture    Subjective:  Patient has no fever, chills, sweats; no nausea, vomiting, diarrhea; no cough, shortness of breath; no increased pain  No new symptoms  She is not requiring any oxygen support  Objective:  Vitals:  Temp:  [97 8 °F (36 6 °C)-99 1 °F (37 3 °C)] 99 1 °F (37 3 °C)  HR:  [] 97  Resp:  [16-24] 18  BP: (140-149)/(77-84) 148/83  SpO2:  [88 %-98 %] 98 %  Temp (24hrs), Av 5 °F (36 9 °C), Min:97 8 °F (36 6 °C), Max:99 1 °F (37 3 °C)  Current: Temperature: 99 1 °F (37 3 °C)    Physical Exam:   General Appearance:  Alert, interactive, nontoxic, no acute distress  Throat: Oropharynx moist without lesions  Lungs:   Decreased breath sounds bilaterally; no wheezes, rhonchi or rales; respirations unlabored   Heart:  Tachycardia; no murmur, rub or gallop   Abdomen:   Soft, non-tender, non-distended, positive bowel sounds  Extremities: No clubbing, cyanosis or edema   Skin: No new rashes or lesions  No draining wounds noted         Labs, Imaging, & Other studies:   All pertinent labs and imaging studies were personally reviewed  Results from last 7 days   Lab Units 08/15/19  0557 19  0547 19  0520   WBC Thousand/uL 25 32* 29 96* 25 94*   HEMOGLOBIN g/dL 7 9* 8 5* 8 6*   PLATELETS Thousands/uL 547* 644* 577*     Results from last 7 days   Lab Units 08/15/19  0557 08/14/19  0547 08/13/19  0520   SODIUM mmol/L 135* 139 148*   POTASSIUM mmol/L 4 0 3 8 3 6   CHLORIDE mmol/L 99* 102 107   CO2 mmol/L 33* 34* 37*   BUN mg/dL 13 9 9   CREATININE mg/dL 0 93 0 68 0 43*   EGFR ml/min/1 73sq m 62 89 103   CALCIUM mg/dL 7 6* 7 8* 8 1*   AST U/L  --  26 29   ALT U/L  --  14 16   ALK PHOS U/L  --  158* 163*     Results from last 7 days   Lab Units 08/11/19  1034 08/08/19  1245   GRAM STAIN RESULT   --  3+ Polys  No bacteria seen   C DIFF TOXIN B  NEGATIVE for C difficle toxin by PCR    --      Results from last 7 days   Lab Units 08/15/19  0557 08/14/19  0547 08/11/19  0453 08/10/19  0439 08/09/19  0451   PROCALCITONIN ng/ml 0 26* 0 33* 0 81* 0 69* 0 88*     Chest x-ray-bilateral airspace disease with effusions      Images personally reviewed by me in PACS

## 2019-08-15 NOTE — PROGRESS NOTES
Progress Note - [facility] A [place] 78 y o  female MRN: [id number]    [facility] Encounter: [id number]      Assessment:  66yo female s/p recent laparoscopic ventral hernia repair with iatrogenic small bowel injury on 7/31 now s/p ex-lap, mesh explantation, small bowel resection and eventual abdominal closure  Candida glabrata fungemia  Protein calorie malnutrition    VSS  Afebrile  WBC 29--> 25  Abd soft/ nontender/ non distended  Picc line placed and IJ removed on 8/14  Plan:  Tube feeds  PT/OT  Continue to trend WBC  Continue antifungals per ID, apprec recs  Subjective:   Denied fever, chills, chest pain, shortness of breath, nausea, vomiting, or abdominal pain this morning  Objective:     Vitals: Blood pressure 140/77, pulse 99, temperature 98 4 °F (36 9 °C), resp  rate (!) 24, height 5' 4" (1 626 m), weight 77 kg (169 lb 12 1 oz), SpO2 94 %  ,Body mass index is 29 14 kg/m²        Intake/Output Summary (Last 24 hours) at 8/15/2019 0334  Last data filed at 8/15/2019 0309  Gross per 24 hour   Intake 3657 5 ml   Output 3350 ml   Net 307 5 ml     Scheduled Meds:  Current Facility-Administered Medications:  acetaminophen 650 mg Oral Q6H PRN Nii Salinas PA-C    bisacodyl 10 mg Rectal Daily PRN Nii Salinas PA-C    dextrose 5 % and sodium chloride 0 45 % with KCl 20 mEq/L 75 mL/hr Intravenous Continuous Ole RopDO miya Last Rate: 75 mL/hr (08/14/19 2201)   escitalopram 10 mg Per G Tube Daily Nii Salinas PA-C    fluconazole 800 mg Oral Daily Lakia Du,     folic acid 1 mg Per G Tube Daily Nii Salinas PA-C    gabapentin 100 mg Oral HS Nii Salinas PA-C    guaiFENesin 200 mg Oral Q4H PRN Josph Meals, DO    heparin (porcine) 5,000 Units Subcutaneous [facility] Nii Salinas PA-C    HYDROmorphone 0 2 mg Intravenous Q4H PRN Nii Salinas PA-C    iohexol 50 mL Oral 90 min pre-procedure Nii Salinas PA-C    lidocaine 1 patch Topical Daily Sheela Fischer IMELDA Fam    melatonin 3 mg Per G Tube HS Vidal Pope PA-C    methocarbamol 500 mg Oral Q6H PRN Vidal Pope PA-C    morphine injection 1 mg Intravenous Q4H PRN Marilu Nazario MD    omeprazole (PRILOSEC) suspension 2 mg/mL 20 mg Oral BID AC Titus Fam PA-C    ondansetron 4 mg Intravenous Q4H PRN Mellody Bongracia, IMELDA    oxyCODONE 2 5 mg Oral Q4H PRN Mellody Bongracia, IMELDA    Or        oxyCODONE 5 mg Oral Q4H PRN Mellody Niko, IMELDA    pravastatin 20 mg Per G Tube Daily With [employer], IMELDA    saccharomyces boulardii 250 mg Oral BID Luanaody Niko, IMELDA    saliva substitute 5 spray Mouth/Throat PRN Marilu Nazario MD      Continuous Infusions:  dextrose 5 % and sodium chloride 0 45 % with KCl 20 mEq/L 75 mL/hr Last Rate: 75 mL/hr (08/14/19 2201)     PRN Meds:   acetaminophen    bisacodyl    guaiFENesin    HYDROmorphone    methocarbamol    morphine injection    ondansetron    oxyCODONE **OR** oxyCODONE    saliva substitute    Physical Exam  General: NAD  HEENT: NC/AT  MMM  Cv: RRR  Lungs: normal effort  Ab: Soft, NT/ND  Wound clean and dry  Ex: no CCE  Neuro: AAOx3    Invasive Devices     Peripherally Inserted Central Catheter Line            PICC Line 44/02/21 Right Basilic less than 1 day          Drain            External Urinary Catheter 6 days                Lab, Imaging and other studies: I have personally reviewed pertinent reports      VTE Pharmacologic Prophylaxis: Heparin  VTE Mechanical Prophylaxis: sequential compression device

## 2019-08-15 NOTE — PLAN OF CARE
Problem: Nutrition/Hydration-ADULT  Goal: Nutrient/Hydration intake appropriate for improving, restoring or maintaining nutritional needs  Description  Monitor and assess patient's nutrition/hydration status for malnutrition (ex- brittle hair, bruises, dry skin, pale skin and conjunctiva, muscle wasting, smooth red tongue, and disorientation)  Collaborate with interdisciplinary team and initiate plan and interventions as ordered  Monitor patient's weight and dietary intake as ordered or per policy  Utilize nutrition screening tool and intervene per policy  Determine patient's food preferences and provide high-protein, high-caloric foods as appropriate       INTERVENTIONS:  - Monitor oral intake, urinary output, labs, and treatment plans  - Assess nutrition and hydration status and recommend course of action  - Evaluate amount of meals eaten  - Assist patient with eating if necessary   - Allow adequate time for meals  - Recommend/ encourage appropriate diets, oral nutritional supplements, and vitamin/mineral supplements  - Order, calculate, and assess calorie counts as needed  - Recommend, monitor, and adjust tube feedings and TPN/PPN based on assessed needs  - Assess need for intravenous fluids  - Provide specific nutrition/hydration education as appropriate  - Include patient/family/caregiver in decisions related to nutrition  8/15/2019 1607 by Melissa Downey RD  Outcome: Progressing  8/15/2019 1604 by Melissa Downey RD  Outcome: Progressing

## 2019-08-15 NOTE — PROGRESS NOTES
08/14/19 9996   [facility]   [facility]  ( [place])   Sacramental Encounters   Sacrament of Sick-Anointing Anointed

## 2019-08-15 NOTE — PHYSICAL THERAPY NOTE
PHYSICAL THERAPY TREATMENT NOTE    Patient Name: Myesha Quevedo  SYXAG'W Date: 8/15/2019     08/15/19 1045   Pain Assessment   Pain Assessment 0-10   Pain Score 8   Pain Location Back   Hospital Pain Intervention(s) Repositioned   Response to Interventions tolerated   Restrictions/Precautions   Weight Bearing Precautions Per Order No   Other Precautions Cognitive; Bed Alarm; Fall Risk;Pain;O2;Telemetry;Multiple lines   General   Chart Reviewed Yes   Response to Previous Treatment Patient with no complaints from previous session  Family/Caregiver Present No   Cognition   Overall Cognitive Status Impaired   Arousal/Participation Alert; Cooperative   Attention Attends with cues to redirect   Comments Pt identified by name and   Subjective   Subjective Pt agrees to PT treatment, reports she feels improvement since yesterday  Bed Mobility   Supine to Sit 2  Maximal assistance   Additional items Assist x 2;HOB elevated; Bedrails; Increased time required;Verbal cues;LE management   Sit to Supine 2  Maximal assistance   Additional items Assist x 2;HOB elevated; Bedrails; Increased time required;Verbal cues;LE management   Additional Comments Able to sit EOB for ~ 5` with varying level of support required from Poor (+) to Fair (-)  BUE support required and consistent verbal cues required  (+) Posterior lean in sitting  Transfers   Sit to Stand Unable to assess  (due to decreased sitting tolerance)   Balance   Static Sitting Poor +  (with periods of Fair (-))   Endurance Deficit   Endurance Deficit Yes   Endurance Deficit Description limited sitting tolerance   Activity Tolerance   Activity Tolerance Patient limited by fatigue;Patient limited by pain   Nurse Made Aware Per RN, pt appropriate to treat   Exercises   Quad Sets Supine;15 reps;AROM; Bilateral  (x2)   Heelslides Supine;15 reps;AROM; Bilateral   Knee AROM Short Arc Quad Supine;15 reps;AROM; Bilateral  (x2)   Ankle Pumps Supine;20 reps;AROM; Bilateral  (x2)   Heel Cord Stretch Supine;20 reps;PROM; Bilateral   Assessment   Prognosis Fair   Problem List Decreased strength;Decreased endurance; Impaired balance;Decreased mobility   Assessment Pt tolerated treatment well and seems to be able to actively participate more with supine AAROM  Decreased LE edema noted  Continues to require max A x2 for bed mobility and lacks sitting endurance due to fatigue, pain, and SOB  Requires consistent verbal cues for encouragement and posture at EOB  Education provided on HEP  Pt left in bed in chair position after session  Good participation overall and will benefit from ongoing skilled PT to maximize her functional mobility and increase her level of independence  Recommending rehab at time of discharge when medically appropriate  Goals   Patient Goals to be able to move better   [facility] Expiration Date 08/20/19   Treatment Day 4   Plan   Treatment/Interventions Functional transfer training;LE strengthening/ROM; Therapeutic exercise; Endurance training;Patient/family training;Equipment eval/education; Bed mobility   Progress Slow progress, decreased activity tolerance   PT Frequency   (3-5x/week)   Recommendation   Recommendation Short-term skilled PT   Equipment Recommended   (pending progress)   Nikos Peña, PT,DPT

## 2019-08-15 NOTE — PLAN OF CARE
Problem: OCCUPATIONAL THERAPY ADULT  Goal: Performs self-care activities at highest level of function for planned discharge setting  See evaluation for individualized goals  Description  Treatment Interventions: ADL retraining, Functional transfer training, UE strengthening/ROM, Endurance training, Cognitive reorientation, Patient/family training, Equipment evaluation/education, Compensatory technique education, Fine motor coordination activities, Energy conservation, Activityengagement          See flowsheet documentation for full assessment, interventions and recommendations  Outcome: Progressing  Note:   Limitation: Decreased ADL status, Decreased UE ROM, Decreased UE strength, Decreased Safe judgement during ADL, Decreased cognition, Decreased endurance, Decreased fine motor control, Decreased self-care trans, Decreased high-level ADLs  Prognosis: Fair, Good  Assessment: Patient participated in Skilled OT session this date with interventions consisting of bed mobility, sitting balance/tolerance, ADL re-training  Patient agreeable to OT treatment session, upon arrival patient was found supine in bed  In comparison to previous session, patient with improvements in grooming tasks - pt able to grasp comb and bring to hair with R and LUE during today's session, continues to have decreased fine motor control/dexterity, and continues require assist to comb back of hair  Pt also able to bring toothbrush to mouth during today's session, continues to require hand over hand assist to apply toothpaste  Patient continues to be functioning below baseline level, occupational performance remains limited secondary to factors listed above and increased risk for falls and injury  From OT standpoint, recommendation at time of d/c would be Short Term Rehab     Patient to benefit from continued Occupational Therapy treatment while in the hospital to address deficits as defined above and maximize level of functional independence with ADLs and functional mobility     Recommendation: Geriatric Consult(MAY BENEFIT FROM NEUROPSYCH CONSULT FOR COPING)  OT Discharge Recommendation: Short Term Rehab  OT - OK to Discharge: Yes(when medically appropriate)

## 2019-08-15 NOTE — MALNUTRITION/BMI
This medical record reflects one or more clinical indicators suggestive of malnutrition    Malnutrition Findings:   Malnutrition type: Acute illness(Related to medical condition as evidenced by <75% energy intake needs met >7 days and generalized +1 edema noted treated with ensure enlive supplements)  Degree of Malnutrition: Malnutrition of moderate degree  Malnutrition Characteristics: Inadequate energy, Fluid accumulation        See Nutrition note dated 8/15/19 for additional details  Completed nutrition assessment is viewable in the nutrition documentation

## 2019-08-15 NOTE — SPEECH THERAPY NOTE
Speech Language/Pathology    Speech/Language Pathology Progress Note    Patient Name: Pauly Rollins  Today's Date: 8/15/2019     Problem List  Patient Active Problem List   Diagnosis    Osteoarthritis of knee    Joint pain, knee    Eagle's esophagus with high grade dysplasia    3-vessel CAD    Abnormal blood chemistry    A-fib ([address] )    Anemia    Atypical chest pain    Backache with radiation    Continuous left lower quadrant pain    Depression    Dysphagia    Gastroesophageal reflux disease    Hyperlipidemia    Hypothyroidism    Insomnia    Low vitamin D level    Lumbar radiculopathy    Myofascial pain    Right leg paresthesias    Sciatica    Spondylosis of 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    Stricture of esophagus    Venous insufficiency    Incisional hernia    Adrenal adenoma    Incisional hernia, without obstruction or gangrene    Bilateral pneumonia    Postprocedural pneumothorax    OR Exploratory laparotomy, SBR secondary to iatrogenic enterotomy     OR Laparoscopic ventral hernia repair    Fungemia        Past Medical History  Past Medical History:   Diagnosis Date    Abnormal blood chemistry     last assessed 03/06/2014    Achalasia, esophageal     Acute medial meniscus tear     last assessed 03/10/2014    Acute otitis externa     last assessed 03/24/2014    Adrenal nodule ([address] )     Anemia     Anxiety     Arthritis     Atrial fibrillation ([address] )     resolved 01/09/2018    Atypical chest pain     last assessed 11/22/2016    Eagle's esophagus with high grade dysplasia     last assessed 01/03/2018    Cancer ([address] )     esophageal    Cerumen impaction     last assessed 03/24/2014    Chronic pain of right knee     last assessed 04/06/2017    Coronary artery disease     3 vessel, resolved 01/09/2018    Depression     Dysphagia     last assessed 06/21/2017    Esophageal stricture     last assessed 06/21/2017    GERD (gastroesophageal reflux disease)     Headache     last assessed 2013    Hiatal hernia     Insomnia     last assessed 10/15/2013    [address]junostomy tube present [address] [address]     resolved 2018    Low vitamin D level     resolved 2018    Osteoarthritis, knee     last assessed 2017    Pneumonia     last assessed 2013    Right leg paresthesias     last assessed 2017    Sciatica     last assessed 2015    Shingles     last assessed 2015    Spondylosis of 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     last assessed 2017    Venous insufficiency     last assessed 2013        Past Surgical History  Past Surgical History:   Procedure Laterality Date     SECTION      EXPLORATORY LAPAROTOMY W/ BOWEL RESECTION N/A 2019    Procedure: LAPAROTOMY EXPLORATORY W/ BOWEL RESECTION, APPLICATION OF Theresa Frieze;  Surgeon: Daksha Paz DO;  [facility];  Service: General    GASTRECTOMY      partial    GASTROJEJUNOSTOMY W/ JEJUNOSTOMY TUBE N/A 2017    Procedure: Raffi Links;  Surgeon: Claude Del, MD;  [facility];  Service: Thoracic    JOINT REPLACEMENT Right 11/10/2014    knee, Dr Tahmina Torres N/A 8/3/2019    Procedure: LAPAROTOMY EXPLORATORY, reanastomosis of proximal small bowel, placement of vac dressing;  Surgeon: Daksha Paz DO;  [facility];  Service: General    WA ESOPHAGOGASTRODUODENOSCOPY TRANSORAL DIAGNOSTIC N/A 3/10/2017    Procedure: ESOPHAGOGASTRODUODENOSCOPY (EGD); Surgeon: Yao Key MD;  [facility];  Service: Gastroenterology    WA ESOPHAGOGASTRODUODENOSCOPY TRANSORAL DIAGNOSTIC N/A 2017    Procedure: ESOPHAGOGASTRODUODENOSCOPY (EGD); Surgeon: Yao Key MD;  [facility];  Service: Gastroenterology    WA ESOPHAGOSCOPY FLEXIBLE TRANSORAL WITH BIOPSY N/A 2017    Procedure: ESOPHAGOGASTRODUODENOSCOPY (EGD);   Surgeon: Claude Del, MD;  Location:  MAIN OR; Service: Thoracic    NE LAP, VENTRAL HERNIA REPAIR,REDUCIBLE N/A 7/31/2019    Procedure: REPAIR HERNIA VENTRAL LAPAROSCOPIC;  Surgeon: Maria Abarca DO;  [facility];  Service: General    NE REMOVAL [address] THORACOTOMY N/A 8/24/2017    Procedure: TRANSHIATAL ESOPHAGECTOMY;  Surgeon: Stormy King MD;  [facility];  Service: Thoracic    STEROID INJECTION KNEE Right 5/2/2017    Procedure: GENICULATE NERVE BLOCKS;  Surgeon: Toya Lou DO;  [facility];  Service:          Subjective:  "I ate the jello and some ice cream"     Objective: The patient is awake and alert  She is sitting upright in bed and feeding herself lunch  The patient continues on a puree diet with thin liquids  She is still on NC with some SOB throughout  O2 remains stable  She is assessed with thin liquids and is trialed with upgraded mechanical soft solids  The patient feeds herself peaches x2 bites  Mastication is timely with good a-p transfer  No oral residue is observed  She takes small, single sips of thin liquids via straw  No overt s/s aspiration observed  Intake is poor, but patient reports strong dislike of puree solids  Assessment:  Patient tolerated trials of mechanical soft solids well  Plan/Recommendations:  Recommend diet upgrade to mechanical soft with thin liquids  ST will continue to further assess tolerance

## 2019-08-16 LAB
ANION GAP SERPL CALCULATED.3IONS-SCNC: 5 MMOL/L (ref 4–13)
BASOPHILS # BLD AUTO: 0.08 THOUSANDS/ΜL (ref 0–0.1)
BASOPHILS NFR BLD AUTO: 0 % (ref 0–1)
BUN SERPL-MCNC: 17 MG/DL (ref 5–25)
CALCIUM SERPL-MCNC: 7.6 MG/DL (ref 8.3–10.1)
CHLORIDE SERPL-SCNC: 97 MMOL/L (ref 100–108)
CO2 SERPL-SCNC: 32 MMOL/L (ref 21–32)
CREAT SERPL-MCNC: 1.35 MG/DL (ref 0.6–1.3)
EOSINOPHIL # BLD AUTO: 0.1 THOUSAND/ΜL (ref 0–0.61)
EOSINOPHIL NFR BLD AUTO: 0 % (ref 0–6)
ERYTHROCYTE [DISTWIDTH] IN BLOOD BY AUTOMATED COUNT: 18 % (ref 11.6–15.1)
GFR SERPL CREATININE-BSD FRML MDRD: 40 ML/MIN/1.73SQ M
GLUCOSE SERPL-MCNC: 79 MG/DL (ref 65–140)
HCT VFR BLD AUTO: 24.8 % (ref 34.8–46.1)
HGB BLD-MCNC: 8.1 G/DL (ref 11.5–15.4)
IMM GRANULOCYTES # BLD AUTO: >0.5 THOUSAND/UL (ref 0–0.2)
IMM GRANULOCYTES NFR BLD AUTO: 3 % (ref 0–2)
LYMPHOCYTES # BLD AUTO: 3.14 THOUSANDS/ΜL (ref 0.6–4.47)
LYMPHOCYTES NFR BLD AUTO: 13 % (ref 14–44)
MCH RBC QN AUTO: 29.1 PG (ref 26.8–34.3)
MCHC RBC AUTO-ENTMCNC: 32.7 G/DL (ref 31.4–37.4)
MCV RBC AUTO: 89 FL (ref 82–98)
MONOCYTES # BLD AUTO: 1.62 THOUSAND/ΜL (ref 0.17–1.22)
MONOCYTES NFR BLD AUTO: 7 % (ref 4–12)
NEUTROPHILS # BLD AUTO: 18.44 THOUSANDS/ΜL (ref 1.85–7.62)
NEUTS SEG NFR BLD AUTO: 77 % (ref 43–75)
NRBC BLD AUTO-RTO: 0 /100 WBCS
PLATELET # BLD AUTO: 535 THOUSANDS/UL (ref 149–390)
PMV BLD AUTO: 10.1 FL (ref 8.9–12.7)
POTASSIUM SERPL-SCNC: 4.3 MMOL/L (ref 3.5–5.3)
RBC # BLD AUTO: 2.78 MILLION/UL (ref 3.81–5.12)
SODIUM SERPL-SCNC: 134 MMOL/L (ref 136–145)
WBC # BLD AUTO: 24.06 THOUSAND/UL (ref 4.31–10.16)

## 2019-08-16 PROCEDURE — 80048 BASIC METABOLIC PNL TOTAL CA: CPT | Performed by: STUDENT IN AN ORGANIZED HEALTH CARE EDUCATION/TRAINING PROGRAM

## 2019-08-16 PROCEDURE — 97530 THERAPEUTIC ACTIVITIES: CPT

## 2019-08-16 PROCEDURE — 99233 SBSQ HOSP IP/OBS HIGH 50: CPT | Performed by: INTERNAL MEDICINE

## 2019-08-16 PROCEDURE — 85025 COMPLETE CBC W/AUTO DIFF WBC: CPT | Performed by: STUDENT IN AN ORGANIZED HEALTH CARE EDUCATION/TRAINING PROGRAM

## 2019-08-16 PROCEDURE — 97112 NEUROMUSCULAR REEDUCATION: CPT

## 2019-08-16 PROCEDURE — 99024 POSTOP FOLLOW-UP VISIT: CPT | Performed by: SURGERY

## 2019-08-16 PROCEDURE — 94760 N-INVAS EAR/PLS OXIMETRY 1: CPT

## 2019-08-16 PROCEDURE — 94668 MNPJ CHEST WALL SBSQ: CPT

## 2019-08-16 RX ORDER — FLUCONAZOLE 200 MG/1
400 TABLET ORAL DAILY
Status: DISCONTINUED | OUTPATIENT
Start: 2019-08-17 | End: 2019-08-19

## 2019-08-16 RX ORDER — SODIUM CHLORIDE 9 MG/ML
60 INJECTION, SOLUTION INTRAVENOUS CONTINUOUS
Status: DISCONTINUED | OUTPATIENT
Start: 2019-08-16 | End: 2019-08-19

## 2019-08-16 RX ADMIN — ESCITALOPRAM OXALATE 10 MG: 10 TABLET ORAL at 08:17

## 2019-08-16 RX ADMIN — SODIUM CHLORIDE 60 ML/HR: 0.9 INJECTION, SOLUTION INTRAVENOUS at 08:15

## 2019-08-16 RX ADMIN — Medication 250 MG: at 17:01

## 2019-08-16 RX ADMIN — PRAVASTATIN SODIUM 20 MG: 20 TABLET ORAL at 17:01

## 2019-08-16 RX ADMIN — MELATONIN 3 MG: 3 TAB ORAL at 21:33

## 2019-08-16 RX ADMIN — HEPARIN SODIUM 5000 UNITS: 5000 INJECTION INTRAVENOUS; SUBCUTANEOUS at 21:33

## 2019-08-16 RX ADMIN — FLUCONAZOLE 800 MG: 200 TABLET ORAL at 08:18

## 2019-08-16 RX ADMIN — OXYCODONE HYDROCHLORIDE 5 MG: 5 TABLET ORAL at 20:21

## 2019-08-16 RX ADMIN — Medication 250 MG: at 08:17

## 2019-08-16 RX ADMIN — HYDROMORPHONE HYDROCHLORIDE 0.2 MG: 1 INJECTION, SOLUTION INTRAMUSCULAR; INTRAVENOUS; SUBCUTANEOUS at 21:41

## 2019-08-16 RX ADMIN — HEPARIN SODIUM 5000 UNITS: 5000 INJECTION INTRAVENOUS; SUBCUTANEOUS at 05:14

## 2019-08-16 RX ADMIN — Medication 20 MG: at 17:01

## 2019-08-16 RX ADMIN — FOLIC ACID 1 MG: 1 TABLET ORAL at 08:17

## 2019-08-16 RX ADMIN — HEPARIN SODIUM 5000 UNITS: 5000 INJECTION INTRAVENOUS; SUBCUTANEOUS at 13:06

## 2019-08-16 RX ADMIN — LIDOCAINE 1 PATCH: 50 PATCH CUTANEOUS at 08:23

## 2019-08-16 RX ADMIN — GABAPENTIN 100 MG: 100 CAPSULE ORAL at 21:33

## 2019-08-16 NOTE — PLAN OF CARE
Problem: PHYSICAL THERAPY ADULT  Goal: Performs mobility at highest level of function for planned discharge setting  See evaluation for individualized goals  Description  Treatment/Interventions: LE strengthening/ROM, Functional transfer training, Therapeutic exercise, Endurance training, Bed mobility, Spoke to nursing, OT          See flowsheet documentation for full assessment, interventions and recommendations  Outcome: Not Progressing  Note:   Prognosis: Fair  Problem List: Decreased strength, Decreased endurance, Impaired balance, Decreased mobility, Pain, Decreased safety awareness, Impaired judgement, Decreased cognition  Assessment: PT INITIATED TREATMENT SESSION IN ORDER TO ASSIST PATIENT IN ACHIEVING GOALS TO IMPROVE OVERALL ACTIVITY TOLERANCE AND TRANSFERS  FUNCTIONAL MOBILITY REMAINS LIMITED BY SIGNIFICANT LETHARGY, B/L LE EDEMA, AND GROSS STRENGTH DEFICITS  PATIENT INCONTINENT OF BOWEL UPON ARRIVAL REQUIRING MAX-AX1 TO ROLL DURING AMOS-HYGIENE CARE  PATIENT IS PLEASANT AND COOPERATIVE HOWEVER REQUIRES INCREASED VC TO PARTICIPATE  SHE REQUIRED MAX-AX2 FOR SUPINE-->SIT TRANSFER AND CONTACT GUARD ASSISTANCE TO MAINTAIN STATIC SEATED BALANCE X 7 MINUTES (TENDENCY TO LEAN POSTERIORLY)  PATIENT REQUIRED MAX-AX2 TO PERFORM SIT PIVOT TRANSFER TO DROP ARM CHAIR DURING THREE BUMPS OVER WITH USE OF BED PAD  RECOMMENDED FOR RN STAFF/RESTORATIVE TO SLIDE PATIENT BACK TO BED WITH SMOOTH   PT D/C RECOMMENDATION FOR SHORT TERM REHAB REMAINS APPROPRIATE  PATIENT WILL BENEFIT FROM CONTINUED SKILLED PT THIS ADMISSION TO ACHIEVE MAXIMAL FUNCTION AND SAFETY  Barriers to Discharge: Decreased caregiver support     Recommendation: (S) Short-term skilled PT     PT - OK to Discharge: (S) Yes(TO STR WHEN MED OMKAR )    See flowsheet documentation for full assessment

## 2019-08-16 NOTE — SOCIAL WORK
Patient not medically cleared today  Possibly here until Tuesday or Wednesday INc WBC count  Patient is tachycardia

## 2019-08-16 NOTE — PROGRESS NOTES
Progress Note - General Surgery   Kristel KAHN Hartranft 78 y o  female MRN: [id number]  Unit/Bed#: [facility] 824-01 Encounter: [id number]    Assessment:  68yo female s/p recent laparoscopic ventral hernia repair with iatrogenic small bowel injury on 7/31 now s/p ex-lap, mesh explantation, small bowel resection and eventual abdominal closure  Candida glabrata fungemia  Protein calorie malnutrition     Intermittent tachycardia 110s  Afebrile  Tolerating soft/solid diet  Urine output adequate  Having bowel mvts  WBC 25-->24  Abd soft/ nontender/ non distended       Plan:  Soft/solid diet, per speech path  PT/OT  Continue to trend WBC  Continue antifungals per ID, apprec recs  Subjective/Objective     Subjective: Denied fever, chills, chest pain, shortness of breath, nausea, vomiting, or abdominal pain this morning  Objective:     Blood pressure 137/76, pulse (!) 107, temperature 99 °F (37 2 °C), resp  rate (!) 24, height 5' 4" (1 626 m), weight 76 9 kg (169 lb 8 oz), SpO2 94 %  ,Body mass index is 29 09 kg/m²  Intake/Output Summary (Last 24 hours) at 8/16/2019 0457  Last data filed at 8/15/2019 1801  Gross per 24 hour   Intake 1140 ml   Output 2250 ml   Net -1110 ml       Invasive Devices     Peripherally Inserted Central Catheter Line            PICC Line 79/54/09 Right Basilic 1 day          Drain            External Urinary Catheter 7 days                Physical Exam  General: NAD  HEENT: NC/AT  MMM  Cv: RRR  Lungs: normal effort  Ab: Soft, NT/ND  Ex: no CCE  Neuro: AAOx3      Lab, Imaging and other studies:I have personally reviewed pertinent lab results      VTE Pharmacologic Prophylaxis: Heparin  VTE Mechanical Prophylaxis: sequential compression device     Kristine Lorenzo, PGY1  Gen Surg  8/16/2019

## 2019-08-16 NOTE — OCCUPATIONAL THERAPY NOTE
[address] Progress Note     Patient Name: Zbigniew Singh  Today's Date: 8/16/2019  Problem List  Patient Active Problem List   Diagnosis    Osteoarthritis of knee    Joint pain, knee    Eagle's esophagus with high grade dysplasia    3-vessel CAD    Abnormal blood chemistry    A-fib ([address]    Anemia    Atypical chest pain    Backache with radiation    Continuous left lower quadrant pain    Depression    Dysphagia    Gastroesophageal reflux disease    Hyperlipidemia    Hypothyroidism    Insomnia    Low vitamin D level    Lumbar radiculopathy    Myofascial pain    Right leg paresthesias    Sciatica    Spondylosis of 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    Stricture of esophagus    Venous insufficiency    Incisional hernia    Adrenal adenoma    Incisional hernia, without obstruction or gangrene    Bilateral pneumonia    Postprocedural pneumothorax    OR Exploratory laparotomy, SBR secondary to iatrogenic enterotomy     OR Laparoscopic ventral hernia repair    Fungemia           08/16/19 1617   Restrictions/Precautions   Weight Bearing Precautions Per Order No   Other Precautions Pain; Fall Risk;O2;Telemetry;Multiple lines;Cognitive; Chair Alarm; Bed Alarm   Lifestyle   Autonomy PT REPORTS BEING INDEPENDENT WITH ADLS/IADLS/DRIVING PTA    Reciprocal Relationships LIVES ALONE  LIMITED FAMILY SUPPORT AVAILABLE    Service to Others RETIRED   Intrinsic Gratification ENJOYS SPENDING TIME WITH HER GRANDCHILDREN    Pain Assessment   Pain Assessment Marquez-Baker FACES   Marquez-Baker FACES Pain Rating 2   Pain Type Chronic pain   Pain Location Back   ADL   Where Assessed Chair   Eating Assistance 5  Supervision/Setup   Eating Deficit Beverage management   Eating Comments able to reach, grasp water cup and bring to mouth   Bed Mobility   Supine to Sit 2  Maximal assistance   Additional items Assist x 2; Increased time required;Verbal cues   Transfers   Sit to Stand Unable to assess   Sit pivot 2  Maximal assistance   Additional items Assist x 2  (chair>bed with drop arm chair-sit pivot /scooting)   Functional Mobility   Additional Comments unsafe to assess   Cognition   Overall Cognitive Status Unable to assess  (2* to fatigue -continue to assess)   Arousal/Participation Alert;Lethargic   Attention Attends with cues to redirect   Orientation Level Oriented X4   Following Commands Follows one step commands with increased time or repetition   Comments Increased fatigue/lethargic 2* to sitting in chair   Activity Tolerance   Activity Tolerance Patient limited by fatigue;Patient limited by pain   Medical Staff Made Aware RN cleared pt for therapy   Assessment   Assessment Patient participated in Skilled OT session this date with interventions consisting of functional transfers, self feeding   Patient agreeable to OT treatment session, upon arrival patient was found seated OOB to Chair  In comparison to previous session, patient with improvements in self feeding, ativity tolerance-increase sitting tolerance out of bed in chair this date   Patient requiring verbal cues for correct technique and frequent rest periods  Patient continues to be functioning below baseline level, occupational performance remains limited secondary to factors listed above and increased risk for falls and injury  From OT standpoint, recommendation at time of d/c would be Short Term Rehab  Patient to benefit from continued Occupational Therapy treatment while in the hospital to address deficits as defined above and maximize level of functional independence with ADLs and functional mobility  Plan   Treatment Interventions ADL retraining;Functional transfer training;UE strengthening/ROM; Endurance training;Patient/family training;Equipment evaluation/education; Compensatory technique education; Energy conservation; Activityengagement;Continued evaluation   Goal Expiration Date 08/20/19   Treatment Day 4   OT Frequency 3-5x/wk   Recommendation   OT Discharge Recommendation Short Term Rehab   OT - OK to Discharge Yes  (when medically cleared)     Dinah Cannon MOT, OTR/L

## 2019-08-16 NOTE — PLAN OF CARE
Problem: OCCUPATIONAL THERAPY ADULT  Goal: Performs self-care activities at highest level of function for planned discharge setting  See evaluation for individualized goals  Description  Treatment Interventions: ADL retraining, Functional transfer training, UE strengthening/ROM, Endurance training, Cognitive reorientation, Patient/family training, Equipment evaluation/education, Compensatory technique education, Fine motor coordination activities, Energy conservation, Activityengagement          See flowsheet documentation for full assessment, interventions and recommendations  Note:   Limitation: Decreased ADL status, Decreased UE ROM, Decreased UE strength, Decreased Safe judgement during ADL, Decreased cognition, Decreased endurance, Decreased fine motor control, Decreased self-care trans, Decreased high-level ADLs  Prognosis: Fair, Good  Assessment: Patient participated in Skilled OT session this date with interventions consisting of functional transfers, self feeding   Patient agreeable to OT treatment session, upon arrival patient was found seated OOB to Chair  In comparison to previous session, patient with improvements in self feeding, ativity tolerance-increase sitting tolerance out of bed in chair this date   Patient requiring verbal cues for correct technique and frequent rest periods  Patient continues to be functioning below baseline level, occupational performance remains limited secondary to factors listed above and increased risk for falls and injury  From OT standpoint, recommendation at time of d/c would be Short Term Rehab  Patient to benefit from continued Occupational Therapy treatment while in the hospital to address deficits as defined above and maximize level of functional independence with ADLs and functional mobility     Recommendation: Geriatric Consult(MAY BENEFIT FROM NEUROPSYCH CONSULT FOR COPING)  OT Discharge Recommendation: Short Term Rehab  OT - OK to Discharge: Yes(when medically cleared)

## 2019-08-16 NOTE — PHYSICAL THERAPY NOTE
PT TREATMENT       08/16/19 1120   Pain Assessment   Pain Assessment 0-10   Pain Score 3   Pain Type Acute pain;Surgical pain   Pain Location Abdomen   Hospital Pain Intervention(s) Repositioned   Restrictions/Precautions   Other Precautions Pain; Fall Risk;Multiple lines; Bed Alarm; Chair Alarm;Cognitive  (CHAIR ALARM ACTIVE POST PT TREAT)   General   Chart Reviewed Yes   Response to Previous Treatment Patient with no complaints from previous session  Family/Caregiver Present No   Cognition   Overall Cognitive Status Unable to assess   Arousal/Participation Alert;Lethargic   Attention Attends with cues to redirect   Subjective   Subjective "I CAN'T STAND"   Bed Mobility   Rolling R 2  Maximal assistance   Additional items Assist x 1; Increased time required;LE management   Rolling L 2  Maximal assistance   Additional items Assist x 1; Increased time required;LE management   Supine to Sit 2  Maximal assistance   Additional items Assist x 2; Increased time required;Verbal cues;LE management   Sit to Supine 2  Maximal assistance   Additional items Assist x 2; Increased time required;LE management;Verbal cues   Transfers   Sit pivot 2  Maximal assistance   Additional items Assist x 2; Increased time required;Verbal cues   Balance   Static Sitting Poor +   Static Standing Poor -   Endurance Deficit   Endurance Deficit Yes   Endurance Deficit Description EDEMA IN B/L LE; DECREASED STRENGTH; PAIN   Activity Tolerance   Activity Tolerance Patient limited by fatigue;Patient limited by pain   Medical Staff Avril Romero 68   Nurse Made Aware OMKAR TO SEE PER GLEN RAMIREZ    Assessment   Prognosis Fair   Problem List Decreased strength;Decreased endurance; Impaired balance;Decreased mobility;Pain;Decreased safety awareness; Impaired judgement;Decreased cognition   Assessment PT INITIATED TREATMENT SESSION IN ORDER TO ASSIST PATIENT IN ACHIEVING GOALS TO IMPROVE OVERALL ACTIVITY TOLERANCE AND TRANSFERS   FUNCTIONAL MOBILITY REMAINS LIMITED BY SIGNIFICANT LETHARGY, B/L LE EDEMA, AND GROSS STRENGTH DEFICITS  PATIENT INCONTINENT OF BOWEL UPON ARRIVAL REQUIRING MAX-AX1 TO ROLL DURING AMOS-HYGIENE CARE  PATIENT IS PLEASANT AND COOPERATIVE HOWEVER REQUIRES INCREASED VC TO PARTICIPATE  SHE REQUIRED MAX-AX2 FOR SUPINE-->SIT TRANSFER AND CONTACT GUARD ASSISTANCE TO MAINTAIN STATIC SEATED BALANCE X 7 MINUTES (TENDENCY TO LEAN POSTERIORLY)  PATIENT REQUIRED MAX-AX2 TO PERFORM SIT PIVOT TRANSFER TO DROP ARM CHAIR DURING THREE BUMPS OVER WITH USE OF BED PAD  RECOMMENDED FOR RN STAFF/RESTORATIVE TO SLIDE PATIENT BACK TO BED WITH SMOOTH   PT D/C RECOMMENDATION FOR SHORT TERM REHAB REMAINS APPROPRIATE  PATIENT WILL BENEFIT FROM CONTINUED SKILLED PT THIS ADMISSION TO ACHIEVE MAXIMAL FUNCTION AND SAFETY  Goals   Patient Goals NONE EXPRESSED (LETHARGIC)    Treatment Day 5   Plan   Treatment/Interventions Functional transfer training;LE strengthening/ROM; Therapeutic exercise; Endurance training;Patient/family training;Gait training;Bed mobility; Equipment eval/education;OT;Spoke to nursing   Progress Slow progress, decreased activity tolerance   PT Frequency Other (Comment)  (3-5X/WK)   Recommendation   Recommendation Short-term skilled PT   PT - OK to Discharge Yes  (TO STR WHEN MED OMKAR )     Jus Matthews, PT

## 2019-08-16 NOTE — PROGRESS NOTES
Progress Note - Infectious Disease   Kristel Chung 78 y o  female MRN: [id number]  Unit/Bed#: [facility] 824-01 Encounter: [id number]      Impression/Plan:  1  Systemic inflammatory response syndrome- Possibly related the patient's fungemia   Possibly secondary to peritonitis   The patient is now improved and moved out of the intensive care unit  The leukocytosis has persisted   Stool for C diff is negative   The patient completed more than 10 days of intravenous antibacterials   The white blood cell count has decreased but remains elevated   The procalcitonin level has continued to decrease  -no additional antibacterials for now  -continue antifungals as below  -recheck CT of the chest abdomen pelvis if leukocytosis does not continue to improve without a new source  -low threshold to tap ascites and sent for cultures  -recheck CBC with diff  -supportive care     2   Fungemia-possibly all translocation across the gut wall in the setting of peritonitis   Possible catheter related bacteremia although this is less likely as the blood cultures were from around the time of admission   The central line has been changed   Fungemia has cleared   The organism is not resistant to fluconazole   -continue fluconazole through 8/21/2019 to complete 14 days from the 1st negative blood cultures  -decrease the dose of fluconazole to 400 mg daily as below due to the acute kidney injury  -recheck liver function test     3  Peritonitis-status post exploration and repair   No cultures obtained at the time of the surgery   Patient does have significant turbid drainage from the superior aspect of the wound   Patient has been maintained on broad antibiotics   The patient continues to have a brisk leukocytosis   Significant ascites noted   She completed more than 10 days of intravenous antibiotics   Patient is stable off all antibiotics in the white cell count has decreased slightly over the last 24 hours  -monitor off all antibiotics  -recheck CBC with diff  -if leukocytosis does not continue to improve, recommend repeat CT of the chest abdomen and pelvis as above  -low threshold to tap ascites and sent for culture  -serial abdominal exams  -close surgical follow-up     4  Acute hypoxic respiratory failure-likely secondary to hemoptysis aspiration and a pneumothorax   Less likely pneumonia  Juliane Woodard is now extubated and on O2 by nasal cannula   Repeat chest x-ray with slightly increased volume   Bronchoscopy cultures without any resistant organisms   Her respiratory status is improved     -oxygen support   -monitor respiratory status    5  Acute kidney injury-suspect this is a pre renal issue  Possibly medication effect  No other clear source appreciated  -recheck creatinine clearance  -dose adjust fluconazole as above  -if the creatinine clearance increases to greater than 50, would increase the fluconazole back to 800 mg daily  -volume management    Discussed the above plan with the surgical service    Antibiotics:  Fluconazole 4  Antifungals 9 since the 1st negative blood culture     Subjective:  Patient has no fever, chills, sweats; no nausea, vomiting, diarrhea; no cough, no increased shortness of breath; no increase pain  No new symptoms  Objective:  Vitals:  Temp:  [97 5 °F (36 4 °C)-99 8 °F (37 7 °C)] 98 8 °F (37 1 °C)  HR:  [] 109  Resp:  [17-24] 18  BP: (131-150)/(69-85) 139/76  SpO2:  [94 %-99 %] 95 %  Temp (24hrs), Av 7 °F (37 1 °C), Min:97 5 °F (36 4 °C), Max:99 8 °F (37 7 °C)  Current: Temperature: 98 8 °F (37 1 °C)    Physical Exam:   General Appearance:  Alert, interactive, nontoxic, no acute distress  Throat: Oropharynx moist without lesions  Lungs:   Decreased breath sounds bilaterally; no wheezes, rhonchi or rales; respirations unlabored   Heart:  Tachycardia; no murmur, rub or gallop   Abdomen:   Soft, non-tender, non-distended, positive bowel sounds    Incision with decreased drainage without surrounding erythema  No purulence     Extremities: No clubbing, cyanosis or edema   Skin: No new rashes or lesions  No draining wounds noted  Labs, Imaging, & Other studies:   All pertinent labs and imaging studies were personally reviewed  Results from last 7 days   Lab Units 08/16/19  0513 08/15/19  0557 08/14/19  0547   WBC Thousand/uL 24 06* 25 32* 29 96*   HEMOGLOBIN g/dL 8 1* 7 9* 8 5*   PLATELETS Thousands/uL 535* 547* 644*     Results from last 7 days   Lab Units 08/16/19  0513 08/15/19  0557 08/14/19  0547 08/13/19  0520   SODIUM mmol/L 134* 135* 139 148*   POTASSIUM mmol/L 4 3 4 0 3 8 3 6   CHLORIDE mmol/L 97* 99* 102 107   CO2 mmol/L 32 33* 34* 37*   BUN mg/dL 17 13 9 9   CREATININE mg/dL 1 35* 0 93 0 68 0 43*   EGFR ml/min/1 73sq m 40 62 89 103   CALCIUM mg/dL 7 6* 7 6* 7 8* 8 1*   AST U/L  --   --  26 29   ALT U/L  --   --  14 16   ALK PHOS U/L  --   --  158* 163*     Results from last 7 days   Lab Units 08/11/19  1034   C DIFF TOXIN B  NEGATIVE for C difficle toxin by PCR        Results from last 7 days   Lab Units 08/15/19  0557 08/14/19  0547 08/11/19  0453 08/10/19  0439   PROCALCITONIN ng/ml 0 26* 0 33* 0 81* 0 69*

## 2019-08-17 LAB
ALBUMIN SERPL BCP-MCNC: 1.4 G/DL (ref 3.5–5)
ALP SERPL-CCNC: 158 U/L (ref 46–116)
ALT SERPL W P-5'-P-CCNC: 16 U/L (ref 12–78)
ANION GAP SERPL CALCULATED.3IONS-SCNC: 9 MMOL/L (ref 4–13)
ANISOCYTOSIS BLD QL SMEAR: PRESENT
AST SERPL W P-5'-P-CCNC: 26 U/L (ref 5–45)
BACTERIA UR QL AUTO: ABNORMAL /HPF
BASOPHILS # BLD MANUAL: 0 THOUSAND/UL (ref 0–0.1)
BASOPHILS NFR MAR MANUAL: 0 % (ref 0–1)
BILIRUB SERPL-MCNC: 0.37 MG/DL (ref 0.2–1)
BILIRUB UR QL STRIP: NEGATIVE
BUN SERPL-MCNC: 24 MG/DL (ref 5–25)
CALCIUM SERPL-MCNC: 7.6 MG/DL (ref 8.3–10.1)
CHLORIDE SERPL-SCNC: 100 MMOL/L (ref 100–108)
CHLORIDE UR-SCNC: 50 MMOL/L
CLARITY UR: ABNORMAL
CO2 SERPL-SCNC: 28 MMOL/L (ref 21–32)
COLOR UR: YELLOW
CREAT SERPL-MCNC: 1.6 MG/DL (ref 0.6–1.3)
EOSINOPHIL # BLD MANUAL: 0.21 THOUSAND/UL (ref 0–0.4)
EOSINOPHIL NFR BLD MANUAL: 1 % (ref 0–6)
ERYTHROCYTE [DISTWIDTH] IN BLOOD BY AUTOMATED COUNT: 17.7 % (ref 11.6–15.1)
GFR SERPL CREATININE-BSD FRML MDRD: 32 ML/MIN/1.73SQ M
GLUCOSE SERPL-MCNC: 69 MG/DL (ref 65–140)
GLUCOSE UR STRIP-MCNC: NEGATIVE MG/DL
HCT VFR BLD AUTO: 25.1 % (ref 34.8–46.1)
HGB BLD-MCNC: 7.9 G/DL (ref 11.5–15.4)
HGB UR QL STRIP.AUTO: ABNORMAL
HYALINE CASTS #/AREA URNS LPF: ABNORMAL /LPF
KETONES UR STRIP-MCNC: NEGATIVE MG/DL
LEUKOCYTE ESTERASE UR QL STRIP: ABNORMAL
LYMPHOCYTES # BLD AUTO: 1.07 THOUSAND/UL (ref 0.6–4.47)
LYMPHOCYTES # BLD AUTO: 5 % (ref 14–44)
MCH RBC QN AUTO: 28.2 PG (ref 26.8–34.3)
MCHC RBC AUTO-ENTMCNC: 31.5 G/DL (ref 31.4–37.4)
MCV RBC AUTO: 90 FL (ref 82–98)
MONOCYTES # BLD AUTO: 0.64 THOUSAND/UL (ref 0–1.22)
MONOCYTES NFR BLD: 3 % (ref 4–12)
NEUTROPHILS # BLD MANUAL: 19.46 THOUSAND/UL (ref 1.85–7.62)
NEUTS SEG NFR BLD AUTO: 91 % (ref 43–75)
NITRITE UR QL STRIP: POSITIVE
NON-SQ EPI CELLS URNS QL MICRO: ABNORMAL /HPF
NRBC BLD AUTO-RTO: 0 /100 WBCS
PH UR STRIP.AUTO: 8 [PH]
PLATELET # BLD AUTO: 530 THOUSANDS/UL (ref 149–390)
PLATELET BLD QL SMEAR: ABNORMAL
PMV BLD AUTO: 10.2 FL (ref 8.9–12.7)
POIKILOCYTOSIS BLD QL SMEAR: PRESENT
POLYCHROMASIA BLD QL SMEAR: PRESENT
POTASSIUM SERPL-SCNC: 4.5 MMOL/L (ref 3.5–5.3)
PROT SERPL-MCNC: 5.8 G/DL (ref 6.4–8.2)
PROT UR STRIP-MCNC: NEGATIVE MG/DL
RBC # BLD AUTO: 2.8 MILLION/UL (ref 3.81–5.12)
RBC #/AREA URNS AUTO: ABNORMAL /HPF
RBC MORPH BLD: PRESENT
SODIUM 24H UR-SCNC: 53 MOL/L
SODIUM SERPL-SCNC: 137 MMOL/L (ref 136–145)
SP GR UR STRIP.AUTO: 1 (ref 1–1.03)
UROBILINOGEN UR QL STRIP.AUTO: 0.2 E.U./DL
WBC # BLD AUTO: 21.38 THOUSAND/UL (ref 4.31–10.16)
WBC #/AREA URNS AUTO: ABNORMAL /HPF

## 2019-08-17 PROCEDURE — 94668 MNPJ CHEST WALL SBSQ: CPT

## 2019-08-17 PROCEDURE — 99024 POSTOP FOLLOW-UP VISIT: CPT | Performed by: SURGERY

## 2019-08-17 PROCEDURE — 84300 ASSAY OF URINE SODIUM: CPT | Performed by: STUDENT IN AN ORGANIZED HEALTH CARE EDUCATION/TRAINING PROGRAM

## 2019-08-17 PROCEDURE — 94760 N-INVAS EAR/PLS OXIMETRY 1: CPT

## 2019-08-17 PROCEDURE — 82436 ASSAY OF URINE CHLORIDE: CPT | Performed by: STUDENT IN AN ORGANIZED HEALTH CARE EDUCATION/TRAINING PROGRAM

## 2019-08-17 PROCEDURE — 85007 BL SMEAR W/DIFF WBC COUNT: CPT | Performed by: INTERNAL MEDICINE

## 2019-08-17 PROCEDURE — 80053 COMPREHEN METABOLIC PANEL: CPT | Performed by: INTERNAL MEDICINE

## 2019-08-17 PROCEDURE — 81001 URINALYSIS AUTO W/SCOPE: CPT | Performed by: STUDENT IN AN ORGANIZED HEALTH CARE EDUCATION/TRAINING PROGRAM

## 2019-08-17 PROCEDURE — 85027 COMPLETE CBC AUTOMATED: CPT | Performed by: INTERNAL MEDICINE

## 2019-08-17 RX ADMIN — ESCITALOPRAM OXALATE 10 MG: 10 TABLET ORAL at 08:07

## 2019-08-17 RX ADMIN — LIDOCAINE 1 PATCH: 50 PATCH CUTANEOUS at 08:08

## 2019-08-17 RX ADMIN — SODIUM CHLORIDE 60 ML/HR: 0.9 INJECTION, SOLUTION INTRAVENOUS at 17:43

## 2019-08-17 RX ADMIN — HEPARIN SODIUM 5000 UNITS: 5000 INJECTION INTRAVENOUS; SUBCUTANEOUS at 14:04

## 2019-08-17 RX ADMIN — Medication 250 MG: at 17:42

## 2019-08-17 RX ADMIN — OXYCODONE HYDROCHLORIDE 5 MG: 5 TABLET ORAL at 08:08

## 2019-08-17 RX ADMIN — FLUCONAZOLE 400 MG: 200 TABLET ORAL at 08:07

## 2019-08-17 RX ADMIN — OXYCODONE HYDROCHLORIDE 5 MG: 5 TABLET ORAL at 16:02

## 2019-08-17 RX ADMIN — PRAVASTATIN SODIUM 20 MG: 20 TABLET ORAL at 17:42

## 2019-08-17 RX ADMIN — HEPARIN SODIUM 5000 UNITS: 5000 INJECTION INTRAVENOUS; SUBCUTANEOUS at 05:01

## 2019-08-17 RX ADMIN — Medication 20 MG: at 06:29

## 2019-08-17 RX ADMIN — MELATONIN 3 MG: 3 TAB ORAL at 21:34

## 2019-08-17 RX ADMIN — HEPARIN SODIUM 5000 UNITS: 5000 INJECTION INTRAVENOUS; SUBCUTANEOUS at 21:34

## 2019-08-17 RX ADMIN — OXYCODONE HYDROCHLORIDE 5 MG: 5 TABLET ORAL at 21:44

## 2019-08-17 RX ADMIN — FOLIC ACID 1 MG: 1 TABLET ORAL at 08:07

## 2019-08-17 RX ADMIN — SODIUM CHLORIDE 60 ML/HR: 0.9 INJECTION, SOLUTION INTRAVENOUS at 01:12

## 2019-08-17 RX ADMIN — Medication 250 MG: at 08:07

## 2019-08-17 RX ADMIN — GABAPENTIN 100 MG: 100 CAPSULE ORAL at 21:34

## 2019-08-17 NOTE — PROGRESS NOTES
Progress Note - General Surgery   Kristel Kelloggft 78 y o  female MRN: [id number]  Unit/Bed#: [facility] 824-01 Encounter: [id number]    Assessment:  78 F with laparoscopic VHR complicated by small bowel injury, now s/p exploratory laparotomy, mesh explantation, SBR, and eventual abdominal closure, also with candidal fungemia    Plan:  Diet as tolerated  Continue IV fluids  Urine studies, Trend creatinine  Daily packing changes  Appreciate ID recs  Continue PT/OT  Heparin ppx    Subjective/Objective     Subjective: No acute events overnight  Tolerating diet  Objective:    Blood pressure 147/89, pulse 102, temperature 98 4 °F (36 9 °C), resp  rate 12, height 5' 4" (1 626 m), weight 76 3 kg (168 lb 3 4 oz), SpO2 98 %  ,Body mass index is 28 87 kg/m²        Intake/Output Summary (Last 24 hours) at 8/17/2019 0709  Last data filed at 8/17/2019 4277  Gross per 24 hour   Intake 1679 ml   Output 2305 ml   Net -626 ml       Invasive Devices     Peripherally Inserted Central Catheter Line            PICC Line 32/90/58 Right Basilic 2 days          Drain            External Urinary Catheter less than 1 day                Physical Exam:   General: NAD, AAOx3  Eyes: PERRL  ENT: moist mucous membranes  Neck: supple  CV: RRR +S1/S2  Chest: breath sounds bilaterally  Abdomen: soft, minimally tender, non-distended, incision c/d/i  Extremities: atraumatic, no edema      Results from last 7 days   Lab Units 08/17/19  0459 08/16/19  0513 08/15/19  0557   WBC Thousand/uL 21 38* 24 06* 25 32*   HEMOGLOBIN g/dL 7 9* 8 1* 7 9*   HEMATOCRIT % 25 1* 24 8* 25 4*   PLATELETS Thousands/uL 530* 535* 547*     Results from last 7 days   Lab Units 08/17/19  0459 08/16/19  0513 08/15/19  0557   POTASSIUM mmol/L 4 5 4 3 4 0   CHLORIDE mmol/L 100 97* 99*   CO2 mmol/L 28 32 33*   BUN mg/dL 24 17 13   CREATININE mg/dL 1 60* 1 35* 0 93   CALCIUM mg/dL 7 6* 7 6* 7 6*

## 2019-08-18 LAB
ANION GAP SERPL CALCULATED.3IONS-SCNC: 8 MMOL/L (ref 4–13)
BACTERIA UR QL AUTO: ABNORMAL /HPF
BILIRUB UR QL STRIP: NEGATIVE
BUN SERPL-MCNC: 28 MG/DL (ref 5–25)
CALCIUM SERPL-MCNC: 7.6 MG/DL (ref 8.3–10.1)
CHLORIDE SERPL-SCNC: 99 MMOL/L (ref 100–108)
CLARITY UR: CLEAR
CO2 SERPL-SCNC: 28 MMOL/L (ref 21–32)
COLOR UR: YELLOW
CREAT SERPL-MCNC: 2.02 MG/DL (ref 0.6–1.3)
CREAT UR-MCNC: <13 MG/DL
ERYTHROCYTE [DISTWIDTH] IN BLOOD BY AUTOMATED COUNT: 17.4 % (ref 11.6–15.1)
GFR SERPL CREATININE-BSD FRML MDRD: 24 ML/MIN/1.73SQ M
GLUCOSE SERPL-MCNC: 75 MG/DL (ref 65–140)
GLUCOSE UR STRIP-MCNC: NEGATIVE MG/DL
HCT VFR BLD AUTO: 24 % (ref 34.8–46.1)
HGB BLD-MCNC: 7.5 G/DL (ref 11.5–15.4)
HGB UR QL STRIP.AUTO: ABNORMAL
HYALINE CASTS #/AREA URNS LPF: ABNORMAL /LPF
KETONES UR STRIP-MCNC: NEGATIVE MG/DL
LEUKOCYTE ESTERASE UR QL STRIP: NEGATIVE
MCH RBC QN AUTO: 28.1 PG (ref 26.8–34.3)
MCHC RBC AUTO-ENTMCNC: 31.3 G/DL (ref 31.4–37.4)
MCV RBC AUTO: 90 FL (ref 82–98)
NITRITE UR QL STRIP: NEGATIVE
NON-SQ EPI CELLS URNS QL MICRO: ABNORMAL /HPF
PH UR STRIP.AUTO: 7 [PH]
PLATELET # BLD AUTO: 496 THOUSANDS/UL (ref 149–390)
PMV BLD AUTO: 9.9 FL (ref 8.9–12.7)
POTASSIUM SERPL-SCNC: 4.8 MMOL/L (ref 3.5–5.3)
PROT UR STRIP-MCNC: NEGATIVE MG/DL
RBC # BLD AUTO: 2.67 MILLION/UL (ref 3.81–5.12)
RBC #/AREA URNS AUTO: ABNORMAL /HPF
SODIUM SERPL-SCNC: 135 MMOL/L (ref 136–145)
SP GR UR STRIP.AUTO: 1 (ref 1–1.03)
UROBILINOGEN UR QL STRIP.AUTO: 0.2 E.U./DL
WBC # BLD AUTO: 25.73 THOUSAND/UL (ref 4.31–10.16)
WBC #/AREA URNS AUTO: ABNORMAL /HPF

## 2019-08-18 PROCEDURE — 87086 URINE CULTURE/COLONY COUNT: CPT | Performed by: SURGERY

## 2019-08-18 PROCEDURE — 82570 ASSAY OF URINE CREATININE: CPT | Performed by: STUDENT IN AN ORGANIZED HEALTH CARE EDUCATION/TRAINING PROGRAM

## 2019-08-18 PROCEDURE — 80048 BASIC METABOLIC PNL TOTAL CA: CPT | Performed by: SURGERY

## 2019-08-18 PROCEDURE — 85027 COMPLETE CBC AUTOMATED: CPT | Performed by: SURGERY

## 2019-08-18 PROCEDURE — 99223 1ST HOSP IP/OBS HIGH 75: CPT | Performed by: INTERNAL MEDICINE

## 2019-08-18 PROCEDURE — 99024 POSTOP FOLLOW-UP VISIT: CPT | Performed by: SURGERY

## 2019-08-18 PROCEDURE — 87186 SC STD MICRODIL/AGAR DIL: CPT | Performed by: SURGERY

## 2019-08-18 PROCEDURE — 81001 URINALYSIS AUTO W/SCOPE: CPT | Performed by: INTERNAL MEDICINE

## 2019-08-18 PROCEDURE — 87077 CULTURE AEROBIC IDENTIFY: CPT | Performed by: SURGERY

## 2019-08-18 RX ORDER — SODIUM CHLORIDE, SODIUM GLUCONATE, SODIUM ACETATE, POTASSIUM CHLORIDE, MAGNESIUM CHLORIDE, SODIUM PHOSPHATE, DIBASIC, AND POTASSIUM PHOSPHATE .53; .5; .37; .037; .03; .012; .00082 G/100ML; G/100ML; G/100ML; G/100ML; G/100ML; G/100ML; G/100ML
1000 INJECTION, SOLUTION INTRAVENOUS ONCE
Status: COMPLETED | OUTPATIENT
Start: 2019-08-18 | End: 2019-08-18

## 2019-08-18 RX ADMIN — SODIUM CHLORIDE 60 ML/HR: 0.9 INJECTION, SOLUTION INTRAVENOUS at 08:42

## 2019-08-18 RX ADMIN — SODIUM CHLORIDE, SODIUM GLUCONATE, SODIUM ACETATE, POTASSIUM CHLORIDE, MAGNESIUM CHLORIDE, SODIUM PHOSPHATE, DIBASIC, AND POTASSIUM PHOSPHATE 1000 ML: .53; .5; .37; .037; .03; .012; .00082 INJECTION, SOLUTION INTRAVENOUS at 06:32

## 2019-08-18 RX ADMIN — Medication 250 MG: at 08:36

## 2019-08-18 RX ADMIN — OXYCODONE HYDROCHLORIDE 5 MG: 5 TABLET ORAL at 09:46

## 2019-08-18 RX ADMIN — Medication 250 MG: at 17:35

## 2019-08-18 RX ADMIN — HEPARIN SODIUM 5000 UNITS: 5000 INJECTION INTRAVENOUS; SUBCUTANEOUS at 23:09

## 2019-08-18 RX ADMIN — HEPARIN SODIUM 5000 UNITS: 5000 INJECTION INTRAVENOUS; SUBCUTANEOUS at 05:00

## 2019-08-18 RX ADMIN — HEPARIN SODIUM 5000 UNITS: 5000 INJECTION INTRAVENOUS; SUBCUTANEOUS at 15:35

## 2019-08-18 RX ADMIN — ESCITALOPRAM OXALATE 10 MG: 10 TABLET ORAL at 08:36

## 2019-08-18 RX ADMIN — CEFTRIAXONE SODIUM 1000 MG: 10 INJECTION, POWDER, FOR SOLUTION INTRAVENOUS at 09:46

## 2019-08-18 RX ADMIN — OXYCODONE HYDROCHLORIDE 5 MG: 5 TABLET ORAL at 20:54

## 2019-08-18 RX ADMIN — LIDOCAINE 1 PATCH: 50 PATCH CUTANEOUS at 08:36

## 2019-08-18 RX ADMIN — Medication 20 MG: at 06:23

## 2019-08-18 RX ADMIN — GABAPENTIN 100 MG: 100 CAPSULE ORAL at 23:08

## 2019-08-18 RX ADMIN — FLUCONAZOLE 400 MG: 200 TABLET ORAL at 08:36

## 2019-08-18 RX ADMIN — SODIUM CHLORIDE 60 ML/HR: 0.9 INJECTION, SOLUTION INTRAVENOUS at 23:30

## 2019-08-18 RX ADMIN — FOLIC ACID 1 MG: 1 TABLET ORAL at 08:36

## 2019-08-18 RX ADMIN — PRAVASTATIN SODIUM 20 MG: 20 TABLET ORAL at 17:35

## 2019-08-18 RX ADMIN — MELATONIN 3 MG: 3 TAB ORAL at 23:08

## 2019-08-18 NOTE — RESPIRATORY THERAPY NOTE
RT Protocol Note  Shelton Chung 78 y o  female MRN: [id number]  Unit/Bed#: [facility] 824-01 Encounter: [id number]    Assessment    Principal Problem:    OR Exploratory laparotomy, SBR secondary to iatrogenic enterotomy   Active Problems:    Anemia    Incisional hernia    Postprocedural pneumothorax    OR Laparoscopic ventral hernia repair    Fungemia      Home Pulmonary Medications:  none       Past Medical History:   Diagnosis Date    Abnormal blood chemistry     last assessed 03/06/2014    Achalasia, esophageal     Acute medial meniscus tear     last assessed 03/10/2014    Acute otitis externa     last assessed 03/24/2014    [facility]renal nodule [facility] [address] [address] [facility]     Anemia     Anxiety     Arthritis     Atrial fibrillation ([facility] )     resolved 01/09/2018    Atypical chest pain     last assessed 11/22/2016    Eagle's esophagus with high grade dysplasia     last assessed 01/03/2018    Cancer ([facility] )     esophageal    Cerumen impaction     last assessed 03/24/2014    Chronic pain of right knee     last assessed 04/06/2017    Coronary artery disease     3 vessel, resolved 01/09/2018    Depression     Dysphagia     last assessed 06/21/2017    Esophageal stricture     last assessed 06/21/2017    GERD (gastroesophageal reflux disease)     Headache     last assessed 08/01/2013    Hiatal hernia     Insomnia     last assessed 10/15/2013    Jejunostomy tube present ([facility] [facility] )     resolved 01/09/2018    Low vitamin D level     resolved 01/08/2018    Osteoarthritis, knee     last assessed 04/06/2017    Pneumonia     last assessed 05/06/2013    Right leg paresthesias     last assessed 02/23/2017    Sciatica     last assessed 05/08/2015    Shingles     last assessed 05/12/2015    Spondylosis of 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     last assessed 04/06/2017    Venous insufficiency     last assessed 12/06/2013     Social History     Socioeconomic History    Marital status: /Civil Union     Spouse name: None    Number of children: None    Years of education: None    Highest education level: None   Occupational History    None   Social Needs    Financial resource strain: None    Food insecurity:     Worry: None     Inability: None    Transportation needs:     Medical: None     Non-medical: None   Tobacco Use    Smoking status: Never Smoker    Smokeless tobacco: Never Used   Substance and Sexual Activity    Alcohol use: Not Currently     Alcohol/week: 0 0 standard drinks     Frequency: Never     Binge frequency: Never    Drug use: No    Sexual activity: Not Currently     Partners: Male   Lifestyle    Physical activity:     Days per week: None     Minutes per session: None    Stress: None   Relationships    Social connections:     Talks on phone: None     Gets together: None     Attends Judaism service: None     Active member of club or organization: None     Attends meetings of clubs or organizations: None     Relationship status: None    Intimate partner violence:     Fear of current or ex partner: None     Emotionally abused: None     Physically abused: None     Forced sexual activity: None   Other Topics Concern    None   Social History Narrative    Patient has living will       Subjective         Objective    Physical Exam:        Vitals:  Blood pressure 143/93, pulse (!) 112, temperature 98 2 °F (36 8 °C), resp  rate 18, height 5' 4" (1 626 m), weight 75 kg (165 lb 6 4 oz), SpO2 92 %  Imaging and other studies: I have personally reviewed pertinent reports  O2 Device: nasal cannula     Plan    Respiratory Plan: Vent/NIV/HFNC  Airway Clearance Plan: (P) Discontinue Protocol     Resp Comments: (P) Pt  BS are diminished with a dry NPC  Pt  doing well with IS   Will D/C airway clearance protocol at this time

## 2019-08-18 NOTE — PROGRESS NOTES
Progress Note - General Surgery   Kristel KAHN Hartranft 78 y o  female MRN: [id number]  Unit/Bed#: [facility] 824-01 Encounter: [id number]    Assessment:  78 F with laparoscopic VHR complicated by small bowel injury, now s/p exploratory laparotomy, mesh explantation, SBR, and eventual abdominal closure, also with candidal fungemia  Cr continues to rise despite adequate UOP    Cr 2 02  WBC 25 7  UA: + nitrite, leuks, WBC, bacteria      Plan:  Continue regular (mechanical soft) diet  Supplemental IVF, bolus this AM for tachycardia  Will consider nephro consult today for persistently rising Cr  Treat UTI  Micafungin through 8/22  Wound care / packing changes per nursing  OOB, ambulation, PT/OT    Subjective/Objective   Chief Complaint:     Subjective: Feels well, no complaints  Tolerating good PO, almost 1 L in     Objective:     Blood pressure 143/93, pulse (!) 111, temperature 98 2 °F (36 8 °C), resp  rate 18, height 5' 4" (1 626 m), weight 75 kg (165 lb 6 4 oz), SpO2 (!) 89 %  ,Body mass index is 28 39 kg/m²  Intake/Output Summary (Last 24 hours) at 8/18/2019 0726  Last data filed at 8/18/2019 0636  Gross per 24 hour   Intake 2339 ml   Output 1300 ml   Net 1039 ml       Invasive Devices     Peripherally Inserted Central Catheter Line            PICC Line 18/40/77 Right Basilic 3 days          Drain            External Urinary Catheter 1 day                Physical Exam:   Gen: A&O, NAD  Cardio: RRR  Lungs: CTAB  Abd: Soft, non distended, mildly tender   Dressing c/d/i      Lab, Imaging and other studies:  CBC:   Lab Results   Component Value Date    WBC 25 73 (H) 08/18/2019    HGB 7 5 (L) 08/18/2019    HCT 24 0 (L) 08/18/2019    MCV 90 08/18/2019     (H) 08/18/2019    MCH 28 1 08/18/2019    MCHC 31 3 (L) 08/18/2019    RDW 17 4 (H) 08/18/2019    MPV 9 9 08/18/2019   , CMP:   Lab Results   Component Value Date    SODIUM 135 (L) 08/18/2019    K 4 8 08/18/2019    CL 99 (L) 08/18/2019    CO2 28 08/18/2019    BUN 28 (H) 08/18/2019    CREATININE 2 02 (H) 08/18/2019    CALCIUM 7 6 (L) 08/18/2019    EGFR 24 08/18/2019   , Coagulation: No results found for: PT, INR, APTT  VTE Pharmacologic Prophylaxis: Heparin  VTE Mechanical Prophylaxis: sequential compression device

## 2019-08-18 NOTE — CONSULTS
Consultation - Nephrology   Alise Chung 78 y o  female MRN: [id number]  [facility] Encounter: [id number]    ASSESSMENT and PLAN:  1  Acute kidney injury, noted serum creatinine started to rise since 8/16 when her serum creatinine started to increase from 0 4 to 0 9 and since then it has been gradually rising to 2 0 today  As per review of patient's chart there is no documented episode of hypotension  Noted she was previously on Micafungin that can cause renal failure but it was discontinued on 08/12  Currently she is on fluconazole p o  And dose was appropriately decreased yesterday  On exam she does not look volume depleted, primary team give her an IVF liter bolus and continue with intravenously fluid  Recommend to repeat a urinalysis with microscopy as well as check a bladder scan to rule out obstructive process though she is having a good urine output with external urinary catheter  Avoid hypotension  Avoid nephrotoxins  Follow serial labs as well as monitor ins and outs  2  Mild hyponatremia in the setting of acute renal failure, will follow for now  3  Sepsis possible secondary to fungemia as well as peritonitis  Infection Disease on board managing and adjusting medication  Patient completed antibiotic therapy  Currently on fluconazole p o  Until 8/21 complete 12 days, dose was decreased yesterday  4  Fungemia, suspected secondary to translocation across the got wall, reported fungemia has cleared  5   Laparoscopic ventral hernia repair complicated with small-bowel injury, now status post exploratory laparotomy, mesh explantation, small-bowel resection and eventual abdominal closure  Postoperative management as per surgery team     6  Anemia, monitor H&H and transfusion as needed, noted hemoglobin is slowly dropping      7  Hemodynamics, blood pressure is stable and diastolics in the higher side, noted patient is tachycardia, continue with intravenously fluid, avoid relative hypotension  SUMMARY OF RECOMMENDATIONS:  Noted patient was previously on Micafungin, last dose on 08/12  Currently on fluconazole p o  Since 8/13, dose was decreased to 400 mg on 08/17 due to worsening kidney function  Patient currently getting intravenously fluids as per surgery team   Repeat with urinalysis with micro  Check a bladder scan  Reported good urine output, patient has a external urinary catheter  Avoid hypotension  Avoid nephrotoxins  Follow serial labs and monitor ins and outs  HISTORY OF PRESENT ILLNESS:  Requesting Physician: Marino Valle MD  Reason for Consult:  Acute renal failure    Tim Chung is a 78 y o  female who was transferred to [facility] from [facility] after presenting with shock in the setting of sepsis and peritonitis  A renal consultation is requested today for assistance in the management of acute renal failure  Patient who was initially admitted to [address] [place] [address],[address] on 07/30 1st for laparoscopic incisional ventral hernia repair, complicated with bowel perforation requiring exploratory laparotomy and small-bowel resection, patient was initially intubated vasopressins support, was also found to have candidemia, eventually improved, was extubated, renal function was stable with serum creatinine around 0 8 on admission  She received CT scan with intravenously contrast on 08/07  Noted since 08/16 her serum creatinine increased to 1 35 and then gradually keeps increasing up to 2 0 today for that reason Nephrology was consulted  During my evaluation patient following in bed, denies any significant chest pain or shortness of breath, no reported nausea vomiting, no diarrhea, she is feeling tired and weak  Have an external urinary catheter with good urine output        PAST MEDICAL HISTORY:  Past Medical History:   Diagnosis Date    Abnormal blood chemistry     last assessed 03/06/2014    Achalasia, esophageal     Acute medial meniscus tear     last assessed 03/10/2014    Acute otitis externa     last assessed 2014    [facility] nodule [facility] [address] [facility]     Anemia     Anxiety     Arthritis     Atrial fibrillation ([facility] )     resolved 2018    Atypical chest pain     last assessed 2016    Eagle's esophagus with high grade dysplasia     last assessed 2018    Cancer ([facility] [address] )     esophageal    Cerumen impaction     last assessed 2014    Chronic pain of right knee     last assessed 2017    Coronary artery disease     3 vessel, resolved 2018    Depression     Dysphagia     last assessed 2017    Esophageal stricture     last assessed 2017    GERD (gastroesophageal reflux disease)     Headache     last assessed 2013    Hiatal hernia     Insomnia     last assessed 10/15/2013    Jejunostomy tube present ([facility] [address] )     resolved 2018    Low vitamin D level     resolved 2018    Osteoarthritis, knee     last assessed 2017    Pneumonia     last assessed 2013    Right leg paresthesias     last assessed 2017    Sciatica     last assessed 2015    Shingles     last assessed 2015    Spondylosis of 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     last assessed 2017    Venous insufficiency     last assessed 2013       PAST SURGICAL HISTORY:  Past Surgical History:   Procedure Laterality Date     SECTION      EXPLORATORY LAPAROTOMY W/ BOWEL RESECTION N/A 2019    Procedure: LAPAROTOMY EXPLORATORY W/ BOWEL RESECTION, APPLICATION OF Rachel Kwok;  Surgeon: Martha Davis DO;  [facility];  Service: General    GASTRECTOMY      partial    GASTROJEJUNOSTOMY W/ JEJUNOSTOMY TUBE N/A 2017    Procedure: Dallas Sinclair;  Surgeon: Stormy King MD;  [facility];  Service: Thoracic    JOINT REPLACEMENT Right 11/10/2014    knee, Dr Kirt Dick N/A 8/3/2019    Procedure: LAPAROTOMY EXPLORATORY, reanastomosis of proximal small bowel, placement of vac dressing;  Surgeon: Alvarez Medina DO;  [facility];  Service: General    WI ESOPHAGOGASTRODUODENOSCOPY TRANSORAL DIAGNOSTIC N/A 3/10/2017    Procedure: ESOPHAGOGASTRODUODENOSCOPY (EGD); Surgeon: Jennifer Campbell MD;  [facility]: [facility];  Service: Gastroenterology    WI ESOPHAGOGASTRODUODENOSCOPY TRANSORAL DIAGNOSTIC N/A 5/19/2017    Procedure: ESOPHAGOGASTRODUODENOSCOPY (EGD); Surgeon: Jennifer Campbell MD;  [facility];  Service: Gastroenterology    WI ESOPHAGOSCOPY FLEXIBLE TRANSORAL WITH BIOPSY N/A 8/24/2017    Procedure: ESOPHAGOGASTRODUODENOSCOPY (EGD);   Surgeon: Fabiano Mejia MD;  [facility];  Service: Thoracic    WI LAP, VENTRAL HERNIA REPAIR,REDUCIBLE N/A 7/31/2019    Procedure: REPAIR HERNIA VENTRAL LAPAROSCOPIC;  Surgeon: Casi Doyle DO;  [facility];  Service: General    WI REMOVAL [address] THORACOTOMY N/A 8/24/2017    Procedure: TRANSHIATAL ESOPHAGECTOMY;  Surgeon: Fabiano Mejia MD;  [facility];  Service: Thoracic    STEROID INJECTION KNEE Right 5/2/2017    Procedure: GENICULATE NERVE BLOCKS;  Surgeon: Ren Nunez DO;  [facility];  Service:        SOCIAL HISTORY:  Social History     Substance and Sexual Activity   Alcohol Use Not Currently    Alcohol/week: 0 0 standard drinks    Frequency: Never    Binge frequency: Never     Social History     Substance and Sexual Activity   Drug Use No     Social History     Tobacco Use   Smoking Status Never Smoker   Smokeless Tobacco Never Used       FAMILY HISTORY:  Family History   Problem Relation Age of Onset    Alzheimer's disease Mother     Diabetes type II Father        ALLERGIES:  No Known Allergies    MEDICATIONS:    Current Facility-Administered Medications:     acetaminophen (TYLENOL) tablet 650 mg, 650 mg, Oral, Q6H PRN, Bambi Contreras PA-C, 650 mg at 08/11/19 0507    bisacodyl (DULCOLAX) rectal suppository 10 mg, 10 mg, Rectal, Daily PRN, Bambi Contreras PA-C    cefTRIAXone (ROCEPHIN) 1,000 mg in dextrose 5 % 50 mL IVPB, 1,000 mg, Intravenous, Q24H, Charmayne Bourgeois, MD, Last Rate: 100 mL/hr at 08/18/19 0946, 1,000 mg at 08/18/19 0946    escitalopram (LEXAPRO) tablet 10 mg, 10 mg, Per G Tube, Daily, Bambi Contreras PA-C, 10 mg at 08/18/19 3308    fluconazole (DIFLUCAN) tablet 400 mg, 400 mg, Oral, Daily, Sarah Wolf MD, 400 mg at 99/90/57 7574    folic acid (FOLVITE) tablet 1 mg, 1 mg, Per G Tube, Daily, Bambi Contreras PA-C, 1 mg at 08/18/19 4172    gabapentin (NEURONTIN) capsule 100 mg, 100 mg, Oral, HS, Bambi Contreras PA-C, 100 mg at 08/17/19 2134    guaiFENesin (ROBITUSSIN) oral solution 200 mg, 200 mg, Oral, Q4H PRN, Alvarez Medina DO    heparin (porcine) subcutaneous injection 5,000 Units, 5,000 Units, Subcutaneous, Q8H [address], Bambi Contreras PA-C, 5,000 Units at 08/18/19 0500    HYDROmorphone (DILAUDID) injection 0 2 mg, 0 2 mg, Intravenous, Q4H PRN, Bambi Contreras PA-C, 0 2 mg at 08/16/19 2141    lidocaine (LIDODERM) 5 % patch 1 patch, 1 patch, Topical, Daily, Bambi Contreras PA-C, 1 patch at 08/18/19 0836    melatonin tablet 3 mg, 3 mg, Per G Tube, HS, Bambi Contreras PA-C, 3 mg at 08/17/19 2134    methocarbamol (ROBAXIN) tablet 500 mg, 500 mg, Oral, Q6H PRN, Bambi Contreras PA-C, 500 mg at 08/14/19 2202    omeprazole (PRILOSEC) suspension 2 mg/mL, 20 mg, Oral, BID AC, Bambi Contreras PA-C, 20 mg at 08/18/19 5176    ondansetron (ZOFRAN) injection 4 mg, 4 mg, Intravenous, Q4H PRN, Bambi Contreras PA-C, 4 mg at 08/12/19 0256    oxyCODONE (ROXICODONE) IR tablet 2 5 mg, 2 5 mg, Oral, Q4H PRN, 2 5 mg at 08/11/19 1605 **OR** oxyCODONE (ROXICODONE) IR tablet 5 mg, 5 mg, Oral, Q4H PRN, Bambi Contreras PA-C, 5 mg at 08/18/19 0957    pravastatin (PRAVACHOL) tablet 20 mg, 20 mg, Per G Tube, Daily With Shahrzad Sandoval PA-C, 20 mg at 08/17/19 7779   saccharomyces boulardii (FLORASTOR) capsule 250 mg, 250 mg, Oral, BID, Theoplis Louder, PA-C, 250 mg at 08/18/19 0836    saliva substitute (MOUTH KOTE) mucosal solution 5 spray, 5 spray, Mouth/Throat, PRN, Cheri Bailey MD    sodium chloride 0 9 % infusion, 60 mL/hr, Intravenous, Continuous, Yuko Chaz Sheru, DO, Last Rate: 60 mL/hr at 08/18/19 0842, 60 mL/hr at 08/18/19 0842    REVIEW OF SYSTEMS:  All the systems were reviewed and were negative except as documented on the HPI      PHYSICAL EXAM:  Current Weight: Weight - Scale: 75 kg (165 lb 6 4 oz)  First Weight: Weight - Scale: 74 9 kg (165 lb 2 oz)  Vitals:    08/18/19 0600 08/18/19 0706 08/18/19 0723 08/18/19 1029   BP:  143/93  144/93   Pulse:  (!) 112 (!) 111 (!) 111   Resp:  18  18   Temp:  98 2 °F (36 8 °C)  98 4 °F (36 9 °C)   TempSrc:       SpO2:  92% (!) 89% (!) 89%   Weight: 75 kg (165 lb 6 4 oz)      Height:           Intake/Output Summary (Last 24 hours) at 8/18/2019 1058  Last data filed at 8/18/2019 0636  Gross per 24 hour   Intake 1979 ml   Output 1300 ml   Net 679 ml     General: conscious, cooperative, in not acute distress  Eyes: conjunctivae pale, anicteric sclerae  ENT: lips and mucous membranes moist  Neck: supple, no JVD  Chest: clear breath sounds bilateral, no crackles, ronchus or wheezings  CVS: distinct S1 & S2, normal rate, regular rhythm  Abdomen: soft, mildy tender, non-distended, normoactive bowel sounds, mild line incision covered with dressing  Extremities: no significant edema of both legs  Skin: no rash  Neuro: awake, alert, oriented        Invasive Devices:        Lab Results:   Results from last 7 days   Lab Units 08/18/19  0452 08/17/19  0459 08/16/19  0513 08/15/19  0557 08/14/19  0547 08/13/19  0520   WBC Thousand/uL 25 73* 21 38* 24 06* 25 32* 29 96* 25 94*   HEMOGLOBIN g/dL 7 5* 7 9* 8 1* 7 9* 8 5* 8 6*   HEMATOCRIT % 24 0* 25 1* 24 8* 25 4* 27 7* 27 5*   PLATELETS Thousands/uL 496* 530* 535* 547* 644* 577* SODIUM mmol/L 135* 137 134* 135* 139 148*   POTASSIUM mmol/L 4 8 4 5 4 3 4 0 3 8 3 6   CHLORIDE mmol/L 99* 100 97* 99* 102 107   CO2 mmol/L 28 28 32 33* 34* 37*   BUN mg/dL 28* 24 17 13 9 9   CREATININE mg/dL 2 02* 1 60* 1 35* 0 93 0 68 0 43*   CALCIUM mg/dL 7 6* 7 6* 7 6* 7 6* 7 8* 8 1*   MAGNESIUM mg/dL  --   --   --  1 9 1 9 2 3   PHOSPHORUS mg/dL  --   --   --  3 5 3 1 2 7   ALK PHOS U/L  --  158*  --   --  158* 163*   ALT U/L  --  16  --   --  14 16   AST U/L  --  26  --   --  26 29           Portions of the record may have been created with voice recognition software  Occasional wrong word or "sound a like" substitutions may have occurred due to the inherent limitations of voice recognition software  Read the chart carefully and recognize, using context, where substitutions have occurred  If you have any questions, please contact the dictating provider

## 2019-08-19 LAB
ANION GAP SERPL CALCULATED.3IONS-SCNC: 9 MMOL/L (ref 4–13)
BASOPHILS # BLD AUTO: 0.09 THOUSANDS/ΜL (ref 0–0.1)
BASOPHILS NFR BLD AUTO: 0 % (ref 0–1)
BUN SERPL-MCNC: 20 MG/DL (ref 5–25)
CALCIUM SERPL-MCNC: 7.7 MG/DL (ref 8.3–10.1)
CHLORIDE SERPL-SCNC: 102 MMOL/L (ref 100–108)
CO2 SERPL-SCNC: 27 MMOL/L (ref 21–32)
CREAT SERPL-MCNC: 1.12 MG/DL (ref 0.6–1.3)
EOSINOPHIL # BLD AUTO: 0.08 THOUSAND/ΜL (ref 0–0.61)
EOSINOPHIL NFR BLD AUTO: 0 % (ref 0–6)
ERYTHROCYTE [DISTWIDTH] IN BLOOD BY AUTOMATED COUNT: 17.2 % (ref 11.6–15.1)
GFR SERPL CREATININE-BSD FRML MDRD: 50 ML/MIN/1.73SQ M
GLUCOSE SERPL-MCNC: 80 MG/DL (ref 65–140)
HCT VFR BLD AUTO: 23.6 % (ref 34.8–46.1)
HGB BLD-MCNC: 7.4 G/DL (ref 11.5–15.4)
IMM GRANULOCYTES # BLD AUTO: 0.44 THOUSAND/UL (ref 0–0.2)
IMM GRANULOCYTES NFR BLD AUTO: 2 % (ref 0–2)
LYMPHOCYTES # BLD AUTO: 2.35 THOUSANDS/ΜL (ref 0.6–4.47)
LYMPHOCYTES NFR BLD AUTO: 10 % (ref 14–44)
MCH RBC QN AUTO: 28 PG (ref 26.8–34.3)
MCHC RBC AUTO-ENTMCNC: 31.4 G/DL (ref 31.4–37.4)
MCV RBC AUTO: 89 FL (ref 82–98)
MONOCYTES # BLD AUTO: 1.43 THOUSAND/ΜL (ref 0.17–1.22)
MONOCYTES NFR BLD AUTO: 6 % (ref 4–12)
NEUTROPHILS # BLD AUTO: 18.46 THOUSANDS/ΜL (ref 1.85–7.62)
NEUTS SEG NFR BLD AUTO: 82 % (ref 43–75)
NRBC BLD AUTO-RTO: 0 /100 WBCS
PLATELET # BLD AUTO: 467 THOUSANDS/UL (ref 149–390)
PMV BLD AUTO: 9.8 FL (ref 8.9–12.7)
POTASSIUM SERPL-SCNC: 4.2 MMOL/L (ref 3.5–5.3)
RBC # BLD AUTO: 2.64 MILLION/UL (ref 3.81–5.12)
SODIUM SERPL-SCNC: 138 MMOL/L (ref 136–145)
WBC # BLD AUTO: 22.85 THOUSAND/UL (ref 4.31–10.16)

## 2019-08-19 PROCEDURE — 92526 ORAL FUNCTION THERAPY: CPT

## 2019-08-19 PROCEDURE — 99232 SBSQ HOSP IP/OBS MODERATE 35: CPT | Performed by: INTERNAL MEDICINE

## 2019-08-19 PROCEDURE — 99024 POSTOP FOLLOW-UP VISIT: CPT | Performed by: SURGERY

## 2019-08-19 PROCEDURE — 85025 COMPLETE CBC W/AUTO DIFF WBC: CPT | Performed by: STUDENT IN AN ORGANIZED HEALTH CARE EDUCATION/TRAINING PROGRAM

## 2019-08-19 PROCEDURE — 80048 BASIC METABOLIC PNL TOTAL CA: CPT | Performed by: INTERNAL MEDICINE

## 2019-08-19 RX ORDER — FLUCONAZOLE 200 MG/1
800 TABLET ORAL DAILY
Status: COMPLETED | OUTPATIENT
Start: 2019-08-20 | End: 2019-08-21

## 2019-08-19 RX ADMIN — LIDOCAINE 1 PATCH: 50 PATCH CUTANEOUS at 08:26

## 2019-08-19 RX ADMIN — GABAPENTIN 100 MG: 100 CAPSULE ORAL at 21:09

## 2019-08-19 RX ADMIN — CEFEPIME HYDROCHLORIDE 1000 MG: 1 INJECTION, POWDER, FOR SOLUTION INTRAMUSCULAR; INTRAVENOUS at 09:07

## 2019-08-19 RX ADMIN — HEPARIN SODIUM 5000 UNITS: 5000 INJECTION INTRAVENOUS; SUBCUTANEOUS at 15:08

## 2019-08-19 RX ADMIN — MELATONIN 3 MG: 3 TAB ORAL at 21:09

## 2019-08-19 RX ADMIN — HEPARIN SODIUM 5000 UNITS: 5000 INJECTION INTRAVENOUS; SUBCUTANEOUS at 05:23

## 2019-08-19 RX ADMIN — Medication 250 MG: at 18:34

## 2019-08-19 RX ADMIN — HEPARIN SODIUM 5000 UNITS: 5000 INJECTION INTRAVENOUS; SUBCUTANEOUS at 21:09

## 2019-08-19 RX ADMIN — OXYCODONE HYDROCHLORIDE 2.5 MG: 5 TABLET ORAL at 05:23

## 2019-08-19 RX ADMIN — FOLIC ACID 1 MG: 1 TABLET ORAL at 08:26

## 2019-08-19 RX ADMIN — ESCITALOPRAM OXALATE 10 MG: 10 TABLET ORAL at 08:26

## 2019-08-19 RX ADMIN — VANCOMYCIN HYDROCHLORIDE 125 MG: 500 INJECTION, POWDER, LYOPHILIZED, FOR SOLUTION INTRAVENOUS at 12:07

## 2019-08-19 RX ADMIN — PRAVASTATIN SODIUM 20 MG: 20 TABLET ORAL at 18:34

## 2019-08-19 RX ADMIN — Medication 250 MG: at 08:26

## 2019-08-19 RX ADMIN — FLUCONAZOLE 400 MG: 200 TABLET ORAL at 08:26

## 2019-08-19 RX ADMIN — VANCOMYCIN HYDROCHLORIDE 125 MG: 500 INJECTION, POWDER, LYOPHILIZED, FOR SOLUTION INTRAVENOUS at 21:09

## 2019-08-19 RX ADMIN — Medication 20 MG: at 18:34

## 2019-08-19 RX ADMIN — CEFEPIME HYDROCHLORIDE 1000 MG: 1 INJECTION, POWDER, FOR SOLUTION INTRAMUSCULAR; INTRAVENOUS at 18:34

## 2019-08-19 NOTE — PROGRESS NOTES
Progress Note - General Surgery   Kristel KAHN Hartranft 78 y o  female MRN: [id number]  Unit/Bed#: [facility] 824-01 Encounter: [id number]    Assessment:  78 F with laparoscopic VHR complicated by small bowel injury, now s/p exploratory laparotomy, mesh explantation, SBR, and eventual abdominal closure, also with candidal fungemia  VSS  Afebrile  YUNG resolving  Cr  2-> 1 1 today  Plan:  -Regular diet   -IV abx rochephin, fluconazole (renally dosed)  -continue ivf, apprec nephro recs  -f/u am labs    Subjective/Objective     Subjective: Denied fever, chills, chest pain, shortness of breath, nausea, vomiting, or abdominal pain this morning  Objective:     Blood pressure 143/87, pulse (!) 114, temperature 98 5 °F (36 9 °C), temperature source Oral, resp  rate 16, height 5' 4" (1 626 m), weight 75 kg (165 lb 6 4 oz), SpO2 95 %  ,Body mass index is 28 39 kg/m²  Intake/Output Summary (Last 24 hours) at 8/19/2019 0545  Last data filed at 8/19/2019 0325  Gross per 24 hour   Intake 2844 ml   Output 4800 ml   Net -1956 ml       Invasive Devices     Peripherally Inserted Central Catheter Line            PICC Line 69/28/06 Right Basilic 4 days          Drain            External Urinary Catheter 2 days                Physical Exam  General: NAD  HEENT: NC/AT  MMM  Cv: RRR  Lungs: normal effort  Ab: Soft, NT/ND  Ex: no CCE  Neuro: AAOx3      Lab, Imaging and other studies:I have personally reviewed pertinent lab results    , CBC: No results found for: WBC, HGB, HCT, MCV, PLT, ADJUSTEDWBC, MCH, MCHC, RDW, MPV, NRBC, CMP: No results found for: SODIUM, K, CL, CO2, ANIONGAP, BUN, CREATININE, GLUCOSE, CALCIUM, AST, ALT, ALKPHOS, PROT, BILITOT, EGFR  VTE Pharmacologic Prophylaxis: SQH  VTE Mechanical Prophylaxis: sequential compression device

## 2019-08-19 NOTE — PROGRESS NOTES
Progress Note - Infectious Disease   Kristel hCung 78 y o  female MRN: [id number]  Unit/Bed#: [facility] 824-01 Encounter: [id number]      Impression/Plan:  1  Systemic inflammatory response syndrome- Possibly related the patient's fungemia   Possibly secondary to peritonitis   The patient is now improved and moved out of the intensive care unit  The leukocytosis has persisted   Stool for C diff is negative   The patient completed more than 10 days of intravenous antibacterials   The white blood cell count has decreased but remains elevated   The procalcitonin level has continued to decrease  -cefepime as below for possible UTI  -continue antifungals as below  -recheck CT of the chest abdomen pelvis if leukocytosis does not continue to improve without a new source  -recheck CBC with diff  -supportive care     2  Fungemia-possibly all translocation across the gut wall in the setting of peritonitis   Possible catheter related bacteremia although this is less likely as the blood cultures were from around the time of admission   The central line has been changed   Fungemia has cleared   The organism is not resistant to fluconazole   -continue fluconazole through 8/21/2019 to complete 14 days from the 1st negative blood cultures  -with improved renal function will increase the fluconazole to 800 mg daily  -recheck liver function test  -recheck creatinine and dose adjust as needed    3  Possible pseudomonal UTI-the patient is asymptomatic but does have a brisk leukocytosis  The urinalysis was not very impressive for an invasive UTI    The ceftriaxone has no activity against Pseudomonas   -discontinue ceftriaxone  -cefepime 1 g IV q 8 hours  -monitor CBC with diff  -anticipate brief course of antibiotics as the patient is not septic and if this is an invasive UTI it is localized     4  Peritonitis-status post exploration and repair   No cultures obtained at the time of the surgery   Patient does have significant turbid drainage from the superior aspect of the wound   Patient has been maintained on broad antibiotics   The patient continues to have a brisk leukocytosis   Significant ascites noted   She completed more than 10 days of intravenous antibiotics   Patient is stable off all antibiotics in the white cell count has decreased slightly over the last 24 hours  -recheck CBC with diff  -if leukocytosis does not continue to improve, recommend repeat CT of the chest abdomen and pelvis as above  -serial abdominal exams  -close surgical follow-up     5  Acute hypoxic respiratory failure-likely secondary to hemoptysis aspiration and a pneumothorax   Less likely pneumonia  Nikki Caceres is now extubated and on O2 by nasal cannula   Repeat chest x-ray with slightly increased volume   Bronchoscopy cultures without any resistant organisms   Her respiratory status is improved     -oxygen support   -monitor respiratory status     6  Acute kidney injury-suspect this is a pre renal issue  Possibly medication effect  No other clear source appreciated  The renal function has significantly improved since yesterday    -recheck BMP  -dose adjust antifungals and antibiotics as above  -nephrology follow-up  -volume management    Antibiotics:  Fluconazole 7  Antifungals 12 since the 1st negative blood culture  Ceftriaxone 2    Subjective:  Patient has no fever, chills, sweats; no nausea, vomiting, diarrhea; no cough, shortness of breath; no pain  No new symptoms  Patient started on ceftriaxone yesterday for possible UTI  Objective:  Vitals:  Temp:  [97 7 °F (36 5 °C)-99 6 °F (37 6 °C)] 97 8 °F (36 6 °C)  HR:  [108-122] 108  Resp:  [16-20] 18  BP: (142-146)/(87-93) 142/89  SpO2:  [89 %-98 %] 96 %  Temp (24hrs), Av 5 °F (36 9 °C), Min:97 7 °F (36 5 °C), Max:99 6 °F (37 6 °C)  Current: Temperature: 97 8 °F (36 6 °C)    Physical Exam:   General Appearance:  Alert, interactive, nontoxic, no acute distress  Throat: Oropharynx moist without lesions  Lungs:   Decreased breath sounds bilaterally; no wheezes, rhonchi or rales; respirations unlabored   Heart:  Tachycardic; no murmur, rub or gallop   Abdomen:   Soft, non-tender, non-distended, positive bowel sounds  Incision well approximated without erythema and only scant drainage     Extremities: No clubbing, cyanosis or edema   Skin: No new rashes or lesions  No draining wounds noted  Labs, Imaging, & Other studies:   All pertinent labs and imaging studies were personally reviewed  Results from last 7 days   Lab Units 08/19/19  0739 08/18/19  0452 08/17/19  0459   WBC Thousand/uL 22 85* 25 73* 21 38*   HEMOGLOBIN g/dL 7 4* 7 5* 7 9*   PLATELETS Thousands/uL 467* 496* 530*     Results from last 7 days   Lab Units 08/19/19  0543 08/18/19  0452 08/17/19  0459  08/14/19  0547 08/13/19  0520   SODIUM mmol/L 138 135* 137   < > 139 148*   POTASSIUM mmol/L 4 2 4 8 4 5   < > 3 8 3 6   CHLORIDE mmol/L 102 99* 100   < > 102 107   CO2 mmol/L 27 28 28   < > 34* 37*   BUN mg/dL 20 28* 24   < > 9 9   CREATININE mg/dL 1 12 2 02* 1 60*   < > 0 68 0 43*   EGFR ml/min/1 73sq m 50 24 32   < > 89 103   CALCIUM mg/dL 7 7* 7 6* 7 6*   < > 7 8* 8 1*   AST U/L  --   --  26  --  26 29   ALT U/L  --   --  16  --  14 16   ALK PHOS U/L  --   --  158*  --  158* 163*    < > = values in this interval not displayed       Results from last 7 days   Lab Units 08/18/19  0817   URINE CULTURE  >100,000 cfu/ml Pseudomonas aeruginosa*     Results from last 7 days   Lab Units 08/15/19  0557 08/14/19  0547   PROCALCITONIN ng/ml 0 26* 0 33*     Urinalysis-minimal pyuria and bacteriuria

## 2019-08-19 NOTE — QUICK NOTE
Nurse-Patient-Provider rounds were completed with the patient's nurse today, Janelle Smith  We discussed the plan is to   - switch Abx to cefipime due to Pseudomonas UTI  - fluconazole through 8/21 due to fungemia  - Continue to monitor leukocytosis  - Creatinine has normalized and patient is making a significant amount of urine; will D/C IVF    We reviewed all of the invasive devices/lines/telemetry orders   - PICC    DVT prophylaxis:  - heparin    Diet:  - dysphagia 2 mechanical soft; thin liquid    Pain Assessment / Plan:  - Continue current pain management regimen: Lidoderm, Tylenol, Oxycodone, Robaxin, Dilaudid    Mobility Assessment / Plan:  - Activity as tolerated  Goals / Barriers for discharge:  - trending leukocytosis      Case management following; case and discharge needs discussed  - Rehab placement upon discharge    All questions and concerns were addressed        Holly Manuel PA-C

## 2019-08-19 NOTE — SOCIAL WORK
Pt discussed during care coordination rounds & should be medically cleared tomorrow  TC from Sabina Blanchard at [address] [facility] [phone] whom stated they can accept pt tomorrow  S/w Shannon Quarles at [address] [facility] up for BLS transport at 1230 tomorrow  CMN completed & on chart  Updated pt whom is in agreement & left VM for her daughter Carlota Duverney [phone]

## 2019-08-19 NOTE — SPEECH THERAPY NOTE
Speech/Language Pathology Progress Note    Patient Name: Dre Chinchilla  Today's Date: 8/19/2019    Subjective:  "Everything is so dry "    Objective:  Pt seen for diagnostic swallow therapy with upgraded diet of dysphagia 2 and thin liquids during lunch today  Lunch included ground chicken and gravy, finely chopped green beans, and mashed potatoes/gravy  Pt has a poor appetite and requires encouragement to increase intake  Pt refuses ensure because she feels it gives her diarrhea  Some feeding assistance provided, but pt is able to feed herself  Only small bites of ground chicken accepted, pt again complaining of dryness despite plenty of gravy  Mild oral residual between bites  Chopped beans were mixed with mashed potatoes and offered in small bites, tolerated with slightly prolonged mastication and no oral residual  No s/s aspiration with thin liquids  Assessment:  Pt is able to tolerate current diet of mech soft and thin liquids without s/s aspiration, but complains of dryness and appetite is poor  Questioned if pt would prefer to be back on pureed foods but she said "It doesn't matter  It's all dry "     Plan/Recommendations:  Continue dysphagia 2 diet and thin liquids  ST will continue to follow

## 2019-08-19 NOTE — UTILIZATION REVIEW
Elective Surgical Continued Stay Review    Date: 8/19/19    POD#: 20 FROM INIITIAL SURGERY OF VENTRAL HERNIA REPAIR WITH 2 RETURNS TO OR FOR EX  LAP  Current Patient Class: IP     Current Level of Care: MED SURG    Assessment/Plan: 78 y o  female, initial surgery date 7/31/19  YUNG IS RESOLVING  IV ANTIBIOTICS CONTINUE - ROCEPHIN AND FLUCONAZOLE  IV FLUIDS  WILL CHANGE ANTIBIOTICS TO CEFEPIME R/T PSEUDOMONAS UTO  Pertinent Labs/Diagnostic Results:   Results for Lulú Bustos (MRN [id number]) as of 8/19/2019 15:24   Ref  Range 8/17/2019 04:59 8/18/2019 04:52 8/19/2019 05:43   Sodium Latest Ref Range: 136 - 145 mmol/L 137 135 (L) 138   Potassium Latest Ref Range: 3 5 - 5 3 mmol/L 4 5 4 8 4 2   Chloride Latest Ref Range: 100 - 108 mmol/L 100 99 (L) 102   CO2 Latest Ref Range: 21 - 32 mmol/L 28 28 27   Anion Gap Latest Ref Range: 4 - 13 mmol/L 9 8 9   BUN Latest Ref Range: 5 - 25 mg/dL 24 28 (H) 20   Creatinine Latest Ref Range: 0 60 - 1 30 mg/dL 1 60 (H) 2 02 (H) 1 12   Glucose, Random Latest Ref Range: 65 - 140 mg/dL 69 75 80   Calcium Latest Ref Range: 8 3 - 10 1 mg/dL 7 6 (L) 7 6 (L) 7 7 (L)   AST Latest Ref Range: 5 - 45 U/L 26     ALT Latest Ref Range: 12 - 78 U/L 16     Alkaline Phosphatase Latest Ref Range: 46 - 116 U/L 158 (H)     Total Protein Latest Ref Range: 6 4 - 8 2 g/dL 5 8 (L)     Albumin Latest Ref Range: 3 5 - 5 0 g/dL 1 4 (L)     TOTAL BILIRUBIN Latest Ref Range: 0 20 - 1 00 mg/dL 0 37     eGFR Latest Units: ml/min/1 73sq m 32 24 50   Results for Lulú Bustos (MRN [id number]) as of 8/19/2019 15:24   Ref   Range 8/17/2019 04:59 8/18/2019 04:52 8/19/2019 07:39   WBC Latest Ref Range: 4 31 - 10 16 Thousand/uL 21 38 (H) 25 73 (H) 22 85 (H)   Red Blood Cell Count Latest Ref Range: 3 81 - 5 12 Million/uL 2 80 (L) 2 67 (L) 2 64 (L)   Hemoglobin Latest Ref Range: 11 5 - 15 4 g/dL 7 9 (L) 7 5 (L) 7 4 (L)   HCT Latest Ref Range: 34 8 - 46 1 % 25 1 (L) 24 0 (L) 23 6 (L)   MCV Latest Ref Range: 82 - 98 fL 90 90 89   MCH Latest Ref Range: 26 8 - 34 3 pg 28 2 28 1 28 0   MCHC Latest Ref Range: 31 4 - 37 4 g/dL 31 5 31 3 (L) 31 4   RDW Latest Ref Range: 11 6 - 15 1 % 17 7 (H) 17 4 (H) 17 2 (H)   Platelet Count Latest Ref Range: 149 - 390 Thousands/uL 530 (H) 496 (H) 467 (H)   MPV Latest Ref Range: 8 9 - 12 7 fL 10 2 9 9 9 8   Platelet Estimate Latest Ref Range: Adequate  Increased (A)     nRBC Latest Units: /100 WBCs 0  0   Segs Relative Latest Ref Range: 43 - 75 % 91 (H)     Neutrophils % Latest Ref Range: 43 - 75 %   82 (H)   Abs Neutrophils Latest Ref Range: 1 85 - 7 62 Thousand/uL 19 46 (H)     Immat GRANS % Latest Ref Range: 0 - 2 %   2   Lymphocytes Relative Latest Ref Range: 14 - 44 %   10 (L)   Lymphocytes % Latest Ref Range: 14 - 44 % 5 (L)     Monocytes Relative Latest Ref Range: 4 - 12 %   6   Monocytes Latest Ref Range: 4 - 12 % 3 (L)     Eosinophils Latest Ref Range: 0 - 6 % 1  0   Basophils Relative Latest Ref Range: 0 - 1 % 0  0   Immature Grans Absolute Latest Ref Range: 0 00 - 0 20 Thousand/uL   0 44 (H)   Absolute Neutrophils Latest Ref Range: 1 85 - 7 62 Thousands/µL   18 46 (H)   Lymphocytes Absolute Latest Ref Range: 0 60 - 4 47 Thousands/µL   2 35   Absolute Monocytes Latest Ref Range: 0 17 - 1 22 Thousand/µL   1 43 (H)   Absolute Eosinophils Latest Ref Range: 0 00 - 0 61 Thousand/µL 0 21  0 08   Basophils Absolute Latest Ref Range: 0 00 - 0 10 Thousands/µL 0 00  0 09   RBC Morphology Unknown Present     Polychromasia Unknown Present     Anisocytosis Unknown Present     Poikilocytes Unknown Present     Results for Osiris Peters (MRN [id number]) as of 8/19/2019 15:24   Ref   Range 8/18/2019 13:11   Color, UA Unknown Yellow   Clarity, UA Unknown Clear   SL AMB SPECIFIC GRAVITY_URINE Latest Ref Range: 1 003 - 1 030  1 005   Glucose, UA Latest Ref Range: Negative mg/dl Negative   Ketones, UA Latest Ref Range: Negative mg/dl Negative   Blood, UA Latest Ref Range: Negative  Large (A)   Nitrite, UA Latest Ref Range: Negative  Negative   Leukocytes, UA Latest Ref Range: Negative  Negative   pH, UA Latest Ref Range: 4 5, 5 0, 5 5, 6 0, 6 5, 7 0, 7 5, 8 0  7 0   POCT URINE PROTEIN Latest Ref Range: Negative mg/dl Negative   Bilirubin, UA Latest Ref Range: Negative  Negative   SL AMB POCT UROBILINOGEN Latest Ref Range: 0 2, 1 0 E U /dl E U /dl 0 2   RBC, UA Latest Ref Range: None Seen, 0-5 /hpf Innumerable (A)   WBC, UA Latest Ref Range: None Seen, 0-5, 5-55, 5-65 /hpf 2-4 (A)   Bacteria, UA Latest Ref Range: None Seen, Occasional /hpf None Seen   Hyaline Casts, UA Latest Ref Range: None Seen /lpf 5-10 (A)     Vital Signs:   08/19/19 15:16:18  99 9 °F (37 7 °C)  111Abnormal   20  146/87  107  97 %     08/19/19 11:39:34  99 1 °F (37 3 °C)  110Abnormal   20  145/87  106  96 %     08/19/19 0830              Nasal cannula   08/19/19 07:43:01  97 8 °F (36 6 °C)  108Abnormal   18  142/89  107  96 %     08/19/19 03:25:34  98 5 °F (36 9 °C)  114Abnormal   16  143/87  106  95 %  Nasal cannula   08/19/19 0300              Nasal cannula   08/18/19 23:32:38  98 7 °F (37 1 °C)  116Abnormal   20  144/89  107  98 %       Medications:   Scheduled Meds:   Current Facility-Administered Medications:  acetaminophen 650 mg Oral Q6H PRN   bisacodyl 10 mg Rectal Daily PRN   cefepime 1,000 mg Intravenous Q8H   escitalopram 10 mg Per G Tube Daily   [START ON 8/20/2019] fluconazole 800 mg Oral Daily   folic acid 1 mg Per G Tube Daily   gabapentin 100 mg Oral HS   guaiFENesin 200 mg Oral Q4H PRN   heparin (porcine) 5,000 Units Subcutaneous Q8H [facility]   HYDROmorphone 0 2 mg Intravenous Q4H PRN   lidocaine 1 patch Topical Daily   melatonin 3 mg Per G Tube HS   methocarbamol 500 mg Oral Q6H PRN   omeprazole (PRILOSEC) suspension 2 mg/mL 20 mg Oral BID AC   ondansetron 4 mg Intravenous Q4H PRN   oxyCODONE 2 5 mg Oral Q4H PRN   Or      oxyCODONE 5 mg Oral Q4H PRN   pravastatin 20 mg Per G Tube Daily [employer] saccharomyces boulardii 250 mg Oral BID   saliva substitute 5 spray Mouth/Throat PRN   vancomycin 125 mg Oral Q12H [address]       Discharge Plan: [address] Utilization Review Department  Phone: [phone]; Fax [phone]  [email or web address]  ATTENTION: Please call with any questions or concerns to [phone]  and carefully listen to the prompts so that you are directed to the right person  Send all requests for admission clinical reviews, approved or denied determinations and any other requests to fax [phone]   All voicemails are confidential

## 2019-08-19 NOTE — TRANSPORTATION MEDICAL NECESSITY
Section I - General Information    Name of Patient: Bharat Son                 : 1950    Medicare #: [id number]  Transport Date: 19 (PCS is valid for round trips on this date and for all repetitive trips in the 60-day range as noted below )  Origin: [address] [facility] [address]                                                         Destination: [facility]  Is the pt's stay covered under Medicare Part A (PPS/DRG)   [x]     Closest appropriate facility? If no, why is transport to more distant facility required? Yes  If hospice pt, is this transport related to pt's terminal illness? n/a       Section II - Medical Necessity Questionnaire  Ambulance transportation is medically necessary only if other means of transport are contraindicated or would be potentially harmful to the patient  To meet this requirement, the patient must either be "bed confined" or suffer from a condition such that transport by means other than ambulance is contraindicated by the patient's condition  The following questions must be answered by the medical professional signing below for this form to be valid:    1)  Describe the MEDICAL CONDITION (physical and/or mental) of this patient AT [address] [place] [address] that requires the patient to be transported in an ambulance and why transport by other means is contraindicated by the patient's condition:exploratory lap, incisional hernai    2) Is the patient "bed confined" as defined below? Yes  To be "be confined" the patient must satisfy all three of the following conditions: (1) unable to get up from bed without Assistance; AND (2) unable to ambulate; AND (3) unable to sit in a chair or wheelchair  3) Can this patient safely be transported by car or wheelchair van (i e , seated during transport without a medical attendant or monitoring)?    No    4) In addition to completing questions 1-3 above, please check any of the following conditions that apply*:   *Note: supporting documentation for any boxes checked must be maintained in the patient's medical records  If hosp-hosp transfer, describe services needed at 2nd facility not available at 1st facility? Medical attendant required   Requires oxygen-unable to self administer  Unable to tolerate seated position for time needed to transport   Unable to sit in a chair or wheelchair due to decubitus ulcers or other wounds   Other(specify) o2 2L, non-ambulator, mas assist bed mobility, verbal cues, fall risk, pain with movement, fatigue, lethargy, impaired judgeent, decreased cognition, incontinent, foely, abd wounds      Section III - Signature of Physician or Healthcare Professional  I certify that the above information is true and correct based on my evaluation of this patient, and represent that the patient requires transport by ambulance and that other forms of transport are contraindicated  I understand that this information will be used by the Centers for Medicare and Medicaid Services (CMS) to support the determination of medical necessity for ambulance services, and I represent that I have personal knowledge of the patient's condition at time of transport  [x]  If this box is checked, I also certify that the patient is physically or mentally incapable of signing the ambulance service's claim and that the institution with which I am affiliated has furnished care, services, or assistance to the patient  My signature below is made on behalf of the patient pursuant to 42 CFR §424 36(b)(4)   In accordance with 42 CFR §424 37, the specific reason(s) that the patient is physically or mentally incapable of signing the claim form is as follows:       Signature of Physician* or Healthcare Professional______________________________________________________________  Signature Date 08/19/19 (For scheduled repetitive transports, this form is not valid for transports performed more than 60 days after this date)    Printed Name & Credentials of Physician or Healthcare Professional (MD, DO, RN, etc )________________________________  *Form must be signed by patient's attending physician for scheduled, repetitive transports   For non-repetitive, unscheduled ambulance transports, if unable to obtain the signature of the attending physician, any of the following may sign (choose appropriate option below)  [] Physician Assistant []  Clinical Nurse Specialist [x]  Registered Nurse  []  Nurse Practitioner  [x] Discharge Planner

## 2019-08-19 NOTE — PROGRESS NOTES
Progress Note - Nephrology   Claribel Chung 78 y o  female MRN: [id number]  Unit/Bed#: [facility] 824-01 Encounter: [id number]    ASSESSMENT AND PLAN:  1  AK I:  Thought secondary to prerenal; less likely  Micafungin which has been reported to cause AK I less than 15%  Currently the creatinine has significantly improved with IV fluids  Patient also with urinary retention yesterday  Both of these would point against the Micafungin  -peak creatinine 2 02 08/18/2019  -baseline creatinine less than 0 5 mg/dL  -current creatinine:  1 12 markedly improved  -UA:  Innumerable all RBCs and no proteinuria  This may be compatible with the straight catheterization  Recommendations:  -bladder scans q 8 hours x3 with urinary retention protocol  -continue IV fluids  -monitor renal function  -avoid nephrotoxic agents such as NSAIDs  -recheck UA in a m  Regarding the hematuria    2  Blood pressure:  Fairly stable  Avoid over treating  Monitor heart rate  Hold parameters  3  Electrolytes: All acceptable today  Patient had mild hyponatremia which resolved and isotonic fluids    4  Mineral bone disorder:  Recheck magnesium in a m     5  Anemia:  Hemoglobin most likely decreased related to multiple procedures  Hemoglobin stable today at 7 4  Check iron studies  We cannot give intravenous iron but may be oral iron  6  Other issues:  -laparoscopic ventral hernia repair complicated with small-bowel injury now status post exploratory laparotomy/mesh explantation/small bowel resection/eventual abdominal closure  -fungi me a secondary to translocation across the gut wall followed by infectious disease complicated by peritonitis        Subjective: The patient is overall feeling fairly well  She has difficulty swallowing food at times because her mouth is so dry  No nausea vomiting or diarrhea  No active urinary symptoms  No chest pain or shortness of Breath      Objective:     Vitals: Blood pressure 142/89, pulse (!) 108, temperature 97 8 °F (36 6 °C), resp  rate 18, height 5' 4" (1 626 m), weight 74 4 kg (164 lb), SpO2 96 %  ,Body mass index is 28 15 kg/m²      Weight (last 2 days)     Date/Time   Weight    08/19/19 0600   74 4 (164)    08/18/19 0600   75 (165 4)    08/17/19 0600   76 3 (168 21)                Intake/Output Summary (Last 24 hours) at 8/19/2019 1110  Last data filed at 8/19/2019 0800  Gross per 24 hour   Intake 4226 ml   Output 5550 ml   Net -1324 ml            Physical Exam: General:  Weak appearing but no acute distress  Skin:  No acute rash  Eyes:  No scleral icterus  ENT:  Dry mucous membranes  Neck:  Supple, no jugular venous distention  Chest:  Clear to auscultation  CVS:  No rubs or gallops appreciable  Abdomen:  Soft with only minimal tenderness to deep palpation around the incision site, bandage midline  Extremities:  No edema and no cyanosis  Neuro:  No gross focality  Psych:  Alert and oriented                Medications:    Scheduled Meds:  Current Facility-Administered Medications:  acetaminophen 650 mg Oral Q6H PRN Genoveva Ramirez PA-C   bisacodyl 10 mg Rectal Daily PRN Genoveva Ramirez PA-C   cefepime 1,000 mg Intravenous Q8H Lia Cotto MD   escitalopram 10 mg Per G Tube Daily Genoveva Ramirez PA-C   [START ON 8/20/2019] fluconazole 800 mg Oral Daily Lia Cotto MD   folic acid 1 mg Per G Tube Daily Genoveva Ramirez PA-C   gabapentin 100 mg Oral HS Genoveva Ramirez PA-C   guaiFENesin 200 mg Oral Q4H PRN Alto Fabry, DO   heparin (porcine) 5,000 Units Subcutaneous [facility] Genoveva Ramirez PA-C   HYDROmorphone 0 2 mg Intravenous Q4H PRN Genoveva Ramirez PA-C   lidocaine 1 patch Topical Daily Genoveva Ramirez PA-C   melatonin 3 mg Per G Tube HS Genoveva Ramirez PA-C   methocarbamol 500 mg Oral Q6H PRN Genoveva Ramirez PA-C   omeprazole (PRILOSEC) suspension 2 mg/mL 20 mg Oral BID TANJA Fam PA-C   ondansetron 4 mg Intravenous Q4H PRN Genoveva Ramirez PA-C   oxyCODONE 2 5 mg Oral Q4H PRN Lopez Del Toro PA-C   Or       oxyCODONE 5 mg Oral Q4H PRN Lopez Del Toro PA-C   pravastatin 20 mg Per G Tube Daily With [employer] kay   saccharomyces boulardii 250 mg Oral BID Lopez Del Toro PA-C   saliva substitute 5 spray Mouth/Throat PRN Robby Galaviz MD   vancomycin 125 mg Oral Q12H [facility] Jesusita Kerr MD       PRN Meds:   acetaminophen    bisacodyl    guaiFENesin    HYDROmorphone    methocarbamol    ondansetron    oxyCODONE **OR** oxyCODONE    saliva substitute    Continuous Infusions:     Lab, Imaging and other studies: I have personally reviewed pertinent labs    Laboratory Results:  Results from last 7 days   Lab Units 08/19/19  0739 08/19/19  0543 08/18/19  0452 08/17/19  0459 08/16/19  0513 08/15/19  0557 08/14/19  0547 08/13/19  0520   WBC Thousand/uL 22 85*  --  25 73* 21 38* 24 06* 25 32* 29 96* 25 94*   HEMOGLOBIN g/dL 7 4*  --  7 5* 7 9* 8 1* 7 9* 8 5* 8 6*   HEMATOCRIT % 23 6*  --  24 0* 25 1* 24 8* 25 4* 27 7* 27 5*   PLATELETS Thousands/uL 467*  --  496* 530* 535* 547* 644* 577*   POTASSIUM mmol/L  --  4 2 4 8 4 5 4 3 4 0 3 8 3 6   CHLORIDE mmol/L  --  102 99* 100 97* 99* 102 107   CO2 mmol/L  --  27 28 28 32 33* 34* 37*   BUN mg/dL  --  20 28* 24 17 13 9 9   CREATININE mg/dL  --  1 12 2 02* 1 60* 1 35* 0 93 0 68 0 43*   CALCIUM mg/dL  --  7 7* 7 6* 7 6* 7 6* 7 6* 7 8* 8 1*   MAGNESIUM mg/dL  --   --   --   --   --  1 9 1 9 2 3   PHOSPHORUS mg/dL  --   --   --   --   --  3 5 3 1 2 7     Urinalysis: Lab Results   Component Value Date    COLORU Yellow 08/18/2019    COLORU Yellow 11/07/2014    CLARITYU Clear 08/18/2019    CLARITYU Clear 11/07/2014    SPECGRAV 1 005 08/18/2019    SPECGRAV 1 020 11/07/2014    PHUR 7 0 08/18/2019    PHUR 7 0 11/07/2014    LEUKOCYTESUR Negative 08/18/2019    LEUKOCYTESUR Negative 11/07/2014    NITRITE Negative 08/18/2019    NITRITE Negative 11/07/2014    PROTEINUA Negative 11/07/2014    GLUCOSEU Negative 08/18/2019 GLUCOSEU Negative 11/07/2014    KETONESU Negative 08/18/2019    KETONESU Negative 11/07/2014    BILIRUBINUR Negative 08/18/2019    BILIRUBINUR Negative 11/07/2014    BLOODU Large (A) 08/18/2019    BLOODU Negative 11/07/2014     ABGs:   Lab Results   Component Value Date    PH 7 334 (L) 08/03/2019     Radiology review:     Portions of the record may have been created with voice recognition software   Occasional wrong word or "sound a like" substitutions may have occurred due to the inherent limitations of voice recognition software   Read the chart carefully and recognize, using context, where substitutions have occurred

## 2019-08-20 LAB
ALBUMIN SERPL BCP-MCNC: 1.3 G/DL (ref 3.5–5)
ALP SERPL-CCNC: 188 U/L (ref 46–116)
ALT SERPL W P-5'-P-CCNC: 14 U/L (ref 12–78)
ANION GAP SERPL CALCULATED.3IONS-SCNC: 6 MMOL/L (ref 4–13)
AST SERPL W P-5'-P-CCNC: 23 U/L (ref 5–45)
BACTERIA UR QL AUTO: ABNORMAL /HPF
BASOPHILS # BLD AUTO: 0.06 THOUSANDS/ΜL (ref 0–0.1)
BASOPHILS NFR BLD AUTO: 0 % (ref 0–1)
BILIRUB SERPL-MCNC: 0.32 MG/DL (ref 0.2–1)
BILIRUB UR QL STRIP: NEGATIVE
BUN SERPL-MCNC: 15 MG/DL (ref 5–25)
CALCIUM SERPL-MCNC: 8.1 MG/DL (ref 8.3–10.1)
CHLORIDE SERPL-SCNC: 102 MMOL/L (ref 100–108)
CLARITY UR: ABNORMAL
CO2 SERPL-SCNC: 32 MMOL/L (ref 21–32)
COLOR UR: YELLOW
CREAT SERPL-MCNC: 0.56 MG/DL (ref 0.6–1.3)
EOSINOPHIL # BLD AUTO: 0.11 THOUSAND/ΜL (ref 0–0.61)
EOSINOPHIL NFR BLD AUTO: 1 % (ref 0–6)
ERYTHROCYTE [DISTWIDTH] IN BLOOD BY AUTOMATED COUNT: 17.2 % (ref 11.6–15.1)
GFR SERPL CREATININE-BSD FRML MDRD: 95 ML/MIN/1.73SQ M
GLUCOSE SERPL-MCNC: 101 MG/DL (ref 65–140)
GLUCOSE UR STRIP-MCNC: NEGATIVE MG/DL
HCT VFR BLD AUTO: 24.4 % (ref 34.8–46.1)
HGB BLD-MCNC: 7.6 G/DL (ref 11.5–15.4)
HGB UR QL STRIP.AUTO: ABNORMAL
IMM GRANULOCYTES # BLD AUTO: 0.43 THOUSAND/UL (ref 0–0.2)
IMM GRANULOCYTES NFR BLD AUTO: 2 % (ref 0–2)
KETONES UR STRIP-MCNC: NEGATIVE MG/DL
LEUKOCYTE ESTERASE UR QL STRIP: ABNORMAL
LYMPHOCYTES # BLD AUTO: 2.24 THOUSANDS/ΜL (ref 0.6–4.47)
LYMPHOCYTES NFR BLD AUTO: 11 % (ref 14–44)
MAGNESIUM SERPL-MCNC: 1.5 MG/DL (ref 1.6–2.6)
MCH RBC QN AUTO: 27.8 PG (ref 26.8–34.3)
MCHC RBC AUTO-ENTMCNC: 31.1 G/DL (ref 31.4–37.4)
MCV RBC AUTO: 89 FL (ref 82–98)
MONOCYTES # BLD AUTO: 1.07 THOUSAND/ΜL (ref 0.17–1.22)
MONOCYTES NFR BLD AUTO: 6 % (ref 4–12)
NEUTROPHILS # BLD AUTO: 15.67 THOUSANDS/ΜL (ref 1.85–7.62)
NEUTS SEG NFR BLD AUTO: 80 % (ref 43–75)
NITRITE UR QL STRIP: NEGATIVE
NON-SQ EPI CELLS URNS QL MICRO: ABNORMAL /HPF
NRBC BLD AUTO-RTO: 0 /100 WBCS
PH UR STRIP.AUTO: 7.5 [PH]
PLATELET # BLD AUTO: 450 THOUSANDS/UL (ref 149–390)
PMV BLD AUTO: 10.1 FL (ref 8.9–12.7)
POTASSIUM SERPL-SCNC: 3.1 MMOL/L (ref 3.5–5.3)
PROT SERPL-MCNC: 6 G/DL (ref 6.4–8.2)
PROT UR STRIP-MCNC: ABNORMAL MG/DL
RBC # BLD AUTO: 2.73 MILLION/UL (ref 3.81–5.12)
RBC #/AREA URNS AUTO: ABNORMAL /HPF
SODIUM SERPL-SCNC: 140 MMOL/L (ref 136–145)
SP GR UR STRIP.AUTO: 1 (ref 1–1.03)
UROBILINOGEN UR QL STRIP.AUTO: 1 E.U./DL
WBC # BLD AUTO: 19.58 THOUSAND/UL (ref 4.31–10.16)
WBC #/AREA URNS AUTO: ABNORMAL /HPF

## 2019-08-20 PROCEDURE — 85025 COMPLETE CBC W/AUTO DIFF WBC: CPT | Performed by: INTERNAL MEDICINE

## 2019-08-20 PROCEDURE — 83735 ASSAY OF MAGNESIUM: CPT | Performed by: INTERNAL MEDICINE

## 2019-08-20 PROCEDURE — 80053 COMPREHEN METABOLIC PANEL: CPT | Performed by: INTERNAL MEDICINE

## 2019-08-20 PROCEDURE — 99232 SBSQ HOSP IP/OBS MODERATE 35: CPT | Performed by: INTERNAL MEDICINE

## 2019-08-20 PROCEDURE — 99024 POSTOP FOLLOW-UP VISIT: CPT | Performed by: SURGERY

## 2019-08-20 PROCEDURE — 92526 ORAL FUNCTION THERAPY: CPT

## 2019-08-20 PROCEDURE — 99222 1ST HOSP IP/OBS MODERATE 55: CPT | Performed by: PHYSICIAN ASSISTANT

## 2019-08-20 PROCEDURE — 97530 THERAPEUTIC ACTIVITIES: CPT

## 2019-08-20 PROCEDURE — 81001 URINALYSIS AUTO W/SCOPE: CPT | Performed by: STUDENT IN AN ORGANIZED HEALTH CARE EDUCATION/TRAINING PROGRAM

## 2019-08-20 RX ORDER — POTASSIUM CHLORIDE 20 MEQ/1
40 TABLET, EXTENDED RELEASE ORAL ONCE
Status: COMPLETED | OUTPATIENT
Start: 2019-08-20 | End: 2019-08-20

## 2019-08-20 RX ORDER — SODIUM CHLORIDE, SODIUM GLUCONATE, SODIUM ACETATE, POTASSIUM CHLORIDE, MAGNESIUM CHLORIDE, SODIUM PHOSPHATE, DIBASIC, AND POTASSIUM PHOSPHATE .53; .5; .37; .037; .03; .012; .00082 G/100ML; G/100ML; G/100ML; G/100ML; G/100ML; G/100ML; G/100ML
75 INJECTION, SOLUTION INTRAVENOUS CONTINUOUS
Status: DISCONTINUED | OUTPATIENT
Start: 2019-08-20 | End: 2019-08-21

## 2019-08-20 RX ADMIN — HEPARIN SODIUM 5000 UNITS: 5000 INJECTION INTRAVENOUS; SUBCUTANEOUS at 13:22

## 2019-08-20 RX ADMIN — HEPARIN SODIUM 5000 UNITS: 5000 INJECTION INTRAVENOUS; SUBCUTANEOUS at 21:49

## 2019-08-20 RX ADMIN — MELATONIN 3 MG: 3 TAB ORAL at 21:50

## 2019-08-20 RX ADMIN — POTASSIUM CHLORIDE 40 MEQ: 1500 TABLET, EXTENDED RELEASE ORAL at 11:49

## 2019-08-20 RX ADMIN — Medication 20 MG: at 16:57

## 2019-08-20 RX ADMIN — LIDOCAINE 1 PATCH: 50 PATCH CUTANEOUS at 08:23

## 2019-08-20 RX ADMIN — OXYCODONE HYDROCHLORIDE 5 MG: 5 TABLET ORAL at 16:53

## 2019-08-20 RX ADMIN — CEFEPIME 2000 MG: 2 INJECTION, POWDER, FOR SOLUTION INTRAMUSCULAR; INTRAVENOUS at 16:54

## 2019-08-20 RX ADMIN — Medication 400 MG: at 11:50

## 2019-08-20 RX ADMIN — CEFEPIME HYDROCHLORIDE 1000 MG: 1 INJECTION, POWDER, FOR SOLUTION INTRAMUSCULAR; INTRAVENOUS at 01:36

## 2019-08-20 RX ADMIN — Medication 20 MG: at 06:09

## 2019-08-20 RX ADMIN — PRAVASTATIN SODIUM 20 MG: 20 TABLET ORAL at 16:52

## 2019-08-20 RX ADMIN — Medication 250 MG: at 16:53

## 2019-08-20 RX ADMIN — FLUCONAZOLE 800 MG: 200 TABLET ORAL at 08:29

## 2019-08-20 RX ADMIN — Medication 250 MG: at 08:23

## 2019-08-20 RX ADMIN — POTASSIUM CHLORIDE 40 MEQ: 1500 TABLET, EXTENDED RELEASE ORAL at 16:53

## 2019-08-20 RX ADMIN — FOLIC ACID 1 MG: 1 TABLET ORAL at 08:23

## 2019-08-20 RX ADMIN — SODIUM CHLORIDE, SODIUM GLUCONATE, SODIUM ACETATE, POTASSIUM CHLORIDE, MAGNESIUM CHLORIDE, SODIUM PHOSPHATE, DIBASIC, AND POTASSIUM PHOSPHATE 75 ML/HR: .53; .5; .37; .037; .03; .012; .00082 INJECTION, SOLUTION INTRAVENOUS at 13:13

## 2019-08-20 RX ADMIN — VANCOMYCIN HYDROCHLORIDE 125 MG: 500 INJECTION, POWDER, LYOPHILIZED, FOR SOLUTION INTRAVENOUS at 08:24

## 2019-08-20 RX ADMIN — HEPARIN SODIUM 5000 UNITS: 5000 INJECTION INTRAVENOUS; SUBCUTANEOUS at 06:09

## 2019-08-20 RX ADMIN — MAGNESIUM SULFATE HEPTAHYDRATE 3 G: 500 INJECTION, SOLUTION INTRAMUSCULAR; INTRAVENOUS at 13:10

## 2019-08-20 RX ADMIN — OXYCODONE HYDROCHLORIDE 2.5 MG: 5 TABLET ORAL at 08:28

## 2019-08-20 RX ADMIN — ESCITALOPRAM OXALATE 10 MG: 10 TABLET ORAL at 08:23

## 2019-08-20 RX ADMIN — CEFEPIME HYDROCHLORIDE 1000 MG: 1 INJECTION, POWDER, FOR SOLUTION INTRAMUSCULAR; INTRAVENOUS at 08:24

## 2019-08-20 RX ADMIN — VANCOMYCIN HYDROCHLORIDE 125 MG: 500 INJECTION, POWDER, LYOPHILIZED, FOR SOLUTION INTRAVENOUS at 21:56

## 2019-08-20 RX ADMIN — GABAPENTIN 100 MG: 100 CAPSULE ORAL at 21:50

## 2019-08-20 NOTE — PROGRESS NOTES
Progress Note - Nephrology   Jimmie Chung 78 y o  female MRN: [id number]  Unit/Bed#: [facility] 824-01 Encounter: [id number]    ASSESSMENT AND PLAN:  1  AK I:  Thought secondary to prerenal; less likely  Micafungin which has been reported to cause AK I less than 15%  CLEARLY A LARGE PORTION OF OBSTRUCTIVE UROPATHY AS WELL AS SHE HAD A LARGE PVR REQUIRING A LAM CATHETER NOW WITH A MASSIVE DIURESIS MOST LIKELY RELATED TO POST OBSTRUCTIVE DIURESIS AND POSSIBLE POST ATN DIURESIS  She has tolerated negative 9 L probably because of significant volume overload over the last couple weeks during her hospital course   -peak creatinine 2 02 08/18/2019  -baseline creatinine less than 0 5 mg/dL  -current creatinine:  Back to baseline at 0 56 mg/dL despite the massive diuresis  -UA:  Innumerable all RBCs and no proteinuria  This may be compatible with the straight catheterization  Recommendations:  -Lam catheter per Urology  -continue IV fluids at a maintenance level despite negative balance  -monitor renal function  -avoid nephrotoxic agents such as NSAIDs  -recheck UA once there is a resolution of his postobstructive state requiring a Lam catheter     2  Blood pressure:  Fairly stable  Avoid over treating  Monitor heart rate  Hold parameters      3  Electrolytes:   -hyponatremia has resolved  -hypokalemia secondary massive diuresis:  Replete     4  Mineral bone disorder:   -replete hypomagnesemia     5  Anemia:  Hemoglobin most likely decreased related to multiple procedures  Hemoglobin stable today at 7 6  Check iron studies    We cannot give intravenous iron but may be oral iron      6  Other issues:  -laparoscopic ventral hernia repair complicated with small-bowel injury now status post exploratory laparotomy/mesh explantation/small bowel resection/eventual abdominal closure  -fungi me a secondary to translocation across the gut wall followed by infectious disease complicated by peritonitis           Subjective:   Patient overall feels fairly well  She sitting out of bed  Beginning to eat  No nausea vomiting or diarrhea  No chest pain or shortness of breath  She now has a Mendoza catheter because of a positive postvoid residual with a massive diuresis  Objective:     Vitals: Blood pressure 147/84, pulse (!) 106, temperature 98 3 °F (36 8 °C), resp  rate 16, height 5' 4" (1 626 m), weight 72 6 kg (160 lb 1 6 oz), SpO2 98 %  ,Body mass index is 27 48 kg/m²      Weight (last 2 days)     Date/Time   Weight    08/20/19 0600   72 6 (160 1)    08/19/19 0600   74 4 (164)    08/18/19 0600   75 (165 4)                Intake/Output Summary (Last 24 hours) at 8/20/2019 1135  Last data filed at 8/20/2019 1131  Gross per 24 hour   Intake 1626 ml   Output 44133 ml   Net -13608 ml       Urethral Catheter Latex (Active)   Site Assessment Clean;Skin intact 8/20/2019  7:10 AM   Collection Container Standard drainage bag 8/20/2019  7:10 AM   Securement Method Securing device (Describe) 8/20/2019  7:10 AM   Output (mL) 1150 mL 8/20/2019 11:31 AM       Physical Exam: General:  Mildly weak appearing sitting out of bed  Skin:  No acute rash  Eyes:  No scleral icterus  ENT:  Slightly dry mucous membranes  Neck:  Supple, no jugular venous distention  Chest:  Clear to auscultation  CVS:  No rubs or gallops appreciable, slight increase in heart rate but regular  Abdomen:  Soft and minimal tenderness upon deep palpation over the midline area, normal bowel sounds  Extremities:  1 -2 + anasarca including lower extremities and mildly in the upper extremities  Neuro:  No gross focality  Psych:  Alert and oriented                Medications:    Scheduled Meds:  Current Facility-Administered Medications:  acetaminophen 650 mg Oral Q6H PRN Astrid Diaz PA-C   bisacodyl 10 mg Rectal Daily PRN Astrid Diaz PA-C   cefepime 2,000 mg Intravenous Q8H Shanti Alexandre MD   escitalopram 10 mg Per G Tube Daily Mylene Sundar IMELDA Fam   fluconazole 800 mg Oral Daily Elke Oliva MD   folic acid 1 mg Per G Tube Daily Gustavo Oris, IMELDA   gabapentin 100 mg Oral HS Gustavo Oris, IMELDA   guaiFENesin 200 mg Oral Q4H PRN Geofm Check, DO   heparin (porcine) 5,000 Units Subcutaneous [facility] Gustavo Oris, [place]   HYDROmorphone 0 2 mg Intravenous Q4H PRN Gustavo OrisIMELDA   lidocaine 1 patch Topical Daily Gustavo OrisIMELDA   magnesium oxide 400 mg Oral Once Nilo Berg PA-C   magnesium sulfate 3 g Intravenous Once Ta Queen MD   melatonin 3 mg Per G Tube HS Gustavo OrisIMELDA   methocarbamol 500 mg Oral Q6H PRN Gustavo Oris, IMELDA   multi-electrolyte 75 mL/hr Intravenous Continuous Ta Queen MD   omeprazole (PRILOSEC) suspension 2 mg/mL 20 mg Oral BID AC Titus Fam PA-C   ondansetron 4 mg Intravenous Q4H PRN Gustavo OrisIMELDA   oxyCODONE 2 5 mg Oral Q4H PRN Gustavo OrisIMELDA   Or       oxyCODONE 5 mg Oral Q4H PRN Gustavo Oris, IMELDA   potassium chloride 40 mEq Oral Once Janel Henning PA-C   potassium chloride 40 mEq Oral Once Ta Queen MD   pravastatin 20 mg Per G Tube Daily With [employer]   saccharomyces boulardii 250 mg Oral BID Gustavo OrisIMELDA   saliva substitute 5 spray Mouth/Throat PRN Malissa Art MD   vancomycin 125 mg Oral Q12H [address] Elke Oliva MD       PRN Meds:   acetaminophen    bisacodyl    guaiFENesin    HYDROmorphone    methocarbamol    ondansetron    oxyCODONE **OR** oxyCODONE    saliva substitute    Continuous Infusions:  multi-electrolyte 75 mL/hr       Lab, Imaging and other studies: I have personally reviewed pertinent labs    Laboratory Results:  Results from last 7 days   Lab Units 08/20/19  0458 08/19/19  0739 08/19/19  0543 08/18/19  0452 08/17/19  0459 08/16/19  0513 08/15/19  0557 08/14/19  0547   WBC Thousand/uL 19 58* 22 85*  --  25 73* 21 38* 24 06* 25 32* 29 96*   HEMOGLOBIN g/dL 7 6* 7 4*  --  7 5* 7  9* 8 1* 7 9* 8 5*   HEMATOCRIT % 24 4* 23 6*  --  24 0* 25 1* 24 8* 25 4* 27 7*   PLATELETS Thousands/uL 450* 467*  --  496* 530* 535* 547* 644*   POTASSIUM mmol/L 3 1*  --  4 2 4 8 4 5 4 3 4 0 3 8   CHLORIDE mmol/L 102  --  102 99* 100 97* 99* 102   CO2 mmol/L 32  --  27 28 28 32 33* 34*   BUN mg/dL 15  --  20 28* 24 17 13 9   CREATININE mg/dL 0 56*  --  1 12 2 02* 1 60* 1 35* 0 93 0 68   CALCIUM mg/dL 8 1*  --  7 7* 7 6* 7 6* 7 6* 7 6* 7 8*   MAGNESIUM mg/dL 1 5*  --   --   --   --   --  1 9 1 9   PHOSPHORUS mg/dL  --   --   --   --   --   --  3 5 3 1     Urinalysis: Lab Results   Component Value Date    COLORU Yellow 08/20/2019    COLORU Yellow 11/07/2014    CLARITYU Cloudy 08/20/2019    CLARITYU Clear 11/07/2014    SPECGRAV 1 005 08/20/2019    SPECGRAV 1 020 11/07/2014    PHUR 7 5 08/20/2019    PHUR 7 0 11/07/2014    LEUKOCYTESUR Large (A) 08/20/2019    LEUKOCYTESUR Negative 11/07/2014    NITRITE Negative 08/20/2019    NITRITE Negative 11/07/2014    PROTEINUA Negative 11/07/2014    GLUCOSEU Negative 08/20/2019    GLUCOSEU Negative 11/07/2014    KETONESU Negative 08/20/2019    KETONESU Negative 11/07/2014    BILIRUBINUR Negative 08/20/2019    BILIRUBINUR Negative 11/07/2014    BLOODU Large (A) 08/20/2019    BLOODU Negative 11/07/2014     ABGs:   Lab Results   Component Value Date    PH 7 334 (L) 08/03/2019     Radiology review:     Portions of the record may have been created with voice recognition software   Occasional wrong word or "sound a like" substitutions may have occurred due to the inherent limitations of voice recognition software   Read the chart carefully and recognize, using context, where substitutions have occurred

## 2019-08-20 NOTE — PHYSICAL THERAPY NOTE
Physical Therapy Progress Note    Dionicio Lisa, PTA        08/20/19 1120   Pain Assessment   Pain Assessment FLACC   Pain Score No Pain   Response to Interventions tolerated    Pain Rating: FLACC (Rest) - Face 0   Pain Rating: FLACC (Rest) - Legs 0   Pain Rating: FLACC (Rest) - Activity 0   Pain Rating: FLACC (Rest) - Cry 0   Pain Rating: FLACC (Rest) - Consolability 0   Score: FLACC (Rest) 0   Pain Rating: FLACC (Activity) - Face 0   Pain Rating: FLACC (Activity) - Legs 0   Pain Rating: FLACC (Activity) - Activity 0   Pain Rating: FLACC (Activity) - Cry 0   Pain Rating: FLACC (Activity) - Consolability 0   Score: FLACC (Activity) 0   Restrictions/Precautions   Weight Bearing Precautions Per Order No   Other Precautions Chair Alarm;Cognitive;O2;Fall Risk   General   Chart Reviewed Yes   Response to Previous Treatment Patient with no complaints from previous session  Family/Caregiver Present No   Cognition   Arousal/Participation Alert   Attention Attends with cues to redirect   Following Commands Follows one step commands with increased time or repetition   Subjective   Subjective I feel stiff   Bed Mobility   Supine to Sit 3  Moderate assistance   Additional items Assist x 2;HOB elevated; Bedrails; Increased time required;Verbal cues;LE management   Transfers   Sit to Stand 2  Maximal assistance   Additional items Assist x 2; Increased time required;Verbal cues   Stand to Sit 2  Maximal assistance   Additional items Assist x 2; Increased time required;Verbal cues   Sit pivot 2  Maximal assistance   Additional items Assist x 2; Increased time required;Verbal cues   Ambulation/Elevation   Gait pattern Not appropriate; Not tested   Balance   Static Sitting Poor +   Static Standing Poor -   Endurance Deficit   Endurance Deficit Yes   Endurance Deficit Description weakness   Activity Tolerance   Activity Tolerance Patient limited by fatigue  (weakness stiffness)   Nurse Made Aware Lissa sanchez    Exercises   Heelslides Sitting;15 reps;AROM; Bilateral   Hip Flexion Sitting;15 reps;AROM; Bilateral   Hip Abduction Sitting;15 reps;AROM; Bilateral   Hip Adduction Sitting;15 reps;AROM; Bilateral   Knee AROM Long Arc Quad Sitting;15 reps;AROM; Bilateral   Ankle Pumps Sitting;15 reps;AROM; Bilateral   Assessment   Prognosis Fair   Problem List Decreased strength;Decreased range of motion;Decreased endurance; Impaired balance;Decreased mobility   Assessment pt  remains weak and deconditioned   she was able to complete bed mob c mod A x2  pt  did  sit on the EOB x 5 min  c BUE support  she  could not get to full upright   she performed  sit/pivot trnasfer c use of bed  pad as  sling and max A x2   pt  was left in  chr c alarm engaged   [place] Barriers to Discharge Inaccessible home environment;Decreased caregiver support   Goals   Patient Goals willing to get OOB   STG Expiration Date 08/20/19   Treatment Day 6   Plan   Treatment/Interventions Functional transfer training;LE strengthening/ROM; Bed mobility;Spoke to nursing   Progress Slow progress, decreased activity tolerance   PT Frequency   (3-5xwk)   Recommendation   Recommendation Short-term skilled PT   PT - OK to Discharge Yes  (to  rehab  when med ready )

## 2019-08-20 NOTE — SOCIAL WORK
Cm informed by ILDA Guzman with surgery that pt is not medically cleared for discharge today & might be ready by the end of the week  Left VM for daughter Ken Tyson [phone], updated pt, called Melania at [facility] & updated nurse  Called KRISHNA & s/w Eliseo Rocha to cancel transport for today

## 2019-08-20 NOTE — QUICK NOTE
Nurse-Patient-Provider rounds were completed with the patient's nurse today, Hiwot Leonardo  We discussed the plan is to   - continue to monitor WBC, slowly trending down  - replete potassium, Mg    We reviewed all of the invasive devices/lines/telemetry orders   - None  DVT prophylaxis:  - heparin    Diet:  - dysphagia 2 mech soft; thin liquid    Pain Assessment / Plan:  - Continue current pain management regimen: tylenol, Oxycodone, Dilaudid    Mobility Assessment / Plan:  - Activity as tolerated  Goals / Barriers for discharge:  - improved leukocytosis    Case management following; case and discharge needs discussed  All questions and concerns were addressed        Willy Obrien PA-C

## 2019-08-20 NOTE — PLAN OF CARE
Problem: PHYSICAL THERAPY ADULT  Goal: Performs mobility at highest level of function for planned discharge setting  See evaluation for individualized goals  Description  Treatment/Interventions: LE strengthening/ROM, Functional transfer training, Therapeutic exercise, Endurance training, Bed mobility, Spoke to nursing, OT          See flowsheet documentation for full assessment, interventions and recommendations  Outcome: Progressing  Note:   Prognosis: Fair  Problem List: Decreased strength, Decreased range of motion, Decreased endurance, Impaired balance, Decreased mobility  Assessment: pt  remains weak and deconditioned   she was able to complete bed mob c mod A x2  pt  did  sit on the EOB x 5 min  c BUE support  she  could not get to full upright   she performed  sit/pivot trnasfer c use of bed  pad as  sling and max A x2   pt  was left in  chr c alarm engaged   [place] Barriers to Discharge: Inaccessible home environment, Decreased caregiver support     Recommendation: Short-term skilled PT     PT - OK to Discharge: Yes(to  rehab  when med ready )    See flowsheet documentation for full assessment

## 2019-08-20 NOTE — CONSULTS
[address] NOTE   Admission Date: 8/2/2019    Patient Identifiers: Imelda Aparicio (MRN: [id number])  Service Requesting Consultation: Keri Mariscal  Service Providing Consultation:  Urology, Kristyn Benites PA-C  Consults  Date of Service: 8/20/2019    Reason for Consultation:  Urology evaluation    History of Present Illness:     Imelda Aparicio is a 78 y o  Female  Seen by Ron Lind in  the past for small adrenal nodules  She  underwent a laparoscopic ventral hernia repair was complicated by small bowel injury now status post exploratory laparotomy with  Mesh explantation small bowel resection and abdominal closure  She has fungating Digna and possible pseudomonal UTI  She failed several straight cath with PVRs between 800 and a 1000 mL  Eventually a Mendoza catheter was inserted and is draining clear yellow  CT abdomen and pelvis showed no acute  findings  Creatinine is 0 56  WBC 22 85  Urine culture growing greater than 100,000 Pseudomonas      Past Medical, Past Surgical History:     Past Medical History:   Diagnosis Date    Abnormal blood chemistry     last assessed 03/06/2014    Achalasia, esophageal     Acute medial meniscus tear     last assessed 03/10/2014    Acute otitis externa     last assessed 03/24/2014    Adrenal nodule [facility]     Anemia     Anxiety     Arthritis     Atrial fibrillation ([facility] )     resolved 01/09/2018    Atypical chest pain     last assessed 11/22/2016    Eagle's esophagus with high grade dysplasia     last assessed 01/03/2018    Cancer ([facility] )     esophageal    Cerumen impaction     last assessed 03/24/2014    Chronic pain of right knee     last assessed 04/06/2017    Coronary artery disease     3 vessel, resolved 01/09/2018    Depression     Dysphagia     last assessed 06/21/2017    Esophageal stricture     last assessed 06/21/2017    GERD (gastroesophageal reflux disease)     Headache last assessed 2013    Hiatal hernia     Insomnia     last assessed 10/15/2013    [facility]junostomy tube present ([facility] [facility]     resolved 2018    Low vitamin D level     resolved 2018    Osteoarthritis, knee     last assessed 2017    Pneumonia     last assessed 2013    Right leg paresthesias     last assessed 2017    Sciatica     last assessed 2015    Shingles     last assessed 2015    Spondylosis of 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     last assessed 2017    Venous insufficiency     last assessed 2013   :    Past Surgical History:   Procedure Laterality Date     SECTION      EXPLORATORY LAPAROTOMY W/ BOWEL RESECTION N/A 2019    Procedure: LAPAROTOMY EXPLORATORY W/ BOWEL RESECTION, APPLICATION OF Aminah Farhan;  Surgeon: Kylee Birmingham DO;  [facility];  Service: General    GASTRECTOMY      partial    GASTROJEJUNOSTOMY W/ JEJUNOSTOMY TUBE N/A 2017    Procedure: Tiny Alosa;  Surgeon: Dayan Guerra MD;  [facility];  Service: Thoracic    JOINT REPLACEMENT Right 11/10/2014    knee, Dr Fitz Blanco N/A 8/3/2019    Procedure: LAPAROTOMY EXPLORATORY, reanastomosis of proximal small bowel, placement of vac dressing;  Surgeon: Kylee Birmingham DO;  [facility];  Service: General    ND ESOPHAGOGASTRODUODENOSCOPY TRANSORAL DIAGNOSTIC N/A 3/10/2017    Procedure: ESOPHAGOGASTRODUODENOSCOPY (EGD); Surgeon: Tony Hager MD;  [facility];  Service: Gastroenterology    ND ESOPHAGOGASTRODUODENOSCOPY TRANSORAL DIAGNOSTIC N/A 2017    Procedure: ESOPHAGOGASTRODUODENOSCOPY (EGD); Surgeon: Tony Hager MD;  [facility];  Service: Gastroenterology    ND ESOPHAGOSCOPY FLEXIBLE TRANSORAL WITH BIOPSY N/A 2017    Procedure: ESOPHAGOGASTRODUODENOSCOPY (EGD);   Surgeon: Dayan Guerra MD;  [facility];  Service: Thoracic    ND LAP, VENTRAL HERNIA REPAIR,REDUCIBLE N/A 7/31/2019    Procedure: REPAIR HERNIA VENTRAL LAPAROSCOPIC;  Surgeon: Jacobo Hernández DO;  [facility];  Service: General    TX REMOVAL Avril Gupta [address] THORACOTOMY N/A 8/24/2017    Procedure: TRANSHIATAL ESOPHAGECTOMY;  Surgeon: Hollis Nicole MD;  [facility];  Service: Thoracic    STEROID INJECTION KNEE Right 5/2/2017    Procedure: GENICULATE NERVE BLOCKS;  Surgeon: Joan Hoang DO;  [facility];  Service:    :    Medications, Allergies:     Current Facility-Administered Medications:     acetaminophen (TYLENOL) tablet 650 mg, 650 mg, Oral, Q6H PRN, Herrera Franks PA-C, 650 mg at 08/11/19 0507    bisacodyl (DULCOLAX) rectal suppository 10 mg, 10 mg, Rectal, Daily PRN, Herrera Franks PA-C    cefepime (MAXIPIME) 1,000 mg in dextrose 5 % 50 mL IVPB, 1,000 mg, Intravenous, Q8H, Monserrat Willett MD, Stopped at 08/20/19 0206    escitalopram (LEXAPRO) tablet 10 mg, 10 mg, Per G Tube, Daily, Herrera Franks PA-C, 10 mg at 08/19/19 8371    fluconazole (DIFLUCAN) tablet 800 mg, 800 mg, Oral, Daily, Monserrat Willett MD    folic acid (FOLVITE) tablet 1 mg, 1 mg, Per G Tube, Daily, Herrera Franks PA-C, 1 mg at 08/19/19 0969    gabapentin (NEURONTIN) capsule 100 mg, 100 mg, Oral, HS, Herrera Franks PA-C, 100 mg at 08/19/19 2109    guaiFENesin (ROBITUSSIN) oral solution 200 mg, 200 mg, Oral, Q4H PRN, Ming Kemp DO    heparin (porcine) subcutaneous injection 5,000 Units, 5,000 Units, Subcutaneous, Q8H [address], Herrera Franks PA-C, 5,000 Units at 08/20/19 0609    HYDROmorphone (DILAUDID) injection 0 2 mg, 0 2 mg, Intravenous, Q4H PRN, Herrera Franks PA-C, 0 2 mg at 08/16/19 2141    lidocaine (LIDODERM) 5 % patch 1 patch, 1 patch, Topical, Daily, Herrera Franks PA-C, 1 patch at 08/19/19 0826    melatonin tablet 3 mg, 3 mg, Per G Tube, HS, Herrera Franks PA-C, 3 mg at 08/19/19 2109    methocarbamol (ROBAXIN) tablet 500 mg, 500 mg, Oral, Q6H PRN, Nii Salinas PA-C, 500 mg at 08/14/19 2202    omeprazole (PRILOSEC) suspension 2 mg/mL, 20 mg, Oral, BID AC, Nii Salinas, PA-C, 20 mg at 08/20/19 0311    ondansetron [facility]) injection 4 mg, 4 mg, Intravenous, Q4H PRN, Nii Salinas, PA-C, 4 mg at 08/12/19 0256    oxyCODONE (ROXICODONE) IR tablet 2 5 mg, 2 5 mg, Oral, Q4H PRN, 2 5 mg at 08/19/19 0523 **OR** oxyCODONE (ROXICODONE) IR tablet 5 mg, 5 mg, Oral, Q4H PRN, Nii Brad, PA-C, 5 mg at 08/18/19 2054    pravastatin (PRAVACHOL) tablet 20 mg, 20 mg, Per G Tube, Daily With Dinner, Nii Salinas PA-C, 20 mg at 08/19/19 1834    saccharomyces boulardii (FLORASTOR) capsule 250 mg, 250 mg, Oral, BID, [place] Brad, PA-C, 250 mg at 08/19/19 1834    saliva substitute (MOUTH KOTE) mucosal solution 5 spray, 5 spray, Mouth/Throat, PRN, Deysi Viera MD    [facility]) oral solution 125 mg, 125 mg, Oral, Q12H [facility], Bridgett Mike MD, 125 mg at 08/19/19 2109    Allergies:  No Known Allergies:    Social and Family History:   Social History:   Social History     Tobacco Use    Smoking status: Never Smoker    Smokeless tobacco: Never Used   Substance Use Topics    Alcohol use: Not Currently     Alcohol/week: 0 0 standard drinks     Frequency: Never     Binge frequency: Never    Drug use: No        Social History     Tobacco Use   Smoking Status Never Smoker   Smokeless Tobacco Never Used       Family History:  Family History   Problem Relation Age of Onset    Alzheimer's disease Mother     Diabetes type II Father    :     Review of Systems:     General: Fever, chills, or night sweats: negative  Cardiac: Negative for chest pain  Pulmonary: Negative for shortness of breath  Gastrointestinal: Abdominal pain negative  Nausea, vomiting, or diarrhea negative,  Genitourinary: See HPI above  Patient does not have hematuria  All other systems queried were negative  Physical Exam:   General: Patient is pleasant and in NAD  Awake and alert  /84   Pulse 104   Temp 98 6 °F (37 °C)   Resp 18   Ht 5' 4" (1 626 m)   Wt 72 6 kg (160 lb 1 6 oz)   SpO2 97%   BMI 27 48 kg/m²   Cardiac: Peripheral edema: negative  Pulmonary: Non-labored breathing  Abdomen: Soft, non-tender, non-distended  No surgical scars  No masses, tenderness, hernias noted  Genitourinary: Negative CVA tenderness, negative suprapubic tenderness  LAM: in place draining clear yellow urine  no clots and       Labs:     Lab Results   Component Value Date    HGB 7 6 (L) 08/20/2019    HCT 24 4 (L) 08/20/2019    WBC 19 58 (H) 08/20/2019     (H) 08/20/2019   ]    Lab Results   Component Value Date     07/23/2015    K 3 1 (L) 08/20/2019     08/20/2019    CO2 32 08/20/2019    BUN 15 08/20/2019    CREATININE 0 56 (L) 08/20/2019    CALCIUM 8 1 (L) 08/20/2019    GLUCOSE 89 08/03/2019   ]    Imaging:   I personally reviewed the images and report of the following studies, and reviewed them with the patient:  CT CHEST, ABDOMEN AND PELVIS WITH IV CONTRAST     IMPRESSION:     1  Profound mucosal wall thickening of the small bowel adjacent to the anastomotic staple line  Infectious versus ischemic in nature  2   Left pneumothorax  3  Ascites, with peritoneal enhancement and thickening in the right upper quadrant, may represent infectious peritonitis  4  Short segment intussusception involving the transverse colon without evidence of obstruction  5  Left IJ catheter tip coils and terminates in the right brachiocephalic vein  6  Anasarca  7  Small bilateral pleural effusions      ASSESSMENT:     1  Postop urinary retention   2  Possible pseudomonal UTI   3  Candida fungemia   4  Status post ventral hernia repair and  small-bowel resection      PLAN:   - maintain Lam catheter until more ambulatory   - possible trial of voiding prior to discharge  - urology will follow      Thank you for allowing me to participate in this patients care    Please do not hesitate to call with any additional questions    Helen Harrington PA-C

## 2019-08-20 NOTE — SOCIAL WORK
TC back from pt's daughter Shania Harris [phone] & updated her that pt is not medically cleared for discharge today

## 2019-08-20 NOTE — PROGRESS NOTES
Progress Note - General Surgery   Kristel KAHN Hartranft 78 y o  female MRN: [id number]  Unit/Bed#: [facility] 824-01 Encounter: [id number]    Assessment:  78 F with laparoscopic VHR complicated by small bowel injury, now s/p exploratory laparotomy, mesh explantation, SBR, and eventual abdominal closure, also with candidal fungemia  VSS  Afebrile  WBC improved from 25-> 22-> 19 5  H/H stable 7 4->7 6  Urine w pseudomonas bacteruria  Plan:  -Continue diet  -IV abx cefipime, fluconazole    -continue IVF  -PT/OT  -dispo planning    Subjective/Objective     Subjective: Having bowel mvts  Passing flatus  Denied fever, chills, chest pain, shortness of breath, nausea, vomiting, or abdominal pain this morning  Wants to go home  Objective:     Blood pressure 142/85, pulse (!) 108, temperature 98 7 °F (37 1 °C), resp  rate 20, height 5' 4" (1 626 m), weight 74 4 kg (164 lb), SpO2 97 %  ,Body mass index is 28 15 kg/m²  Intake/Output Summary (Last 24 hours) at 8/20/2019 0529  Last data filed at 8/20/2019 0230  Gross per 24 hour   Intake 3490 ml   Output 85067 ml   Net -6960 ml       Invasive Devices     Peripherally Inserted Central Catheter Line            PICC Line 17/96/16 Right Basilic 5 days          Drain            Urethral Catheter Latex less than 1 day                Physical Exam  General: NAD  HEENT: NC/AT  MMM  Cv: RRR  Lungs: normal effort  Ab: Soft, NT/ND  Incision clean, dry, intact  Ex: no CCE  Neuro: AAOx3      Lab, Imaging and other studies:  I have personally reviewed pertinent lab results    , CBC:   Lab Results   Component Value Date    WBC 19 58 (H) 08/20/2019    HGB 7 6 (L) 08/20/2019    HCT 24 4 (L) 08/20/2019    MCV 89 08/20/2019     (H) 08/20/2019    MCH 27 8 08/20/2019    MCHC 31 1 (L) 08/20/2019    RDW 17 2 (H) 08/20/2019    MPV 10 1 08/20/2019    NRBC 0 08/20/2019   , CMP:   Lab Results   Component Value Date    SODIUM 138 08/19/2019    K 4 2 08/19/2019     08/19/2019    CO2 27 08/19/2019    BUN 20 08/19/2019    CREATININE 1 12 08/19/2019    CALCIUM 7 7 (L) 08/19/2019    EGFR 50 08/19/2019     VTE Pharmacologic Prophylaxis: Heparin  VTE Mechanical Prophylaxis: sequential compression device

## 2019-08-20 NOTE — PLAN OF CARE
Problem: Prexisting or High Potential for Compromised Skin Integrity  Goal: Skin integrity is maintained or improved  Description  INTERVENTIONS:  - Identify patients at risk for skin breakdown  - Assess and monitor skin integrity  - Assess and monitor nutrition and hydration status  - Monitor labs (i e  albumin)  - Turn and reposition patient  - Assist with mobility/ambulation  - Relieve pressure over bony prominences  - Avoid friction and shearing  - Provide appropriate hygiene as needed including keeping skin clean and dry  - Evaluate need for skin moisturizer/barrier cream  - Collaborate with interdisciplinary team (i e  Nutrition, Rehabilitation, etc )   - Patient/family teaching   Outcome: Progressing     Problem: Potential for Falls  Goal: Patient will remain free of falls  Description  INTERVENTIONS:  - Assess patient frequently for physical needs  -  Identify cognitive and physical deficits and behaviors that affect risk of falls    -  [place] fall precautions as indicated by assessment   - Educate patient/family on patient safety including physical limitations  - Instruct patient to call for assistance with activity based on assessment  - Modify environment to reduce risk of injury  - Consider OT/PT consult to assist with strengthening/mobility  Outcome: Progressing     Problem: CARDIOVASCULAR - ADULT  Goal: Maintains optimal cardiac output and hemodynamic stability  Description  INTERVENTIONS:  - Monitor I/O, vital signs and rhythm  - Monitor for S/S and trends of decreased cardiac output i e  bleeding, hypotension  - Administer and titrate ordered vasoactive medications to optimize hemodynamic stability  - Assess quality of pulses, skin color and temperature  - Instruct patient to report change in severity of symptoms   Outcome: Progressing     Problem: RESPIRATORY - ADULT  Goal: Achieves optimal ventilation and oxygenation  Description  INTERVENTIONS:  - Assess for changes in respiratory status  - Assess for changes in mentation and behavior  - Position to facilitate oxygenation and minimize respiratory effort  - Oxygen administration by appropriate delivery method based on oxygen saturation (per order) or ABGs  - Encourage broncho-pulmonary hygiene including cough, deep breathe, Incentive Spirometry  - Assess the need for suctioning and aspirate as neede  - Respiratory Therapy support as indicated   Outcome: Progressing     Problem: GASTROINTESTINAL - ADULT  Goal: Maintains or returns to baseline bowel function  Description  INTERVENTIONS:  - Assess bowel function  - Encourage oral fluids to ensure adequate hydration  - Administer IV fluids as ordered to ensure adequate hydration  - Administer ordered medications as needed  - Encourage mobilization and activity  - Nutrition services referral to assist patient with appropriate food choices  Outcome: Progressing  Goal: Maintains adequate nutritional intake  Description  INTERVENTIONS:  Npo   - Identify factors contributing to decreased intake, treat as appropriate  - Monitor I&O, WT and lab values  - Obtain nutrition services referral as needed   Outcome: Progressing     Problem: METABOLIC, FLUID AND ELECTROLYTES - ADULT  Goal: Electrolytes maintained within normal limits  Description  INTERVENTIONS:  - Monitor labs and assess patient for signs and symptoms of electrolyte imbalances  - Administer electrolyte replacement as ordered  - Monitor response to electrolyte replacements, including repeat lab results as appropriate  - Instruct patient on fluid and nutrition as appropriate  Outcome: Progressing  Goal: Fluid balance maintained  Description  INTERVENTIONS:  - Monitor labs and assess for signs and symptoms of volume excess or deficit  - Monitor I/O and WT  - Instruct patient on fluid and nutrition as appropriate  Outcome: Progressing     Problem: SKIN/TISSUE INTEGRITY - ADULT  Goal: Skin integrity remains intact  Description  INTERVENTIONS  - Identify patients at risk for skin breakdown  - Assess and monitor skin integrity  - Assess and monitor nutrition and hydration statu  - Turn and reposition patient  - Assist with mobility/ambulation  - Relieve pressure over bony prominences  - Avoid friction and shearing  - Provide appropriate hygiene as needed including keeping skin clean and dry  - Evaluate need for skin moisturizer/barrier cream  - Collaborate with interdisciplinary team (i e  Nutrition, Rehabilitation, etc )   - Patient/family teaching   Outcome: Progressing  Goal: Oral mucous membranes remain intact  Description  INTERVENTIONS  - Assess oral mucosa and hygiene practices  - Implement preventative oral hygiene regimen  - Implement oral medicated treatments as ordered  - Initiate Nutrition services referral as needed  Outcome: Progressing  Goal: Incision(s), wounds(s) or drain site(s) healing without S/S of infection  Description  INTERVENTIONS  - Assess and document risk factors for skin impairment   - Assess and document dressing, incision, wound bed, drain sites and surrounding tissue  - Consider nutrition services referral as needed  - Oral mucous membranes remain intact  - Provide patient/ family education  Outcome: Progressing     Problem: MUSCULOSKELETAL - ADULT  Goal: Maintain or return mobility to safest level of function  Description  INTERVENTIONS:  - Assess patient's ability to carry out ADLs; assess patient's baseline for ADL function and identify physical deficits which impact ability to perform ADLs (bathing, care of mouth/teeth, toileting, grooming, dressing, etc )  - Assess/evaluate cause of self-care deficits   - Assess range of motion  - Assess patient's mobility; develop plan if impaired  - Assess patient's need for assistive devices and provide as appropriate  - Encourage maximum independence but intervene and supervise when necessary  - Involve family in performance of ADLs  - Assess for home care needs following discharge   - Request OT consult to assist with ADL evaluation and planning for discharge  - Provide patient education as appropriate  Outcome: Progressing     Problem: Nutrition/Hydration-ADULT  Goal: Nutrient/Hydration intake appropriate for improving, restoring or maintaining nutritional needs  Description  Monitor and assess patient's nutrition/hydration status for malnutrition (ex- brittle hair, bruises, dry skin, pale skin and conjunctiva, muscle wasting, smooth red tongue, and disorientation)  Collaborate with interdisciplinary team and initiate plan and interventions as ordered  Monitor patient's weight and dietary intake as ordered or per policy  Utilize nutrition screening tool and intervene per policy  Determine patient's food preferences and provide high-protein, high-caloric foods as appropriate       INTERVENTIONS:  - Monitor oral intake, urinary output, labs, and treatment plans  - Assess nutrition and hydration status and recommend course of action  - Evaluate amount of meals eaten  - Assist patient with eating if necessary   - Allow adequate time for meals  - Recommend/ encourage appropriate diets, oral nutritional supplements, and vitamin/mineral supplements  - Order, calculate, and assess calorie counts as needed  - Recommend, monitor, and adjust tube feedings and TPN/PPN based on assessed needs  - Assess need for intravenous fluids  - Provide specific nutrition/hydration education as appropriate  - Include patient/family/caregiver in decisions related to nutrition  Outcome: Progressing     Problem: COPING  Goal: Pt/Family able to verbalize concerns and demonstrate effective coping strategies  Description  INTERVENTIONS:  - Assist patient/family to identify coping skills, available support systems and cultural and spiritual values  - Provide emotional support, including active listening and acknowledgement of concerns of patient and caregivers  - Reduce environmental stimuli, as able  - Provide patient education  - Assess for spiritual pain/suffering and initiate spiritual care, including notification of Pastoral Care or [place] based community as needed  - Assess effectiveness of coping strategies  Outcome: Progressing  Goal: Will report anxiety at manageable levels  Description  INTERVENTIONS:  - Administer medication as ordered  - Teach and encourage coping skills  - Provide emotional support  - Assess patient/family for anxiety and ability to cope  Outcome: Progressing     Problem: Knowledge Deficit  Goal: Patient/family/caregiver demonstrates understanding of disease process, treatment plan, medications, and discharge instructions  Description  Complete learning assessment and assess knowledge base    Interventions:  - Provide teaching at level of understanding  - Provide teaching via preferred learning methods  Outcome: Progressing

## 2019-08-20 NOTE — PROGRESS NOTES
Orders in by dr Rodo Dejesus for daily weight via standing scale; unable to safely stand patient for weight; dr Hermilo Del Rio aware and will notify dr Rodo Dejesus

## 2019-08-20 NOTE — SPEECH THERAPY NOTE
Speech/Language Pathology Progress Note    Patient Name: Lino Elaine  Today's Date: 8/20/2019     Subjective:  Pt awake and alert, sitting up in chair at bedside  Objective:  Pt seen for diagnostic swallow tx  Current diet is dysphagia 2 with thin liquids  Pt continues to complain of dryness  Encouragement needed for even a small amount of PO intake with lunch  Cueing needed to alternate liquids and solids, and to "swish" liquids slightly to clear oral cavity  After initial cueing pt was able to use liquids to clear oral residual, however she continued to eat very slowly with only 4-5 bites total  Mod belching noted x1  No immediate s/s aspiration noted with swallow, however pt did demonstrate occasional throat clearing  Assessment:  Poor intake overall  Pt has no other complaints aside from dryness, and states she never eats a lot  Plan/Recommendations:  Continue dysphagia 2 diet and thin liquids  Encourage PO intake  ST will continue to follow

## 2019-08-20 NOTE — PROGRESS NOTES
Progress Note - Infectious Disease   Kristel Chung 78 y o  female MRN: [id number]  [facility] Encounter: [id number]      Impression/Plan:  1  Systemic inflammatory response syndrome- Possibly related the patient's fungemia   Possibly secondary to peritonitis   The patient is now improved and moved out of the intensive care unit  The leukocytosis has persisted   Stool for C diff is negative   The patient completed more than 10 days of intravenous antibacterials   The white blood cell count has decreased but remains elevated   The procalcitonin level has continued to decrease  -cefepime as below for possible UTI  -continue antifungals as below  -recheck CT of the chest abdomen pelvis if leukocytosis does not continue to improve without a new source  -recheck CBC with diff  -supportive care     2  Fungemia-possibly all translocation across the gut wall in the setting of peritonitis   Possible catheter related bacteremia although this is less likely as the blood cultures were from around the time of admission   The central line has been changed   Fungemia has cleared   The organism is not resistant to fluconazole   -continue high-dose fluconazole through tomorrow to complete 14 days from the 1st negative blood cultures  -recheck creatinine and dose adjust as needed     3  Possible pseudomonal UTI-the patient is asymptomatic but does have a brisk leukocytosis  The urinalysis was not very impressive for an invasive UTI    She seems to be tolerating the cefepime without difficulty  -increased cefepime to 2 g IV q 8 hours with improved renal function  -possibly trial of oral ciprofloxacin if continues to improve  -recheck CBC with diff  -anticipate brief course of antibiotics as the patient is not septic and if this is an invasive UTI it is localized     4  Peritonitis-status post exploration and repair   No cultures obtained at the time of the surgery   Patient does have significant turbid drainage from the superior aspect of the wound   Patient has been maintained on broad antibiotics   The patient continues to have a brisk leukocytosis   Significant ascites noted   She completed more than 10 days of intravenous antibiotics, and is now back on antibiotics for UTI as above    -recheck CBC with diff  -if leukocytosis does not continue to improve, recommend repeat CT of the chest abdomen and pelvis as above  -serial abdominal exams  -close surgical follow-up     5  Acute hypoxic respiratory failure-likely secondary to hemoptysis aspiration and a pneumothorax   Less likely pneumonia  Cassandra Crowley is now extubated and on O2 by nasal cannula   Repeat chest x-ray with slightly increased volume   Bronchoscopy cultures without any resistant organisms   Her respiratory status is improved     -oxygen support   -monitor respiratory status     6  Acute kidney injury-suspect urinary retention was playing a significant role  Possible the pre renal issues also playing a role   Possibly medication effect   No other clear source appreciated  The renal function has continued to improve   -recheck BMP  -dose adjusted antifungals and antibiotics as above  -nephrology follow-up  -volume management    Antibiotics:  Cefepime 2  Fluconazole 8  First negative blood culture 13    Subjective:  Patient has no fever, chills, sweats; no nausea, vomiting, diarrhea; no cough, shortness of breath; no pain  No new symptoms  She has continued to have urinary retention and therefore she had a Mendoza catheter placed  Objective:  Vitals:  Temp:  [97 3 °F (36 3 °C)-99 9 °F (37 7 °C)] 98 6 °F (37 °C)  HR:  [104-111] 104  Resp:  [16-20] 18  BP: (142-146)/(84-87) 144/84  SpO2:  [96 %-98 %] 97 %  Temp (24hrs), Av 6 °F (37 °C), Min:97 3 °F (36 3 °C), Max:99 9 °F (37 7 °C)  Current: Temperature: 98 6 °F (37 °C)    Physical Exam:   General Appearance:  Alert, interactive, nontoxic, no acute distress  Throat: Oropharynx dry without lesions      Lungs: Decreased breath sounds bilaterally; no wheezes, rhonchi or rales; respirations unlabored   Heart:  Tachycardia; no murmur, rub or gallop   Abdomen:   Soft, non-tender, non-distended, positive bowel sounds  Midline incision with scant drainage but no erythema   Extremities: No clubbing, cyanosis or edema   Skin: No new rashes or lesions  No draining wounds noted  Labs, Imaging, & Other studies:   All pertinent labs and imaging studies were personally reviewed  Results from last 7 days   Lab Units 08/20/19  0458 08/19/19  0739 08/18/19  0452   WBC Thousand/uL 19 58* 22 85* 25 73*   HEMOGLOBIN g/dL 7 6* 7 4* 7 5*   PLATELETS Thousands/uL 450* 467* 496*     Results from last 7 days   Lab Units 08/20/19  0458 08/19/19  0543 08/18/19  0452 08/17/19  0459  08/14/19  0547   SODIUM mmol/L 140 138 135* 137   < > 139   POTASSIUM mmol/L 3 1* 4 2 4 8 4 5   < > 3 8   CHLORIDE mmol/L 102 102 99* 100   < > 102   CO2 mmol/L 32 27 28 28   < > 34*   BUN mg/dL 15 20 28* 24   < > 9   CREATININE mg/dL 0 56* 1 12 2 02* 1 60*   < > 0 68   EGFR ml/min/1 73sq m 95 50 24 32   < > 89   CALCIUM mg/dL 8 1* 7 7* 7 6* 7 6*   < > 7 8*   AST U/L 23  --   --  26  --  26   ALT U/L 14  --   --  16  --  14   ALK PHOS U/L 188*  --   --  158*  --  158*    < > = values in this interval not displayed       Results from last 7 days   Lab Units 08/18/19  0817   URINE CULTURE  >100,000 cfu/ml Pseudomonas aeruginosa*     Results from last 7 days   Lab Units 08/15/19  0557 08/14/19  0547   PROCALCITONIN ng/ml 0 26* 0 33*

## 2019-08-21 LAB
ANION GAP SERPL CALCULATED.3IONS-SCNC: 4 MMOL/L (ref 4–13)
BACTERIA UR CULT: ABNORMAL
BASOPHILS # BLD MANUAL: 0 THOUSAND/UL (ref 0–0.1)
BASOPHILS NFR MAR MANUAL: 0 % (ref 0–1)
BUN SERPL-MCNC: 9 MG/DL (ref 5–25)
CALCIUM SERPL-MCNC: 7.8 MG/DL (ref 8.3–10.1)
CHLORIDE SERPL-SCNC: 98 MMOL/L (ref 100–108)
CO2 SERPL-SCNC: 33 MMOL/L (ref 21–32)
CREAT SERPL-MCNC: 0.36 MG/DL (ref 0.6–1.3)
EOSINOPHIL # BLD MANUAL: 0 THOUSAND/UL (ref 0–0.4)
EOSINOPHIL NFR BLD MANUAL: 0 % (ref 0–6)
ERYTHROCYTE [DISTWIDTH] IN BLOOD BY AUTOMATED COUNT: 17.2 % (ref 11.6–15.1)
GFR SERPL CREATININE-BSD FRML MDRD: 110 ML/MIN/1.73SQ M
GLUCOSE SERPL-MCNC: 102 MG/DL (ref 65–140)
HCT VFR BLD AUTO: 23.4 % (ref 34.8–46.1)
HGB BLD-MCNC: 7.3 G/DL (ref 11.5–15.4)
LYMPHOCYTES # BLD AUTO: 0.95 THOUSAND/UL (ref 0.6–4.47)
LYMPHOCYTES # BLD AUTO: 6 % (ref 14–44)
MAGNESIUM SERPL-MCNC: 1.7 MG/DL (ref 1.6–2.6)
MCH RBC QN AUTO: 27.8 PG (ref 26.8–34.3)
MCHC RBC AUTO-ENTMCNC: 31.2 G/DL (ref 31.4–37.4)
MCV RBC AUTO: 89 FL (ref 82–98)
MONOCYTES # BLD AUTO: 0.32 THOUSAND/UL (ref 0–1.22)
MONOCYTES NFR BLD: 2 % (ref 4–12)
NEUTROPHILS # BLD MANUAL: 14.49 THOUSAND/UL (ref 1.85–7.62)
NEUTS SEG NFR BLD AUTO: 92 % (ref 43–75)
NRBC BLD AUTO-RTO: 0 /100 WBCS
PLATELET # BLD AUTO: 398 THOUSANDS/UL (ref 149–390)
PLATELET BLD QL SMEAR: ADEQUATE
PMV BLD AUTO: 9.9 FL (ref 8.9–12.7)
POLYCHROMASIA BLD QL SMEAR: PRESENT
POTASSIUM SERPL-SCNC: 3.2 MMOL/L (ref 3.5–5.3)
RBC # BLD AUTO: 2.63 MILLION/UL (ref 3.81–5.12)
RBC MORPH BLD: PRESENT
SODIUM SERPL-SCNC: 135 MMOL/L (ref 136–145)
WBC # BLD AUTO: 15.75 THOUSAND/UL (ref 4.31–10.16)

## 2019-08-21 PROCEDURE — 80048 BASIC METABOLIC PNL TOTAL CA: CPT | Performed by: INTERNAL MEDICINE

## 2019-08-21 PROCEDURE — 99232 SBSQ HOSP IP/OBS MODERATE 35: CPT | Performed by: INTERNAL MEDICINE

## 2019-08-21 PROCEDURE — 85027 COMPLETE CBC AUTOMATED: CPT | Performed by: STUDENT IN AN ORGANIZED HEALTH CARE EDUCATION/TRAINING PROGRAM

## 2019-08-21 PROCEDURE — 99024 POSTOP FOLLOW-UP VISIT: CPT | Performed by: SURGERY

## 2019-08-21 PROCEDURE — 99232 SBSQ HOSP IP/OBS MODERATE 35: CPT | Performed by: PHYSICIAN ASSISTANT

## 2019-08-21 PROCEDURE — 85007 BL SMEAR W/DIFF WBC COUNT: CPT | Performed by: STUDENT IN AN ORGANIZED HEALTH CARE EDUCATION/TRAINING PROGRAM

## 2019-08-21 PROCEDURE — 83735 ASSAY OF MAGNESIUM: CPT | Performed by: INTERNAL MEDICINE

## 2019-08-21 RX ORDER — PRAVASTATIN SODIUM 20 MG
20 TABLET ORAL
Status: DISCONTINUED | OUTPATIENT
Start: 2019-08-21 | End: 2019-08-27 | Stop reason: HOSPADM

## 2019-08-21 RX ORDER — ESCITALOPRAM OXALATE 10 MG/1
10 TABLET ORAL DAILY
Status: DISCONTINUED | OUTPATIENT
Start: 2019-08-22 | End: 2019-08-26

## 2019-08-21 RX ORDER — POTASSIUM CHLORIDE 20 MEQ/1
40 TABLET, EXTENDED RELEASE ORAL ONCE
Status: COMPLETED | OUTPATIENT
Start: 2019-08-21 | End: 2019-08-21

## 2019-08-21 RX ORDER — DEXTROSE, SODIUM CHLORIDE, AND POTASSIUM CHLORIDE 5; .9; .15 G/100ML; G/100ML; G/100ML
150 INJECTION INTRAVENOUS CONTINUOUS
Status: DISCONTINUED | OUTPATIENT
Start: 2019-08-21 | End: 2019-08-22

## 2019-08-21 RX ORDER — FOLIC ACID 1 MG/1
1 TABLET ORAL DAILY
Status: DISCONTINUED | OUTPATIENT
Start: 2019-08-22 | End: 2019-08-27 | Stop reason: HOSPADM

## 2019-08-21 RX ORDER — MAGNESIUM SULFATE HEPTAHYDRATE 40 MG/ML
2 INJECTION, SOLUTION INTRAVENOUS ONCE
Status: COMPLETED | OUTPATIENT
Start: 2019-08-21 | End: 2019-08-21

## 2019-08-21 RX ORDER — POTASSIUM CHLORIDE 29.8 MG/ML
40 INJECTION INTRAVENOUS ONCE
Status: COMPLETED | OUTPATIENT
Start: 2019-08-21 | End: 2019-08-21

## 2019-08-21 RX ORDER — LANOLIN ALCOHOL/MO/W.PET/CERES
3 CREAM (GRAM) TOPICAL
Status: DISCONTINUED | OUTPATIENT
Start: 2019-08-21 | End: 2019-08-27 | Stop reason: HOSPADM

## 2019-08-21 RX ADMIN — HEPARIN SODIUM 5000 UNITS: 5000 INJECTION INTRAVENOUS; SUBCUTANEOUS at 21:39

## 2019-08-21 RX ADMIN — CEFEPIME 2000 MG: 2 INJECTION, POWDER, FOR SOLUTION INTRAMUSCULAR; INTRAVENOUS at 09:22

## 2019-08-21 RX ADMIN — HEPARIN SODIUM 5000 UNITS: 5000 INJECTION INTRAVENOUS; SUBCUTANEOUS at 15:06

## 2019-08-21 RX ADMIN — FOLIC ACID 1 MG: 1 TABLET ORAL at 09:23

## 2019-08-21 RX ADMIN — ESCITALOPRAM OXALATE 10 MG: 10 TABLET ORAL at 09:24

## 2019-08-21 RX ADMIN — HEPARIN SODIUM 5000 UNITS: 5000 INJECTION INTRAVENOUS; SUBCUTANEOUS at 05:16

## 2019-08-21 RX ADMIN — POTASSIUM CHLORIDE, DEXTROSE MONOHYDRATE AND SODIUM CHLORIDE 150 ML/HR: 150; 5; 900 INJECTION, SOLUTION INTRAVENOUS at 18:44

## 2019-08-21 RX ADMIN — Medication 20 MG: at 16:17

## 2019-08-21 RX ADMIN — GABAPENTIN 100 MG: 100 CAPSULE ORAL at 21:39

## 2019-08-21 RX ADMIN — FLUCONAZOLE 800 MG: 200 TABLET ORAL at 09:24

## 2019-08-21 RX ADMIN — Medication 20 MG: at 06:12

## 2019-08-21 RX ADMIN — LIDOCAINE 1 PATCH: 50 PATCH CUTANEOUS at 09:23

## 2019-08-21 RX ADMIN — SODIUM CHLORIDE, SODIUM GLUCONATE, SODIUM ACETATE, POTASSIUM CHLORIDE, MAGNESIUM CHLORIDE, SODIUM PHOSPHATE, DIBASIC, AND POTASSIUM PHOSPHATE 75 ML/HR: .53; .5; .37; .037; .03; .012; .00082 INJECTION, SOLUTION INTRAVENOUS at 02:52

## 2019-08-21 RX ADMIN — POTASSIUM CHLORIDE 40 MEQ: 1500 TABLET, EXTENDED RELEASE ORAL at 11:11

## 2019-08-21 RX ADMIN — Medication 250 MG: at 17:45

## 2019-08-21 RX ADMIN — PRAVASTATIN SODIUM 20 MG: 20 TABLET ORAL at 16:29

## 2019-08-21 RX ADMIN — MAGNESIUM SULFATE HEPTAHYDRATE 2 G: 40 INJECTION, SOLUTION INTRAVENOUS at 17:36

## 2019-08-21 RX ADMIN — POTASSIUM CHLORIDE 40 MEQ: 400 INJECTION, SOLUTION INTRAVENOUS at 10:47

## 2019-08-21 RX ADMIN — VANCOMYCIN HYDROCHLORIDE 125 MG: 500 INJECTION, POWDER, LYOPHILIZED, FOR SOLUTION INTRAVENOUS at 21:40

## 2019-08-21 RX ADMIN — Medication 250 MG: at 09:24

## 2019-08-21 RX ADMIN — METHOCARBAMOL 500 MG: 500 TABLET, FILM COATED ORAL at 11:26

## 2019-08-21 RX ADMIN — CEFEPIME 2000 MG: 2 INJECTION, POWDER, FOR SOLUTION INTRAMUSCULAR; INTRAVENOUS at 16:17

## 2019-08-21 RX ADMIN — POTASSIUM CHLORIDE, DEXTROSE MONOHYDRATE AND SODIUM CHLORIDE 150 ML/HR: 150; 5; 900 INJECTION, SOLUTION INTRAVENOUS at 11:28

## 2019-08-21 RX ADMIN — CEFEPIME 2000 MG: 2 INJECTION, POWDER, FOR SOLUTION INTRAMUSCULAR; INTRAVENOUS at 00:35

## 2019-08-21 RX ADMIN — METHOCARBAMOL 500 MG: 500 TABLET, FILM COATED ORAL at 17:53

## 2019-08-21 RX ADMIN — OXYCODONE HYDROCHLORIDE 5 MG: 5 TABLET ORAL at 21:39

## 2019-08-21 RX ADMIN — HYDROMORPHONE HYDROCHLORIDE 0.2 MG: 1 INJECTION, SOLUTION INTRAMUSCULAR; INTRAVENOUS; SUBCUTANEOUS at 16:29

## 2019-08-21 RX ADMIN — MELATONIN 3 MG: 3 TAB ORAL at 21:39

## 2019-08-21 RX ADMIN — OXYCODONE HYDROCHLORIDE 5 MG: 5 TABLET ORAL at 15:06

## 2019-08-21 RX ADMIN — VANCOMYCIN HYDROCHLORIDE 125 MG: 500 INJECTION, POWDER, LYOPHILIZED, FOR SOLUTION INTRAVENOUS at 09:25

## 2019-08-21 NOTE — PLAN OF CARE
Problem: Prexisting or High Potential for Compromised Skin Integrity  Goal: Skin integrity is maintained or improved  Description  INTERVENTIONS:  - Identify patients at risk for skin breakdown  - Assess and monitor skin integrity  - Assess and monitor nutrition and hydration status  - Monitor labs (i e  albumin)  - Turn and reposition patient  - Assist with mobility/ambulation  - Relieve pressure over bony prominences  - Avoid friction and shearing  - Provide appropriate hygiene as needed including keeping skin clean and dry  - Evaluate need for skin moisturizer/barrier cream  - Collaborate with interdisciplinary team (i e  Nutrition, Rehabilitation, etc )   - Patient/family teaching   Outcome: Progressing     Problem: Potential for Falls  Goal: Patient will remain free of falls  Description  INTERVENTIONS:  - Assess patient frequently for physical needs  -  Identify cognitive and physical deficits and behaviors that affect risk of falls    -  [place] fall precautions as indicated by assessment   - Educate patient/family on patient safety including physical limitations  - Instruct patient to call for assistance with activity based on assessment  - Modify environment to reduce risk of injury  - Consider OT/PT consult to assist with strengthening/mobility  Outcome: Progressing     Problem: CARDIOVASCULAR - ADULT  Goal: Maintains optimal cardiac output and hemodynamic stability  Description  INTERVENTIONS:  - Monitor I/O, vital signs and rhythm  - Monitor for S/S and trends of decreased cardiac output i e  bleeding, hypotension  - Administer and titrate ordered vasoactive medications to optimize hemodynamic stability  - Assess quality of pulses, skin color and temperature  - Instruct patient to report change in severity of symptoms   Outcome: Progressing     Problem: RESPIRATORY - ADULT  Goal: Achieves optimal ventilation and oxygenation  Description  INTERVENTIONS:  - Assess for changes in respiratory status  - Assess for changes in mentation and behavior  - Position to facilitate oxygenation and minimize respiratory effort  - Oxygen administration by appropriate delivery method based on oxygen saturation (per order) or ABGs  - Encourage broncho-pulmonary hygiene including cough, deep breathe, Incentive Spirometry  - Assess the need for suctioning and aspirate as neede  - Respiratory Therapy support as indicated   Outcome: Progressing     Problem: GASTROINTESTINAL - ADULT  Goal: Maintains or returns to baseline bowel function  Description  INTERVENTIONS:  - Assess bowel function  - Encourage oral fluids to ensure adequate hydration  - Administer IV fluids as ordered to ensure adequate hydration  - Administer ordered medications as needed  - Encourage mobilization and activity  - Nutrition services referral to assist patient with appropriate food choices  Outcome: Progressing  Goal: Maintains adequate nutritional intake  Description  INTERVENTIONS:  Npo   - Identify factors contributing to decreased intake, treat as appropriate  - Monitor I&O, WT and lab values  - Obtain nutrition services referral as needed   Outcome: Progressing     Problem: METABOLIC, FLUID AND ELECTROLYTES - ADULT  Goal: Electrolytes maintained within normal limits  Description  INTERVENTIONS:  - Monitor labs and assess patient for signs and symptoms of electrolyte imbalances  - Administer electrolyte replacement as ordered  - Monitor response to electrolyte replacements, including repeat lab results as appropriate  - Instruct patient on fluid and nutrition as appropriate  Outcome: Progressing  Goal: Fluid balance maintained  Description  INTERVENTIONS:  - Monitor labs and assess for signs and symptoms of volume excess or deficit  - Monitor I/O and WT  - Instruct patient on fluid and nutrition as appropriate  Outcome: Progressing     Problem: SKIN/TISSUE INTEGRITY - ADULT  Goal: Skin integrity remains intact  Description  INTERVENTIONS  - Identify patients at risk for skin breakdown  - Assess and monitor skin integrity  - Assess and monitor nutrition and hydration statu  - Turn and reposition patient  - Assist with mobility/ambulation  - Relieve pressure over bony prominences  - Avoid friction and shearing  - Provide appropriate hygiene as needed including keeping skin clean and dry  - Evaluate need for skin moisturizer/barrier cream  - Collaborate with interdisciplinary team (i e  Nutrition, Rehabilitation, etc )   - Patient/family teaching   Outcome: Progressing  Goal: Oral mucous membranes remain intact  Description  INTERVENTIONS  - Assess oral mucosa and hygiene practices  - Implement preventative oral hygiene regimen  - Implement oral medicated treatments as ordered  - Initiate Nutrition services referral as needed  Outcome: Progressing  Goal: Incision(s), wounds(s) or drain site(s) healing without S/S of infection  Description  INTERVENTIONS  - Assess and document risk factors for skin impairment   - Assess and document dressing, incision, wound bed, drain sites and surrounding tissue  - Consider nutrition services referral as needed  - Oral mucous membranes remain intact  - Provide patient/ family education  Outcome: Progressing     Problem: MUSCULOSKELETAL - ADULT  Goal: Maintain or return mobility to safest level of function  Description  INTERVENTIONS:  - Assess patient's ability to carry out ADLs; assess patient's baseline for ADL function and identify physical deficits which impact ability to perform ADLs (bathing, care of mouth/teeth, toileting, grooming, dressing, etc )  - Assess/evaluate cause of self-care deficits   - Assess range of motion  - Assess patient's mobility; develop plan if impaired  - Assess patient's need for assistive devices and provide as appropriate  - Encourage maximum independence but intervene and supervise when necessary  - Involve family in performance of ADLs  - Assess for home care needs following discharge   - Request OT consult to assist with ADL evaluation and planning for discharge  - Provide patient education as appropriate  Outcome: Progressing     Problem: Nutrition/Hydration-ADULT  Goal: Nutrient/Hydration intake appropriate for improving, restoring or maintaining nutritional needs  Description  Monitor and assess patient's nutrition/hydration status for malnutrition (ex- brittle hair, bruises, dry skin, pale skin and conjunctiva, muscle wasting, smooth red tongue, and disorientation)  Collaborate with interdisciplinary team and initiate plan and interventions as ordered  Monitor patient's weight and dietary intake as ordered or per policy  Utilize nutrition screening tool and intervene per policy  Determine patient's food preferences and provide high-protein, high-caloric foods as appropriate       INTERVENTIONS:  - Monitor oral intake, urinary output, labs, and treatment plans  - Assess nutrition and hydration status and recommend course of action  - Evaluate amount of meals eaten  - Assist patient with eating if necessary   - Allow adequate time for meals  - Recommend/ encourage appropriate diets, oral nutritional supplements, and vitamin/mineral supplements  - Order, calculate, and assess calorie counts as needed  - Recommend, monitor, and adjust tube feedings and TPN/PPN based on assessed needs  - Assess need for intravenous fluids  - Provide specific nutrition/hydration education as appropriate  - Include patient/family/caregiver in decisions related to nutrition  Outcome: Progressing     Problem: COPING  Goal: Pt/Family able to verbalize concerns and demonstrate effective coping strategies  Description  INTERVENTIONS:  - Assist patient/family to identify coping skills, available support systems and cultural and spiritual values  - Provide emotional support, including active listening and acknowledgement of concerns of patient and caregivers  - Reduce environmental stimuli, as able  - Provide patient education  - Assess for spiritual pain/suffering and initiate spiritual care, including notification of Pastoral Care or [place] based community as needed  - Assess effectiveness of coping strategies  Outcome: Progressing  Goal: Will report anxiety at manageable levels  Description  INTERVENTIONS:  - Administer medication as ordered  - Teach and encourage coping skills  - Provide emotional support  - Assess patient/family for anxiety and ability to cope  Outcome: Progressing     Problem: Knowledge Deficit  Goal: Patient/family/caregiver demonstrates understanding of disease process, treatment plan, medications, and discharge instructions  Description  Complete learning assessment and assess knowledge base    Interventions:  - Provide teaching at level of understanding  - Provide teaching via preferred learning methods  Outcome: Progressing

## 2019-08-21 NOTE — QUICK NOTE
Nurse-Patient-Provider rounds were completed with the patient's nurse today, Ruma  We discussed the plan is to   - void trial  - IVF per Nephrology  - replete K  - trend leukocytosis    We reviewed all of the invasive devices/lines/telemetry orders   - PICC    DVT prophylaxis:  - heparin    Diet:  - dysphagia 2 soft; thin liquid    Pain Assessment / Plan:  - Continue current pain management regimen: Tylenol, Oxycodone, Dilaudid    Mobility Assessment / Plan:  - Activity as tolerated  Goals / Barriers for discharge:  - void trial  - improving leukocytosis    Case management following; case and discharge needs discussed  All questions and concerns were addressed        Marysol Christianson PA-C

## 2019-08-21 NOTE — PROGRESS NOTES
Progress Note - Infectious Disease   Kristel Chung 78 y o  female MRN: [id number]  Unit/Bed#: [facility] 824-01 Encounter: [id number]      Impression/Plan:  1  Systemic inflammatory response syndrome- Possibly related the patient's fungemia   Possibly secondary to peritonitis   The patient is now improved and moved out of the intensive care unit  The leukocytosis has persisted   Stool for C diff is negative   The patient completed more than 10 days of intravenous antibacterials   The white blood cell count has now been decreasing   The procalcitonin level has decreased substantially  -antibiotics as below  -discontinue fluconazole after dose today as below  -recheck CT of the chest abdomen pelvis if leukocytosis does not continue to improve without a new source  -recheck CBC with diff  -supportive care     2  Fungemia-possibly all translocation across the gut wall in the setting of peritonitis   Possible catheter related bacteremia although this is less likely as the blood cultures were from around the time of admission   The central line has been changed   Fungemia has cleared  Ophthalmology exam negative   The organism is not resistant to fluconazole  The patient has completed 14 days of high-dose fluconazole since the 1st negative blood culture  -discontinue fluconazole after dose today  -monitor for recurrent fever     3  Possible pseudomonal UTI-the patient is asymptomatic but does have a brisk leukocytosis   The urinalysis was not very impressive for an invasive UTI    She seems to be tolerating the cefepime without difficulty  -continue cefepime  -likely to oral ciprofloxacin tomorrow to complete 7 days total treatment  -recheck CBC with diff     4  Peritonitis-status post exploration and repair   No cultures obtained at the time of the surgery   Patient does have significant turbid drainage from the superior aspect of the wound   Patient has been maintained on broad antibiotics   The patient continues to have a brisk leukocytosis   Significant ascites noted   She completed more than 10 days of intravenous antibiotics, and is now back on antibiotics for UTI as above  White cell count continues to decrease     -recheck CBC with diff  -serial abdominal exams  -close surgical follow-up     5  Acute hypoxic respiratory failure-likely secondary to hemoptysis aspiration and a pneumothorax   Less likely pneumonia  Brittany Chairez is now extubated and on O2 by nasal cannula   Repeat chest x-ray with slightly increased volume   Bronchoscopy cultures without any resistant organisms   Her respiratory status is improved     -oxygen support   -monitor respiratory status     6  Acute kidney injury-suspect urinary retention was playing a significant role  Possible the pre renal issues also playing a role   Possibly medication effect   No other clear source appreciated   The renal function has continued to improve   -recheck BMP  -nephrology follow-up  -volume management    7  History of C difficile colitis-no current diarrhea   -continue oral vancomycin prophylaxis until S after completion of other antibiotics    Antibiotics:  Cefepime 3  Fluconazole 9  Day 14 to since 1st negative blood culture    Subjective:  Patient has no fever, chills, sweats; no nausea, vomiting, diarrhea; no cough, shortness of breath; no pain  No new symptoms  She continues to have a poor appetite    Objective:  Vitals:  Temp:  [97 6 °F (36 4 °C)-99 4 °F (37 4 °C)] 97 6 °F (36 4 °C)  HR:  [] 97  Resp:  [16-20] 20  BP: (129-147)/(66-84) 129/69  SpO2:  [96 %-98 %] 96 %  Temp (24hrs), Av 5 °F (36 9 °C), Min:97 6 °F (36 4 °C), Max:99 4 °F (37 4 °C)  Current: Temperature: 97 6 °F (36 4 °C)    Physical Exam:   General Appearance:  Alert, interactive, nontoxic, no acute distress  Throat: Oropharynx dry without lesions      Lungs:   Decreased breath sounds bilaterally; no wheezes, rhonchi or rales; respirations unlabored   Heart:  Tachycardia; no murmur, rub or gallop   Abdomen:   Soft, non-tender, non-distended, positive bowel sounds  Extremities: No clubbing, cyanosis or edema   Skin: No new rashes or lesions  No draining wounds noted  Labs, Imaging, & Other studies:   All pertinent labs and imaging studies were personally reviewed  Results from last 7 days   Lab Units 08/21/19  0514 08/20/19  0458 08/19/19  0739   WBC Thousand/uL 15 75* 19 58* 22 85*   HEMOGLOBIN g/dL 7 3* 7 6* 7 4*   PLATELETS Thousands/uL 398* 450* 467*     Results from last 7 days   Lab Units 08/21/19  0514 08/20/19  0458 08/19/19  0543  08/17/19  0459   SODIUM mmol/L 135* 140 138   < > 137   POTASSIUM mmol/L 3 2* 3 1* 4 2   < > 4 5   CHLORIDE mmol/L 98* 102 102   < > 100   CO2 mmol/L 33* 32 27   < > 28   BUN mg/dL 9 15 20   < > 24   CREATININE mg/dL 0 36* 0 56* 1 12   < > 1 60*   EGFR ml/min/1 73sq m 110 95 50   < > 32   CALCIUM mg/dL 7 8* 8 1* 7 7*   < > 7 6*   AST U/L  --  23  --   --  26   ALT U/L  --  14  --   --  16   ALK PHOS U/L  --  188*  --   --  158*    < > = values in this interval not displayed       Results from last 7 days   Lab Units 08/18/19  0817   URINE CULTURE  >100,000 cfu/ml Pseudomonas aeruginosa*     Results from last 7 days   Lab Units 08/15/19  0557   PROCALCITONIN ng/ml 0 26*

## 2019-08-21 NOTE — PROGRESS NOTES
Progress Note - Nephrology   Lynnette Chung 78 y o  female MRN: [id number]  [facility] Encounter: [id number]    ASSESSMENT AND PLAN:  1  AK I:  Thought secondary to prerenal; less likely  Micafungin which has been reported to cause AK I less than 15%  CLEARLY A LARGE PORTION OF OBSTRUCTIVE UROPATHY AS WELL AS SHE HAD A LARGE PVR REQUIRING A LAM CATHETER NOW WITH A MASSIVE DIURESIS MOST LIKELY RELATED TO POST OBSTRUCTIVE DIURESIS AND POSSIBLE POST ATN DIURESIS  She has tolerated negative 9 L probably because of significant volume overload over the last couple weeks during her hospital course  Yesterday she had an additional -5 L for total of 14 L negative in 2 days!!  -peak creatinine 2 02 08/18/2019  -baseline creatinine less than 0 5 mg/dL  -current creatinine:  Back to baseline at 0 36 mg/dL despite the massive diuresis  -UA:  Innumerable all RBCs and no proteinuria   This may be compatible with the straight catheterization  Recommendations:  -Lam catheter per Urology  -continue IV fluids  but will change the solution to normal saline with potassium given slight metabolic alkalosis, hyponatremia borderline and hypokalemia and I will increase the rate to 150 mL/hour  -monitor renal function  -avoid nephrotoxic agents such as NSAIDs  -recheck UA once there is a resolution of his postobstructive state requiring a Lam catheter     2  Blood pressure:  Fairly stable   Avoid over treating   Monitor heart rate   Hold parameters      3  Electrolytes:   -hyponatremia has slightly recurred secondary to massive diuresis and mild hypotonic fluid with Isolyte  -hypokalemia secondary massive diuresis:  Replete  -metabolic alkalosis:  Secondary to contraction alkalosis/hypokalemia:  Change to isotonic saline as outlined     4  Mineral bone disorder:   -replete hypomagnesemia     5   Anemia:  Hemoglobin most likely decreased related to multiple procedures   Hemoglobin stable today at 7 3   Check iron studies   We cannot give intravenous iron but may be oral iron      6  Other issues:  -laparoscopic ventral hernia repair complicated with small-bowel injury now status post exploratory laparotomy/mesh explantation/small bowel resection/eventual abdominal closure  -fungemia secondary to translocation across the gut wall followed by infectious disease complicated by peritonitis              Subjective: The patient overall feels fairly well still very weak  No chest pain or shortness of breath  No nausea vomiting or diarrhea  Extremely large urine output remains present  Objective:     Vitals: Blood pressure 129/69, pulse 97, temperature 97 6 °F (36 4 °C), resp  rate 20, height 5' 4" (1 626 m), weight 72 4 kg (159 lb 9 6 oz), SpO2 96 %  ,Body mass index is 27 4 kg/m²      Weight (last 2 days)     Date/Time   Weight    08/21/19 0600   72 4 (159 6)    08/20/19 1355   72 7 (160 3)    08/20/19 0600   72 6 (160 1)    08/19/19 0600   74 4 (164)                Intake/Output Summary (Last 24 hours) at 8/21/2019 1022  Last data filed at 8/21/2019 0900  Gross per 24 hour   Intake 3069 75 ml   Output 7000 ml   Net -3930 25 ml       Urethral Catheter Latex (Active)   Site Assessment Clean;Skin intact 8/20/2019  8:30 PM   Collection Container Standard drainage bag 8/20/2019  8:30 PM   Securement Method Securing device (Describe) 8/20/2019  8:30 PM   Output (mL) 1000 mL 8/21/2019  6:42 AM       Physical Exam: General:  No acute distress but weak appearing lying in bed  Skin:  No acute rash  Eyes:  No scleral icterus  ENT:  Slightly dry mucous membranes  Neck:  Supple, no jugular venous distention  Chest:  Slight rhonchi at the right base otherwise clear to auscultation  CVS:  Regular rate and rhythm without evidence of a cardiac rub or gallops  Abdomen:  Soft with essentially no tenderness upon deep palpation, midline bandage present clean and dry  Extremities:  Edema has significantly decreased and almost nonexistent, no cyanosis  Neuro:  No overt focalities  Psych:  Alert and oriented                Medications:    Scheduled Meds:  Current Facility-Administered Medications:  acetaminophen 650 mg Oral Q6H PRN Nii Salinas PA-C    bisacodyl 10 mg Rectal Daily PRN Nii Salinas PA-C    cefepime 2,000 mg Intravenous Q8H Bridgett Mike MD Last Rate: 2,000 mg (08/21/19 1604)   escitalopram 10 mg Per G Tube Daily Nii Salinas PA-C    folic acid 1 mg Per G Tube Daily Nii Salinas PA-C    gabapentin 100 mg Oral HS Nii Salinas PA-C    guaiFENesin 200 mg Oral Q4H PRN Josph Meals, DO    heparin (porcine) 5,000 Units Subcutaneous [facility] Nii Salinas PA-C    HYDROmorphone 0 2 mg Intravenous Q4H PRN Nii Salinas PA-C    lidocaine 1 patch Topical Daily Nii Salinas PA-C    melatonin 3 mg Per G Tube HS Nii Salinas PA-C    methocarbamol 500 mg Oral Q6H PRN Nii Salinas PA-C    multi-electrolyte 75 mL/hr Intravenous Continuous Trevro Dos Santos MD Last Rate: 75 mL/hr (08/21/19 0252)   omeprazole (PRILOSEC) suspension 2 mg/mL 20 mg Oral BID AC Titus Fam PA-C    ondansetron 4 mg Intravenous Q4H PRN Nii Salinas PA-C    oxyCODONE 2 5 mg Oral Q4H PRN Nii Salinas PA-C    Or        oxyCODONE 5 mg Oral Q4H PRN Nii Salinas PA-C    potassium chloride 40 mEq Intravenous Once Allison Rolon MD    pravastatin 20 mg Per G Tube Daily With [employer]DA kay    saccharomyces boulardii 250 mg Oral BID Nii Salinas PA-C    saliva substitute 5 spray Mouth/Throat PRN Deysi Viera MD    vancomycin 125 mg Oral Q12H [facility] Bridgett Mike MD        PRN Meds:   acetaminophen    bisacodyl    guaiFENesin    HYDROmorphone    methocarbamol    ondansetron    oxyCODONE **OR** oxyCODONE    saliva substitute    Continuous Infusions:  multi-electrolyte 75 mL/hr Last Rate: 75 mL/hr (08/21/19 0252)       Lab, Imaging and other studies: I have personally reviewed pertinent labs   Laboratory Results:  Results from last 7 days   Lab Units 08/21/19  0514 08/20/19  0458 08/19/19  0739 08/19/19  0543 08/18/19  0452 08/17/19  0459 08/16/19  0513 08/15/19  0557   WBC Thousand/uL 15 75* 19 58* 22 85*  --  25 73* 21 38* 24 06* 25 32*   HEMOGLOBIN g/dL 7 3* 7 6* 7 4*  --  7 5* 7 9* 8 1* 7 9*   HEMATOCRIT % 23 4* 24 4* 23 6*  --  24 0* 25 1* 24 8* 25 4*   PLATELETS Thousands/uL 398* 450* 467*  --  496* 530* 535* 547*   POTASSIUM mmol/L 3 2* 3 1*  --  4 2 4 8 4 5 4 3 4 0   CHLORIDE mmol/L 98* 102  --  102 99* 100 97* 99*   CO2 mmol/L 33* 32  --  27 28 28 32 33*   BUN mg/dL 9 15  --  20 28* 24 17 13   CREATININE mg/dL 0 36* 0 56*  --  1 12 2 02* 1 60* 1 35* 0 93   CALCIUM mg/dL 7 8* 8 1*  --  7 7* 7 6* 7 6* 7 6* 7 6*   MAGNESIUM mg/dL 1 7 1 5*  --   --   --   --   --  1 9   PHOSPHORUS mg/dL  --   --   --   --   --   --   --  3 5     Urinalysis: Lab Results   Component Value Date    COLORU Yellow 08/20/2019    COLORU Yellow 11/07/2014    CLARITYU Cloudy 08/20/2019    CLARITYU Clear 11/07/2014    SPECGRAV 1 005 08/20/2019    SPECGRAV 1 020 11/07/2014    PHUR 7 5 08/20/2019    PHUR 7 0 11/07/2014    LEUKOCYTESUR Large (A) 08/20/2019    LEUKOCYTESUR Negative 11/07/2014    NITRITE Negative 08/20/2019    NITRITE Negative 11/07/2014    PROTEINUA Negative 11/07/2014    GLUCOSEU Negative 08/20/2019    GLUCOSEU Negative 11/07/2014    KETONESU Negative 08/20/2019    KETONESU Negative 11/07/2014    BILIRUBINUR Negative 08/20/2019    BILIRUBINUR Negative 11/07/2014    BLOODU Large (A) 08/20/2019    BLOODU Negative 11/07/2014     ABGs:   Lab Results   Component Value Date    PH 7 334 (L) 08/03/2019     Radiology review:     Portions of the record may have been created with voice recognition software   Occasional wrong word or "sound a like" substitutions may have occurred due to the inherent limitations of voice recognition software   Read the chart carefully and recognize, using context, where substitutions have occurred

## 2019-08-21 NOTE — PROGRESS NOTES
UROLOGY PROGRESS NOTE   Patient Identifiers: Michelle Rodriguez (MRN [id number])  Date of Service: 8/21/2019    Subjective:    Awake and alert  Resting fairly comfortably    Patient has  no complaints        Objective:     VITALS:    Vitals:    08/20/19 2313   BP: 133/66   Pulse: 103   Resp: 18   Temp: 99 4 °F (37 4 °C)   SpO2: 98%           LABS:  Lab Results   Component Value Date    HGB 7 3 (L) 08/21/2019    HCT 23 4 (L) 08/21/2019    WBC 15 75 (H) 08/21/2019     (H) 08/21/2019   ]    Lab Results   Component Value Date     07/23/2015    K 3 2 (L) 08/21/2019    CL 98 (L) 08/21/2019    CO2 33 (H) 08/21/2019    BUN 9 08/21/2019    CREATININE 0 36 (L) 08/21/2019    CALCIUM 7 8 (L) 08/21/2019    GLUCOSE 89 08/03/2019   ]        INPATIENT MEDS:    Current Facility-Administered Medications:     acetaminophen (TYLENOL) tablet 650 mg, 650 mg, Oral, Q6H PRN, Mimi Cleaning PA-C, 650 mg at 08/11/19 0507    bisacodyl (DULCOLAX) rectal suppository 10 mg, 10 mg, Rectal, Daily PRN, Mimi Cleaning PA-C    cefepime (MAXIPIME) 2,000 mg in dextrose 5 % 50 mL IVPB, 2,000 mg, Intravenous, Q8H, María Elena Peralta MD, Stopped at 08/21/19 0105    escitalopram (LEXAPRO) tablet 10 mg, 10 mg, Per G Tube, Daily, Mimi Cleaning PA-C, 10 mg at 08/20/19 4080    fluconazole (DIFLUCAN) tablet 800 mg, 800 mg, Oral, Daily, María Elena Peralta MD, 800 mg at 60/39/21 4302    folic acid (FOLVITE) tablet 1 mg, 1 mg, Per G Tube, Daily, Mimi Cleaning PA-C, 1 mg at 08/20/19 4916    gabapentin (NEURONTIN) capsule 100 mg, 100 mg, Oral, HS, Mimi Cleaning PA-C, 100 mg at 08/20/19 2150    guaiFENesin (ROBITUSSIN) oral solution 200 mg, 200 mg, Oral, Q4H PRN, Beba Reyes,     heparin (porcine) subcutaneous injection 5,000 Units, 5,000 Units, Subcutaneous, Q8H [address], Mimi Cleaning PA-C, 5,000 Units at 08/21/19 0516    HYDROmorphone (DILAUDID) injection 0 2 mg, 0 2 mg, Intravenous, Q4H PRN, Mimi Cleaning PA-C, 0 2 mg at 08/16/19 2141    lidocaine (LIDODERM) 5 % patch 1 patch, 1 patch, Topical, Daily, Rachel Gibbslin, PA-C, 1 patch at 08/20/19 1491    melatonin tablet 3 mg, 3 mg, Per G Tube, HS, Rachel [place], PA-C, 3 mg at 08/20/19 2150    methocarbamol (ROBAXIN) tablet 500 mg, 500 mg, Oral, Q6H PRN, Rachel [place], PA-C, 500 mg at 08/14/19 2202    multi-electrolyte (ISOLYTE-S PH 7 4 equivalent) IV solution, 75 mL/hr, Intravenous, Continuous, Daneen Gilford, MD, Last Rate: 75 mL/hr at 08/21/19 0252, 75 mL/hr at 08/21/19 0252    omeprazole (PRILOSEC) suspension 2 mg/mL, 20 mg, Oral, BID AC, Rachel Claire, PA-C, 20 mg at 08/21/19 0612    ondansetron (ZOFRAN) injection 4 mg, 4 mg, Intravenous, Q4H PRN, Rachel Claire, PA-C, 4 mg at 08/12/19 0256    oxyCODONE (ROXICODONE) IR tablet 2 5 mg, 2 5 mg, Oral, Q4H PRN, 2 5 mg at 08/20/19 0828 **OR** oxyCODONE (ROXICODONE) IR tablet 5 mg, 5 mg, Oral, Q4H PRN, Rachel [place], PA-C, 5 mg at 08/20/19 1653    potassium chloride 40 mEq IVPB (premix), 40 mEq, Intravenous, Once, Aida Brady MD    pravastatin (PRAVACHOL) tablet 20 mg, 20 mg, Per G Tube, Daily With Shantal Gordillo PA-C, 20 mg at 08/20/19 1652    saccharomyces boulardii (FLORASTOR) capsule 250 mg, 250 mg, Oral, BID, Rachel Claire, PA-C, 250 mg at 08/20/19 1653    saliva substitute (MOUTH KOTE) mucosal solution 5 spray, 5 spray, Mouth/Throat, PRN, Ryan Jackson MD    [facility]) oral solution 125 mg, 125 mg, Oral, Q12H [address], Derrel [place], MD, 125 mg at 08/20/19 4467      Physical Exam:   /66   Pulse 103   Temp 99 4 °F (37 4 °C)   Resp 18   Ht 5' 4" (1 626 m)   Wt 72 4 kg (159 lb 9 6 oz)   SpO2 98%   BMI 27 40 kg/m²   GEN: no acute distress    RESP: breathing comfortably with no accessory muscle use    ABD: soft, appropriately tender to palpation, non-distended   INCISION:    EXT: no significant peripheral edema       RADIOLOGY:    none     Assessment:    1   Postop urinary retention   2  Possible pseudomonal UTI   3  Candida fungemia   4  Status post ventral hernia repair and  small-bowel resection     5   Severe deconditioning      Plan:   - consider possible trial of voiding prior to discharge  - if not may go to next level of care with Mendoza catheter and will be managed as an outpatient  - when she gain some strength I would suspect her voiding pattern should improve  - urology will sign off at this time  - please call with any questions or concerns

## 2019-08-21 NOTE — PROGRESS NOTES
Progress Note - Kristel Chung 78 y o  female MRN: [id number]    Unit/Bed#: [facility] 824-01 Encounter: [id number]      Assessment:  78 F with laparoscopic VHR complicated by small bowel injury, now s/p exploratory laparotomy, mesh explantation, SBR, and eventual abdominal closure, also with candidal fungemia      VSS  Afebrile  Dressing clean, dry, intact  WBC trending down  Plan:  -Continue diet  -IV abx cefipime, fluconazole    -continue IVF  -PT/OT  -void trial per urology, appreciate recs    Subjective:   Denied fever, chills, chest pain, shortness of breath, nausea, vomiting, or abdominal pain this morning  Objective:     Vitals: Blood pressure 133/66, pulse 103, temperature 99 4 °F (37 4 °C), resp  rate 18, height 5' 4" (1 626 m), weight 72 7 kg (160 lb 4 8 oz), SpO2 98 %  ,Body mass index is 27 52 kg/m²  Intake/Output Summary (Last 24 hours) at 8/21/2019 0546  Last data filed at 8/21/2019 0300  Gross per 24 hour   Intake 2810 75 ml   Output 8900 ml   Net -6089 25 ml     Physical Exam  General: NAD  HEENT: NC/AT  MMM  Cv: RRR  Lungs: normal effort  Ab: Soft, NT/ND  Ex: no CCE  Neuro: AAOx3      Invasive Devices     Peripherally Inserted Central Catheter Line            PICC Line 41/63/50 Right Basilic 6 days          Drain            Urethral Catheter Latex 1 day                Lab, Imaging and other studies: I have personally reviewed pertinent reports      VTE Pharmacologic Prophylaxis: Heparin  VTE Mechanical Prophylaxis: sequential compression device

## 2019-08-22 LAB
ANION GAP SERPL CALCULATED.3IONS-SCNC: 6 MMOL/L (ref 4–13)
BASOPHILS # BLD AUTO: 0.04 THOUSANDS/ΜL (ref 0–0.1)
BASOPHILS NFR BLD AUTO: 0 % (ref 0–1)
BUN SERPL-MCNC: 12 MG/DL (ref 5–25)
CALCIUM SERPL-MCNC: 7.8 MG/DL (ref 8.3–10.1)
CHLORIDE SERPL-SCNC: 107 MMOL/L (ref 100–108)
CO2 SERPL-SCNC: 30 MMOL/L (ref 21–32)
CREAT SERPL-MCNC: 0.66 MG/DL (ref 0.6–1.3)
EOSINOPHIL # BLD AUTO: 0.18 THOUSAND/ΜL (ref 0–0.61)
EOSINOPHIL NFR BLD AUTO: 1 % (ref 0–6)
ERYTHROCYTE [DISTWIDTH] IN BLOOD BY AUTOMATED COUNT: 17.2 % (ref 11.6–15.1)
GFR SERPL CREATININE-BSD FRML MDRD: 90 ML/MIN/1.73SQ M
GLUCOSE SERPL-MCNC: 135 MG/DL (ref 65–140)
HCT VFR BLD AUTO: 24 % (ref 34.8–46.1)
HGB BLD-MCNC: 7.2 G/DL (ref 11.5–15.4)
IMM GRANULOCYTES # BLD AUTO: 0.44 THOUSAND/UL (ref 0–0.2)
IMM GRANULOCYTES NFR BLD AUTO: 3 % (ref 0–2)
LYMPHOCYTES # BLD AUTO: 2.38 THOUSANDS/ΜL (ref 0.6–4.47)
LYMPHOCYTES NFR BLD AUTO: 14 % (ref 14–44)
MAGNESIUM SERPL-MCNC: 2.4 MG/DL (ref 1.6–2.6)
MCH RBC QN AUTO: 27.2 PG (ref 26.8–34.3)
MCHC RBC AUTO-ENTMCNC: 30 G/DL (ref 31.4–37.4)
MCV RBC AUTO: 91 FL (ref 82–98)
MONOCYTES # BLD AUTO: 1.36 THOUSAND/ΜL (ref 0.17–1.22)
MONOCYTES NFR BLD AUTO: 8 % (ref 4–12)
NEUTROPHILS # BLD AUTO: 12.88 THOUSANDS/ΜL (ref 1.85–7.62)
NEUTS SEG NFR BLD AUTO: 74 % (ref 43–75)
NRBC BLD AUTO-RTO: 0 /100 WBCS
PHOSPHATE SERPL-MCNC: 2.5 MG/DL (ref 2.3–4.1)
PLATELET # BLD AUTO: 381 THOUSANDS/UL (ref 149–390)
PMV BLD AUTO: 10.2 FL (ref 8.9–12.7)
POTASSIUM SERPL-SCNC: 4 MMOL/L (ref 3.5–5.3)
RBC # BLD AUTO: 2.65 MILLION/UL (ref 3.81–5.12)
SODIUM SERPL-SCNC: 143 MMOL/L (ref 136–145)
WBC # BLD AUTO: 17.28 THOUSAND/UL (ref 4.31–10.16)

## 2019-08-22 PROCEDURE — 99232 SBSQ HOSP IP/OBS MODERATE 35: CPT | Performed by: INTERNAL MEDICINE

## 2019-08-22 PROCEDURE — 84100 ASSAY OF PHOSPHORUS: CPT | Performed by: INTERNAL MEDICINE

## 2019-08-22 PROCEDURE — 80048 BASIC METABOLIC PNL TOTAL CA: CPT | Performed by: PHYSICIAN ASSISTANT

## 2019-08-22 PROCEDURE — 83735 ASSAY OF MAGNESIUM: CPT | Performed by: INTERNAL MEDICINE

## 2019-08-22 PROCEDURE — 99024 POSTOP FOLLOW-UP VISIT: CPT | Performed by: SURGERY

## 2019-08-22 PROCEDURE — 85025 COMPLETE CBC W/AUTO DIFF WBC: CPT | Performed by: PHYSICIAN ASSISTANT

## 2019-08-22 RX ORDER — CIPROFLOXACIN 750 MG/1
750 TABLET, FILM COATED ORAL EVERY 12 HOURS
Status: DISCONTINUED | OUTPATIENT
Start: 2019-08-22 | End: 2019-08-25

## 2019-08-22 RX ORDER — TAMSULOSIN HYDROCHLORIDE 0.4 MG/1
0.4 CAPSULE ORAL
Status: DISCONTINUED | OUTPATIENT
Start: 2019-08-22 | End: 2019-08-27 | Stop reason: HOSPADM

## 2019-08-22 RX ORDER — DEXTROSE AND SODIUM CHLORIDE 5; .45 G/100ML; G/100ML
50 INJECTION, SOLUTION INTRAVENOUS CONTINUOUS
Status: DISCONTINUED | OUTPATIENT
Start: 2019-08-22 | End: 2019-08-23

## 2019-08-22 RX ADMIN — CIPROFLOXACIN HYDROCHLORIDE 750 MG: 750 TABLET, FILM COATED ORAL at 21:48

## 2019-08-22 RX ADMIN — ESCITALOPRAM OXALATE 10 MG: 10 TABLET ORAL at 08:31

## 2019-08-22 RX ADMIN — DEXTROSE AND SODIUM CHLORIDE 50 ML/HR: 5; .45 INJECTION, SOLUTION INTRAVENOUS at 11:38

## 2019-08-22 RX ADMIN — HEPARIN SODIUM 5000 UNITS: 5000 INJECTION INTRAVENOUS; SUBCUTANEOUS at 05:09

## 2019-08-22 RX ADMIN — FOLIC ACID 1 MG: 1 TABLET ORAL at 08:31

## 2019-08-22 RX ADMIN — Medication 20 MG: at 06:12

## 2019-08-22 RX ADMIN — POTASSIUM CHLORIDE, DEXTROSE MONOHYDRATE AND SODIUM CHLORIDE 150 ML/HR: 150; 5; 900 INJECTION, SOLUTION INTRAVENOUS at 08:39

## 2019-08-22 RX ADMIN — HEPARIN SODIUM 5000 UNITS: 5000 INJECTION INTRAVENOUS; SUBCUTANEOUS at 21:49

## 2019-08-22 RX ADMIN — CIPROFLOXACIN HYDROCHLORIDE 750 MG: 750 TABLET, FILM COATED ORAL at 09:01

## 2019-08-22 RX ADMIN — LIDOCAINE 1 PATCH: 50 PATCH CUTANEOUS at 08:32

## 2019-08-22 RX ADMIN — POTASSIUM CHLORIDE, DEXTROSE MONOHYDRATE AND SODIUM CHLORIDE 150 ML/HR: 150; 5; 900 INJECTION, SOLUTION INTRAVENOUS at 01:45

## 2019-08-22 RX ADMIN — VANCOMYCIN HYDROCHLORIDE 125 MG: 500 INJECTION, POWDER, LYOPHILIZED, FOR SOLUTION INTRAVENOUS at 21:49

## 2019-08-22 RX ADMIN — GABAPENTIN 100 MG: 100 CAPSULE ORAL at 21:48

## 2019-08-22 RX ADMIN — Medication 250 MG: at 08:31

## 2019-08-22 RX ADMIN — HEPARIN SODIUM 5000 UNITS: 5000 INJECTION INTRAVENOUS; SUBCUTANEOUS at 14:02

## 2019-08-22 RX ADMIN — CEFEPIME 2000 MG: 2 INJECTION, POWDER, FOR SOLUTION INTRAMUSCULAR; INTRAVENOUS at 00:37

## 2019-08-22 RX ADMIN — METHOCARBAMOL 500 MG: 500 TABLET, FILM COATED ORAL at 21:48

## 2019-08-22 RX ADMIN — VANCOMYCIN HYDROCHLORIDE 125 MG: 500 INJECTION, POWDER, LYOPHILIZED, FOR SOLUTION INTRAVENOUS at 08:29

## 2019-08-22 RX ADMIN — Medication 250 MG: at 17:23

## 2019-08-22 RX ADMIN — TAMSULOSIN HYDROCHLORIDE 0.4 MG: 0.4 CAPSULE ORAL at 17:23

## 2019-08-22 RX ADMIN — MELATONIN 3 MG: 3 TAB ORAL at 21:49

## 2019-08-22 RX ADMIN — PRAVASTATIN SODIUM 20 MG: 20 TABLET ORAL at 17:23

## 2019-08-22 RX ADMIN — METHOCARBAMOL 500 MG: 500 TABLET, FILM COATED ORAL at 09:01

## 2019-08-22 NOTE — PROGRESS NOTES
Progress Note - Kristel Chung 78 y o  female MRN: [id number]    Unit/Bed#: [facility] 824-01 Encounter: [id number]      Assessment:  78 F with laparoscopic VHR complicated by small bowel injury, now s/p exploratory laparotomy, mesh explantation, SBR, and eventual abdominal closure, also with candidal fungemia       VSS  Afebrile  WBC: 15-->17  Adequate uop s/p pak discontinuation  Plan:  -Continue diet  -IV abx cefipime  -continue IVF  -PT/OT    Subjective:   Urinating w/o issues  Tolerating diet  Passing flatus  Having bowel mvts  Denied fever, chills, chest pain, shortness of breath, nausea, vomiting, or abdominal pain this morning  Objective:     Vitals: Blood pressure 136/76, pulse 103, temperature 98 9 °F (37 2 °C), resp  rate 20, height 5' 4" (1 626 m), weight 72 4 kg (159 lb 9 6 oz), SpO2 95 %  ,Body mass index is 27 4 kg/m²  Intake/Output Summary (Last 24 hours) at 8/22/2019 0610  Last data filed at 8/22/2019 9804  Gross per 24 hour   Intake 3791 75 ml   Output 3997 ml   Net -205 25 ml       Physical Exam  General: NAD  HEENT: NC/AT  MMM  Cv: RRR  Lungs: normal effort  Ab: Soft, NT/ND  Incision site cl  Dry  Intact  Ex: no CCE  Neuro: AAOx3      Invasive Devices     Peripherally Inserted Central Catheter Line            PICC Line 61/22/54 Right Basilic 7 days                Lab, Imaging and other studies: I have personally reviewed pertinent reports      VTE Pharmacologic Prophylaxis: Heparin  VTE Mechanical Prophylaxis: sequential compression device

## 2019-08-22 NOTE — PLAN OF CARE
Problem: Prexisting or High Potential for Compromised Skin Integrity  Goal: Skin integrity is maintained or improved  Description  INTERVENTIONS:  - Identify patients at risk for skin breakdown  - Assess and monitor skin integrity  - Assess and monitor nutrition and hydration status  - Monitor labs (i e  albumin)  - Turn and reposition patient  - Assist with mobility/ambulation  - Relieve pressure over bony prominences  - Avoid friction and shearing  - Provide appropriate hygiene as needed including keeping skin clean and dry  - Evaluate need for skin moisturizer/barrier cream  - Collaborate with interdisciplinary team (i e  Nutrition, Rehabilitation, etc )   - Patient/family teaching   Outcome: Progressing     Problem: Potential for Falls  Goal: Patient will remain free of falls  Description  INTERVENTIONS:  - Assess patient frequently for physical needs  -  Identify cognitive and physical deficits and behaviors that affect risk of falls    -  [place] fall precautions as indicated by assessment   - Educate patient/family on patient safety including physical limitations  - Instruct patient to call for assistance with activity based on assessment  - Modify environment to reduce risk of injury  - Consider OT/PT consult to assist with strengthening/mobility  Outcome: Progressing     Problem: CARDIOVASCULAR - ADULT  Goal: Maintains optimal cardiac output and hemodynamic stability  Description  INTERVENTIONS:  - Monitor I/O, vital signs and rhythm  - Monitor for S/S and trends of decreased cardiac output i e  bleeding, hypotension  - Administer and titrate ordered vasoactive medications to optimize hemodynamic stability  - Assess quality of pulses, skin color and temperature  - Instruct patient to report change in severity of symptoms   Outcome: Progressing     Problem: RESPIRATORY - ADULT  Goal: Achieves optimal ventilation and oxygenation  Description  INTERVENTIONS:  - Assess for changes in respiratory status  - Assess for changes in mentation and behavior  - Position to facilitate oxygenation and minimize respiratory effort  - Oxygen administration by appropriate delivery method based on oxygen saturation (per order) or ABGs  - Encourage broncho-pulmonary hygiene including cough, deep breathe, Incentive Spirometry  - Assess the need for suctioning and aspirate as neede  - Respiratory Therapy support as indicated   Outcome: Progressing     Problem: GASTROINTESTINAL - ADULT  Goal: Maintains or returns to baseline bowel function  Description  INTERVENTIONS:  - Assess bowel function  - Encourage oral fluids to ensure adequate hydration  - Administer IV fluids as ordered to ensure adequate hydration  - Administer ordered medications as needed  - Encourage mobilization and activity  - Nutrition services referral to assist patient with appropriate food choices  Outcome: Progressing  Goal: Maintains adequate nutritional intake  Description  INTERVENTIONS:  Npo   - Identify factors contributing to decreased intake, treat as appropriate  - Monitor I&O, WT and lab values  - Obtain nutrition services referral as needed   Outcome: Progressing     Problem: METABOLIC, FLUID AND ELECTROLYTES - ADULT  Goal: Electrolytes maintained within normal limits  Description  INTERVENTIONS:  - Monitor labs and assess patient for signs and symptoms of electrolyte imbalances  - Administer electrolyte replacement as ordered  - Monitor response to electrolyte replacements, including repeat lab results as appropriate  - Instruct patient on fluid and nutrition as appropriate  Outcome: Progressing  Goal: Fluid balance maintained  Description  INTERVENTIONS:  - Monitor labs and assess for signs and symptoms of volume excess or deficit  - Monitor I/O and WT  - Instruct patient on fluid and nutrition as appropriate  Outcome: Progressing     Problem: SKIN/TISSUE INTEGRITY - ADULT  Goal: Skin integrity remains intact  Description  INTERVENTIONS  - Identify patients at risk for skin breakdown  - Assess and monitor skin integrity  - Assess and monitor nutrition and hydration statu  - Turn and reposition patient  - Assist with mobility/ambulation  - Relieve pressure over bony prominences  - Avoid friction and shearing  - Provide appropriate hygiene as needed including keeping skin clean and dry  - Evaluate need for skin moisturizer/barrier cream  - Collaborate with interdisciplinary team (i e  Nutrition, Rehabilitation, etc )   - Patient/family teaching   Outcome: Progressing  Goal: Oral mucous membranes remain intact  Description  INTERVENTIONS  - Assess oral mucosa and hygiene practices  - Implement preventative oral hygiene regimen  - Implement oral medicated treatments as ordered  - Initiate Nutrition services referral as needed  Outcome: Progressing  Goal: Incision(s), wounds(s) or drain site(s) healing without S/S of infection  Description  INTERVENTIONS  - Assess and document risk factors for skin impairment   - Assess and document dressing, incision, wound bed, drain sites and surrounding tissue  - Consider nutrition services referral as needed  - Oral mucous membranes remain intact  - Provide patient/ family education  Outcome: Progressing     Problem: MUSCULOSKELETAL - ADULT  Goal: Maintain or return mobility to safest level of function  Description  INTERVENTIONS:  - Assess patient's ability to carry out ADLs; assess patient's baseline for ADL function and identify physical deficits which impact ability to perform ADLs (bathing, care of mouth/teeth, toileting, grooming, dressing, etc )  - Assess/evaluate cause of self-care deficits   - Assess range of motion  - Assess patient's mobility; develop plan if impaired  - Assess patient's need for assistive devices and provide as appropriate  - Encourage maximum independence but intervene and supervise when necessary  - Involve family in performance of ADLs  - Assess for home care needs following discharge   - Request OT consult to assist with ADL evaluation and planning for discharge  - Provide patient education as appropriate  Outcome: Progressing     Problem: Nutrition/Hydration-ADULT  Goal: Nutrient/Hydration intake appropriate for improving, restoring or maintaining nutritional needs  Description  Monitor and assess patient's nutrition/hydration status for malnutrition (ex- brittle hair, bruises, dry skin, pale skin and conjunctiva, muscle wasting, smooth red tongue, and disorientation)  Collaborate with interdisciplinary team and initiate plan and interventions as ordered  Monitor patient's weight and dietary intake as ordered or per policy  Utilize nutrition screening tool and intervene per policy  Determine patient's food preferences and provide high-protein, high-caloric foods as appropriate       INTERVENTIONS:  - Monitor oral intake, urinary output, labs, and treatment plans  - Assess nutrition and hydration status and recommend course of action  - Evaluate amount of meals eaten  - Assist patient with eating if necessary   - Allow adequate time for meals  - Recommend/ encourage appropriate diets, oral nutritional supplements, and vitamin/mineral supplements  - Order, calculate, and assess calorie counts as needed  - Recommend, monitor, and adjust tube feedings and TPN/PPN based on assessed needs  - Assess need for intravenous fluids  - Provide specific nutrition/hydration education as appropriate  - Include patient/family/caregiver in decisions related to nutrition  Outcome: Progressing     Problem: COPING  Goal: Pt/Family able to verbalize concerns and demonstrate effective coping strategies  Description  INTERVENTIONS:  - Assist patient/family to identify coping skills, available support systems and cultural and spiritual values  - Provide emotional support, including active listening and acknowledgement of concerns of patient and caregivers  - Reduce environmental stimuli, as able  - Provide patient education  - Assess for spiritual pain/suffering and initiate spiritual care, including notification of Pastoral Care or [place] based community as needed  - Assess effectiveness of coping strategies  Outcome: Progressing  Goal: Will report anxiety at manageable levels  Description  INTERVENTIONS:  - Administer medication as ordered  - Teach and encourage coping skills  - Provide emotional support  - Assess patient/family for anxiety and ability to cope  Outcome: Progressing     Problem: Knowledge Deficit  Goal: Patient/family/caregiver demonstrates understanding of disease process, treatment plan, medications, and discharge instructions  Description  Complete learning assessment and assess knowledge base    Interventions:  - Provide teaching at level of understanding  - Provide teaching via preferred learning methods  Outcome: Progressing

## 2019-08-22 NOTE — SOCIAL WORK
Per Surgical services  Patient is not medically cleared today [facility] WBC and urinary retention  Mendoza placed  D/c is day to day per Surgery

## 2019-08-22 NOTE — QUICK NOTE
Nurse-Patient-Provider rounds were completed with the patient's nurse today, Ruma  We discussed the plan is to   - IVF per Nephrology, decreased to 50ml/h  Currently 1/2NSS  Patient with edematous lower extremities  - PVR >600ml this morning  Nursing attempted straight cath with difficulty  Therefore, I placed a pak catheter to avoid continued difficultly with additional attempts and anticipated ongoing high urine output  Begin Flomax and leave pak catheter, at least until euvolemic     - Abdominal incision repacked with mesalt packing gauze  - trend WBC - ID following    We reviewed all of the invasive devices/lines/telemetry orders   - PICC    DVT prophylaxis:  - heparin    Diet:  - Mech soft; thin liquid    Pain Assessment / Plan:  - Continue current pain management regimen: Tylenol, Oxycodone, Robaxin, Dilaudid    Mobility Assessment / Plan:  - Activity as tolerated  Goals / Barriers for discharge:  - urinary retention, ongoing fluid balance management  - leukocytosis    Case management following; case and discharge needs discussed  All questions and concerns were addressed        Leslie Farias PA-C

## 2019-08-22 NOTE — PROGRESS NOTES
Progress Note - Nephrology   Radha Chung 78 y o  female MRN: [id number]  Unit/Bed#: [facility] 824-01 Encounter: [id number]    ASSESSMENT AND PLAN:  1  AK I:  Thought secondary to prerenal; less likely  Micafungin which has been reported to cause AK I less than 15%  CLEARLY A LARGE PORTION OF OBSTRUCTIVE UROPATHY AS WELL AS SHE HAD A LARGE PVR REQUIRING A LAM CATHETER NOW WITH A MASSIVE DIURESIS MOST LIKELY RELATED TO POST OBSTRUCTIVE DIURESIS AND POSSIBLE POST ATN DIURESIS  She has tolerated negative 9 L probably because of significant volume overload over the last couple weeks during her hospital course  Yesterday she was finally in a positive balance  -peak creatinine 2 02 08/18/2019  -baseline creatinine less than 0 5 mg/dL  -current creatinine:  Back to baseline at 0 66 mg/dL only minimally higher with a negative balance  -UA:  Innumerable all RBCs and no proteinuria   This may be compatible with the straight catheterization  Recommendations:  -Lam catheter per Urology  -decrease IV fluids down to 50 mL/hour given positive balance  -monitor renal function  -avoid nephrotoxic agents such as NSAIDs  -recheck UA once there is a resolution of his postobstructive state requiring a Lam catheter     2  Blood pressure:  Doing well   Avoid over treating   Monitor heart rate   Hold parameters      3  Electrolytes:   -hyponatremia has  resolved on current fluids switch to half-normal saline given actually a sodium 143 today  -hypokalemia secondary massive diuresis:  Repleted  -metabolic alkalosis:  Secondary to contraction alkalosis/hypokalemia:  Resolved on isotonic saline     4  Mineral bone disorder:   -repleted hypomagnesemia  -phosphorus acceptable     5   Anemia:  Hemoglobin most likely decreased related to multiple procedures   Hemoglobin stable today at 7 2   Check iron studies   We cannot give intravenous iron but may be oral iron      6  Other issues:  -laparoscopic ventral hernia repair complicated with small-bowel injury now status post exploratory laparotomy/mesh explantation/small bowel resection/eventual abdominal closure  -fungemia secondary to translocation across the gut wall followed by infectious disease complicated by peritonitis           Subjective: The patient is fairly stable without any complaints of chest pain or shortness of breath  No significant abdominal pain on less palpated  No nausea vomiting or diarrhea  Mendoza catheter in place with an outstanding urine output    Objective:     Vitals: Blood pressure 137/78, pulse 101, temperature 98 2 °F (36 8 °C), temperature source Oral, resp  rate 19, height 5' 4" (1 626 m), weight 72 5 kg (159 lb 14 4 oz), SpO2 96 %  ,Body mass index is 27 45 kg/m²      Weight (last 2 days)     Date/Time   Weight    08/22/19 0600   72 5 (159 9)    08/21/19 0600   72 4 (159 6)    08/20/19 1355   72 7 (160 3)    08/20/19 0600   72 6 (160 1)                Intake/Output Summary (Last 24 hours) at 8/22/2019 0859  Last data filed at 8/22/2019 4429  Gross per 24 hour   Intake 6246 75 ml   Output 3347 ml   Net 2899 75 ml            Physical Exam: General:  No acute distress  Skin:  No acute rash  Eyes:  No scleral icterus  ENT:  Slightly dry mucous membranes  Neck:  Supple, no jugular venous distention  Chest:  Clear to auscultation but poor effort  CVS:  Regular rate and rhythm without evidence of a cardiac rub or gallops  Abdomen:  Soft with mild tenderness around the incision site with clean bandage but normal bowel sounds  Extremities:  Trace upper extremity edema and trace pedal edema  Neuro:  No gross focality  Psych:  Alert and oriented                Medications:    Scheduled Meds:  Current Facility-Administered Medications:  acetaminophen 650 mg Oral Q6H PRN Anatoliy Carmen PA-C    bisacodyl 10 mg Rectal Daily PRN Anatoliy Carmen PA-C    ciprofloxacin 750 mg Oral Q12H Keo Franco MD    dextrose 5 % and sodium chloride 0 9 % with KCl 20 mEq/L 150 mL/hr Intravenous Continuous Ellen Dubin, MD Last Rate: 150 mL/hr (08/22/19 0839)   escitalopram 10 mg Oral Daily Willy Obrien PA-C    folic acid 1 mg Oral Daily Willy Obrien, PA-BAR    gabapentin 100 mg Oral HS Ebony Lose, PA-BAR    guaiFENesin 200 mg Oral Q4H PRN Neno Lynch DO    heparin (porcine) 5,000 Units Subcutaneous [facility] Ebony Lose, PA-BAR    HYDROmorphone 0 2 mg Intravenous Q4H PRN Ebony Lose, PA-BAR    lidocaine 1 patch Topical Daily Ebony Lose, IMELDA    melatonin 3 mg Oral HS ILDA Ornelas-BAR    methocarbamol 500 mg Oral Q6H PRN Ebony Lose, PA-BAR    omeprazole (PRILOSEC) suspension 2 mg/mL 20 mg Oral BID TANJA Fam PA-C    ondansetron 4 mg Intravenous Q4H PRN Ebony Lose, PA-C    oxyCODONE 2 5 mg Oral Q4H PRN Ebony LoseIMELDA    Or        oxyCODONE 5 mg Oral Q4H PRN Ebony Lose, IMELDA    pravastatin 20 mg Oral Daily [employer] PA-BAR    saccharomyces boulardii 250 mg Oral BID Ebony LoseIMELDA    saliva substitute 5 spray Mouth/Throat PRN Trent Damico MD    vancomycin 125 mg Oral Q12H [address] Liana Mora MD        PRN Meds:   acetaminophen    bisacodyl    guaiFENesin    HYDROmorphone    methocarbamol    ondansetron    oxyCODONE **OR** oxyCODONE    saliva substitute    Continuous Infusions:  dextrose 5 % and sodium chloride 0 9 % with KCl 20 mEq/L 150 mL/hr Last Rate: 150 mL/hr (08/22/19 0839)       Lab, Imaging and other studies: I have personally reviewed pertinent labs    Laboratory Results:  Results from last 7 days   Lab Units 08/22/19  0507 08/21/19  6520 08/20/19  0458 08/19/19  0739 08/19/19  0543 08/18/19  0452 08/17/19  0459 08/16/19  0513   WBC Thousand/uL 17 28* 15 75* 19 58* 22 85*  --  25 73* 21 38* 24 06*   HEMOGLOBIN g/dL 7 2* 7 3* 7 6* 7 4*  --  7 5* 7 9* 8 1*   HEMATOCRIT % 24 0* 23 4* 24 4* 23 6*  --  24 0* 25 1* 24 8*   PLATELETS Thousands/uL 381 398* 450* 467*  --  496* 530* 535*   POTASSIUM mmol/L 4 0 3 2* 3 1*  --  4 2 4 8 4 5 4 3   CHLORIDE mmol/L 107 98* 102  --  102 99* 100 97*   CO2 mmol/L 30 33* 32  --  27 28 28 32   BUN mg/dL 12 9 15  --  20 28* 24 17   CREATININE mg/dL 0 66 0 36* 0 56*  --  1 12 2 02* 1 60* 1 35*   CALCIUM mg/dL 7 8* 7 8* 8 1*  --  7 7* 7 6* 7 6* 7 6*   MAGNESIUM mg/dL 2 4 1 7 1 5*  --   --   --   --   --    PHOSPHORUS mg/dL 2 5  --   --   --   --   --   --   --      Urinalysis: Lab Results   Component Value Date    COLORU Yellow 08/20/2019    COLORU Yellow 11/07/2014    CLARITYU Cloudy 08/20/2019    CLARITYU Clear 11/07/2014    SPECGRAV 1 005 08/20/2019    SPECGRAV 1 020 11/07/2014    PHUR 7 5 08/20/2019    PHUR 7 0 11/07/2014    LEUKOCYTESUR Large (A) 08/20/2019    LEUKOCYTESUR Negative 11/07/2014    NITRITE Negative 08/20/2019    NITRITE Negative 11/07/2014    PROTEINUA Negative 11/07/2014    GLUCOSEU Negative 08/20/2019    GLUCOSEU Negative 11/07/2014    KETONESU Negative 08/20/2019    KETONESU Negative 11/07/2014    BILIRUBINUR Negative 08/20/2019    BILIRUBINUR Negative 11/07/2014    BLOODU Large (A) 08/20/2019    BLOODU Negative 11/07/2014     ABGs:   Lab Results   Component Value Date    PH 7 334 (L) 08/03/2019     Radiology review:     Portions of the record may have been created with voice recognition software   Occasional wrong word or "sound a like" substitutions may have occurred due to the inherent limitations of voice recognition software   Read the chart carefully and recognize, using context, where substitutions have occurred

## 2019-08-22 NOTE — PROGRESS NOTES
Progress Note - Infectious Disease   Kristel Chung 78 y o  female MRN: [id number]  Unit/Bed#: [facility] 824-01 Encounter: [id number]      Impression/Plan:  1  Systemic inflammatory response syndrome- Possibly related the patient's fungemia   Possibly secondary to peritonitis   The patient is now improved and moved out of the intensive care unit  The leukocytosis has persisted   Stool for C diff is negative   The patient completed more than 10 days of intravenous antibacterials   The white blood cell count has now been decreasing   The procalcitonin level has decreased substantially  -antibiotics as below  -discontinue fluconazole after dose today as below  -recheck CT of the chest abdomen pelvis if leukocytosis does not continue to improve without a new source  -recheck CBC with diff  -supportive care     2  Fungemia-possibly all translocation across the gut wall in the setting of peritonitis   Possible catheter related bacteremia although this is less likely as the blood cultures were from around the time of admission   The central line has been changed   Fungemia has cleared  Ophthalmology exam negative   The organism is not resistant to fluconazole  The patient has completed 14 days of high-dose fluconazole since the 1st negative blood culture  -no additional anti fungal treatment for now  -monitor for recurrent fever     3  Possible UTI-2 gram-negative rods in VRE  Unclear significance of the VRE    the patient is asymptomatic but she did have a brisk leukocytosis   She seems to be tolerating the cefepime without difficulty  -discontinue cefepime  -trial of ciprofloxacin 750 mg p o  Q 12 hours through 8/25/2019  -recheck CBC with diff     4  Peritonitis-status post exploration and repair   No cultures obtained at the time of the surgery   Patient does have significant turbid drainage from the superior aspect of the wound   Patient has been maintained on broad antibiotics   The patient continues to have a brisk leukocytosis   Significant ascites noted   She completed more than 10 days of intravenous antibiotics, and is now back on antibiotics for UTI as above  White cell count has increased a bit since yesterday  -recheck CBC with diff  -serial abdominal exams  -close surgical follow-up  -reimage abdomen if white cell count continues to rise     5  Acute hypoxic respiratory failure-likely secondary to hemoptysis aspiration and a pneumothorax   Less likely pneumonia  Shruthi García is now extubated and on O2 by nasal cannula   Repeat chest x-ray with slightly increased volume      -oxygen support   -monitor respiratory status     6  Acute kidney injury-suspect urinary retention was playing a significant role   Possible the pre renal issues also playing a role   Possibly medication effect   No other clear source appreciated   The renal function has worsened a bit since yesterday  -recheck BMP  -close follow-up of urine output with the Mendoza out  -nephrology follow-up  -volume management     7  History of C difficile colitis-no current diarrhea   -continue oral vancomycin prophylaxis until S after completion of other antibiotics    Discussed the above antibiotic plan with the surgical service    Antibiotics:  Cefepime 4  Subjective:  Patient has no fever, chills, sweats; no nausea, vomiting, diarrhea; no cough, shortness of breath; no pain  No new symptoms  Objective:  Vitals:  Temp:  [98 2 °F (36 8 °C)-98 9 °F (37 2 °C)] 98 2 °F (36 8 °C)  HR:  [] 101  Resp:  [19-20] 19  BP: (135-137)/(74-78) 137/78  SpO2:  [95 %-100 %] 96 %  Temp (24hrs), Av 5 °F (36 9 °C), Min:98 2 °F (36 8 °C), Max:98 9 °F (37 2 °C)  Current: Temperature: 98 2 °F (36 8 °C)    Physical Exam:   General Appearance:  Alert, interactive, nontoxic, no acute distress  Throat: Oropharynx moist without lesions      Lungs:   Decreased breath sounds bilaterally; no wheezes, rhonchi or rales; respirations unlabored   Heart:  Tachycardia; no murmur, rub or gallop   Abdomen:   Soft, non-tender, non-distended, positive bowel sounds  Incision well approximated without purulence or surrounding erythema     Extremities: No clubbing, cyanosis or edema   Skin: No new rashes or lesions  No draining wounds noted  Labs, Imaging, & Other studies:   All pertinent labs and imaging studies were personally reviewed  Results from last 7 days   Lab Units 08/22/19  0507 08/21/19  0514 08/20/19  0458   WBC Thousand/uL 17 28* 15 75* 19 58*   HEMOGLOBIN g/dL 7 2* 7 3* 7 6*   PLATELETS Thousands/uL 381 398* 450*     Results from last 7 days   Lab Units 08/22/19  0507 08/21/19  0514 08/20/19  0458  08/17/19  0459   SODIUM mmol/L 143 135* 140   < > 137   POTASSIUM mmol/L 4 0 3 2* 3 1*   < > 4 5   CHLORIDE mmol/L 107 98* 102   < > 100   CO2 mmol/L 30 33* 32   < > 28   BUN mg/dL 12 9 15   < > 24   CREATININE mg/dL 0 66 0 36* 0 56*   < > 1 60*   EGFR ml/min/1 73sq m 90 110 95   < > 32   CALCIUM mg/dL 7 8* 7 8* 8 1*   < > 7 6*   AST U/L  --   --  23  --  26   ALT U/L  --   --  14  --  16   ALK PHOS U/L  --   --  188*  --  158*    < > = values in this interval not displayed       Results from last 7 days   Lab Units 08/18/19  0817   URINE CULTURE  >100,000 cfu/ml Pseudomonas aeruginosa*  10,000-19,000 cfu/ml Vancomycin Resistant Enterococcus faecium*  10,000-19,000 cfu/ml Klebsiella pneumoniae*

## 2019-08-23 LAB
ABO GROUP BLD: NORMAL
ANION GAP SERPL CALCULATED.3IONS-SCNC: 7 MMOL/L (ref 4–13)
BASOPHILS # BLD AUTO: 0.05 THOUSANDS/ΜL (ref 0–0.1)
BASOPHILS NFR BLD AUTO: 0 % (ref 0–1)
BLD GP AB SCN SERPL QL: NEGATIVE
BUN SERPL-MCNC: 8 MG/DL (ref 5–25)
CALCIUM SERPL-MCNC: 7.9 MG/DL (ref 8.3–10.1)
CHLORIDE SERPL-SCNC: 103 MMOL/L (ref 100–108)
CO2 SERPL-SCNC: 30 MMOL/L (ref 21–32)
CREAT SERPL-MCNC: 0.33 MG/DL (ref 0.6–1.3)
EOSINOPHIL # BLD AUTO: 0.24 THOUSAND/ΜL (ref 0–0.61)
EOSINOPHIL NFR BLD AUTO: 2 % (ref 0–6)
ERYTHROCYTE [DISTWIDTH] IN BLOOD BY AUTOMATED COUNT: 17.2 % (ref 11.6–15.1)
FERRITIN SERPL-MCNC: 58 NG/ML (ref 8–388)
FUNGUS SPEC CULT: ABNORMAL
GFR SERPL CREATININE-BSD FRML MDRD: 113 ML/MIN/1.73SQ M
GLUCOSE SERPL-MCNC: 101 MG/DL (ref 65–140)
HCT VFR BLD AUTO: 22.5 % (ref 34.8–46.1)
HGB BLD-MCNC: 7 G/DL (ref 11.5–15.4)
IMM GRANULOCYTES # BLD AUTO: >0.5 THOUSAND/UL (ref 0–0.2)
IMM GRANULOCYTES NFR BLD AUTO: 4 % (ref 0–2)
IRON SATN MFR SERPL: 19 %
IRON SERPL-MCNC: 25 UG/DL (ref 50–170)
LYMPHOCYTES # BLD AUTO: 2.73 THOUSANDS/ΜL (ref 0.6–4.47)
LYMPHOCYTES NFR BLD AUTO: 18 % (ref 14–44)
MAGNESIUM SERPL-MCNC: 1.7 MG/DL (ref 1.6–2.6)
MCH RBC QN AUTO: 27.7 PG (ref 26.8–34.3)
MCHC RBC AUTO-ENTMCNC: 31.1 G/DL (ref 31.4–37.4)
MCV RBC AUTO: 89 FL (ref 82–98)
MONOCYTES # BLD AUTO: 1.18 THOUSAND/ΜL (ref 0.17–1.22)
MONOCYTES NFR BLD AUTO: 8 % (ref 4–12)
NEUTROPHILS # BLD AUTO: 10.2 THOUSANDS/ΜL (ref 1.85–7.62)
NEUTS SEG NFR BLD AUTO: 68 % (ref 43–75)
NRBC BLD AUTO-RTO: 0 /100 WBCS
PLATELET # BLD AUTO: 370 THOUSANDS/UL (ref 149–390)
PMV BLD AUTO: 10.4 FL (ref 8.9–12.7)
POTASSIUM SERPL-SCNC: 3.1 MMOL/L (ref 3.5–5.3)
RBC # BLD AUTO: 2.53 MILLION/UL (ref 3.81–5.12)
RH BLD: POSITIVE
SODIUM SERPL-SCNC: 140 MMOL/L (ref 136–145)
SPECIMEN EXPIRATION DATE: NORMAL
TIBC SERPL-MCNC: 131 UG/DL (ref 250–450)
WBC # BLD AUTO: 14.97 THOUSAND/UL (ref 4.31–10.16)

## 2019-08-23 PROCEDURE — 86900 BLOOD TYPING SEROLOGIC ABO: CPT | Performed by: STUDENT IN AN ORGANIZED HEALTH CARE EDUCATION/TRAINING PROGRAM

## 2019-08-23 PROCEDURE — 99024 POSTOP FOLLOW-UP VISIT: CPT | Performed by: SURGERY

## 2019-08-23 PROCEDURE — 99232 SBSQ HOSP IP/OBS MODERATE 35: CPT | Performed by: INTERNAL MEDICINE

## 2019-08-23 PROCEDURE — 83550 IRON BINDING TEST: CPT | Performed by: INTERNAL MEDICINE

## 2019-08-23 PROCEDURE — 83540 ASSAY OF IRON: CPT | Performed by: INTERNAL MEDICINE

## 2019-08-23 PROCEDURE — 86901 BLOOD TYPING SEROLOGIC RH(D): CPT | Performed by: STUDENT IN AN ORGANIZED HEALTH CARE EDUCATION/TRAINING PROGRAM

## 2019-08-23 PROCEDURE — 86850 RBC ANTIBODY SCREEN: CPT | Performed by: STUDENT IN AN ORGANIZED HEALTH CARE EDUCATION/TRAINING PROGRAM

## 2019-08-23 PROCEDURE — 83735 ASSAY OF MAGNESIUM: CPT | Performed by: INTERNAL MEDICINE

## 2019-08-23 PROCEDURE — 85025 COMPLETE CBC W/AUTO DIFF WBC: CPT | Performed by: INTERNAL MEDICINE

## 2019-08-23 PROCEDURE — 82728 ASSAY OF FERRITIN: CPT | Performed by: INTERNAL MEDICINE

## 2019-08-23 PROCEDURE — 86923 COMPATIBILITY TEST ELECTRIC: CPT

## 2019-08-23 PROCEDURE — P9016 RBC LEUKOCYTES REDUCED: HCPCS

## 2019-08-23 PROCEDURE — 80048 BASIC METABOLIC PNL TOTAL CA: CPT | Performed by: INTERNAL MEDICINE

## 2019-08-23 RX ORDER — POTASSIUM CHLORIDE 20 MEQ/1
20 TABLET, EXTENDED RELEASE ORAL ONCE
Status: COMPLETED | OUTPATIENT
Start: 2019-08-23 | End: 2019-08-23

## 2019-08-23 RX ORDER — POTASSIUM CHLORIDE 20MEQ/15ML
40 LIQUID (ML) ORAL 2 TIMES DAILY
Status: DISCONTINUED | OUTPATIENT
Start: 2019-08-23 | End: 2019-08-23

## 2019-08-23 RX ORDER — POTASSIUM CHLORIDE 20 MEQ/1
40 TABLET, EXTENDED RELEASE ORAL ONCE
Status: COMPLETED | OUTPATIENT
Start: 2019-08-23 | End: 2019-08-23

## 2019-08-23 RX ORDER — MAGNESIUM SULFATE HEPTAHYDRATE 40 MG/ML
2 INJECTION, SOLUTION INTRAVENOUS ONCE
Status: COMPLETED | OUTPATIENT
Start: 2019-08-23 | End: 2019-08-23

## 2019-08-23 RX ORDER — POTASSIUM CHLORIDE 14.9 MG/ML
20 INJECTION INTRAVENOUS ONCE
Status: COMPLETED | OUTPATIENT
Start: 2019-08-23 | End: 2019-08-23

## 2019-08-23 RX ORDER — DEXTROSE, SODIUM CHLORIDE, AND POTASSIUM CHLORIDE 5; .45; .15 G/100ML; G/100ML; G/100ML
100 INJECTION INTRAVENOUS CONTINUOUS
Status: DISCONTINUED | OUTPATIENT
Start: 2019-08-23 | End: 2019-08-24

## 2019-08-23 RX ADMIN — Medication 250 MG: at 17:20

## 2019-08-23 RX ADMIN — Medication 250 MG: at 09:46

## 2019-08-23 RX ADMIN — PRAVASTATIN SODIUM 20 MG: 20 TABLET ORAL at 17:19

## 2019-08-23 RX ADMIN — VANCOMYCIN HYDROCHLORIDE 125 MG: 500 INJECTION, POWDER, LYOPHILIZED, FOR SOLUTION INTRAVENOUS at 09:55

## 2019-08-23 RX ADMIN — ACETAMINOPHEN 650 MG: 325 TABLET ORAL at 03:51

## 2019-08-23 RX ADMIN — ESCITALOPRAM OXALATE 10 MG: 10 TABLET ORAL at 09:46

## 2019-08-23 RX ADMIN — DEXTROSE, SODIUM CHLORIDE, AND POTASSIUM CHLORIDE 100 ML/HR: 5; .45; .15 INJECTION INTRAVENOUS at 22:58

## 2019-08-23 RX ADMIN — POTASSIUM CHLORIDE 40 MEQ: 20 SOLUTION ORAL at 09:46

## 2019-08-23 RX ADMIN — Medication 20 MG: at 17:38

## 2019-08-23 RX ADMIN — MAGNESIUM SULFATE HEPTAHYDRATE 2 G: 40 INJECTION, SOLUTION INTRAVENOUS at 14:04

## 2019-08-23 RX ADMIN — TAMSULOSIN HYDROCHLORIDE 0.4 MG: 0.4 CAPSULE ORAL at 17:20

## 2019-08-23 RX ADMIN — ONDANSETRON 4 MG: 2 INJECTION INTRAMUSCULAR; INTRAVENOUS at 22:49

## 2019-08-23 RX ADMIN — CIPROFLOXACIN HYDROCHLORIDE 750 MG: 750 TABLET, FILM COATED ORAL at 09:47

## 2019-08-23 RX ADMIN — HEPARIN SODIUM 5000 UNITS: 5000 INJECTION INTRAVENOUS; SUBCUTANEOUS at 06:32

## 2019-08-23 RX ADMIN — DEXTROSE AND SODIUM CHLORIDE 50 ML/HR: 5; .45 INJECTION, SOLUTION INTRAVENOUS at 04:51

## 2019-08-23 RX ADMIN — LIDOCAINE 1 PATCH: 50 PATCH CUTANEOUS at 09:46

## 2019-08-23 RX ADMIN — DEXTROSE, SODIUM CHLORIDE, AND POTASSIUM CHLORIDE 100 ML/HR: 5; .45; .15 INJECTION INTRAVENOUS at 14:04

## 2019-08-23 RX ADMIN — HEPARIN SODIUM 5000 UNITS: 5000 INJECTION INTRAVENOUS; SUBCUTANEOUS at 21:17

## 2019-08-23 RX ADMIN — POTASSIUM CHLORIDE 20 MEQ: 200 INJECTION, SOLUTION INTRAVENOUS at 14:04

## 2019-08-23 RX ADMIN — POTASSIUM CHLORIDE 40 MEQ: 1500 TABLET, EXTENDED RELEASE ORAL at 17:19

## 2019-08-23 RX ADMIN — METHOCARBAMOL 500 MG: 500 TABLET, FILM COATED ORAL at 11:57

## 2019-08-23 RX ADMIN — POTASSIUM CHLORIDE 20 MEQ: 1500 TABLET, EXTENDED RELEASE ORAL at 11:57

## 2019-08-23 RX ADMIN — GABAPENTIN 100 MG: 100 CAPSULE ORAL at 21:16

## 2019-08-23 RX ADMIN — MELATONIN 3 MG: 3 TAB ORAL at 21:17

## 2019-08-23 RX ADMIN — FOLIC ACID 1 MG: 1 TABLET ORAL at 09:46

## 2019-08-23 RX ADMIN — CIPROFLOXACIN HYDROCHLORIDE 750 MG: 750 TABLET, FILM COATED ORAL at 21:17

## 2019-08-23 RX ADMIN — HEPARIN SODIUM 5000 UNITS: 5000 INJECTION INTRAVENOUS; SUBCUTANEOUS at 14:04

## 2019-08-23 NOTE — MALNUTRITION/BMI
This medical record reflects one or more clinical indicators suggestive of malnutrition     Malnutrition Findings:   Malnutrition type: Acute illness(Related to medical condition as evidenced by Right and left upper/lower extremity +2 edema and <50% energy intake needs met >5 days treated with ensure clear supplements)  Degree of Malnutrition: Other severe protein calorie malnutrition  Malnutrition Characteristics: Fluid accumulation, Inadequate energy        See Nutrition note dated 8/23/19 for additional details  Completed nutrition assessment is viewable in the nutrition documentation

## 2019-08-23 NOTE — PROGRESS NOTES
Progress Note - Infectious Disease   Kristel Chung 78 y o  female MRN: [id number]  [facility] Encounter: [id number]      Impression/Plan:  1  Systemic inflammatory response syndrome- Possibly related the patient's fungemia   Possibly secondary to peritonitis   The patient is now improved and moved out of the intensive care unit  The leukocytosis has persisted   Stool for C diff is negative   The patient completed more than 10 days of intravenous antibacterials   The white blood cell count has now been decreasing   The procalcitonin level has decreased substantially  -antibiotics as below  -recheck CBC with diff  -supportive care     2  Fungemia-possibly all translocation across the gut wall in the setting of peritonitis   Possible catheter related bacteremia although this is less likely as the blood cultures were from around the time of admission   The central line has been changed   Fungemia has cleared   Ophthalmology exam negative   The organism is not resistant to fluconazole   The patient has completed 14 days of high-dose fluconazole since the 1st negative blood culture  -no additional anti fungal treatment for now  -monitor for recurrent fever     3  Possible UTI-2 gram-negative rods in VRE  Unclear significance of the VRE    the patient is asymptomatic but she did have a brisk leukocytosis   She seems to be tolerating the cefepime without difficulty  -continue Cipro through 8/25/2019  -no additional ID workup for now     4  Peritonitis-status post exploration and repair   No cultures obtained at the time of the surgery   Patient does have significant turbid drainage from the superior aspect of the wound   Patient has been maintained on broad antibiotics   The patient continues to have a brisk leukocytosis   Significant ascites noted   She completed more than 10 days of intravenous antibiotics, and is now back on antibiotics for UTI as above   White cell count has decreased  -recheck CBC with diff  -serial abdominal exams  -close surgical follow-up     5  Acute hypoxic respiratory failure-likely secondary to hemoptysis aspiration and a pneumothorax   Less likely pneumonia     She is currently not on oxygen support     -oxygen support as needed  -monitor respiratory status     6  Acute kidney injury-suspect urinary retention was playing a significant role   Possible the pre renal issues also playing a role   Possibly medication effect   No other clear source appreciated   The renal function has improved since placement of the Mendoza catheter yesterday  -monitor BMP  -nephrology follow-up  -volume management     7  History of C difficile colitis-no current diarrhea   -continue oral vancomycin prophylaxis until 72 hours after completion of other antibiotics    Will see the patient again 2019 if not discharged  Please call if questions  Antibiotics:  Cipro 2  Antibiotics 5    Subjective:  Patient has no fever, chills, sweats; no nausea, vomiting, diarrhea; no cough, shortness of breath; no pain  No new symptoms  She is anxious to get out of the hospital    Objective:  Vitals:  Temp:  [98 2 °F (36 8 °C)-98 5 °F (36 9 °C)] 98 2 °F (36 8 °C)  HR:  [108-109] 108  Resp:  [16-20] 16  BP: (129-142)/(74-83) 129/74  SpO2:  [94 %-95 %] 95 %  Temp (24hrs), Av 4 °F (36 9 °C), Min:98 2 °F (36 8 °C), Max:98 5 °F (36 9 °C)  Current: Temperature: 98 2 °F (36 8 °C)    Physical Exam:   General Appearance:  Alert, interactive, nontoxic, no acute distress  Throat: Oropharynx moist without lesions  Lungs:   Decreased breath sounds bilaterally; no wheezes, rhonchi or rales; respirations unlabored   Heart:  Tachycardia; no murmur, rub or gallop   Abdomen:   Soft, non-tender, non-distended, positive bowel sounds  Incision without erythema or purulence     Extremities: No clubbing, cyanosis or edema   Skin: No new rashes or lesions  No draining wounds noted  Labs, Imaging, & Other studies:    All pertinent labs and imaging studies were personally reviewed  Results from last 7 days   Lab Units 08/23/19  0449 08/22/19  0507 08/21/19  0514   WBC Thousand/uL 14 97* 17 28* 15 75*   HEMOGLOBIN g/dL 7 0* 7 2* 7 3*   PLATELETS Thousands/uL 370 381 398*     Results from last 7 days   Lab Units 08/23/19  0449 08/22/19  0507 08/21/19  0514 08/20/19  0458  08/17/19  0459   SODIUM mmol/L 140 143 135* 140   < > 137   POTASSIUM mmol/L 3 1* 4 0 3 2* 3 1*   < > 4 5   CHLORIDE mmol/L 103 107 98* 102   < > 100   CO2 mmol/L 30 30 33* 32   < > 28   BUN mg/dL 8 12 9 15   < > 24   CREATININE mg/dL 0 33* 0 66 0 36* 0 56*   < > 1 60*   EGFR ml/min/1 73sq m 113 90 110 95   < > 32   CALCIUM mg/dL 7 9* 7 8* 7 8* 8 1*   < > 7 6*   AST U/L  --   --   --  23  --  26   ALT U/L  --   --   --  14  --  16   ALK PHOS U/L  --   --   --  188*  --  158*    < > = values in this interval not displayed       Results from last 7 days   Lab Units 08/18/19  0817   URINE CULTURE  >100,000 cfu/ml Pseudomonas aeruginosa*  10,000-19,000 cfu/ml Vancomycin Resistant Enterococcus faecium*  10,000-19,000 cfu/ml Klebsiella pneumoniae*

## 2019-08-23 NOTE — QUICK NOTE
Nurse / Provider rounds completed with staff nurse and patient    Repleting potassium and will recheck BMP in am

## 2019-08-23 NOTE — NUTRITION
08/23/19 1423   Recommendations/Interventions   Summary Patient's appetite remains poor  Patient's meal completions 0-25%, refusing ensure enlive supplements  Right and left lower/upper extremity +2 edema noted  Nursing skin care plan reviewed  Patient is agreeable to try ensure clear liquid  ? initiate EN secondary to prolonged poor po intake  Malnutrition/BMI Present Yes   Malnutrition type Acute illness  (Related to medical condition as evidenced by Right and left upper/lower extremity +2 edema and <50% energy intake needs met >5 days treated with ensure clear supplements)   Degree of Malnutrition Other severe protein calorie malnutrition   Malnutrition Characteristics Fluid accumulation; Inadequate energy   Interventions Diet: continued as ordered; Supplement adjust;EN initiate  (Patient denied need for extra gravy/sauce with meals  Ensure enlive discontinued, ensure clear BID ordered  )   Nutrition Recommendations Tube Feeding Recommendation provided; Other (specify); Lab - consider order (specify)  (Secondary to prolonged poor po intake and malnutrition suggest initiate EN to provide 75% nutritional needs  Rec: Jevity 1 2 kcal @ a goal rate of 90 ml/hr x12 hours  Monitor electrolytes (high risk for refeeding syndrome)    )

## 2019-08-23 NOTE — PLAN OF CARE
Problem: Prexisting or High Potential for Compromised Skin Integrity  Goal: Skin integrity is maintained or improved  Description  INTERVENTIONS:  - Identify patients at risk for skin breakdown  - Assess and monitor skin integrity  - Assess and monitor nutrition and hydration status  - Monitor labs (i e  albumin)  - Turn and reposition patient  - Assist with mobility/ambulation  - Relieve pressure over bony prominences  - Avoid friction and shearing  - Provide appropriate hygiene as needed including keeping skin clean and dry  - Evaluate need for skin moisturizer/barrier cream  - Collaborate with interdisciplinary team (i e  Nutrition, Rehabilitation, etc )   - Patient/family teaching   Outcome: Progressing     Problem: Potential for Falls  Goal: Patient will remain free of falls  Description  INTERVENTIONS:  - Assess patient frequently for physical needs  -  Identify cognitive and physical deficits and behaviors that affect risk of falls    -  [place] fall precautions as indicated by assessment   - Educate patient/family on patient safety including physical limitations  - Instruct patient to call for assistance with activity based on assessment  - Modify environment to reduce risk of injury  - Consider OT/PT consult to assist with strengthening/mobility  Outcome: Progressing     Problem: CARDIOVASCULAR - ADULT  Goal: Maintains optimal cardiac output and hemodynamic stability  Description  INTERVENTIONS:  - Monitor I/O, vital signs and rhythm  - Monitor for S/S and trends of decreased cardiac output i e  bleeding, hypotension  - Administer and titrate ordered vasoactive medications to optimize hemodynamic stability  - Assess quality of pulses, skin color and temperature  - Instruct patient to report change in severity of symptoms   Outcome: Progressing     Problem: RESPIRATORY - ADULT  Goal: Achieves optimal ventilation and oxygenation  Description  INTERVENTIONS:  - Assess for changes in respiratory status  - Assess for changes in mentation and behavior  - Position to facilitate oxygenation and minimize respiratory effort  - Oxygen administration by appropriate delivery method based on oxygen saturation (per order) or ABGs  - Encourage broncho-pulmonary hygiene including cough, deep breathe, Incentive Spirometry  - Assess the need for suctioning and aspirate as neede  - Respiratory Therapy support as indicated   Outcome: Progressing     Problem: GASTROINTESTINAL - ADULT  Goal: Maintains or returns to baseline bowel function  Description  INTERVENTIONS:  - Assess bowel function  - Encourage oral fluids to ensure adequate hydration  - Administer IV fluids as ordered to ensure adequate hydration  - Administer ordered medications as needed  - Encourage mobilization and activity  - Nutrition services referral to assist patient with appropriate food choices  Outcome: Progressing  Goal: Maintains adequate nutritional intake  Description  INTERVENTIONS:  Npo   - Identify factors contributing to decreased intake, treat as appropriate  - Monitor I&O, WT and lab values  - Obtain nutrition services referral as needed   Outcome: Progressing     Problem: METABOLIC, FLUID AND ELECTROLYTES - ADULT  Goal: Electrolytes maintained within normal limits  Description  INTERVENTIONS:  - Monitor labs and assess patient for signs and symptoms of electrolyte imbalances  - Administer electrolyte replacement as ordered  - Monitor response to electrolyte replacements, including repeat lab results as appropriate  - Instruct patient on fluid and nutrition as appropriate  Outcome: Progressing  Goal: Fluid balance maintained  Description  INTERVENTIONS:  - Monitor labs and assess for signs and symptoms of volume excess or deficit  - Monitor I/O and WT  - Instruct patient on fluid and nutrition as appropriate  Outcome: Progressing     Problem: SKIN/TISSUE INTEGRITY - ADULT  Goal: Skin integrity remains intact  Description  INTERVENTIONS  - Identify patients at risk for skin breakdown  - Assess and monitor skin integrity  - Assess and monitor nutrition and hydration statu  - Turn and reposition patient  - Assist with mobility/ambulation  - Relieve pressure over bony prominences  - Avoid friction and shearing  - Provide appropriate hygiene as needed including keeping skin clean and dry  - Evaluate need for skin moisturizer/barrier cream  - Collaborate with interdisciplinary team (i e  Nutrition, Rehabilitation, etc )   - Patient/family teaching   Outcome: Progressing  Goal: Oral mucous membranes remain intact  Description  INTERVENTIONS  - Assess oral mucosa and hygiene practices  - Implement preventative oral hygiene regimen  - Implement oral medicated treatments as ordered  - Initiate Nutrition services referral as needed  Outcome: Progressing  Goal: Incision(s), wounds(s) or drain site(s) healing without S/S of infection  Description  INTERVENTIONS  - Assess and document risk factors for skin impairment   - Assess and document dressing, incision, wound bed, drain sites and surrounding tissue  - Consider nutrition services referral as needed  - Oral mucous membranes remain intact  - Provide patient/ family education  Outcome: Progressing     Problem: MUSCULOSKELETAL - ADULT  Goal: Maintain or return mobility to safest level of function  Description  INTERVENTIONS:  - Assess patient's ability to carry out ADLs; assess patient's baseline for ADL function and identify physical deficits which impact ability to perform ADLs (bathing, care of mouth/teeth, toileting, grooming, dressing, etc )  - Assess/evaluate cause of self-care deficits   - Assess range of motion  - Assess patient's mobility; develop plan if impaired  - Assess patient's need for assistive devices and provide as appropriate  - Encourage maximum independence but intervene and supervise when necessary  - Involve family in performance of ADLs  - Assess for home care needs following discharge   - Request OT consult to assist with ADL evaluation and planning for discharge  - Provide patient education as appropriate  Outcome: Progressing     Problem: Nutrition/Hydration-ADULT  Goal: Nutrient/Hydration intake appropriate for improving, restoring or maintaining nutritional needs  Description  Monitor and assess patient's nutrition/hydration status for malnutrition (ex- brittle hair, bruises, dry skin, pale skin and conjunctiva, muscle wasting, smooth red tongue, and disorientation)  Collaborate with interdisciplinary team and initiate plan and interventions as ordered  Monitor patient's weight and dietary intake as ordered or per policy  Utilize nutrition screening tool and intervene per policy  Determine patient's food preferences and provide high-protein, high-caloric foods as appropriate       INTERVENTIONS:  - Monitor oral intake, urinary output, labs, and treatment plans  - Assess nutrition and hydration status and recommend course of action  - Evaluate amount of meals eaten  - Assist patient with eating if necessary   - Allow adequate time for meals  - Recommend/ encourage appropriate diets, oral nutritional supplements, and vitamin/mineral supplements  - Order, calculate, and assess calorie counts as needed  - Recommend, monitor, and adjust tube feedings and TPN/PPN based on assessed needs  - Assess need for intravenous fluids  - Provide specific nutrition/hydration education as appropriate  - Include patient/family/caregiver in decisions related to nutrition  Outcome: Progressing     Problem: COPING  Goal: Pt/Family able to verbalize concerns and demonstrate effective coping strategies  Description  INTERVENTIONS:  - Assist patient/family to identify coping skills, available support systems and cultural and spiritual values  - Provide emotional support, including active listening and acknowledgement of concerns of patient and caregivers  - Reduce environmental stimuli, as able  - Provide patient education  - Assess for spiritual pain/suffering and initiate spiritual care, including notification of Pastoral Care or [place] based community as needed  - Assess effectiveness of coping strategies  Outcome: Progressing  Goal: Will report anxiety at manageable levels  Description  INTERVENTIONS:  - Administer medication as ordered  - Teach and encourage coping skills  - Provide emotional support  - Assess patient/family for anxiety and ability to cope  Outcome: Progressing     Problem: Knowledge Deficit  Goal: Patient/family/caregiver demonstrates understanding of disease process, treatment plan, medications, and discharge instructions  Description  Complete learning assessment and assess knowledge base    Interventions:  - Provide teaching at level of understanding  - Provide teaching via preferred learning methods  Outcome: Progressing

## 2019-08-23 NOTE — PLAN OF CARE
Problem: Nutrition/Hydration-ADULT  Goal: Nutrient/Hydration intake appropriate for improving, restoring or maintaining nutritional needs  Description  Monitor and assess patient's nutrition/hydration status for malnutrition (ex- brittle hair, bruises, dry skin, pale skin and conjunctiva, muscle wasting, smooth red tongue, and disorientation)  Collaborate with interdisciplinary team and initiate plan and interventions as ordered  Monitor patient's weight and dietary intake as ordered or per policy  Utilize nutrition screening tool and intervene per policy  Determine patient's food preferences and provide high-protein, high-caloric foods as appropriate  INTERVENTIONS:  - Monitor oral intake, urinary output, labs, and treatment plans  - Assess nutrition and hydration status and recommend course of action  - Evaluate amount of meals eaten  - Assist patient with eating if necessary   - Allow adequate time for meals  - Recommend/ encourage appropriate diets, oral nutritional supplements, and vitamin/mineral supplements  - Order, calculate, and assess calorie counts as needed  - Recommend, monitor, and adjust tube feedings and TPN/PPN based on assessed needs  - Assess need for intravenous fluids  - Provide specific nutrition/hydration education as appropriate  - Include patient/family/caregiver in decisions related to nutrition  Outcome: Not Progressing   PO intake remains poor  EN recommendations provided

## 2019-08-23 NOTE — PROGRESS NOTES
Progress Note - Nephrology   Barrie Chung 78 y o  female MRN: [id number]  Unit/Bed#: [facility] 824-01 Encounter: [id number]    ASSESSMENT AND PLAN:  1  AK I:  Thought secondary to prerenal; less likely  Micafungin which has been reported to cause AK I less than 15%  CLEARLY A LARGE PORTION OF OBSTRUCTIVE UROPATHY AS WELL AS SHE HAD A LARGE PVR REQUIRING A LAM CATHETER NOW WITH A MASSIVE DIURESIS MOST LIKELY RELATED TO POST OBSTRUCTIVE DIURESIS AND POSSIBLE POST ATN DIURESIS  She has tolerated negative 9 L probably because of significant volume overload over the last couple weeks during her hospital course    YESTERDAY ONCE AGAIN SHE HAD A SIGNIFICANT DIURESIS OF 7 6 L WITH A NEGATIVE BALANCE OF 3 3 L  SHE CONTINUES TO TOLERATE THIS WITH AN IMPROVED CREATININE SUGGESTING STILL MODEST TOTAL BODY VOLUME OVERLOAD  ALSO POSSIBLE POST ATN/POSTOBSTRUCTIVE DIURESIS WITH THE REQUIREMENT OF A LAM CATHETER AND LARGE PVR  -peak creatinine 2 02 08/18/2019  -baseline creatinine less than 0 5 mg/dL  -current creatinine:  Back to baseline at 0 33 mg/dL  despite significant diuresis  -UA:  Innumerable all RBCs and no proteinuria   This may be compatible with the straight catheterization  Recommendations:  -Lam catheter per Urology  -I would actually increase her IV fluids once again to 100 mL/hour to avoid volume depletion with the massive auto diuresis  -monitor renal function  -avoid nephrotoxic agents such as NSAIDs  -recheck UA once there is a resolution of his postobstructive state requiring a Lam catheter     2  Blood pressure:  Doing well   Avoid over treating   Monitor heart rate   Hold parameters      3  Electrolytes:   -hyponatremia has  resolved on current fluids switch to half-normal saline given actually a sodium 140  -hypokalemia secondary massive diuresis:  Replete  -metabolic alkalosis:  Secondary to contraction  alkalosis/hypokalemia:  resolved at this time     4   Mineral bone disorder:   -replete borderline  hypomagnesemia  -phosphorus acceptable     5  Anemia:  Hemoglobin most likely decreased related to multiple procedures     -Hemoglobin lower at 7 0 now receiving blood  -low iron studies with a saturation 19% and ferritin 58  Given infection of place on oral iron      6  Other issues:  -laparoscopic ventral hernia repair complicated with small-bowel injury now status post exploratory laparotomy/mesh explantation/small bowel resection/eventual abdominal closure  -fungemia secondary to translocation across the gut wall followed by infectious disease complicated by peritonitis           Subjective: The patient remains weak but overall feels fairly stable without any chest pain or shortness of Breath  No nausea vomiting or diarrhea  Mendoza catheter in place  Objective:     Vitals: Blood pressure 138/78, pulse 105, temperature 98 4 °F (36 9 °C), temperature source Oral, resp  rate 22, height 5' 4" (1 626 m), weight 69 3 kg (152 lb 12 5 oz), SpO2 95 %  ,Body mass index is 26 22 kg/m²  Weight (last 2 days)     Date/Time   Weight    08/23/19 0549   69 3 (152 78)    08/22/19 0600   72 5 (159 9)    08/21/19 0600   72 4 (159 6)                Intake/Output Summary (Last 24 hours) at 8/23/2019 1255  Last data filed at 8/23/2019 0644  Gross per 24 hour   Intake 1974 17 ml   Output 5000 ml   Net -3025 83 ml       Urethral Catheter Latex 16 Fr   (Active)   Site Assessment Clean;Skin intact 8/23/2019  8:09 AM   Collection Container Standard drainage bag 8/23/2019  8:09 AM   Securement Method Securing device (Describe) 8/23/2019  8:09 AM   Output (mL) 750 mL 8/23/2019  5:49 AM       Physical Exam: General:  No acute distress lying in bed but slightly weak appearing  Skin:  No acute rash  Eyes:  No scleral icterus  ENT:  Slightly dry mucous membranes  Neck:  Supple, no jugular venous distention  Chest:  Clear to auscultation but poor effort  CVS:  No rubs or gallops appreciable  Abdomen:  Soft with only minimal tenderness over the incision site but clean bandage and normal bowel sounds  Extremities:  No cyanosis, and minimal pedal edema some mild edema in the upper extremities  Neuro:  No gross focality  Psych:  Alert and oriented                Medications:    Scheduled Meds:  Current Facility-Administered Medications:  acetaminophen 650 mg Oral Q6H PRN Gustavo OrisIMELDA    bisacodyl 10 mg Rectal Daily PRN Gustavo Oris, IMELDA    ciprofloxacin 750 mg Oral Q12H Elke Oliva MD    dextrose 5 % and sodium chloride 0 45 % 50 mL/hr Intravenous Continuous Ta Queen MD Last Rate: 50 mL/hr (08/23/19 5195)   escitalopram 10 mg Oral Daily Nilo Berg PA-C    folic acid 1 mg Oral Daily Nilo Berg PA-C    gabapentin 100 mg Oral HS Gustavo OrisIMELDA    guaiFENesin 200 mg Oral Q4H PRN Geofm Check, DO    heparin (porcine) 5,000 Units Subcutaneous [facility] Gustavo OrisIMELDA    HYDROmorphone 0 2 mg Intravenous Q4H PRN Gustavo OrisIMELDA    lidocaine 1 patch Topical Daily Titus Fam PA-C    melatonin 3 mg Oral HS Nilo Berg PA-C    methocarbamol 500 mg Oral Q6H PRN Gustavo Oris, IMELDA    omeprazole (PRILOSEC) suspension 2 mg/mL 20 mg Oral BID AC Titus Fam PA-C    ondansetron 4 mg Intravenous Q4H PRN Gustavo OrisIMELDA    oxyCODONE 2 5 mg Oral Q4H PRN Gustavo OrisIMELDA    Or        oxyCODONE 5 mg Oral Q4H PRN Gustavo OrisIMELDA    potassium chloride 20 mEq Intravenous Once BHAVNA Yañez    pravastatin 20 mg Oral Daily With Marshall Pacheco PA-C    saccharomyces boulardii 250 mg Oral BID Gustavo IMELDA Rodriguez    saliva substitute 5 spray Mouth/Throat PRN Malissa Art MD    tamsulosin 0 4 mg Oral Daily With Marshall Pacheco PA-C    vancomycin 125 mg Oral Q12H [address] Elke Oliva MD        PRN Meds:   acetaminophen    bisacodyl    guaiFENesin    HYDROmorphone    methocarbamol    ondansetron    oxyCODONE **OR** oxyCODONE    saliva substitute    Continuous Infusions:  dextrose 5 % and sodium chloride 0 45 % 50 mL/hr Last Rate: 50 mL/hr (08/23/19 0451)       Lab, Imaging and other studies: I have personally reviewed pertinent labs    Laboratory Results:  Results from last 7 days   Lab Units 08/23/19  0449 08/22/19  0507 08/21/19  6845 08/20/19  0458 08/19/19  0739 08/19/19  0543 08/18/19  0452 08/17/19  0459   WBC Thousand/uL 14 97* 17 28* 15 75* 19 58* 22 85*  --  25 73* 21 38*   HEMOGLOBIN g/dL 7 0* 7 2* 7 3* 7 6* 7 4*  --  7 5* 7 9*   HEMATOCRIT % 22 5* 24 0* 23 4* 24 4* 23 6*  --  24 0* 25 1*   PLATELETS Thousands/uL 370 381 398* 450* 467*  --  496* 530*   POTASSIUM mmol/L 3 1* 4 0 3 2* 3 1*  --  4 2 4 8 4 5   CHLORIDE mmol/L 103 107 98* 102  --  102 99* 100   CO2 mmol/L 30 30 33* 32  --  27 28 28   BUN mg/dL 8 12 9 15  --  20 28* 24   CREATININE mg/dL 0 33* 0 66 0 36* 0 56*  --  1 12 2 02* 1 60*   CALCIUM mg/dL 7 9* 7 8* 7 8* 8 1*  --  7 7* 7 6* 7 6*   MAGNESIUM mg/dL 1 7 2 4 1 7 1 5*  --   --   --   --    PHOSPHORUS mg/dL  --  2 5  --   --   --   --   --   --      Urinalysis: Lab Results   Component Value Date    COLORU Yellow 08/20/2019    COLORU Yellow 11/07/2014    CLARITYU Cloudy 08/20/2019    CLARITYU Clear 11/07/2014    SPECGRAV 1 005 08/20/2019    SPECGRAV 1 020 11/07/2014    PHUR 7 5 08/20/2019    PHUR 7 0 11/07/2014    LEUKOCYTESUR Large (A) 08/20/2019    LEUKOCYTESUR Negative 11/07/2014    NITRITE Negative 08/20/2019    NITRITE Negative 11/07/2014    PROTEINUA Negative 11/07/2014    GLUCOSEU Negative 08/20/2019    GLUCOSEU Negative 11/07/2014    KETONESU Negative 08/20/2019    KETONESU Negative 11/07/2014    BILIRUBINUR Negative 08/20/2019    BILIRUBINUR Negative 11/07/2014    BLOODU Large (A) 08/20/2019    BLOODU Negative 11/07/2014     ABGs:   Lab Results   Component Value Date    PH 7 334 (L) 08/03/2019     Radiology review:     Portions of the record may have been created with voice recognition software   Occasional wrong word or "sound a like" substitutions may have occurred due to the inherent limitations of voice recognition software   Read the chart carefully and recognize, using context, where substitutions have occurred

## 2019-08-24 LAB
ABO GROUP BLD BPU: NORMAL
ANION GAP SERPL CALCULATED.3IONS-SCNC: 4 MMOL/L (ref 4–13)
ANISOCYTOSIS BLD QL SMEAR: PRESENT
BASOPHILS # BLD MANUAL: 0.15 THOUSAND/UL (ref 0–0.1)
BASOPHILS NFR MAR MANUAL: 1 % (ref 0–1)
BPU ID: NORMAL
BUN SERPL-MCNC: 5 MG/DL (ref 5–25)
CALCIUM SERPL-MCNC: 7.6 MG/DL (ref 8.3–10.1)
CHLORIDE SERPL-SCNC: 101 MMOL/L (ref 100–108)
CO2 SERPL-SCNC: 29 MMOL/L (ref 21–32)
CREAT SERPL-MCNC: 0.34 MG/DL (ref 0.6–1.3)
CROSSMATCH: NORMAL
EOSINOPHIL # BLD MANUAL: 0.29 THOUSAND/UL (ref 0–0.4)
EOSINOPHIL NFR BLD MANUAL: 2 % (ref 0–6)
ERYTHROCYTE [DISTWIDTH] IN BLOOD BY AUTOMATED COUNT: 17.1 % (ref 11.6–15.1)
GFR SERPL CREATININE-BSD FRML MDRD: 112 ML/MIN/1.73SQ M
GLUCOSE SERPL-MCNC: 118 MG/DL (ref 65–140)
HCT VFR BLD AUTO: 26.4 % (ref 34.8–46.1)
HGB BLD-MCNC: 8.2 G/DL (ref 11.5–15.4)
LYMPHOCYTES # BLD AUTO: 1.47 THOUSAND/UL (ref 0.6–4.47)
LYMPHOCYTES # BLD AUTO: 10 % (ref 14–44)
MAGNESIUM SERPL-MCNC: 1.5 MG/DL (ref 1.6–2.6)
MCH RBC QN AUTO: 27.1 PG (ref 26.8–34.3)
MCHC RBC AUTO-ENTMCNC: 31.1 G/DL (ref 31.4–37.4)
MCV RBC AUTO: 87 FL (ref 82–98)
MONOCYTES # BLD AUTO: 0.59 THOUSAND/UL (ref 0–1.22)
MONOCYTES NFR BLD: 4 % (ref 4–12)
NEUTROPHILS # BLD MANUAL: 12.06 THOUSAND/UL (ref 1.85–7.62)
NEUTS SEG NFR BLD AUTO: 82 % (ref 43–75)
NRBC BLD AUTO-RTO: 0 /100 WBCS
PHOSPHATE SERPL-MCNC: 2.3 MG/DL (ref 2.3–4.1)
PLATELET # BLD AUTO: 309 THOUSANDS/UL (ref 149–390)
PLATELET BLD QL SMEAR: ADEQUATE
PMV BLD AUTO: 10.5 FL (ref 8.9–12.7)
POLYCHROMASIA BLD QL SMEAR: PRESENT
POTASSIUM SERPL-SCNC: 3.6 MMOL/L (ref 3.5–5.3)
PROMYELOCYTES NFR BLD MANUAL: 1 % (ref 0–0)
RBC # BLD AUTO: 3.03 MILLION/UL (ref 3.81–5.12)
RBC MORPH BLD: PRESENT
SODIUM SERPL-SCNC: 134 MMOL/L (ref 136–145)
UNIT DISPENSE STATUS: NORMAL
UNIT PRODUCT CODE: NORMAL
UNIT RH: NORMAL
WBC # BLD AUTO: 14.71 THOUSAND/UL (ref 4.31–10.16)

## 2019-08-24 PROCEDURE — 80048 BASIC METABOLIC PNL TOTAL CA: CPT | Performed by: INTERNAL MEDICINE

## 2019-08-24 PROCEDURE — 83735 ASSAY OF MAGNESIUM: CPT | Performed by: INTERNAL MEDICINE

## 2019-08-24 PROCEDURE — 84100 ASSAY OF PHOSPHORUS: CPT | Performed by: INTERNAL MEDICINE

## 2019-08-24 PROCEDURE — 99024 POSTOP FOLLOW-UP VISIT: CPT | Performed by: SURGERY

## 2019-08-24 PROCEDURE — 85007 BL SMEAR W/DIFF WBC COUNT: CPT | Performed by: SURGERY

## 2019-08-24 PROCEDURE — 99232 SBSQ HOSP IP/OBS MODERATE 35: CPT | Performed by: INTERNAL MEDICINE

## 2019-08-24 PROCEDURE — 85027 COMPLETE CBC AUTOMATED: CPT | Performed by: SURGERY

## 2019-08-24 RX ORDER — MAGNESIUM SULFATE HEPTAHYDRATE 40 MG/ML
2 INJECTION, SOLUTION INTRAVENOUS ONCE
Status: COMPLETED | OUTPATIENT
Start: 2019-08-24 | End: 2019-08-24

## 2019-08-24 RX ORDER — POTASSIUM CHLORIDE 29.8 MG/ML
40 INJECTION INTRAVENOUS ONCE
Status: COMPLETED | OUTPATIENT
Start: 2019-08-24 | End: 2019-08-24

## 2019-08-24 RX ORDER — SODIUM CHLORIDE 9 MG/ML
50 INJECTION, SOLUTION INTRAVENOUS CONTINUOUS
Status: DISCONTINUED | OUTPATIENT
Start: 2019-08-24 | End: 2019-08-27

## 2019-08-24 RX ORDER — POTASSIUM CHLORIDE 20 MEQ/1
40 TABLET, EXTENDED RELEASE ORAL ONCE
Status: DISCONTINUED | OUTPATIENT
Start: 2019-08-24 | End: 2019-08-24

## 2019-08-24 RX ADMIN — HEPARIN SODIUM 5000 UNITS: 5000 INJECTION INTRAVENOUS; SUBCUTANEOUS at 05:43

## 2019-08-24 RX ADMIN — LIDOCAINE 1 PATCH: 50 PATCH CUTANEOUS at 08:16

## 2019-08-24 RX ADMIN — ONDANSETRON 4 MG: 2 INJECTION INTRAMUSCULAR; INTRAVENOUS at 22:51

## 2019-08-24 RX ADMIN — MAGNESIUM SULFATE HEPTAHYDRATE 2 G: 40 INJECTION, SOLUTION INTRAVENOUS at 13:04

## 2019-08-24 RX ADMIN — HEPARIN SODIUM 5000 UNITS: 5000 INJECTION INTRAVENOUS; SUBCUTANEOUS at 13:05

## 2019-08-24 RX ADMIN — Medication 20 MG: at 06:58

## 2019-08-24 RX ADMIN — SODIUM CHLORIDE 75 ML/HR: 0.9 INJECTION, SOLUTION INTRAVENOUS at 13:05

## 2019-08-24 RX ADMIN — POTASSIUM CHLORIDE 40 MEQ: 400 INJECTION, SOLUTION INTRAVENOUS at 13:04

## 2019-08-24 RX ADMIN — HEPARIN SODIUM 5000 UNITS: 5000 INJECTION INTRAVENOUS; SUBCUTANEOUS at 22:00

## 2019-08-24 NOTE — PLAN OF CARE
Problem: Prexisting or High Potential for Compromised Skin Integrity  Goal: Skin integrity is maintained or improved  Description  INTERVENTIONS:  - Identify patients at risk for skin breakdown  - Assess and monitor skin integrity  - Assess and monitor nutrition and hydration status  - Monitor labs (i e  albumin)  - Turn and reposition patient  - Assist with mobility/ambulation  - Relieve pressure over bony prominences  - Avoid friction and shearing  - Provide appropriate hygiene as needed including keeping skin clean and dry  - Evaluate need for skin moisturizer/barrier cream  - Collaborate with interdisciplinary team (i e  Nutrition, Rehabilitation, etc )   - Patient/family teaching   Outcome: Progressing     Problem: Potential for Falls  Goal: Patient will remain free of falls  Description  INTERVENTIONS:  - Assess patient frequently for physical needs  -  Identify cognitive and physical deficits and behaviors that affect risk of falls    -  [place] fall precautions as indicated by assessment   - Educate patient/family on patient safety including physical limitations  - Instruct patient to call for assistance with activity based on assessment  - Modify environment to reduce risk of injury  - Consider OT/PT consult to assist with strengthening/mobility  Outcome: Progressing     Problem: CARDIOVASCULAR - ADULT  Goal: Maintains optimal cardiac output and hemodynamic stability  Description  INTERVENTIONS:  - Monitor I/O, vital signs and rhythm  - Monitor for S/S and trends of decreased cardiac output i e  bleeding, hypotension  - Administer and titrate ordered vasoactive medications to optimize hemodynamic stability  - Assess quality of pulses, skin color and temperature  - Instruct patient to report change in severity of symptoms   Outcome: Progressing     Problem: RESPIRATORY - ADULT  Goal: Achieves optimal ventilation and oxygenation  Description  INTERVENTIONS:  - Assess for changes in respiratory status  - Assess for changes in mentation and behavior  - Position to facilitate oxygenation and minimize respiratory effort  - Oxygen administration by appropriate delivery method based on oxygen saturation (per order) or ABGs  - Encourage broncho-pulmonary hygiene including cough, deep breathe, Incentive Spirometry  - Assess the need for suctioning and aspirate as neede  - Respiratory Therapy support as indicated   Outcome: Progressing     Problem: GASTROINTESTINAL - ADULT  Goal: Maintains or returns to baseline bowel function  Description  INTERVENTIONS:  - Assess bowel function  - Encourage oral fluids to ensure adequate hydration  - Administer IV fluids as ordered to ensure adequate hydration  - Administer ordered medications as needed  - Encourage mobilization and activity  - Nutrition services referral to assist patient with appropriate food choices  Outcome: Progressing  Goal: Maintains adequate nutritional intake  Description  INTERVENTIONS:  Npo   - Identify factors contributing to decreased intake, treat as appropriate  - Monitor I&O, WT and lab values  - Obtain nutrition services referral as needed   Outcome: Progressing     Problem: METABOLIC, FLUID AND ELECTROLYTES - ADULT  Goal: Electrolytes maintained within normal limits  Description  INTERVENTIONS:  - Monitor labs and assess patient for signs and symptoms of electrolyte imbalances  - Administer electrolyte replacement as ordered  - Monitor response to electrolyte replacements, including repeat lab results as appropriate  - Instruct patient on fluid and nutrition as appropriate  Outcome: Progressing  Goal: Fluid balance maintained  Description  INTERVENTIONS:  - Monitor labs and assess for signs and symptoms of volume excess or deficit  - Monitor I/O and WT  - Instruct patient on fluid and nutrition as appropriate  Outcome: Progressing     Problem: SKIN/TISSUE INTEGRITY - ADULT  Goal: Skin integrity remains intact  Description  INTERVENTIONS  - Identify patients at risk for skin breakdown  - Assess and monitor skin integrity  - Assess and monitor nutrition and hydration statu  - Turn and reposition patient  - Assist with mobility/ambulation  - Relieve pressure over bony prominences  - Avoid friction and shearing  - Provide appropriate hygiene as needed including keeping skin clean and dry  - Evaluate need for skin moisturizer/barrier cream  - Collaborate with interdisciplinary team (i e  Nutrition, Rehabilitation, etc )   - Patient/family teaching   Outcome: Progressing  Goal: Oral mucous membranes remain intact  Description  INTERVENTIONS  - Assess oral mucosa and hygiene practices  - Implement preventative oral hygiene regimen  - Implement oral medicated treatments as ordered  - Initiate Nutrition services referral as needed  Outcome: Progressing  Goal: Incision(s), wounds(s) or drain site(s) healing without S/S of infection  Description  INTERVENTIONS  - Assess and document risk factors for skin impairment   - Assess and document dressing, incision, wound bed, drain sites and surrounding tissue  - Consider nutrition services referral as needed  - Oral mucous membranes remain intact  - Provide patient/ family education  Outcome: Progressing     Problem: MUSCULOSKELETAL - ADULT  Goal: Maintain or return mobility to safest level of function  Description  INTERVENTIONS:  - Assess patient's ability to carry out ADLs; assess patient's baseline for ADL function and identify physical deficits which impact ability to perform ADLs (bathing, care of mouth/teeth, toileting, grooming, dressing, etc )  - Assess/evaluate cause of self-care deficits   - Assess range of motion  - Assess patient's mobility; develop plan if impaired  - Assess patient's need for assistive devices and provide as appropriate  - Encourage maximum independence but intervene and supervise when necessary  - Involve family in performance of ADLs  - Assess for home care needs following discharge   - Request OT consult to assist with ADL evaluation and planning for discharge  - Provide patient education as appropriate  Outcome: Progressing     Problem: Nutrition/Hydration-ADULT  Goal: Nutrient/Hydration intake appropriate for improving, restoring or maintaining nutritional needs  Description  Monitor and assess patient's nutrition/hydration status for malnutrition (ex- brittle hair, bruises, dry skin, pale skin and conjunctiva, muscle wasting, smooth red tongue, and disorientation)  Collaborate with interdisciplinary team and initiate plan and interventions as ordered  Monitor patient's weight and dietary intake as ordered or per policy  Utilize nutrition screening tool and intervene per policy  Determine patient's food preferences and provide high-protein, high-caloric foods as appropriate       INTERVENTIONS:  - Monitor oral intake, urinary output, labs, and treatment plans  - Assess nutrition and hydration status and recommend course of action  - Evaluate amount of meals eaten  - Assist patient with eating if necessary   - Allow adequate time for meals  - Recommend/ encourage appropriate diets, oral nutritional supplements, and vitamin/mineral supplements  - Order, calculate, and assess calorie counts as needed  - Recommend, monitor, and adjust tube feedings and TPN/PPN based on assessed needs  - Assess need for intravenous fluids  - Provide specific nutrition/hydration education as appropriate  - Include patient/family/caregiver in decisions related to nutrition  Outcome: Progressing     Problem: COPING  Goal: Pt/Family able to verbalize concerns and demonstrate effective coping strategies  Description  INTERVENTIONS:  - Assist patient/family to identify coping skills, available support systems and cultural and spiritual values  - Provide emotional support, including active listening and acknowledgement of concerns of patient and caregivers  - Reduce environmental stimuli, as able  - Provide patient education  - Assess for spiritual pain/suffering and initiate spiritual care, including notification of Pastoral Care or [place] based community as needed  - Assess effectiveness of coping strategies  Outcome: Progressing  Goal: Will report anxiety at manageable levels  Description  INTERVENTIONS:  - Administer medication as ordered  - Teach and encourage coping skills  - Provide emotional support  - Assess patient/family for anxiety and ability to cope  Outcome: Progressing     Problem: Knowledge Deficit  Goal: Patient/family/caregiver demonstrates understanding of disease process, treatment plan, medications, and discharge instructions  Description  Complete learning assessment and assess knowledge base    Interventions:  - Provide teaching at level of understanding  - Provide teaching via preferred learning methods  Outcome: Progressing

## 2019-08-24 NOTE — PROGRESS NOTES
Orders in for oral potassium; pt refused all oral meds, stating she gets "quezzy"; from them; dr Margi Burns aware; orders rec'd for iv potassium

## 2019-08-24 NOTE — PROGRESS NOTES
Progress Note - General Surgery   Kristel KAHN Hartranft 78 y o  female MRN: [id number]  Unit/Bed#: [facility] 824-01 Encounter: [id number]    Assessment:  78 F with laparoscopic VHR complicated by small bowel injury, now s/p exploratory laparotomy, mesh explantation, SBR, and eventual abdominal closure, also with candidal fungemia  Now with upset stomach - unsure etiology of this as patient's abdomen is soft, non distended, and she is having bowel function    Hb 8 2 from 7 s/p transfusion  3 BMs  WBC 14 7    Plan:  Dysphagia diet w/ Ensures - added meal assist, calorie count  Monitor PO intake - nutrition recommended resuming TF however prior to yesterday patient was taking in >1500 cc / day  Continue Cipro through 8/25 per ID recs  Fluids per nephrology  PT/OT/OOB    Subjective/Objective   Chief Complaint:     Subjective: Complains of an "upset stomach" yesterday and this AM  Up until yesterday she was taking in nearly 2 L PO daily  Good diuresis w/ bowel function    Objective:     Blood pressure 137/72, pulse 98, temperature 99 2 °F (37 3 °C), resp  rate 18, height 5' 4" (1 626 m), weight 69 3 kg (152 lb 12 5 oz), SpO2 97 %  ,Body mass index is 26 22 kg/m²  Intake/Output Summary (Last 24 hours) at 8/24/2019 0725  Last data filed at 8/24/2019 0501  Gross per 24 hour   Intake 1448 33 ml   Output 4700 ml   Net -3251 67 ml       Invasive Devices     Peripherally Inserted Central Catheter Line            PICC Line 76/56/64 Right Basilic 9 days          Drain            Urethral Catheter Latex 16 Fr  1 day              Physical Exam:   Gen: A&O, NAD  Cardio: RRR  Lungs: CTAB  Abd: Soft, non distended, non tender   Sutures c/d/i, dressing w/ packing in place      Lab, Imaging and other studies:  CBC:   Lab Results   Component Value Date    WBC 14 71 (H) 08/24/2019    HGB 8 2 (L) 08/24/2019    HCT 26 4 (L) 08/24/2019    MCV 87 08/24/2019     08/24/2019    MCH 27 1 08/24/2019    MCHC 31 1 (L) 08/24/2019    RDW 17 1 (H) 08/24/2019    MPV 10 5 08/24/2019   , CMP:   Lab Results   Component Value Date    SODIUM 134 (L) 08/24/2019    K 3 6 08/24/2019     08/24/2019    CO2 29 08/24/2019    BUN 5 08/24/2019    CREATININE 0 34 (L) 08/24/2019    CALCIUM 7 6 (L) 08/24/2019    EGFR 112 08/24/2019   , Coagulation: No results found for: PT, INR, APTT  VTE Pharmacologic Prophylaxis: Heparin  VTE Mechanical Prophylaxis: sequential compression device

## 2019-08-24 NOTE — PROGRESS NOTES
Pt refusing am meds; explained importance of meds; due for oral antibiotics; pt states "they make me throw up"; offered anti-emetic; pt declined anti emetic; pt continues to refuse meds

## 2019-08-24 NOTE — PROGRESS NOTES
NEPHROLOGY PROGRESS NOTE   Ileana Chung 78 y o  female MRN: [id number]  Unit/Bed#: [facility] 824-01 Encounter: [id number]  Reason for Consult: YUNG    ASSESSMENT AND PLAN:  Initial YUNG thought to be prerenal/obstructive uropathy, now resolved  Serum creatinine 0 3  -baseline serum creatinine less than 0 5, initial peak creatinine 2 0 now improved  Polyuria  -could be secondary to post ATN/postobstructive diuresis  -currently on IV D5 half-normal saline, change IV fluid as below   -continue to closely monitor urine output and all electrolytes    Mild hyponatremia, serum sodium 134  -currently on IV hypotonic IV fluid as above   -change IV fluid to IV normal saline at 75 mL/hour   -if sodium continue to worsen, will do further evaluation  Hypomagnesemia, serum magnesium 1 5 below goal  -will give IV magnesium sulfate 2 g once today  Serum magnesium in a m  Hypokalemia, serum potassium slightly improved to 3 6  Also secondary to significant diuresis and hypomagnesemia related renal loss  -will give potassium chloride 40 mEq once today  Mild total body volume overload, now with auto diuresis as mentioned above  Continue to monitor volume status  Remains on room air  Anemia, transfuse p r n  For hemoglobin less than seven, further management as per primary team     Other issues:  -laparoscopic ventral hernia repair complicated with small-bowel injury now status post exploratory laparotomy/mesh explantation/small bowel resection/eventual abdominal closure  -fungemia secondary to translocation across the gut wall followed by infectious disease complicated by peritonitis      SUBJECTIVE:  Patient seen and examined at bedside  No chest pain, shortness of breath, nausea, vomiting      OBJECTIVE:  Current Weight: Weight - Scale: 69 3 kg (152 lb 12 5 oz)  Vitals:    08/24/19 0730   BP: 136/73   Pulse: 100   Resp: (!) 24   Temp: 98 5 °F (36 9 °C)   SpO2: 96%       Intake/Output Summary (Last 24 hours) at 8/24/2019 1110  Last data filed at 8/24/2019 1057  Gross per 24 hour   Intake 1478 33 ml   Output 6250 ml   Net -4771 67 ml     Wt Readings from Last 3 Encounters:   08/23/19 69 3 kg (152 lb 12 5 oz)   07/31/19 57 2 kg (126 lb 1 7 oz)   06/26/19 57 2 kg (126 lb)     Temp Readings from Last 3 Encounters:   08/24/19 98 5 °F (36 9 °C)   08/02/19 100 4 °F (38 °C) (Tympanic)   06/26/19 99 6 °F (37 6 °C)     BP Readings from Last 3 Encounters:   08/24/19 136/73   08/02/19 101/56   06/26/19 155/81     Pulse Readings from Last 3 Encounters:   08/24/19 100   08/02/19 (!) 130   06/26/19 89        Physical Examination:  General:  Lying in bed, no acute distress   Eyes:  Mild conjunctival pallor present  ENT:  External examination of ears and nose unremarkable  Neck:  Supple  Respiratory:  Bilateral air entry present  CVS:  S1, S2 present  GI:  Soft, nondistended  CNS:  Active alert oriented  Extremities:  Trace edema in both legs  Skin:  No new rash in legs    Medications:    Current Facility-Administered Medications:     acetaminophen (TYLENOL) tablet 650 mg, 650 mg, Oral, Q6H PRN, Bambi Contreras PA-C, 650 mg at 08/23/19 0351    bisacodyl (DULCOLAX) rectal suppository 10 mg, 10 mg, Rectal, Daily PRN, Bambi Contreras PA-C    ciprofloxacin (CIPRO) tablet 750 mg, 750 mg, Oral, Q12H, Sarah Wolf MD, 750 mg at 08/23/19 2117    dextrose 5 % and sodium chloride 0 45 % with KCl 20 mEq/L infusion, 100 mL/hr, Intravenous, Continuous, Jackie Edgar MD, Last Rate: 100 mL/hr at 08/23/19 2258, 100 mL/hr at 08/23/19 2258    escitalopram (LEXAPRO) tablet 10 mg, 10 mg, Oral, Daily, Panchito Langley PA-C, 10 mg at 31/43/37 6048    folic acid (FOLVITE) tablet 1 mg, 1 mg, Oral, Daily, Panchito Langley PA-C, 1 mg at 08/23/19 0946    gabapentin (NEURONTIN) capsule 100 mg, 100 mg, Oral, HS, Bambi Contreras PA-C, 100 mg at 08/23/19 2116    guaiFENesin (ROBITUSSIN) oral solution 200 mg, 200 mg, Oral, Q4H PRN, Alvarez Medina DO    heparin (porcine) subcutaneous injection 5,000 Units, 5,000 Units, Subcutaneous, Q8H [address], Lacho Quails, PA-C, 5,000 Units at 08/24/19 0543    HYDROmorphone (DILAUDID) injection 0 2 mg, 0 2 mg, Intravenous, Q4H PRN, Lacho Quails, PA-C, 0 2 mg at 08/21/19 1629    lidocaine (LIDODERM) 5 % patch 1 patch, 1 patch, Topical, Daily, Lacho Quails, PA-C, 1 patch at 08/24/19 0816    melatonin tablet 3 mg, 3 mg, Oral, HS, Sindhu Kossuth, PA-C, 3 mg at 08/23/19 2117    methocarbamol (ROBAXIN) tablet 500 mg, 500 mg, Oral, Q6H PRN, Lacho Quails, PA-C, 500 mg at 08/23/19 1157    omeprazole (PRILOSEC) suspension 2 mg/mL, 20 mg, Oral, BID AC, Lacho Quails, PA-C, 20 mg at 08/24/19 0658    ondansetron (ZOFRAN) injection 4 mg, 4 mg, Intravenous, Q4H PRN, Lacho Quails, PA-C, 4 mg at 08/23/19 2249    oxyCODONE (ROXICODONE) IR tablet 2 5 mg, 2 5 mg, Oral, Q4H PRN, 2 5 mg at 08/20/19 0828 **OR** oxyCODONE (ROXICODONE) IR tablet 5 mg, 5 mg, Oral, Q4H PRN, Lacho Quails, PA-C, 5 mg at 08/21/19 2139    pravastatin (PRAVACHOL) tablet 20 mg, 20 mg, Oral, Daily With Dinner, Sindhu Kossuth, PA-C, 20 mg at 08/23/19 1719    saccharomyces boulardii (FLORASTOR) capsule 250 mg, 250 mg, Oral, BID, Lacho Quails, PA-C, 250 mg at 08/23/19 1720    saliva substitute (MOUTH KOTE) mucosal solution 5 spray, 5 spray, Mouth/Throat, PRN, Deo Delgadillo MD    tamsulosin [facility]) capsule 0 4 mg, 0 4 mg, Oral, Daily With Dinner, Sindhu Kossuth, PA-C, 0 4 mg at 08/23/19 1720    vancomycin (VANCOCIN) oral solution 125 mg, 125 mg, Oral, Q12H [address], Minda Osler, MD, 125 mg at 08/23/19 0955    Laboratory Results:  Results from last 7 days   Lab Units 08/24/19  0451 08/23/19  0449 08/22/19  0507 08/21/19  0514 08/20/19  0458 08/19/19  0739 08/19/19  0543 08/18/19  0452   WBC Thousand/uL 14 71* 14 97* 17 28* 15 75* 19 58* 22 85*  --  25 73*   HEMOGLOBIN g/dL 8 2* 7 0* 7 2* 7 3* 7 6* 7 4*  --  7 5*   HEMATOCRIT % 26 4* 22 5* 24 0* 23 4* 24 4* 23 6*  --  24 0*   PLATELETS Thousands/uL 309 370 381 398* 450* 467*  --  496*   SODIUM mmol/L 134* 140 143 135* 140  --  138 135*   POTASSIUM mmol/L 3 6 3 1* 4 0 3 2* 3 1*  --  4 2 4 8   CHLORIDE mmol/L 101 103 107 98* 102  --  102 99*   CO2 mmol/L 29 30 30 33* 32  --  27 28   BUN mg/dL 5 8 12 9 15  --  20 28*   CREATININE mg/dL 0 34* 0 33* 0 66 0 36* 0 56*  --  1 12 2 02*   CALCIUM mg/dL 7 6* 7 9* 7 8* 7 8* 8 1*  --  7 7* 7 6*   MAGNESIUM mg/dL 1 5* 1 7 2 4 1 7 1 5*  --   --   --    PHOSPHORUS mg/dL 2 3  --  2 5  --   --   --   --   --            Portions of the record may have been created with voice recognition software  Occasional wrong word or "sound a like" substitutions may have occurred due to the inherent limitations of voice recognition software  Read the chart carefully and recognize, using context, where substitutions have occurred

## 2019-08-24 NOTE — NUTRITION
08/24/19 0818   Assessment   Timepoint Nutrition Review  (Calorie count ordered)   Recommendations/Interventions   Summary Calorie count discontinued secondary to prolonged poor po intake since admission (22 days)  Patient with severe protein calorie malnutrition  Suggest initiate EN to provide 75% needs at this time  Interventions Diet: continued as ordered; Supplement continue;EN initiate   Nutrition Recommendations Tube Feeding Recommendation provided  (See EN recommendations from 8/23 assessment  )   Nutrition Complexity Risk   Nutrition complexity level High risk   Nutrition review: 08/27/19   Follow up date 08/27/19

## 2019-08-25 ENCOUNTER — APPOINTMENT (INPATIENT)
Dept: RADIOLOGY | Facility: HOSPITAL | Age: 69
DRG: 856 | End: 2019-08-25
Payer: MEDICARE

## 2019-08-25 LAB
ANION GAP SERPL CALCULATED.3IONS-SCNC: 7 MMOL/L (ref 4–13)
BUN SERPL-MCNC: 3 MG/DL (ref 5–25)
CALCIUM SERPL-MCNC: 7.8 MG/DL (ref 8.3–10.1)
CHLORIDE SERPL-SCNC: 102 MMOL/L (ref 100–108)
CO2 SERPL-SCNC: 28 MMOL/L (ref 21–32)
CREAT SERPL-MCNC: 0.24 MG/DL (ref 0.6–1.3)
GFR SERPL CREATININE-BSD FRML MDRD: 125 ML/MIN/1.73SQ M
GLUCOSE SERPL-MCNC: 87 MG/DL (ref 65–140)
MAGNESIUM SERPL-MCNC: 1.5 MG/DL (ref 1.6–2.6)
POTASSIUM SERPL-SCNC: 3.2 MMOL/L (ref 3.5–5.3)
SODIUM SERPL-SCNC: 137 MMOL/L (ref 136–145)

## 2019-08-25 PROCEDURE — 99232 SBSQ HOSP IP/OBS MODERATE 35: CPT | Performed by: INTERNAL MEDICINE

## 2019-08-25 PROCEDURE — 80048 BASIC METABOLIC PNL TOTAL CA: CPT | Performed by: INTERNAL MEDICINE

## 2019-08-25 PROCEDURE — 99024 POSTOP FOLLOW-UP VISIT: CPT | Performed by: SURGERY

## 2019-08-25 PROCEDURE — 83735 ASSAY OF MAGNESIUM: CPT | Performed by: INTERNAL MEDICINE

## 2019-08-25 PROCEDURE — 74018 RADEX ABDOMEN 1 VIEW: CPT

## 2019-08-25 RX ORDER — POTASSIUM CHLORIDE 29.8 MG/ML
40 INJECTION INTRAVENOUS 2 TIMES DAILY
Status: COMPLETED | OUTPATIENT
Start: 2019-08-25 | End: 2019-08-25

## 2019-08-25 RX ORDER — CIPROFLOXACIN 2 MG/ML
400 INJECTION, SOLUTION INTRAVENOUS EVERY 12 HOURS
Status: COMPLETED | OUTPATIENT
Start: 2019-08-25 | End: 2019-08-26

## 2019-08-25 RX ORDER — METOCLOPRAMIDE HYDROCHLORIDE 5 MG/ML
10 INJECTION INTRAMUSCULAR; INTRAVENOUS EVERY 6 HOURS SCHEDULED
Status: DISPENSED | OUTPATIENT
Start: 2019-08-25 | End: 2019-08-26

## 2019-08-25 RX ORDER — MAGNESIUM SULFATE HEPTAHYDRATE 40 MG/ML
2 INJECTION, SOLUTION INTRAVENOUS ONCE
Status: COMPLETED | OUTPATIENT
Start: 2019-08-25 | End: 2019-08-25

## 2019-08-25 RX ADMIN — MELATONIN 3 MG: 3 TAB ORAL at 22:37

## 2019-08-25 RX ADMIN — SODIUM CHLORIDE 75 ML/HR: 0.9 INJECTION, SOLUTION INTRAVENOUS at 16:50

## 2019-08-25 RX ADMIN — METOCLOPRAMIDE 10 MG: 5 INJECTION, SOLUTION INTRAMUSCULAR; INTRAVENOUS at 11:00

## 2019-08-25 RX ADMIN — POTASSIUM CHLORIDE 40 MEQ: 400 INJECTION, SOLUTION INTRAVENOUS at 07:41

## 2019-08-25 RX ADMIN — LIDOCAINE 1 PATCH: 50 PATCH CUTANEOUS at 07:40

## 2019-08-25 RX ADMIN — CIPROFLOXACIN 400 MG: 2 INJECTION, SOLUTION INTRAVENOUS at 21:54

## 2019-08-25 RX ADMIN — MAGNESIUM SULFATE HEPTAHYDRATE 2 G: 40 INJECTION, SOLUTION INTRAVENOUS at 07:40

## 2019-08-25 RX ADMIN — MAGNESIUM SULFATE HEPTAHYDRATE 2 G: 40 INJECTION, SOLUTION INTRAVENOUS at 14:02

## 2019-08-25 RX ADMIN — POTASSIUM CHLORIDE 40 MEQ: 400 INJECTION, SOLUTION INTRAVENOUS at 16:48

## 2019-08-25 RX ADMIN — HEPARIN SODIUM 5000 UNITS: 5000 INJECTION INTRAVENOUS; SUBCUTANEOUS at 22:37

## 2019-08-25 RX ADMIN — METOCLOPRAMIDE 10 MG: 5 INJECTION, SOLUTION INTRAMUSCULAR; INTRAVENOUS at 16:49

## 2019-08-25 RX ADMIN — CIPROFLOXACIN 400 MG: 2 INJECTION, SOLUTION INTRAVENOUS at 08:50

## 2019-08-25 RX ADMIN — HEPARIN SODIUM 5000 UNITS: 5000 INJECTION INTRAVENOUS; SUBCUTANEOUS at 14:02

## 2019-08-25 RX ADMIN — OXYCODONE HYDROCHLORIDE 5 MG: 5 TABLET ORAL at 12:09

## 2019-08-25 RX ADMIN — GABAPENTIN 100 MG: 100 CAPSULE ORAL at 22:37

## 2019-08-25 RX ADMIN — HEPARIN SODIUM 5000 UNITS: 5000 INJECTION INTRAVENOUS; SUBCUTANEOUS at 05:04

## 2019-08-25 NOTE — PROGRESS NOTES
Progress Note - General Surgery   Kristel KAHN Hartranft 78 y o  female MRN: [id number]  Unit/Bed#: [facility] 824-01 Encounter: [id number]    Assessment:  78 F with laparoscopic VHR complicated by small bowel injury, now s/p exploratory laparotomy, mesh explantation, SBR, and eventual abdominal closure, also with candidal fungemia  With nausea over the last 48 hours - etiology is unclear at this time         K+ 3 2 which was repleted  Mag 1 5 which was repleted   Hg 8 2 yesterday, labs for today still pending  WBC 14 7 yesterday, labs for today still pending  Plan:  -Dysphagia diet w/ Ensures - added meal assist, calorie count  -Monitor PO intake - nutrition recommended resuming TF however prior to yesterday patient was taking in >1500 cc / day  -Continue Cipro through 8/25 per ID recs  -Replete lytes   -Fluids per nephrology  -PT/OT/OOB      Subjective/Objective     Subjective: Nausea has increased to the point where patient is denying all po medications and solid foods  She is taking in fluids  Patient is very weak and tearful stating that she just wants to get better  Denies any pain at this time  No vomiting, fevers, chills  Objective:     Blood pressure 135/73, pulse 102, temperature 98 6 °F (37 °C), resp  rate 20, height 5' 4" (1 626 m), weight 68 9 kg (151 lb 12 8 oz), SpO2 98 %  ,Body mass index is 26 06 kg/m²  Intake/Output Summary (Last 24 hours) at 8/25/2019 0616  Last data filed at 8/24/2019 2300  Gross per 24 hour   Intake 2650 42 ml   Output 3650 ml   Net -999 58 ml       Invasive Devices     Peripherally Inserted Central Catheter Line            PICC Line 55/20/82 Right Basilic 10 days          Drain            Urethral Catheter Latex 16 Fr  2 days                  NAD, alert and oriented x3  Patient appears globally weak     Normocephalic, atraumatic  MMM, EOMI, PERRLA  Norm resp effort on RA  RRR  Abd soft, NT/ND, midline incision with packing in place, this was taken out and replaced with new packing, covered with 4x4 gauze and an abd pad over top    No calf tenderness or peripheral edema  Motor/sensation intact in distal extremities  CN grossly intact  -rash/lesions        Lab, Imaging and other studies:  CBC: No results found for: WBC, HGB, HCT, MCV, PLT, ADJUSTEDWBC, MCH, MCHC, RDW, MPV, NRBC, CMP:   Lab Results   Component Value Date    SODIUM 137 08/25/2019    K 3 2 (L) 08/25/2019     08/25/2019    CO2 28 08/25/2019    BUN 3 (L) 08/25/2019    CREATININE 0 24 (L) 08/25/2019    CALCIUM 7 8 (L) 08/25/2019    EGFR 125 08/25/2019     VTE Pharmacologic Prophylaxis: Heparin  VTE Mechanical Prophylaxis: sequential compression device

## 2019-08-25 NOTE — PROGRESS NOTES
NEPHROLOGY PROGRESS NOTE   Ileana Chung 78 y o  female MRN: [id number]  Unit/Bed#: [facility] 824-01 Encounter: [id number]  Reason for Consult: YUNG    ASSESSMENT AND PLAN:  Initial YUNG thought to be prerenal/obstructive uropathy, now resolved  Serum creatinine 0 2  -baseline serum creatinine less than 0 5, initial peak creatinine 2 0 now improved      Polyuria  -could be secondary to post ATN/postobstructive diuresis  -reduce IV fluid to 60 mL/hour  -continue to closely monitor urine output and all electrolytes     Mild hyponatremia, serum sodium improved to 137  -currently on IV normal saline and reduce rate as above     Hypomagnesemia, serum magnesium 1 5 below goal  -status post magnesium sulfate 2 g IV once today  Will give additional 2 g later today  Serum magnesium in a m      Hypokalemia, serum potassium dropped to 3 2  Agree with total 80 mEq potassium chloride replacement today  -secondary to significant diuresis and hypomagnesemia related renal loss     Mild total body volume overload, now with auto diuresis as mentioned above  Continue to monitor volume status  Remains on room air      Anemia, transfuse p r n  For hemoglobin less than seven, further management as per primary team      Other issues:  -laparoscopic ventral hernia repair complicated with small-bowel injury now status post exploratory laparotomy/mesh explantation/small bowel resection/eventual abdominal closure  -fungemia secondary to translocation across the gut wall followed by infectious disease complicated by peritonitis    SUBJECTIVE:  Patient seen and examined at bedside   No chest pain, shortness of breath, nausea, vomiting    OBJECTIVE:  Current Weight: Weight - Scale: 68 9 kg (151 lb 12 8 oz)  Vitals:    08/24/19 2316   BP: 135/73   Pulse: 102   Resp: 20   Temp: 98 6 °F (37 °C)   SpO2: 98%       Intake/Output Summary (Last 24 hours) at 8/25/2019 1039  Last data filed at 8/25/2019 0900  Gross per 24 hour   Intake 2530 42 ml   Output 3200 ml   Net -669 58 ml     Wt Readings from Last 3 Encounters:   08/25/19 68 9 kg (151 lb 12 8 oz)   07/31/19 57 2 kg (126 lb 1 7 oz)   06/26/19 57 2 kg (126 lb)     Temp Readings from Last 3 Encounters:   08/24/19 98 6 °F (37 °C)   08/02/19 100 4 °F (38 °C) (Tympanic)   06/26/19 99 6 °F (37 6 °C)     BP Readings from Last 3 Encounters:   08/24/19 135/73   08/02/19 101/56   06/26/19 155/81     Pulse Readings from Last 3 Encounters:   08/24/19 102   08/02/19 (!) 130   06/26/19 89        Physical Examination:  General:  Lying in bed, no acute distress   Eyes:  Mild conjunctival pallor present  ENT:  External examination of ears and nose unremarkable  Neck:  Supple  Respiratory:  Bilateral air entry present  CVS:  S1, S2 present  GI:  Soft, nondistended  CNS:  Active, alert, oriented  Extremities:  No significant edema in legs  Skin:  No new rash in legs    Medications:    Current Facility-Administered Medications:     acetaminophen (TYLENOL) tablet 650 mg, 650 mg, Oral, Q6H PRN, Vidal Ibarra PA-C, 650 mg at 08/23/19 0351    bisacodyl (DULCOLAX) rectal suppository 10 mg, 10 mg, Rectal, Daily PRN, Vidal Ibarra PA-C    ciprofloxacin (CIPRO) IVPB (premix) 400 mg, 400 mg, Intravenous, Q12H, Yuko White DO, Last Rate: 200 mL/hr at 08/25/19 0850, 400 mg at 08/25/19 0850    escitalopram (LEXAPRO) tablet 10 mg, 10 mg, Oral, Daily, Harveyne Alex PA-C, 10 mg at 33/66/66 8245    folic acid (FOLVITE) tablet 1 mg, 1 mg, Oral, Daily, Dana Johnson PA-C, 1 mg at 08/23/19 0946    gabapentin (NEURONTIN) capsule 100 mg, 100 mg, Oral, HS, Vidal Ibarra, PA-C, 100 mg at 08/23/19 2116    guaiFENesin (ROBITUSSIN) oral solution 200 mg, 200 mg, Oral, Q4H PRN, John Paul Budds, DO    heparin (porcine) subcutaneous injection 5,000 Units, 5,000 Units, Subcutaneous, Q8H [facility], Vidal Ibarra PA-C, 5,000 Units at 08/25/19 0504    HYDROmorphone (DILAUDID) injection 0 2 mg, 0 2 mg, Intravenous, Q4H PRN, Nelda Edwards IMELDA Fam, 0 2 mg at 08/21/19 1629    lidocaine (LIDODERM) 5 % patch 1 patch, 1 patch, Topical, Daily, Herrera Franks PA-C, Stopped at 08/25/19 0932    melatonin tablet 3 mg, 3 mg, Oral, HS, Rae Quintanilla PA-C, 3 mg at 08/23/19 2117    methocarbamol (ROBAXIN) tablet 500 mg, 500 mg, Oral, Q6H PRN, Herrera Franks PA-C, 500 mg at 08/23/19 1157    metoclopramide (REGLAN) injection 10 mg, 10 mg, Intravenous, Q6H [address], Babatunde Montenegro MD    omeprazole (PRILOSEC) suspension 2 mg/mL, 20 mg, Oral, BID AC, Herrera Franks PA-C, 20 mg at 08/24/19 0658    ondansetron [facility]) injection 4 mg, 4 mg, Intravenous, Q4H PRN, Herrera Franks PA-C, 4 mg at 08/24/19 2251    oxyCODONE (ROXICODONE) IR tablet 2 5 mg, 2 5 mg, Oral, Q4H PRN, 2 5 mg at 08/20/19 0828 **OR** oxyCODONE (ROXICODONE) IR tablet 5 mg, 5 mg, Oral, Q4H PRN, Herrera Franks PA-C, 5 mg at 08/21/19 2139    potassium chloride 40 mEq IVPB (premix), 40 mEq, Intravenous, BID, Yuko White DO, Last Rate: 25 mL/hr at 08/25/19 0741, 40 mEq at 08/25/19 0741    pravastatin (PRAVACHOL) tablet 20 mg, 20 mg, Oral, Daily With Dinner, Rae Quintanilla PA-C, 20 mg at 08/23/19 1719    saccharomyces boulardii (FLORASTOR) capsule 250 mg, 250 mg, Oral, BID, Herrera Franks PA-C, 250 mg at 08/23/19 1720    saliva substitute (MOUTH KOTE) mucosal solution 5 spray, 5 spray, Mouth/Throat, PRN, Angel Garibay MD    sodium chloride 0 9 % infusion, 75 mL/hr, Intravenous, Continuous, Wilber Kumar MD, Last Rate: 75 mL/hr at 08/24/19 1305, 75 mL/hr at 08/24/19 1305    tamsulosin (FLOMAX) capsule 0 4 mg, 0 4 mg, Oral, Daily With Dinner, Rae Quintanilla PA-C, 0 4 mg at 08/23/19 1720    vancomycin (VANCOCIN) oral solution 125 mg, 125 mg, Oral, Q12H [address], Monserrat Willett MD, 125 mg at 08/23/19 6975    Laboratory Results:  Results from last 7 days   Lab Units 08/25/19  0510 08/24/19  0451 08/23/19  6586 08/22/19  0507 08/21/19  2840 08/20/19  0458 08/19/19  1082 08/19/19  0543   WBC Thousand/uL  --  14 71* 14 97* 17 28* 15 75* 19 58* 22 85*  --    HEMOGLOBIN g/dL  --  8 2* 7 0* 7 2* 7 3* 7 6* 7 4*  --    HEMATOCRIT %  --  26 4* 22 5* 24 0* 23 4* 24 4* 23 6*  --    PLATELETS Thousands/uL  --  309 370 381 398* 450* 467*  --    SODIUM mmol/L 137 134* 140 143 135* 140  --  138   POTASSIUM mmol/L 3 2* 3 6 3 1* 4 0 3 2* 3 1*  --  4 2   CHLORIDE mmol/L 102 101 103 107 98* 102  --  102   CO2 mmol/L 28 29 30 30 33* 32  --  27   BUN mg/dL 3* 5 8 12 9 15  --  20   CREATININE mg/dL 0 24* 0 34* 0 33* 0 66 0 36* 0 56*  --  1 12   CALCIUM mg/dL 7 8* 7 6* 7 9* 7 8* 7 8* 8 1*  --  7 7*   MAGNESIUM mg/dL 1 5* 1 5* 1 7 2 4 1 7 1 5*  --   --    PHOSPHORUS mg/dL  --  2 3  --  2 5  --   --   --   --        XR chest portable   Final Result by Janette Ventura MD (08/14 1624)      PICC line tip in the right atrium likely about 1 6 cm below the cavoatrial junction            Workstation performed: [id number]         XR chest portable   Final Result by Sole Alvarado MD (08/11 1725)      No pneumothorax status post chest tube removal       Persistent bilateral pulmonary airspace disease and small bibasilar pleural effusions  Workstation performed: [id number]         XR chest portable   Final Result by Steven Read MD (08/10 1251)      Slight interval increase in the subcutaneous emphysema along the left lateral chest wall without discrete pneumothorax  Increasing left basilar airspace opacity suggesting worsening atelectasis or infiltrate  Workstation performed: [id number]         XR chest portable ICU   Final Result by Anupam Rand DO (08/09 1625)      No pneumothorax following chest tube removal       Mild central vascular prominence with bilateral airspace opacities and suspected pleural effusions        Workstation performed: [id number]         XR chest portable   Final Result by Anupam Rand DO (08/09 1627)      Left-sided chest tube, side port projects outside the pleural space  There is no pneumothorax  Central vascular prominence with bilateral airspace opacities and suspected pleural effusions, similar  Workstation performed: [id number]         XR chest portable   Final Result by Paramjit Landis DO (08/09 0848)      No pneumothorax  Status post extubation  Bibasilar consolidation similar to prior  Workstation performed: [id number]         US right upper quadrant   Final Result by Nasim Patrick MD (05/58 4555)      1  Distended gallbladder with sludge though no wall thickening or Alarcon's sign  2   Complex ascites with pockets of fluid that appear loculated  This is suspicious for peritonitis and developing abscesses are not excluded  The study was marked in [facility] for immediate notification  Workstation performed: [id number]         XR chest portable   Final Result by Nasim Patrick MD (56/95 9680)      Tiny left apical pneumothorax no longer visualized  Stable airspace disease and pleural effusions  Workstation performed: [id number]         XR chest portable   Final Result by Satish Leavitt MD (08/08 4812)      Slight retraction of left pleural tube without significant interval change                     Workstation performed: [id number]         XR chest portable   Final Result by Satish Leavitt MD (08/08 0825)      1  Interval retraction of left subclavian catheter, tip in superior cavoatrial junction/right atrium   2   Bibasal opacities and small effusions   3  Small left pneumothorax            Workstation performed: [id number]         XR chest 1 view   Final Result by Pepe Phillips MD (08/08 7179)         1  Persistent bilateral airspace disease compatible with infiltrates and/or pneumonia  2   No pneumothorax  3   2nd left chest tube  4   Left subclavian line with its tip overlying the right atrium  Left IJ line with its tip overlying the brachiocephalic vein unchanged     5   NG tube coiled upon itself in the upper abdomen  Workstation performed: [id number]         CT chest abdomen pelvis w contrast   Final Result by Kary Gunn MD (08/07 1651)      1  Profound mucosal wall thickening of the small bowel adjacent to the anastomotic staple line  Infectious versus ischemic in nature  2   Left pneumothorax   3  Ascites, with peritoneal enhancement and thickening in the right upper quadrant, may represent infectious peritonitis   4  Short segment intussusception involving the transverse colon without evidence of obstruction   5  Left IJ catheter tip coils and terminates in the right brachiocephalic vein   6  Anasarca   7  Small bilateral pleural effusions      The study was marked in [employer] for immediate notification  Workstation performed: [id number]         XR chest portable   Final Result by Maria Teresa Snyder MD (08/07 1340)   Lines and tubes as above  Improving bilateral airspace opacities  Small pleural effusions  Decreased left pneumothorax  Workstation performed: [id number]         XR chest portable   Final Result by Jessica Hart MD (83/35 8322)      1  Interval increase in left apical pneumothorax, though still small  2   Persistent small right pleural effusion with compressive atelectasis  3   Pulmonary vascular congestion  The study was marked in [facility] for significant notification  Workstation performed: [id number]         XR chest portable   Final Result by Mila Durán MD (08/07 9801)      Left basilar chest tube again noted  Small left apical pneumothorax, stable  Small to moderate-sized bilateral pleural effusions, larger on the right  Left IJ line tip is coiled up probably in the region of the right brachiocephalic vein, unchanged  Consider repositioning if feasible  Workstation performed: [id number]         XR chest portable ICU   Final Result by Theresa Villagran MD (96/29 1336)      1    Improved small left apical pneumothorax  2   Increased right pleural effusion and stable left basilar consolidation/ effusion  Workstation performed: [id number]         XR chest portable   Final Result by Can Hernández DO (08/05 1112)      Slight interval increase size of small left apical pneumothorax  Left-sided chest tube remains in place  Workstation performed: [id number]         XR chest portable   Final Result by Quintin Briggs MD (08/04 2119)      Left-sided chest tube remains in place, with unchanged trace left apical pneumothorax  Unchanged left lower lobe airspace opacity  The left-sided IJ line is again noted to course superiorly into the right brachiocephalic vein, stable in position  Workstation performed: [id number]         CTA chest ct abdomen pelvis w contrast   Final Result by Anjel Saba MD (08/02 1523)      No evidence for acute pulmonary embolus  Bilateral lower lobe are atelectasis and moderate dependent pleural fluid  Small left pneumothorax, indwelling left chest tube  Recent abdominal surgery for ventral hernia repair  Large anterior mesh repair bulging at the ventral incision with underlying loculated air fluid interposed between the mesh and omentum  Also moderate volume of peritoneal fluid and scattered foci of    pneumoperitoneum throughout the upper and lower quadrants with small amount of peritoneal enhancement  Long loop of proximal jejunum showing diffuse abnormal fold thickening without luminal distention  Raising suspicion for possible ischemic or inflammatory etiology  No pneumatosis  [place] segment of more distal small bowel also showing mild    thickening in the midline pelvis  The study was marked in [facility] for immediate notification  Workstation performed: [id number]         X-ray chest 1 view   Final Result by Usman Dhaliwal MD (08/02 8539)      Endotracheal tube above the heri  Left-sided IJ line is again noted to course superiorly along the right brachiocephalic vein  Left-sided chest tube in place with a grossly stable small left apical pneumothorax  Grossly stable patchy airspace disease bilaterally  Workstation performed: [id number]         XR abdomen 1 view kub    (Results Pending)   XR abdomen 1 view kub    (Results Pending)       Portions of the record may have been created with voice recognition software  Occasional wrong word or "sound a like" substitutions may have occurred due to the inherent limitations of voice recognition software  Read the chart carefully and recognize, using context, where substitutions have occurred

## 2019-08-25 NOTE — PROGRESS NOTES
rec'd call from patients daughter emanuel; requesting to have md call her regarding her mom; appears to be mad; she has questions and concerns that need to be addressed; dr Azul Zhao aware

## 2019-08-25 NOTE — PLAN OF CARE
Problem: Prexisting or High Potential for Compromised Skin Integrity  Goal: Skin integrity is maintained or improved  Description  INTERVENTIONS:  - Identify patients at risk for skin breakdown  - Assess and monitor skin integrity  - Assess and monitor nutrition and hydration status  - Monitor labs (i e  albumin)  - Turn and reposition patient  - Assist with mobility/ambulation  - Relieve pressure over bony prominences  - Avoid friction and shearing  - Provide appropriate hygiene as needed including keeping skin clean and dry  - Evaluate need for skin moisturizer/barrier cream  - Collaborate with interdisciplinary team (i e  Nutrition, Rehabilitation, etc )   - Patient/family teaching   Outcome: Progressing     Problem: Potential for Falls  Goal: Patient will remain free of falls  Description  INTERVENTIONS:  - Assess patient frequently for physical needs  -  Identify cognitive and physical deficits and behaviors that affect risk of falls    -  [place] fall precautions as indicated by assessment   - Educate patient/family on patient safety including physical limitations  - Instruct patient to call for assistance with activity based on assessment  - Modify environment to reduce risk of injury  - Consider OT/PT consult to assist with strengthening/mobility  Outcome: Progressing     Problem: CARDIOVASCULAR - ADULT  Goal: Maintains optimal cardiac output and hemodynamic stability  Description  INTERVENTIONS:  - Monitor I/O, vital signs and rhythm  - Monitor for S/S and trends of decreased cardiac output i e  bleeding, hypotension  - Administer and titrate ordered vasoactive medications to optimize hemodynamic stability  - Assess quality of pulses, skin color and temperature  - Instruct patient to report change in severity of symptoms   Outcome: Progressing     Problem: RESPIRATORY - ADULT  Goal: Achieves optimal ventilation and oxygenation  Description  INTERVENTIONS:  - Assess for changes in respiratory status  - Assess for changes in mentation and behavior  - Position to facilitate oxygenation and minimize respiratory effort  - Oxygen administration by appropriate delivery method based on oxygen saturation (per order) or ABGs  - Encourage broncho-pulmonary hygiene including cough, deep breathe, Incentive Spirometry  - Assess the need for suctioning and aspirate as neede  - Respiratory Therapy support as indicated   Outcome: Progressing     Problem: GASTROINTESTINAL - ADULT  Goal: Maintains or returns to baseline bowel function  Description  INTERVENTIONS:  - Assess bowel function  - Encourage oral fluids to ensure adequate hydration  - Administer IV fluids as ordered to ensure adequate hydration  - Administer ordered medications as needed  - Encourage mobilization and activity  - Nutrition services referral to assist patient with appropriate food choices  Outcome: Progressing  Goal: Maintains adequate nutritional intake  Description  INTERVENTIONS:  Npo   - Identify factors contributing to decreased intake, treat as appropriate  - Monitor I&O, WT and lab values  - Obtain nutrition services referral as needed   Outcome: Progressing     Problem: METABOLIC, FLUID AND ELECTROLYTES - ADULT  Goal: Electrolytes maintained within normal limits  Description  INTERVENTIONS:  - Monitor labs and assess patient for signs and symptoms of electrolyte imbalances  - Administer electrolyte replacement as ordered  - Monitor response to electrolyte replacements, including repeat lab results as appropriate  - Instruct patient on fluid and nutrition as appropriate  Outcome: Progressing  Goal: Fluid balance maintained  Description  INTERVENTIONS:  - Monitor labs and assess for signs and symptoms of volume excess or deficit  - Monitor I/O and WT  - Instruct patient on fluid and nutrition as appropriate  Outcome: Progressing     Problem: SKIN/TISSUE INTEGRITY - ADULT  Goal: Skin integrity remains intact  Description  INTERVENTIONS  - Identify patients at risk for skin breakdown  - Assess and monitor skin integrity  - Assess and monitor nutrition and hydration statu  - Turn and reposition patient  - Assist with mobility/ambulation  - Relieve pressure over bony prominences  - Avoid friction and shearing  - Provide appropriate hygiene as needed including keeping skin clean and dry  - Evaluate need for skin moisturizer/barrier cream  - Collaborate with interdisciplinary team (i e  Nutrition, Rehabilitation, etc )   - Patient/family teaching   Outcome: Progressing  Goal: Oral mucous membranes remain intact  Description  INTERVENTIONS  - Assess oral mucosa and hygiene practices  - Implement preventative oral hygiene regimen  - Implement oral medicated treatments as ordered  - Initiate Nutrition services referral as needed  Outcome: Progressing  Goal: Incision(s), wounds(s) or drain site(s) healing without S/S of infection  Description  INTERVENTIONS  - Assess and document risk factors for skin impairment   - Assess and document dressing, incision, wound bed, drain sites and surrounding tissue  - Consider nutrition services referral as needed  - Oral mucous membranes remain intact  - Provide patient/ family education  Outcome: Progressing     Problem: MUSCULOSKELETAL - ADULT  Goal: Maintain or return mobility to safest level of function  Description  INTERVENTIONS:  - Assess patient's ability to carry out ADLs; assess patient's baseline for ADL function and identify physical deficits which impact ability to perform ADLs (bathing, care of mouth/teeth, toileting, grooming, dressing, etc )  - Assess/evaluate cause of self-care deficits   - Assess range of motion  - Assess patient's mobility; develop plan if impaired  - Assess patient's need for assistive devices and provide as appropriate  - Encourage maximum independence but intervene and supervise when necessary  - Involve family in performance of ADLs  - Assess for home care needs following discharge   - Request OT consult to assist with ADL evaluation and planning for discharge  - Provide patient education as appropriate  Outcome: Progressing     Problem: Nutrition/Hydration-ADULT  Goal: Nutrient/Hydration intake appropriate for improving, restoring or maintaining nutritional needs  Description  Monitor and assess patient's nutrition/hydration status for malnutrition (ex- brittle hair, bruises, dry skin, pale skin and conjunctiva, muscle wasting, smooth red tongue, and disorientation)  Collaborate with interdisciplinary team and initiate plan and interventions as ordered  Monitor patient's weight and dietary intake as ordered or per policy  Utilize nutrition screening tool and intervene per policy  Determine patient's food preferences and provide high-protein, high-caloric foods as appropriate       INTERVENTIONS:  - Monitor oral intake, urinary output, labs, and treatment plans  - Assess nutrition and hydration status and recommend course of action  - Evaluate amount of meals eaten  - Assist patient with eating if necessary   - Allow adequate time for meals  - Recommend/ encourage appropriate diets, oral nutritional supplements, and vitamin/mineral supplements  - Order, calculate, and assess calorie counts as needed  - Recommend, monitor, and adjust tube feedings and TPN/PPN based on assessed needs  - Assess need for intravenous fluids  - Provide specific nutrition/hydration education as appropriate  - Include patient/family/caregiver in decisions related to nutrition  Outcome: Progressing     Problem: COPING  Goal: Pt/Family able to verbalize concerns and demonstrate effective coping strategies  Description  INTERVENTIONS:  - Assist patient/family to identify coping skills, available support systems and cultural and spiritual values  - Provide emotional support, including active listening and acknowledgement of concerns of patient and caregivers  - Reduce environmental stimuli, as able  - Provide patient education  - Assess for spiritual pain/suffering and initiate spiritual care, including notification of Pastoral Care or [place] based community as needed  - Assess effectiveness of coping strategies  Outcome: Progressing  Goal: Will report anxiety at manageable levels  Description  INTERVENTIONS:  - Administer medication as ordered  - Teach and encourage coping skills  - Provide emotional support  - Assess patient/family for anxiety and ability to cope  Outcome: Progressing     Problem: Knowledge Deficit  Goal: Patient/family/caregiver demonstrates understanding of disease process, treatment plan, medications, and discharge instructions  Description  Complete learning assessment and assess knowledge base    Interventions:  - Provide teaching at level of understanding  - Provide teaching via preferred learning methods  Outcome: Progressing

## 2019-08-26 LAB
ANION GAP SERPL CALCULATED.3IONS-SCNC: 7 MMOL/L (ref 4–13)
ATRIAL RATE: 105 BPM
BUN SERPL-MCNC: 4 MG/DL (ref 5–25)
CALCIUM SERPL-MCNC: 8.1 MG/DL (ref 8.3–10.1)
CHLORIDE SERPL-SCNC: 105 MMOL/L (ref 100–108)
CO2 SERPL-SCNC: 28 MMOL/L (ref 21–32)
CREAT SERPL-MCNC: 0.25 MG/DL (ref 0.6–1.3)
ERYTHROCYTE [DISTWIDTH] IN BLOOD BY AUTOMATED COUNT: 16.8 % (ref 11.6–15.1)
GFR SERPL CREATININE-BSD FRML MDRD: 124 ML/MIN/1.73SQ M
GLUCOSE SERPL-MCNC: 90 MG/DL (ref 65–140)
HCT VFR BLD AUTO: 28.9 % (ref 34.8–46.1)
HGB BLD-MCNC: 8.7 G/DL (ref 11.5–15.4)
MAGNESIUM SERPL-MCNC: 2 MG/DL (ref 1.6–2.6)
MCH RBC QN AUTO: 26.5 PG (ref 26.8–34.3)
MCHC RBC AUTO-ENTMCNC: 30.1 G/DL (ref 31.4–37.4)
MCV RBC AUTO: 88 FL (ref 82–98)
P AXIS: 56 DEGREES
PLATELET # BLD AUTO: 380 THOUSANDS/UL (ref 149–390)
PMV BLD AUTO: 10.1 FL (ref 8.9–12.7)
POTASSIUM SERPL-SCNC: 3.6 MMOL/L (ref 3.5–5.3)
PR INTERVAL: 138 MS
QRS AXIS: -23 DEGREES
QRSD INTERVAL: 86 MS
QT INTERVAL: 344 MS
QTC INTERVAL: 454 MS
RBC # BLD AUTO: 3.28 MILLION/UL (ref 3.81–5.12)
SODIUM SERPL-SCNC: 140 MMOL/L (ref 136–145)
T WAVE AXIS: 52 DEGREES
VENTRICULAR RATE: 105 BPM
WBC # BLD AUTO: 11.6 THOUSAND/UL (ref 4.31–10.16)

## 2019-08-26 PROCEDURE — NC001 PR NO CHARGE: Performed by: NURSE PRACTITIONER

## 2019-08-26 PROCEDURE — G8988 SELF CARE GOAL STATUS: HCPCS

## 2019-08-26 PROCEDURE — 99231 SBSQ HOSP IP/OBS SF/LOW 25: CPT | Performed by: INTERNAL MEDICINE

## 2019-08-26 PROCEDURE — G8987 SELF CARE CURRENT STATUS: HCPCS

## 2019-08-26 PROCEDURE — 97168 OT RE-EVAL EST PLAN CARE: CPT

## 2019-08-26 PROCEDURE — 99222 1ST HOSP IP/OBS MODERATE 55: CPT | Performed by: INTERNAL MEDICINE

## 2019-08-26 PROCEDURE — NC001 PR NO CHARGE: Performed by: INTERNAL MEDICINE

## 2019-08-26 PROCEDURE — 93005 ELECTROCARDIOGRAM TRACING: CPT

## 2019-08-26 PROCEDURE — 83735 ASSAY OF MAGNESIUM: CPT | Performed by: SURGERY

## 2019-08-26 PROCEDURE — 93010 ELECTROCARDIOGRAM REPORT: CPT | Performed by: INTERNAL MEDICINE

## 2019-08-26 PROCEDURE — 80048 BASIC METABOLIC PNL TOTAL CA: CPT | Performed by: SURGERY

## 2019-08-26 PROCEDURE — 99024 POSTOP FOLLOW-UP VISIT: CPT | Performed by: SURGERY

## 2019-08-26 PROCEDURE — 97110 THERAPEUTIC EXERCISES: CPT

## 2019-08-26 PROCEDURE — 99232 SBSQ HOSP IP/OBS MODERATE 35: CPT | Performed by: INTERNAL MEDICINE

## 2019-08-26 PROCEDURE — 97530 THERAPEUTIC ACTIVITIES: CPT

## 2019-08-26 PROCEDURE — 85027 COMPLETE CBC AUTOMATED: CPT | Performed by: SURGERY

## 2019-08-26 RX ORDER — POTASSIUM CHLORIDE 20 MEQ/1
40 TABLET, EXTENDED RELEASE ORAL ONCE
Status: COMPLETED | OUTPATIENT
Start: 2019-08-26 | End: 2019-08-26

## 2019-08-26 RX ORDER — MIRTAZAPINE 15 MG/1
15 TABLET, FILM COATED ORAL
Status: DISCONTINUED | OUTPATIENT
Start: 2019-08-26 | End: 2019-08-26

## 2019-08-26 RX ORDER — MIRTAZAPINE 15 MG/1
7.5 TABLET, FILM COATED ORAL
Status: DISCONTINUED | OUTPATIENT
Start: 2019-08-26 | End: 2019-08-27 | Stop reason: HOSPADM

## 2019-08-26 RX ADMIN — LIDOCAINE 1 PATCH: 50 PATCH CUTANEOUS at 08:00

## 2019-08-26 RX ADMIN — METOCLOPRAMIDE 10 MG: 5 INJECTION, SOLUTION INTRAMUSCULAR; INTRAVENOUS at 00:54

## 2019-08-26 RX ADMIN — VANCOMYCIN HYDROCHLORIDE 125 MG: 500 INJECTION, POWDER, LYOPHILIZED, FOR SOLUTION INTRAVENOUS at 20:52

## 2019-08-26 RX ADMIN — SODIUM CHLORIDE 50 ML/HR: 0.9 INJECTION, SOLUTION INTRAVENOUS at 21:30

## 2019-08-26 RX ADMIN — PRAVASTATIN SODIUM 20 MG: 20 TABLET ORAL at 17:51

## 2019-08-26 RX ADMIN — MIRTAZAPINE 7.5 MG: 15 TABLET, FILM COATED ORAL at 21:25

## 2019-08-26 RX ADMIN — METOCLOPRAMIDE 10 MG: 5 INJECTION, SOLUTION INTRAMUSCULAR; INTRAVENOUS at 05:47

## 2019-08-26 RX ADMIN — Medication 250 MG: at 08:00

## 2019-08-26 RX ADMIN — METOCLOPRAMIDE 10 MG: 5 INJECTION, SOLUTION INTRAMUSCULAR; INTRAVENOUS at 17:51

## 2019-08-26 RX ADMIN — Medication 20 MG: at 06:17

## 2019-08-26 RX ADMIN — HEPARIN SODIUM 5000 UNITS: 5000 INJECTION INTRAVENOUS; SUBCUTANEOUS at 13:51

## 2019-08-26 RX ADMIN — METHOCARBAMOL 500 MG: 500 TABLET, FILM COATED ORAL at 11:34

## 2019-08-26 RX ADMIN — HEPARIN SODIUM 5000 UNITS: 5000 INJECTION INTRAVENOUS; SUBCUTANEOUS at 21:26

## 2019-08-26 RX ADMIN — OXYCODONE HYDROCHLORIDE 5 MG: 5 TABLET ORAL at 21:26

## 2019-08-26 RX ADMIN — ESCITALOPRAM OXALATE 10 MG: 10 TABLET ORAL at 08:00

## 2019-08-26 RX ADMIN — Medication 250 MG: at 17:51

## 2019-08-26 RX ADMIN — MELATONIN 3 MG: 3 TAB ORAL at 21:25

## 2019-08-26 RX ADMIN — POTASSIUM CHLORIDE 40 MEQ: 1500 TABLET, EXTENDED RELEASE ORAL at 09:46

## 2019-08-26 RX ADMIN — HEPARIN SODIUM 5000 UNITS: 5000 INJECTION INTRAVENOUS; SUBCUTANEOUS at 05:47

## 2019-08-26 RX ADMIN — GABAPENTIN 100 MG: 100 CAPSULE ORAL at 21:25

## 2019-08-26 RX ADMIN — FOLIC ACID 1 MG: 1 TABLET ORAL at 08:00

## 2019-08-26 RX ADMIN — VANCOMYCIN HYDROCHLORIDE 125 MG: 500 INJECTION, POWDER, LYOPHILIZED, FOR SOLUTION INTRAVENOUS at 00:27

## 2019-08-26 RX ADMIN — VANCOMYCIN HYDROCHLORIDE 125 MG: 500 INJECTION, POWDER, LYOPHILIZED, FOR SOLUTION INTRAVENOUS at 08:01

## 2019-08-26 RX ADMIN — METOCLOPRAMIDE 10 MG: 5 INJECTION, SOLUTION INTRAMUSCULAR; INTRAVENOUS at 11:34

## 2019-08-26 RX ADMIN — OXYCODONE HYDROCHLORIDE 5 MG: 5 TABLET ORAL at 16:41

## 2019-08-26 NOTE — PROGRESS NOTES
NEPHROLOGY PROGRESS NOTE   Toby Chung 78 y o  female MRN: [id number]  Unit/Bed#: [facility] 824-01 Encounter: [id number]  Reason for Consult: YUNG    ASSESSMENT and PLAN:    1 ) Acute kidney injury- resolved    2 ) Polyuria  -secondary to a postobstructive diuresis  -urine output is improving and the rate has cut down  -reduce the rate of the normal saline to 50 mL/hour  -electrolytes are stable  -encourage free water intake    3 ) Hyponatremia - resolved    4 ) Hypokalemia- resolved    5 ) Hypomagnesemia- resolved      SUBJECTIVE / INTERVAL HISTORY:    No overnight events    OBJECTIVE:  Current Weight: Weight - Scale: 64 8 kg (142 lb 13 7 oz)  Vitals:    08/25/19 2008 08/25/19 2354 08/26/19 0600 08/26/19 0659   BP:  134/78  134/77   Pulse: 105 100  98   Resp:  18  18   Temp: 98 4 °F (36 9 °C) 98 7 °F (37 1 °C)  98 2 °F (36 8 °C)   TempSrc:       SpO2: 96% 95%  96%   Weight:   64 8 kg (142 lb 13 7 oz)    Height:           Intake/Output Summary (Last 24 hours) at 8/26/2019 1249  Last data filed at 8/26/2019 0730  Gross per 24 hour   Intake 2056 25 ml   Output 2200 ml   Net -143 75 ml       Review of Systems:    12 point ROS has been reviewed  Physical Exam   Constitutional: She is oriented to person, place, and time  She appears well-developed and well-nourished  No distress  HENT:   Head: Normocephalic and atraumatic  Eyes: Pupils are equal, round, and reactive to light  No scleral icterus  Neck: Normal range of motion  Neck supple  Cardiovascular: Normal rate, regular rhythm and normal heart sounds  Exam reveals no gallop and no friction rub  No murmur heard  Pulmonary/Chest: Effort normal and breath sounds normal  No respiratory distress  She has no wheezes  She has no rales  She exhibits no tenderness  Abdominal: Soft  Bowel sounds are normal  She exhibits no distension  There is no tenderness  There is no rebound  Musculoskeletal: Normal range of motion  She exhibits no edema     Neurological: She is alert and oriented to person, place, and time  Skin: No rash noted  She is not diaphoretic  Psychiatric: She has a normal mood and affect  Nursing note and vitals reviewed        Medications:    Current Facility-Administered Medications:     acetaminophen (TYLENOL) tablet 650 mg, 650 mg, Oral, Q6H PRN, Garold Drop, PA-C, 650 mg at 08/23/19 0351    bisacodyl (DULCOLAX) rectal suppository 10 mg, 10 mg, Rectal, Daily PRN, Garold Drop, PA-C    folic acid (FOLVITE) tablet 1 mg, 1 mg, Oral, Daily, Felicitas Bur, PA-C, 1 mg at 08/26/19 0800    gabapentin (NEURONTIN) capsule 100 mg, 100 mg, Oral, HS, Garold Drop, PA-C, 100 mg at 08/25/19 2237    guaiFENesin (ROBITUSSIN) oral solution 200 mg, 200 mg, Oral, Q4H PRN, Eden Johnson DO    heparin (porcine) subcutaneous injection 5,000 Units, 5,000 Units, Subcutaneous, Q8H [address], Garold Drop, PA-C, 5,000 Units at 08/26/19 0547    HYDROmorphone (DILAUDID) injection 0 2 mg, 0 2 mg, Intravenous, Q4H PRN, Garold Drop, PA-C, 0 2 mg at 08/21/19 1629    lidocaine (LIDODERM) 5 % patch 1 patch, 1 patch, Topical, Daily, Garold Drop, PA-C, 1 patch at 08/26/19 0800    melatonin tablet 3 mg, 3 mg, Oral, HS, Felicitas Bur, PA-C, 3 mg at 08/25/19 2237    methocarbamol (ROBAXIN) tablet 500 mg, 500 mg, Oral, Q6H PRN, Garold Drop, PA-C, 500 mg at 08/26/19 1134    metoclopramide (REGLAN) injection 10 mg, 10 mg, Intravenous, Q6H [address], Dwain Fairchild MD, 10 mg at 08/26/19 1134    mirtazapine (REMERON) tablet 7 5 mg, 7 5 mg, Oral, HS, BHAVNA Crocker    omeprazole (PRILOSEC) suspension 2 mg/mL, 20 mg, Oral, BID AC, Garold Drop, PA-C, 20 mg at 08/26/19 0617    ondansetron [facility]) injection 4 mg, 4 mg, Intravenous, Q4H PRN, Garold Drop, PA-C, 4 mg at 08/24/19 8469    oxyCODONE (ROXICODONE) IR tablet 2 5 mg, 2 5 mg, Oral, Q4H PRN, 2 5 mg at 08/20/19 0828 **OR** oxyCODONE (ROXICODONE) IR tablet 5 mg, 5 mg, Oral, Q4H PRN, Roddy White IMELDA Fam, 5 mg at 08/25/19 1209    pravastatin (PRAVACHOL) tablet 20 mg, 20 mg, Oral, Daily With Dinner, Leslie Farias PA-C, 20 mg at 08/23/19 1719    saccharomyces boulardii (FLORASTOR) capsule 250 mg, 250 mg, Oral, BID, Binu Garnica PA-C, 250 mg at 08/26/19 0800    saliva substitute (MOUTH KOTE) mucosal solution 5 spray, 5 spray, Mouth/Throat, PRN, Lillie Carrillo MD    sodium chloride 0 9 % infusion, 50 mL/hr, Intravenous, Continuous, Gretchen León MD, Last Rate: 60 mL/hr at 08/26/19 0801, 60 mL/hr at 08/26/19 0801    tamsulosin (FLOMAX) capsule 0 4 mg, 0 4 mg, Oral, Daily With Dinner, Leslie Farias PA-C, 0 4 mg at 08/23/19 1720    vancomycin (VANCOCIN) oral solution 125 mg, 125 mg, Oral, Q12H [address], Janet Corona MD, 125 mg at 08/26/19 0801    Laboratory Results:  Results from last 7 days   Lab Units 08/26/19  0555 08/25/19  0510 08/24/19  0451 08/23/19  0449 08/22/19  0507 08/21/19  0514 08/20/19  0458   WBC Thousand/uL 11 60*  --  14 71* 14 97* 17 28* 15 75* 19 58*   HEMOGLOBIN g/dL 8 7*  --  8 2* 7 0* 7 2* 7 3* 7 6*   HEMATOCRIT % 28 9*  --  26 4* 22 5* 24 0* 23 4* 24 4*   PLATELETS Thousands/uL 380  --  309 370 381 398* 450*   POTASSIUM mmol/L 3 6 3 2* 3 6 3 1* 4 0 3 2* 3 1*   CHLORIDE mmol/L 105 102 101 103 107 98* 102   CO2 mmol/L 28 28 29 30 30 33* 32   BUN mg/dL 4* 3* 5 8 12 9 15   CREATININE mg/dL 0 25* 0 24* 0 34* 0 33* 0 66 0 36* 0 56*   CALCIUM mg/dL 8 1* 7 8* 7 6* 7 9* 7 8* 7 8* 8 1*   MAGNESIUM mg/dL 2 0 1 5* 1 5* 1 7 2 4 1 7 1 5*   PHOSPHORUS mg/dL  --   --  2 3  --  2 5  --   --

## 2019-08-26 NOTE — PLAN OF CARE
Problem: PHYSICAL THERAPY ADULT  Goal: Performs mobility at highest level of function for planned discharge setting  See evaluation for individualized goals  Description  Treatment/Interventions: LE strengthening/ROM, Functional transfer training, Therapeutic exercise, Endurance training, Bed mobility, Spoke to nursing, OT          See flowsheet documentation for full assessment, interventions and recommendations  Outcome: Progressing  Note:   Prognosis: Fair  Problem List: Decreased strength, Decreased range of motion, Decreased endurance, Impaired balance, Decreased mobility  Assessment: Pt tolerated treatment well and is pleasant and cooperative throughout session  Able to perform multiple trials of rolling, however requires significant verbal and tactile cues to actively participate in correct technique  Pt was able to tolerate sitting EOB with Poor (+) balance and BUE support  Requires max A x1 for scooting toward EOB, however pt does initiate movement  Pt requires max A x2 sit pivot transfer to drop arm chair with BLE knees blocked for safety  Tolerates LE exercises well in sitting  Will continue to benefit from ongoing skilled PT to maximize her functional mobility and increase her level of independence  Recommending rehab at time of discharge when medically appropriate  Barriers to Discharge: Inaccessible home environment, Decreased caregiver support     Recommendation: Short-term skilled PT     PT - OK to Discharge: Yes(to  rehab  when med ready )    See flowsheet documentation for full assessment

## 2019-08-26 NOTE — OCCUPATIONAL THERAPY NOTE
[address] Re-Evaluation     Patient Name: Emelyn Lees  Today's Date: 8/26/2019  Problem List  Patient Active Problem List   Diagnosis    Osteoarthritis of knee    Joint pain, knee    Eagle's esophagus with high grade dysplasia    3-vessel CAD    Abnormal blood chemistry    A-fib ([address] )    Anemia    Atypical chest pain    Backache with radiation    Continuous left lower quadrant pain    Depression    Dysphagia    Gastroesophageal reflux disease    Hyperlipidemia    Hypothyroidism    Insomnia    Low vitamin D level    Lumbar radiculopathy    Myofascial pain    Right leg paresthesias    Sciatica    Spondylosis of 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    Stricture of esophagus    Venous insufficiency    Incisional hernia    Adrenal adenoma    Incisional hernia, without obstruction or gangrene    Bilateral pneumonia    Postprocedural pneumothorax    OR Exploratory laparotomy, SBR secondary to iatrogenic enterotomy     OR Laparoscopic ventral hernia repair    Fungemia     Past Medical History  Past Medical History:   Diagnosis Date    Abnormal blood chemistry     last assessed 03/06/2014    Achalasia, esophageal     Acute medial meniscus tear     last assessed 03/10/2014    Acute otitis externa     last assessed 03/24/2014    Adrenal nodule ([address] )     Anemia     Anxiety     Arthritis     Atrial fibrillation ([address] )     resolved 01/09/2018    Atypical chest pain     last assessed 11/22/2016    Eagle's esophagus with high grade dysplasia     last assessed 01/03/2018    Cancer ([address] )     esophageal    Cerumen impaction     last assessed 03/24/2014    Chronic pain of right knee     last assessed 04/06/2017    Coronary artery disease     3 vessel, resolved 01/09/2018    Depression     Dysphagia     last assessed 06/21/2017    Esophageal stricture     last assessed 06/21/2017    GERD (gastroesophageal reflux disease)     Headache     last assessed 2013    Hiatal hernia     Insomnia     last assessed 10/15/2013    [address]junostomy tube present [address] [address]     resolved 2018    Low vitamin D level     resolved 2018    Osteoarthritis, knee     last assessed 2017    Pneumonia     last assessed 2013    Right leg paresthesias     last assessed 2017    Sciatica     last assessed 2015    Shingles     last assessed 2015    Spondylosis of 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     last assessed 2017    Venous insufficiency     last assessed 2013     Past Surgical History  Past Surgical History:   Procedure Laterality Date     SECTION      EXPLORATORY LAPAROTOMY W/ BOWEL RESECTION N/A 2019    Procedure: LAPAROTOMY EXPLORATORY W/ BOWEL RESECTION, APPLICATION OF Alexandre Dine;  Surgeon: Sheron Alexis DO;  [facility];  Service: General    GASTRECTOMY      partial    GASTROJEJUNOSTOMY W/ JEJUNOSTOMY TUBE N/A 2017    Procedure: Willoughby Senna;  Surgeon: Maranda Salgado MD;  [facility];  Service: Thoracic    JOINT REPLACEMENT Right 11/10/2014    knee, Dr Ami Mcfadden N/A 8/3/2019    Procedure: LAPAROTOMY EXPLORATORY, reanastomosis of proximal small bowel, placement of vac dressing;  Surgeon: Sheron Alexis DO;  [facility];  Service: General    MO ESOPHAGOGASTRODUODENOSCOPY TRANSORAL DIAGNOSTIC N/A 3/10/2017    Procedure: ESOPHAGOGASTRODUODENOSCOPY (EGD); Surgeon: Paramjit Ruelas MD;  [facility];  Service: Gastroenterology    MO ESOPHAGOGASTRODUODENOSCOPY TRANSORAL DIAGNOSTIC N/A 2017    Procedure: ESOPHAGOGASTRODUODENOSCOPY (EGD); Surgeon: Paramjit Ruelas MD;  [facility];  Service: Gastroenterology    MO ESOPHAGOSCOPY FLEXIBLE TRANSORAL WITH BIOPSY N/A 2017    Procedure: ESOPHAGOGASTRODUODENOSCOPY (EGD);   Surgeon: Maranda Salgado MD;  [facility];  Service: Thoracic    MO LAP, VENTRAL HERNIA REPAIR,REDUCIBLE N/A 7/31/2019    Procedure: REPAIR HERNIA VENTRAL LAPAROSCOPIC;  Surgeon: Esha Elliott DO;  [facility];  Service: General    CA REMOVAL [address] THORACOTOMY N/A 8/24/2017    Procedure: TRANSHIATAL ESOPHAGECTOMY;  Surgeon: Anahi Reynoso MD;  [facility];  Service: Thoracic    STEROID INJECTION KNEE Right 5/2/2017    Procedure: GENICULATE NERVE BLOCKS;  Surgeon: Cal Humphrey DO;  [facility];  Service:                   08/26/19 1253   Note Type   Note type Re-eval   Restrictions/Precautions   Weight Bearing Precautions Per Order No   Other Precautions Cognitive; Chair Alarm;Multiple lines;Telemetry; Fall Risk;Pain;Contact/isolation   Pain Assessment   Pain Assessment No/denies pain   Marquez-[place] FACES Pain Rating 4   Pain Type Acute pain   Pain Location Abdomen   Hospital Pain Intervention(s) Repositioned; Emotional support;Rest;Ambulation/increased activity   Home Living   Type of Home   (see initial evaluation)   Prior Function   Level of [place] Independent with ADLs and functional mobility   Lives With Alone   Receives Help From Family   ADL Assistance Independent   IADLs Independent   Falls in the last 6 months 0   Vocational Retired   Lifestyle   Autonomy PT [place] ADLS/IADLS/DRIVING PTA    Reciprocal Relationships LIVES ALONE  LIMITED FAMILY SUPPORT AVAILABLE    Service to Others RETIRED   Intrinsic Gratification ENJOYS SPENDING TIME WITH HER GRANDCHILDREN    Psychosocial   Psychosocial (WDL) WDL   ADL   Eating Assistance 5  Supervision/Setup   Eating Deficit Beverage management   Grooming Assistance 1  Total Assistance   Grooming Deficit Brushing hair  (pt with extreme fatigue to initate-grasp comb at end of session)   Bed Mobility   Supine to Sit 2  Maximal assistance   Additional items Assist x 2; Increased time required;LE management   Transfers   Sit pivot 2  Maximal assistance   Additional items Assist x 2;Verbal cues  (chair>bed with drop arm chair )   Functional Mobility   Additional Comments unsafe to assess   Balance   Static Sitting Poor   Activity Tolerance   Activity Tolerance Patient limited by fatigue   Nurse Made Aware RN cleared pt for therapy   RUE Assessment   RUE Assessment   (see initial evaluation)   LUE Assessment   LUE Assessment   (see initial evaluation)   Hand Function   Gross Motor Coordination Impaired   Fine Motor Coordination Impaired   Cognition   Overall Cognitive Status Impaired   Arousal/Participation Cooperative;Lethargic   Attention Difficulty attending to directions   Orientation Level Oriented to person;Oriented to place;Oriented to time   Memory   (able to recall todays date)   Following Commands Follows one step commands with increased time or repetition   Comments pt with extreme fatigue sitting out in chair for 3 hours upon OT session, diffciulty with attention/concentration, impaired motivation, initation with grooming tasks,  whispered voice when answering questions   Assessment   Limitation Decreased cognition;Decreased UE strength;Decreased ADL status; Decreased Safe judgement during ADL;Decreased self-care trans;Decreased high-level ADLs; Decreased endurance   Prognosis Poor   Assessment Patient participated in Skilled OT session this date with interventions consisting of self feeding, grooming tasks, functional transfers -see assistance levels above   Patient agreeable to OT treatment session, upon arrival patient was found seated at edge of bed  In comparison to previous session, patient with improvements in    Patient requiring verbal cues for correct technique, cognitive assistance to anticipate next step and frequent rest periods  Patient continues to be functioning below baseline level, occupational performance remains limited secondary to factors listed above and increased risk for falls and injury  From OT standpoint, recommendation at time of d/c would be Short Term Rehab  Patient to benefit from continued Occupational Therapy treatment while in the hospital to address deficits as defined above and maximize level of functional independence with ADLs and functional mobility  Goals   LTG Time Frame 10-14   Long Term Goal #1 see goals below   Plan   Treatment Interventions ADL retraining;Functional transfer training; Endurance training;Patient/family training;Equipment evaluation/education; Compensatory technique education; Energy conservation; Activityengagement   Goal Expiration Date 09/09/19   OT Frequency 3-5x/wk   Recommendation   OT Discharge Recommendation Short Term Rehab   OT - OK to Discharge Yes   Barthel Index   Feeding 0   Bathing 0   Grooming Score 0   Dressing Score 0   Bladder Score 0   Bowels Score 0   Toilet Use Score 0   Transfers (Bed/Chair) Score 5   Mobility (Level Surface) Score 0   Stairs Score 0   Barthel Index Score 5   Modified Franklin Scale   Modified Franklin Scale 4      Occupational Therapy Goals:    *Min a with bed mobility to engage in functional tasks  *Increase edge of bed sitting 5 minutes to engage in self care tasks    *Min a  Adl's after setup with use of AE PRN  *Min a toileting and clothing management   *Min a transfers to/from all surfaces with Fair + dynamic balance and safety for participation in dynamic adls and iadl tasks s   *Increase activity tolerance to 25-30 minutes for participation in adls and enjoyable activities  *Pt to participate in further cognitive testing with good attention and participation to assist with safe d/c recommendations      Leonila DAVILA, OTR/L

## 2019-08-26 NOTE — PLAN OF CARE
Problem: Prexisting or High Potential for Compromised Skin Integrity  Goal: Skin integrity is maintained or improved  Description  INTERVENTIONS:  - Identify patients at risk for skin breakdown  - Assess and monitor skin integrity  - Assess and monitor nutrition and hydration status  - Monitor labs (i e  albumin)  - Turn and reposition patient  - Assist with mobility/ambulation  - Relieve pressure over bony prominences  - Avoid friction and shearing  - Provide appropriate hygiene as needed including keeping skin clean and dry  - Evaluate need for skin moisturizer/barrier cream  - Collaborate with interdisciplinary team (i e  Nutrition, Rehabilitation, etc )   - Patient/family teaching   Outcome: Progressing     Problem: Potential for Falls  Goal: Patient will remain free of falls  Description  INTERVENTIONS:  - Assess patient frequently for physical needs  -  Identify cognitive and physical deficits and behaviors that affect risk of falls    -  [place] fall precautions as indicated by assessment   - Educate patient/family on patient safety including physical limitations  - Instruct patient to call for assistance with activity based on assessment  - Modify environment to reduce risk of injury  - Consider OT/PT consult to assist with strengthening/mobility  Outcome: Progressing     Problem: CARDIOVASCULAR - ADULT  Goal: Maintains optimal cardiac output and hemodynamic stability  Description  INTERVENTIONS:  - Monitor I/O, vital signs and rhythm  - Monitor for S/S and trends of decreased cardiac output i e  bleeding, hypotension  - Administer and titrate ordered vasoactive medications to optimize hemodynamic stability  - Assess quality of pulses, skin color and temperature  - Instruct patient to report change in severity of symptoms   Outcome: Progressing     Problem: RESPIRATORY - ADULT  Goal: Achieves optimal ventilation and oxygenation  Description  INTERVENTIONS:  - Assess for changes in respiratory status  - Assess for changes in mentation and behavior  - Position to facilitate oxygenation and minimize respiratory effort  - Oxygen administration by appropriate delivery method based on oxygen saturation (per order) or ABGs  - Encourage broncho-pulmonary hygiene including cough, deep breathe, Incentive Spirometry  - Assess the need for suctioning and aspirate as neede  - Respiratory Therapy support as indicated   Outcome: Progressing     Problem: GASTROINTESTINAL - ADULT  Goal: Maintains or returns to baseline bowel function  Description  INTERVENTIONS:  - Assess bowel function  - Encourage oral fluids to ensure adequate hydration  - Administer IV fluids as ordered to ensure adequate hydration  - Administer ordered medications as needed  - Encourage mobilization and activity  - Nutrition services referral to assist patient with appropriate food choices  Outcome: Progressing  Goal: Maintains adequate nutritional intake  Description  INTERVENTIONS:  Npo   - Identify factors contributing to decreased intake, treat as appropriate  - Monitor I&O, WT and lab values  - Obtain nutrition services referral as needed   Outcome: Progressing     Problem: METABOLIC, FLUID AND ELECTROLYTES - ADULT  Goal: Electrolytes maintained within normal limits  Description  INTERVENTIONS:  - Monitor labs and assess patient for signs and symptoms of electrolyte imbalances  - Administer electrolyte replacement as ordered  - Monitor response to electrolyte replacements, including repeat lab results as appropriate  - Instruct patient on fluid and nutrition as appropriate  Outcome: Progressing  Goal: Fluid balance maintained  Description  INTERVENTIONS:  - Monitor labs and assess for signs and symptoms of volume excess or deficit  - Monitor I/O and WT  - Instruct patient on fluid and nutrition as appropriate  Outcome: Progressing     Problem: SKIN/TISSUE INTEGRITY - ADULT  Goal: Skin integrity remains intact  Description  INTERVENTIONS  - Identify patients at risk for skin breakdown  - Assess and monitor skin integrity  - Assess and monitor nutrition and hydration statu  - Turn and reposition patient  - Assist with mobility/ambulation  - Relieve pressure over bony prominences  - Avoid friction and shearing  - Provide appropriate hygiene as needed including keeping skin clean and dry  - Evaluate need for skin moisturizer/barrier cream  - Collaborate with interdisciplinary team (i e  Nutrition, Rehabilitation, etc )   - Patient/family teaching   Outcome: Progressing  Goal: Oral mucous membranes remain intact  Description  INTERVENTIONS  - Assess oral mucosa and hygiene practices  - Implement preventative oral hygiene regimen  - Implement oral medicated treatments as ordered  - Initiate Nutrition services referral as needed  Outcome: Progressing  Goal: Incision(s), wounds(s) or drain site(s) healing without S/S of infection  Description  INTERVENTIONS  - Assess and document risk factors for skin impairment   - Assess and document dressing, incision, wound bed, drain sites and surrounding tissue  - Consider nutrition services referral as needed  - Oral mucous membranes remain intact  - Provide patient/ family education  Outcome: Progressing     Problem: MUSCULOSKELETAL - ADULT  Goal: Maintain or return mobility to safest level of function  Description  INTERVENTIONS:  - Assess patient's ability to carry out ADLs; assess patient's baseline for ADL function and identify physical deficits which impact ability to perform ADLs (bathing, care of mouth/teeth, toileting, grooming, dressing, etc )  - Assess/evaluate cause of self-care deficits   - Assess range of motion  - Assess patient's mobility; develop plan if impaired  - Assess patient's need for assistive devices and provide as appropriate  - Encourage maximum independence but intervene and supervise when necessary  - Involve family in performance of ADLs  - Assess for home care needs following discharge   - Request OT consult to assist with ADL evaluation and planning for discharge  - Provide patient education as appropriate  Outcome: Progressing     Problem: Nutrition/Hydration-ADULT  Goal: Nutrient/Hydration intake appropriate for improving, restoring or maintaining nutritional needs  Description  Monitor and assess patient's nutrition/hydration status for malnutrition (ex- brittle hair, bruises, dry skin, pale skin and conjunctiva, muscle wasting, smooth red tongue, and disorientation)  Collaborate with interdisciplinary team and initiate plan and interventions as ordered  Monitor patient's weight and dietary intake as ordered or per policy  Utilize nutrition screening tool and intervene per policy  Determine patient's food preferences and provide high-protein, high-caloric foods as appropriate       INTERVENTIONS:  - Monitor oral intake, urinary output, labs, and treatment plans  - Assess nutrition and hydration status and recommend course of action  - Evaluate amount of meals eaten  - Assist patient with eating if necessary   - Allow adequate time for meals  - Recommend/ encourage appropriate diets, oral nutritional supplements, and vitamin/mineral supplements  - Order, calculate, and assess calorie counts as needed  - Recommend, monitor, and adjust tube feedings and TPN/PPN based on assessed needs  - Assess need for intravenous fluids  - Provide specific nutrition/hydration education as appropriate  - Include patient/family/caregiver in decisions related to nutrition  Outcome: Progressing     Problem: COPING  Goal: Pt/Family able to verbalize concerns and demonstrate effective coping strategies  Description  INTERVENTIONS:  - Assist patient/family to identify coping skills, available support systems and cultural and spiritual values  - Provide emotional support, including active listening and acknowledgement of concerns of patient and caregivers  - Reduce environmental stimuli, as able  - Provide patient education  - Assess for spiritual pain/suffering and initiate spiritual care, including notification of Pastoral Care or [place] based community as needed  - Assess effectiveness of coping strategies  Outcome: Progressing  Goal: Will report anxiety at manageable levels  Description  INTERVENTIONS:  - Administer medication as ordered  - Teach and encourage coping skills  - Provide emotional support  - Assess patient/family for anxiety and ability to cope  Outcome: Progressing     Problem: Knowledge Deficit  Goal: Patient/family/caregiver demonstrates understanding of disease process, treatment plan, medications, and discharge instructions  Description  Complete learning assessment and assess knowledge base    Interventions:  - Provide teaching at level of understanding  - Provide teaching via preferred learning methods  Outcome: Progressing

## 2019-08-26 NOTE — CONSULTS
Consultation - Viktoriya KAHN Hartranft 78 y o  female MRN: [id number]  Unit/Bed#: [facility] 824-01 Encounter: [id number]      Assessment/Plan  1  Frailty  Decrease appetite  Encourage out of bed  Encourage p o  Intake  PT/OT  Rehab post hospitalization    2  Severe protein malnutrition  Nutrition consult  Continue ensure supplementation    3  Depression  GDS 7/15  Recommend start Remeron 7 5 mg p o  Q h s  Recommend discontinuation of Lexapro  Na 140  Qtc 477, K 3 4 Mg 2 3 (8/2/19)  recommend recheck EKG  K 3 6 Mg 2 0    4  Insomnia  Started on remeron 7 5 mg po Qhs  Evaluate need for neurtontin 100mg po Qhs  Continue melatonin    5  Urinary retention  Patient with Mendoza catheter  Urinary retention protocol  Urology on consult  flomax 0 4mg po daily    6  Laparoscopic ventral hernia repair  Complicated by small bowel injury  Status post exploratory laparotomy, mesh, small bowel repair with abdominal closure  Management by surgery    7  SIRS  Management by ID  Pt completed 10 days abx    8  Cdiff prophylaxis  Management by ID  vanco 125mg po Q12    9  Delirium precautions  Alert and oriented x3   Provide frequent redirection, reorientation, distraction techniques    Avoid deliriogenic medications such as tramadol, benzodiazepines, anticholinergics,  Benadryl  Treat pain, See geriatric pain protocol  Monitor for constipation and urinary retention  Encourage early and frequent moblization, OOB  Encourage Hydration/ Nutrition  Implement sleep hygiene, limit night time interuptions, group activities                    History of Present Illness   Physician Requesting Consult: Heather Medrano MD  Reason for Consult / Principal Problem:   Hx and PE limited by:   HPI: Sue Montenegro is a 78y o  year old female who presents initially to [address] [place] [address],[address] 07/30/2019 for  laparoscopic ventral hernia repair which was complicated by small bowel injury    Now status post exploratory laparotomy with mesh, small bowel resection and abdominal closure  She has a fungemia and possible pseudomonal UTI, peritonitis  ID is on consult for SIRS, Fungemia, Possible UTI, Peritonitis  Nephology is on consult for YUNG, mild hyponatremia, hypomagnesia, hypokalemia, anemia  Nutrition is consulted for  Caloric intake and tube feeding recommendations  Urology was following for urinary retention      She has a past medical history of AFib, arthritis, depression, Barretts with high grade dysplagia  Prior to arrival patient lived at home  She was independent with ADLs  She states she was feeling depressed prior to her current illness because her  was admitted to a nursing home 1 month prior after having a stroke  Since admission she has not been able to see or speak to him  She states that does not have any wishes to die  She wants to get better  She just does not have the energy  She is not hungry  She states that she thinks about her grandchildren often  She wears glasses, she denies issues with hearing  She is missing some teeth  Upon exam patient is oob in recliner chair  she is alert and oriented x3  She is requesting to go back to bed  She does not want to eat food on tray  She is taking sips of ensure when offered  Spoke with nursing and surgery  Inpatient consult to Gerontology  Consult performed by: BHAVNA Parry  Consult ordered by: Alberto Jensen MD          Review of Systems   Constitutional: Negative for unexpected weight change  HENT: Negative for hearing loss  Eyes: Negative for visual disturbance  Respiratory: Negative for shortness of breath  Cardiovascular: Negative for chest pain  Gastrointestinal: Negative for constipation  Genitourinary: Negative for difficulty urinating  Musculoskeletal: Negative for gait problem  Neurological: Negative for dizziness  Psychiatric/Behavioral: Negative for agitation         Historical Information   Past Medical History: Diagnosis Date    Abnormal blood chemistry     last assessed 2014    Achalasia, esophageal     Acute medial meniscus tear     last assessed 03/10/2014    Acute otitis externa     last assessed 2014    Adrenal nodule ([facility] )     Anemia     Anxiety     Arthritis     Atrial fibrillation (HCC)     resolved 2018    Atypical chest pain     last assessed 2016    Eagle's esophagus with high grade dysplasia     last assessed 2018    Cancer ([facility] )     esophageal    Cerumen impaction     last assessed 2014    Chronic pain of right knee     last assessed 2017    Coronary artery disease     3 vessel, resolved 2018    Depression     Dysphagia     last assessed 2017    Esophageal stricture     last assessed 2017    GERD (gastroesophageal reflux disease)     Headache     last assessed 2013    Hiatal hernia     Insomnia     last assessed 10/15/2013    Jejunostomy tube present ([facility] )     resolved 2018    Low vitamin D level     resolved 2018    Osteoarthritis, knee     last assessed 2017    Pneumonia     last assessed 2013    Right leg paresthesias     last assessed 2017    Sciatica     last assessed 2015    Shingles     last assessed 2015    Spondylosis of 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     last assessed 2017    Venous insufficiency     last assessed 2013     Past Surgical History:   Procedure Laterality Date     SECTION      EXPLORATORY LAPAROTOMY W/ BOWEL RESECTION N/A 2019    Procedure: LAPAROTOMY EXPLORATORY W/ BOWEL RESECTION, APPLICATION OF Candie Nash;  Surgeon: Johan Maya DO;  [facility];  Service: General    GASTRECTOMY      partial    GASTROJEJUNOSTOMY W/ JEJUNOSTOMY TUBE N/A 2017    Procedure: Joanie Parekh;  Surgeon: Frieda Hanna MD;  [facility];  Service: Thoracic    JOINT REPLACEMENT Right 11/10/2014    Dr Ricky sher N/A 8/3/2019    Procedure: LAPAROTOMY EXPLORATORY, reanastomosis of proximal small bowel, placement of vac dressing;  Surgeon: John Paul Tabares DO;  [facility];  Service: General    AR ESOPHAGOGASTRODUODENOSCOPY TRANSORAL DIAGNOSTIC N/A 3/10/2017    Procedure: ESOPHAGOGASTRODUODENOSCOPY (EGD); Surgeon: Douglas Santizo MD;  [facility];  Service: Gastroenterology    AR ESOPHAGOGASTRODUODENOSCOPY TRANSORAL DIAGNOSTIC N/A 5/19/2017    Procedure: ESOPHAGOGASTRODUODENOSCOPY (EGD); Surgeon: Douglas Santizo MD;  [facility];  Service: Gastroenterology    AR ESOPHAGOSCOPY FLEXIBLE TRANSORAL WITH BIOPSY N/A 8/24/2017    Procedure: ESOPHAGOGASTRODUODENOSCOPY (EGD);   Surgeon: Myrick Kawasaki, MD;  [facility];  Service: Thoracic    AR LAP, VENTRAL HERNIA REPAIR,REDUCIBLE N/A 7/31/2019    Procedure: REPAIR HERNIA VENTRAL LAPAROSCOPIC;  Surgeon: Shelby Stewart DO;  [facility];  Service: General    AR REMOVAL [address] THORACOTOMY N/A 8/24/2017    Procedure: TRANSHIATAL ESOPHAGECTOMY;  Surgeon: Myrick Kawasaki, MD;  [facility];  Service: Thoracic    STEROID INJECTION KNEE Right 5/2/2017    Procedure: GENICULATE NERVE BLOCKS;  Surgeon: Alan Joshi DO;  [facility];  Service:      Social History   Social History     Substance and Sexual Activity   Alcohol Use Not Currently    Alcohol/week: 0 0 standard drinks    Frequency: Never    Binge frequency: Never     Social History     Substance and Sexual Activity   Drug Use No     Social History     Tobacco Use   Smoking Status Never Smoker   Smokeless Tobacco Never Used         Family History: non-contributory    Meds/Allergies   Current meds:   Current Facility-Administered Medications   Medication Dose Route Frequency    acetaminophen (TYLENOL) tablet 650 mg  650 mg Oral Q6H PRN    bisacodyl (DULCOLAX) rectal suppository 10 mg  10 mg Rectal Daily PRN    escitalopram (LEXAPRO) tablet 10 mg  10 mg Oral Daily    folic acid (FOLVITE) tablet 1 mg  1 mg Oral Daily    gabapentin (NEURONTIN) capsule 100 mg  100 mg Oral HS    guaiFENesin (ROBITUSSIN) oral solution 200 mg  200 mg Oral Q4H PRN    heparin (porcine) subcutaneous injection 5,000 Units  5,000 Units Subcutaneous Q8H [address]    HYDROmorphone (DILAUDID) injection 0 2 mg  0 2 mg Intravenous Q4H PRN    lidocaine (LIDODERM) 5 % patch 1 patch  1 patch Topical Daily    melatonin tablet 3 mg  3 mg Oral HS    methocarbamol (ROBAXIN) tablet 500 mg  500 mg Oral Q6H PRN    metoclopramide (REGLAN) injection 10 mg  10 mg Intravenous Q6H [address]    mirtazapine (REMERON) tablet 15 mg  15 mg Oral HS    omeprazole (PRILOSEC) suspension 2 mg/mL  20 mg Oral BID AC    ondansetron (ZOFRAN) injection 4 mg  4 mg Intravenous Q4H PRN    oxyCODONE (ROXICODONE) IR tablet 2 5 mg  2 5 mg Oral Q4H PRN    Or    oxyCODONE (ROXICODONE) IR tablet 5 mg  5 mg Oral Q4H PRN    pravastatin (PRAVACHOL) tablet 20 mg  20 mg Oral Daily With Dinner    saccharomyces boulardii (FLORASTOR) capsule 250 mg  250 mg Oral BID    saliva substitute (MOUTH KOTE) mucosal solution 5 spray  5 spray Mouth/Throat PRN    sodium chloride 0 9 % infusion  60 mL/hr Intravenous Continuous    tamsulosin (FLOMAX) capsule 0 4 mg  0 4 mg Oral Daily With Dinner    vancomycin (VANCOCIN) oral solution 125 mg  125 mg Oral Q12H [address]      Current PTA meds:  No medications prior to admission  No Known Allergies    Objective   Vitals: Blood pressure 134/77, pulse 98, temperature 98 2 °F (36 8 °C), resp  rate 18, height 5' 4" (1 626 m), weight 64 8 kg (142 lb 13 7 oz), SpO2 96 %  ,Body mass index is 24 52 kg/m²  Physical Exam   Constitutional: She is oriented to person, place, and time  No distress  Cachetic   HENT:   Head: Normocephalic  Eyes: Right eye exhibits no discharge  Left eye exhibits no discharge  Neck: Normal range of motion  No thyromegaly present  Cardiovascular: Normal rate, regular rhythm and normal heart sounds  Exam reveals no gallop and no friction rub  No murmur heard  Pulmonary/Chest: Effort normal and breath sounds normal  No stridor  No respiratory distress  She has no wheezes  Abdominal: Soft  Bowel sounds are normal  She exhibits no distension  There is no tenderness  There is no guarding  Musculoskeletal: Normal range of motion  She exhibits no edema  Neurological: She is alert and oriented to person, place, and time  No cranial nerve deficit  Skin: Skin is warm and dry  She is not diaphoretic  No erythema  No pallor  Psychiatric: She has a normal mood and affect  Nursing note and vitals reviewed  Lab Results:   Results from last 7 days   Lab Units 08/26/19  0555   WBC Thousand/uL 11 60*   HEMOGLOBIN g/dL 8 7*   HEMATOCRIT % 28 9*   PLATELETS Thousands/uL 380        Results from last 7 days   Lab Units 08/26/19  0555  08/20/19  0458   POTASSIUM mmol/L 3 6   < > 3 1*   CHLORIDE mmol/L 105   < > 102   CO2 mmol/L 28   < > 32   BUN mg/dL 4*   < > 15   CREATININE mg/dL 0 25*   < > 0 56*   CALCIUM mg/dL 8 1*   < > 8 1*   ALK PHOS U/L  --   --  188*   ALT U/L  --   --  14   AST U/L  --   --  23    < > = values in this interval not displayed  Imaging Studies: I have personally reviewed pertinent reports  EKG, Pathology, and Other Studies: I have personally reviewed pertinent reports      VTE Prophylaxis: Sequential compression device (Venodyne)     Code Status: Level 1 - Full Code

## 2019-08-26 NOTE — PROGRESS NOTES
Progress Note - Infectious Disease   Kristel Chung 78 y o  female MRN: [id number]  Unit/Bed#: [facility] 824-01 Encounter: [id number]      Impression/Plan:  1  Systemic inflammatory response syndrome- Possibly related the patient's fungemia   Possibly secondary to peritonitis   The patient is now improved and moved out of the intensive care unit  The leukocytosis has persisted   Stool for C diff is negative   The patient completed more than 10 days of intravenous antibacterials   The white blood cell count has now been decreasing   The procalcitonin level has decreased substantially  -antibiotics as below  -monitor CBC with diff  -supportive care     2  Fungemia-possibly all translocation across the gut wall in the setting of peritonitis   Possible catheter related bacteremia although this is less likely as the blood cultures were from around the time of admission   The central line has been changed   Fungemia has cleared   Ophthalmology exam negative   The organism is not resistant to fluconazole   The patient has completed 14 days of high-dose fluconazole since the 1st negative blood culture  -no additional anti fungal treatment for now  -monitor for recurrent fever     3  Possible UTI-2 gram-negative rods in VRE   Unclear significance of the VRE   the patient is asymptomatic but she did have a brisk leukocytosis   She seems to be tolerating the cefepime without difficulty    She completed 7 days of antibiotics and is now off all antibiotics  -no additional urinary directed antibiotics  -no additional ID workup for now     4  Peritonitis-status post exploration and repair   No cultures obtained at the time of the surgery   Patient does have significant turbid drainage from the superior aspect of the wound   Patient has been maintained on broad antibiotics   The patient continues to have a brisk leukocytosis   Significant ascites noted   She completed more than 10 days of intravenous antibiotics, and is now back on antibiotics for UTI as above   White cell count has continued to decrease and almost normalized  Repeat x-ray without evidence of bowel obstruction  -monitor CBC with diff  -serial abdominal exams  -close surgical follow-up     5  Acute hypoxic respiratory failure-likely secondary to hemoptysis aspiration and a pneumothorax   Less likely pneumonia     She is currently not on oxygen support     -oxygen support as needed  -monitor respiratory status     6  Acute kidney injury-suspect urinary retention was playing a significant role  The patient now has a Mendoza catheter in place in the renal function is normalized  -monitor BMP  -nephrology follow-up  -volume management  -Mendoza drainage     7  History of C difficile colitis-no current diarrhea   -continue oral vancomycin prophylaxis through 2019 to complete 72 hours after the last dose of other systemic antibiotics  -monitor stool output    Antibiotics:  Oral vancomycin prophylaxis    Subjective:  Patient has no fever, chills, sweats; no nausea, vomiting, diarrhea; no cough, shortness of breath; no pain  No new symptoms  She continues to have a poor appetite  She feels a bit better by report    Objective:  Vitals:  Temp:  [98 2 °F (36 8 °C)-98 7 °F (37 1 °C)] 98 2 °F (36 8 °C)  HR:  [] 98  Resp:  [16-18] 18  BP: (134-135)/(77-78) 134/77  SpO2:  [94 %-96 %] 96 %  Temp (24hrs), Av 4 °F (36 9 °C), Min:98 2 °F (36 8 °C), Max:98 7 °F (37 1 °C)  Current: Temperature: 98 2 °F (36 8 °C)    Physical Exam:   General Appearance:  Alert, interactive, nontoxic, no acute distress  Throat: Oropharynx moist without lesions  Lungs:   Decreased breath sounds bilaterally; no wheezes, rhonchi or rales; respirations unlabored   Heart:  Tachycardia; no murmur, rub or gallop   Abdomen:   Soft, non-tender, non-distended, positive bowel sounds  Incision dressed with a dry dressing in place       Extremities: No clubbing, cyanosis or edema   Skin: No new rashes or lesions  No draining wounds noted  Labs, Imaging, & Other studies:   All pertinent labs and imaging studies were personally reviewed  Results from last 7 days   Lab Units 08/26/19  0555 08/24/19  0451 08/23/19  0449   WBC Thousand/uL 11 60* 14 71* 14 97*   HEMOGLOBIN g/dL 8 7* 8 2* 7 0*   PLATELETS Thousands/uL 380 309 370     Results from last 7 days   Lab Units 08/26/19  0555 08/25/19  0510 08/24/19  0451  08/20/19  0458   SODIUM mmol/L 140 137 134*   < > 140   POTASSIUM mmol/L 3 6 3 2* 3 6   < > 3 1*   CHLORIDE mmol/L 105 102 101   < > 102   CO2 mmol/L 28 28 29   < > 32   BUN mg/dL 4* 3* 5   < > 15   CREATININE mg/dL 0 25* 0 24* 0 34*   < > 0 56*   EGFR ml/min/1 73sq m 124 125 112   < > 95   CALCIUM mg/dL 8 1* 7 8* 7 6*   < > 8 1*   AST U/L  --   --   --   --  23   ALT U/L  --   --   --   --  14   ALK PHOS U/L  --   --   --   --  188*    < > = values in this interval not displayed          x-ray abdomen-no evidence of bowel obstruction    Images personally reviewed by me in PACS

## 2019-08-26 NOTE — PROGRESS NOTES
Provider rounds completed  Spoke specifically with nursing and patient will be OOB for lunch  Will also consult Geriatrics to see and evaluate patient

## 2019-08-26 NOTE — PROGRESS NOTES
Pt up and OOB to chair  Nurse, PT, and Restorative Therapist at bedside to assist with transfer  Pt max assist x2  Pt currently working with PT  Will continue to monitor

## 2019-08-26 NOTE — PLAN OF CARE
Problem: OCCUPATIONAL THERAPY ADULT  Goal: Performs self-care activities at highest level of function for planned discharge setting  See evaluation for individualized goals  Description  Treatment Interventions: ADL retraining, Functional transfer training, UE strengthening/ROM, Endurance training, Cognitive reorientation, Patient/family training, Equipment evaluation/education, Compensatory technique education, Fine motor coordination activities, Energy conservation, Activityengagement          See flowsheet documentation for full assessment, interventions and recommendations  Outcome: Not Progressing  Note:   Limitation: Decreased cognition, Decreased UE strength, Decreased ADL status, Decreased Safe judgement during ADL, Decreased self-care trans, Decreased high-level ADLs, Decreased endurance  Prognosis: Poor  Assessment: Patient participated in Skilled OT session this date with interventions consisting of self feeding, grooming tasks, functional transfers -see assistance levels above   Patient agreeable to OT treatment session, upon arrival patient was found seated at edge of bed  In comparison to previous session, patient with improvements in    Patient requiring verbal cues for correct technique, cognitive assistance to anticipate next step and frequent rest periods  Patient continues to be functioning below baseline level, occupational performance remains limited secondary to factors listed above and increased risk for falls and injury  From OT standpoint, recommendation at time of d/c would be Short Term Rehab  Patient to benefit from continued Occupational Therapy treatment while in the hospital to address deficits as defined above and maximize level of functional independence with ADLs and functional mobility  Recommendation: Geriatric Consult(MAY BENEFIT FROM NEUROPSYCH CONSULT FOR COPING)  OT Discharge Recommendation: Short Term Rehab  OT - OK to Discharge:  Yes

## 2019-08-26 NOTE — SOCIAL WORK
Current  LOS 24 Days  CM was given recommendations from  The LOS committee for the [facility] attending to contact [address] [place] [address]) to do Peer to peer for continuity of care  For Dr Alec Truong at [facility]) to explain the LOC that can be provided to the patient  CM gave phone number [place] [facility] [place] Truong [phone] to call peer to peer  Awaiting confirmation of peer to peer for further guidance of anticipated d/c date

## 2019-08-26 NOTE — PROGRESS NOTES
Midline incision currently dressed with abd and tape  Order states to pack x 3 and dress with DSD  [facility] surgery paged to clarify  Spoke to Keegan River MD - incision okay to be dressed with abd and tape only  Will continue to monitor

## 2019-08-26 NOTE — CONSULTS
Consult Note - Wound   Kristel Chung 78 y o  female MRN: [id number]  [facility] Encounter: [id number]      History and Present Illness:  Patient is a 78year old femaled admitted for exploratory laparotomy  Wound care is consulted for R breast skin breakdown  Patient examined sitting out of bed in a chair  Patient refused to stand for a full assessment  Assessment Findings:   1  MASD under R breast   2  Heels intact        Skin care plans:  1-Hydraguard to bilateral sacrum, buttock and heels BID and PRN  2-Elevate heels to offload pressure  3-Ehob cushion in chair when out of bed  4-Moisturize skin daily with skin nourishing cream   5-Turn/reposition q2h or when medically stable for pressure re-distribution on skin  6-Cleanse R breast with soap and water, pat dry, apply calazime BID and PRN      Call or Tiger text with any questions    Wound Care will sign off

## 2019-08-26 NOTE — PHYSICAL THERAPY NOTE
PHYSICAL THERAPY TREATMENT NOTE    Patient Name: Lori Veronica  XNKCY'[id number] Date: 8/26/2019 08/26/19 1010   Pain Assessment   Pain Assessment 0-10   Pain Score 7   Pain Type Acute pain   Pain Location Leg; Foot   Pain Orientation Left   Pain Descriptors [facility] Pain Intervention(s) Ambulation/increased activity;Repositioned   Response to Interventions tolerated   Restrictions/Precautions   Weight Bearing Precautions Per Order No   Other Precautions Chair Alarm;Cognitive;O2;Fall Risk;Pain   General   Chart Reviewed Yes   Response to Previous Treatment Patient with no complaints from previous session  Family/Caregiver Present No   Cognition   Overall Cognitive Status Impaired   Arousal/Participation Cooperative   Attention Attends with cues to redirect   Comments Pt agrees to PT treatment   Subjective   Subjective "It feels good to move my legs "   Bed Mobility   Rolling R 3  Moderate assistance   Additional items Assist x 1;Bedrails;Verbal cues; Increased time required;LE management   Rolling L 3  Moderate assistance   Additional items Assist x 1;Bedrails; Increased time required;Verbal cues;LE management   Supine to Sit 3  Moderate assistance   Additional items Assist x 2;HOB elevated; Bedrails; Increased time required;Verbal cues;LE management   Sit to Supine Unable to assess  (left OOB in chair post session with alarm intact)   Transfers   Sit pivot 2  Maximal assistance   Additional items Assist x 2; Increased time required;Verbal cues  (to drop arm chair )   Additional Comments Pt incontinent of bowel in bed; required pericare and pad change with multiple trials of rolling to both sides  Requires vc and tc for rolling technique      Ambulation/Elevation   Gait pattern Not appropriate   Balance   Static Sitting Poor +   Dynamic Sitting Fair -   Endurance Deficit   Endurance Deficit Yes   Endurance Deficit Description limited sitting tolerance/fatigue   Activity Tolerance   Activity Tolerance Patient limited by fatigue   Nurse Made Aware Per RN, pt appropriate to treat   Exercises   Hip Abduction Sitting;10 reps;AROM; Bilateral  (x2)   Hip Adduction Sitting;10 reps;AROM; Bilateral  (x2)   Knee AROM Long Arc Quad Sitting;10 reps;AROM; Bilateral  (x2)   Ankle Pumps Sitting;10 reps;AROM; Bilateral  (x2)   Heel Cord Stretch Sitting;10 reps;PROM; Bilateral  (x2)   Marching Sitting;10 reps;AAROM; Bilateral  (x2)   Balance training  Pt sat EOB ~4` until room rearranged for safe transfer  Assessment   Prognosis Fair   Problem List Decreased strength;Decreased range of motion;Decreased endurance; Impaired balance;Decreased mobility   Assessment Pt tolerated treatment well and is pleasant and cooperative throughout session  Able to perform multiple trials of rolling, however requires significant verbal and tactile cues to actively participate in correct technique  Pt was able to tolerate sitting EOB with Poor (+) balance and BUE support  Requires max A x1 for scooting toward EOB, however pt does initiate movement  Pt requires max A x2 sit pivot transfer to drop arm chair with BLE knees blocked for safety  Tolerates LE exercises well in sitting  Will continue to benefit from ongoing skilled PT to maximize her functional mobility and increase her level of independence  Recommending rehab at time of discharge when medically appropriate  Goals   Patient Goals to get OOB to chair for lunch   STG Expiration Date 09/04/19   Short Term Goal #1 Pt will be able to: (1) perform bed mobility with min A (2) tolerate sitting EOB for at least 20` with Fair balance (3) perform SPT with mod A x2 (4) perform lateral scooting along EOB with min A     Treatment Day 7   Plan   Treatment/Interventions Functional transfer training;LE strengthening/ROM; Therapeutic exercise; Endurance training;Patient/family training;Equipment eval/education; Bed mobility   Progress Slow progress, decreased activity tolerance   PT Frequency (3-5x/week)   Recommendation   Recommendation Short-term skilled PT   Equipment Recommended   (pending progress)   Darci Boyd, PT,DPT

## 2019-08-26 NOTE — PROGRESS NOTES
Progress Note Dena KAHN Hartranft 78 y o  female MRN: [id number]    Unit/Bed#: [facility] 824-01 Encounter: [id number]      Assessment:  78 F with laparoscopic VHR complicated by small bowel injury, now s/p exploratory laparotomy, mesh explantation, SBR, and abdominal closure, also with candidal fungemia  Severe protein-calorie malnutrition in the setting of chronic illness  <50% energy intake needs met > 5 days" treated by ensure clear supplements  VSS  Afebrile  Incision cl  Dry  Intact  abd soft/ nontender/ nondistended  Plan:  -Dysphagia 2 diet  -PT/OT  -IVF 60cc/h per nephro appreciate recs  -replete lytes as needed   -discharge planning  -malnutrition, holding tube feeds for now  dietary following  Appreciate recs  Subjective:   Reported feeling better today  Denied any nausea, vomiting, or abdominal pain  Objective:     Vitals: Blood pressure 134/78, pulse 100, temperature 98 7 °F (37 1 °C), resp  rate 18, height 5' 4" (1 626 m), weight 68 9 kg (151 lb 12 8 oz), SpO2 95 %  ,Body mass index is 26 06 kg/m²  Intake/Output Summary (Last 24 hours) at 8/26/2019 0556  Last data filed at 8/25/2019 2300  Gross per 24 hour   Intake 2426 25 ml   Output 2500 ml   Net -73 75 ml       Physical Exam  General: NAD  HEENT: NC/AT  MMM  Cv: RRR  Lungs: normal effort  Ab: Soft, NT/ND  Incision clean, dry, intact     Ex: no CCE  Neuro: AAOx3    Scheduled Meds:  Current Facility-Administered Medications:  acetaminophen 650 mg Oral Q6H PRN Bertha Mullen PA-C    bisacodyl 10 mg Rectal Daily PRN Bertha Mullen PA-C    escitalopram 10 mg Oral Daily Tanya Cifuentes PA-C    folic acid 1 mg Oral Daily Tanya Cifuentes PA-C    gabapentin 100 mg Oral HS Bertha Mullen PA-C    guaiFENesin 200 mg Oral Q4H PRN Sheron Krill, DO    heparin (porcine) 5,000 Units Subcutaneous [facility] Bertha Mullen PA-C    HYDROmorphone 0 2 mg Intravenous Q4H PRN Bertha Mullen PA-C    lidocaine 1 patch Topical Daily Earnie Meetmaria teresa IMELDA Fam    melatonin 3 mg Oral HS Rhona Pedroza PA-C    methocarbamol 500 mg Oral Q6H PRN Sony Guevara PA-C    metoclopramide 10 mg Intravenous Q6H Jessica Diaz MD    omeprazole (PRILOSEC) suspension 2 mg/mL 20 mg Oral BID AC Titus Fam PA-C    ondansetron 4 mg Intravenous Q4H PRN Earneschance Guevara PA-C    oxyCODONE 2 5 mg Oral Q4H PRN Earneschance Guevara PA-C    Or        oxyCODONE 5 mg Oral Q4H PRN Earneschance Guevara PA-C    pravastatin 20 mg Oral Daily With Marshall Pacheco PA-C    saccharomyces boulardii 250 mg Oral BID Sony Guevara PA-C    saliva substitute 5 spray Mouth/Throat PRN Candance Netter, MD    sodium chloride 60 mL/hr Intravenous Continuous Liborio Moreira MD Last Rate: 75 mL/hr (08/25/19 1650)   tamsulosin 0 4 mg Oral Daily With Dinner Rhona Pedroza PA-C    vancomycin 125 mg Oral Q12H [facility] Js Montgomery MD      Continuous Infusions:  sodium chloride 60 mL/hr Last Rate: 75 mL/hr (08/25/19 1650)     PRN Meds:   acetaminophen    bisacodyl    guaiFENesin    HYDROmorphone    methocarbamol    ondansetron    oxyCODONE **OR** oxyCODONE    saliva substitute      Invasive Devices     Peripherally Inserted Central Catheter Line            PICC Line 07/65/29 Right Basilic 11 days          Drain            Urethral Catheter Latex 16 Fr  3 days                Lab, Imaging and other studies: I have personally reviewed pertinent reports      VTE Pharmacologic Prophylaxis: Heparin  VTE Mechanical Prophylaxis: sequential compression device

## 2019-08-27 VITALS
HEART RATE: 101 BPM | HEIGHT: 64 IN | DIASTOLIC BLOOD PRESSURE: 78 MMHG | TEMPERATURE: 98.3 F | WEIGHT: 143.96 LBS | RESPIRATION RATE: 18 BRPM | OXYGEN SATURATION: 97 % | SYSTOLIC BLOOD PRESSURE: 141 MMHG | BODY MASS INDEX: 24.58 KG/M2

## 2019-08-27 PROBLEM — E43 SEVERE PROTEIN-CALORIE MALNUTRITION (HCC): Status: ACTIVE | Noted: 2019-08-27

## 2019-08-27 LAB
ANION GAP SERPL CALCULATED.3IONS-SCNC: 6 MMOL/L (ref 4–13)
ANISOCYTOSIS BLD QL SMEAR: PRESENT
BASOPHILS # BLD MANUAL: 0.09 THOUSAND/UL (ref 0–0.1)
BASOPHILS NFR MAR MANUAL: 1 % (ref 0–1)
BUN SERPL-MCNC: 4 MG/DL (ref 5–25)
CALCIUM SERPL-MCNC: 8.1 MG/DL (ref 8.3–10.1)
CHLORIDE SERPL-SCNC: 105 MMOL/L (ref 100–108)
CO2 SERPL-SCNC: 28 MMOL/L (ref 21–32)
CREAT SERPL-MCNC: 0.25 MG/DL (ref 0.6–1.3)
EOSINOPHIL # BLD MANUAL: 0.45 THOUSAND/UL (ref 0–0.4)
EOSINOPHIL NFR BLD MANUAL: 5 % (ref 0–6)
ERYTHROCYTE [DISTWIDTH] IN BLOOD BY AUTOMATED COUNT: 17.2 % (ref 11.6–15.1)
GFR SERPL CREATININE-BSD FRML MDRD: 124 ML/MIN/1.73SQ M
GLUCOSE SERPL-MCNC: 90 MG/DL (ref 65–140)
HCT VFR BLD AUTO: 39.1 % (ref 34.8–46.1)
HGB BLD-MCNC: 12.1 G/DL (ref 11.5–15.4)
LYMPHOCYTES # BLD AUTO: 1.27 THOUSAND/UL (ref 0.6–4.47)
LYMPHOCYTES # BLD AUTO: 14 % (ref 14–44)
MCH RBC QN AUTO: 27.4 PG (ref 26.8–34.3)
MCHC RBC AUTO-ENTMCNC: 30.9 G/DL (ref 31.4–37.4)
MCV RBC AUTO: 89 FL (ref 82–98)
METAMYELOCYTES NFR BLD MANUAL: 1 % (ref 0–1)
MONOCYTES # BLD AUTO: 0.73 THOUSAND/UL (ref 0–1.22)
MONOCYTES NFR BLD: 8 % (ref 4–12)
NEUTROPHILS # BLD MANUAL: 5.8 THOUSAND/UL (ref 1.85–7.62)
NEUTS BAND NFR BLD MANUAL: 1 % (ref 0–8)
NEUTS SEG NFR BLD AUTO: 63 % (ref 43–75)
NRBC BLD AUTO-RTO: 0 /100 WBCS
PLATELET # BLD AUTO: 303 THOUSANDS/UL (ref 149–390)
PLATELET BLD QL SMEAR: ADEQUATE
PMV BLD AUTO: 10.8 FL (ref 8.9–12.7)
POLYCHROMASIA BLD QL SMEAR: PRESENT
POTASSIUM SERPL-SCNC: 3.5 MMOL/L (ref 3.5–5.3)
RBC # BLD AUTO: 4.41 MILLION/UL (ref 3.81–5.12)
RBC MORPH BLD: PRESENT
SMUDGE CELLS BLD QL SMEAR: PRESENT
SODIUM SERPL-SCNC: 139 MMOL/L (ref 136–145)
VARIANT LYMPHS # BLD AUTO: 7 %
WBC # BLD AUTO: 9.07 THOUSAND/UL (ref 4.31–10.16)

## 2019-08-27 PROCEDURE — 97530 THERAPEUTIC ACTIVITIES: CPT

## 2019-08-27 PROCEDURE — 99232 SBSQ HOSP IP/OBS MODERATE 35: CPT | Performed by: NURSE PRACTITIONER

## 2019-08-27 PROCEDURE — 92526 ORAL FUNCTION THERAPY: CPT

## 2019-08-27 PROCEDURE — NC001 PR NO CHARGE: Performed by: SURGERY

## 2019-08-27 PROCEDURE — 85007 BL SMEAR W/DIFF WBC COUNT: CPT | Performed by: STUDENT IN AN ORGANIZED HEALTH CARE EDUCATION/TRAINING PROGRAM

## 2019-08-27 PROCEDURE — 80048 BASIC METABOLIC PNL TOTAL CA: CPT | Performed by: STUDENT IN AN ORGANIZED HEALTH CARE EDUCATION/TRAINING PROGRAM

## 2019-08-27 PROCEDURE — 97110 THERAPEUTIC EXERCISES: CPT

## 2019-08-27 PROCEDURE — 99232 SBSQ HOSP IP/OBS MODERATE 35: CPT | Performed by: INTERNAL MEDICINE

## 2019-08-27 PROCEDURE — 99024 POSTOP FOLLOW-UP VISIT: CPT | Performed by: SURGERY

## 2019-08-27 PROCEDURE — 85027 COMPLETE CBC AUTOMATED: CPT | Performed by: STUDENT IN AN ORGANIZED HEALTH CARE EDUCATION/TRAINING PROGRAM

## 2019-08-27 RX ORDER — OXYCODONE HYDROCHLORIDE 5 MG/1
2.5 TABLET ORAL EVERY 4 HOURS PRN
Qty: 20 TABLET | Refills: 0
Start: 2019-08-27 | End: 2019-08-27

## 2019-08-27 RX ORDER — TAMSULOSIN HYDROCHLORIDE 0.4 MG/1
0.4 CAPSULE ORAL
Qty: 20 CAPSULE | Refills: 0
Start: 2019-08-27 | End: 2019-11-04 | Stop reason: SDUPTHER

## 2019-08-27 RX ORDER — METHOCARBAMOL 500 MG/1
500 TABLET, FILM COATED ORAL EVERY 6 HOURS PRN
Qty: 20 TABLET | Refills: 0
Start: 2019-08-27 | End: 2019-08-27

## 2019-08-27 RX ORDER — SACCHAROMYCES BOULARDII 250 MG
250 CAPSULE ORAL 2 TIMES DAILY
Qty: 10 CAPSULE | Refills: 0
Start: 2019-08-27 | End: 2019-10-14 | Stop reason: ALTCHOICE

## 2019-08-27 RX ORDER — LIDOCAINE 50 MG/G
1 PATCH TOPICAL DAILY
Qty: 30 PATCH | Refills: 0
Start: 2019-08-28 | End: 2019-08-27

## 2019-08-27 RX ORDER — ACETAMINOPHEN 325 MG/1
650 TABLET ORAL EVERY 6 HOURS PRN
Qty: 30 TABLET | Refills: 0
Start: 2019-08-27 | End: 2019-10-14 | Stop reason: ALTCHOICE

## 2019-08-27 RX ORDER — MIRTAZAPINE 7.5 MG/1
7.5 TABLET, FILM COATED ORAL
Qty: 30 TABLET | Refills: 0 | Status: SHIPPED | OUTPATIENT
Start: 2019-08-27 | End: 2019-11-04 | Stop reason: SDUPTHER

## 2019-08-27 RX ORDER — METHOCARBAMOL 500 MG/1
500 TABLET, FILM COATED ORAL EVERY 6 HOURS PRN
Qty: 20 TABLET | Refills: 0 | Status: SHIPPED | OUTPATIENT
Start: 2019-08-27 | End: 2019-10-14 | Stop reason: ALTCHOICE

## 2019-08-27 RX ORDER — BISACODYL 10 MG
10 SUPPOSITORY, RECTAL RECTAL DAILY PRN
Qty: 12 SUPPOSITORY | Refills: 0
Start: 2019-08-27 | End: 2019-10-14 | Stop reason: ALTCHOICE

## 2019-08-27 RX ORDER — PRAVASTATIN SODIUM 20 MG
20 TABLET ORAL
Qty: 20 TABLET | Refills: 0
Start: 2019-08-27 | End: 2019-11-04 | Stop reason: SDUPTHER

## 2019-08-27 RX ORDER — LIDOCAINE 50 MG/G
1 PATCH TOPICAL DAILY
Qty: 30 PATCH | Refills: 0 | Status: SHIPPED | OUTPATIENT
Start: 2019-08-28 | End: 2019-10-14 | Stop reason: ALTCHOICE

## 2019-08-27 RX ORDER — LANOLIN ALCOHOL/MO/W.PET/CERES
3 CREAM (GRAM) TOPICAL
Qty: 10 TABLET | Refills: 0
Start: 2019-08-27 | End: 2019-11-04 | Stop reason: SDUPTHER

## 2019-08-27 RX ORDER — SODIUM CHLORIDE, SODIUM GLUCONATE, SODIUM ACETATE, POTASSIUM CHLORIDE, MAGNESIUM CHLORIDE, SODIUM PHOSPHATE, DIBASIC, AND POTASSIUM PHOSPHATE .53; .5; .37; .037; .03; .012; .00082 G/100ML; G/100ML; G/100ML; G/100ML; G/100ML; G/100ML; G/100ML
50 INJECTION, SOLUTION INTRAVENOUS CONTINUOUS
Status: DISCONTINUED | OUTPATIENT
Start: 2019-08-27 | End: 2019-08-27

## 2019-08-27 RX ORDER — MIRTAZAPINE 7.5 MG/1
7.5 TABLET, FILM COATED ORAL
Qty: 30 TABLET | Refills: 0
Start: 2019-08-27 | End: 2019-08-27

## 2019-08-27 RX ORDER — HEPARIN SODIUM 5000 [USP'U]/ML
5000 INJECTION, SOLUTION INTRAVENOUS; SUBCUTANEOUS EVERY 8 HOURS SCHEDULED
Qty: 1 ML | Refills: 0 | Status: SHIPPED | OUTPATIENT
Start: 2019-08-27 | End: 2019-10-14 | Stop reason: ALTCHOICE

## 2019-08-27 RX ORDER — XYLITOL/YERBA SANTA
5 AEROSOL, SPRAY WITH PUMP (ML) MUCOUS MEMBRANE AS NEEDED
Qty: 1 BOTTLE | Refills: 0
Start: 2019-08-27 | End: 2019-10-14 | Stop reason: ALTCHOICE

## 2019-08-27 RX ORDER — OMEPRAZOLE 20 MG/1
20 CAPSULE, DELAYED RELEASE ORAL 2 TIMES DAILY
Qty: 20 CAPSULE | Refills: 0
Start: 2019-08-27 | End: 2019-10-14 | Stop reason: ALTCHOICE

## 2019-08-27 RX ORDER — FOLIC ACID 1 MG/1
1 TABLET ORAL DAILY
Qty: 20 TABLET | Refills: 0
Start: 2019-08-28 | End: 2019-11-04 | Stop reason: SDUPTHER

## 2019-08-27 RX ORDER — OXYCODONE HYDROCHLORIDE 5 MG/1
2.5 TABLET ORAL EVERY 4 HOURS PRN
Qty: 20 TABLET | Refills: 0 | Status: SHIPPED | OUTPATIENT
Start: 2019-08-27 | End: 2019-09-06

## 2019-08-27 RX ADMIN — Medication 20 MG: at 06:16

## 2019-08-27 RX ADMIN — SODIUM CHLORIDE, SODIUM GLUCONATE, SODIUM ACETATE, POTASSIUM CHLORIDE, MAGNESIUM CHLORIDE, SODIUM PHOSPHATE, DIBASIC, AND POTASSIUM PHOSPHATE 50 ML/HR: .53; .5; .37; .037; .03; .012; .00082 INJECTION, SOLUTION INTRAVENOUS at 11:24

## 2019-08-27 RX ADMIN — LIDOCAINE 1 PATCH: 50 PATCH CUTANEOUS at 08:51

## 2019-08-27 RX ADMIN — HEPARIN SODIUM 5000 UNITS: 5000 INJECTION INTRAVENOUS; SUBCUTANEOUS at 06:16

## 2019-08-27 RX ADMIN — VANCOMYCIN HYDROCHLORIDE 125 MG: 500 INJECTION, POWDER, LYOPHILIZED, FOR SOLUTION INTRAVENOUS at 08:51

## 2019-08-27 RX ADMIN — FOLIC ACID 1 MG: 1 TABLET ORAL at 08:51

## 2019-08-27 RX ADMIN — Medication 250 MG: at 08:51

## 2019-08-27 NOTE — NUTRITION
08/27/19 1433   Recommendations/Interventions   Summary Patient's appetite remains poor, most meal completions 0-25% noted  Patient is consuming ensure clear liquid supplements  Continued weight loss noted  Generalized +2 edema noted  Nursing skin care plan reviewed  Interventions Diet: continued as ordered; Supplement continue;EN initiate   Nutrition Recommendations Tube Feeding Recommendation provided;Lab - consider order (specify)  (Secondary to prolonged poor appetite initiate cyclic EN to provide 87% nutritional needs  See EN recommendation from 8/23   Monitor electrolytes  )

## 2019-08-27 NOTE — SOCIAL WORK
Discussion with [facility] patient medically cleared to go ot [facility]  CM spoke with Kassy at [facility]  91  Patient will go to room 4009  [facility]  GRETEL requested [facility] call Dr Clint May for d/c Peer to peer per [facility] request  Number [phone]

## 2019-08-27 NOTE — SOCIAL WORK
CM left voicemail for patients daughter Lannie Kocher about d/c plan  and Patients room number at [facility]  Patient will go to room 4008

## 2019-08-27 NOTE — PROGRESS NOTES
Progress Note Kelsea KAHN Hartranft 78 y o  female MRN: [id number]    [facility] Encounter: [id number]      Assessment/Plan:  1  Frailty  Decreased appetite  encourage po intake, frequent small meals  PT/OT  Encourage oob  Rehab post hospitalization    2  Severe protein malnutrition  Nutrition following  Continue ensure supplementation    3  Depression  GDS 7/15  Started on Remeron 7 5mg po QHS  lexapro discontinued  Na 139  Qtc 454    4  Insomnia  Started on Remeron 7 5mg po QHS  Recommend d/c of Neurontin 100 mg po QHS  Continue melatonin    5  Urinary retention  Urology consulted  Mendoza cath  Flomax 0 4mg po daily    6  Laparoscopic ventral hernia repair  Complicated by small bowel injury  S/p exploratory lap, mesh, small bowel repair with abdominal closure  Management by surgery    7  SIRS  Management by ID  Pt completed 10 days Abx    8  CDiff prophylaxis  Management by ID  vanco 125mg po Q12    9  Delirium precuations  Alert and oriented x3  Provide frequent redirection, reorientation, distraction techniques  Avoid deliriogenic medications such as tramadol, benzodiazepines, anticholinergics,  Benadryl  Treat pain, See geriatric pain protocol  Monitor for constipation and urinary retention  Encourage early and frequent moblization, OOB  Encourage Hydration/ Nutrition  Implement sleep hygiene, limit night time interuptions, group activities        Subjective:   Upon exam patient is oob in recliner chair  She is alert and oriented x3  She is complaining of fatigue  Per nursing patient is drinking ensure, not eating  She is anticipating d/c to rehab  Objective:     Vitals: Blood pressure 141/78, pulse 101, temperature 98 3 °F (36 8 °C), resp  rate 18, height 5' 4" (1 626 m), weight 65 3 kg (143 lb 15 4 oz), SpO2 97 %  ,Body mass index is 24 71 kg/m²        Intake/Output Summary (Last 24 hours) at 8/27/2019 0938  Last data filed at 8/27/2019 0300  Gross per 24 hour   Intake 901 67 ml   Output 1400 ml Net -498 33 ml       Physical Exam:   General : NAD  HEENT : MMM   Heart : Normal rate, no murmur rub or gallop  Lungs : CTA no wheezes, rales or rhonchi  Abdomen : Soft, NT/ND, BS auscultated in all 4 quads  Ext :  no edema  Skin : Pink, warm, dry, age appropriate turgor and mobility  Neuro : Nonfocal  Psych : Alert and O x 3      Invasive Devices     Peripherally Inserted Central Catheter Line            PICC Line 63/62/07 Right Basilic 12 days          Drain            Urethral Catheter Latex 16 Fr  4 days                Lab, Imaging and other studies: I have personally reviewed pertinent reports      VTE Pharmacologic Prophylaxis: Sequential compression device (Venodyne)   VTE Mechanical Prophylaxis: sequential compression device

## 2019-08-27 NOTE — PLAN OF CARE
Problem: Prexisting or High Potential for Compromised Skin Integrity  Goal: Skin integrity is maintained or improved  Description  INTERVENTIONS:  - Identify patients at risk for skin breakdown  - Assess and monitor skin integrity  - Assess and monitor nutrition and hydration status  - Monitor labs (i e  albumin)  - Turn and reposition patient  - Assist with mobility/ambulation  - Relieve pressure over bony prominences  - Avoid friction and shearing  - Provide appropriate hygiene as needed including keeping skin clean and dry  - Evaluate need for skin moisturizer/barrier cream  - Collaborate with interdisciplinary team (i e  Nutrition, Rehabilitation, etc )   - Patient/family teaching   Outcome: Progressing     Problem: Potential for Falls  Goal: Patient will remain free of falls  Description  INTERVENTIONS:  - Assess patient frequently for physical needs  -  Identify cognitive and physical deficits and behaviors that affect risk of falls    -  [place] fall precautions as indicated by assessment   - Educate patient/family on patient safety including physical limitations  - Instruct patient to call for assistance with activity based on assessment  - Modify environment to reduce risk of injury  - Consider OT/PT consult to assist with strengthening/mobility  Outcome: Progressing     Problem: CARDIOVASCULAR - ADULT  Goal: Maintains optimal cardiac output and hemodynamic stability  Description  INTERVENTIONS:  - Monitor I/O, vital signs and rhythm  - Monitor for S/S and trends of decreased cardiac output i e  bleeding, hypotension  - Administer and titrate ordered vasoactive medications to optimize hemodynamic stability  - Assess quality of pulses, skin color and temperature  - Instruct patient to report change in severity of symptoms   Outcome: Progressing     Problem: RESPIRATORY - ADULT  Goal: Achieves optimal ventilation and oxygenation  Description  INTERVENTIONS:  - Assess for changes in respiratory status  - Assess for changes in mentation and behavior  - Position to facilitate oxygenation and minimize respiratory effort  - Oxygen administration by appropriate delivery method based on oxygen saturation (per order) or ABGs  - Encourage broncho-pulmonary hygiene including cough, deep breathe, Incentive Spirometry  - Assess the need for suctioning and aspirate as neede  - Respiratory Therapy support as indicated   Outcome: Progressing     Problem: GASTROINTESTINAL - ADULT  Goal: Maintains or returns to baseline bowel function  Description  INTERVENTIONS:  - Assess bowel function  - Encourage oral fluids to ensure adequate hydration  - Administer IV fluids as ordered to ensure adequate hydration  - Administer ordered medications as needed  - Encourage mobilization and activity  - Nutrition services referral to assist patient with appropriate food choices  Outcome: Progressing  Goal: Maintains adequate nutritional intake  Description  INTERVENTIONS:  Npo   - Identify factors contributing to decreased intake, treat as appropriate  - Monitor I&O, WT and lab values  - Obtain nutrition services referral as needed   Outcome: Progressing     Problem: METABOLIC, FLUID AND ELECTROLYTES - ADULT  Goal: Electrolytes maintained within normal limits  Description  INTERVENTIONS:  - Monitor labs and assess patient for signs and symptoms of electrolyte imbalances  - Administer electrolyte replacement as ordered  - Monitor response to electrolyte replacements, including repeat lab results as appropriate  - Instruct patient on fluid and nutrition as appropriate  Outcome: Progressing  Goal: Fluid balance maintained  Description  INTERVENTIONS:  - Monitor labs and assess for signs and symptoms of volume excess or deficit  - Monitor I/O and WT  - Instruct patient on fluid and nutrition as appropriate  Outcome: Progressing     Problem: SKIN/TISSUE INTEGRITY - ADULT  Goal: Skin integrity remains intact  Description  INTERVENTIONS  - Identify patients at risk for skin breakdown  - Assess and monitor skin integrity  - Assess and monitor nutrition and hydration statu  - Turn and reposition patient  - Assist with mobility/ambulation  - Relieve pressure over bony prominences  - Avoid friction and shearing  - Provide appropriate hygiene as needed including keeping skin clean and dry  - Evaluate need for skin moisturizer/barrier cream  - Collaborate with interdisciplinary team (i e  Nutrition, Rehabilitation, etc )   - Patient/family teaching   Outcome: Progressing  Goal: Oral mucous membranes remain intact  Description  INTERVENTIONS  - Assess oral mucosa and hygiene practices  - Implement preventative oral hygiene regimen  - Implement oral medicated treatments as ordered  - Initiate Nutrition services referral as needed  Outcome: Progressing  Goal: Incision(s), wounds(s) or drain site(s) healing without S/S of infection  Description  INTERVENTIONS  - Assess and document risk factors for skin impairment   - Assess and document dressing, incision, wound bed, drain sites and surrounding tissue  - Consider nutrition services referral as needed  - Oral mucous membranes remain intact  - Provide patient/ family education  Outcome: Progressing     Problem: MUSCULOSKELETAL - ADULT  Goal: Maintain or return mobility to safest level of function  Description  INTERVENTIONS:  - Assess patient's ability to carry out ADLs; assess patient's baseline for ADL function and identify physical deficits which impact ability to perform ADLs (bathing, care of mouth/teeth, toileting, grooming, dressing, etc )  - Assess/evaluate cause of self-care deficits   - Assess range of motion  - Assess patient's mobility; develop plan if impaired  - Assess patient's need for assistive devices and provide as appropriate  - Encourage maximum independence but intervene and supervise when necessary  - Involve family in performance of ADLs  - Assess for home care needs following discharge   - Request OT consult to assist with ADL evaluation and planning for discharge  - Provide patient education as appropriate  Outcome: Progressing     Problem: Nutrition/Hydration-ADULT  Goal: Nutrient/Hydration intake appropriate for improving, restoring or maintaining nutritional needs  Description  Monitor and assess patient's nutrition/hydration status for malnutrition (ex- brittle hair, bruises, dry skin, pale skin and conjunctiva, muscle wasting, smooth red tongue, and disorientation)  Collaborate with interdisciplinary team and initiate plan and interventions as ordered  Monitor patient's weight and dietary intake as ordered or per policy  Utilize nutrition screening tool and intervene per policy  Determine patient's food preferences and provide high-protein, high-caloric foods as appropriate       INTERVENTIONS:  - Monitor oral intake, urinary output, labs, and treatment plans  - Assess nutrition and hydration status and recommend course of action  - Evaluate amount of meals eaten  - Assist patient with eating if necessary   - Allow adequate time for meals  - Recommend/ encourage appropriate diets, oral nutritional supplements, and vitamin/mineral supplements  - Order, calculate, and assess calorie counts as needed  - Recommend, monitor, and adjust tube feedings and TPN/PPN based on assessed needs  - Assess need for intravenous fluids  - Provide specific nutrition/hydration education as appropriate  - Include patient/family/caregiver in decisions related to nutrition  Outcome: Progressing     Problem: COPING  Goal: Pt/Family able to verbalize concerns and demonstrate effective coping strategies  Description  INTERVENTIONS:  - Assist patient/family to identify coping skills, available support systems and cultural and spiritual values  - Provide emotional support, including active listening and acknowledgement of concerns of patient and caregivers  - Reduce environmental stimuli, as able  - Provide patient education  - Assess for spiritual pain/suffering and initiate spiritual care, including notification of Pastoral Care or [place] based community as needed  - Assess effectiveness of coping strategies  Outcome: Progressing  Goal: Will report anxiety at manageable levels  Description  INTERVENTIONS:  - Administer medication as ordered  - Teach and encourage coping skills  - Provide emotional support  - Assess patient/family for anxiety and ability to cope  Outcome: Progressing     Problem: Knowledge Deficit  Goal: Patient/family/caregiver demonstrates understanding of disease process, treatment plan, medications, and discharge instructions  Description  Complete learning assessment and assess knowledge base    Interventions:  - Provide teaching at level of understanding  - Provide teaching via preferred learning methods  Outcome: Progressing

## 2019-08-27 NOTE — RESTORATIVE TECHNICIAN NOTE
Restorative Specialist Mobility Note       Activity: Chair     Assistive Device: Other (Comment)(HHA x2)        Repositioned: Up in chair, Sitting             Anti-Embolism Device On:  Bilateral, Sequential compression devices, below knee

## 2019-08-27 NOTE — PLAN OF CARE
Problem: Prexisting or High Potential for Compromised Skin Integrity  Goal: Skin integrity is maintained or improved  Description  INTERVENTIONS:  - Identify patients at risk for skin breakdown  - Assess and monitor skin integrity  - Assess and monitor nutrition and hydration status  - Monitor labs (i e  albumin)  - Turn and reposition patient  - Assist with mobility/ambulation  - Relieve pressure over bony prominences  - Avoid friction and shearing  - Provide appropriate hygiene as needed including keeping skin clean and dry  - Evaluate need for skin moisturizer/barrier cream  - Collaborate with interdisciplinary team (i e  Nutrition, Rehabilitation, etc )   - Patient/family teaching   Outcome: Adequate for Discharge     Problem: Potential for Falls  Goal: Patient will remain free of falls  Description  INTERVENTIONS:  - Assess patient frequently for physical needs  -  Identify cognitive and physical deficits and behaviors that affect risk of falls    -  [place] fall precautions as indicated by assessment   - Educate patient/family on patient safety including physical limitations  - Instruct patient to call for assistance with activity based on assessment  - Modify environment to reduce risk of injury  - Consider OT/PT consult to assist with strengthening/mobility  Outcome: Adequate for Discharge     Problem: CARDIOVASCULAR - ADULT  Goal: Maintains optimal cardiac output and hemodynamic stability  Description  INTERVENTIONS:  - Monitor I/O, vital signs and rhythm  - Monitor for S/S and trends of decreased cardiac output i e  bleeding, hypotension  - Administer and titrate ordered vasoactive medications to optimize hemodynamic stability  - Assess quality of pulses, skin color and temperature  - Instruct patient to report change in severity of symptoms   Outcome: Adequate for Discharge     Problem: RESPIRATORY - ADULT  Goal: Achieves optimal ventilation and oxygenation  Description  INTERVENTIONS:  - Assess for changes in respiratory status  - Assess for changes in mentation and behavior  - Position to facilitate oxygenation and minimize respiratory effort  - Oxygen administration by appropriate delivery method based on oxygen saturation (per order) or ABGs  - Encourage broncho-pulmonary hygiene including cough, deep breathe, Incentive Spirometry  - Assess the need for suctioning and aspirate as neede  - Respiratory Therapy support as indicated   Outcome: Adequate for Discharge     Problem: GASTROINTESTINAL - ADULT  Goal: Maintains or returns to baseline bowel function  Description  INTERVENTIONS:  - Assess bowel function  - Encourage oral fluids to ensure adequate hydration  - Administer IV fluids as ordered to ensure adequate hydration  - Administer ordered medications as needed  - Encourage mobilization and activity  - Nutrition services referral to assist patient with appropriate food choices  Outcome: Adequate for Discharge  Goal: Maintains adequate nutritional intake  Description  INTERVENTIONS:  Npo   - Identify factors contributing to decreased intake, treat as appropriate  - Monitor I&O, WT and lab values  - Obtain nutrition services referral as needed   Outcome: Adequate for Discharge     Problem: METABOLIC, FLUID AND ELECTROLYTES - ADULT  Goal: Electrolytes maintained within normal limits  Description  INTERVENTIONS:  - Monitor labs and assess patient for signs and symptoms of electrolyte imbalances  - Administer electrolyte replacement as ordered  - Monitor response to electrolyte replacements, including repeat lab results as appropriate  - Instruct patient on fluid and nutrition as appropriate  Outcome: Adequate for Discharge  Goal: Fluid balance maintained  Description  INTERVENTIONS:  - Monitor labs and assess for signs and symptoms of volume excess or deficit  - Monitor I/O and WT  - Instruct patient on fluid and nutrition as appropriate  Outcome: Adequate for Discharge     Problem: SKIN/TISSUE INTEGRITY - ADULT  Goal: Skin integrity remains intact  Description  INTERVENTIONS  - Identify patients at risk for skin breakdown  - Assess and monitor skin integrity  - Assess and monitor nutrition and hydration statu  - Turn and reposition patient  - Assist with mobility/ambulation  - Relieve pressure over bony prominences  - Avoid friction and shearing  - Provide appropriate hygiene as needed including keeping skin clean and dry  - Evaluate need for skin moisturizer/barrier cream  - Collaborate with interdisciplinary team (i e  Nutrition, Rehabilitation, etc )   - Patient/family teaching   Outcome: Adequate for Discharge  Goal: Oral mucous membranes remain intact  Description  INTERVENTIONS  - Assess oral mucosa and hygiene practices  - Implement preventative oral hygiene regimen  - Implement oral medicated treatments as ordered  - Initiate Nutrition services referral as needed  Outcome: Adequate for Discharge  Goal: Incision(s), wounds(s) or drain site(s) healing without S/S of infection  Description  INTERVENTIONS  - Assess and document risk factors for skin impairment   - Assess and document dressing, incision, wound bed, drain sites and surrounding tissue  - Consider nutrition services referral as needed  - Oral mucous membranes remain intact  - Provide patient/ family education  Outcome: Adequate for Discharge     Problem: MUSCULOSKELETAL - ADULT  Goal: Maintain or return mobility to safest level of function  Description  INTERVENTIONS:  - Assess patient's ability to carry out ADLs; assess patient's baseline for ADL function and identify physical deficits which impact ability to perform ADLs (bathing, care of mouth/teeth, toileting, grooming, dressing, etc )  - Assess/evaluate cause of self-care deficits   - Assess range of motion  - Assess patient's mobility; develop plan if impaired  - Assess patient's need for assistive devices and provide as appropriate  - Encourage maximum independence but intervene and supervise when necessary  - Involve family in performance of ADLs  - Assess for home care needs following discharge   - Request OT consult to assist with ADL evaluation and planning for discharge  - Provide patient education as appropriate  Outcome: Adequate for Discharge     Problem: Nutrition/Hydration-ADULT  Goal: Nutrient/Hydration intake appropriate for improving, restoring or maintaining nutritional needs  Description  Monitor and assess patient's nutrition/hydration status for malnutrition (ex- brittle hair, bruises, dry skin, pale skin and conjunctiva, muscle wasting, smooth red tongue, and disorientation)  Collaborate with interdisciplinary team and initiate plan and interventions as ordered  Monitor patient's weight and dietary intake as ordered or per policy  Utilize nutrition screening tool and intervene per policy  Determine patient's food preferences and provide high-protein, high-caloric foods as appropriate       INTERVENTIONS:  - Monitor oral intake, urinary output, labs, and treatment plans  - Assess nutrition and hydration status and recommend course of action  - Evaluate amount of meals eaten  - Assist patient with eating if necessary   - Allow adequate time for meals  - Recommend/ encourage appropriate diets, oral nutritional supplements, and vitamin/mineral supplements  - Order, calculate, and assess calorie counts as needed  - Recommend, monitor, and adjust tube feedings and TPN/PPN based on assessed needs  - Assess need for intravenous fluids  - Provide specific nutrition/hydration education as appropriate  - Include patient/family/caregiver in decisions related to nutrition  Outcome: Adequate for Discharge     Problem: COPING  Goal: Pt/Family able to verbalize concerns and demonstrate effective coping strategies  Description  INTERVENTIONS:  - Assist patient/family to identify coping skills, available support systems and cultural and spiritual values  - Provide emotional support, including active listening and acknowledgement of concerns of patient and caregivers  - Reduce environmental stimuli, as able  - Provide patient education  - Assess for spiritual pain/suffering and initiate spiritual care, including notification of Pastoral Care or [place] based community as needed  - Assess effectiveness of coping strategies  Outcome: Adequate for Discharge  Goal: Will report anxiety at manageable levels  Description  INTERVENTIONS:  - Administer medication as ordered  - Teach and encourage coping skills  - Provide emotional support  - Assess patient/family for anxiety and ability to cope  Outcome: Adequate for Discharge     Problem: Knowledge Deficit  Goal: Patient/family/caregiver demonstrates understanding of disease process, treatment plan, medications, and discharge instructions  Description  Complete learning assessment and assess knowledge base    Interventions:  - Provide teaching at level of understanding  - Provide teaching via preferred learning methods  Outcome: Adequate for Discharge

## 2019-08-27 NOTE — DISCHARGE SUMMARY
Discharge Summary - Marquis Justin Chung 78 y o  female MRN: [id number]    [facility] Encounter: [id number]    Admission Date:   Admission Orders (From admission, onward)     Ordered        08/02/19 1245  Inpatient Admission  Once                     Admitting Diagnosis: Incisional hernia [K43 2]  Acute respiratory failure with hypoxemia ([address] ) [J96 01]    HPI: per Dr Viviana Sanchez;  "  71-year-old female with a history of esophagectomy for Eagle's esophagus with high-grade dysplasia, presents for follow-up regarding symptomatic incisional hernia   Currently she does have intermittent pain mostly in the more superior portion of the hernia   Denies any nausea vomiting   No fevers or chills   She still hoping to proceed with the surgery   She is aware of the findings on the x-ray knows she will be getting a CT scan soon  [place] Phoenix is having lot of personal and family issues right now that is causing her lot of emotional stress   Currently denies any worsening shortness of breath or chest pain  "            Procedures Performed:   Orders Placed This Encounter   Procedures    Central Line    Chest Tube Insertion    Intubation       Summary of Hospital Course: 77 y/o female admitted with a complicated medical history and multiple surgeries,  Noted to have failure to thrive  Had YUNG which has since resolved, laparoscopic repair of ventral hernia  Delerium has cleared and we are encouraging her to take more of a diet, has increased amount of fluids she will take in  Will keep PICC line in for now secondary to very bad stick and should they need more blood work will have access  For details of her stay, please refer to medical records  Per ID no further Antibiotics required, YUNG resolved so no follow up with Nephrology required at this time  Will follow up with surgery in 2 weeks as well as her PCP      Significant Findings, Care, Treatment and Services Provided: Xr Chest Portable    Result Date: 8/8/2019  Impression: Slight retraction of left pleural tube without significant interval change Workstation performed: [id number]     Xr Chest Portable    Result Date: 8/8/2019  Impression: 1  Interval retraction of left subclavian catheter, tip in superior cavoatrial junction/right atrium 2   Bibasal opacities and small effusions 3  Small left pneumothorax Workstation performed: [id number]     Xr Chest Portable    Result Date: 8/7/2019  Impression: Lines and tubes as above  Improving bilateral airspace opacities  Small pleural effusions  Decreased left pneumothorax  Workstation performed: [id number]     Xr Chest Portable    Result Date: 8/7/2019  Impression: 1  Interval increase in left apical pneumothorax, though still small  2   Persistent small right pleural effusion with compressive atelectasis  3   Pulmonary vascular congestion  The study was marked in EPIC for significant notification  Workstation performed: [id number]     Xr Chest Portable    Result Date: 8/7/2019  Impression: Left basilar chest tube again noted  Small left apical pneumothorax, stable  Small to moderate-sized bilateral pleural effusions, larger on the right  Left IJ line tip is coiled up probably in the region of the right brachiocephalic vein, unchanged  Consider repositioning if feasible  Workstation performed: [id number]     Xr Chest Portable    Result Date: 8/5/2019  Impression: Slight interval increase size of small left apical pneumothorax  Left-sided chest tube remains in place  Workstation performed: [id number]     Xr Chest Portable    Result Date: 8/4/2019  Impression: Left-sided chest tube remains in place, with unchanged trace left apical pneumothorax  Unchanged left lower lobe airspace opacity  The left-sided IJ line is again noted to course superiorly into the right brachiocephalic vein, stable in position  Workstation performed: [id number]     Xr Chest Portable    Result Date: 8/2/2019  Impression: 1    Left lung reexpansion after placement of chest tube with resolution of mediastinal shift  Sliver-like pneumothorax at the apex with probable small residual anterior component at the costophrenic sulcus  2   Patchy bilateral consolidations with basilar predominance persist   Persistent small left right effusion  3   Left IJ catheter deviating peripherally into the right brachiocephalic vein  Dr Georgia Lamas is aware  4   Adequately positioned endotracheal and enteric tubes  Workstation performed: [id number]     Xr Chest Portable    Result Date: 8/2/2019  Impression: Increasing bibasilar consolidations with tiny effusions  This could represent aspiration pneumonitis, multifocal pneumonia, or bland atelectasis related to effusions  Risk factors for aspiration include recent operation and prior gastric pull-through demonstrated on recent chest CT  Rounded focus of air under the right hemiabdomen nonspecific given recency of surgery  Potentially related to prior insufflation /surgery  Note: I personally discussed this study with Youisf Floyd on 8/2/2019 at 8:41 AM   Also discussed with Ilia Peterson and Dr Steven Feliz from surgery  Workstation performed: [id number]     Xr Abdomen 1 View Kub    Result Date: 8/2/2019  Impression: Postoperative changes after laparoscopic hernia repair without evidence for ileus or obstruction  Rounded focus of lucency under the right diaphragm concerning for intra-abdominal air on recent chest radiograph is not well demonstrated on the current image  Chest radiograph finding is nonspecific in the 2nd postoperative day  Subcutaneous emphysema related to insufflation  Workstation performed: [id number]     Xr Chest 1 View    Result Date: 8/8/2019  Impression: 1  Persistent bilateral airspace disease compatible with infiltrates and/or pneumonia  2   No pneumothorax  3   2nd left chest tube  4   Left subclavian line with its tip overlying the right atrium    Left IJ line with its tip overlying the brachiocephalic vein unchanged  5   NG tube coiled upon itself in the upper abdomen  Workstation performed: [id number]     X-ray Chest 1 View    Result Date: 8/2/2019  Impression: Endotracheal tube above the heri  Left-sided IJ line is again noted to course superiorly along the right brachiocephalic vein  Left-sided chest tube in place with a grossly stable small left apical pneumothorax  Grossly stable patchy airspace disease bilaterally  Workstation performed: [id number]     Ct Chest Abdomen Pelvis W Contrast    Result Date: 8/7/2019  Impression: 1  Profound mucosal wall thickening of the small bowel adjacent to the anastomotic staple line  Infectious versus ischemic in nature  2   Left pneumothorax 3  Ascites, with peritoneal enhancement and thickening in the right upper quadrant, may represent infectious peritonitis 4  Short segment intussusception involving the transverse colon without evidence of obstruction 5  Left IJ catheter tip coils and terminates in the right brachiocephalic vein 6  Anasarca 7  Small bilateral pleural effusions The study was marked in EPIC for immediate notification  Workstation performed: [id number]     Cta Chest Ct Abdomen Pelvis W Contrast    Result Date: 8/2/2019  Impression: No evidence for acute pulmonary embolus  Bilateral lower lobe are atelectasis and moderate dependent pleural fluid  Small left pneumothorax, indwelling left chest tube  Recent abdominal surgery for ventral hernia repair  Large anterior mesh repair bulging at the ventral incision with underlying loculated air fluid interposed between the mesh and omentum  Also moderate volume of peritoneal fluid and scattered foci of pneumoperitoneum throughout the upper and lower quadrants with small amount of peritoneal enhancement  Long loop of proximal jejunum showing diffuse abnormal fold thickening without luminal distention  Raising suspicion for possible ischemic or inflammatory etiology  No pneumatosis  Yoder segment of more distal small bowel also showing mild thickening in the midline pelvis  The study was marked in [facility] for immediate notification  Workstation performed: [id number]     Xr Chest Portable Icu    Result Date: 8/6/2019  Impression: 1  Improved small left apical pneumothorax  2   Increased right pleural effusion and stable left basilar consolidation/ effusion  Workstation performed: [id number]     Xr Chest Portable Icu    Result Date: 8/2/2019  Impression: 1  Large left pneumothorax with mild rightward mediastinal shift concerning for tension  2   Increasing multifocal consolidations in both lungs could represent increasing multifocal infection, edema, or developing ARDS  3   Left IJ catheter tip is located peripherally, deviating into the right brachiocephalic vein  Recommend repositioning  4   Enteric tube coiled over the patient's chest, likely within the gastric pull-through  Recommend repositioning  5   Adequate positioning of endotracheal tube    I personally discussed this study with Sandra Cordero on 8/2/2019 at 10:20 AM  Workstation performed: [id number]       Complications: poor appetite, refusing food but now taking fluids and drinking    Discharge Diagnosis: S/P Intermittent pain in hernia  S/P Laparoscopic VHR  Candidal fungemia  Dysphagia  Severe Protein Malnutrition  Depression  Insomnia  Urinary retention  SIRS  C-diff  Delerium    Resolved Problems  Date Reviewed: 8/11/2019          Resolved    Severe sepsis with septic shock (CODE) ([facility] ) 8/9/2019     Resolved by  Danita Echols PA-C    Acute respiratory failure with hypoxemia ([address] ) 8/9/2019     Resolved by  Danita Echols PA-C    Acute renal failure (ARF) ([facility] ) 8/9/2019     Resolved by  ILDA Valderrama-BAR    Shock ([facility] ) 8/9/2019     Resolved by  [place] IMELDA COHN          Condition at Discharge: fair and improving        Discharge instructions/Information to patient and family:   See after visit summary for information provided to patient and family  Provisions for Follow-Up Care:  See after visit summary for information related to follow-up care and any pertinent home health orders  PCP: Tomma Lefort, DO    Disposition: LTAC    Planned Readmission: No    Discharge Statement   I spent 30 minutes discharging the patient  This time was spent on the day of discharge  I had direct contact with the patient on the day of discharge  Additional documentation is required if more than 30 minutes were spent on discharge  Discharge Medications:  See after visit summary for reconciled discharge medications provided to patient and family

## 2019-08-27 NOTE — PHYSICAL THERAPY NOTE
Physical Therapy Progress Note        Amanda Hannah, PTA       08/27/19 1124   Pain Assessment   Pain Assessment Pt sleeping - easy to arouse   General   Chart Reviewed Yes   Cognition   Overall Cognitive Status Impaired   Subjective   Subjective pt sleeping in recliner, ask to be back in bed ,    Bed Mobility   Sit to Supine 2  Maximal assistance   Additional items Assist x 2; Increased time required;LE management;Verbal cues   Additional Comments back to bed sit to supine , sit pivot transfers    Transfers   Sit pivot 2  Maximal assistance   Additional items Assist x 2; Increased time required;Verbal cues   Additional Comments blocking BL knees sit pivot transfers MaxAX2    Ambulation/Elevation   Gait pattern Not appropriate   Balance   Static Sitting Poor   Dynamic Sitting Fair -   Endurance Deficit   Endurance Deficit Yes   Activity Tolerance   Activity Tolerance Patient limited by fatigue;Patient limited by pain   Exercises   Quad Sets Sitting;Supine;10 reps   Heelslides Supine;10 reps   Glute Sets Supine;15 reps   Hip Abduction Supine;10 reps   Hip Adduction Supine;10 reps   Knee AROM Long Arc Quad Sitting;10 reps   Ankle Pumps Supine;15 reps   Heel Cord Stretch Supine;PROM   Balance training  pt sat EOB for core strengthening , sitting balance before supine lying    Assessment   Prognosis Fair   Problem List Decreased strength;Decreased range of motion;Decreased endurance; Impaired balance;Decreased mobility; Decreased coordination; Impaired judgement;Decreased safety awareness;Pain   Assessment pt perform skilled PT focus on inc functional activities in sitting sit pivot transfers , sitting and standing thex ex's to her tolerance , Pt very fatigue and weak during transfers and seem a little SOB at times  Pt in supnie position with all needs in reach   Pt being d/c today to LTec    Barriers to Discharge Inaccessible home environment;Decreased caregiver support   Goals   Patient Goals to go back to bed    STG Expiration Date 09/04/19   Recommendation   Recommendation Short-term skilled PT

## 2019-08-27 NOTE — TRANSPORTATION MEDICAL NECESSITY
Section I - General Information    Name of Patient: Lloyd Appiah                 : 1950    Medicare #: [id number]  Transport Date: 19 (PCS is valid for round trips on this date and for all repetitive trips in the 60-day range as noted below )  Origin: [address] [facility]                                                         Destination: [facility]  Is the pt's stay covered under Medicare Part A (PPS/DRG)   [x]     Closest appropriate facility? If no, why is transport to more distant facility required? Yes  If hospice pt, is this transport related to pt's terminal illness? NA       Section II - Medical Necessity Questionnaire  Ambulance transportation is medically necessary only if other means of transport are contraindicated or would be potentially harmful to the patient  To meet this requirement, the patient must either be "bed confined" or suffer from a condition such that transport by means other than ambulance is contraindicated by the patient's condition  The following questions must be answered by the medical professional signing below for this form to be valid:    1)  Describe the MEDICAL CONDITION (physical and/or mental) of this patient AT [address] [place] [address] that requires the patient to be transported in an ambulance and why transport by other means is contraindicated by the patient's condition:Unable to sit for time needed to transport    2) Is the patient "bed confined" as defined below? No  To be "be confined" the patient must satisfy all three of the following conditions: (1) unable to get up from bed without Assistance; AND (2) unable to ambulate; AND (3) unable to sit in a chair or wheelchair  3) Can this patient safely be transported by car or wheelchair van (i e , seated during transport without a medical attendant or monitoring)?    No    4) In addition to completing questions 1-3 above, please check any of the following conditions that apply*:   *Note: supporting documentation for any boxes checked must be maintained in the patient's medical records  If hosp-hosp transfer, describe services needed at 2nd facility not available at 1st facility? Danger to self/others  Medical attendant required   Unable to tolerate seated position for time needed to transport   Unable to sit in a chair or wheelchair due to decubitus ulcers or other wounds       Section III - Signature of Physician or Healthcare Professional  I certify that the above information is true and correct based on my evaluation of this patient, and represent that the patient requires transport by ambulance and that other forms of transport are contraindicated  I understand that this information will be used by the Centers for Medicare and Medicaid Services (CMS) to support the determination of medical necessity for ambulance services, and I represent that I have personal knowledge of the patient's condition at time of transport  []  If this box is checked, I also certify that the patient is physically or mentally incapable of signing the ambulance service's claim and that the institution with which I am affiliated has furnished care, services, or assistance to the patient  My signature below is made on behalf of the patient pursuant to 42 CFR §424 36(b)(4)  In accordance with 42 CFR §424 37, the specific reason(s) that the patient is physically or mentally incapable of signing the claim form is as follows:  [address] of Physician* or Healthcare Professional______________________________________________________________  Signature Date 08/27/19 (For scheduled repetitive transports, this form is not valid for transports performed more than 60 days after this date)    Printed Name & Credentials of Physician or Healthcare Professional (MD, DO, RN, etc )________________________________  *Form must be signed by patient's attending physician for scheduled, repetitive transports   For non-repetitive, unscheduled ambulance transports, if unable to obtain the signature of the attending physician, any of the following may sign (choose appropriate option below)  [] Physician Assistant []  Clinical Nurse Specialist [x]  Registered Nurse  []  Nurse Practitioner  [x] Discharge Planner

## 2019-08-27 NOTE — PROGRESS NOTES
Progress Note - Infectious Disease   Kristel Chung 78 y o  female MRN: [id number]  Unit/Bed#: [facility] 824-01 Encounter: [id number]      Impression/Plan:  1  Systemic inflammatory response syndrome- Possibly related the patient's fungemia   Possibly secondary to peritonitis   The patient is now improved and moved out of the intensive care unit  The leukocytosis has persisted   Stool for C diff is negative   The patient completed more than 10 days of intravenous antibacterials   The white blood cell count has now been decreasing  No repeat CBC with diff today   The procalcitonin level has decreased substantially  -antibiotics as below  -monitor CBC with diff  -supportive care     2  Fungemia-possibly all translocation across the gut wall in the setting of peritonitis   Possible catheter related bacteremia although this is less likely as the blood cultures were from around the time of admission   The central line has been changed   Fungemia has cleared   Ophthalmology exam negative   The organism is not resistant to fluconazole   The patient has completed 14 days of high-dose fluconazole since the 1st negative blood culture  -no additional anti fungal treatment for now  -monitor for recurrent fever     3  Possible UTI-2 gram-negative rods in VRE   Unclear significance of the VRE   the patient is asymptomatic but she did have a brisk leukocytosis   She seems to be tolerating the cefepime without difficulty    She completed 7 days of antibiotics and is now off all antibiotics  -no additional urinary directed antibiotics  -no additional ID workup for now     4  Peritonitis-status post exploration and repair   No cultures obtained at the time of the surgery   Patient does have significant turbid drainage from the superior aspect of the wound   Patient has been maintained on broad antibiotics   The patient continues to have a brisk leukocytosis   Significant ascites noted   She completed more than 10 days of intravenous antibiotics, and is now back on antibiotics for UTI as above   White cell count has continued to decrease and almost normalized  Repeat x-ray without evidence of bowel obstruction  -monitor CBC with diff  -serial abdominal exams  -close surgical follow-up     5  Acute hypoxic respiratory failure-likely secondary to hemoptysis aspiration and a pneumothorax   Less likely pneumonia  Janes Sample is currently not on oxygen support     -oxygen support as needed  -monitor respiratory status     6  Acute kidney injury-suspect urinary retention was playing a significant role  The patient now has a Mendoza catheter in place in the renal function is normalized  -monitor BMP  -nephrology follow-up  -volume management  -Mendoza drainage     7  History of C difficile colitis-no current diarrhea   -continue oral vancomycin prophylaxis through 2019 to complete 72 hours after the last dose of other systemic antibiotics  -monitor stool output    Antibiotics:  Oral vancomycin prophylaxis    Subjective:  Patient has no fever, chills, sweats; no nausea, vomiting, diarrhea; no cough, shortness of breath; no pain  No new symptoms  Objective:  Vitals:  Temp:  [97 6 °F (36 4 °C)-98 6 °F (37 °C)] 98 3 °F (36 8 °C)  HR:  [] 101  Resp:  [16-20] 18  BP: (137-141)/(78-80) 141/78  SpO2:  [97 %-98 %] 97 %  Temp (24hrs), Av 2 °F (36 8 °C), Min:97 6 °F (36 4 °C), Max:98 6 °F (37 °C)  Current: Temperature: 98 3 °F (36 8 °C)    Physical Exam:   General Appearance:  Alert, interactive, nontoxic, no acute distress  Throat: Oropharynx moist without lesions  Lungs:   Clear to auscultation bilaterally; no wheezes, rhonchi or rales; respirations unlabored   Heart:  Tachycardic; no murmur, rub or gallop   Abdomen:   Soft, non-tender, non-distended, positive bowel sounds  Midline abdominal incision well approximated without erythema or drainage    Extremities: No clubbing, cyanosis or edema   Skin: No new rashes or lesions   No draining wounds noted         Labs, Imaging, & Other studies:   All pertinent labs and imaging studies were personally reviewed  Results from last 7 days   Lab Units 08/27/19  0616 08/26/19  0555 08/24/19  0451   WBC Thousand/uL 9 07 11 60* 14 71*   HEMOGLOBIN g/dL 12 1 8 7* 8 2*   PLATELETS Thousands/uL 303 380 309     Results from last 7 days   Lab Units 08/27/19  0616 08/26/19  0555 08/25/19  0510   SODIUM mmol/L 139 140 137   POTASSIUM mmol/L 3 5 3 6 3 2*   CHLORIDE mmol/L 105 105 102   CO2 mmol/L 28 28 28   BUN mg/dL 4* 4* 3*   CREATININE mg/dL 0 25* 0 25* 0 24*   EGFR ml/min/1 73sq m 124 124 125   CALCIUM mg/dL 8 1* 8 1* 7 8*

## 2019-08-27 NOTE — PROGRESS NOTES
Progress Note Lele KAHN Hartranft 78 y o  female MRN: [id number]    [facility] Encounter: [id number]      Assessment:  78 F with laparoscopic VHR complicated by small bowel injury, now s/p exploratory laparotomy, mesh explantation, SBR, and abdominal closure, also with candidal fungemia  Severe protein-calorie malnutrition in the setting of chronic illness  <50% energy intake needs met > 5 days" treated by ensure clear supplements  VSS  Afebrile  Incision cl  Dry  Intact  abd soft/ nontender/ nondistended  Plan:  -out of bed  -up in chair  -encourage PO intake  -PT/OT  -malnutrition, holding tube feeds for now  dietary following  Appreciate recs  Subjective:   Denied fever, chills, chest pain, shortness of breath, nausea, vomiting, or abdominal pain this morning  Objective:     Vitals: Blood pressure 137/78, pulse 97, temperature 98 6 °F (37 °C), resp  rate 20, height 5' 4" (1 626 m), weight 64 8 kg (142 lb 13 7 oz), SpO2 98 %  ,Body mass index is 24 52 kg/m²  Intake/Output Summary (Last 24 hours) at 8/27/2019 0535  Last data filed at 8/27/2019 0300  Gross per 24 hour   Intake 901 67 ml   Output 2100 ml   Net -1198 33 ml       Physical Exam  General: NAD  HEENT: NC/AT  MMM  Cv: RRR  Lungs: normal effort  Ab: Soft, NT/ND  Mild drainage from incision sites which were previously packed     Ex: no CCE  Neuro: AAOx3    Scheduled Meds:  Current Facility-Administered Medications:  acetaminophen 650 mg Oral Q6H PRN Ebony Jewell PA-C    bisacodyl 10 mg Rectal Daily PRN Ebony Jewell PA-C    folic acid 1 mg Oral Daily Willy Obrien PA-C    gabapentin 100 mg Oral HS Ebony Jewell PA-C    guaiFENesin 200 mg Oral Q4H PRN Neno Lynch DO    heparin (porcine) 5,000 Units Subcutaneous [facility] Ebony Jewell PA-C    HYDROmorphone 0 2 mg Intravenous Q4H PRN Ebony Jewell PA-C    lidocaine 1 patch Topical Daily Titus Fam PA-C    melatonin 3 mg Oral HS Willy Obrien PA-C    methocarbamol 500 mg Oral Q6H PRN Arlina Nails, PA-C    mirtazapine 7 5 mg Oral HS Caren Hilary Paget, CRNP    omeprazole (PRILOSEC) suspension 2 mg/mL 20 mg Oral BID AC Titus Fam, PA-BAR    ondansetron 4 mg Intravenous Q4H PRN Arlina Nails, PA-C    oxyCODONE 2 5 mg Oral Q4H PRN Arlina Nails, PA-C    Or        oxyCODONE 5 mg Oral Q4H PRN Arlina Nails, PA-C    pravastatin 20 mg Oral Daily With Marshall Co, PA-BAR    saccharomyces boulardii 250 mg Oral BID Arlina Nails, PA-C    saliva substitute 5 spray Mouth/Throat PRN Shanice Cervantse MD    sodium chloride 50 mL/hr Intravenous Continuous Florgustavo Prince MD Last Rate: 50 mL/hr (08/26/19 2130)   tamsulosin 0 4 mg Oral Daily With Marshall Pacheco, PA-BAR    vancomycin 125 mg Oral Q12H [address] Sita Suh MD      Continuous Infusions:  sodium chloride 50 mL/hr Last Rate: 50 mL/hr (08/26/19 2130)     PRN Meds:   acetaminophen    bisacodyl    guaiFENesin    HYDROmorphone    methocarbamol    ondansetron    oxyCODONE **OR** oxyCODONE    saliva substitute      Invasive Devices     Peripherally Inserted Central Catheter Line            PICC Line 99/11/83 Right Basilic 12 days          Drain            Urethral Catheter Latex 16 Fr  4 days                Lab, Imaging and other studies: I have personally reviewed pertinent reports      VTE Pharmacologic Prophylaxis: heparin  VTE Mechanical Prophylaxis: sequential compression device

## 2019-08-27 NOTE — PROGRESS NOTES
Nurse / Provider rounds completed  Per case management LTAC bed available  Switched meds to hopefull increase appetite  She is OOb in a chair this morning and drink much more  Still not that interested in the solid food

## 2019-08-27 NOTE — PLAN OF CARE
Problem: PHYSICAL THERAPY ADULT  Goal: Performs mobility at highest level of function for planned discharge setting  See evaluation for individualized goals  Description  Treatment/Interventions: LE strengthening/ROM, Functional transfer training, Therapeutic exercise, Endurance training, Bed mobility, Spoke to nursing, OT          See flowsheet documentation for full assessment, interventions and recommendations  Note:   Prognosis: Fair  Problem List: Decreased strength, Decreased range of motion, Decreased endurance, Impaired balance, Decreased mobility, Decreased coordination, Impaired judgement, Decreased safety awareness, Pain  Assessment: pt perform skilled PT focus on inc functional activities in sitting sit pivot transfers , sitting and standing thex ex's to her tolerance , Pt very fatigue and weak during transfers and seem a little SOB at times  Pt in supnie position with all needs in reach  Pt being d/c today to [facility]   Barriers to Discharge: Inaccessible home environment, Decreased caregiver support     Recommendation: Short-term skilled PT     PT - OK to Discharge: Yes(to  rehab  when med ready )    See flowsheet documentation for full assessment

## 2019-08-27 NOTE — SPEECH THERAPY NOTE
Speech Language/Pathology    Speech/Language Pathology Progress Note    Patient Name: Ulises Hernandez  Today's Date: 8/27/2019     Problem List  Patient Active Problem List   Diagnosis    Osteoarthritis of knee    Joint pain, knee    Eagle's esophagus with high grade dysplasia    3-vessel CAD    Abnormal blood chemistry    A-fib (HCC)    Anemia    Atypical chest pain    Backache with radiation    Continuous left lower quadrant pain    Depression    Dysphagia    Gastroesophageal reflux disease    Hyperlipidemia    Hypothyroidism    Insomnia    Low vitamin D level    Lumbar radiculopathy    Myofascial pain    Right leg paresthesias    Sciatica    Spondylosis of 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    Stricture of esophagus    Venous insufficiency    Incisional hernia    Adrenal adenoma    Incisional hernia, without obstruction or gangrene    Bilateral pneumonia    Postprocedural pneumothorax    OR Exploratory laparotomy, SBR secondary to iatrogenic enterotomy     OR Laparoscopic ventral hernia repair    Fungemia    Severe protein-calorie malnutrition ([address] )        Past Medical History  Past Medical History:   Diagnosis Date    Abnormal blood chemistry     last assessed 03/06/2014    Achalasia, esophageal     Acute medial meniscus tear     last assessed 03/10/2014    Acute otitis externa     last assessed 03/24/2014    Adrenal nodule ([address] )     Anemia     Anxiety     Arthritis     Atrial fibrillation ([address] )     resolved 01/09/2018    Atypical chest pain     last assessed 11/22/2016    Eagle's esophagus with high grade dysplasia     last assessed 01/03/2018    Cancer ([address] )     esophageal    Cerumen impaction     last assessed 03/24/2014    Chronic pain of right knee     last assessed 04/06/2017    Coronary artery disease     3 vessel, resolved 01/09/2018    Depression     Dysphagia     last assessed 06/21/2017    Esophageal stricture     last assessed 2017    GERD (gastroesophageal reflux disease)     Headache     last assessed 2013    Hiatal hernia     Insomnia     last assessed 10/15/2013    Jejunostomy tube present ([facility] [address] )     resolved 2018    Low vitamin D level     resolved 2018    Osteoarthritis, knee     last assessed 2017    Pneumonia     last assessed 2013    Right leg paresthesias     last assessed 2017    Sciatica     last assessed 2015    Shingles     last assessed 2015    Spondylosis of 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     last assessed 2017    Venous insufficiency     last assessed 2013        Past Surgical History  Past Surgical History:   Procedure Laterality Date     SECTION      EXPLORATORY LAPAROTOMY W/ BOWEL RESECTION N/A 2019    Procedure: LAPAROTOMY EXPLORATORY W/ BOWEL RESECTION, APPLICATION OF Rachel Rissa;  Surgeon: Martha Davis DO;  [facility];  Service: General    GASTRECTOMY      partial    GASTROJEJUNOSTOMY W/ JEJUNOSTOMY TUBE N/A 2017    Procedure: JEJUNOSTOMY TUBE PLACEMENT;  Surgeon: Stormy King MD;  [facility];  Service: Thoracic    JOINT REPLACEMENT Right 11/10/2014    knee, Dr Kirt Dick N/A 8/3/2019    Procedure: LAPAROTOMY EXPLORATORY, reanastomosis of proximal small bowel, placement of vac dressing;  Surgeon: Martha Davis DO;  [facility];  Service: General    SC ESOPHAGOGASTRODUODENOSCOPY TRANSORAL DIAGNOSTIC N/A 3/10/2017    Procedure: ESOPHAGOGASTRODUODENOSCOPY (EGD); Surgeon: Tahmina Castillo MD;  [facility];  Service: Gastroenterology    SC ESOPHAGOGASTRODUODENOSCOPY TRANSORAL DIAGNOSTIC N/A 2017    Procedure: ESOPHAGOGASTRODUODENOSCOPY (EGD); Surgeon: Tahmina Castillo MD;  [facility];  Service: Gastroenterology    SC ESOPHAGOSCOPY FLEXIBLE TRANSORAL WITH BIOPSY N/A 2017    Procedure: ESOPHAGOGASTRODUODENOSCOPY (EGD);   Surgeon: Michelle Vyas MD;  [facility];  Service: Thoracic    GA LAP, VENTRAL HERNIA REPAIR,REDUCIBLE N/A 7/31/2019    Procedure: REPAIR HERNIA VENTRAL LAPAROSCOPIC;  Surgeon: Cherrie Andrews DO;  [facility];  Service: General    GA REMOVAL [facility] N/A 8/24/2017    Procedure: TRANSHIATAL ESOPHAGECTOMY;  Surgeon: Michelle Vyas MD;  [facility];  Service: Thoracic    STEROID INJECTION KNEE Right 5/2/2017    Procedure: GENICULATE NERVE BLOCKS;  Surgeon: Josi Garner DO;  [facility];  Service:          Subjective:  "I was out of bed today"     Objective: The patient is seen for f/u dysphagia therapy  She continues on a mechanical soft diet with thin liquids, with very poor PO intake  RN reports that patient possibly fatigues quickly with chewing soft solids  Discussed with patient who agrees  She also c/o dry throat with no relief from drinking water and trying to keep hydrated  She is agreeable to trial of Ensure milkshake (with ice cream)  The patient takes one sip without difficulty, but immediately states dislike  She then takes 1 sip of Ensure clear  She states preference to this and has no overt s/s aspiration with swallowing thin liquids  PO items are offered (including pudding, ice cream, applesauce) but patient refuses all  Unable to assess with solids at this time  Assessment:  Patient is tolerating thin liquids well, but refuses all solids  Plan/Recommendations:  Patient to be discharged to [facility] today  Recommend f/u with ST there  Patient wishes to continue on a mechanical soft diet for now, until evaluation at [facility] is completed

## 2019-08-28 ENCOUNTER — TRANSITIONAL CARE MANAGEMENT (OUTPATIENT)
Dept: FAMILY MEDICINE CLINIC | Facility: CLINIC | Age: 69
End: 2019-08-28

## 2019-09-23 LAB — FUNGAL BLOOD CULTURE: NORMAL

## 2019-10-14 ENCOUNTER — OFFICE VISIT (OUTPATIENT)
Dept: FAMILY MEDICINE CLINIC | Facility: CLINIC | Age: 69
End: 2019-10-14
Payer: MEDICARE

## 2019-10-14 ENCOUNTER — TRANSITIONAL CARE MANAGEMENT (OUTPATIENT)
Dept: FAMILY MEDICINE CLINIC | Facility: CLINIC | Age: 69
End: 2019-10-14

## 2019-10-14 ENCOUNTER — LAB (OUTPATIENT)
Dept: LAB | Facility: MEDICAL CENTER | Age: 69
End: 2019-10-14
Payer: MEDICARE

## 2019-10-14 VITALS
BODY MASS INDEX: 19.81 KG/M2 | DIASTOLIC BLOOD PRESSURE: 64 MMHG | WEIGHT: 116 LBS | SYSTOLIC BLOOD PRESSURE: 122 MMHG | HEIGHT: 64 IN

## 2019-10-14 DIAGNOSIS — E43 SEVERE PROTEIN-CALORIE MALNUTRITION (HCC): ICD-10-CM

## 2019-10-14 DIAGNOSIS — Z98.890 STATUS POST EXPLORATORY LAPAROTOMY: ICD-10-CM

## 2019-10-14 DIAGNOSIS — J95.811 POSTPROCEDURAL PNEUMOTHORAX: Primary | ICD-10-CM

## 2019-10-14 LAB
ALBUMIN SERPL BCP-MCNC: 3.1 G/DL (ref 3.5–5)
ALP SERPL-CCNC: 91 U/L (ref 46–116)
ALT SERPL W P-5'-P-CCNC: 21 U/L (ref 12–78)
ANION GAP SERPL CALCULATED.3IONS-SCNC: 3 MMOL/L (ref 4–13)
AST SERPL W P-5'-P-CCNC: 23 U/L (ref 5–45)
BASOPHILS # BLD AUTO: 0.07 THOUSANDS/ΜL (ref 0–0.1)
BASOPHILS NFR BLD AUTO: 1 % (ref 0–1)
BILIRUB SERPL-MCNC: 0.36 MG/DL (ref 0.2–1)
BUN SERPL-MCNC: 12 MG/DL (ref 5–25)
CALCIUM SERPL-MCNC: 9.6 MG/DL (ref 8.3–10.1)
CHLORIDE SERPL-SCNC: 106 MMOL/L (ref 100–108)
CO2 SERPL-SCNC: 29 MMOL/L (ref 21–32)
CREAT SERPL-MCNC: 0.64 MG/DL (ref 0.6–1.3)
EOSINOPHIL # BLD AUTO: 0.19 THOUSAND/ΜL (ref 0–0.61)
EOSINOPHIL NFR BLD AUTO: 2 % (ref 0–6)
ERYTHROCYTE [DISTWIDTH] IN BLOOD BY AUTOMATED COUNT: 15.6 % (ref 11.6–15.1)
GFR SERPL CREATININE-BSD FRML MDRD: 91 ML/MIN/1.73SQ M
GLUCOSE P FAST SERPL-MCNC: 85 MG/DL (ref 65–99)
HCT VFR BLD AUTO: 39.8 % (ref 34.8–46.1)
HGB BLD-MCNC: 12 G/DL (ref 11.5–15.4)
IMM GRANULOCYTES # BLD AUTO: 0.02 THOUSAND/UL (ref 0–0.2)
IMM GRANULOCYTES NFR BLD AUTO: 0 % (ref 0–2)
LYMPHOCYTES # BLD AUTO: 4.21 THOUSANDS/ΜL (ref 0.6–4.47)
LYMPHOCYTES NFR BLD AUTO: 45 % (ref 14–44)
MAGNESIUM SERPL-MCNC: 1.9 MG/DL (ref 1.6–2.6)
MCH RBC QN AUTO: 26.8 PG (ref 26.8–34.3)
MCHC RBC AUTO-ENTMCNC: 30.2 G/DL (ref 31.4–37.4)
MCV RBC AUTO: 89 FL (ref 82–98)
MONOCYTES # BLD AUTO: 0.63 THOUSAND/ΜL (ref 0.17–1.22)
MONOCYTES NFR BLD AUTO: 7 % (ref 4–12)
NEUTROPHILS # BLD AUTO: 4.14 THOUSANDS/ΜL (ref 1.85–7.62)
NEUTS SEG NFR BLD AUTO: 45 % (ref 43–75)
NRBC BLD AUTO-RTO: 0 /100 WBCS
PLATELET # BLD AUTO: 226 THOUSANDS/UL (ref 149–390)
PMV BLD AUTO: 11.9 FL (ref 8.9–12.7)
POTASSIUM SERPL-SCNC: 4.5 MMOL/L (ref 3.5–5.3)
PROT SERPL-MCNC: 7.9 G/DL (ref 6.4–8.2)
RBC # BLD AUTO: 4.48 MILLION/UL (ref 3.81–5.12)
SODIUM SERPL-SCNC: 138 MMOL/L (ref 136–145)
WBC # BLD AUTO: 9.26 THOUSAND/UL (ref 4.31–10.16)

## 2019-10-14 PROCEDURE — 80053 COMPREHEN METABOLIC PANEL: CPT | Performed by: FAMILY MEDICINE

## 2019-10-14 PROCEDURE — 99496 TRANSJ CARE MGMT HIGH F2F 7D: CPT | Performed by: FAMILY MEDICINE

## 2019-10-14 PROCEDURE — 83735 ASSAY OF MAGNESIUM: CPT | Performed by: FAMILY MEDICINE

## 2019-10-14 PROCEDURE — 36415 COLL VENOUS BLD VENIPUNCTURE: CPT | Performed by: FAMILY MEDICINE

## 2019-10-14 PROCEDURE — 85025 COMPLETE CBC W/AUTO DIFF WBC: CPT

## 2019-10-14 RX ORDER — PANTOPRAZOLE SODIUM 20 MG/1
TABLET, DELAYED RELEASE ORAL
Refills: 0 | COMMUNITY
Start: 2019-10-10 | End: 2019-11-04 | Stop reason: SDUPTHER

## 2019-10-14 RX ORDER — PAROXETINE HYDROCHLORIDE 20 MG/1
TABLET, FILM COATED ORAL
Refills: 0 | COMMUNITY
Start: 2019-10-09 | End: 2019-11-04 | Stop reason: SDUPTHER

## 2019-10-14 RX ORDER — POTASSIUM CHLORIDE 20 MEQ/1
TABLET, EXTENDED RELEASE ORAL
Refills: 0 | COMMUNITY
Start: 2019-10-09 | End: 2019-10-15 | Stop reason: SDUPTHER

## 2019-10-14 NOTE — PROGRESS NOTES
Assessment/Plan:      Diagnoses and all orders for this visit:    Postprocedural pneumothorax    OR Exploratory laparotomy, SBR secondary to iatrogenic enterotomy     Severe protein-calorie malnutrition (HCC)  -     Comprehensive metabolic panel  -     CBC and differential; Future  -     Magnesium    Other orders  -     pantoprazole (PROTONIX) 20 mg tablet  -     PARoxetine (PAXIL) 20 mg tablet  -     potassium chloride (K-DUR,KLOR-CON) 20 mEq tablet         Subjective:     Patient ID: Imtiaz Donnelly is a 78 y o  female  This is a transition of care visit on patient was discharged from rehab approximately 1 week ago I have reviewed her hospital records the diagnosis is that she had a repair of a ventral hernia of a hiatal hernia ventral hernia and then subsequently developed a problem with sepsis adult respiratory distress syndrome and was hospitalized at [place] for a prolonged complicated hospital recovery she now looks well she ambulates with a wheeled walker      Review of Systems   Constitutional: Positive for activity change and fatigue  Negative for appetite change, diaphoresis and fever  HENT: Negative  Eyes: Negative  Respiratory: Negative for apnea, cough, chest tightness, shortness of breath and wheezing  Cardiovascular: Negative for chest pain, palpitations and leg swelling  Gastrointestinal: Positive for abdominal distention  Negative for abdominal pain, anal bleeding, constipation, diarrhea, nausea and vomiting  Endocrine: Negative for cold intolerance, heat intolerance, polydipsia, polyphagia and polyuria  Genitourinary: Negative for difficulty urinating, dysuria, flank pain, hematuria and urgency  Musculoskeletal: Negative for arthralgias, back pain, gait problem, joint swelling and myalgias  Skin: Negative for color change, rash and wound  Allergic/Immunologic: Negative for environmental allergies, food allergies and immunocompromised state     Neurological: Negative for dizziness, seizures, syncope, speech difficulty, numbness and headaches  Hematological: Negative for adenopathy  Does not bruise/bleed easily  Psychiatric/Behavioral: Negative for agitation, behavioral problems, hallucinations, sleep disturbance and suicidal ideas  Objective:     Physical Exam   Constitutional: She is oriented to person, place, and time  She appears well-developed and well-nourished  No distress  HENT:   Head: Normocephalic  Right Ear: External ear normal    Left Ear: External ear normal    Nose: Nose normal    Mouth/Throat: Oropharynx is clear and moist    Eyes: Pupils are equal, round, and reactive to light  Conjunctivae and EOM are normal  Right eye exhibits no discharge  Left eye exhibits no discharge  No scleral icterus  Neck: Normal range of motion  No tracheal deviation present  No thyromegaly present  Cardiovascular: Normal rate, regular rhythm and normal heart sounds  Exam reveals no gallop and no friction rub  No murmur heard  Pulmonary/Chest: Effort normal and breath sounds normal  No respiratory distress  She has no wheezes  Abdominal: Soft  Bowel sounds are normal  She exhibits no mass  There is no tenderness  There is no guarding  Musculoskeletal: She exhibits no edema or deformity  Lymphadenopathy:     She has no cervical adenopathy  Neurological: She is alert and oriented to person, place, and time  No cranial nerve deficit  Skin: Skin is warm and dry  No rash noted  She is not diaphoretic  No erythema  Psychiatric: She has a normal mood and affect   Thought content normal          Vitals:    10/14/19 1302   BP: 122/64   BP Location: Right arm   Patient Position: Sitting   Cuff Size: Standard   Weight: 52 6 kg (116 lb)   Height: 5' 4" (1 626 m)       The following portions of the patient's history were reviewed and updated as appropriate:   She  has a past medical history of Abnormal blood chemistry, Achalasia, esophageal, Acute medial meniscus tear, Acute otitis externa, Adrenal nodule ([facility] [address] ), Anemia, Anxiety, Arthritis, Atrial fibrillation (HCC), Atypical chest pain, Eagle's esophagus with high grade dysplasia, Cancer (HCC), Cerumen impaction, Chronic pain of right knee, Coronary artery disease, Depression, Dysphagia, Esophageal stricture, GERD (gastroesophageal reflux disease), Headache, Hiatal hernia, Insomnia, Jejunostomy tube present (HCC), Low vitamin D level, Osteoarthritis, knee, Pneumonia, Right leg paresthesias, Sciatica, Shingles, Spondylosis of 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, and Venous insufficiency    She   Patient Active Problem List    Diagnosis Date Noted    Severe protein-calorie malnutrition ([facility] [address] ) 08/27/2019    Fungemia 08/08/2019    Bilateral pneumonia 08/02/2019    Postprocedural pneumothorax 08/02/2019    OR Exploratory laparotomy, SBR secondary to iatrogenic enterotomy  08/02/2019    OR Laparoscopic ventral hernia repair 07/31/2019    Incisional hernia, without obstruction or gangrene 06/28/2019    Adrenal adenoma 12/06/2018    Incisional hernia 11/01/2018    Eagle's esophagus with high grade dysplasia 08/24/2017    Osteoarthritis of knee 05/02/2017    Joint pain, knee 05/02/2017    3-vessel CAD 04/05/2017    A-fib ([facility] ) 04/05/2017    Lumbar radiculopathy 03/02/2017    Dysphagia 02/28/2017    Myofascial pain 02/23/2017    Right leg paresthesias 02/23/2017    Spondylosis of 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 02/23/2017    Backache with radiation 01/31/2017    Atypical chest pain 11/22/2016    Continuous left lower quadrant pain 08/25/2015    Low vitamin D level 07/24/2015    Abnormal blood chemistry 07/22/2015    Sciatica 05/08/2015    Stricture of esophagus 07/02/2014    Gastroesophageal reflux disease 04/21/2014    Hyperlipidemia 03/11/2014    Anemia 03/06/2014    Hypothyroidism 03/06/2014    Venous insufficiency 12/06/2013    Depression 06/28/2013    Insomnia 2013     She  has a past surgical history that includes  section; pr esophagogastroduodenoscopy transoral diagnostic (N/A, 3/10/2017); Steroid injection knee (Right, 2017); Joint replacement (Right, 11/10/2014); pr esophagogastroduodenoscopy transoral diagnostic (N/A, 2017); pr esophagoscopy flexible transoral with biopsy (N/A, 2017); pr removal esophagus,no thoracotomy (N/A, 2017); Gastrojejunostomy w/ jejunostomy tube (N/A, 2017); Gastrectomy; pr lap, ventral hernia repair,reducible (N/A, 2019); LAPAROTOMY (N/A, 8/3/2019); Exploratory laparotomy w/ bowel resection (N/A, 2019); and Ventral hernia repair (2019)  Her family history includes Alzheimer's disease in her mother; Diabetes type II in her father  She  reports that she has never smoked  She has never used smokeless tobacco  She reports that she drank alcohol  She reports that she does not use drugs  Current Outpatient Medications   Medication Sig Dispense Refill    folic acid (FOLVITE) 1 mg tablet Take 1 tablet (1 mg total) by mouth daily 20 tablet 0    melatonin 3 mg Take 1 tablet (3 mg total) by mouth daily at bedtime 10 tablet 0    mirtazapine (REMERON) 7 5 MG tablet Take 1 tablet (7 5 mg total) by mouth daily at bedtime 30 tablet 0    pantoprazole (PROTONIX) 20 mg tablet   0    PARoxetine (PAXIL) 20 mg tablet   0    potassium chloride (K-DUR,KLOR-CON) 20 mEq tablet   0    pravastatin (PRAVACHOL) 20 mg tablet Take 1 tablet (20 mg total) by mouth daily with dinner 20 tablet 0    tamsulosin (FLOMAX) 0 4 mg Take 1 capsule (0 4 mg total) by mouth daily with dinner 20 capsule 0     No current facility-administered medications for this visit        Current Outpatient Medications on File Prior to Visit   Medication Sig    folic acid (FOLVITE) 1 mg tablet Take 1 tablet (1 mg total) by mouth daily    melatonin 3 mg Take 1 tablet (3 mg total) by mouth daily at bedtime    mirtazapine (REMERON) 7 5 MG tablet Take 1 tablet (7 5 mg total) by mouth daily at bedtime    pantoprazole (PROTONIX) 20 mg tablet     PARoxetine (PAXIL) 20 mg tablet     potassium chloride (K-DUR,KLOR-CON) 20 mEq tablet     pravastatin (PRAVACHOL) 20 mg tablet Take 1 tablet (20 mg total) by mouth daily with dinner    tamsulosin (FLOMAX) 0 4 mg Take 1 capsule (0 4 mg total) by mouth daily with dinner    [DISCONTINUED] acetaminophen (TYLENOL) 325 mg tablet Take 2 tablets (650 mg total) by mouth every 6 (six) hours as needed for mild pain    [DISCONTINUED] bisacodyl (DULCOLAX) 10 mg suppository Insert 1 suppository (10 mg total) into the rectum daily as needed for constipation    [DISCONTINUED] Heparin Sodium, Porcine, (HEPARIN, PORCINE,) 5,000 units/mL Inject 1 mL (5,000 Units total) under the skin every 8 (eight) hours    [DISCONTINUED] lidocaine (LIDODERM) 5 % Apply 1 patch topically daily Remove & Discard patch within 12 hours or as directed by MD    [DISCONTINUED] methocarbamol (ROBAXIN) 500 mg tablet Take 1 tablet (500 mg total) by mouth every 6 (six) hours as needed for muscle spasms    [DISCONTINUED] omeprazole (PriLOSEC) 20 mg delayed release capsule Take 1 capsule (20 mg total) by mouth 2 (two) times a day    [DISCONTINUED] saccharomyces boulardii (FLORASTOR) 250 mg capsule Take 1 capsule (250 mg total) by mouth 2 (two) times a day    [DISCONTINUED] saliva substitute (MOUTH KOTE) Apply 5 sprays to the mouth or throat as needed (dry mouth)     No current facility-administered medications on file prior to visit  She has No Known Allergies       Transitional Care Management Review:  Hannah Raymundo is a 78 y o  female here for TCM follow up       During the TCM phone call patient stated:    TCM Call (since 9/13/2019)     Date and time call was made  10/14/2019  9:46 AM    Hospital care reviewed  Records reviewed    Patient was hospitialized at  Other (comment)    Comment  [facility]    Date of Admission  08/27/19    Date of discharge  10/09/19    Diagnosis  Exploratory laparotomy    Disposition  Home; Home health services    Were the patients medications reviewed and updated  No    Current Symptoms  Weakness    Weakness severity  Moderate      TCM Call (since 9/13/2019)     Post hospital issues  None    Should patient be enrolled in anticoag monitoring? No    Scheduled for follow up?   Yes <img src='C:FILES (X86)    Did you obtain your prescribed medications  Yes    Do you need help managing your prescriptions or medications  No    Is transportation to your appointment needed  Yes    Specify why  daughter can only bring her on weekends, school days off    [address]   Family <img src='C:FILES (X86)    The type of support provided  Emotional    Do you have social support  Yes, a little    Are you recieving any outpatient services  Yes    What type of services  Pt    Are you recieving home care services  Yes    Types of home care services  Nurse visit <img src='C:FILES (X86)    Are you using any community resources  No    Current waiver services  No    Have you fallen in the last 12 months  No    Interperter language line needed  No    Counseling  Family <img src='C:FILES (F56)    Comments  daughter can only bring her today              Molina Kelley, DO

## 2019-10-15 DIAGNOSIS — I10 ESSENTIAL HYPERTENSION: Primary | ICD-10-CM

## 2019-10-15 RX ORDER — POTASSIUM CHLORIDE 20 MEQ/1
20 TABLET, EXTENDED RELEASE ORAL DAILY
Qty: 30 TABLET | Refills: 0
Start: 2019-10-15 | End: 2019-11-04 | Stop reason: SDUPTHER

## 2019-10-28 ENCOUNTER — OFFICE VISIT (OUTPATIENT)
Dept: SURGERY | Facility: CLINIC | Age: 69
End: 2019-10-28

## 2019-10-28 VITALS — WEIGHT: 114.6 LBS | TEMPERATURE: 98 F | BODY MASS INDEX: 19.56 KG/M2 | HEART RATE: 101 BPM | HEIGHT: 64 IN

## 2019-10-28 DIAGNOSIS — Z98.890 STATUS POST EXPLORATORY LAPAROTOMY: Primary | ICD-10-CM

## 2019-10-28 PROCEDURE — 99024 POSTOP FOLLOW-UP VISIT: CPT | Performed by: STUDENT IN AN ORGANIZED HEALTH CARE EDUCATION/TRAINING PROGRAM

## 2019-10-28 NOTE — PROGRESS NOTES
Office Visit - General Surgery  Shani Reed MRN: [id number]  Encounter: [id number]    Assessment and Plan    Problem List Items Addressed This Visit        Other    OR Exploratory laparotomy, SBR secondary to iatrogenic enterotomy  - Primary      -Silver nitrate applied to small midline wound  Keep covered until drainage stops, can remove for showering, and does not require a bandage once drainage stops  -defer to family medicine for medication refils  -She can follow up as needed or in 1 month if her wound still hasn't healed  Chief Complaint:  Shani Reed is a 78 y o  female who presents for Post-op (p/o incisional hernia)    Subjective  Pt is a 69y o  F who was hospitalized from 8/2-8/27 after undergoing 8/2/19 ex lap, SBR, abthera vac left in discontinuity and subsequent 8/3/19 ex lap, reanastomosis, and closure  She has been doing well since her hospital discharge  Home nurses, aids, and physical therapy are still visiting and providing her with care  She is ambulating with a walker, eating well, and having normal bowel function  She does not have any abdominal pain, fevers/chills, and has been undergoing local wound care to a small defect in her midline incision  She inquired about medication refills, however is unsure of what medicine, and was instructed to follow up with her family medicine doctor       Past Medical History  Past Medical History:   Diagnosis Date    Abnormal blood chemistry     last assessed 03/06/2014    Achalasia, esophageal     Acute medial meniscus tear     last assessed 03/10/2014    Acute otitis externa     last assessed 03/24/2014    Adrenal nodule ([facility] [address] )     Anemia     Anxiety     Arthritis     Atrial fibrillation ([address] )     resolved 01/09/2018    Atypical chest pain     last assessed 11/22/2016    Eagle's esophagus with high grade dysplasia     last assessed 01/03/2018    Cancer ([facility] [address] )     esophageal    Cerumen impaction     last assessed 03/24/2014  Chronic pain of right knee     last assessed 2017    Coronary artery disease     3 vessel, resolved 2018    Depression     Dysphagia     last assessed 2017    Esophageal stricture     last assessed 2017    GERD (gastroesophageal reflux disease)     Headache     last assessed 2013    Hiatal hernia     Insomnia     last assessed 10/15/2013    Jejunostomy tube present ([address] )     resolved 2018    Low vitamin D level     resolved 2018    Osteoarthritis, knee     last assessed 2017    Pneumonia     last assessed 2013    Right leg paresthesias     last assessed 2017    Sciatica     last assessed 2015    Shingles     last assessed 2015    Spondylosis of 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     last assessed 2017    Venous insufficiency     last assessed 2013       Past Surgical History  Past Surgical History:   Procedure Laterality Date     SECTION      EXPLORATORY LAPAROTOMY W/ BOWEL RESECTION N/A 2019    Procedure: LAPAROTOMY EXPLORATORY W/ BOWEL RESECTION, APPLICATION OF ABTHERA VAC;  Surgeon: Julissa Hicks DO;  [facility];  Service: General    GASTRECTOMY      partial    GASTROJEJUNOSTOMY W/ JEJUNOSTOMY TUBE N/A 2017    Procedure: JEJUNOSTOMY TUBE PLACEMENT;  Surgeon: Giovanni Peña MD;  [facility];  Service: Thoracic    JOINT REPLACEMENT Right 11/10/2014    knee, Dr Viola Boudreaux N/A 8/3/2019    Procedure: LAPAROTOMY EXPLORATORY, reanastomosis of proximal small bowel, placement of vac dressing;  Surgeon: Julissa Hicks DO;  [facility];  Service: General    OK ESOPHAGOGASTRODUODENOSCOPY TRANSORAL DIAGNOSTIC N/A 3/10/2017    Procedure: ESOPHAGOGASTRODUODENOSCOPY (EGD);   Surgeon: Maggi Fierro MD;  [facility];  Service: Gastroenterology    OK ESOPHAGOGASTRODUODENOSCOPY TRANSORAL DIAGNOSTIC N/A 2017    Procedure: ESOPHAGOGASTRODUODENOSCOPY (EGD); Surgeon: Nadja Hammonds MD;  [facility];  Service: Gastroenterology    SC ESOPHAGOSCOPY FLEXIBLE TRANSORAL WITH BIOPSY N/A 8/24/2017    Procedure: ESOPHAGOGASTRODUODENOSCOPY (EGD);   Surgeon: Han Burnham MD;  [facility];  Service: Thoracic    SC LAP, VENTRAL HERNIA REPAIR,REDUCIBLE N/A 7/31/2019    Procedure: REPAIR HERNIA VENTRAL LAPAROSCOPIC;  Surgeon: Satish Martinez DO;  [facility];  Service: General    SC REMOVAL [address] THORACOTOMY N/A 8/24/2017    Procedure: TRANSHIATAL ESOPHAGECTOMY;  Surgeon: Han Burnham MD;  [facility];  Service: Thoracic    STEROID INJECTION KNEE Right 5/2/2017    Procedure: GENICULATE NERVE BLOCKS;  Surgeon: Pino Scott DO;  [facility];  Service:     VENTRAL HERNIA REPAIR  07/30/2019       Family History  Family History   Problem Relation Age of Onset    Alzheimer's disease Mother     Diabetes type II Father        Social History  Social History     Socioeconomic History    Marital status: /Civil Union     Spouse name: None    Number of children: None    Years of education: None    Highest education level: None   Occupational History    None   Social Needs    Financial resource strain: None    Food insecurity:     Worry: None     Inability: None    Transportation needs:     Medical: None     Non-medical: None   Tobacco Use    Smoking status: Never Smoker    Smokeless tobacco: Never Used   Substance and Sexual Activity    Alcohol use: Not Currently     Alcohol/week: 0 0 standard drinks     Frequency: Never     Binge frequency: Never    Drug use: No    Sexual activity: Not Currently     Partners: Male   Lifestyle    Physical activity:     Days per week: None     Minutes per session: None    Stress: None   Relationships    Social connections:     Talks on phone: None     Gets together: None     Attends Gnosticist service: None     Active member of club or organization: None     Attends meetings of clubs or organizations: None     Relationship status: None    Intimate partner violence:     Fear of current or ex partner: None     Emotionally abused: None     Physically abused: None     Forced sexual activity: None   Other Topics Concern    None   Social History Narrative    Patient has living will        Medications  Current Outpatient Medications on File Prior to Visit   Medication Sig Dispense Refill    folic acid (FOLVITE) 1 mg tablet Take 1 tablet (1 mg total) by mouth daily 20 tablet 0    melatonin 3 mg Take 1 tablet (3 mg total) by mouth daily at bedtime 10 tablet 0    mirtazapine (REMERON) 7 5 MG tablet Take 1 tablet (7 5 mg total) by mouth daily at bedtime 30 tablet 0    pantoprazole (PROTONIX) 20 mg tablet   0    PARoxetine (PAXIL) 20 mg tablet   0    potassium chloride (K-DUR,KLOR-CON) 20 mEq tablet Take 1 tablet (20 mEq total) by mouth daily 30 tablet 0    pravastatin (PRAVACHOL) 20 mg tablet Take 1 tablet (20 mg total) by mouth daily with dinner 20 tablet 0    tamsulosin (FLOMAX) 0 4 mg Take 1 capsule (0 4 mg total) by mouth daily with dinner 20 capsule 0     No current facility-administered medications on file prior to visit  Allergies  No Known Allergies    Review of Systems   Constitutional: Negative for activity change, appetite change and fever  HENT: Negative  Eyes: Negative  Respiratory: Negative for chest tightness and shortness of breath  Gastrointestinal: Negative  Genitourinary: Negative  Musculoskeletal: Negative  Skin: Positive for wound  Neurological: Negative  Psychiatric/Behavioral: Negative  Objective  Vitals:    10/28/19 0948   Pulse: 101   Temp: 98 °F (36 7 °C)       Physical Exam   Constitutional: She is oriented to person, place, and time  She appears well-developed and well-nourished  No distress  HENT:   Head: Normocephalic and atraumatic     Eyes: Pupils are equal, round, and reactive to light  Neck: Normal range of motion  Cardiovascular: Normal rate and regular rhythm  Pulmonary/Chest: Effort normal  No respiratory distress  Abdominal: Soft  She exhibits no distension  There is no tenderness  There is no guarding  Midline incision with 1cm wound near umbilicus, minimal drainage, silver nitrate applied   Musculoskeletal: She exhibits no edema  Neurological: She is oriented to person, place, and time  Skin: Skin is warm and dry  Capillary refill takes less than 2 seconds  She is not diaphoretic  Psychiatric: She has a normal mood and affect

## 2019-11-04 DIAGNOSIS — F33.9 EPISODE OF RECURRENT MAJOR DEPRESSIVE DISORDER, UNSPECIFIED DEPRESSION EPISODE SEVERITY (HCC): ICD-10-CM

## 2019-11-04 DIAGNOSIS — J96.01 ACUTE RESPIRATORY FAILURE WITH HYPOXEMIA (HCC): ICD-10-CM

## 2019-11-04 DIAGNOSIS — I10 ESSENTIAL HYPERTENSION: ICD-10-CM

## 2019-11-04 DIAGNOSIS — K21.00 GASTROESOPHAGEAL REFLUX DISEASE WITH ESOPHAGITIS: Primary | ICD-10-CM

## 2019-11-04 RX ORDER — TAMSULOSIN HYDROCHLORIDE 0.4 MG/1
0.4 CAPSULE ORAL
Qty: 30 CAPSULE | Refills: 5 | Status: SHIPPED | OUTPATIENT
Start: 2019-11-04 | End: 2020-04-27

## 2019-11-04 RX ORDER — PANTOPRAZOLE SODIUM 20 MG/1
20 TABLET, DELAYED RELEASE ORAL DAILY
Qty: 30 TABLET | Refills: 5 | Status: SHIPPED | OUTPATIENT
Start: 2019-11-04 | End: 2020-04-27

## 2019-11-04 RX ORDER — FOLIC ACID 1 MG/1
1 TABLET ORAL DAILY
Qty: 20 TABLET | Refills: 0 | Status: SHIPPED | OUTPATIENT
Start: 2019-11-04 | End: 2020-01-20

## 2019-11-04 RX ORDER — POTASSIUM CHLORIDE 20 MEQ/1
20 TABLET, EXTENDED RELEASE ORAL DAILY
Qty: 30 TABLET | Refills: 0 | Status: SHIPPED | OUTPATIENT
Start: 2019-11-04 | End: 2020-01-27

## 2019-11-04 RX ORDER — PAROXETINE HYDROCHLORIDE 20 MG/1
20 TABLET, FILM COATED ORAL DAILY
Qty: 30 TABLET | Refills: 5 | Status: SHIPPED | OUTPATIENT
Start: 2019-11-04 | End: 2020-04-27

## 2019-11-04 RX ORDER — MIRTAZAPINE 7.5 MG/1
7.5 TABLET, FILM COATED ORAL
Qty: 30 TABLET | Refills: 5 | Status: SHIPPED | OUTPATIENT
Start: 2019-11-04 | End: 2020-04-23

## 2019-11-04 RX ORDER — LANOLIN ALCOHOL/MO/W.PET/CERES
3 CREAM (GRAM) TOPICAL
Qty: 30 TABLET | Refills: 5 | Status: SHIPPED | OUTPATIENT
Start: 2019-11-04 | End: 2020-08-04 | Stop reason: ALTCHOICE

## 2019-11-04 RX ORDER — PRAVASTATIN SODIUM 20 MG
20 TABLET ORAL
Qty: 30 TABLET | Refills: 5 | Status: SHIPPED | OUTPATIENT
Start: 2019-11-04 | End: 2020-04-27

## 2019-11-11 ENCOUNTER — OFFICE VISIT (OUTPATIENT)
Dept: FAMILY MEDICINE CLINIC | Facility: CLINIC | Age: 69
End: 2019-11-11
Payer: MEDICARE

## 2019-11-11 VITALS
TEMPERATURE: 97.5 F | HEIGHT: 64 IN | DIASTOLIC BLOOD PRESSURE: 80 MMHG | WEIGHT: 119.6 LBS | BODY MASS INDEX: 20.42 KG/M2 | SYSTOLIC BLOOD PRESSURE: 130 MMHG

## 2019-11-11 DIAGNOSIS — E43 SEVERE PROTEIN-CALORIE MALNUTRITION (HCC): ICD-10-CM

## 2019-11-11 DIAGNOSIS — K21.00 GASTROESOPHAGEAL REFLUX DISEASE WITH ESOPHAGITIS: Primary | ICD-10-CM

## 2019-11-11 PROBLEM — M54.9 BACKACHE WITH RADIATION: Status: RESOLVED | Noted: 2017-01-31 | Resolved: 2019-11-11

## 2019-11-11 PROBLEM — R20.2 RIGHT LEG PARESTHESIAS: Status: RESOLVED | Noted: 2017-02-23 | Resolved: 2019-11-11

## 2019-11-11 PROBLEM — B49 FUNGEMIA: Status: RESOLVED | Noted: 2019-08-08 | Resolved: 2019-11-11

## 2019-11-11 PROBLEM — M79.18 MYOFASCIAL PAIN: Status: RESOLVED | Noted: 2017-02-23 | Resolved: 2019-11-11

## 2019-11-11 PROBLEM — J18.9 BILATERAL PNEUMONIA: Status: RESOLVED | Noted: 2019-08-02 | Resolved: 2019-11-11

## 2019-11-11 PROBLEM — J95.811 POSTPROCEDURAL PNEUMOTHORAX: Status: RESOLVED | Noted: 2019-08-02 | Resolved: 2019-11-11

## 2019-11-11 PROCEDURE — 99213 OFFICE O/P EST LOW 20 MIN: CPT | Performed by: FAMILY MEDICINE

## 2019-11-11 NOTE — PROGRESS NOTES
Assessment/Plan:  Both ears were debrided of impacted cerumen TMs were intact upon completion of the procedure patient tolerated procedure well she will be seen back again in about 3 months she is going to a concentrator getting adequate nutrition she will continue with wound care    Problem List Items Addressed This Visit        Digestive    Gastroesophageal reflux disease - Primary       Other    Severe protein-calorie malnutrition ([facility] )           Diagnoses and all orders for this visit:    Gastroesophageal reflux disease with esophagitis    Severe protein-calorie malnutrition ([facility] [address] )        No problem-specific Assessment & Plan notes found for this encounter  Subjective:      Patient ID: Darryle Barrios is a 78 y o  female  Follow-up visit on protein calorie malnutrition status post extensive abdominal surgery for hiatal hernia with gastroesophageal reflux the patient also is having some reactive depression to loss of her  who recently passed away      The following portions of the patient's history were reviewed and updated as appropriate:   She has a past medical history of Abnormal blood chemistry, Achalasia, esophageal, Acute medial meniscus tear, Acute otitis externa, Adrenal nodule ([facility] [address] ), Anemia, Anxiety, Arthritis, Atrial fibrillation ([address] ), Atypical chest pain, Eagle's esophagus with high grade dysplasia, Cancer (HCC), Cerumen impaction, Chronic pain of right knee, Coronary artery disease, Depression, Dysphagia, Esophageal stricture, GERD (gastroesophageal reflux disease), Headache, Hiatal hernia, Insomnia, Jejunostomy tube present ([address] ), Low vitamin D level, Osteoarthritis, knee, Pneumonia, Right leg paresthesias, Sciatica, Shingles, Spondylosis of 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, and Venous insufficiency  ,  does not have any pertinent problems on file  ,   has a past surgical history that includes  section; pr esophagogastroduodenoscopy transoral diagnostic (N/A, 3/10/2017); Steroid injection knee (Right, 5/2/2017); Joint replacement (Right, 11/10/2014); pr esophagogastroduodenoscopy transoral diagnostic (N/A, 5/19/2017); pr esophagoscopy flexible transoral with biopsy (N/A, 8/24/2017); pr removal esophagus,no thoracotomy (N/A, 8/24/2017); Gastrojejunostomy w/ jejunostomy tube (N/A, 8/24/2017); Gastrectomy; pr lap, ventral hernia repair,reducible (N/A, 7/31/2019); LAPAROTOMY (N/A, 8/3/2019); Exploratory laparotomy w/ bowel resection (N/A, 8/2/2019); and Ventral hernia repair (07/30/2019)  ,  family history includes Alzheimer's disease in her mother; Diabetes type II in her father  ,   reports that she has never smoked  She has never used smokeless tobacco  She reports that she drank alcohol  She reports that she does not use drugs  ,  has No Known Allergies     Current Outpatient Medications   Medication Sig Dispense Refill    folic acid (FOLVITE) 1 mg tablet Take 1 tablet (1 mg total) by mouth daily 20 tablet 0    melatonin 3 mg Take 1 tablet (3 mg total) by mouth daily at bedtime 30 tablet 5    mirtazapine (REMERON) 7 5 MG tablet Take 1 tablet (7 5 mg total) by mouth daily at bedtime 30 tablet 5    pantoprazole (PROTONIX) 20 mg tablet Take 1 tablet (20 mg total) by mouth daily 30 tablet 5    PARoxetine (PAXIL) 20 mg tablet Take 1 tablet (20 mg total) by mouth daily 30 tablet 5    potassium chloride (K-DUR,KLOR-CON) 20 mEq tablet Take 1 tablet (20 mEq total) by mouth daily 30 tablet 0    pravastatin (PRAVACHOL) 20 mg tablet Take 1 tablet (20 mg total) by mouth daily with dinner 30 tablet 5    tamsulosin (FLOMAX) 0 4 mg Take 1 capsule (0 4 mg total) by mouth daily with dinner 30 capsule 5     No current facility-administered medications for this visit  Review of Systems   Constitutional: Negative for activity change, appetite change, diaphoresis, fatigue and fever  HENT: Positive for hearing loss  Impacted cerumen both ears   Eyes: Negative  Respiratory: Negative for apnea, cough, chest tightness, shortness of breath and wheezing  Cardiovascular: Negative for chest pain, palpitations and leg swelling  Gastrointestinal: Negative for abdominal distention, abdominal pain, anal bleeding, constipation, diarrhea, nausea and vomiting  Endocrine: Negative for cold intolerance, heat intolerance, polydipsia, polyphagia and polyuria  Genitourinary: Negative for difficulty urinating, dysuria, flank pain, hematuria and urgency  Musculoskeletal: Negative for arthralgias, back pain, gait problem, joint swelling and myalgias  Skin: Negative for color change, rash and wound  Allergic/Immunologic: Negative for environmental allergies, food allergies and immunocompromised state  Neurological: Negative for dizziness, seizures, syncope, speech difficulty, numbness and headaches  Hematological: Negative for adenopathy  Does not bruise/bleed easily  Psychiatric/Behavioral: Negative for agitation, behavioral problems, hallucinations, sleep disturbance and suicidal ideas  Objective:  Vitals:    11/11/19 1131   BP: 130/80   BP Location: Left arm   Patient Position: Sitting   Cuff Size: Standard   Temp: 97 5 °F (36 4 °C)   TempSrc: Tympanic   Weight: 54 3 kg (119 lb 9 6 oz)   Height: 5' 4" (1 626 m)     Body mass index is 20 53 kg/m²  Physical Exam   Constitutional: She is oriented to person, place, and time  She appears well-developed and well-nourished  No distress  HENT:   Head: Normocephalic  Nose: Nose normal    Mouth/Throat: Oropharynx is clear and moist    Impacted cerumen both ears   Eyes: Pupils are equal, round, and reactive to light  Conjunctivae and EOM are normal  Right eye exhibits no discharge  Left eye exhibits no discharge  No scleral icterus  Neck: Normal range of motion  No tracheal deviation present  No thyromegaly present  Cardiovascular: Normal rate, regular rhythm and normal heart sounds   Exam reveals no gallop and no friction rub  No murmur heard  Pulmonary/Chest: Effort normal and breath sounds normal  No respiratory distress  She has no wheezes  Abdominal: Soft  Bowel sounds are normal  She exhibits no mass  There is no tenderness  There is no guarding  Surgical dress abdomen  dehisced wound which is being treated by wound care   Musculoskeletal: She exhibits no edema or deformity  Lymphadenopathy:     She has no cervical adenopathy  Neurological: She is alert and oriented to person, place, and time  No cranial nerve deficit  Skin: Skin is warm and dry  No rash noted  She is not diaphoretic  No erythema  Psychiatric: She has a normal mood and affect   Thought content normal

## 2019-11-20 ENCOUNTER — TELEPHONE (OUTPATIENT)
Dept: FAMILY MEDICINE CLINIC | Facility: CLINIC | Age: 69
End: 2019-11-20

## 2019-11-20 NOTE — TELEPHONE ENCOUNTER
She is having hair loss, she called the pharmacy and they told her it could be from the paroxetine     Can she stop it and start something different

## 2019-11-20 NOTE — TELEPHONE ENCOUNTER
She will have to stop it slowly because if she stops it cold turkey she will go through withdrawal I would break the tablets in half for 1 week and then the following week I would take 1/2 a tablet every other day and then discontinue it so it will take 2 weeks to get off of it

## 2019-11-22 ENCOUNTER — TELEPHONE (OUTPATIENT)
Dept: FAMILY MEDICINE CLINIC | Facility: CLINIC | Age: 69
End: 2019-11-22

## 2020-01-02 ENCOUNTER — TRANSCRIBE ORDERS (OUTPATIENT)
Dept: RADIOLOGY | Facility: MEDICAL CENTER | Age: 70
End: 2020-01-02

## 2020-01-02 ENCOUNTER — APPOINTMENT (OUTPATIENT)
Dept: RADIOLOGY | Facility: MEDICAL CENTER | Age: 70
End: 2020-01-02
Payer: MEDICARE

## 2020-01-02 ENCOUNTER — APPOINTMENT (OUTPATIENT)
Dept: LAB | Facility: MEDICAL CENTER | Age: 70
End: 2020-01-02
Payer: MEDICARE

## 2020-01-02 ENCOUNTER — OFFICE VISIT (OUTPATIENT)
Dept: FAMILY MEDICINE CLINIC | Facility: CLINIC | Age: 70
End: 2020-01-02
Payer: MEDICARE

## 2020-01-02 VITALS
BODY MASS INDEX: 21.1 KG/M2 | HEIGHT: 64 IN | WEIGHT: 123.6 LBS | SYSTOLIC BLOOD PRESSURE: 122 MMHG | DIASTOLIC BLOOD PRESSURE: 70 MMHG

## 2020-01-02 DIAGNOSIS — M25.511 ACUTE PAIN OF RIGHT SHOULDER: ICD-10-CM

## 2020-01-02 DIAGNOSIS — Z13.31 POSITIVE DEPRESSION SCREENING: ICD-10-CM

## 2020-01-02 DIAGNOSIS — R53.83 FATIGUE, UNSPECIFIED TYPE: Primary | ICD-10-CM

## 2020-01-02 DIAGNOSIS — R53.83 FATIGUE, UNSPECIFIED TYPE: ICD-10-CM

## 2020-01-02 LAB
ALBUMIN SERPL BCP-MCNC: 2.9 G/DL (ref 3.5–5)
ALP SERPL-CCNC: 105 U/L (ref 46–116)
ALT SERPL W P-5'-P-CCNC: 13 U/L (ref 12–78)
ANION GAP SERPL CALCULATED.3IONS-SCNC: 4 MMOL/L (ref 4–13)
AST SERPL W P-5'-P-CCNC: 14 U/L (ref 5–45)
BILIRUB SERPL-MCNC: 0.35 MG/DL (ref 0.2–1)
BUN SERPL-MCNC: 13 MG/DL (ref 5–25)
CALCIUM SERPL-MCNC: 9 MG/DL (ref 8.3–10.1)
CHLORIDE SERPL-SCNC: 105 MMOL/L (ref 100–108)
CO2 SERPL-SCNC: 30 MMOL/L (ref 21–32)
CREAT SERPL-MCNC: 0.73 MG/DL (ref 0.6–1.3)
ERYTHROCYTE [DISTWIDTH] IN BLOOD BY AUTOMATED COUNT: 14.1 % (ref 11.6–15.1)
GFR SERPL CREATININE-BSD FRML MDRD: 84 ML/MIN/1.73SQ M
GLUCOSE SERPL-MCNC: 91 MG/DL (ref 65–140)
HCT VFR BLD AUTO: 35.6 % (ref 34.8–46.1)
HGB BLD-MCNC: 10.8 G/DL (ref 11.5–15.4)
MCH RBC QN AUTO: 24.9 PG (ref 26.8–34.3)
MCHC RBC AUTO-ENTMCNC: 30.3 G/DL (ref 31.4–37.4)
MCV RBC AUTO: 82 FL (ref 82–98)
PLATELET # BLD AUTO: 255 THOUSANDS/UL (ref 149–390)
PMV BLD AUTO: 12.2 FL (ref 8.9–12.7)
POTASSIUM SERPL-SCNC: 3.6 MMOL/L (ref 3.5–5.3)
PROT SERPL-MCNC: 8 G/DL (ref 6.4–8.2)
RBC # BLD AUTO: 4.33 MILLION/UL (ref 3.81–5.12)
SODIUM SERPL-SCNC: 139 MMOL/L (ref 136–145)
TSH SERPL DL<=0.05 MIU/L-ACNC: 1.1 UIU/ML (ref 0.36–3.74)
WBC # BLD AUTO: 13.64 THOUSAND/UL (ref 4.31–10.16)

## 2020-01-02 PROCEDURE — 99213 OFFICE O/P EST LOW 20 MIN: CPT | Performed by: PHYSICIAN ASSISTANT

## 2020-01-02 PROCEDURE — 80053 COMPREHEN METABOLIC PANEL: CPT

## 2020-01-02 PROCEDURE — 73030 X-RAY EXAM OF SHOULDER: CPT

## 2020-01-02 PROCEDURE — 85027 COMPLETE CBC AUTOMATED: CPT

## 2020-01-02 PROCEDURE — 36415 COLL VENOUS BLD VENIPUNCTURE: CPT

## 2020-01-02 PROCEDURE — 84443 ASSAY THYROID STIM HORMONE: CPT

## 2020-01-02 NOTE — PROGRESS NOTES
Assessment/Plan:    Problem List Items Addressed This Visit     None      Visit Diagnoses     Fatigue, unspecified type    -  Primary    Relevant Orders    CBC    Comprehensive metabolic panel    TSH, 3rd generation with Free T4 reflex    Acute pain of right shoulder        Relevant Orders    XR shoulder 2+ vw right    Positive depression screening               Diagnoses and all orders for this visit:    Fatigue, unspecified type  -     CBC; Future  -     Comprehensive metabolic panel; Future  -     TSH, 3rd generation with Free T4 reflex; Future    Acute pain of right shoulder  -     XR shoulder 2+ vw right; Future    Positive depression screening        Encouraged to try Ensure or Boost shakes to give her more energy  Will check labs and xray R shoulder  Subjective:      Patient ID: Niraj Soriano is a 78 y o  female  [facility] is here today complaining of fatigue and pain in R shoulder  She is depressed due to death of  3 months ago  She states she is not sleeping or eating  She's tried to seek counseling with her , but states that you have to be "one of those families" to have the  help  The following portions of the patient's history were reviewed and updated as appropriate:   She has a past medical history of Abnormal blood chemistry, Achalasia, esophageal, Acute medial meniscus tear, Acute otitis externa, Adrenal nodule ([address] ), Anemia, Anxiety, Arthritis, Atrial fibrillation ([address] ), Atypical chest pain, Egale's esophagus with high grade dysplasia, Cancer ([address] ), Cerumen impaction, Chronic pain of right knee, Coronary artery disease, Depression, Dysphagia, Esophageal stricture, GERD (gastroesophageal reflux disease), Headache, Hiatal hernia, Insomnia, Jejunostomy tube present ([address] ), Low vitamin D level, Osteoarthritis, knee, Pneumonia, Right leg paresthesias, Sciatica, Shingles, Spondylosis of 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, and Venous insufficiency  ,  does not have any pertinent problems on file  ,   has a past surgical history that includes  section; pr esophagogastroduodenoscopy transoral diagnostic (N/A, 3/10/2017); Steroid injection knee (Right, 2017); Joint replacement (Right, 11/10/2014); pr esophagogastroduodenoscopy transoral diagnostic (N/A, 2017); pr esophagoscopy flexible transoral with biopsy (N/A, 2017); pr removal esophagus,no thoracotomy (N/A, 2017); Gastrojejunostomy w/ jejunostomy tube (N/A, 2017); Gastrectomy; pr lap, ventral hernia repair,reducible (N/A, 2019); LAPAROTOMY (N/A, 8/3/2019); Exploratory laparotomy w/ bowel resection (N/A, 2019); and Ventral hernia repair (2019)  ,  family history includes Alzheimer's disease in her mother; Diabetes type II in her father  ,   reports that she has never smoked  She has never used smokeless tobacco  She reports that she drank alcohol  She reports that she does not use drugs  ,  has No Known Allergies     Current Outpatient Medications   Medication Sig Dispense Refill    folic acid (FOLVITE) 1 mg tablet Take 1 tablet (1 mg total) by mouth daily 20 tablet 0    melatonin 3 mg Take 1 tablet (3 mg total) by mouth daily at bedtime 30 tablet 5    mirtazapine (REMERON) 7 5 MG tablet Take 1 tablet (7 5 mg total) by mouth daily at bedtime 30 tablet 5    pantoprazole (PROTONIX) 20 mg tablet Take 1 tablet (20 mg total) by mouth daily 30 tablet 5    PARoxetine (PAXIL) 20 mg tablet Take 1 tablet (20 mg total) by mouth daily 30 tablet 5    pravastatin (PRAVACHOL) 20 mg tablet Take 1 tablet (20 mg total) by mouth daily with dinner 30 tablet 5    tamsulosin (FLOMAX) 0 4 mg Take 1 capsule (0 4 mg total) by mouth daily with dinner 30 capsule 5    potassium chloride (K-DUR,KLOR-CON) 20 mEq tablet Take 1 tablet (20 mEq total) by mouth daily (Patient not taking: Reported on 2020) 30 tablet 0     No current facility-administered medications for this visit          Review of Systems   Constitutional: Positive for appetite change and fatigue  Negative for activity change, chills, diaphoresis, fever and unexpected weight change  HENT: Negative for congestion, ear pain, postnasal drip, rhinorrhea, sinus pressure, sinus pain, sneezing, sore throat, tinnitus and voice change  Eyes: Negative for pain, redness and visual disturbance  Respiratory: Negative for cough, chest tightness, shortness of breath and wheezing  Cardiovascular: Negative for chest pain, palpitations and leg swelling  Gastrointestinal: Negative for abdominal pain, blood in stool, constipation, diarrhea, nausea and vomiting  Genitourinary: Negative for difficulty urinating, dysuria, frequency, hematuria and urgency  Musculoskeletal: Positive for arthralgias (R shoulder pain)  Negative for back pain, gait problem, joint swelling, myalgias, neck pain and neck stiffness  Skin: Negative for color change, pallor, rash and wound  Neurological: Negative for dizziness, tremors, weakness, light-headedness and headaches  Psychiatric/Behavioral: Positive for sleep disturbance  Negative for dysphoric mood, self-injury and suicidal ideas  The patient is not nervous/anxious  Objective:  Vitals:    01/02/20 1103   BP: 122/70   Weight: 56 1 kg (123 lb 9 6 oz)   Height: 5' 4" (1 626 m)     Body mass index is 21 22 kg/m²  Physical Exam   Constitutional: She is oriented to person, place, and time  She appears well-developed and well-nourished  No distress  HENT:   Head: Normocephalic and atraumatic  Right Ear: Hearing, tympanic membrane, external ear and ear canal normal    Left Ear: Hearing, tympanic membrane, external ear and ear canal normal    Mouth/Throat: Uvula is midline, oropharynx is clear and moist and mucous membranes are normal  No oropharyngeal exudate  Eyes: Conjunctivae are normal  Right eye exhibits no discharge  Left eye exhibits no discharge  No scleral icterus  Neck: Neck supple  Carotid bruit is not present  No thyromegaly present  Cardiovascular: Normal rate, regular rhythm and normal heart sounds  No murmur heard  Pulmonary/Chest: Effort normal and breath sounds normal  No respiratory distress  She has no wheezes  Abdominal: Soft  Bowel sounds are normal  She exhibits no distension and no mass  There is no tenderness  There is no rebound and no guarding  Musculoskeletal: She exhibits tenderness  She exhibits no edema  Right shoulder: She exhibits decreased range of motion and bony tenderness  Arms:  Lymphadenopathy:     She has no cervical adenopathy  Neurological: She is alert and oriented to person, place, and time  Skin: Skin is warm and dry  No rash noted  She is not diaphoretic  No erythema  Psychiatric: Her behavior is normal  Judgment and thought content normal  She exhibits a depressed mood  Vitals reviewed  PHQ-9 Depression Screening    PHQ-9:    Frequency of the following problems over the past two weeks:       Little interest or pleasure in doing things:  2 - more than half the days  Feeling down, depressed, or hopeless:  2 - more than half the days  Trouble falling or staying asleep, or sleeping too much:  3 - nearly every day  Feeling tired or having little energy:  3 - nearly every day  Poor appetite or overeatin - more than half the days  Feeling bad about yourself - or that you are a failure or have let yourself or your family down:  0 - not at all  Trouble concentrating on things, such as reading the newspaper or watching television:  0 - not at all  Moving or speaking so slowly that other people could have noticed   Or the opposite - being so fidgety or restless that you have been moving around a lot more than usual:  0 - not at all  Thoughts that you would be better off dead, or of hurting yourself in some way:  0 - not at all  PHQ-2 Score:  4  PHQ-9 Score:  12         Depression Screening Follow-up Plan: Patient's depression screening was positive with a PHQ-2 score of 4  Their PHQ-9 score was 12  Patient declines further evaluation by mental health professional and/or medications  They have no active suicidal ideations  Brief counseling provided and recommend additional follow-up/re-evaluation at next office visit

## 2020-01-03 ENCOUNTER — APPOINTMENT (OUTPATIENT)
Dept: LAB | Facility: MEDICAL CENTER | Age: 70
End: 2020-01-03
Payer: MEDICARE

## 2020-01-03 DIAGNOSIS — R53.83 FATIGUE, UNSPECIFIED TYPE: ICD-10-CM

## 2020-01-03 DIAGNOSIS — R53.83 FATIGUE, UNSPECIFIED TYPE: Primary | ICD-10-CM

## 2020-01-03 LAB
AMORPH URATE CRY URNS QL MICRO: ABNORMAL /HPF
BACTERIA UR QL AUTO: ABNORMAL /HPF
BILIRUB UR QL STRIP: ABNORMAL
CAOX CRY URNS QL MICRO: ABNORMAL /HPF
CLARITY UR: ABNORMAL
COLOR UR: ABNORMAL
ERYTHROCYTE [DISTWIDTH] IN BLOOD BY AUTOMATED COUNT: 14.3 % (ref 11.6–15.1)
GLUCOSE UR STRIP-MCNC: NEGATIVE MG/DL
HCT VFR BLD AUTO: 38 % (ref 34.8–46.1)
HGB BLD-MCNC: 11.6 G/DL (ref 11.5–15.4)
HGB UR QL STRIP.AUTO: NEGATIVE
KETONES UR STRIP-MCNC: NEGATIVE MG/DL
LEUKOCYTE ESTERASE UR QL STRIP: ABNORMAL
MCH RBC QN AUTO: 25.2 PG (ref 26.8–34.3)
MCHC RBC AUTO-ENTMCNC: 30.5 G/DL (ref 31.4–37.4)
MCV RBC AUTO: 83 FL (ref 82–98)
NITRITE UR QL STRIP: NEGATIVE
NON-SQ EPI CELLS URNS QL MICRO: ABNORMAL /HPF
PH UR STRIP.AUTO: 5.5 [PH]
PLATELET # BLD AUTO: 317 THOUSANDS/UL (ref 149–390)
PMV BLD AUTO: 11.9 FL (ref 8.9–12.7)
PROT UR STRIP-MCNC: ABNORMAL MG/DL
RBC # BLD AUTO: 4.6 MILLION/UL (ref 3.81–5.12)
RBC #/AREA URNS AUTO: ABNORMAL /HPF
SP GR UR STRIP.AUTO: 1.02 (ref 1–1.03)
UROBILINOGEN UR QL STRIP.AUTO: 0.2 E.U./DL
WBC # BLD AUTO: 13.56 THOUSAND/UL (ref 4.31–10.16)
WBC #/AREA URNS AUTO: ABNORMAL /HPF

## 2020-01-03 PROCEDURE — 36415 COLL VENOUS BLD VENIPUNCTURE: CPT

## 2020-01-03 PROCEDURE — 87086 URINE CULTURE/COLONY COUNT: CPT

## 2020-01-03 PROCEDURE — 85027 COMPLETE CBC AUTOMATED: CPT

## 2020-01-03 PROCEDURE — 81001 URINALYSIS AUTO W/SCOPE: CPT | Performed by: PHYSICIAN ASSISTANT

## 2020-01-04 LAB — BACTERIA UR CULT: NORMAL

## 2020-01-06 DIAGNOSIS — R82.90 ABNORMAL URINE FINDING: Primary | ICD-10-CM

## 2020-01-06 DIAGNOSIS — D72.829 LEUKOCYTOSIS, UNSPECIFIED TYPE: ICD-10-CM

## 2020-01-07 ENCOUNTER — TELEPHONE (OUTPATIENT)
Dept: UROLOGY | Facility: CLINIC | Age: 70
End: 2020-01-07

## 2020-01-07 NOTE — TELEPHONE ENCOUNTER
Yes with me is fine, next avail  Looks like calcium oxalate crystals in urine, no blood  I assume that's what the referral is for? Culture was negative

## 2020-01-07 NOTE — TELEPHONE ENCOUNTER
Patient called back and is questioning about why she needs an appointment   Please call patient at [phone]

## 2020-01-07 NOTE — TELEPHONE ENCOUNTER
Called and spoke with patient about her need for appointment  Informed patient that we have received a referral from Dr Waldo Wilkerson for an abnormal urine finding and that's the reason she needs the appointment  Patient scheduled 01/24/20 at 11:15am with Thea Black in [address]

## 2020-01-07 NOTE — TELEPHONE ENCOUNTER
Carlos Christianson,  This patient had an abnormal urine finding  The referral is for Dr Derek Pacheco to see her  Is it ok to schedule her with you? Dr Kim Joseph saw her back in early 2019   Thanks

## 2020-01-09 ENCOUNTER — APPOINTMENT (OUTPATIENT)
Dept: LAB | Facility: MEDICAL CENTER | Age: 70
End: 2020-01-09
Payer: MEDICARE

## 2020-01-09 DIAGNOSIS — D72.829 LEUKOCYTOSIS, UNSPECIFIED TYPE: Primary | ICD-10-CM

## 2020-01-09 DIAGNOSIS — D72.829 LEUKOCYTOSIS, UNSPECIFIED TYPE: ICD-10-CM

## 2020-01-09 LAB
ERYTHROCYTE [DISTWIDTH] IN BLOOD BY AUTOMATED COUNT: 14.2 % (ref 11.6–15.1)
HCT VFR BLD AUTO: 40.1 % (ref 34.8–46.1)
HGB BLD-MCNC: 12 G/DL (ref 11.5–15.4)
MCH RBC QN AUTO: 24.4 PG (ref 26.8–34.3)
MCHC RBC AUTO-ENTMCNC: 29.9 G/DL (ref 31.4–37.4)
MCV RBC AUTO: 82 FL (ref 82–98)
PLATELET # BLD AUTO: 311 THOUSANDS/UL (ref 149–390)
PMV BLD AUTO: 12.1 FL (ref 8.9–12.7)
RBC # BLD AUTO: 4.91 MILLION/UL (ref 3.81–5.12)
WBC # BLD AUTO: 13.37 THOUSAND/UL (ref 4.31–10.16)

## 2020-01-09 PROCEDURE — 36415 COLL VENOUS BLD VENIPUNCTURE: CPT

## 2020-01-09 PROCEDURE — 85027 COMPLETE CBC AUTOMATED: CPT

## 2020-01-10 ENCOUNTER — TELEPHONE (OUTPATIENT)
Dept: HEMATOLOGY ONCOLOGY | Facility: CLINIC | Age: 70
End: 2020-01-10

## 2020-01-10 NOTE — TELEPHONE ENCOUNTER
New Patient Encounter    New Patient Intake Form   Patient Details:  Apurva Easley  1950  [id number]    Background Information:  [facility] [place] starts by opening a telephone encounter and gathering the following information   Who is calling to schedule? If not self, relationship to patient? Self   Referring Provider Loyce Crigler, PA-C   What is the diagnosis? Leukocytosis, unspecified type   Is this diagnosis confirmed Yes   Is there a confirmed diagnosis from a biopsy/tissue reviewed by pathology? No   Is there any prior history of Cancer? No   If yes, please explain N/A   When was the diagnosis? January 2019   Is patient aware of diagnosis? Yes   Reason for visit? NP DX   Have you had any testing done? If so: when, where? Yes   Are records in Monitor? yes   Was the patient told to bring a disk? no   Scheduling Information:   Preferred [place]:  [place]     Requesting Specific Provider? N/A   Are there any dates/time the patient cannot be seen? Friday      Miscellaneous:N/A   After completing the above information, please route to Financial Counselor and the appropriate Nurse Navigator for review

## 2020-01-19 DIAGNOSIS — J96.01 ACUTE RESPIRATORY FAILURE WITH HYPOXEMIA (HCC): ICD-10-CM

## 2020-01-20 RX ORDER — FOLIC ACID 1 MG/1
1 TABLET ORAL DAILY
Qty: 30 TABLET | Refills: 0 | Status: SHIPPED | OUTPATIENT
Start: 2020-01-20 | End: 2020-03-10 | Stop reason: SDUPTHER

## 2020-01-24 ENCOUNTER — OFFICE VISIT (OUTPATIENT)
Dept: UROLOGY | Facility: CLINIC | Age: 70
End: 2020-01-24
Payer: MEDICARE

## 2020-01-24 VITALS
SYSTOLIC BLOOD PRESSURE: 120 MMHG | BODY MASS INDEX: 21.11 KG/M2 | HEART RATE: 98 BPM | WEIGHT: 123 LBS | DIASTOLIC BLOOD PRESSURE: 88 MMHG

## 2020-01-24 DIAGNOSIS — R82.90 ABNORMAL URINALYSIS: Primary | ICD-10-CM

## 2020-01-24 PROCEDURE — 99213 OFFICE O/P EST LOW 20 MIN: CPT | Performed by: PHYSICIAN ASSISTANT

## 2020-01-24 NOTE — PROGRESS NOTES
2020      Chief Complaint   Patient presents with    Adrenal nodule ([facility] )         Assessment and Plan    78 y o  female managed by Dr Triplett     1  Abnormal urinalysis  - urinalysis with moderate calcium oxalate crystals  - CT scan performed 2019 with no evidence of any stone disease    Ultrasound ordered  Hydration goals and stone prevention reviewed  Stone red flag symptoms reviewed  May not need any further workup, will await updated imaging  History of Present Illness  Kristel Chung is a 78 y o  female here for evaluation of abnormal urinalysis- unsure but presumably related to the calcium oxalate crystals identified  Patient underwent screening urinalysis by primary care which revealed 0 RBCs, 20-30 WBCs, occasional bacteria, and moderate calcium oxalate crystals  Urine culture was negative  She has had several CT scans over the past few years none of which demonstrate any evidence of any urinary stone disease  She has no upper or lower urinary tract complaints and no history of stone disease  No hematuria  She has mourning the loss of her  several months ago, and is still upset over the complications from her hernia repair last summer  Review of Systems   Constitutional: Positive for fatigue and unexpected weight change  Respiratory: Negative  Cardiovascular: Negative  Gastrointestinal: Negative for abdominal pain and nausea  Genitourinary: Negative for decreased urine volume, difficulty urinating, dysuria, flank pain, frequency, hematuria and urgency  Musculoskeletal: Negative                   Past Medical History  Past Medical History:   Diagnosis Date    Abnormal blood chemistry     last assessed 2014    Achalasia, esophageal     Acute medial meniscus tear     last assessed 03/10/2014    Acute otitis externa     last assessed 2014    Adrenal nodule (HCC)     Anemia     Anxiety     Arthritis     Atrial fibrillation ([facility] ) resolved 2018    Atypical chest pain     last assessed 2016    Eagle's esophagus with high grade dysplasia     last assessed 2018    [facility]     esophageal    Cerumen impaction     last assessed 2014    Chronic pain of right knee     last assessed 2017    Coronary artery disease     3 vessel, resolved 2018    Depression     Dysphagia     last assessed 2017    Esophageal stricture     last assessed 2017    GERD (gastroesophageal reflux disease)     Headache     last assessed 2013    Hiatal hernia     Insomnia     last assessed 10/15/2013    Jejunostomy tube present ([facility] [address] )     resolved 2018    Low vitamin D level     resolved 2018    Osteoarthritis, knee     last assessed 2017    Pneumonia     last assessed 2013    Right leg paresthesias     last assessed 2017    Sciatica     last assessed 2015    Shingles     last assessed 2015    Spondylosis of 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     last assessed 2017    Venous insufficiency     last assessed 2013     Past Social History  Past Surgical History:   Procedure Laterality Date     SECTION      EXPLORATORY LAPAROTOMY W/ BOWEL RESECTION N/A 2019    Procedure: LAPAROTOMY EXPLORATORY W/ BOWEL RESECTION, APPLICATION OF ABTHERA VAC;  Surgeon: Monserrat Bustillos DO;  [facility];  Service: General    GASTRECTOMY      partial    GASTROJEJUNOSTOMY W/ JEJUNOSTOMY TUBE N/A 2017    Procedure: JEJUNOSTOMY TUBE PLACEMENT;  Surgeon: Tati Vasquez MD;  [facility];  Service: Thoracic    JOINT REPLACEMENT Right 11/10/2014    knee, Dr Nela Cisneros N/A 8/3/2019    Procedure: LAPAROTOMY EXPLORATORY, reanastomosis of proximal small bowel, placement of vac dressing;  Surgeon: Monserrat Bustillos DO;  [facility];  Service: General    UT ESOPHAGOGASTRODUODENOSCOPY TRANSORAL DIAGNOSTIC N/A 3/10/2017    Procedure: ESOPHAGOGASTRODUODENOSCOPY (EGD); Surgeon: Lory Jones MD;  [facility];  Service: Gastroenterology    PA ESOPHAGOGASTRODUODENOSCOPY TRANSORAL DIAGNOSTIC N/A 5/19/2017    Procedure: ESOPHAGOGASTRODUODENOSCOPY (EGD); Surgeon: Lory Jones MD;  [facility];  Service: Gastroenterology    PA ESOPHAGOSCOPY FLEXIBLE TRANSORAL WITH BIOPSY N/A 8/24/2017    Procedure: ESOPHAGOGASTRODUODENOSCOPY (EGD); Surgeon: Rene Smith MD;  [facility];  Service: Thoracic    PA LAP, VENTRAL HERNIA REPAIR,REDUCIBLE N/A 7/31/2019    Procedure: REPAIR HERNIA VENTRAL LAPAROSCOPIC;  Surgeon: Aubrey Baez DO;  [facility];  Service: General    PA REMOVAL [address] THORACOTOMY N/A 8/24/2017    Procedure: TRANSHIATAL ESOPHAGECTOMY;  Surgeon: Rene Smith MD;  [facility];  Service: Thoracic    STEROID INJECTION KNEE Right 5/2/2017    Procedure: GENICULATE NERVE BLOCKS;  Surgeon: Awais Ledezma DO;  [facility];  Service:     VENTRAL HERNIA REPAIR  07/30/2019     Social History     Tobacco Use   Smoking Status Never Smoker   Smokeless Tobacco Never Used     Past Family History  Family History   Problem Relation Age of Onset    Alzheimer's disease Mother     Diabetes type II Father      Past Social history  Social History     Socioeconomic History    Marital status:       Spouse name: Not on file    Number of children: Not on file    Years of education: Not on file    Highest education level: Not on file   Occupational History    Not on file   Social Needs    Financial resource strain: Not on file    Food insecurity:     Worry: Not on file     Inability: Not on file    Transportation needs:     Medical: Not on file     Non-medical: Not on file   Tobacco Use    Smoking status: Never Smoker    Smokeless tobacco: Never Used   Substance and Sexual Activity    Alcohol use: Not Currently     Alcohol/week: 0 0 standard drinks Frequency: Never     Binge frequency: Never    Drug use: No    Sexual activity: Not Currently     Partners: Male   Lifestyle    Physical activity:     Days per week: Not on file     Minutes per session: Not on file    Stress: Not on file   Relationships    Social connections:     Talks on phone: Not on file     Gets together: Not on file     Attends [employer] service: Not on file     Active member of club or organization: Not on file     Attends meetings of clubs or organizations: Not on file     Relationship status: Not on file    Intimate partner violence:     Fear of current or ex partner: Not on file     Emotionally abused: Not on file     Physically abused: Not on file     Forced sexual activity: Not on file   Other Topics Concern    Not on file   Social History Narrative    Patient has living will     Current Medications  Current Outpatient Medications   Medication Sig Dispense Refill    folic acid (FOLVITE) 1 mg tablet Take 1 tablet (1 mg total) by mouth daily 30 tablet 0    melatonin 3 mg Take 1 tablet (3 mg total) by mouth daily at bedtime 30 tablet 5    mirtazapine (REMERON) 7 5 MG tablet Take 1 tablet (7 5 mg total) by mouth daily at bedtime 30 tablet 5    pantoprazole (PROTONIX) 20 mg tablet Take 1 tablet (20 mg total) by mouth daily 30 tablet 5    PARoxetine (PAXIL) 20 mg tablet Take 1 tablet (20 mg total) by mouth daily 30 tablet 5    pravastatin (PRAVACHOL) 20 mg tablet Take 1 tablet (20 mg total) by mouth daily with dinner 30 tablet 5    tamsulosin (FLOMAX) 0 4 mg Take 1 capsule (0 4 mg total) by mouth daily with dinner 30 capsule 5    potassium chloride (K-DUR,KLOR-CON) 20 mEq tablet Take 1 tablet (20 mEq total) by mouth daily (Patient not taking: Reported on 1/2/2020) 30 tablet 0     No current facility-administered medications for this visit        Allergies  No Known Allergies      The following portions of the patient's history were reviewed and updated as appropriate: allergies, current medications, past medical history, past social history, past surgical history and problem list       Vitals  Vitals:    01/24/20 1026   BP: 120/88   Pulse: 98   Weight: 55 8 kg (123 lb)       Physical Exam   Constitutional: She is oriented to person, place, and time  She appears well-developed and well-nourished  No distress  HENT:   Head: Normocephalic and atraumatic  Pulmonary/Chest: Effort normal    Abdominal: Soft  There is no tenderness  Laparotomy scar   Musculoskeletal: She exhibits no edema  Neurological: She is alert and oriented to person, place, and time  Gait normal    Skin: Skin is warm and dry  She is not diaphoretic  Psychiatric: She has a normal mood and affect  Her speech is normal and behavior is normal    Nursing note and vitals reviewed  Results  No results found for this or any previous visit (from the past 1 hour(s))  ]  No results found for: PSA  Lab Results   Component Value Date    GLUCOSE 89 08/03/2019    CALCIUM 9 0 01/02/2020     07/23/2015    K 3 6 01/02/2020    CO2 30 01/02/2020     01/02/2020    BUN 13 01/02/2020    CREATININE 0 73 01/02/2020     Lab Results   Component Value Date    WBC 13 37 (H) 01/09/2020    HGB 12 0 01/09/2020    HCT 40 1 01/09/2020    MCV 82 01/09/2020     01/09/2020         Orders  Orders Placed This Encounter   Procedures    US kidney and bladder     Standing Status:   Future     Standing Expiration Date:   1/24/2024     Scheduling Instructions:      13 years and Up - 1)  Full bladder required  2)  Please drink 24-32 oz of water 1 hour prior to appointment time  3)  No voiding 1 hour prior to appointment time  "Prep required if being scheduled in conjunction withother studies, refer to those examination's Preps first before scheduling  All patients for US Kidney and Bladder they must drink 24 oz of water 60 minutes before your scheduled appointment time   This test requiresyou to have a FULL bladder  Please do not urinate before your test             Please bring your insurance cards, a form of photo ID and a list of your medications with you  Arrive 15 minutes prior to yourappointment time in order to register  If you need to have lab work or a urinalysis, please do this AFTER your ultrasound  To schedulethis appointment, please contact Central Scheduling at [phone]  Order Specific Question:   Is a Renal Artery Doppler also being requested in addition to the Kidney/Renal ultrasound ? Answer:    No

## 2020-01-25 DIAGNOSIS — I10 ESSENTIAL HYPERTENSION: ICD-10-CM

## 2020-01-27 RX ORDER — POTASSIUM CHLORIDE 20 MEQ/1
20 TABLET, EXTENDED RELEASE ORAL DAILY
Qty: 30 TABLET | Refills: 0 | Status: SHIPPED | OUTPATIENT
Start: 2020-01-27

## 2020-01-29 ENCOUNTER — HOSPITAL ENCOUNTER (OUTPATIENT)
Dept: ULTRASOUND IMAGING | Facility: HOSPITAL | Age: 70
Discharge: HOME | End: 2020-01-29
Payer: MEDICARE

## 2020-01-29 DIAGNOSIS — R82.90 ABNORMAL URINALYSIS: ICD-10-CM

## 2020-01-29 PROCEDURE — 76770 US EXAM ABDO BACK WALL COMP: CPT

## 2020-02-03 ENCOUNTER — TELEPHONE (OUTPATIENT)
Dept: UROLOGY | Facility: CLINIC | Age: 70
End: 2020-02-03

## 2020-02-03 NOTE — TELEPHONE ENCOUNTER
Called patient and let a voice mail informing patient her ultrasound reviewed by AP showed  no hydronephrosis, no stones in tract and ultrasound looked normal  To call us with any further questions  Office number left

## 2020-02-03 NOTE — TELEPHONE ENCOUNTER
Patient seen recently by me for abnormal urinalysis with calcium oxalate crystals  No stones--  Please let patient know her ultrasound was reviewed  She has no hydronephrosis or visible urinary tract calculus  Small simple cysts on the left kidney and otherwise normal   Bladder normal  She can follow up nancy Zamora

## 2020-02-10 ENCOUNTER — CONSULT (OUTPATIENT)
Dept: HEMATOLOGY ONCOLOGY | Facility: CLINIC | Age: 70
End: 2020-02-10
Payer: MEDICARE

## 2020-02-10 VITALS
BODY MASS INDEX: 19.7 KG/M2 | TEMPERATURE: 98.7 F | DIASTOLIC BLOOD PRESSURE: 83 MMHG | WEIGHT: 115.4 LBS | HEIGHT: 64 IN | SYSTOLIC BLOOD PRESSURE: 124 MMHG | HEART RATE: 101 BPM

## 2020-02-10 DIAGNOSIS — R19.03 RIGHT LOWER QUADRANT ABDOMINAL SWELLING: ICD-10-CM

## 2020-02-10 DIAGNOSIS — R63.4 WEIGHT LOSS OF MORE THAN 10% BODY WEIGHT: ICD-10-CM

## 2020-02-10 DIAGNOSIS — D72.823 LEUKEMOID REACTION: ICD-10-CM

## 2020-02-10 DIAGNOSIS — D72.829 LEUKOCYTOSIS, UNSPECIFIED TYPE: ICD-10-CM

## 2020-02-10 DIAGNOSIS — D64.9 ANEMIA, UNSPECIFIED TYPE: Primary | ICD-10-CM

## 2020-02-10 PROCEDURE — 3079F DIAST BP 80-89 MM HG: CPT | Performed by: INTERNAL MEDICINE

## 2020-02-10 PROCEDURE — 1160F RVW MEDS BY RX/DR IN RCRD: CPT | Performed by: INTERNAL MEDICINE

## 2020-02-10 PROCEDURE — 99204 OFFICE O/P NEW MOD 45 MIN: CPT | Performed by: INTERNAL MEDICINE

## 2020-02-10 PROCEDURE — 3008F BODY MASS INDEX DOCD: CPT | Performed by: INTERNAL MEDICINE

## 2020-02-10 PROCEDURE — 4040F PNEUMOC VAC/ADMIN/RCVD: CPT | Performed by: INTERNAL MEDICINE

## 2020-02-10 PROCEDURE — 3074F SYST BP LT 130 MM HG: CPT | Performed by: INTERNAL MEDICINE

## 2020-02-10 PROCEDURE — 1036F TOBACCO NON-USER: CPT | Performed by: INTERNAL MEDICINE

## 2020-02-10 NOTE — PATIENT INSTRUCTIONS
The patient is aware seek medical attention for fever i e  temperature 100 5° or higher, chills, nausea, abdominal pain, change in bowel habits, persistent or progressive right lower quadrant abdominal protrusion, excessive fatigue, or if other new problems arise

## 2020-02-10 NOTE — PROGRESS NOTES
2/10/2020    Kristel Chung was seen in consultation today in regards to leukocytosis and mild anemia    She underwent laparoscopic incisional ventral hernia repair with mesh on July 31, 2019 at [facility]  Subsequently, she was admitted to [facility] [address] [facility] 8/2/2019 - 8/27/2019  She underwent exploratory laparotomy, reanastomosis of proximal small bowel on August 3, 2019 done by Dr Jose Juan Prado  There was complication with acute respiratory failure with hypoxemia and candidal fungemia  Review of Systems:      General:  She has felt tired in the past 2 months, she has occasional lightheadedness, her weight has decreased from 126 lb on June 26, 2019 prior to the ventral hernia repair to 115 lb today  No chills or swaets  Head and Neck: No nosebleeds, no oral cavity or throat soreness  Cardiovascular: No chest pain, no lower extremity edema  Respriatory: No cough  She notes dyspnea on exertion walking up an incline, she avoids steps  GI: Appetite is good, no abdominal pain, bowel habits formed and regular  :  She notes nocturia x4  Last menstrual period in her 45s  Musculoskeletal:  She has chronic pain of the right shoulder and bilateral knees  Skin: No skin rash  Neurological:  She has infrequent headache in the past year, no numbness  She uses a cane when walking to maintain balance  Hematologic: No easy bruising  Psychiatric:  She has felt down since loss of her  4 months ago      Medications:    Current Outpatient Medications   Medication Sig Dispense Refill    folic acid (FOLVITE) 1 mg tablet Take 1 tablet (1 mg total) by mouth daily 30 tablet 0    melatonin 3 mg Take 1 tablet (3 mg total) by mouth daily at bedtime 30 tablet 5    mirtazapine (REMERON) 7 5 MG tablet Take 1 tablet (7 5 mg total) by mouth daily at bedtime 30 tablet 5    pantoprazole (PROTONIX) 20 mg tablet Take 1 tablet (20 mg total) by mouth daily 30 tablet 5    PARoxetine (PAXIL) 20 mg tablet Take 1 tablet (20 mg total) by mouth daily 30 tablet 5    potassium chloride (K-DUR,KLOR-CON) 20 mEq tablet Take 1 tablet (20 mEq total) by mouth daily 30 tablet 0    pravastatin (PRAVACHOL) 20 mg tablet Take 1 tablet (20 mg total) by mouth daily with dinner 30 tablet 5    tamsulosin (FLOMAX) 0 4 mg Take 1 capsule (0 4 mg total) by mouth daily with dinner 30 capsule 5     No current facility-administered medications for this visit  Past Medical History:    Dyslipidemia  Gastroesophageal reflux disease  Depression    Past Surgical History:    Right knee replacement done by Dr Cuco Guardado at [facility] in    delivery     Family History:    Her mother  age 80 with dementia  Her father  age [de-identified] with Mediterranean anemia he had chronic RBC transfusion support  Her brothers in good health  Her 70-year-old daughter is in good health    Social History:    Her  passed away 4 months ago post CVA  She has not been an alcohol drinker  She is a retired garment   She has not been a cigarette smoker    Physical Examination:    /83 (BP Location: Right arm, Patient Position: Sitting, Cuff Size: Extra-Large)   Pulse 101   Temp 98 7 °F (37 1 °C) (Tympanic)   Ht 5' 4" (1 626 m)   Wt 52 3 kg (115 lb 6 4 oz)   BMI 19 81 kg/m²      General appearance: Appears well  Head: Normocephalic  Eyes: Extraocular movements intact  Ears: No gross hearing deficit  Oropharynx: Clear  Neck: Supple, No lymphadenopathy  Chest: No axillary adenopathy  Lungs: Clear to auscultation bilaterally  Heart: Regular rate and rhythm  Abdomen: No tenderness, no hepatic or splenic enlargement; there is protrusion of the right lower anterior abdominal wall possibly representing persistent ventral hernia;  No inguinal  lymphadenopathy  Extremities: No lower extremity edema bilaterally  Skin: No rashes or suspicious lesions  Neurologic: Grossly intact, no focal neurological deficit  She to the office ambulating with the use of a cane  Psychiatric: Oriented to person, place and time, normal mood and affect    Laboratory:    From August 23, 2019:  Serum iron is 25 with saturation 19%, ferritin is 58 (8-388), hemoglobin 7 0    From January 2, 2020:  TSH is 1 10, creatinine 0 73, calcium 9 0, AST 14, ALT 13, alk-phos 105, bili 0 3    From January 9, 2020:  WBC is 13 37, hemoglobin 12 0 with MCV 82, platelets 974    Contrast enhanced CT of the chest, abdomen and pelvis from August 7, 2019:  Liver, spleen, pancreas, adrenals, kidneys are unremarkable, there is mucosal wall thickening of the small bowel adjacent to the anastomotic staple line, infectious versus ischemic in nature, peritoneal thickening in the right upper quadrant may represent infectious peritonitis, no destructive osseous lesion    Ultrasound of kidneys and bladder from January 29, 2020: There is trace perinephric fluid of the upper pole the right kidney, no kidney masses, no hydronephrosis, normally distended bladder without focal thickening or mass lesions  Assessment:    1  Leukocytosis  There is concern as to underlying inflammatory condition involving the abdomen  A primary bone marrow disorder such as a myeloproliferative disorder is less likely  2  Anemia, borderline, probably related to previous surgical procedures and prolonged hospital admission  Recommendations:    Further evaluation for underlying inflammatory condition involving the abdomen is to begin with contrast enhanced CT of the abdomen and pelvis  JAK2 mutation testing and LD are to be obtained as to potential underlying myeloproliferative disorder  Also iron studies are to be obtained to assess iron stores and replacement therapy accordingly       The patient is to follow-up with her general surgeon Dr Toma Mcadams in regards to unintentional weight loss and protrusion of the right lower anterior abdominal wall     The patient is aware seek medical attention for fever i e  temperature 100 5° or higher, chills, nausea, abdominal pain, change in bowel habits, persistent or progressive right lower quadrant abdominal protrusion, excessive fatigue, or if other new problems arise  Otherwise, I plan to see her again after the evaluation above has been completed  Today's office visit required 45 minutes, over 50% of the time was utilized to review diagnostic tests, impressions and recommendations

## 2020-02-11 ENCOUNTER — APPOINTMENT (OUTPATIENT)
Dept: LAB | Facility: MEDICAL CENTER | Age: 70
End: 2020-02-11
Payer: MEDICARE

## 2020-02-11 DIAGNOSIS — D64.9 ANEMIA, UNSPECIFIED TYPE: ICD-10-CM

## 2020-02-11 DIAGNOSIS — D72.829 LEUKOCYTOSIS, UNSPECIFIED TYPE: ICD-10-CM

## 2020-02-11 DIAGNOSIS — D72.823 LEUKEMOID REACTION: ICD-10-CM

## 2020-02-11 LAB
BASOPHILS # BLD AUTO: 0.04 THOUSANDS/ΜL (ref 0–0.1)
BASOPHILS NFR BLD AUTO: 0 % (ref 0–1)
EOSINOPHIL # BLD AUTO: 0.1 THOUSAND/ΜL (ref 0–0.61)
EOSINOPHIL NFR BLD AUTO: 1 % (ref 0–6)
ERYTHROCYTE [DISTWIDTH] IN BLOOD BY AUTOMATED COUNT: 17.4 % (ref 11.6–15.1)
HCT VFR BLD AUTO: 41.6 % (ref 34.8–46.1)
HGB BLD-MCNC: 12.4 G/DL (ref 11.5–15.4)
IMM GRANULOCYTES # BLD AUTO: 0.03 THOUSAND/UL (ref 0–0.2)
IMM GRANULOCYTES NFR BLD AUTO: 0 % (ref 0–2)
IRON SERPL-MCNC: 49 UG/DL (ref 50–170)
LDH SERPL-CCNC: 139 U/L (ref 81–234)
LYMPHOCYTES # BLD AUTO: 3.16 THOUSANDS/ΜL (ref 0.6–4.47)
LYMPHOCYTES NFR BLD AUTO: 33 % (ref 14–44)
MCH RBC QN AUTO: 24.6 PG (ref 26.8–34.3)
MCHC RBC AUTO-ENTMCNC: 29.8 G/DL (ref 31.4–37.4)
MCV RBC AUTO: 83 FL (ref 82–98)
MONOCYTES # BLD AUTO: 0.39 THOUSAND/ΜL (ref 0.17–1.22)
MONOCYTES NFR BLD AUTO: 4 % (ref 4–12)
NEUTROPHILS # BLD AUTO: 5.95 THOUSANDS/ΜL (ref 1.85–7.62)
NEUTS SEG NFR BLD AUTO: 62 % (ref 43–75)
NRBC BLD AUTO-RTO: 0 /100 WBCS
PLATELET # BLD AUTO: 289 THOUSANDS/UL (ref 149–390)
PMV BLD AUTO: 12.8 FL (ref 8.9–12.7)
RBC # BLD AUTO: 5.04 MILLION/UL (ref 3.81–5.12)
WBC # BLD AUTO: 9.67 THOUSAND/UL (ref 4.31–10.16)

## 2020-02-11 PROCEDURE — 83540 ASSAY OF IRON: CPT

## 2020-02-11 PROCEDURE — 36415 COLL VENOUS BLD VENIPUNCTURE: CPT

## 2020-02-11 PROCEDURE — 85025 COMPLETE CBC W/AUTO DIFF WBC: CPT

## 2020-02-11 PROCEDURE — 83615 LACTATE (LD) (LDH) ENZYME: CPT

## 2020-02-12 ENCOUNTER — TELEPHONE (OUTPATIENT)
Dept: HEMATOLOGY ONCOLOGY | Facility: CLINIC | Age: 70
End: 2020-02-12

## 2020-02-12 NOTE — TELEPHONE ENCOUNTER
Patient cancelled appt with  Lue Foil  His office only had afternoon appt's  Patient would like Dr Wiley in [place]  Please call patient  back [phone]

## 2020-02-14 LAB — SCAN RESULT: NORMAL

## 2020-02-18 ENCOUNTER — HOSPITAL ENCOUNTER (OUTPATIENT)
Dept: CT IMAGING | Facility: HOSPITAL | Age: 70
Discharge: HOME | End: 2020-02-18
Attending: INTERNAL MEDICINE
Payer: MEDICARE

## 2020-02-18 DIAGNOSIS — R63.4 WEIGHT LOSS OF MORE THAN 10% BODY WEIGHT: ICD-10-CM

## 2020-02-18 DIAGNOSIS — R19.03 RIGHT LOWER QUADRANT ABDOMINAL SWELLING: ICD-10-CM

## 2020-02-18 DIAGNOSIS — D64.9 ANEMIA, UNSPECIFIED TYPE: ICD-10-CM

## 2020-02-18 DIAGNOSIS — D72.823 LEUKEMOID REACTION: ICD-10-CM

## 2020-02-18 PROCEDURE — 74177 CT ABD & PELVIS W/CONTRAST: CPT

## 2020-02-18 RX ADMIN — IOHEXOL 100 ML: 350 INJECTION, SOLUTION INTRAVENOUS at 09:05

## 2020-02-20 ENCOUNTER — TELEPHONE (OUTPATIENT)
Dept: SURGERY | Facility: CLINIC | Age: 70
End: 2020-02-20

## 2020-02-20 ENCOUNTER — TELEPHONE (OUTPATIENT)
Dept: HEMATOLOGY ONCOLOGY | Facility: CLINIC | Age: 70
End: 2020-02-20

## 2020-02-20 ENCOUNTER — TELEPHONE (OUTPATIENT)
Dept: INTERVENTIONAL RADIOLOGY/VASCULAR | Facility: HOSPITAL | Age: 70
End: 2020-02-20

## 2020-02-20 ENCOUNTER — CONSULT (OUTPATIENT)
Dept: SURGERY | Facility: CLINIC | Age: 70
End: 2020-02-20
Payer: MEDICARE

## 2020-02-20 VITALS
BODY MASS INDEX: 20.28 KG/M2 | HEIGHT: 64 IN | DIASTOLIC BLOOD PRESSURE: 68 MMHG | SYSTOLIC BLOOD PRESSURE: 114 MMHG | HEART RATE: 114 BPM | TEMPERATURE: 98.3 F | WEIGHT: 118.8 LBS

## 2020-02-20 DIAGNOSIS — K75.0 LIVER ABSCESS: Primary | ICD-10-CM

## 2020-02-20 PROCEDURE — 3074F SYST BP LT 130 MM HG: CPT | Performed by: SURGERY

## 2020-02-20 PROCEDURE — 99214 OFFICE O/P EST MOD 30 MIN: CPT | Performed by: SURGERY

## 2020-02-20 PROCEDURE — 3078F DIAST BP <80 MM HG: CPT | Performed by: SURGERY

## 2020-02-20 PROCEDURE — 3008F BODY MASS INDEX DOCD: CPT | Performed by: SURGERY

## 2020-02-20 PROCEDURE — 4040F PNEUMOC VAC/ADMIN/RCVD: CPT | Performed by: SURGERY

## 2020-02-20 PROCEDURE — 1160F RVW MEDS BY RX/DR IN RCRD: CPT | Performed by: SURGERY

## 2020-02-20 PROCEDURE — 1036F TOBACCO NON-USER: CPT | Performed by: SURGERY

## 2020-02-20 NOTE — TELEPHONE ENCOUNTER
Called patient to schedule liver abscess drainage  No answer  Appointment information and callback inforamtion provided

## 2020-02-20 NOTE — PROGRESS NOTES
Office Visit - General Surgery  Antonino Hsu MRN: [id number]  Encounter: [id number]    Assessment and Plan  Impression  Large nonobstructing ventral hernia  Large perihepatic abscess  History of small-bowel resection and open abdomen after ventral hernia repair    Recommendation  I spoke at length with the patient and her family, as well as Dr Jayro Romeo from interventional Radiology  Prior to any intervention, laparoscopic or open, repair of her ventral hernia, we will need to get this perihepatic liver abscess drained by Interventional Radiology  It is anticipated that we will be able to get this drained on 2/21/20  We will continue to follow her in the office and manage the drain however I expect that it would be at least 2-4 months before we would consider ventral hernia repair so that we can minimize the risk the mesh becoming infected  The family was agreeable with this plan  We will anticipate getting the abscess drained at the [facility] tomorrow and follow up in 3 to 4 weeks at [facility] [place]  Chief Complaint:  Antonino Hsu is a 69 y o  female who presents for Hernia (Consult hernia (CT done))    Subjective  The patient states that she continuously feels tired  She is also complaining right-sided abdominal pain  She currently has a extremely poor appetite      Past Medical History  Past Medical History:   Diagnosis Date    Abnormal blood chemistry     last assessed 03/06/2014    Achalasia, esophageal     Acute medial meniscus tear     last assessed 03/10/2014    Acute otitis externa     last assessed 03/24/2014    Adrenal nodule ([address]     Anemia     Anxiety     Arthritis     Atrial fibrillation ([address] )     resolved 01/09/2018    Atypical chest pain     last assessed 11/22/2016    Eagle's esophagus with high grade dysplasia     last assessed 01/03/2018    Cancer [facility]     esophageal    Cerumen impaction     last assessed 03/24/2014    Chronic pain of right knee     last assessed 2017    Coronary artery disease     3 vessel, resolved 2018    Depression     Dysphagia     last assessed 2017    Esophageal stricture     last assessed 2017    GERD (gastroesophageal reflux disease)     Headache     last assessed 2013    Hiatal hernia     Insomnia     last assessed 10/15/2013    Jejunostomy tube present ([facility] [address] )     resolved 2018    Low vitamin D level     resolved 2018    Osteoarthritis, knee     last assessed 2017    Pneumonia     last assessed 2013    Right leg paresthesias     last assessed 2017    Sciatica     last assessed 2015    Shingles     last assessed 2015    Spondylosis of 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     last assessed 2017    Venous insufficiency     last assessed 2013       Past Surgical History  Past Surgical History:   Procedure Laterality Date     SECTION      EXPLORATORY LAPAROTOMY W/ BOWEL RESECTION N/A 2019    Procedure: LAPAROTOMY EXPLORATORY W/ BOWEL RESECTION, APPLICATION OF ABTHERA VAC;  Surgeon: Shane Veloz DO;  [facility];  Service: General    GASTRECTOMY      partial    GASTROJEJUNOSTOMY W/ JEJUNOSTOMY TUBE N/A 2017    Procedure: JEJUNOSTOMY TUBE PLACEMENT;  Surgeon: Amarilis Elizabeth MD;  [facility];  Service: Thoracic    JOINT REPLACEMENT Right 11/10/2014    knee, Dr Trinity Meyers N/A 8/3/2019    Procedure: LAPAROTOMY EXPLORATORY, reanastomosis of proximal small bowel, placement of vac dressing;  Surgeon: Shane Veloz DO;  [facility];  Service: General    MD ESOPHAGOGASTRODUODENOSCOPY TRANSORAL DIAGNOSTIC N/A 3/10/2017    Procedure: ESOPHAGOGASTRODUODENOSCOPY (EGD); Surgeon: Carlyn Bueno MD;  [facility];  Service: Gastroenterology    MD ESOPHAGOGASTRODUODENOSCOPY TRANSORAL DIAGNOSTIC N/A 2017    Procedure: ESOPHAGOGASTRODUODENOSCOPY (EGD);   Surgeon: Carlyn Bueno MD;  [facility];  Service: Gastroenterology    MO ESOPHAGOSCOPY FLEXIBLE TRANSORAL WITH BIOPSY N/A 8/24/2017    Procedure: ESOPHAGOGASTRODUODENOSCOPY (EGD); Surgeon: Valentino Dupont, MD;  Location:  MAIN OR;  Service: Thoracic    MO LAP, VENTRAL HERNIA REPAIR,REDUCIBLE N/A 7/31/2019    Procedure: REPAIR HERNIA VENTRAL LAPAROSCOPIC;  Surgeon: Jignesh Adamson DO;  [facility];  Service: General    MO REMOVAL [address] N/A 8/24/2017    Procedure: TRANSHIATAL ESOPHAGECTOMY;  Surgeon: Valentino Dupont, MD;  [facility];  Service: Thoracic    STEROID INJECTION KNEE Right 5/2/2017    Procedure: GENICULATE NERVE BLOCKS;  Surgeon: Fabiano Grimaldo DO;  [facility];  Service:     VENTRAL HERNIA REPAIR  07/30/2019       Family History  Family History   Problem Relation Age of Onset    Alzheimer's disease Mother     Diabetes type II Father        Social History  Social History     Socioeconomic History    Marital status:       Spouse name: None    Number of children: None    Years of education: None    Highest education level: None   Occupational History    None   Social Needs    Financial resource strain: None    Food insecurity:     Worry: None     Inability: None    Transportation needs:     Medical: None     Non-medical: None   Tobacco Use    Smoking status: Never Smoker    Smokeless tobacco: Never Used   Substance and Sexual Activity    Alcohol use: Not Currently     Alcohol/week: 0 0 standard drinks     Frequency: Never     Binge frequency: Never    Drug use: No    Sexual activity: Not Currently     Partners: Male   Lifestyle    Physical activity:     Days per week: None     Minutes per session: None    Stress: None   Relationships    Social connections:     Talks on phone: None     Gets together: None     Attends Protestant service: None     Active member of club or organization: None     Attends meetings of clubs or organizations: None Relationship status: None    Intimate partner violence:     Fear of current or ex partner: None     Emotionally abused: None     Physically abused: None     Forced sexual activity: None   Other Topics Concern    None   Social History Narrative    Patient has living will        Medications  Current Outpatient Medications on File Prior to Visit   Medication Sig Dispense Refill    folic acid (FOLVITE) 1 mg tablet Take 1 tablet (1 mg total) by mouth daily 30 tablet 0    melatonin 3 mg Take 1 tablet (3 mg total) by mouth daily at bedtime 30 tablet 5    mirtazapine (REMERON) 7 5 MG tablet Take 1 tablet (7 5 mg total) by mouth daily at bedtime 30 tablet 5    pantoprazole (PROTONIX) 20 mg tablet Take 1 tablet (20 mg total) by mouth daily 30 tablet 5    PARoxetine (PAXIL) 20 mg tablet Take 1 tablet (20 mg total) by mouth daily 30 tablet 5    potassium chloride (K-DUR,KLOR-CON) 20 mEq tablet Take 1 tablet (20 mEq total) by mouth daily 30 tablet 0    pravastatin (PRAVACHOL) 20 mg tablet Take 1 tablet (20 mg total) by mouth daily with dinner 30 tablet 5    tamsulosin (FLOMAX) 0 4 mg Take 1 capsule (0 4 mg total) by mouth daily with dinner 30 capsule 5     No current facility-administered medications on file prior to visit  Allergies  No Known Allergies    Review of Systems    Objective  Vitals:    02/20/20 1324   BP: 114/68   Pulse: (!) 114   Temp: 98 3 °F (36 8 °C)       Physical Exam     Heart regular rate and rhythm  Lungs clear to auscultation  Abdomen is soft with right upper quadrant tenderness  Large reducible ventral hernia    Incision well healed  Extremities no edema clubbing or cyanosis noted

## 2020-02-20 NOTE — TELEPHONE ENCOUNTER
I spoke to patient by telephone:  Again she feels tired, however, there has been no chills or sweats  Laboratory testing from February 11, 2020 was reviewed:  WBC 9 67, hemoglobin 12 4 with MCV 83, platelets 795, serum iron is 49 (),  (), JAK2 V617 F mutation is not detected  On CT scan of abdomen pelvis with contrast from February 18, 2020 there is a 9 3 x 6 6 x 6 6 complex collection in the superior perihepatic space most consistent with chronic perihepatic abscess, also a nonobstructing ventral hernia is identified  I recommended that the patient take iron sulfate 325 mg daily with a stool softener of choice for the next 3 months  She is to follow-up with her surgeon Dr Dixon Joseph at 1:30 p m  today  The patient is aware seek medical attention for fever i e  temperature 100 5° or higher, chills, nausea, abdominal pain, change in bowel habits, persistent or progressive right lower quadrant abdominal protrusion, excessive fatigue, or if other new problems arise  I subsequently spoke with Dr Dixon Joseph by telephone today and reviewed this case  Dr Chucky Stafford indicated that if the patient was not septic he would arrange for IR guided percutaneous drainage of the chronic perihepatic abscess

## 2020-02-20 NOTE — TELEPHONE ENCOUNTER
Dr Isrrael Walsh called to speak with Frieda Gandhi regarding the patient recent CT scan   He provided his phone # and Conception Holcomb Text Dr Mick Lawson his request to call

## 2020-02-21 ENCOUNTER — HOSPITAL ENCOUNTER (OUTPATIENT)
Dept: RADIOLOGY | Facility: HOSPITAL | Age: 70
Discharge: HOME | End: 2020-02-21
Attending: SURGERY
Payer: MEDICARE

## 2020-02-21 VITALS
RESPIRATION RATE: 16 BRPM | TEMPERATURE: 97.9 F | WEIGHT: 118 LBS | OXYGEN SATURATION: 96 % | HEART RATE: 88 BPM | BODY MASS INDEX: 20.14 KG/M2 | SYSTOLIC BLOOD PRESSURE: 132 MMHG | DIASTOLIC BLOOD PRESSURE: 66 MMHG | HEIGHT: 64 IN

## 2020-02-21 DIAGNOSIS — K75.0 LIVER ABSCESS: ICD-10-CM

## 2020-02-21 PROBLEM — K65.0 PERIHEPATIC ABSCESS (HCC): Status: ACTIVE | Noted: 2020-02-21

## 2020-02-21 PROBLEM — K43.2 INCISIONAL HERNIA: Status: RESOLVED | Noted: 2018-11-01 | Resolved: 2020-02-21

## 2020-02-21 PROBLEM — Z87.19 S/P REPAIR OF RECURRENT VENTRAL HERNIA: Status: RESOLVED | Noted: 2019-07-31 | Resolved: 2020-02-21

## 2020-02-21 PROBLEM — K43.9 VENTRAL HERNIA WITHOUT OBSTRUCTION OR GANGRENE: Status: ACTIVE | Noted: 2020-02-21

## 2020-02-21 PROBLEM — Z98.890 S/P REPAIR OF RECURRENT VENTRAL HERNIA: Status: RESOLVED | Noted: 2019-07-31 | Resolved: 2020-02-21

## 2020-02-21 PROBLEM — K43.2 INCISIONAL HERNIA, WITHOUT OBSTRUCTION OR GANGRENE: Status: RESOLVED | Noted: 2019-06-28 | Resolved: 2020-02-21

## 2020-02-21 PROCEDURE — 87205 SMEAR GRAM STAIN: CPT | Performed by: SURGERY

## 2020-02-21 PROCEDURE — C1729 CATH, DRAINAGE: HCPCS

## 2020-02-21 PROCEDURE — 49405 IMAGE CATH FLUID COLXN VISC: CPT

## 2020-02-21 PROCEDURE — 49406 IMAGE CATH FLUID PERI/RETRO: CPT | Performed by: RADIOLOGY

## 2020-02-21 PROCEDURE — C1769 GUIDE WIRE: HCPCS

## 2020-02-21 PROCEDURE — 99152 MOD SED SAME PHYS/QHP 5/>YRS: CPT | Performed by: RADIOLOGY

## 2020-02-21 PROCEDURE — 87070 CULTURE OTHR SPECIMN AEROBIC: CPT | Performed by: SURGERY

## 2020-02-21 RX ORDER — MIDAZOLAM HYDROCHLORIDE 2 MG/2ML
INJECTION, SOLUTION INTRAMUSCULAR; INTRAVENOUS CODE/TRAUMA/SEDATION MEDICATION
Status: COMPLETED | OUTPATIENT
Start: 2020-02-21 | End: 2020-02-21

## 2020-02-21 RX ORDER — FENTANYL CITRATE 50 UG/ML
INJECTION, SOLUTION INTRAMUSCULAR; INTRAVENOUS CODE/TRAUMA/SEDATION MEDICATION
Status: COMPLETED | OUTPATIENT
Start: 2020-02-21 | End: 2020-02-21

## 2020-02-21 RX ORDER — LIDOCAINE HYDROCHLORIDE 10 MG/ML
INJECTION, SOLUTION EPIDURAL; INFILTRATION; INTRACAUDAL; PERINEURAL CODE/TRAUMA/SEDATION MEDICATION
Status: COMPLETED | OUTPATIENT
Start: 2020-02-21 | End: 2020-02-21

## 2020-02-21 RX ORDER — SODIUM CHLORIDE, SODIUM LACTATE, POTASSIUM CHLORIDE, CALCIUM CHLORIDE 600; 310; 30; 20 MG/100ML; MG/100ML; MG/100ML; MG/100ML
50 INJECTION, SOLUTION INTRAVENOUS CONTINUOUS
Status: CANCELLED | OUTPATIENT
Start: 2020-02-21

## 2020-02-21 RX ORDER — SODIUM CHLORIDE, SODIUM LACTATE, POTASSIUM CHLORIDE, CALCIUM CHLORIDE 600; 310; 30; 20 MG/100ML; MG/100ML; MG/100ML; MG/100ML
50 INJECTION, SOLUTION INTRAVENOUS CONTINUOUS
Status: DISCONTINUED | OUTPATIENT
Start: 2020-02-21 | End: 2020-02-22 | Stop reason: HOSPADM

## 2020-02-21 RX ADMIN — SODIUM CHLORIDE, SODIUM LACTATE, POTASSIUM CHLORIDE, AND CALCIUM CHLORIDE 50 ML/HR: .6; .31; .03; .02 INJECTION, SOLUTION INTRAVENOUS at 09:36

## 2020-02-21 RX ADMIN — FENTANYL CITRATE 50 MCG: 50 INJECTION, SOLUTION INTRAMUSCULAR; INTRAVENOUS at 11:23

## 2020-02-21 RX ADMIN — MIDAZOLAM HYDROCHLORIDE 1 MG: 1 INJECTION, SOLUTION INTRAMUSCULAR; INTRAVENOUS at 11:33

## 2020-02-21 RX ADMIN — LIDOCAINE HYDROCHLORIDE 10 ML: 10 INJECTION, SOLUTION EPIDURAL; INFILTRATION; INTRACAUDAL; PERINEURAL at 11:34

## 2020-02-21 RX ADMIN — FENTANYL CITRATE 50 MCG: 50 INJECTION, SOLUTION INTRAMUSCULAR; INTRAVENOUS at 11:33

## 2020-02-21 RX ADMIN — MIDAZOLAM HYDROCHLORIDE 1 MG: 1 INJECTION, SOLUTION INTRAMUSCULAR; INTRAVENOUS at 11:23

## 2020-02-21 NOTE — PROGRESS NOTES
The history and physical were reviewed, along with progress notes, and the patient was examined  There are no changes since it was written  /68   Pulse 89   Temp 98 2 °F (36 8 °C) (Tympanic)   Resp 20   Ht 5' 4" (1 626 m)   Wt 53 5 kg (118 lb)   SpO2 96%   BMI 20 25 kg/m²      Contacted yesterday by surgery  This patient complicated hernia repair  She had a prolonged postoperative course  She now has a large subphrenic collection, on the right  Plan is for sonographic and fluoroscopic guided drainage  Will use moderate intravenous conscious sedation  She will probably need a transhepatic route  On exam today, she does feel warm to touch  She reports being tired  Otherwise she has a regular rate and rhythm  She has a normal respiratory excursion  The costal margin is easily palpable  Will probably try to do her on her side, depending on the location of the lateral costophrenic sulcus  We will perform ultrasound multiple positions to decide on final approach

## 2020-02-21 NOTE — DISCHARGE INSTRUCTIONS
[address]  Interventional Radiology  Dr Escobedo [place]: [phone] (M-F 7:30am - 4:00pm)  [address] [facility] [facility]: [phone] (Off hours or no answer)        [address] [facility] [address]  after your procedure:    Resume your normal diet  Small sips of flat soda will help with nausea  1  The properly functioning catheter should be forward flushed once (1x) daily with 10ml of normal saline using clean technique  You will be given a prescription for flushes  To flush the tube, clean both connections with alcohol swab  Twist off the drainage bag/ bulb  tubing and twist the saline syringe into the drainage tube and flush  Remove the syringe and twist the drainage bag / bulb tubing tubing back on     2  The drainage bag/bulb may be emptied as necessary  Keep a record of the amount of fluid you drain from your tube  This should be done with clean technique as well  3  A fresh dressing should be applied daily over the tube insertion site  4  As the tube is secured to the skin with only a suture,try not to pull on your tube  Tub baths are not permitted  Showers are permitted if the patient's skin entry site is prevented from getting wet  Similarly, washcloth "baths" are acceptable  Contact Interventional Radiology at [phone] Sukhwinder PATIENTS: Contact Interventional Radiology at [phone]) Rakesh Bryson PATIENTS: Contact Interventional Radiology at [phone]) if:    1  Leakage or large amounts of liquid around the catheter  2  Fever of 101 degrees lasting several hours without other obvious cause (such as sore throat, flu, etc)  3  Persistent nausea or vomiting  4  Diminished drainage, which may be associated with pressure or pain  Or when the     drainage from your tube is less than 10mls for 48 hours  5  Catheter pulled back or falls out  The following pharmacies carry the flush syringes         [address]                         [id number] [place] [address]  Phone [phone]            Phone [phone] [facility]                                [phone]  [address] [place] [address] Мария GONSALES                      [facility]  Phone [phone]            Phone [phone]                      [facility]                                                                                                          [phone]  [facility]  [facility] [address]    [address]  Phone [phone]

## 2020-02-21 NOTE — PROCEDURES
Interventional Radiology Procedure Note    PATIENT NAME: Lalit Chung  : 1950  MRN: [id number]     Pre-op Diagnosis:   1  Liver abscess      2  Ultrasound-guided drainage of subphrenic abscess  Post-op Diagnosis:   1  Liver abscess      2  Same    Procedure:  Ultrasound-guided drainage of subphrenic abscess    Surgeon:   Matt Silvestre MD  Assistants:     No qualified resident was available, Resident is only observing    Estimated Blood Loss: Minimal     Findings:  Upon needle placement, there was return of pus  After tube placement, I withdrew 10 cc, to sent for laboratory evaluation  I did not want to change the size of the cavity too quickly, and cause hemorrhage  I was worried this could cause intravisation of pus  There was continuous flow of pus into the bag  Procedure  She has been successful  We will follow-up, with her, next week, at [facility]  Plan is to start flushes on Monday  Specimens:  10 cc sent for culture      Complications:  None     Anesthesia: Conscious sedation and Local    Matt Silvestre MD     Date: 2020  Time: 3:03 PM

## 2020-02-21 NOTE — ASSESSMENT & PLAN NOTE
Impression  Large nonobstructing ventral hernia  Large perihepatic abscess  History of small-bowel resection and open abdomen after ventral hernia repair    Recommendation  I spoke at length with the patient and her family, as well as Dr Alessia Cervantes from interventional Radiology  Prior to any intervention, laparoscopic or open, repair of her ventral hernia, we will need to get this perihepatic liver abscess drained by Interventional Radiology  It is anticipated that we will be able to get this drained on 2/21/20  We will continue to follow her in the office and manage the drain however I expect that it would be at least 2-4 months before we would consider ventral hernia repair so that we can minimize the risk the mesh becoming infected  The family was agreeable with this plan  We will anticipate getting the abscess drained at the [facility] tomorrow and follow up in 3 to 4 weeks at \[facility] [place] [facility]\""

## 2020-02-21 NOTE — ASSESSMENT & PLAN NOTE
Impression  Large nonobstructing ventral hernia  Large perihepatic abscess  History of small-bowel resection and open abdomen after ventral hernia repair    Recommendation  I spoke at length with the patient and her family, as well as Dr Frank De Leon from interventional Radiology  Prior to any intervention, laparoscopic or open, repair of her ventral hernia, we will need to get this perihepatic liver abscess drained by Interventional Radiology  It is anticipated that we will be able to get this drained on 2/21/20  We will continue to follow her in the office and manage the drain however I expect that it would be at least 2-4 months before we would consider ventral hernia repair so that we can minimize the risk the mesh becoming infected  The family was agreeable with this plan  We will anticipate getting the abscess drained at the [facility] tomorrow and follow up in 3 to 4 weeks at [facility]

## 2020-02-21 NOTE — PROGRESS NOTES
Dr Cortes Pedersen into speak with pt and family  Home health was ordered  This rn reviewed with pt and daughter how to drain bag  Pt and daughter verbalized understanding  Also they were made aware how to keep track of drainage for doctor to review  pt d/c home in stable condition

## 2020-02-23 RX ORDER — SODIUM CHLORIDE 9 MG/ML
10 INJECTION INTRAVENOUS DAILY
Qty: 300 ML | Refills: 2 | Status: SHIPPED | OUTPATIENT
Start: 2020-02-23 | End: 2020-03-10 | Stop reason: ALTCHOICE

## 2020-02-24 LAB
BACTERIA SPEC BFLD CULT: NO GROWTH
GRAM STN SPEC: NORMAL
GRAM STN SPEC: NORMAL

## 2020-03-02 ENCOUNTER — TELEPHONE (OUTPATIENT)
Dept: INTERVENTIONAL RADIOLOGY/VASCULAR | Facility: HOSPITAL | Age: 70
End: 2020-03-02

## 2020-03-02 ENCOUNTER — TELEPHONE (OUTPATIENT)
Dept: HEMATOLOGY ONCOLOGY | Facility: CLINIC | Age: 70
End: 2020-03-02

## 2020-03-02 NOTE — TELEPHONE ENCOUNTER
Patient called states her drainage from her abscess drain is less than 10ML and clear  Patient scheduled for tube check/pull on 3/5/2020 at 1500 at [place] [address]

## 2020-03-05 ENCOUNTER — HOSPITAL ENCOUNTER (OUTPATIENT)
Dept: INTERVENTIONAL RADIOLOGY/VASCULAR | Facility: HOSPITAL | Age: 70
Discharge: HOME | End: 2020-03-05
Attending: RADIOLOGY

## 2020-03-05 DIAGNOSIS — Z00.00 ROUTINE HEALTH MAINTENANCE: ICD-10-CM

## 2020-03-10 ENCOUNTER — OFFICE VISIT (OUTPATIENT)
Dept: FAMILY MEDICINE CLINIC | Facility: CLINIC | Age: 70
End: 2020-03-10
Payer: MEDICARE

## 2020-03-10 VITALS
SYSTOLIC BLOOD PRESSURE: 122 MMHG | TEMPERATURE: 97.6 F | DIASTOLIC BLOOD PRESSURE: 80 MMHG | BODY MASS INDEX: 21.17 KG/M2 | HEIGHT: 64 IN | WEIGHT: 124 LBS

## 2020-03-10 DIAGNOSIS — Z00.00 MEDICARE ANNUAL WELLNESS VISIT, SUBSEQUENT: Primary | ICD-10-CM

## 2020-03-10 DIAGNOSIS — J96.01 ACUTE RESPIRATORY FAILURE WITH HYPOXEMIA (HCC): ICD-10-CM

## 2020-03-10 PROCEDURE — 3079F DIAST BP 80-89 MM HG: CPT | Performed by: FAMILY MEDICINE

## 2020-03-10 PROCEDURE — 3008F BODY MASS INDEX DOCD: CPT | Performed by: FAMILY MEDICINE

## 2020-03-10 PROCEDURE — 4040F PNEUMOC VAC/ADMIN/RCVD: CPT | Performed by: FAMILY MEDICINE

## 2020-03-10 PROCEDURE — G0438 PPPS, INITIAL VISIT: HCPCS | Performed by: FAMILY MEDICINE

## 2020-03-10 PROCEDURE — 1125F AMNT PAIN NOTED PAIN PRSNT: CPT | Performed by: FAMILY MEDICINE

## 2020-03-10 PROCEDURE — 1160F RVW MEDS BY RX/DR IN RCRD: CPT | Performed by: FAMILY MEDICINE

## 2020-03-10 PROCEDURE — 1170F FXNL STATUS ASSESSED: CPT | Performed by: FAMILY MEDICINE

## 2020-03-10 PROCEDURE — 1036F TOBACCO NON-USER: CPT | Performed by: FAMILY MEDICINE

## 2020-03-10 PROCEDURE — 3074F SYST BP LT 130 MM HG: CPT | Performed by: FAMILY MEDICINE

## 2020-03-10 RX ORDER — FOLIC ACID 1 MG/1
1 TABLET ORAL DAILY
Qty: 30 TABLET | Refills: 3 | Status: SHIPPED | OUTPATIENT
Start: 2020-03-10 | End: 2020-04-27 | Stop reason: SDUPTHER

## 2020-03-10 NOTE — PROGRESS NOTES
Assessment and Plan:     Problem List Items Addressed This Visit     None           Preventive health issues were discussed with patient, and age appropriate screening tests were ordered as noted in patient's After Visit Summary  Personalized health advice and appropriate referrals for health education or preventive services given if needed, as noted in patient's After Visit Summary  History of Present Illness:     Patient presents for Welcome to Medicare visit  Patient Care Team:  Molina Kelley DO as PCP - General  Ozie Coons, MD Johnanna Lars, MD Florance Bitters, DO Marzella Moll, MD Jobe Bloch, MD Roderic Fend, DO     Review of Systems:     Review of Systems   Constitutional: Positive for activity change  Negative for appetite change, diaphoresis, fatigue and fever  HENT: Negative  Eyes: Positive for visual disturbance  Respiratory: Negative for apnea, cough, chest tightness, shortness of breath and wheezing  Cardiovascular: Negative for chest pain, palpitations and leg swelling  Gastrointestinal: Negative for abdominal distention, abdominal pain, anal bleeding, constipation, diarrhea, nausea and vomiting  Recurrence of abdominal wall hernia   Endocrine: Negative for cold intolerance, heat intolerance, polydipsia, polyphagia and polyuria  Genitourinary: Positive for frequency  Negative for difficulty urinating, dysuria, flank pain, hematuria and urgency  Musculoskeletal: Negative for arthralgias, back pain, gait problem, joint swelling and myalgias  Skin: Negative for color change, rash and wound  Allergic/Immunologic: Negative for environmental allergies, food allergies and immunocompromised state  Neurological: Negative for dizziness, seizures, syncope, speech difficulty, numbness and headaches  Hematological: Negative for adenopathy  Does not bruise/bleed easily     Psychiatric/Behavioral: Negative for agitation, behavioral problems, hallucinations, sleep disturbance and suicidal ideas  The patient is nervous/anxious         Problem List:     Patient Active Problem List   Diagnosis    Osteoarthritis of knee    Joint pain, knee    Eagle's esophagus with high grade dysplasia    3-vessel CAD    A-fib ([address] )    Anemia    Depression    Dysphagia    Gastroesophageal reflux disease    Hyperlipidemia    Hypothyroidism    Lumbar radiculopathy    Spondylosis of 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    Stricture of esophagus    Venous insufficiency    Adrenal adenoma    OR Exploratory laparotomy, SBR secondary to iatrogenic enterotomy     Severe protein-calorie malnutrition ([address] )    Leukemoid reaction    Perihepatic abscess ([address] )    Ventral hernia without obstruction or gangrene      Past Medical and Surgical History:     Past Medical History:   Diagnosis Date    Abnormal blood chemistry     last assessed 03/06/2014    Achalasia, esophageal     Acute medial meniscus tear     last assessed 03/10/2014    Acute otitis externa     last assessed 03/24/2014    Adrenal nodule ([address] )     Anemia     Anxiety     Arthritis     Atrial fibrillation ([address] )     resolved 01/09/2018    Atypical chest pain     last assessed 11/22/2016    Eagle's esophagus with high grade dysplasia     last assessed 01/03/2018    Cancer ([address] )     esophageal    Cerumen impaction     last assessed 03/24/2014    Chronic pain of right knee     last assessed 04/06/2017    Coronary artery disease     3 vessel, resolved 01/09/2018    Depression     Dysphagia     last assessed 06/21/2017    Esophageal stricture     last assessed 06/21/2017    GERD (gastroesophageal reflux disease)     Headache     last assessed 08/01/2013    Hiatal hernia     Insomnia     last assessed 10/15/2013    Jejunostomy tube present ([address] )     resolved 01/09/2018    Low vitamin D level     resolved 01/08/2018    Osteoarthritis, knee     last assessed 04/06/2017    Pneumonia     last assessed 2013    Right leg paresthesias     last assessed 2017    Sciatica     last assessed 2015    Shingles     last assessed 2015    Spondylosis of 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     last assessed 2017    Venous insufficiency     last assessed 2013     Past Surgical History:   Procedure Laterality Date     SECTION      EXPLORATORY LAPAROTOMY W/ BOWEL RESECTION N/A 2019    Procedure: LAPAROTOMY EXPLORATORY W/ BOWEL RESECTION, APPLICATION OF Tammy Clarity;  Surgeon: Jarocho Cade DO;  [facility];  Service: General    GASTRECTOMY      partial    GASTROJEJUNOSTOMY W/ JEJUNOSTOMY TUBE N/A 2017    Procedure: Williemae Matilde TUBE PLACEMENT;  Surgeon: Anahi Silvestre MD;  [facility];  Service: Thoracic    IR IMAGE [address] / DRAINAGE W TUBE  2020    JOINT REPLACEMENT Right 11/10/2014    knee, Dr Gilberto Coronado N/A 8/3/2019    Procedure: LAPAROTOMY EXPLORATORY, reanastomosis of proximal small bowel, placement of vac dressing;  Surgeon: Jarocho Cade DO;  [facility];  Service: General    TX ESOPHAGOGASTRODUODENOSCOPY TRANSORAL DIAGNOSTIC N/A 3/10/2017    Procedure: ESOPHAGOGASTRODUODENOSCOPY (EGD); Surgeon: Meri Sorenson MD;  [facility];  Service: Gastroenterology    TX ESOPHAGOGASTRODUODENOSCOPY TRANSORAL DIAGNOSTIC N/A 2017    Procedure: ESOPHAGOGASTRODUODENOSCOPY (EGD); Surgeon: Meri Sorenson MD;  [facility];  Service: Gastroenterology    TX ESOPHAGOSCOPY FLEXIBLE TRANSORAL WITH BIOPSY N/A 2017    Procedure: ESOPHAGOGASTRODUODENOSCOPY (EGD);   Surgeon: Anahi Silvestre MD;  [facility];  Service: Thoracic    TX LAP, VENTRAL HERNIA REPAIR,REDUCIBLE N/A 2019    Procedure: REPAIR HERNIA VENTRAL LAPAROSCOPIC;  Surgeon: Cassie Barrera DO;  [facility];  Service: General    TX REMOVAL [address] N/A 2017    Procedure: TRANSHIATAL ESOPHAGECTOMY;  Surgeon: Han Burnham MD;  Location:  MAIN OR;  Service: Thoracic    STEROID INJECTION KNEE Right 5/2/2017    Procedure: GENICULATE NERVE BLOCKS;  Surgeon: Pino Scott DO;  [facility];  Service:     VENTRAL HERNIA REPAIR  07/30/2019      Family History:     Family History   Problem Relation Age of Onset    Alzheimer's disease Mother     Diabetes type II Father       Social History:        Social History     Socioeconomic History    Marital status:       Spouse name: None    Number of children: None    Years of education: None    Highest education level: None   Occupational History    None   Social Needs    Financial resource strain: None    Food insecurity:     Worry: None     Inability: None    Transportation needs:     Medical: None     Non-medical: None   Tobacco Use    Smoking status: Never Smoker    Smokeless tobacco: Never Used   Substance and Sexual Activity    Alcohol use: Not Currently     Alcohol/week: 0 0 standard drinks     Frequency: Never     Binge frequency: Never    Drug use: No    Sexual activity: Not Currently     Partners: Male   Lifestyle    Physical activity:     Days per week: None     Minutes per session: None    Stress: None   Relationships    Social connections:     Talks on phone: None     Gets together: None     Attends Oriental orthodox service: None     Active member of club or organization: None     Attends meetings of clubs or organizations: None     Relationship status: None    Intimate partner violence:     Fear of current or ex partner: None     Emotionally abused: None     Physically abused: None     Forced sexual activity: None   Other Topics Concern    None   Social History Narrative    Patient has living will      Medications and Allergies:     Current Outpatient Medications   Medication Sig Dispense Refill    folic acid (FOLVITE) 1 mg tablet Take 1 tablet (1 mg total) by mouth daily 30 tablet 0    melatonin 3 mg Take 1 tablet (3 mg total) by mouth daily at bedtime 30 tablet 5    mirtazapine (REMERON) 7 5 MG tablet Take 1 tablet (7 5 mg total) by mouth daily at bedtime 30 tablet 5    pantoprazole (PROTONIX) 20 mg tablet Take 1 tablet (20 mg total) by mouth daily 30 tablet 5    PARoxetine (PAXIL) 20 mg tablet Take 1 tablet (20 mg total) by mouth daily 30 tablet 5    potassium chloride (K-DUR,KLOR-CON) 20 mEq tablet Take 1 tablet (20 mEq total) by mouth daily 30 tablet 0    pravastatin (PRAVACHOL) 20 mg tablet Take 1 tablet (20 mg total) by mouth daily with dinner 30 tablet 5    tamsulosin (FLOMAX) 0 4 mg Take 1 capsule (0 4 mg total) by mouth daily with dinner 30 capsule 5     No current facility-administered medications for this visit  No Known Allergies   Immunizations:     Immunization History   Administered Date(s) Administered     Influenza (IM) Preservative Free 08/29/2013    INFLUENZA 12/05/2000, 08/14/2014, 09/14/2015, 09/20/2016, 09/17/2018, 09/27/2019    Influenza Split High Dose Preservative Free IM 08/14/2014, 09/14/2015, 09/14/2015, 09/20/2016    Influenza Whole 12/05/2000    Influenza, high dose seasonal 0 5 mL 09/17/2018    Pneumococcal Conjugate 13-Valent 07/22/2015    Pneumococcal Polysaccharide PPV23 03/06/2014    Tdap 06/06/2014    Tuberculin Skin Test-PPD Intradermal 07/24/2002    Zoster 06/09/2015      Health Maintenance:         Topic Date Due    MAMMOGRAM  01/02/2021 (Originally 8/12/2016)    Hepatitis C Screening  Completed     There are no preventive care reminders to display for this patient  Medicare Screening Tests and Risk Assessments:     Becky Ontiveros is here for her Subsequent Wellness visit  Health Risk Assessment:   Patient rates overall health as fair  Patient feels that their physical health rating is slightly worse  Eyesight was rated as same  Hearing was rated as same  Patient feels that their emotional and mental health rating is slightly worse   Pain experienced in the last 7 days has been some  Patient's pain rating has been 7/10  Patient states that she has experienced no weight loss or gain in last 6 months  Depression Screening:   PHQ-2 Score: 4  PHQ-9 Score: 10      Fall Risk Screening: In the past year, patient has experienced: no history of falling in past year      Urinary Incontinence Screening:   Patient has leaked urine accidently in the last six months  Home Safety:  Patient does not have trouble with stairs inside or outside of their home  Patient has working smoke alarms and has working carbon monoxide detector  Home safety hazards include: none  Nutrition:   Current diet is Regular and Limited junk food  Medications:   Patient is currently taking over-the-counter supplements  OTC medications include: see medication list  Patient is able to manage medications  Activities of Daily Living (ADLs)/Instrumental Activities of Daily Living (IADLs):   Walk and transfer into and out of bed and chair?: Yes  Dress and groom yourself?: Yes    Bathe or shower yourself?: Yes    Feed yourself? Yes  Do your laundry/housekeeping?: Yes  Manage your money, pay your bills and track your expenses?: Yes  Make your own meals?: Yes    Do your own shopping?: Yes    Previous Hospitalizations:   Any hospitalizations or ED visits within the last 12 months?: Yes    How many hospitalizations have you had in the last year?: 1-2    Advance Care Planning:   Living will: Yes    Durable POA for healthcare:  Yes    Advanced directive: Yes    Advanced directive counseling given: No    Five wishes given: No    Patient declined ACP directive: No    End of Life Decisions reviewed with patient: Yes    Provider agrees with end of life decisions: Yes      Cognitive Screening:   Provider or family/friend/caregiver concerned regarding cognition?: No    PREVENTIVE SCREENINGS      Cardiovascular Screening:    General: Screening Not Indicated and History Lipid Disorder      Diabetes Screening:     General: Screening Current      Colorectal Cancer Screening:     General: Screening Not Indicated      Breast Cancer Screening:     General: Risks and Benefits Discussed    Due for: Mammogram        Cervical Cancer Screening:    General: Screening Not Indicated      Osteoporosis Screening:    General: Screening Not Indicated      Abdominal Aortic Aneurysm (AAA) Screening:        General: Screening Not Indicated      Lung Cancer Screening:     General: Screening Not Indicated      Hepatitis C Screening:    General: Screening Current    No exam data present     Physical Exam:     /80 (BP Location: Left arm, Patient Position: Sitting, Cuff Size: Standard)   Temp 97 6 °F (36 4 °C) (Tympanic) Comment (Src): Left Ear  Ht 5' 4" (1 626 m)   Wt 56 2 kg (124 lb)   BMI 21 28 kg/m²     Physical Exam   Constitutional: She is oriented to person, place, and time  She appears well-developed and well-nourished  No distress  HENT:   Head: Normocephalic  Right Ear: External ear normal    Left Ear: External ear normal    Nose: Nose normal    Mouth/Throat: Oropharynx is clear and moist    Eyes: Pupils are equal, round, and reactive to light  Conjunctivae and EOM are normal  Right eye exhibits no discharge  Left eye exhibits no discharge  No scleral icterus  Neck: Normal range of motion  No tracheal deviation present  No thyromegaly present  Cardiovascular: Normal rate, regular rhythm and normal heart sounds  Exam reveals no gallop and no friction rub  Pulses are no weak pulses  No murmur heard  Pulses:       Dorsalis pedis pulses are 2+ on the right side, and 2+ on the left side  Posterior tibial pulses are 2+ on the right side, and 2+ on the left side  Pulmonary/Chest: Effort normal and breath sounds normal  No respiratory distress  She has no wheezes  Abdominal: Soft  Bowel sounds are normal  She exhibits no mass  There is no tenderness  There is no guarding     Abdominal wall hernia Musculoskeletal: She exhibits no edema or deformity  Feet:   Right Foot:   Skin Integrity: Negative for ulcer, skin breakdown, erythema, warmth, callus or dry skin  Left Foot:   Skin Integrity: Negative for ulcer, skin breakdown, erythema, warmth, callus or dry skin  Lymphadenopathy:     She has no cervical adenopathy  Neurological: She is alert and oriented to person, place, and time  No cranial nerve deficit  Skin: Skin is warm and dry  No rash noted  She is not diaphoretic  No erythema  Psychiatric: She has a normal mood and affect  Thought content normal      Patient's shoes and socks removed  Right Foot/Ankle   Right Foot Inspection  Skin Exam: skin normal and skin intact no dry skin, no warmth, no callus, no erythema, no maceration, no abnormal color, no pre-ulcer, no ulcer and no callus                          Toe Exam: ROM and strength within normal limits  Sensory   Vibration: intact  Proprioception: intact   Monofilament testing: intact  Vascular  Capillary refills: < 3 seconds  The right DP pulse is 2+  The right PT pulse is 2+  Left Foot/Ankle  Left Foot Inspection  Skin Exam: skin normal and skin intactno dry skin, no warmth, no erythema, no maceration, normal color, no pre-ulcer, no ulcer and no callus                         Toe Exam: ROM and strength within normal limits                   Sensory   Vibration: intact  Proprioception: intact  Monofilament: intact  Vascular  Capillary refills: < 3 seconds  The left DP pulse is 2+  The left PT pulse is 2+  Assign Risk Category:  No deformity present; No loss of protective sensation;  No weak pulses       Risk: 0    Lara Leaver,

## 2020-03-25 ENCOUNTER — TELEPHONE (OUTPATIENT)
Dept: HEMATOLOGY ONCOLOGY | Facility: CLINIC | Age: 70
End: 2020-03-25

## 2020-03-25 NOTE — TELEPHONE ENCOUNTER
Chel from [facility] called inquiring on patient's insurance  I provided insurance information on file

## 2020-04-15 ENCOUNTER — OFFICE VISIT (OUTPATIENT)
Dept: FAMILY MEDICINE CLINIC | Facility: CLINIC | Age: 70
End: 2020-04-15
Payer: MEDICARE

## 2020-04-15 VITALS
TEMPERATURE: 98.8 F | HEIGHT: 64 IN | DIASTOLIC BLOOD PRESSURE: 84 MMHG | WEIGHT: 128 LBS | SYSTOLIC BLOOD PRESSURE: 126 MMHG | BODY MASS INDEX: 21.85 KG/M2

## 2020-04-15 DIAGNOSIS — K14.8 TONGUE MASS: Primary | ICD-10-CM

## 2020-04-15 DIAGNOSIS — D69.6 THROMBOCYTOPENIA, UNSPECIFIED (HCC): ICD-10-CM

## 2020-04-15 DIAGNOSIS — F33.9 EPISODE OF RECURRENT MAJOR DEPRESSIVE DISORDER, UNSPECIFIED DEPRESSION EPISODE SEVERITY (HCC): ICD-10-CM

## 2020-04-15 DIAGNOSIS — I48.20 CHRONIC ATRIAL FIBRILLATION (HCC): ICD-10-CM

## 2020-04-15 DIAGNOSIS — C15.5 MALIGNANT NEOPLASM OF LOWER THIRD OF ESOPHAGUS (HCC): ICD-10-CM

## 2020-04-15 DIAGNOSIS — E43 SEVERE PROTEIN-CALORIE MALNUTRITION (HCC): ICD-10-CM

## 2020-04-15 PROCEDURE — 1160F RVW MEDS BY RX/DR IN RCRD: CPT | Performed by: FAMILY MEDICINE

## 2020-04-15 PROCEDURE — 1036F TOBACCO NON-USER: CPT | Performed by: FAMILY MEDICINE

## 2020-04-15 PROCEDURE — 4040F PNEUMOC VAC/ADMIN/RCVD: CPT | Performed by: FAMILY MEDICINE

## 2020-04-15 PROCEDURE — 3074F SYST BP LT 130 MM HG: CPT | Performed by: FAMILY MEDICINE

## 2020-04-15 PROCEDURE — 99213 OFFICE O/P EST LOW 20 MIN: CPT | Performed by: FAMILY MEDICINE

## 2020-04-15 PROCEDURE — 3008F BODY MASS INDEX DOCD: CPT | Performed by: FAMILY MEDICINE

## 2020-04-15 PROCEDURE — 3079F DIAST BP 80-89 MM HG: CPT | Performed by: FAMILY MEDICINE

## 2020-04-15 RX ORDER — LIDOCAINE HYDROCHLORIDE 20 MG/ML
5 SOLUTION OROPHARYNGEAL 3 TIMES DAILY PRN
Qty: 100 ML | Refills: 0 | Status: SHIPPED | OUTPATIENT
Start: 2020-04-15 | End: 2020-08-04 | Stop reason: ALTCHOICE

## 2020-04-16 ENCOUNTER — TELEPHONE (OUTPATIENT)
Dept: HEMATOLOGY ONCOLOGY | Facility: CLINIC | Age: 70
End: 2020-04-16

## 2020-04-21 ENCOUNTER — OFFICE VISIT (OUTPATIENT)
Dept: OTOLARYNGOLOGY | Facility: CLINIC | Age: 70
End: 2020-04-21
Payer: MEDICARE

## 2020-04-21 VITALS
DIASTOLIC BLOOD PRESSURE: 80 MMHG | HEART RATE: 80 BPM | OXYGEN SATURATION: 98 % | WEIGHT: 133.8 LBS | SYSTOLIC BLOOD PRESSURE: 126 MMHG | BODY MASS INDEX: 24.62 KG/M2 | HEIGHT: 62 IN

## 2020-04-21 DIAGNOSIS — K14.8 TONGUE MASS: Primary | ICD-10-CM

## 2020-04-21 PROCEDURE — 88342 IMHCHEM/IMCYTCHM 1ST ANTB: CPT | Performed by: PATHOLOGY

## 2020-04-21 PROCEDURE — 88341 IMHCHEM/IMCYTCHM EA ADD ANTB: CPT | Performed by: PATHOLOGY

## 2020-04-21 PROCEDURE — 88305 TISSUE EXAM BY PATHOLOGIST: CPT | Performed by: PATHOLOGY

## 2020-04-21 PROCEDURE — 99203 OFFICE O/P NEW LOW 30 MIN: CPT | Performed by: OTOLARYNGOLOGY

## 2020-04-21 PROCEDURE — 41100 BIOPSY OF TONGUE: CPT | Performed by: OTOLARYNGOLOGY

## 2020-04-22 DIAGNOSIS — J96.01 ACUTE RESPIRATORY FAILURE WITH HYPOXEMIA (HCC): ICD-10-CM

## 2020-04-23 RX ORDER — MIRTAZAPINE 7.5 MG/1
7.5 TABLET, FILM COATED ORAL
Qty: 30 TABLET | Refills: 0 | Status: SHIPPED | OUTPATIENT
Start: 2020-04-23 | End: 2020-04-27 | Stop reason: SDUPTHER

## 2020-04-25 DIAGNOSIS — K21.00 GASTROESOPHAGEAL REFLUX DISEASE WITH ESOPHAGITIS: ICD-10-CM

## 2020-04-25 DIAGNOSIS — J96.01 ACUTE RESPIRATORY FAILURE WITH HYPOXEMIA (HCC): ICD-10-CM

## 2020-04-25 DIAGNOSIS — F33.9 EPISODE OF RECURRENT MAJOR DEPRESSIVE DISORDER, UNSPECIFIED DEPRESSION EPISODE SEVERITY (HCC): ICD-10-CM

## 2020-04-27 ENCOUNTER — TELEPHONE (OUTPATIENT)
Dept: SURGERY | Facility: CLINIC | Age: 70
End: 2020-04-27

## 2020-04-27 DIAGNOSIS — K21.00 GASTROESOPHAGEAL REFLUX DISEASE WITH ESOPHAGITIS: ICD-10-CM

## 2020-04-27 DIAGNOSIS — J96.01 ACUTE RESPIRATORY FAILURE WITH HYPOXEMIA (HCC): ICD-10-CM

## 2020-04-27 DIAGNOSIS — F33.9 EPISODE OF RECURRENT MAJOR DEPRESSIVE DISORDER, UNSPECIFIED DEPRESSION EPISODE SEVERITY (HCC): ICD-10-CM

## 2020-04-27 RX ORDER — FOLIC ACID 1 MG/1
1 TABLET ORAL DAILY
Qty: 30 TABLET | Refills: 5 | Status: SHIPPED | OUTPATIENT
Start: 2020-04-27 | End: 2020-06-23

## 2020-04-27 RX ORDER — TAMSULOSIN HYDROCHLORIDE 0.4 MG/1
0.4 CAPSULE ORAL
Qty: 30 CAPSULE | Refills: 2 | Status: SHIPPED | OUTPATIENT
Start: 2020-04-27 | End: 2020-06-23

## 2020-04-27 RX ORDER — TAMSULOSIN HYDROCHLORIDE 0.4 MG/1
0.4 CAPSULE ORAL
Qty: 30 CAPSULE | Refills: 0 | Status: SHIPPED | OUTPATIENT
Start: 2020-04-27 | End: 2020-04-27 | Stop reason: SDUPTHER

## 2020-04-27 RX ORDER — PANTOPRAZOLE SODIUM 20 MG/1
20 TABLET, DELAYED RELEASE ORAL DAILY
Qty: 30 TABLET | Refills: 5 | Status: SHIPPED | OUTPATIENT
Start: 2020-04-27 | End: 2020-06-23

## 2020-04-27 RX ORDER — PAROXETINE HYDROCHLORIDE 20 MG/1
20 TABLET, FILM COATED ORAL DAILY
Qty: 30 TABLET | Refills: 0 | Status: SHIPPED | OUTPATIENT
Start: 2020-04-27 | End: 2020-04-27 | Stop reason: SDUPTHER

## 2020-04-27 RX ORDER — MIRTAZAPINE 7.5 MG/1
7.5 TABLET, FILM COATED ORAL
Qty: 30 TABLET | Refills: 2 | Status: SHIPPED | OUTPATIENT
Start: 2020-04-27 | End: 2020-05-27 | Stop reason: SDUPTHER

## 2020-04-27 RX ORDER — PAROXETINE HYDROCHLORIDE 20 MG/1
20 TABLET, FILM COATED ORAL DAILY
Qty: 30 TABLET | Refills: 2 | Status: SHIPPED | OUTPATIENT
Start: 2020-04-27 | End: 2020-06-23

## 2020-04-27 RX ORDER — PANTOPRAZOLE SODIUM 20 MG/1
20 TABLET, DELAYED RELEASE ORAL DAILY
Qty: 30 TABLET | Refills: 0 | Status: SHIPPED | OUTPATIENT
Start: 2020-04-27 | End: 2020-04-27 | Stop reason: SDUPTHER

## 2020-04-27 RX ORDER — PRAVASTATIN SODIUM 20 MG
20 TABLET ORAL
Qty: 30 TABLET | Refills: 0 | Status: SHIPPED | OUTPATIENT
Start: 2020-04-27 | End: 2020-04-27 | Stop reason: SDUPTHER

## 2020-04-27 RX ORDER — PRAVASTATIN SODIUM 20 MG
20 TABLET ORAL
Qty: 30 TABLET | Refills: 5 | Status: SHIPPED | OUTPATIENT
Start: 2020-04-27 | End: 2020-06-23

## 2020-05-01 ENCOUNTER — OFFICE VISIT (OUTPATIENT)
Dept: OTOLARYNGOLOGY | Facility: CLINIC | Age: 70
End: 2020-05-01
Payer: MEDICARE

## 2020-05-01 VITALS
DIASTOLIC BLOOD PRESSURE: 82 MMHG | WEIGHT: 134.4 LBS | BODY MASS INDEX: 24.73 KG/M2 | HEART RATE: 98 BPM | SYSTOLIC BLOOD PRESSURE: 126 MMHG | HEIGHT: 62 IN

## 2020-05-01 DIAGNOSIS — K14.8 TONGUE MASS: Primary | ICD-10-CM

## 2020-05-01 PROCEDURE — 3079F DIAST BP 80-89 MM HG: CPT | Performed by: OTOLARYNGOLOGY

## 2020-05-01 PROCEDURE — 99024 POSTOP FOLLOW-UP VISIT: CPT | Performed by: OTOLARYNGOLOGY

## 2020-05-01 PROCEDURE — 1036F TOBACCO NON-USER: CPT | Performed by: OTOLARYNGOLOGY

## 2020-05-01 PROCEDURE — 3008F BODY MASS INDEX DOCD: CPT | Performed by: OTOLARYNGOLOGY

## 2020-05-01 PROCEDURE — 4040F PNEUMOC VAC/ADMIN/RCVD: CPT | Performed by: OTOLARYNGOLOGY

## 2020-05-01 PROCEDURE — 1160F RVW MEDS BY RX/DR IN RCRD: CPT | Performed by: OTOLARYNGOLOGY

## 2020-05-01 PROCEDURE — 3074F SYST BP LT 130 MM HG: CPT | Performed by: OTOLARYNGOLOGY

## 2020-05-21 ENCOUNTER — CONSULT (OUTPATIENT)
Dept: SURGERY | Facility: CLINIC | Age: 70
End: 2020-05-21
Payer: MEDICARE

## 2020-05-21 VITALS
WEIGHT: 139 LBS | DIASTOLIC BLOOD PRESSURE: 90 MMHG | BODY MASS INDEX: 25.58 KG/M2 | SYSTOLIC BLOOD PRESSURE: 164 MMHG | HEART RATE: 92 BPM | TEMPERATURE: 97.9 F | HEIGHT: 62 IN

## 2020-05-21 DIAGNOSIS — K43.9 VENTRAL HERNIA WITHOUT OBSTRUCTION OR GANGRENE: Primary | ICD-10-CM

## 2020-05-21 PROCEDURE — 99213 OFFICE O/P EST LOW 20 MIN: CPT | Performed by: SURGERY

## 2020-05-21 PROCEDURE — 3008F BODY MASS INDEX DOCD: CPT | Performed by: SURGERY

## 2020-05-21 PROCEDURE — 3080F DIAST BP >= 90 MM HG: CPT | Performed by: SURGERY

## 2020-05-21 PROCEDURE — 3077F SYST BP >= 140 MM HG: CPT | Performed by: SURGERY

## 2020-05-21 PROCEDURE — 4040F PNEUMOC VAC/ADMIN/RCVD: CPT | Performed by: SURGERY

## 2020-05-21 PROCEDURE — 1036F TOBACCO NON-USER: CPT | Performed by: SURGERY

## 2020-05-27 DIAGNOSIS — J96.01 ACUTE RESPIRATORY FAILURE WITH HYPOXEMIA (HCC): ICD-10-CM

## 2020-05-27 RX ORDER — MIRTAZAPINE 7.5 MG/1
7.5 TABLET, FILM COATED ORAL
Qty: 30 TABLET | Refills: 2 | Status: SHIPPED | OUTPATIENT
Start: 2020-05-27 | End: 2020-07-21

## 2020-05-29 ENCOUNTER — OFFICE VISIT (OUTPATIENT)
Dept: OTOLARYNGOLOGY | Facility: CLINIC | Age: 70
End: 2020-05-29
Payer: MEDICARE

## 2020-05-29 VITALS
BODY MASS INDEX: 26.06 KG/M2 | HEIGHT: 62 IN | SYSTOLIC BLOOD PRESSURE: 140 MMHG | HEART RATE: 88 BPM | DIASTOLIC BLOOD PRESSURE: 72 MMHG | RESPIRATION RATE: 16 BRPM | WEIGHT: 141.6 LBS

## 2020-05-29 DIAGNOSIS — K14.8 TONGUE MASS: Primary | ICD-10-CM

## 2020-05-29 PROCEDURE — 4040F PNEUMOC VAC/ADMIN/RCVD: CPT | Performed by: OTOLARYNGOLOGY

## 2020-05-29 PROCEDURE — 99213 OFFICE O/P EST LOW 20 MIN: CPT | Performed by: OTOLARYNGOLOGY

## 2020-05-29 PROCEDURE — 1160F RVW MEDS BY RX/DR IN RCRD: CPT | Performed by: OTOLARYNGOLOGY

## 2020-05-29 PROCEDURE — 3008F BODY MASS INDEX DOCD: CPT | Performed by: OTOLARYNGOLOGY

## 2020-05-29 PROCEDURE — 3078F DIAST BP <80 MM HG: CPT | Performed by: OTOLARYNGOLOGY

## 2020-05-29 PROCEDURE — 3077F SYST BP >= 140 MM HG: CPT | Performed by: OTOLARYNGOLOGY

## 2020-05-29 PROCEDURE — 1036F TOBACCO NON-USER: CPT | Performed by: OTOLARYNGOLOGY

## 2020-06-22 DIAGNOSIS — K21.00 GASTROESOPHAGEAL REFLUX DISEASE WITH ESOPHAGITIS: ICD-10-CM

## 2020-06-22 DIAGNOSIS — F33.9 EPISODE OF RECURRENT MAJOR DEPRESSIVE DISORDER, UNSPECIFIED DEPRESSION EPISODE SEVERITY (HCC): ICD-10-CM

## 2020-06-22 DIAGNOSIS — J96.01 ACUTE RESPIRATORY FAILURE WITH HYPOXEMIA (HCC): ICD-10-CM

## 2020-06-23 RX ORDER — PAROXETINE HYDROCHLORIDE 20 MG/1
20 TABLET, FILM COATED ORAL DAILY
Qty: 30 TABLET | Refills: 0 | Status: SHIPPED | OUTPATIENT
Start: 2020-06-23 | End: 2020-08-04 | Stop reason: SDUPTHER

## 2020-06-23 RX ORDER — TAMSULOSIN HYDROCHLORIDE 0.4 MG/1
0.4 CAPSULE ORAL
Qty: 30 CAPSULE | Refills: 0 | Status: SHIPPED | OUTPATIENT
Start: 2020-06-23 | End: 2020-08-04 | Stop reason: ALTCHOICE

## 2020-06-23 RX ORDER — PRAVASTATIN SODIUM 20 MG
20 TABLET ORAL
Qty: 30 TABLET | Refills: 0 | Status: SHIPPED | OUTPATIENT
Start: 2020-06-23 | End: 2020-08-04 | Stop reason: SDUPTHER

## 2020-06-23 RX ORDER — FOLIC ACID 1 MG/1
1 TABLET ORAL DAILY
Qty: 30 TABLET | Refills: 0 | Status: SHIPPED | OUTPATIENT
Start: 2020-06-23 | End: 2020-08-04 | Stop reason: ALTCHOICE

## 2020-06-23 RX ORDER — PANTOPRAZOLE SODIUM 20 MG/1
20 TABLET, DELAYED RELEASE ORAL DAILY
Qty: 30 TABLET | Refills: 0 | Status: SHIPPED | OUTPATIENT
Start: 2020-06-23 | End: 2020-08-04 | Stop reason: SDUPTHER

## 2020-07-21 DIAGNOSIS — J96.01 ACUTE RESPIRATORY FAILURE WITH HYPOXEMIA (HCC): ICD-10-CM

## 2020-07-21 RX ORDER — MIRTAZAPINE 7.5 MG/1
7.5 TABLET, FILM COATED ORAL
Qty: 2 TABLET | Refills: 0 | Status: SHIPPED | OUTPATIENT
Start: 2020-07-21 | End: 2020-08-04 | Stop reason: SDUPTHER

## 2020-08-04 ENCOUNTER — OFFICE VISIT (OUTPATIENT)
Dept: FAMILY MEDICINE CLINIC | Facility: CLINIC | Age: 70
End: 2020-08-04
Payer: MEDICARE

## 2020-08-04 VITALS
HEIGHT: 62 IN | SYSTOLIC BLOOD PRESSURE: 118 MMHG | TEMPERATURE: 97.5 F | DIASTOLIC BLOOD PRESSURE: 78 MMHG | WEIGHT: 153 LBS | BODY MASS INDEX: 28.16 KG/M2

## 2020-08-04 DIAGNOSIS — K21.00 GASTROESOPHAGEAL REFLUX DISEASE WITH ESOPHAGITIS: ICD-10-CM

## 2020-08-04 DIAGNOSIS — F33.9 EPISODE OF RECURRENT MAJOR DEPRESSIVE DISORDER, UNSPECIFIED DEPRESSION EPISODE SEVERITY (HCC): ICD-10-CM

## 2020-08-04 DIAGNOSIS — F51.01 PRIMARY INSOMNIA: Primary | ICD-10-CM

## 2020-08-04 PROCEDURE — 1160F RVW MEDS BY RX/DR IN RCRD: CPT | Performed by: FAMILY MEDICINE

## 2020-08-04 PROCEDURE — 3078F DIAST BP <80 MM HG: CPT | Performed by: FAMILY MEDICINE

## 2020-08-04 PROCEDURE — 3074F SYST BP LT 130 MM HG: CPT | Performed by: FAMILY MEDICINE

## 2020-08-04 PROCEDURE — 3008F BODY MASS INDEX DOCD: CPT | Performed by: FAMILY MEDICINE

## 2020-08-04 PROCEDURE — 1036F TOBACCO NON-USER: CPT | Performed by: FAMILY MEDICINE

## 2020-08-04 PROCEDURE — 99213 OFFICE O/P EST LOW 20 MIN: CPT | Performed by: FAMILY MEDICINE

## 2020-08-04 PROCEDURE — 4040F PNEUMOC VAC/ADMIN/RCVD: CPT | Performed by: FAMILY MEDICINE

## 2020-08-04 RX ORDER — FOLIC ACID 1 MG/1
1 TABLET ORAL DAILY
Qty: 30 TABLET | Refills: 5 | Status: CANCELLED | OUTPATIENT
Start: 2020-08-04

## 2020-08-04 RX ORDER — PRAVASTATIN SODIUM 20 MG
20 TABLET ORAL
Qty: 30 TABLET | Refills: 5 | Status: SHIPPED | OUTPATIENT
Start: 2020-08-04 | End: 2021-02-25 | Stop reason: SDUPTHER

## 2020-08-04 RX ORDER — PANTOPRAZOLE SODIUM 20 MG/1
20 TABLET, DELAYED RELEASE ORAL DAILY
Qty: 30 TABLET | Refills: 5 | Status: SHIPPED | OUTPATIENT
Start: 2020-08-04 | End: 2021-02-25 | Stop reason: SDUPTHER

## 2020-08-04 RX ORDER — TAMSULOSIN HYDROCHLORIDE 0.4 MG/1
0.4 CAPSULE ORAL
Qty: 30 CAPSULE | Refills: 5 | Status: CANCELLED | OUTPATIENT
Start: 2020-08-04

## 2020-08-04 RX ORDER — PAROXETINE HYDROCHLORIDE 20 MG/1
20 TABLET, FILM COATED ORAL DAILY
Qty: 30 TABLET | Refills: 2 | Status: SHIPPED | OUTPATIENT
Start: 2020-08-04 | End: 2020-11-16

## 2020-08-04 RX ORDER — MIRTAZAPINE 7.5 MG/1
7.5 TABLET, FILM COATED ORAL
Qty: 2 TABLET | Refills: 5 | Status: SHIPPED | OUTPATIENT
Start: 2020-08-04 | End: 2021-06-14

## 2020-08-04 NOTE — PROGRESS NOTES
BMI Counseling: Body mass index is 27 98 kg/m²  The BMI is above normal  Nutrition recommendations include decreasing portion sizes, encouraging healthy choices of fruits and vegetables, decreasing fast food intake, consuming healthier snacks, moderation in carbohydrate intake and increasing intake of lean protein  Exercise recommendations include exercising 3-5 times per week  No pharmacotherapy was ordered  Patient referred to PCP due to patient being overweight  Assessment/Plan:    Problem List Items Addressed This Visit        Digestive    Gastroesophageal reflux disease       Other    Episode of recurrent major depressive disorder ([facility] [address] )      Other Visit Diagnoses     Primary insomnia    -  Primary           Diagnoses and all orders for this visit:    Primary insomnia  -     mirtazapine (REMERON) 7 5 MG tablet; Take 1 tablet (7 5 mg total) by mouth daily at bedtime  -     pravastatin (PRAVACHOL) 20 mg tablet; Take 1 tablet (20 mg total) by mouth daily with dinner    Gastroesophageal reflux disease with esophagitis  -     pantoprazole (PROTONIX) 20 mg tablet; Take 1 tablet (20 mg total) by mouth daily    Episode of recurrent major depressive disorder, unspecified depression episode severity (HCC)  -     PARoxetine (PAXIL) 20 mg tablet; Take 1 tablet (20 mg total) by mouth daily    Other orders  -     Cancel: folic acid (FOLVITE) 1 mg tablet; Take 1 tablet (1 mg total) by mouth daily  -     Cancel: tamsulosin (FLOMAX) 0 4 mg; Take 1 capsule (0 4 mg total) by mouth daily with dinner        No problem-specific Assessment & Plan notes found for this encounter  Subjective:      Patient ID: Senia Cavazos is a 69 y o  female      Mrs Artem Rouse trapped here follow-up visit feels well no complaints her abdominal incisional hernia is enlarging she will be seeing the surgeon next week and then they will set up a time for her to have this repaired      The following portions of the patient's history were reviewed and updated as appropriate:   She has a past medical history of Abnormal blood chemistry, Achalasia, esophageal, Acute medial meniscus tear, Acute otitis externa, Adrenal nodule ([address] ), Anemia, Anxiety, Arthritis, Atrial fibrillation ([address] ), Atypical chest pain, Eagle's esophagus with high grade dysplasia, Cancer ([facility] [address] ), Cerumen impaction, Chronic pain of right knee, Coronary artery disease, Depression, Dysphagia, Esophageal stricture, GERD (gastroesophageal reflux disease), Headache, Hiatal hernia, Insomnia, Jejunostomy tube present ([address] ), Low vitamin D level, Osteoarthritis, knee, Pneumonia, Right leg paresthesias, Sciatica, Shingles, Spondylosis of 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, and Venous insufficiency  ,  does not have any pertinent problems on file  ,   has a past surgical history that includes  section; pr esophagogastroduodenoscopy transoral diagnostic (N/A, 3/10/2017); Steroid injection knee (Right, 2017); Joint replacement (Right, 11/10/2014); pr esophagogastroduodenoscopy transoral diagnostic (N/A, 2017); pr esophagoscopy flexible transoral with biopsy (N/A, 2017); pr removal esophagus,no thoracotomy (N/A, 2017); Gastrojejunostomy w/ jejunostomy tube (N/A, 2017); Gastrectomy; pr lap, ventral hernia repair,reducible (N/A, 2019); LAPAROTOMY (N/A, 8/3/2019); Exploratory laparotomy w/ bowel resection (N/A, 2019); Ventral hernia repair (2019); and IR image guided aspiration / drainage w tube (2020)  ,  family history includes Alzheimer's disease in her mother; Diabetes type II in her father  ,   reports that she has never smoked  She has never used smokeless tobacco  She reports previous alcohol use  She reports that she does not use drugs  ,  has No Known Allergies     Current Outpatient Medications   Medication Sig Dispense Refill    mirtazapine (REMERON) 7 5 MG tablet Take 1 tablet (7 5 mg total) by mouth daily at bedtime 2 tablet 0  pantoprazole (PROTONIX) 20 mg tablet Take 1 tablet (20 mg total) by mouth daily 30 tablet 0    PARoxetine (PAXIL) 20 mg tablet Take 1 tablet (20 mg total) by mouth daily 30 tablet 0    potassium chloride (K-DUR,KLOR-CON) 20 mEq tablet Take 1 tablet (20 mEq total) by mouth daily 30 tablet 0    pravastatin (PRAVACHOL) 20 mg tablet Take 1 tablet (20 mg total) by mouth daily with dinner 30 tablet 0     No current facility-administered medications for this visit  Review of Systems   Constitutional: Negative for activity change, appetite change, diaphoresis, fatigue and fever  HENT: Negative  Eyes: Negative  Respiratory: Negative for apnea, cough, chest tightness, shortness of breath and wheezing  Cardiovascular: Negative for chest pain, palpitations and leg swelling  Gastrointestinal: Positive for abdominal pain  Negative for abdominal distention, anal bleeding, constipation, diarrhea, nausea and vomiting  Endocrine: Negative for cold intolerance, heat intolerance, polydipsia, polyphagia and polyuria  Genitourinary: Positive for difficulty urinating  Negative for dysuria, flank pain, hematuria and urgency  Musculoskeletal: Negative for arthralgias, back pain, gait problem, joint swelling and myalgias  Skin: Negative for color change, rash and wound  Allergic/Immunologic: Negative for environmental allergies, food allergies and immunocompromised state  Neurological: Negative for dizziness, seizures, syncope, speech difficulty, numbness and headaches  Hematological: Negative for adenopathy  Does not bruise/bleed easily  Psychiatric/Behavioral: Negative for agitation, behavioral problems, hallucinations, sleep disturbance and suicidal ideas           Objective:  Vitals:    08/04/20 1244   BP: 118/78   BP Location: Left arm   Patient Position: Sitting   Cuff Size: Standard   Temp: 97 5 °F (36 4 °C)   TempSrc: Temporal   Weight: 69 4 kg (153 lb)   Height: 5' 2"     Body mass index is 27 98 kg/m²  Physical Exam   Constitutional: She is oriented to person, place, and time  She appears well-developed  No distress  HENT:   Head: Normocephalic  Right Ear: External ear normal    Left Ear: External ear normal    Nose: Nose normal    Eyes: Pupils are equal, round, and reactive to light  Conjunctivae are normal  Right eye exhibits no discharge  Left eye exhibits no discharge  No scleral icterus  Neck: Normal range of motion  No tracheal deviation present  No thyromegaly present  Cardiovascular: Normal rate, regular rhythm and normal heart sounds  Exam reveals no gallop and no friction rub  No murmur heard  Pulmonary/Chest: Effort normal and breath sounds normal  No respiratory distress  She has no wheezes  Abdominal: Soft  Bowel sounds are normal  She exhibits no mass  There is no abdominal tenderness  There is no guarding  A hernia is present  Musculoskeletal:         General: No deformity  Lymphadenopathy:     She has no cervical adenopathy  Neurological: She is alert and oriented to person, place, and time  No cranial nerve deficit  Skin: Skin is warm and dry  No rash noted  She is not diaphoretic  No erythema     Psychiatric: Thought content normal

## 2020-08-21 ENCOUNTER — OFFICE VISIT (OUTPATIENT)
Dept: SURGERY | Facility: CLINIC | Age: 70
End: 2020-08-21
Payer: MEDICARE

## 2020-08-21 VITALS — HEIGHT: 62 IN | HEART RATE: 98 BPM | TEMPERATURE: 97 F | WEIGHT: 153 LBS | BODY MASS INDEX: 28.16 KG/M2

## 2020-08-21 DIAGNOSIS — K43.9 HERNIA OF ABDOMINAL WALL: Primary | ICD-10-CM

## 2020-08-21 PROCEDURE — 99214 OFFICE O/P EST MOD 30 MIN: CPT | Performed by: SURGERY

## 2020-08-21 NOTE — PROGRESS NOTES
Office Visit - General Surgery  Alyson Lawson MRN: [id number]  Encounter: [id number]    Assessment and Plan    Problem List Items Addressed This Visit     None      70F w large nonobstructing ventral hernia s/p SBR and open abdomen after ventral hernia rpr  S/p IR drainage of perihepatic abscess     - d/w patient that in order to repair large ventral hernia she will most likely require major abdominal wall reconstruction  Given her recent prolonged hospitalization, major operation will likely require hospital stay in postop period as well  Explained that would need CT imaging to ensure no further abscess and preoperative planning will be needed  Risk and benefits of surgery were discussed with patient and her family  - will order CT A/P in meantime and d/w pt further options    Chief Complaint:  Alyson Lawson is a 69 y o  female who presents for Abdominal Pain (Consult ventral hernia)    Subjective  70F w large nonobstructing ventral hernia present after SBR/open abdomen after ventral hernia rpr  Pt also s/p IR drainage of perihepatic abscess w drain now out  C/o mild discomfort secondary to hernia after longer periods of walking/standing  She is anisa PO diet, +BMs  No n/v, no f/c      Past Medical History  Past Medical History:   Diagnosis Date    Abnormal blood chemistry     last assessed 03/06/2014    Achalasia, esophageal     Acute medial meniscus tear     last assessed 03/10/2014    Acute otitis externa     last assessed 03/24/2014    Adrenal nodule ([address]     Anemia     Anxiety     Arthritis     Atrial fibrillation ([address] )     resolved 01/09/2018    Atypical chest pain     last assessed 11/22/2016    Eagle's esophagus with high grade dysplasia     last assessed 01/03/2018    Cancer [address] [address]     esophageal    Cerumen impaction     last assessed 03/24/2014    Chronic pain of right knee     last assessed 04/06/2017    Coronary artery disease     3 vessel, resolved 01/09/2018    Depression  Dysphagia     last assessed 2017    Esophageal stricture     last assessed 2017    GERD (gastroesophageal reflux disease)     Headache     last assessed 2013    Hiatal hernia     Insomnia     last assessed 10/15/2013    Jejunostomy tube present ([facility] [address]     resolved 2018    Low vitamin D level     resolved 2018    Osteoarthritis, knee     last assessed 2017    Pneumonia     last assessed 2013    Right leg paresthesias     last assessed 2017    Sciatica     last assessed 2015    Shingles     last assessed 2015    Spondylosis of 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     last assessed 2017    Venous insufficiency     last assessed 2013       Past Surgical History  Past Surgical History:   Procedure Laterality Date     SECTION      EXPLORATORY LAPAROTOMY W/ BOWEL RESECTION N/A 2019    Procedure: LAPAROTOMY EXPLORATORY W/ BOWEL RESECTION, APPLICATION OF Chad Gables;  Surgeon: Marga Leonard DO;  [facility];  Service: General    GASTRECTOMY      partial    GASTROJEJUNOSTOMY W/ JEJUNOSTOMY TUBE N/A 2017    Procedure: JEJUNOSTOMY TUBE PLACEMENT;  Surgeon: Brenton Damon MD;  [facility];  Service: Thoracic    IR IMAGE [address] / DRAINAGE W TUBE  2020    JOINT REPLACEMENT Right 11/10/2014    knee, Dr Bertha Baptiste N/A 8/3/2019    Procedure: LAPAROTOMY EXPLORATORY, reanastomosis of proximal small bowel, placement of vac dressing;  Surgeon: Marga Leonard DO;  [facility];  Service: General    AK ESOPHAGOGASTRODUODENOSCOPY TRANSORAL DIAGNOSTIC N/A 3/10/2017    Procedure: ESOPHAGOGASTRODUODENOSCOPY (EGD); Surgeon: Corwin Salinas MD;  [facility];  Service: Gastroenterology    AK ESOPHAGOGASTRODUODENOSCOPY TRANSORAL DIAGNOSTIC N/A 2017    Procedure: ESOPHAGOGASTRODUODENOSCOPY (EGD);   Surgeon: Corwin Salinas MD;  [facility];  Service: Gastroenterology    LA ESOPHAGOSCOPY FLEXIBLE TRANSORAL WITH BIOPSY N/A 8/24/2017    Procedure: ESOPHAGOGASTRODUODENOSCOPY (EGD); Surgeon: Shane Lazcano MD;  Location:  MAIN OR;  Service: Thoracic    LA LAP, VENTRAL HERNIA REPAIR,REDUCIBLE N/A 7/31/2019    Procedure: REPAIR HERNIA VENTRAL LAPAROSCOPIC;  Surgeon: Toma Gramajo DO;  [facility];  Service: General    LA REMOVAL [address] N/A 8/24/2017    Procedure: TRANSHIATAL ESOPHAGECTOMY;  Surgeon: Shane Lazcano MD;  [facility];  Service: Thoracic    STEROID INJECTION KNEE Right 5/2/2017    Procedure: GENICULATE NERVE BLOCKS;  Surgeon: Brooke Andrews DO;  [facility];  Service:     VENTRAL HERNIA REPAIR  07/30/2019       Family History  Family History   Problem Relation Age of Onset    Alzheimer's disease Mother     Diabetes type II Father        Social History  Social History     Socioeconomic History    Marital status:       Spouse name: None    Number of children: None    Years of education: None    Highest education level: None   Occupational History    None   Social Needs    Financial resource strain: None    Food insecurity     Worry: None     Inability: None    Transportation needs     Medical: None     Non-medical: None   Tobacco Use    Smoking status: Never Smoker    Smokeless tobacco: Never Used   Substance and Sexual Activity    Alcohol use: Not Currently     Alcohol/week: 0 0 standard drinks     Frequency: Never     Binge frequency: Never    Drug use: No    Sexual activity: Not Currently     Partners: Male   Lifestyle    Physical activity     Days per week: None     Minutes per session: None    Stress: None   Relationships    Social connections     Talks on phone: None     Gets together: None     Attends [employer] service: None     Active member of club or organization: None     Attends meetings of clubs or organizations: None     Relationship status: None    Intimate partner violence     Fear of current or ex partner: None     Emotionally abused: None     Physically abused: None     Forced sexual activity: None   Other Topics Concern    None   Social History Narrative    Patient has living will        Medications  Current Outpatient Medications on File Prior to Visit   Medication Sig Dispense Refill    mirtazapine (REMERON) 7 5 MG tablet Take 1 tablet (7 5 mg total) by mouth daily at bedtime 2 tablet 5    pantoprazole (PROTONIX) 20 mg tablet Take 1 tablet (20 mg total) by mouth daily 30 tablet 5    PARoxetine (PAXIL) 20 mg tablet Take 1 tablet (20 mg total) by mouth daily 30 tablet 2    potassium chloride (K-DUR,KLOR-CON) 20 mEq tablet Take 1 tablet (20 mEq total) by mouth daily 30 tablet 0    pravastatin (PRAVACHOL) 20 mg tablet Take 1 tablet (20 mg total) by mouth daily with dinner 30 tablet 5     No current facility-administered medications on file prior to visit  Allergies  No Known Allergies    Review of Systems   All other systems reviewed and are negative  Objective  Vitals:    08/21/20 1011   Pulse: 98   Temp: (!) 97 °F (36 1 °C)       Physical Exam  HENT:      Head: Atraumatic  Cardiovascular:      Rate and Rhythm: Normal rate and regular rhythm  Pulmonary:      Breath sounds: Normal breath sounds  Abdominal:      Palpations: Abdomen is soft  Comments: Large ventral hernia w loss of domain, NT/ND   Skin:     General: Skin is warm and dry  Neurological:      General: No focal deficit present  Mental Status: She is oriented to person, place, and time

## 2020-08-22 NOTE — SPEECH THERAPY NOTE
Speech Language/Pathology    Speech/Language Pathology Progress Note    Patient Name: Sue Montenegro  Today's Date: 8/10/2019     Problem List  Patient Active Problem List   Diagnosis    Osteoarthritis of knee    Joint pain, knee    Eagle's esophagus with high grade dysplasia    3-vessel CAD    Abnormal blood chemistry    A-fib ([address] )    Anemia    Atypical chest pain    Backache with radiation    Continuous left lower quadrant pain    Depression    Dysphagia    Gastroesophageal reflux disease    Hyperlipidemia    Hypothyroidism    Insomnia    Low vitamin D level    Lumbar radiculopathy    Myofascial pain    Right leg paresthesias    Sciatica    Spondylosis of 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    Stricture of esophagus    Venous insufficiency    Incisional hernia    Adrenal adenoma    Incisional hernia, without obstruction or gangrene    Bilateral pneumonia    Postprocedural pneumothorax    OR Exploratory laparotomy, SBR secondary to iatrogenic enterotomy     OR Laparoscopic ventral hernia repair    Fungemia        Past Medical History  Past Medical History:   Diagnosis Date    Abnormal blood chemistry     last assessed 03/06/2014    Achalasia, esophageal     Acute medial meniscus tear     last assessed 03/10/2014    Acute otitis externa     last assessed 03/24/2014    Adrenal nodule ([address]     Anemia     Anxiety     Arthritis     Atrial fibrillation ([address] )     resolved 01/09/2018    Atypical chest pain     last assessed 11/22/2016    Eagle's esophagus with high grade dysplasia     last assessed 01/03/2018    Cancer ([address]     esophageal    Cerumen impaction     last assessed 03/24/2014    Chronic pain of right knee     last assessed 04/06/2017    Coronary artery disease     3 vessel, resolved 01/09/2018    Depression     Dysphagia     last assessed 06/21/2017    Esophageal stricture     last assessed 06/21/2017    GERD (gastroesophageal reflux Event Note disease)     Headache     last assessed 2013    Hiatal hernia     Insomnia     last assessed 10/15/2013    Jejunostomy tube present [address] [address] )     resolved 2018    Low vitamin D level     resolved 2018    Osteoarthritis, knee     last assessed 2017    Pneumonia     last assessed 2013    Right leg paresthesias     last assessed 2017    Sciatica     last assessed 2015    Shingles     last assessed 2015    Spondylosis of 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     last assessed 2017    Venous insufficiency     last assessed 2013        Past Surgical History  Past Surgical History:   Procedure Laterality Date     SECTION      EXPLORATORY LAPAROTOMY W/ BOWEL RESECTION N/A 2019    Procedure: LAPAROTOMY EXPLORATORY W/ BOWEL RESECTION, APPLICATION OF Kiki Blakes;  Surgeon: Stephan Kelley DO;  [facility];  Service: General    GASTRECTOMY      partial    GASTROJEJUNOSTOMY W/ JEJUNOSTOMY TUBE N/A 2017    Procedure: Hi Manuel;  Surgeon: Fortino Martin MD;  [facility];  Service: Thoracic    JOINT REPLACEMENT Right 11/10/2014    knee, Dr Blayne Zimmerman N/A 8/3/2019    Procedure: LAPAROTOMY EXPLORATORY, reanastomosis of proximal small bowel, placement of vac dressing;  Surgeon: Stephan Kelley DO;  [facility];  Service: General    OR ESOPHAGOGASTRODUODENOSCOPY TRANSORAL DIAGNOSTIC N/A 3/10/2017    Procedure: ESOPHAGOGASTRODUODENOSCOPY (EGD); Surgeon: Merlin Lulas, MD;  [facility];  Service: Gastroenterology    OR ESOPHAGOGASTRODUODENOSCOPY TRANSORAL DIAGNOSTIC N/A 2017    Procedure: ESOPHAGOGASTRODUODENOSCOPY (EGD); Surgeon: Merlin Lulas, MD;  [facility];  Service: Gastroenterology    OR ESOPHAGOSCOPY FLEXIBLE TRANSORAL WITH BIOPSY N/A 2017    Procedure: ESOPHAGOGASTRODUODENOSCOPY (EGD);   Surgeon: Fortino Martin MD;  Location:  MAIN OR; Service: Thoracic    [place] LAP, VENTRAL HERNIA REPAIR,REDUCIBLE N/A 7/31/2019    Procedure: REPAIR HERNIA VENTRAL LAPAROSCOPIC;  Surgeon: Tk Ricketts DO;  [facility];  Service: General    MO REMOVAL [address] THORACOTOMY N/A 8/24/2017    Procedure: TRANSHIATAL ESOPHAGECTOMY;  Surgeon: Masood Carmen MD;  Location:  MAIN OR;  Service: Thoracic    STEROID INJECTION KNEE Right 5/2/2017    Procedure: GENICULATE NERVE BLOCKS;  Surgeon: Nava Friday, ;  [facility];  Service:          Subjective:    Pt was lethargic but cooperative for diagnostic treatment session this morning  Nurse was providing ice chips upon arrival  She stated "I'm so tired "    Objective:    SLP provided 3 large ice chips which pt allowed to melt in her mouth slowly  No s/s of aspiration were noted with ice chips  With trial of HTL by tsp, pt took only minimal amounts from spoon (required 5 presentations to take a full spoonful of liquid)  Manipulation and transfer were very delayed with delayed swallow initiation  One cough was noted after HTL  Anterior loss on L side noted x2  No other s/s of aspiration were noted however it took pt a prolonged period of time to consume less than 1 oz of liquid  Assessment:    Although pt did take small amount of HTL today, oral acceptance and swallowing do not appear appropriate for diet advancement at this time  It took pt a prolonged period of time to accept less than 1 oz of liquid which resulted in fatigue and refusal of additional trials  Amount taken would not be enough to sustain nutrition/hydration and aspiration risk increases with fatigue       Plan/Recommendations:    Recommend continue NPO  Ice chips OK with nursing   Speech to follow

## 2020-08-24 ENCOUNTER — HOSPITAL ENCOUNTER (OUTPATIENT)
Dept: CT IMAGING | Facility: HOSPITAL | Age: 70
Discharge: HOME | End: 2020-08-24
Attending: SURGERY
Payer: MEDICARE

## 2020-08-24 DIAGNOSIS — K43.9 HERNIA OF ABDOMINAL WALL: ICD-10-CM

## 2020-08-24 PROCEDURE — G1004 CDSM NDSC: HCPCS

## 2020-08-24 PROCEDURE — 74176 CT ABD & PELVIS W/O CONTRAST: CPT

## 2020-09-04 ENCOUNTER — OFFICE VISIT (OUTPATIENT)
Dept: SURGERY | Facility: CLINIC | Age: 70
End: 2020-09-04
Payer: MEDICARE

## 2020-09-04 VITALS
WEIGHT: 156.2 LBS | HEART RATE: 83 BPM | DIASTOLIC BLOOD PRESSURE: 80 MMHG | HEIGHT: 62 IN | SYSTOLIC BLOOD PRESSURE: 132 MMHG | TEMPERATURE: 97.7 F | BODY MASS INDEX: 28.74 KG/M2

## 2020-09-04 DIAGNOSIS — M62.08 DIASTASIS RECTI: Primary | ICD-10-CM

## 2020-09-04 PROCEDURE — 99213 OFFICE O/P EST LOW 20 MIN: CPT | Performed by: SURGERY

## 2020-09-04 NOTE — PATIENT INSTRUCTIONS
Follow-up as needed  Diet as tolerated  I had a long conversation (greater than 15 minutes) with the patient and her daughter  At this time they are not going to pursue plastic surgery evaluation for repair of diastasis recti as she has no true hernia noted on CT scan  They were instructed to follow-up if there is any change in exam or symptomatology

## 2020-09-04 NOTE — PROGRESS NOTES
Office Visit - General Surgery  Abel Haley MRN: [id number]  Encounter: [id number]    Assessment and Plan  Impression  Diastasis recti    Recommendation  Follow-up as needed  Diet as tolerated  I had a long conversation (greater than 15 minutes) with the patient and her daughter  At this time they are not going to pursue plastic surgery evaluation for repair of diastasis recti as she has no true hernia noted on CT scan  They were instructed to follow-up if there is any change in exam or symptomatology  Problem List Items Addressed This Visit     None          Chief Complaint:  Abel Haley is a 69 y o  female who presents for Follow-up (CT scan results)    Subjective  Patient doing well  Tolerating diet  Normal bowel movements  Minimal abdominal pain      Past Medical History  Past Medical History:   Diagnosis Date    Abnormal blood chemistry     last assessed 03/06/2014    Achalasia, esophageal     Acute medial meniscus tear     last assessed 03/10/2014    Acute otitis externa     last assessed 03/24/2014    [facility] [address]     Anemia     Anxiety     Arthritis     Atrial fibrillation ([facility] [address] )     resolved 01/09/2018    Atypical chest pain     last assessed 11/22/2016    Eagle's esophagus with high grade dysplasia     last assessed 01/03/2018    Cancer ([facility] [address] [facility]     esophageal    Cerumen impaction     last assessed 03/24/2014    Chronic pain of right knee     last assessed 04/06/2017    Coronary artery disease     3 vessel, resolved 01/09/2018    Depression     Dysphagia     last assessed 06/21/2017    Esophageal stricture     last assessed 06/21/2017    GERD (gastroesophageal reflux disease)     Headache     last assessed 08/01/2013    Hiatal hernia     Insomnia     last assessed 10/15/2013    Jejunostomy tube present ([facility] [address] [facility]     resolved 01/09/2018    Low vitamin D level     resolved 01/08/2018    Osteoarthritis, knee     last assessed 04/06/2017    Pneumonia     last assessed 2013    Right leg paresthesias     last assessed 2017    Sciatica     last assessed 2015    Shingles     last assessed 2015    Spondylosis of 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     last assessed 2017    Venous insufficiency     last assessed 2013       Past Surgical History  Past Surgical History:   Procedure Laterality Date     SECTION      EXPLORATORY LAPAROTOMY W/ BOWEL RESECTION N/A 2019    Procedure: LAPAROTOMY EXPLORATORY W/ BOWEL RESECTION, APPLICATION OF Cyntha Meth;  Surgeon: Erlin Viera DO;  [facility];  Service: General    GASTRECTOMY      partial    GASTROJEJUNOSTOMY W/ JEJUNOSTOMY TUBE N/A 2017    Procedure: [place] TUBE PLACEMENT;  Surgeon: Lesia Gonzalez MD;  [facility];  Service: Thoracic    IR IMAGE [address] / DRAINAGE W TUBE  2020    JOINT REPLACEMENT Right 11/10/2014    knee, Dr Nataly Schwarz N/A 8/3/2019    Procedure: LAPAROTOMY EXPLORATORY, reanastomosis of proximal small bowel, placement of vac dressing;  Surgeon: Erlin Viera DO;  [facility];  Service: General    [place] ESOPHAGOGASTRODUODENOSCOPY TRANSORAL DIAGNOSTIC N/A 3/10/2017    Procedure: ESOPHAGOGASTRODUODENOSCOPY (EGD); Surgeon: Phong Nicole MD;  [facility];  Service: Gastroenterology    [place] ESOPHAGOGASTRODUODENOSCOPY TRANSORAL DIAGNOSTIC N/A 2017    Procedure: ESOPHAGOGASTRODUODENOSCOPY (EGD); Surgeon: Phong Nicole MD;  [facility];  Service: Gastroenterology    [place] ESOPHAGOSCOPY FLEXIBLE TRANSORAL WITH BIOPSY N/A 2017    Procedure: ESOPHAGOGASTRODUODENOSCOPY (EGD);   Surgeon: Lesia Gonzalez MD;  [facility];  Service: Thoracic    [place] LAP, VENTRAL HERNIA REPAIR,REDUCIBLE N/A 2019    Procedure: REPAIR HERNIA VENTRAL LAPAROSCOPIC;  Surgeon: Mine Moses DO;  [facility];  Service: General    [place] REMOVAL ESOPHAGUS,NO THORACOTOMY N/A 8/24/2017    Procedure: TRANSHIATAL ESOPHAGECTOMY;  Surgeon: Jack Tomlin MD;  [facility];  Service: Thoracic    STEROID INJECTION KNEE Right 5/2/2017    Procedure: GENICULATE NERVE BLOCKS;  Surgeon: Jonn Wray DO;  [facility];  Service:     VENTRAL HERNIA REPAIR  07/30/2019       Family History  Family History   Problem Relation Age of Onset    Alzheimer's disease Mother     Diabetes type II Father        Medications  Current Outpatient Medications on File Prior to Visit   Medication Sig Dispense Refill    mirtazapine (REMERON) 7 5 MG tablet Take 1 tablet (7 5 mg total) by mouth daily at bedtime 2 tablet 5    pantoprazole (PROTONIX) 20 mg tablet Take 1 tablet (20 mg total) by mouth daily 30 tablet 5    PARoxetine (PAXIL) 20 mg tablet Take 1 tablet (20 mg total) by mouth daily 30 tablet 2    pravastatin (PRAVACHOL) 20 mg tablet Take 1 tablet (20 mg total) by mouth daily with dinner 30 tablet 5    potassium chloride (K-DUR,KLOR-CON) 20 mEq tablet Take 1 tablet (20 mEq total) by mouth daily (Patient not taking: Reported on 9/4/2020) 30 tablet 0     No current facility-administered medications on file prior to visit          Allergies  No Known Allergies    Review of Systems    Objective  Vitals:    09/04/20 1109   BP: 132/80   Pulse: 83   Temp: 97 7 °F (36 5 °C)       Physical Exam     Abdominal bulge still present however no evidence of any hernia

## 2020-11-16 DIAGNOSIS — F33.9 EPISODE OF RECURRENT MAJOR DEPRESSIVE DISORDER, UNSPECIFIED DEPRESSION EPISODE SEVERITY (HCC): ICD-10-CM

## 2020-11-16 RX ORDER — PAROXETINE HYDROCHLORIDE 20 MG/1
20 TABLET, FILM COATED ORAL DAILY
Qty: 30 TABLET | Refills: 0 | Status: SHIPPED | OUTPATIENT
Start: 2020-11-16 | End: 2021-02-25 | Stop reason: SDUPTHER

## 2021-02-25 DIAGNOSIS — F33.9 EPISODE OF RECURRENT MAJOR DEPRESSIVE DISORDER, UNSPECIFIED DEPRESSION EPISODE SEVERITY (HCC): ICD-10-CM

## 2021-02-25 DIAGNOSIS — F51.01 PRIMARY INSOMNIA: ICD-10-CM

## 2021-02-25 DIAGNOSIS — K21.00 GASTROESOPHAGEAL REFLUX DISEASE WITH ESOPHAGITIS: ICD-10-CM

## 2021-02-25 RX ORDER — PAROXETINE HYDROCHLORIDE 20 MG/1
20 TABLET, FILM COATED ORAL DAILY
Qty: 30 TABLET | Refills: 0 | Status: SHIPPED | OUTPATIENT
Start: 2021-02-25 | End: 2021-05-13

## 2021-02-25 RX ORDER — PRAVASTATIN SODIUM 20 MG
20 TABLET ORAL
Qty: 30 TABLET | Refills: 5 | Status: SHIPPED | OUTPATIENT
Start: 2021-02-25

## 2021-02-25 RX ORDER — PANTOPRAZOLE SODIUM 20 MG/1
20 TABLET, DELAYED RELEASE ORAL DAILY
Qty: 30 TABLET | Refills: 5 | Status: SHIPPED | OUTPATIENT
Start: 2021-02-25

## 2021-03-09 DIAGNOSIS — Z23 ENCOUNTER FOR IMMUNIZATION: ICD-10-CM

## 2021-03-11 ENCOUNTER — IMMUNIZATIONS (OUTPATIENT)
Dept: FAMILY MEDICINE CLINIC | Facility: HOSPITAL | Age: 71
End: 2021-03-11

## 2021-03-11 DIAGNOSIS — Z23 ENCOUNTER FOR IMMUNIZATION: Primary | ICD-10-CM

## 2021-03-11 PROCEDURE — 91301 SARS-COV-2 / COVID-19 MRNA VACCINE (MODERNA) 100 MCG: CPT

## 2021-03-11 PROCEDURE — 0011A SARS-COV-2 / COVID-19 MRNA VACCINE (MODERNA) 100 MCG: CPT

## 2021-04-08 ENCOUNTER — IMMUNIZATIONS (OUTPATIENT)
Dept: FAMILY MEDICINE CLINIC | Facility: HOSPITAL | Age: 71
End: 2021-04-08

## 2021-04-08 DIAGNOSIS — Z23 ENCOUNTER FOR IMMUNIZATION: Primary | ICD-10-CM

## 2021-04-08 PROCEDURE — 91301 SARS-COV-2 / COVID-19 MRNA VACCINE (MODERNA) 100 MCG: CPT

## 2021-04-08 PROCEDURE — 0012A SARS-COV-2 / COVID-19 MRNA VACCINE (MODERNA) 100 MCG: CPT

## 2021-04-30 ENCOUNTER — TELEPHONE (OUTPATIENT)
Dept: OTHER | Facility: OTHER | Age: 71
End: 2021-04-30

## 2021-05-13 DIAGNOSIS — F33.9 EPISODE OF RECURRENT MAJOR DEPRESSIVE DISORDER, UNSPECIFIED DEPRESSION EPISODE SEVERITY (HCC): ICD-10-CM

## 2021-05-13 RX ORDER — PAROXETINE HYDROCHLORIDE 20 MG/1
20 TABLET, FILM COATED ORAL DAILY
Qty: 30 TABLET | Refills: 0 | Status: SHIPPED | OUTPATIENT
Start: 2021-05-13

## 2021-06-12 DIAGNOSIS — F51.01 PRIMARY INSOMNIA: ICD-10-CM

## 2021-06-14 RX ORDER — MIRTAZAPINE 7.5 MG/1
7.5 TABLET, FILM COATED ORAL
Qty: 30 TABLET | Refills: 0 | Status: SHIPPED | OUTPATIENT
Start: 2021-06-14

## 2021-08-13 NOTE — PROCEDURES
Insert PICC line  Date/Time: 8/14/2019 11:39 AM  Performed by: Dia Butterfield RN  Authorized by: Tiana Walker MD     Patient location:  Bedside  Other Assisting Provider: Yes (comment) (Loletta Finders)    Consent:     Consent obtained:  Written    Consent given by:  Patient    Procedural risks discussed: Consent per md     Alternatives discussed: Consent per md   Universal protocol:     Procedure explained and questions answered to patient or proxy's satisfaction: yes      Relevant documents present and verified: yes      Test results available and properly labeled: yes      Radiology Images displayed and confirmed  If images not available, report reviewed: yes      Required blood products, implants, devices, and special equipment available: yes      Site/side marked: yes      Immediately prior to procedure, a time out was called: yes      Patient identity confirmed:  Verbally with patient and arm band  Pre-procedure details:     Hand hygiene: Hand hygiene performed prior to insertion      Sterile barrier technique: All elements of maximal sterile technique followed      Skin preparation:  ChloraPrep    Skin preparation agent: Skin preparation agent completely dried prior to procedure    Indications:     PICC line indications: replacement and no peripheral vascular access    Anesthesia (see MAR for exact dosages):      Anesthesia method:  Local infiltration    Local anesthetic:  Lidocaine 1% w/o epi  Procedure details:     Location:  [place]    Vessel type: vein      Laterality:  Right    Approach: percutaneous technique used      Patient position:  Flat    Procedural supplies:  Double lumen    Catheter size:  5 Fr    Landmarks identified: yes      Ultrasound guidance: yes      Sterile ultrasound techniques: Sterile gel and sterile probe covers were used      Number of attempts:  1    Successful placement: yes      Vessel of catheter tip end:  Sherlock 3CG confirmed    Total catheter length (cm):  40    Catheter out [de-identified] : We discussed further treatment options.  We discussed nonsurgical and surgical options.  She wishes to try a course of physical therapy.  Prescription was given.  MRI if not improved or worsened. on skin (cm):  2    Max flow rate:  999ml/hr    Arm circumference:  26  Post-procedure details:     Post-procedure:  Dressing applied and securement device placed    Assessment:  Blood return through all ports and free fluid flow    Post-procedure complications: none      Patient tolerance of procedure:   Tolerated well, no immediate complications

## 2022-09-26 NOTE — PROGRESS NOTES
Please update location on referral for the Holter Monitor to [facility] cardiology [address]. Referral is to [employer] but patient is scheduled at the [facility] location. Inform patient referral needs to be changed as well. Thank you.   Progress Note - General Surgery   Yvonna Bone Hartranft 78 y o  female MRN: [id number]  Unit/Bed#: ICU 02 Encounter: [id number]    Assessment:  68yo female with iatrogenic bowel injury s/p laparoscopic repair of multiple ventral hernias now s/p exlap bowel resection abthera VAC placement and subsequent reexploration, bowel anastomosis, abdominal closure  Plan:  Wean pressors  Wean vent  Sips of clears if successfully extubated  Wound closure tomorrow at bedside  Continue antibiotics  Fluid hydration  Care per ICU    Subjective/Objective   Chief Complaint:     Subjective: Was put on levo @ 4 yesterday evening  Weaned to levo @ 2 this morning  Objective:     Blood pressure (!) 87/53, pulse 82, temperature 98 2 °F (36 8 °C), resp  rate 14, height 5' 4" (1 626 m), weight 74 9 kg (165 lb 2 oz), SpO2 100 %  ,Body mass index is 28 34 kg/m²        Intake/Output Summary (Last 24 hours) at 8/4/2019 0726  Last data filed at 8/4/2019 2609  Gross per 24 hour   Intake 8948 07 ml   Output 2525 ml   Net 6423 07 ml       Invasive Devices     Central Venous Catheter Line            CVC Central Lines 08/02/19 Triple 1 day          Arterial Line            Arterial Line 08/03/19 Left Radial less than 1 day          Drain            Gastrostomy/Enterostomy Jejunostomy 14 Fr   days    Chest Tube 1 Left 12 Fr  1 day    NG/OG/Enteral Tube Orogastric 16 Fr Right mouth 1 day    Negative Pressure Wound Therapy (V A C ) Abdomen 1 day    Urethral Catheter Latex;Straight-tip 16 Fr  1 day          Airway            ETT  Cuffed 7 mm 1 day                Physical Exam:  NAD  nonlabored respirations on vent  Abdomen soft, nondistended, wound VAC in place with good seal and draining SS     Lab, Imaging and other studies:  CBC:   Lab Results   Component Value Date    WBC 10 87 (H) 08/04/2019    HGB 9 3 (L) 08/04/2019    HCT 30 5 (L) 08/04/2019    MCV 92 08/04/2019     08/04/2019    MCH 28 2 08/04/2019    MCHC 30 5 (L) 08/04/2019 RDW 15 4 (H) 08/04/2019    MPV 11 3 08/04/2019   , CMP:   Lab Results   Component Value Date    SODIUM 143 08/04/2019    K 3 7 08/04/2019     (H) 08/04/2019    CO2 22 08/04/2019    CO2 23 08/03/2019    BUN 19 08/04/2019    CREATININE 0 52 (L) 08/04/2019    GLUCOSE 89 08/03/2019    CALCIUM 8 0 (L) 08/04/2019    EGFR 97 08/04/2019   , Coagulation: No results found for: PT, INR, APTT, Urinalysis: No results found for: COLORU, CLARITYU, SPECGRAV, PHUR, LEUKOCYTESUR, NITRITE, PROTEINUA, GLUCOSEU, KETONESU, BILIRUBINUR, BLOODU, Amylase: No results found for: AMYLASE, Lipase: No results found for: LIPASE  VTE Pharmacologic Prophylaxis: Sequential compression device (Venodyne)   VTE Mechanical Prophylaxis: sequential compression device

## 2022-10-14 NOTE — RESULT NOTES
Diagnoses and all orders for this visit:    1. Depression, unspecified depression type  Not optimally controlled  She is interested in initiating low-dose  Monitor symptoms and follow-up in 2 to 3 weeks  If helping we can even potentially transition her to Contrave. Notes that her BMI is causing depression    -     buPROPion SR (WELLBUTRIN SR) 150 mg SR tablet; Take 1 Tablet by mouth daily. 2. Anxiety  Not optimally controlled but improving  Recommended BuSpar 3 times a day to give her better control  Wrote for clonazepam for as needed panic attack. She is aware and does not like to take anything addicting.  -     clonazePAM (KlonoPIN) 0.5 mg tablet; Take 1 Tablet by mouth nightly as needed for Anxiety. Max Daily Amount: 0.5 mg.  -     busPIRone (BUSPAR) 5 mg tablet; Take 0.5 Tablets by mouth three (3) times daily (with meals). 90    3. BMI 31.0-31.9,adult  Discussed with patient and reports this is causing some of her depression  Will try the Wellbutrin and potentially add on naltrexone. Potentially could use Contrave pharmacy  Also discussed GLP-1's. She is not interested in injections. She may want to see Dr. Shira Champion and could also talk to Sherrell Aceves about the injection to see if this may be something she could do. 4. Right elbow tendinitis  She notes recently pain at her elbow. She does a lot of work at [employer]. Will use brace  -     diclofenac EC (VOLTAREN) 50 mg EC tablet; Take 1 Tablet by mouth two (2) times a day. Take with food and water for about 1-2 weeks  Indications: inflammation of a tendon    5. Mixed hyperlipidemia  Labs reviewed  Heart healthy Mediterranean diet discussed  Not interested in cholesterol and no history of blood pressure             Paul Daniel is a 51 y.o. female and presents with Medication Refill and Labs (Reviewing labs)  . Anxiety  Patient reports that the BuSpar 2.5 mg twice daily has been helpful for her.   Prior to the BuSpar she was watching TV and all of a sudden Discussion/Summary   I spoke with patient about results  Please schedule her with me in office sometime in June  OK to overbook  Verified Results  (1) TISSUE EXAM [id number] 09:50AM Stephanie King     Test Name Result Flag Reference   LAB AP CASE REPORT (Report)     Surgical Pathology Report             Case: [id number]                   Authorizing Provider: Tony Hager MD      Collected:      05/19/2017 0950        Ordering Location:   [facility] Received:      05/19/2017 1100                    Operating Room                                 Pathologist:      Esvin Easley MD                                 Specimens:  A) - Esophagus, bx at 35 cm                                      B) - Esophagus, bx at 33 cm                                      C) - Esophagus, bx at 31 cm                                      D) - Esophagus, bx at 29 cm                                      E) - Esophagus, bx at 27 cm   LAB AP FINAL DIAGNOSIS (Report)     A  35 cm, esophagus (biopsy):    - Eagle mucosa of the distinctive type with columnar epithelial   changes indefinite for dysplasia  - Case seen in consultation at [facility]  B  33 cm, esophagus (biopsy):    - 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  Normal squamous mucosa  - Case seen in consultation at [facility]  C  31 cm, esophagus (biopsy):    - 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,   cannot exclude intramucosal carcinoma  - Case seen in consultation at [facility]  D  29 cm, esophagus (biopsy):    - 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  - Case seen in consultation at [facility]  E  27 cm, esophagus (biopsy):    - 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  - Case seen in consultation at [facility]    Electronically signed by Esvin Easley MD on 5/30/2017 at 11:51 AM  Preliminary result electronically signed by Som Brady MD Odin on 5/24/2017 at 3:02 PM  Preliminary result electronically signed by Zamzam Ward MD on 5/23/2017 at 1:19 PM   LAB AP NOTE (Report)     - Consultation report from [facility]:    1) Esophagus (Biopsy, [id number], 5/19/2017):    A) Esophagus (Biopsy at 35 cm): Eagle mucosa of the distinctive type   with columnar epithelial changes indefinite for dysplasia  B) Esophagus (Biopsy at 33 cm): Eagle mucosa of the distinctive type   with low-grade dysplasia  Normal squamous mucosa  C) Esophagus (Biopsy at 31 cm): Eagle mucosa of the distinctive type   with high-grade dysplasia, cannot exclude intramucosal carcinoma  D) Esophagus (Biopsy at 29 cm): Eagle mucosa of the distinctive type   with high-grade dysplasia  E) Esophagus (Biopsy at 27 cm): Eagle mucosa of the distinctive type   with low-grade dysplasia  [facility] [place]   [facility]    - Dr Feng Montes notified of preliminary results via phone at [address] [facility] PM on   5/24/2017  Final report faxed 05/30/2017  - Interpretation performed at [facility], [address] [place] [address]  LAB AP SURGICAL ADDITIONAL INFORMATION (Report)     These tests were developed and their performance characteristics   determined by [facility] or [facility]  They may not be cleared or approved by the [place]  Food and   Drug Administration  The FDA has determined that such clearance or   approval is not necessary  These tests are used for clinical purposes  They should not be regarded as investigational or for research  This   laboratory has been approved by CLIA 88, designated as a high-complexity   laboratory and is qualified to perform these tests  LAB AP GROSS DESCRIPTION (Report)     A  The specimen is received in formalin, labeled with the patient's name   and hospital number, and is designated esophagus biopsy at 35 cm   The   specimen consists of 2 white tan-pink soft tissue fragments measuring 0 2   cm had a panic attack. She got hot and sweaty and felt like she had a panic attack. Since being on the BuSpar she has not had any other episodes like this. She has seen a counselor in the past but does not feel like she needs a counselor now. Background  She is a single mom and raised 3 children. She is currently working full time and exercising. Since her last visit she had a cholecystectomy. Counselor  Does not need right now  Sertraline caused weight gain in the past      Headaches  She is taking the Nurtec as needed. 5-10 per 30 days headaches  During the daytime she takes Nurtec if she has a headache  In the evening she takes Fioricet and Phenergan so that she can sleep. She notes that she has had bad luck with headache medication in the past.  She has not reached out to neurology yet. 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 She has a lot of responsibilities within the company. Depression  PHQ-9 is 18 consistent with moderate depression  She notes that sometimes she has periods where she will cry but without knowing reason why  She is a very good relationship with her mother. Situational stressors chronic  Sleeping 7 hours but interupted and not well rested  Energy level 5/10    Review of Systems  Constitutional: negative for fevers, chills, anorexia and weight loss  Eyes:   negative for visual disturbance and irritation  ENT:   negative for tinnitus,sore throat,nasal congestion,ear pains. hoarseness  Respiratory:  negative for cough, hemoptysis, dyspnea,wheezing  CV:   negative for chest pain, palpitations, lower extremity edema  GI:   negative for nausea, vomiting, diarrhea, abdominal pain,melena  Endo:               negative for and 0 4 cm in greatest dimension  Entirely submitted  One cassette  B  The specimen is received in formalin, labeled with the patient's name   and hospital number, and is designated esophagus biopsy at 33 cm  The   specimen consists of 2 white tan-pink soft tissue fragments measuring 0 2   cm and 0 3 cm in greatest dimension  Entirely submitted  One cassette  C  The specimen is received in formalin, labeled with the patient's name   and hospital number, and is designated esophagus biopsy at 31 cm  The   specimen consists of 2 white tan-pink soft tissue fragments measuring 0 2   cm and 0 3 cm in greatest dimension  Entirely submitted  One cassette  D  The specimen is received in formalin, labeled with the patient's name   and hospital number, and is designated esophagus biopsy at 29 cm  The   specimen consists of 2 white tan-pink soft tissue fragments each measuring   0 2 cm in greatest dimension  Entirely submitted  One cassette  E  The specimen is received in formalin, labeled with the patient's name   and hospital number, and is designated esophagus biopsy at 27 cm  The   specimen consists of 2 white tan-pink soft tissue fragments measuring 0 2   cm and 0 3 cm in greatest dimension  Entirely submitted  One cassette  Note: The estimated total formalin fixation time based upon information   provided by the submitting clinician and the standard processing schedule   is approximately 72 hours  MAS   LAB AP CLINICAL INFORMATION      Esophageal obstruction  ???  Eagle esophagus  ??? Dysphagia  polyuria,polydipsia,polyphagia,heat intolerance  Genitourinary: negative for frequency, dysuria and hematuria  Integument:  negative for rash and pruritus  Hematologic:  negative for easy bruising and gum/nose bleeding  Musculoskel: negative for myalgias, arthralgias, back pain, muscle weakness, joint pain  Neurological:  negative for headaches, dizziness, vertigo, memory problems and gait   Behavl/Psych: negative for feelings of anxiety, depression, mood changes    Past Medical History:   Diagnosis Date    Anxiety     GERD (gastroesophageal reflux disease)     Headache     Nausea & vomiting      Past Surgical History:   Procedure Laterality Date    HX TOTAL ABDOMINAL HYSTERECTOMY  2007    fibroids and cysts    IMPLANT BREAST SILICONE/EQ      VA BREAST SURGERY PROCEDURE UNLISTED       Social History     Socioeconomic History    Marital status: SINGLE   Tobacco Use    Smoking status: Former     Packs/day: 1.00     Years: 25.00     Pack years: 25.00     Types: Cigarettes     Quit date: 3/1/2019     Years since quitting: 3.6    Smokeless tobacco: Never    Tobacco comments:     20 cigs/day about 15 years off and on   Vaping Use    Vaping Use: Never used   Substance and Sexual Activity    Alcohol use:  Yes     Alcohol/week: 0.0 standard drinks     Comment: occasional    Drug use: No    Sexual activity: Yes     Partners: Male   Social History Narrative    Full time: sedentary     Manageable stress        3 kids raised by herself    20 yo daughter     23 yo twin men        2 children live at home and one of the twins got      Family History   Problem Relation Age of Onset    Thyroid Disease Mother     Cancer Father         lymphoma    Diabetes Father     Heart Disease Father     Hypertension Father     Prostate Cancer Father     Diabetes Maternal Grandmother     Thyroid Disease Maternal Grandmother     Cancer Maternal Grandfather     Thyroid Disease Paternal Grandmother     Breast Cancer Maternal Aunt Breast Cancer Cousin      Current Outpatient Medications   Medication Sig Dispense Refill    Nurtec ODT 75 mg disintegrating tablet TAKE 1 TABLET BY MOUTH ONCE AS NEEDED FOR MIGRAINE FOR UP TO 1 DOSE. 12 Tablet 0    butalbital-acetaminophen-caffeine (FIORICET, ESGIC) -40 mg per tablet Take 1 Tablet by mouth every six (6) hours as needed for Headache. 30 Tablet 0    promethazine (PHENERGAN) 12.5 mg tablet Take 1 Tablet by mouth every six (6) hours as needed for Nausea. 30 Tablet 0    busPIRone (BUSPAR) 5 mg tablet Take 1/2 tab po at night 1 hour prior to sleep may increase to 1 tablet if needed can take bid. 45 Tablet 0    fluticasone propionate (FLONASE) 50 mcg/actuation nasal spray Take 2 Sprays by Both Nostrils route in the morning. 1 Each 1    omeprazole (PRILOSEC) 40 mg capsule Take 1 Capsule by mouth in the morning.  30 Capsule 0     Allergies   Allergen Reactions    Amitriptyline Other (comments)     Significant weight gain       Objective:  Visit Vitals  /68 (BP 1 Location: Left upper arm, BP Patient Position: Sitting, BP Cuff Size: Adult)   Pulse 81   Temp 98.2 °F (36.8 °C) (Oral)   Resp 16   Ht 5' 4\" (1.626 m)   Wt 182 lb (82.6 kg)   SpO2 93%   BMI 31.24 kg/m²     Physical Exam:   General appearance - alert, well appearing, and in no distress  Mental status - alert, oriented to person, place, and time  EYE-BENJAMIN, EOMI, fundi normal, corneas normal, no foreign bodies, visual acuity normal both eyes, no periorbital cellulitis  ENT-ENT exam normal, no neck nodes or sinus tenderness  Nose - normal and patent, no erythema, discharge or polyps  Mouth - mucous membranes moist, pharynx normal without lesions  Neck - supple, no significant adenopathy   Chest - clear to auscultation, no wheezes, rales or rhonchi, symmetric air entry   Heart - normal rate, regular rhythm, normal S1, S2, no murmurs, rubs, clicks or gallops   Abdomen - soft, nontender, nondistended, no masses or organomegaly  Lymph- no adenopathy palpable  Ext-peripheral pulses normal, no pedal edema, no clubbing or cyanosis  Skin-Warm and dry. no hyperpigmentation, vitiligo, or suspicious lesions  Neuro -alert, oriented, normal speech, no focal findings or movement disorder noted  Neck-normal C-spine, no tenderness, full ROM without pain  Gyn not indicated as pt with MARY bso      Results for orders placed or performed in visit on 09/29/22   TSH 3RD GENERATION   Result Value Ref Range    TSH 1.90 0.36 - 3.74 uIU/mL   CBC W/O DIFF   Result Value Ref Range    WBC 6.2 3.6 - 11.0 K/uL    RBC 4.98 3.80 - 5.20 M/uL    HGB 15.0 11.5 - 16.0 g/dL    HCT 46.2 35.0 - 47.0 %    MCV 92.8 80.0 - 99.0 FL    MCH 30.1 26.0 - 34.0 PG    MCHC 32.5 30.0 - 36.5 g/dL    RDW 13.2 11.5 - 14.5 %    PLATELET 740 929 - 581 K/uL    MPV 9.1 8.9 - 12.9 FL    NRBC 0.0 0  WBC    ABSOLUTE NRBC 0.00 0.00 - 9.93 K/uL   METABOLIC PANEL, COMPREHENSIVE   Result Value Ref Range    Sodium 138 136 - 145 mmol/L    Potassium 4.3 3.5 - 5.1 mmol/L    Chloride 104 97 - 108 mmol/L    CO2 29 21 - 32 mmol/L    Anion gap 5 5 - 15 mmol/L    Glucose 99 65 - 100 mg/dL    BUN 22 (H) 6 - 20 MG/DL    Creatinine 0.85 0.55 - 1.02 MG/DL    BUN/Creatinine ratio 26 (H) 12 - 20      GFR est AA >60 >60 ml/min/1.73m2    GFR est non-AA >60 >60 ml/min/1.73m2    Calcium 9.9 8.5 - 10.1 MG/DL    Bilirubin, total 0.4 0.2 - 1.0 MG/DL    ALT (SGPT) 37 12 - 78 U/L    AST (SGOT) 13 (L) 15 - 37 U/L    Alk. phosphatase 112 45 - 117 U/L    Protein, total 7.4 6.4 - 8.2 g/dL    Albumin 4.3 3.5 - 5.0 g/dL    Globulin 3.1 2.0 - 4.0 g/dL    A-G Ratio 1.4 1.1 - 2.2     LIPID PANEL   Result Value Ref Range    Cholesterol, total 271 (H) <200 MG/DL    Triglyceride 208 (H) <150 MG/DL    HDL Cholesterol 65 MG/DL    LDL, calculated 164.4 (H) 0 - 100 MG/DL    VLDL, calculated 41.6 MG/DL    CHOL/HDL Ratio 4.2 0.0 - 5.0     HEPATITIS C AB   Result Value Ref Range    Hep C virus Ab Interp.  NONREACTIVE NONREACTIVE Prevention    Cardiovascular profile  Family hx  Exercising:  Nahum Neff riding bikees  Blood pressure:  Health healthy diet:  Diabetes:  Cholesterol:  Renal function:      Cancer risk profile  Mamm[facility] 6/14, [facility] due 7/15 cig smoking  Lung no sx  Colonoscopy no blood stool  Skin nonhealing in 2 weeks no sx  Gyn abnormal bleeding/discharge/abd pain/pressure no sx, sees gyn      Thyroid sx  + anxiety situatioanally related ot son    Osteopenia prevention  Calcium 1000mg/day yes  Vitamin D 800iu/day yes    Mental health scale: 7-8/10  Depression  Anxiety  Sleep # of hours:  Energy Level:        Immunizations  TDAP defer  Pneumonia vaccine  Flu vaccine  Shingles vaccine  HPV

## 2022-10-17 ENCOUNTER — OFFICE VISIT (OUTPATIENT)
Dept: FAMILY MEDICINE CLINIC | Facility: CLINIC | Age: 72
End: 2022-10-17
Payer: MEDICARE

## 2022-10-17 ENCOUNTER — APPOINTMENT (OUTPATIENT)
Dept: LAB | Facility: MEDICAL CENTER | Age: 72
End: 2022-10-17
Payer: MEDICARE

## 2022-10-17 VITALS
HEIGHT: 62 IN | SYSTOLIC BLOOD PRESSURE: 118 MMHG | TEMPERATURE: 97.4 F | BODY MASS INDEX: 22.05 KG/M2 | DIASTOLIC BLOOD PRESSURE: 72 MMHG | WEIGHT: 119.8 LBS | OXYGEN SATURATION: 97 % | HEART RATE: 87 BPM

## 2022-10-17 DIAGNOSIS — Z13.89 SCREENING FOR MULTIPLE CONDITIONS: ICD-10-CM

## 2022-10-17 DIAGNOSIS — K22.711 BARRETT'S ESOPHAGUS WITH HIGH GRADE DYSPLASIA: ICD-10-CM

## 2022-10-17 DIAGNOSIS — Z59.9 FINANCIAL DIFFICULTIES: ICD-10-CM

## 2022-10-17 DIAGNOSIS — Z00.00 MEDICARE ANNUAL WELLNESS VISIT, SUBSEQUENT: ICD-10-CM

## 2022-10-17 DIAGNOSIS — Z53.20 SCREENING MAMMOGRAPHY DECLINED: ICD-10-CM

## 2022-10-17 DIAGNOSIS — J98.8 RESPIRATORY INFECTION: ICD-10-CM

## 2022-10-17 DIAGNOSIS — J98.8 RESPIRATORY INFECTION: Primary | ICD-10-CM

## 2022-10-17 DIAGNOSIS — R63.4 UNINTENTIONAL WEIGHT LOSS: ICD-10-CM

## 2022-10-17 DIAGNOSIS — B34.9 VIRAL INFECTION, UNSPECIFIED: ICD-10-CM

## 2022-10-17 LAB
ALBUMIN SERPL BCP-MCNC: 3.5 G/DL (ref 3.5–5)
ALP SERPL-CCNC: 68 U/L (ref 46–116)
ALT SERPL W P-5'-P-CCNC: 30 U/L (ref 12–78)
ANION GAP SERPL CALCULATED.3IONS-SCNC: 3 MMOL/L (ref 4–13)
AST SERPL W P-5'-P-CCNC: 19 U/L (ref 5–45)
BILIRUB SERPL-MCNC: 0.27 MG/DL (ref 0.2–1)
BUN SERPL-MCNC: 17 MG/DL (ref 5–25)
CALCIUM SERPL-MCNC: 9.3 MG/DL (ref 8.3–10.1)
CHLORIDE SERPL-SCNC: 105 MMOL/L (ref 96–108)
CO2 SERPL-SCNC: 31 MMOL/L (ref 21–32)
CREAT SERPL-MCNC: 0.71 MG/DL (ref 0.6–1.3)
ERYTHROCYTE [DISTWIDTH] IN BLOOD BY AUTOMATED COUNT: 13.9 % (ref 11.6–15.1)
GFR SERPL CREATININE-BSD FRML MDRD: 84 ML/MIN/1.73SQ M
GLUCOSE P FAST SERPL-MCNC: 90 MG/DL (ref 65–99)
HCT VFR BLD AUTO: 43.5 % (ref 34.8–46.1)
HGB BLD-MCNC: 13.3 G/DL (ref 11.5–15.4)
MCH RBC QN AUTO: 27.7 PG (ref 26.8–34.3)
MCHC RBC AUTO-ENTMCNC: 30.6 G/DL (ref 31.4–37.4)
MCV RBC AUTO: 91 FL (ref 82–98)
PLATELET # BLD AUTO: 237 THOUSANDS/UL (ref 149–390)
PMV BLD AUTO: 12.4 FL (ref 8.9–12.7)
POTASSIUM SERPL-SCNC: 3.6 MMOL/L (ref 3.5–5.3)
PROT SERPL-MCNC: 7 G/DL (ref 6.4–8.4)
RBC # BLD AUTO: 4.8 MILLION/UL (ref 3.81–5.12)
SODIUM SERPL-SCNC: 139 MMOL/L (ref 135–147)
TSH SERPL DL<=0.05 MIU/L-ACNC: 2.01 UIU/ML (ref 0.45–4.5)
WBC # BLD AUTO: 9.01 THOUSAND/UL (ref 4.31–10.16)

## 2022-10-17 PROCEDURE — U0003 INFECTIOUS AGENT DETECTION BY NUCLEIC ACID (DNA OR RNA); SEVERE ACUTE RESPIRATORY SYNDROME CORONAVIRUS 2 (SARS-COV-2) (CORONAVIRUS DISEASE [COVID-19]), AMPLIFIED PROBE TECHNIQUE, MAKING USE OF HIGH THROUGHPUT TECHNOLOGIES AS DESCRIBED BY CMS-2020-01-R: HCPCS | Performed by: PHYSICIAN ASSISTANT

## 2022-10-17 PROCEDURE — 80053 COMPREHEN METABOLIC PANEL: CPT

## 2022-10-17 PROCEDURE — 85027 COMPLETE CBC AUTOMATED: CPT

## 2022-10-17 PROCEDURE — 99214 OFFICE O/P EST MOD 30 MIN: CPT | Performed by: PHYSICIAN ASSISTANT

## 2022-10-17 PROCEDURE — G0439 PPPS, SUBSEQ VISIT: HCPCS | Performed by: PHYSICIAN ASSISTANT

## 2022-10-17 PROCEDURE — 36415 COLL VENOUS BLD VENIPUNCTURE: CPT

## 2022-10-17 PROCEDURE — U0005 INFEC AGEN DETEC AMPLI PROBE: HCPCS | Performed by: PHYSICIAN ASSISTANT

## 2022-10-17 PROCEDURE — 84443 ASSAY THYROID STIM HORMONE: CPT

## 2022-10-17 RX ORDER — AZITHROMYCIN 250 MG/1
TABLET, FILM COATED ORAL
Qty: 6 TABLET | Refills: 0 | Status: SHIPPED | OUTPATIENT
Start: 2022-10-17 | End: 2022-10-21

## 2022-10-17 SDOH — ECONOMIC STABILITY - INCOME SECURITY: PROBLEM RELATED TO HOUSING AND ECONOMIC CIRCUMSTANCES, UNSPECIFIED: Z59.9

## 2022-10-17 NOTE — PROGRESS NOTES
Assessment and Plan:     Problem List Items Addressed This Visit        Digestive    Eagle's esophagus with high grade dysplasia    Relevant Orders    Ambulatory referral to Gastroenterology      Other Visit Diagnoses     Respiratory infection    -  Primary    Given Zithromax  Relevant Medications    azithromycin (Zithromax) 250 mg tablet    Other Relevant Orders    CBC    Comprehensive metabolic panel    Unintentional weight loss        Check labs, referring back to GI likely for EGD and colonoscopy    Relevant Orders    Ambulatory referral to Gastroenterology    Screening for multiple conditions        Check labs    Relevant Orders    TSH, 3rd generation with Free T4 reflex    Financial difficulties        Referred to social work program     Relevant Orders    Ambulatory referral to social work care management program    Viral infection, unspecified        Covid swab done today  Relevant Orders    COVID Only - Office Collect    Medicare annual wellness visit, subsequent        Screening mammography declined        Pt states "I can't afford it "           Preventive health issues were discussed with patient, and age appropriate screening tests were ordered as noted in patient's After Visit Summary  Personalized health advice and appropriate referrals for health education or preventive services given if needed, as noted in patient's After Visit Summary  History of Present Illness:     Patient presents for a Medicare Wellness Visit    Federico Cobos is here today after developing acute cold symptoms x few days  While here, she admits to unintentional weight loss of > 30 lbs, has history of Eagle's esophagus with high grade dysplasia, has not been following with GI  She has a lot of emotional/family stressors currently with daughter, but states she is eating every day and should not be losing so much weight        Patient Care Team:  Eduarda Flores DO as PCP - General  MD Marcia Russdo Erma MD Buddy Razo DO Merrilee Landmark, MD Awilda Dress, MD Augustin Dutton, DO     Review of Systems:     Review of Systems   Constitutional: Positive for unexpected weight change  Negative for activity change, appetite change, chills, diaphoresis, fatigue and fever  HENT: Positive for sore throat  Negative for congestion, ear pain, postnasal drip, rhinorrhea, sinus pressure, sinus pain, sneezing, tinnitus and voice change  Eyes: Negative for pain, redness and visual disturbance  Respiratory: Positive for cough  Negative for chest tightness, shortness of breath and wheezing  Cardiovascular: Negative for chest pain, palpitations and leg swelling  Gastrointestinal: Negative for abdominal pain, blood in stool, constipation, diarrhea, nausea and vomiting  Genitourinary: Negative for difficulty urinating, dysuria, frequency, hematuria and urgency  Musculoskeletal: Negative for arthralgias, back pain, gait problem, joint swelling, myalgias, neck pain and neck stiffness  Skin: Negative for color change, pallor, rash and wound  Neurological: Negative for dizziness, tremors, weakness, light-headedness and headaches  Psychiatric/Behavioral: Positive for sleep disturbance  Negative for dysphoric mood, self-injury and suicidal ideas  The patient is not nervous/anxious           Problem List:     Patient Active Problem List   Diagnosis   • Osteoarthritis of knee   • Joint pain, knee   • Eagle's esophagus with high grade dysplasia   • 3-vessel CAD   • A-fib (HCC)   • Anemia   • Depression   • Dysphagia   • Gastroesophageal reflux disease   • Hyperlipidemia   • Hypothyroidism   • Lumbar radiculopathy   • Spondylosis of 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   • Stricture of esophagus   • Venous insufficiency   • Adrenal adenoma   • OR Exploratory laparotomy, SBR secondary to iatrogenic enterotomy    • Severe protein-calorie malnutrition (HCC)   • Leukemoid reaction   • Perihepatic abscess [facility])   • Ventral hernia without obstruction or gangrene   • Episode of recurrent major depressive disorder ([facility] )   • Thrombocytopenia, unspecified ([facility] [address] )      Past Medical and Surgical History:     Past Medical History:   Diagnosis Date   • Abnormal blood chemistry     last assessed 2014   • Achalasia, esophageal    • Acute medial meniscus tear     last assessed 03/10/2014   • Acute otitis externa     last assessed 2014   • Adrenal nodule ([facility] [address] )    • Anemia    • Anxiety    • Arthritis    • Atrial fibrillation ([facility] )     resolved 2018   • Atypical chest pain     last assessed 2016   • Eagle's esophagus with high grade dysplasia     last assessed 2018   • Cancer ([facility] [address] )     esophageal   • Cerumen impaction     last assessed 2014   • Chronic pain of right knee     last assessed 2017   • Coronary artery disease     3 vessel, resolved 2018   • Depression    • Dysphagia     last assessed 2017   • Esophageal stricture     last assessed 2017   • GERD (gastroesophageal reflux disease)    • Headache     last assessed 2013   • Hiatal hernia    • Insomnia     last assessed 10/15/2013   • Jejunostomy tube present ([facility] )     resolved 2018   • Low vitamin D level     resolved 2018   • Osteoarthritis, knee     last assessed 2017   • Pneumonia     last assessed 2013   • Right leg paresthesias     last assessed 2017   • Sciatica     last assessed 2015   • Shingles     last assessed 2015   • Spondylosis of 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     last assessed 2017   • Venous insufficiency     last assessed 2013     Past Surgical History:   Procedure Laterality Date   •  SECTION     • EXPLORATORY LAPAROTOMY W/ BOWEL RESECTION N/A 2019    Procedure: LAPAROTOMY EXPLORATORY W/ BOWEL RESECTION, APPLICATION OF Rosaline Lee;  Surgeon: Polina Castro DO;  [facility];  Service: General   • GASTRECTOMY      partial   • GASTROJEJUNOSTOMY W/ JEJUNOSTOMY TUBE N/A 8/24/2017    Procedure: JEJUNOSTOMY TUBE PLACEMENT;  Surgeon: Jayro Ford MD;  [facility];  Service: Thoracic   • IR IMAGE GUIDED ASPIRATION / DRAINAGE W TUBE  2/21/2020   • JOINT REPLACEMENT Right 11/10/2014    knee, Dr Cherelle Mcclelland   • LAPAROTOMY N/A 8/3/2019    Procedure: LAPAROTOMY EXPLORATORY, reanastomosis of proximal small bowel, placement of vac dressing;  Surgeon: Pricila Rose DO;  [facility];  Service: General   • NV ESOPHAGOGASTRODUODENOSCOPY TRANSORAL DIAGNOSTIC N/A 3/10/2017    Procedure: ESOPHAGOGASTRODUODENOSCOPY (EGD); Surgeon: Kesha Barger MD;  [facility];  Service: Gastroenterology   • NV ESOPHAGOGASTRODUODENOSCOPY TRANSORAL DIAGNOSTIC N/A 5/19/2017    Procedure: ESOPHAGOGASTRODUODENOSCOPY (EGD); Surgeon: Kesha Barger MD;  [facility];  Service: Gastroenterology   • NV ESOPHAGOSCOPY FLEXIBLE TRANSORAL WITH BIOPSY N/A 8/24/2017    Procedure: ESOPHAGOGASTRODUODENOSCOPY (EGD); Surgeon: Jayro Ford MD;  [facility];  Service: Thoracic   • NV LAP, VENTRAL HERNIA REPAIR,REDUCIBLE N/A 7/31/2019    Procedure: REPAIR HERNIA VENTRAL LAPAROSCOPIC;  Surgeon: Simone Cui DO;  [facility];  Service: General   • NV REMOVAL [address] THORACOTOMY N/A 8/24/2017    Procedure: TRANSHIATAL ESOPHAGECTOMY;  Surgeon: Jayro Ford MD;  [facility];  Service: Thoracic   • STEROID INJECTION KNEE Right 5/2/2017    Procedure: GENICULATE NERVE BLOCKS;  Surgeon: Valdemar Flood DO;  [facility];  Service:    • VENTRAL HERNIA REPAIR  07/30/2019      Family History:     Family History   Problem Relation Age of Onset   • Alzheimer's disease Mother    • Diabetes type II Father       Social History:     Social History     Socioeconomic History   • Marital status:       Spouse name: None   • Number of children: None   • Years of education: None   • Highest education level: None   Occupational History   • None   Tobacco Use   • Smoking status: Never Smoker   • Smokeless tobacco: Never Used   Vaping Use   • Vaping Use: Never used   Substance and Sexual Activity   • Alcohol use: Not Currently     Alcohol/week: 0 0 standard drinks   • Drug use: No   • Sexual activity: Not Currently     Partners: Male   Other Topics Concern   • None   Social History Narrative    Patient has living will     Social Determinants of Health     Financial Resource Strain: High Risk   • Difficulty of Paying Living Expenses: Hard   Food Insecurity: Not on file   Transportation Needs: No Transportation Needs   • Lack of Transportation (Medical): No   • Lack of Transportation (Non-Medical): No   Physical Activity: Not on file   Stress: Not on file   Social Connections: Not on file   Intimate Partner Violence: Not on file   Housing Stability: Not on file      Medications and Allergies:     Current Outpatient Medications   Medication Sig Dispense Refill   • azithromycin (Zithromax) 250 mg tablet Day 1 take 2 tablets, days 2-5 take 1 tablet  6 tablet 0   • mirtazapine (REMERON) 7 5 MG tablet Take 1 tablet (7 5 mg total) by mouth daily at bedtime (Patient not taking: Reported on 10/17/2022) 30 tablet 0   • pantoprazole (PROTONIX) 20 mg tablet Take 1 tablet (20 mg total) by mouth daily (Patient not taking: Reported on 10/17/2022) 30 tablet 5   • PARoxetine (PAXIL) 20 mg tablet Take 1 tablet (20 mg total) by mouth daily (Patient not taking: Reported on 10/17/2022) 30 tablet 0   • potassium chloride (K-DUR,KLOR-CON) 20 mEq tablet Take 1 tablet (20 mEq total) by mouth daily (Patient not taking: No sig reported) 30 tablet 0   • pravastatin (PRAVACHOL) 20 mg tablet Take 1 tablet (20 mg total) by mouth daily with dinner (Patient not taking: Reported on 10/17/2022) 30 tablet 5     No current facility-administered medications for this visit       No Known Allergies   Immunizations:     Immunization History Administered Date(s) Administered   • COVID-19 MODERNA VACC 0 5 ML IM 03/11/2021, 04/08/2021, 10/29/2021, 04/01/2022   • INFLUENZA 12/05/2000, 08/14/2014, 09/14/2015, 09/20/2016, 09/17/2018, 09/27/2019   • Influenza Split High Dose Preservative Free IM 08/14/2014, 09/14/2015, 09/14/2015, 09/20/2016, 09/21/2021   • Influenza Whole 12/05/2000   • Influenza, high dose seasonal 0 7 mL 09/17/2018   • Influenza, injectable, quadrivalent, preservative free 0 5 mL 09/26/2020   • Influenza, seasonal, injectable, preservative free 08/29/2013   • Pneumococcal Conjugate 13-Valent 07/22/2015   • Pneumococcal Polysaccharide PPV23 03/06/2014   • Tdap 06/06/2014   • Tuberculin Skin Test-PPD Intradermal 07/24/2002   • Zoster 06/09/2015      Health Maintenance:         Topic Date Due   • Colorectal Cancer Screening  Never done   • Breast Cancer Screening: Mammogram  08/12/2016   • Hepatitis C Screening  Completed         Topic Date Due   • Influenza Vaccine (1) 09/01/2022      Medicare Screening Tests and Risk Assessments:     [facility] is here for her Subsequent Wellness visit  Health Risk Assessment:   Patient rates overall health as fair  Patient feels that their physical health rating is slightly worse  Patient is satisfied with their life  Eyesight was rated as slightly worse  Hearing was rated as same  Patient feels that their emotional and mental health rating is slightly worse  Patients states they are sometimes angry  Patient states they are often unusually tired/fatigued  Pain experienced in the last 7 days has been none  Patient states that she has experienced weight loss or gain in last 6 months  Depression Screening:   PHQ-9 Score: 16      Fall Risk Screening: In the past year, patient has experienced: no history of falling in past year      Urinary Incontinence Screening:   Patient has not leaked urine accidently in the last six months       Home Safety:  Patient does not have trouble with stairs inside or outside of their home  Patient has working smoke alarms and has working carbon monoxide detector  Home safety hazards include: none  Nutrition:   Current diet is Regular  Medications:   Patient is not currently taking any over-the-counter supplements  Patient is able to manage medications  Activities of Daily Living (ADLs)/Instrumental Activities of Daily Living (IADLs):   Walk and transfer into and out of bed and chair?: Yes  Dress and groom yourself?: Yes    Bathe or shower yourself?: Yes    Feed yourself? Yes  Do your laundry/housekeeping?: Yes  Manage your money, pay your bills and track your expenses?: Yes  Make your own meals?: Yes    Do your own shopping?: Yes    Previous Hospitalizations:   Any hospitalizations or ED visits within the last 12 months?: Yes    How many hospitalizations have you had in the last year?: 3-4    Advance Care Planning:   Living will: Yes    Durable POA for healthcare:  Yes    Advanced directive: Yes      PREVENTIVE SCREENINGS      Cardiovascular Screening:    General: Screening Not Indicated and History Lipid Disorder      Diabetes Screening:     General: Risks and Benefits Discussed    Due for: Blood Glucose      Colorectal Cancer Screening:     General: Risks and Benefits Discussed    Due for: Colonoscopy - Low Risk      Breast Cancer Screening:     General: Risks and Benefits Discussed and Patient Declines    Due for: Mammogram        Cervical Cancer Screening:    General: Screening Not Indicated      Osteoporosis Screening:    General: Risks and Benefits Discussed and Patient Declines      Abdominal Aortic Aneurysm (AAA) Screening:        General: Screening Not Indicated      Lung Cancer Screening:     General: Screening Not Indicated      Hepatitis C Screening:    General: Screening Current    Screening, Brief Intervention, and Referral to Treatment (SBIRT)    Screening  Typical number of drinks in a day: 0  Typical number of drinks in a week: 0  Interpretation: Low risk drinking behavior  Single Item Drug Screening:  How often have you used an illegal drug (including marijuana) or a prescription medication for non-medical reasons in the past year? never    Single Item Drug Screen Score: 0  Interpretation: Negative screen for possible drug use disorder    No exam data present     Physical Exam:     /72   Pulse 87   Temp (!) 97 4 °F (36 3 °C)   Ht 5' 2" (1 575 m)   Wt 54 3 kg (119 lb 12 8 oz)   SpO2 97%   BMI 21 91 kg/m²     Physical Exam  Vitals and nursing note reviewed  Constitutional:       General: She is not in acute distress  Appearance: She is well-developed  HENT:      Head: Normocephalic and atraumatic  Nose: Congestion and rhinorrhea present  Eyes:      Conjunctiva/sclera: Conjunctivae normal    Cardiovascular:      Rate and Rhythm: Normal rate and regular rhythm  Heart sounds: No murmur heard  Pulmonary:      Effort: Pulmonary effort is normal  No respiratory distress  Breath sounds: Normal breath sounds  Abdominal:      Palpations: Abdomen is soft  Tenderness: There is no abdominal tenderness  Musculoskeletal:      Cervical back: Neck supple  Skin:     General: Skin is warm and dry  Neurological:      Mental Status: She is alert  Psychiatric:         Mood and Affect: Affect is tearful            Judy Agudelo PA-C

## 2022-10-17 NOTE — PATIENT INSTRUCTIONS
Medicare Preventive Visit Patient Instructions  Thank you for completing your Welcome to Medicare Visit or Medicare Annual Wellness Visit today  Your next wellness visit will be due in one year (10/18/2023)  The screening/preventive services that you may require over the next 5-10 years are detailed below  Some tests may not apply to you based off risk factors and/or age  Screening tests ordered at today's visit but not completed yet may show as past due  Also, please note that scanned in results may not display below  Preventive Screenings:  Service Recommendations Previous Testing/Comments   Colorectal Cancer Screening  * Colonoscopy    * Fecal Occult Blood Test (FOBT)/Fecal Immunochemical Test (FIT)  * Fecal DNA/Cologuard Test  * Flexible Sigmoidoscopy Age: 39-70 years old   Colonoscopy: every 10 years (may be performed more frequently if at higher risk)  OR  FOBT/FIT: every 1 year  OR  Cologuard: every 3 years  OR  Sigmoidoscopy: every 5 years  Screening may be recommended earlier than age 39 if at higher risk for colorectal cancer  Also, an individualized decision between you and your healthcare provider will decide whether screening between the ages of 74-80 would be appropriate  Colonoscopy: 04/13/2017  FOBT/FIT: Not on file  Cologuard: Not on file  Sigmoidoscopy: Not on file    Screening Current     Breast Cancer Screening Age: 36 years old  Frequency: every 1-2 years  Not required if history of left and right mastectomy Mammogram: 08/12/2015        Cervical Cancer Screening Between the ages of 21-29, pap smear recommended once every 3 years  Between the ages of 33-67, can perform pap smear with HPV co-testing every 5 years     Recommendations may differ for women with a history of total hysterectomy, cervical cancer, or abnormal pap smears in past  Pap Smear: Not on file    Screening Not Indicated   Hepatitis C Screening Once for adults born between 1945 and 1965  More frequently in patients at high risk for Hepatitis C Hep C Antibody: 11/22/2016    Screening Current   Diabetes Screening 1-2 times per year if you're at risk for diabetes or have pre-diabetes Fasting glucose: 85 mg/dL (10/14/2019)  A1C: No results in last 5 years (No results in last 5 years)      Cholesterol Screening Once every 5 years if you don't have a lipid disorder  May order more often based on risk factors  Lipid panel: Not on file    Screening Not Indicated  History Lipid Disorder     Other Preventive Screenings Covered by Medicare:  1  Abdominal Aortic Aneurysm (AAA) Screening: covered once if your at risk  You're considered to be at risk if you have a family history of AAA  2  Lung Cancer Screening: covers low dose CT scan once per year if you meet all of the following conditions: (1) Age 50-69; (2) No signs or symptoms of lung cancer; (3) Current smoker or have quit smoking within the last 15 years; (4) You have a tobacco smoking history of at least 20 pack years (packs per day multiplied by number of years you smoked); (5) You get a written order from a healthcare provider  3  Glaucoma Screening: covered annually if you're considered high risk: (1) You have diabetes OR (2) Family history of glaucoma OR (3)  aged 48 and older OR (3)  American aged 72 and older  3  Osteoporosis Screening: covered every 2 years if you meet one of the following conditions: (1) You're estrogen deficient and at risk for osteoporosis based off medical history and other findings; (2) Have a vertebral abnormality; (3) On glucocorticoid therapy for more than 3 months; (4) Have primary hyperparathyroidism; (5) On osteoporosis medications and need to assess response to drug therapy  · Last bone density test (DXA Scan): 08/19/2015  5  HIV Screening: covered annually if you're between the age of 12-76  Also covered annually if you are younger than 13 and older than 72 with risk factors for HIV infection   For pregnant patients, it is covered up to 3 times per pregnancy  Immunizations:  Immunization Recommendations   Influenza Vaccine Annual influenza vaccination during flu season is recommended for all persons aged >= 6 months who do not have contraindications   Pneumococcal Vaccine   * Pneumococcal conjugate vaccine = PCV13 (Prevnar 13), PCV15 (Vaxneuvance), PCV20 (Prevnar 20)  * Pneumococcal polysaccharide vaccine = PPSV23 (Pneumovax) Adults 25-60 years old: 1-3 doses may be recommended based on certain risk factors  Adults 72 years old: 1-2 doses may be recommended based off what pneumonia vaccine you previously received   Hepatitis B Vaccine 3 dose series if at intermediate or high risk (ex: diabetes, end stage renal disease, liver disease)   Tetanus (Td) Vaccine - COST NOT COVERED BY MEDICARE PART B Following completion of primary series, a booster dose should be given every 10 years to maintain immunity against tetanus  Td may also be given as tetanus wound prophylaxis  Tdap Vaccine - COST NOT COVERED BY MEDICARE PART B Recommended at least once for all adults  For pregnant patients, recommended with each pregnancy  Shingles Vaccine (Shingrix) - COST NOT COVERED BY MEDICARE PART B  2 shot series recommended in those aged 48 and above     Health Maintenance Due:      Topic Date Due   • Colorectal Cancer Screening  Never done   • Breast Cancer Screening: Mammogram  08/12/2016   • Hepatitis C Screening  Completed     Immunizations Due:      Topic Date Due   • Influenza Vaccine (1) 09/01/2022     Advance Directives   What are advance directives? Advance directives are legal documents that state your wishes and plans for medical care  These plans are made ahead of time in case you lose your ability to make decisions for yourself  Advance directives can apply to any medical decision, such as the treatments you want, and if you want to donate organs  What are the types of advance directives?   There are many types of advance directives, and each state has rules about how to use them  You may choose a combination of any of the following:  · Living will: This is a written record of the treatment you want  You can also choose which treatments you do not want, which to limit, and which to stop at a certain time  This includes surgery, medicine, IV fluid, and tube feedings  · Durable power of  for healthcare [facility]): This is a written record that states who you want to make healthcare choices for you when you are unable to make them for yourself  This person, called a proxy, is usually a family member or a friend  You may choose more than 1 proxy  · Do not resuscitate (DNR) order:  A DNR order is used in case your heart stops beating or you stop breathing  It is a request not to have certain forms of treatment, such as CPR  A DNR order may be included in other types of advance directives  · Medical directive: This covers the care that you want if you are in a coma, near death, or unable to make decisions for yourself  You can list the treatments you want for each condition  Treatment may include pain medicine, surgery, blood transfusions, dialysis, IV or tube feedings, and a ventilator (breathing machine)  · Values history: This document has questions about your views, beliefs, and how you feel and think about life  This information can help others choose the care that you would choose  Why are advance directives important? An advance directive helps you control your care  Although spoken wishes may be used, it is better to have your wishes written down  Spoken wishes can be misunderstood, or not followed  Treatments may be given even if you do not want them  An advance directive may make it easier for your family to make difficult choices about your care  © Copyright [employer] 2018 Information is for End User's use only and may not be sold, redistributed or otherwise used for commercial purposes   All illustrations and images included in CareNotes® are the copyrighted property of [employer]  or [employer]

## 2022-10-18 ENCOUNTER — TELEPHONE (OUTPATIENT)
Dept: ADMINISTRATIVE | Facility: OTHER | Age: 72
End: 2022-10-18

## 2022-10-18 LAB — SARS-COV-2 RNA RESP QL NAA+PROBE: NEGATIVE

## 2022-10-18 NOTE — TELEPHONE ENCOUNTER
Upon review of the In Basket request we were able to locate, review, and update the patient chart as requested for CRC: Kevin  Any additional questions or concerns should be emailed to the Practice Liaisons via the appropriate education email address, please do not reply via In Basket      Thank you  Chuck Hart

## 2022-10-18 NOTE — TELEPHONE ENCOUNTER
----- [facility] sent at 10/17/2022  1:13 PM EDT -----  Regarding: kelley  10/17/22 1:13 PM    Hello, our patient Gauri Engle has had CRC: Coljomar completed/performed  Please assist in updating the patient chart by pulling the document from the Media Tab  The date of service is 04/13/2017 It is listed under operative report in the media tab       Thank you,  Chio Almonte   [facility]

## 2022-10-19 ENCOUNTER — PATIENT OUTREACH (OUTPATIENT)
Dept: FAMILY MEDICINE CLINIC | Facility: CLINIC | Age: 72
End: 2022-10-19

## 2022-10-19 NOTE — PROGRESS NOTES
NERISSA MAJANO reviewed chart  Patient was referred by PCP d/t financial difficulties  NERISSA MAJANO outreached patient  Home phone rang and no one answered and there was no voicemail option  NERISSA MAJANO attempted to call patient on mobile phone and left a message

## 2022-10-28 ENCOUNTER — PATIENT OUTREACH (OUTPATIENT)
Dept: FAMILY MEDICINE CLINIC | Facility: CLINIC | Age: 72
End: 2022-10-28

## 2022-10-28 DIAGNOSIS — Z59.9 FINANCIAL DIFFICULTIES: Primary | ICD-10-CM

## 2022-10-28 SDOH — ECONOMIC STABILITY - INCOME SECURITY: PROBLEM RELATED TO HOUSING AND ECONOMIC CIRCUMSTANCES, UNSPECIFIED: Z59.9

## 2022-10-28 NOTE — PROGRESS NOTES
Chart Review/ Outgoing call  10/28/2022    CM  received a referral from Valentine Dia  Referral states that patient requires assistance with an application for [employer]urance [employer]  [facility] contacted patient to introduce self, describe role, and discuss assistance with an application for Assurance wireless  Patient was agreeable to assistance  [facility] assisted with completing and submitting an application for Assurance wireless (application ID# [id number])  The patient was approved for a cell phone  The patient should receive a welcome letter and phone within the next 7-10 business days  Patient thanked [facility] for the assistance  [facility] will follow up with patient in 2 weeks to ensure she received the cell phone  Next outreach is scheduled for 11/11/2022

## 2022-10-28 NOTE — PROGRESS NOTES
NERISSA MAJANO outreached patient to introduce self and discuss any needs  Spoke with patient and she shared that she is dealing with quite a bit of family stressors  Shared that she raised her grandchildren, but now her daughter hasn't been speaking to her so she has not been seeing her grandchildren  She shared that she just started in home meals and SNAP  Stated she met with aging yesterday and meals should start in two weeks  Stated she is on the Extra Help program  She pays for car insurance on her car, but her daughter has the car  She also pays a monthly payment for windows that she put in at her daughter's house  Patient stated she will not take her car from her daughter because she wants to ensure that she has transportation for her grandchildren  Patient reported that her income is about $1,300/month but her bills are more than she earns  Shared that she planned on living with her daughter and that is why this was not a concern when she decided to put windows in  She has a cell phone that she pays $25/month for  Her only bills in her apartment are $29/month  Patient has her own vehicle and drives herself places  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

## 2022-11-03 NOTE — PROGRESS NOTES
Progress Note - [address] [place] 78 y o  female MRN: [id number]  Unit/Bed#: ICU 02 Encounter: [id number]    Assessment:   Principal Problem:    OR Exploratory laparotomy, SBR secondary to iatrogenic enterotomy   Active Problems:    Anemia    Incisional hernia    Postprocedural pneumothorax    OR Laparoscopic ventral hernia repair    Fungemia        Plan: Critical Care Management as outlined below:     Neuro:  Opens eyes to voice  No focal neuro deficits  Currently denies pain  Has required a total of 20 mg oxycodone p o  and 0 9 mg Dilaudid IV in 24 hours  Continue frequent neurologic exams, daily CAM ICU, delirium precautions  Maintain adequate sleep-wake cycle  CV:  Hemodynamically normal   Septic shock resolved  Has remained off pressors  No acute issues  Continue to monitor  Lung:  Left pneumothorax resolved  Chest tube removed 2 days ago  Chest x-ray this morning shows no residual pneumothorax  Left chest subcutaneous emphysema appears relatively unchanged  Continue aggressive pulmonary toilet, encourage coughing and deep breathing  Continue incentive spirometry  Continue chest PT, Mucinex  GI:  Status post small bowel resection, postop day 9  Had abdominal discomfort yesterday with increased tube feeds, now decreased to 40 mL/hr  Tolerating this well  Abdomen soft and minimally tender  Incision with clarisa in place  No erythema or discharge from the area to suggest infection  Continue to increase tube feeds as tolerated to goal 60  Has had 4 episodes of loose stools in 24 hours  FEN:  -270 mL in 24 hours  Positive 17 3 L since admission  Weight 86 6 kg, up from 74 9 kg on admission  Mild hypokalemia and hypomagnesemia, will replete  Continue to monitor and replete as needed  :  Good urine output  BUN and creatinine normal      ID:  Afebrile  Persistent leukocytosis at 33,000  Remains on broad-spectrum antibiotics and micafungin for fungemia  Repeat blood cultures negative at 72 hours  Antibiotics and antifungals to be finished today  ID continues to follow  Procalcitonin 0 85 this morning  Heme:  Acute blood loss anemia with stable hemoglobin  Mild thrombocytosis  Continue to monitor and transfuse for hemoglobin less than 7 0  Continue subcutaneous heparin and SCDs for DVT prophylaxis  Endo:  Blood glucose normal to mildly elevated  Continue to monitor closely  Msk/Skin:  No skin breakdown  Continue frequent repositioning and offloading  Disposition:  Consider transition to step-down level of care     ______________________________________________________________________    Chief Complaint:  None given      HPI/24hr events:  No significant 24 hour events  Had to decrease tube feeds overnight for mild abdominal discomfort however tolerating 40 mL/hr     ______________________________________________________________________    Physical Exam:   Physical Exam   Constitutional: She is oriented to person, place, and time  Vital signs are normal  She is cooperative  Non-toxic appearance  She has a sickly appearance  She appears ill  No distress  Nasal cannula in place  Cardiovascular: Normal rate, regular rhythm, intact distal pulses and normal pulses  Pulmonary/Chest: Effort normal  No stridor  She has no decreased breath sounds  She has no wheezes  She has no rhonchi  She has no rales  Abdominal: Soft  She exhibits no distension  There is generalized tenderness  There is no rigidity and no guarding  Neurological: She is alert and oriented to person, place, and time  She has normal strength  GCS eye subscore is 4  GCS verbal subscore is 5  GCS motor subscore is 6     Skin: Skin is warm, dry and intact              ______________________________________________________________________  Vitals:    08/11/19 0200 08/11/19 0300 08/11/19 0400 08/11/19 0500   BP: 146/76 146/73 147/81 151/73   BP Location: Right arm Right arm Right arm Right arm   Pulse: 96 94 92 96   Resp:     Temp:       TempSrc:       SpO2: 96% 98% 98% 96%   Weight:       Height:         Arterial Line BP: 156/82  Arterial Line MAP (mmHg): 110 mmHg     Temperature:   Temp (24hrs), Av 4 °F (36 9 °C), Min:97 8 °F (36 6 °C), Max:98 8 °F (37 1 °C)    Current Temperature: 98 6 °F (37 °C)  Weights:   IBW: 54 7 kg    Body mass index is 32 77 kg/m²  Weight (last 2 days)     Date/Time   Weight    19 0600   86 6 (190 92)            Hemodynamic Monitoring:  N/A     Non-Invasive/Invasive Ventilation Settings:  Respiratory    Lab Data (Last 4 hours)    None         O2/Vent Data (Last 4 hours)    None              No results found for: PHART, OOQ1OHA, PO2ART, CFZ5JOR, R7QNUOHP, BEART, SOURCE  SpO2: SpO2: 96 %, SpO2 Activity: SpO2 Activity: At Rest, SpO2 Device: O2 Device: Nasal cannula  Intake and Outputs:  I/O        07 - 08/10 0700 08/10 07 -  0700    P  O   0    I V  (mL/kg) 100 (1 2) 140 (1 6)    NG/ 440    IV Piggyback 600 600    Feedings 118 397    Total Intake(mL/kg) 1098 (12 7) 1577 (18 2)    Urine (mL/kg/hr) 1975 (1) 1850 (0 9)    Emesis/NG output 0     Stool 0 0    Chest Tube 150     Total Output 2125 1850    Net -1027 -405          Unmeasured Urine Occurrence 1 x 3 x    Unmeasured Stool Occurrence 6 x 4 x    Unmeasured Emesis Occurrence 1 x         UOP:  0 9 mL/Kg/hour   Nutrition:        Diet Orders   (From admission, onward)             Start     Ordered    08/10/19 0947  Diet Enteral/Parenteral; Tube Feeding No Oral Diet; Jevity 1 2 Donovan; Continuous; 60; Prosource Protein Liquid - One Packet; Banatrol Plus Banana Flakes - Two Packets  Diet effective now     Comments:  Increase by 10cc q 4 hours to goal of 60   Question Answer Comment   Diet Type Enteral/Parenteral    Enteral/Parenteral Tube Feeding No Oral Diet    Tube Feeding Formula: Jevity 1 2 Donovan    Bolus/Cyclic/Continuous Continuous    Tube Feeding Goal Rate (mL/hr): 60 Prosource Protein Liquid - No Carb Prosource Protein Liquid - One Packet    Banatrol Plus Banana Flakes Banatrol Plus Banana Flakes - Two Packets    RD to adjust diet per protocol? Yes        08/10/19 0946                Labs:   Results from last 7 days   Lab Units 08/11/19  0453 08/10/19  0439 08/09/19  0451 08/08/19  0526  08/07/19  0453   WBC Thousand/uL 33 46* 34 54* 34 14* 40 90*   < > 23 06*   HEMOGLOBIN g/dL 8 9* 8 6* 8 0* 8 8*   < > 9 4*   HEMATOCRIT % 28 1* 26 6* 25 0* 27 0*   < > 28 7*   PLATELETS Thousands/uL 504* 372 257 305   < > 231   NEUTROS PCT % 82*  --   --  74  --  72   MONOS PCT % 5  --   --  2*  --  3*   MONO PCT %  --  1* 1*  --    < >  --     < > = values in this interval not displayed  Results from last 7 days   Lab Units 08/11/19  0453 08/10/19  0439 08/09/19  0451 08/08/19  0526 08/07/19  1826   POTASSIUM mmol/L 3 5 3 5 3 8 3 8  --    CHLORIDE mmol/L 100 100 107 103  --    CO2 mmol/L 33* 30 28 27  --    BUN mg/dL 9 8 9 8  --    CREATININE mg/dL 0 32* 0 25* 0 22* 0 35*  --    CALCIUM mg/dL 7 6* 7 5* 7 5* 7 2*  --    ALK PHOS U/L  --   --   --  116 106   ALT U/L  --   --   --  11* 15   AST U/L  --   --   --  22 25     Results from last 7 days   Lab Units 08/11/19  0453 08/10/19  0439 08/09/19  0451   MAGNESIUM mg/dL 1 9 1 9 2 0     Results from last 7 days   Lab Units 08/09/19 0451 08/08/19  0526   PHOSPHORUS mg/dL 3 0 1 5*      Results from last 7 days   Lab Units 08/07/19 2028   INR  1 40*   PTT seconds 27     Results from last 7 days   Lab Units 08/08/19  0526   LACTIC ACID mmol/L 1 2     0   Lab Value Date/Time    TROPONINI <0 02 08/02/2019 1126    TROPONINI <0 02 08/02/2019 0406    TROPONINI <0 02 11/22/2016 1110     Imaging:  Portable chest x-ray this morning relatively unchanged from yesterday  Await official read  I have personally reviewed pertinent reports     and I have personally reviewed pertinent films in PACS    Micro:  Lab Results   Component Value Date    BLOODCX No Growth at 72 hrs  08/07/2019    BLOODCX No Growth at 72 hrs  08/07/2019    BLOODCX No Growth After 5 Days   08/02/2019     Allergies: No Known Allergies  Medications:   Scheduled Meds:  Current Facility-Administered Medications:  acetaminophen 650 mg Oral Q6H PRN Romayne Pounds, IMELDA    bisacodyl 10 mg Rectal Daily PRN Zhane BARBIE Ford, IMELDA    cefepime 2,000 mg Intravenous Q12H Shanti Alexandre MD Last Rate: Stopped (08/11/19 0600)   docusate sodium 100 mg Oral BID Blake Lindy, PA-BAR    escitalopram 10 mg Per G Tube Daily Romayne Pounds, PA-BAR    folic acid 1 mg Per G Tube Daily Romayne Pounds, PA-BAR    gabapentin 100 mg Oral HS Romayne Pounds, PA-BAR    guaiFENesin 600 mg Oral Q12H [facility] Cash Hicks PA-C    heparin (porcine) 5,000 Units Subcutaneous Q8H [facility] Abel Luis PA-C    HYDROmorphone 0 2 mg Intravenous Q4H PRN Zhane BARBIE Ford, PAGAUDENCIO    iohexol 50 mL Oral 90 min pre-procedure Darline Potts, IMELDA    lidocaine 1 patch Topical Daily Rosey Gram, IMELDA    melatonin 3 mg Per G Tube HS Romayne Pounds, IMELDA    methocarbamol 500 mg Oral Q6H PRN Gretta Rang, DO    metroNIDAZOLE 500 mg Oral Q8H [facility] Daria Clinton PA-C    micafungin 100 mg Intravenous Q24H Gretta Rang, DO Last Rate: 100 mg (08/10/19 1543)   omeprazole (PRILOSEC) suspension 2 mg/mL 20 mg Oral BID AC Cash Hicks PA-C    ondansetron 4 mg Intravenous Q4H PRN Romayne Pounds, IMELDA    oxyCODONE 2 5 mg Oral Q4H PRN Darline Potts, IMELDA    Or        oxyCODONE 5 mg Oral Q4H PRN Darline Outlaw, PA-BAR    pravastatin 20 mg Per G Tube Daily With [facility], IMELDA    saccharomyces boulardii 250 mg Oral BID Rosey Gram, PA-C    vancomycin 1,500 mg Intravenous Q12H Shanti Alexandre MD Last Rate: Stopped (08/11/19 0600)     Continuous Infusions:   PRN Meds:    acetaminophen 650 mg Q6H PRN   bisacodyl 10 mg Daily PRN   HYDROmorphone 0 2 mg Q4H PRN   methocarbamol 500 mg Q6H PRN   ondansetron 4 mg Q4H PRN   oxyCODONE 2 5 mg Q4H PRN   Or     oxyCODONE 5 mg Q4H PRN     VTE Pharmacologic Prophylaxis: Heparin  VTE Mechanical Prophylaxis: sequential compression device  Invasive lines and devices: Invasive Devices     Central Venous Catheter Line            CVC Central Lines 08/07/19 Triple 20cm 3 days          Drain            Gastrostomy/Enterostomy Jejunostomy 14 Fr   days    NG/OG/Enteral Tube Nasogastric Right nares 3 days    External Urinary Catheter 2 days    Rectal Tube less than 1 day                   Counseling / Coordination of Care  Total Critical Care time spent 37 minutes excluding procedures, teaching and family updates  Code Status: Level 1 - Full Code    Portions of the record may have been created with voice recognition software  Occasional wrong word or "sound a like" substitutions may have occurred due to the inherent limitations of voice recognition software  Read the chart carefully and recognize, using context, where substitutions have occurred      Eloisa Saeed PA-C Patient's belongings returned

## 2022-11-07 ENCOUNTER — OFFICE VISIT (OUTPATIENT)
Dept: FAMILY MEDICINE CLINIC | Facility: CLINIC | Age: 72
End: 2022-11-07

## 2022-11-07 VITALS
BODY MASS INDEX: 21.5 KG/M2 | HEART RATE: 94 BPM | SYSTOLIC BLOOD PRESSURE: 132 MMHG | HEIGHT: 62 IN | TEMPERATURE: 97.3 F | WEIGHT: 116.84 LBS | DIASTOLIC BLOOD PRESSURE: 84 MMHG | OXYGEN SATURATION: 97 %

## 2022-11-07 DIAGNOSIS — J01.90 ACUTE NON-RECURRENT SINUSITIS, UNSPECIFIED LOCATION: Primary | ICD-10-CM

## 2022-11-07 DIAGNOSIS — R63.4 UNINTENTIONAL WEIGHT LOSS: ICD-10-CM

## 2022-11-07 DIAGNOSIS — F32.A ANXIETY AND DEPRESSION: ICD-10-CM

## 2022-11-07 DIAGNOSIS — F41.9 ANXIETY AND DEPRESSION: ICD-10-CM

## 2022-11-07 PROBLEM — K65.0 PERIHEPATIC ABSCESS (HCC): Status: RESOLVED | Noted: 2020-02-21 | Resolved: 2022-11-07

## 2022-11-07 PROBLEM — E43 SEVERE PROTEIN-CALORIE MALNUTRITION (HCC): Status: RESOLVED | Noted: 2019-08-27 | Resolved: 2022-11-07

## 2022-11-07 PROBLEM — F33.9 EPISODE OF RECURRENT MAJOR DEPRESSIVE DISORDER (HCC): Status: RESOLVED | Noted: 2020-04-15 | Resolved: 2022-11-07

## 2022-11-07 PROBLEM — I48.91 A-FIB (HCC): Status: RESOLVED | Noted: 2017-04-05 | Resolved: 2022-11-07

## 2022-11-07 PROBLEM — Z85.01 HISTORY OF ESOPHAGEAL CANCER: Status: ACTIVE | Noted: 2022-11-07

## 2022-11-07 PROBLEM — D69.6 THROMBOCYTOPENIA, UNSPECIFIED (HCC): Status: RESOLVED | Noted: 2020-04-15 | Resolved: 2022-11-07

## 2022-11-07 RX ORDER — AMOXICILLIN 500 MG/1
500 CAPSULE ORAL EVERY 8 HOURS SCHEDULED
Qty: 30 CAPSULE | Refills: 0 | Status: SHIPPED | OUTPATIENT
Start: 2022-11-07 | End: 2022-11-17

## 2022-11-07 RX ORDER — PAROXETINE 10 MG/1
10 TABLET, FILM COATED ORAL DAILY
Qty: 30 TABLET | Refills: 0 | Status: SHIPPED | OUTPATIENT
Start: 2022-11-07

## 2022-11-07 NOTE — PROGRESS NOTES
Assessment/Plan:    Problem List Items Addressed This Visit        Other    Anxiety and depression    Relevant Medications    PARoxetine (PAXIL) 10 mg tablet      Other Visit Diagnoses     Acute non-recurrent sinusitis, unspecified location    -  Primary    Given Amoxicillin, pt to take until completed  Relevant Medications    amoxicillin (AMOXIL) 500 mg capsule    Unintentional weight loss        Pt seeing GI next week for further evaluation  Diagnoses and all orders for this visit:    Acute non-recurrent sinusitis, unspecified location  Comments:  Given Amoxicillin, pt to take until completed  Orders:  -     amoxicillin (AMOXIL) 500 mg capsule; Take 1 capsule (500 mg total) by mouth every 8 (eight) hours for 10 days    Anxiety and depression  Comments:  Initiate Paxil 10 mg daily, RTO 1 month  Orders:  -     PARoxetine (PAXIL) 10 mg tablet; Take 1 tablet (10 mg total) by mouth daily    Unintentional weight loss  Comments:  Pt seeing GI next week for further evaluation  Subjective:      Patient ID: Jr Gill is a 67 y o  female  Stroud Gallop is here today complaining of sinus symptoms again but also very anxious, nervous, and depressed  She is tearful, continues to have family stressors with daughter  Pt with financial difficulty of living paycheck to [facility]  Pt was referred to social work since last OV, is going to start Meals on Wheels next week  Pt states she is eating, yet continues to lose weight  She has GI appointment next week to evaluate for cause of unintentional weight loss, she does have a history of esophageal CA         The following portions of the patient's history were reviewed and updated as appropriate:   She has a past medical history of Abnormal blood chemistry, Achalasia, esophageal, Acute medial meniscus tear, Acute otitis externa, Adrenal nodule ([address] ), Anemia, Anxiety, Arthritis, Atrial fibrillation ([address] ), Atypical chest pain, Eagle's esophagus with high grade dysplasia, Cancer ([facility] [address] ), Cerumen impaction, Chronic pain of right knee, Coronary artery disease, Depression, Dysphagia, Esophageal stricture, GERD (gastroesophageal reflux disease), Headache, Hiatal hernia, Insomnia, Jejunostomy tube present (HCC), Low vitamin D level, Osteoarthritis, knee, Pneumonia, Right leg paresthesias, Sciatica, Shingles, Spondylosis of 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, and Venous insufficiency  ,  does not have any pertinent problems on file  ,   has a past surgical history that includes  section; pr esophagogastroduodenoscopy transoral diagnostic (N/A, 3/10/2017); Steroid injection knee (Right, 2017); Joint replacement (Right, 11/10/2014); pr esophagogastroduodenoscopy transoral diagnostic (N/A, 2017); pr esophagoscopy flexible transoral with biopsy (N/A, 2017); pr removal esophagus,no thoracotomy (N/A, 2017); Gastrojejunostomy w/ jejunostomy tube (N/A, 2017); Gastrectomy; pr lap, ventral hernia repair,reducible (N/A, 2019); LAPAROTOMY (N/A, 8/3/2019); Exploratory laparotomy w/ bowel resection (N/A, 2019); Ventral hernia repair (2019); and IR image guided aspiration / drainage w tube (2020)  ,  family history includes Alzheimer's disease in her mother; Diabetes type II in her father  ,   reports that she has never smoked  She has never used smokeless tobacco  She reports previous alcohol use  She reports that she does not use drugs  ,  has No Known Allergies     Current Outpatient Medications   Medication Sig Dispense Refill   • amoxicillin (AMOXIL) 500 mg capsule Take 1 capsule (500 mg total) by mouth every 8 (eight) hours for 10 days 30 capsule 0   • PARoxetine (PAXIL) 10 mg tablet Take 1 tablet (10 mg total) by mouth daily 30 tablet 0     No current facility-administered medications for this visit  Review of Systems   Constitutional: Positive for unexpected weight change   Negative for activity change, appetite change, chills, diaphoresis, fatigue and fever  HENT: Positive for congestion, postnasal drip, rhinorrhea, sinus pressure, sinus pain and sore throat  Negative for ear pain, sneezing, tinnitus and voice change  Eyes: Negative for pain, redness and visual disturbance  Respiratory: Negative for cough, chest tightness, shortness of breath and wheezing  Cardiovascular: Negative for chest pain, palpitations and leg swelling  Gastrointestinal: Negative for abdominal pain, blood in stool, constipation, diarrhea, nausea and vomiting  Genitourinary: Negative for difficulty urinating, dysuria, frequency, hematuria and urgency  Musculoskeletal: Negative for arthralgias, back pain, gait problem, joint swelling, myalgias, neck pain and neck stiffness  Skin: Negative for color change, pallor, rash and wound  Neurological: Negative for dizziness, tremors, weakness, light-headedness and headaches  Psychiatric/Behavioral: Positive for dysphoric mood and sleep disturbance  Negative for self-injury and suicidal ideas  The patient is nervous/anxious  Objective:  Vitals:    11/07/22 1525   BP: 132/84   Pulse: 94   Temp: (!) 97 3 °F (36 3 °C)   SpO2: 97%   Weight: 53 kg (116 lb 13 4 oz)   Height: 5' 2" (1 575 m)     Body mass index is 21 37 kg/m²  Physical Exam  Vitals reviewed  Constitutional:       General: She is not in acute distress  Appearance: She is well-developed  She is not diaphoretic  HENT:      Head: Normocephalic and atraumatic  Right Ear: Hearing, tympanic membrane, ear canal and external ear normal       Left Ear: Hearing, tympanic membrane, ear canal and external ear normal       Mouth/Throat:      Pharynx: Uvula midline  No oropharyngeal exudate  Eyes:      General: No scleral icterus  Right eye: No discharge  Left eye: No discharge  Conjunctiva/sclera: Conjunctivae normal    Neck:      Thyroid: No thyromegaly  Vascular: No carotid bruit  Cardiovascular:      Rate and Rhythm: Normal rate and regular rhythm  Heart sounds: Normal heart sounds  No murmur heard  Pulmonary:      Effort: Pulmonary effort is normal  No respiratory distress  Breath sounds: Normal breath sounds  No wheezing  Abdominal:      General: Bowel sounds are normal  There is no distension  Palpations: Abdomen is soft  There is no mass  Tenderness: There is no abdominal tenderness  There is no guarding or rebound  Musculoskeletal:         General: No tenderness  Normal range of motion  Cervical back: Neck supple  Lymphadenopathy:      Cervical: No cervical adenopathy  Skin:     General: Skin is warm and dry  Findings: No erythema or rash  Neurological:      Mental Status: She is alert and oriented to person, place, and time  Psychiatric:         Mood and Affect: Mood is anxious and depressed  Affect is tearful  Speech: Speech is rapid and pressured  Behavior: Behavior normal          Thought Content:  Thought content normal          Judgment: Judgment normal

## 2022-11-11 ENCOUNTER — PATIENT OUTREACH (OUTPATIENT)
Dept: FAMILY MEDICINE CLINIC | Facility: CLINIC | Age: 72
End: 2022-11-11

## 2022-11-11 NOTE — PROGRESS NOTES
Chart Review/ Outgoing Call  11/11/2022    CM  completed chart review  Patient scheduled for outreach to ensure she received the cell phone from [employer]  Jj Tiny Eldridge contacted patient  Jj Eldridge received a message from [address] [place] [address] (call could not be completed as dialed) when calling patients cell phone  [address] unable to leave a message  ZeniaMarlo Tiny Eldridge attempted to contact the patient on the home phone  Phone rang and there was no answer  Jj [address] unable to leave message  Jj [address] contacted [employer] to check the status of the cell phone  Status of the cell phone states the phone was activated on 10/31/2022  Jj Franks Rasheed Eldridge will follow up with patient in 2 weeks to ensure the cell phone was received  Next outreach is scheduled for 11/28/2022

## 2022-11-14 ENCOUNTER — CONSULT (OUTPATIENT)
Dept: GASTROENTEROLOGY | Facility: CLINIC | Age: 72
End: 2022-11-14

## 2022-11-14 VITALS
BODY MASS INDEX: 21.68 KG/M2 | DIASTOLIC BLOOD PRESSURE: 84 MMHG | SYSTOLIC BLOOD PRESSURE: 129 MMHG | TEMPERATURE: 94.7 F | HEIGHT: 62 IN | HEART RATE: 101 BPM | OXYGEN SATURATION: 98 % | WEIGHT: 117.8 LBS

## 2022-11-14 DIAGNOSIS — K22.711 BARRETT'S ESOPHAGUS WITH HIGH GRADE DYSPLASIA: ICD-10-CM

## 2022-11-14 DIAGNOSIS — R63.4 UNINTENTIONAL WEIGHT LOSS: ICD-10-CM

## 2022-11-14 NOTE — PROGRESS NOTES
[facility] - Outpatient Consultation  Ashutosh Chung 67 y o  female MRN: [id number]  Encounter: [id number]          ASSESSMENT AND PLAN:      1  Unintentional weight loss  Comments:  Check labs, referring back to GI likely for EGD and colonoscopy  Orders:  -     Ambulatory referral to Gastroenterology    2  Eagle's esophagus with high grade dysplasia  Comments:  Referral to GI  Orders:  -     Ambulatory referral to Gastroenterology     This is a 79-year-old white female with significant weight loss over the last six months  She also has a history of Barretts esophagitis and a small bowel resection  I will schedule her for both an endoscopy and colonoscopy to further evaluate the cause of the weight loss  Risks of perforation bleeding were discussed with the patient she is agreeable to proceed with the procedure  Thank you so much for referring this patient  ______________________________________________________________________    HPI:  Antonieta Smith is a 79-year-old white female with a 30 lb weight loss over the last six months  She complains of some abdominal distention after eating but denies any real abdominal pain  She denies any rectal bleeding, constipation or diarrhea  She has a history of Barretts esophagitis with partial esophagectomy  She also had a ventral hernia which was repaired but also had a small-bowel resection  She has never had a colonoscopy  There is no family history of colon cancer  REVIEW OF SYSTEMS:    CONSTITUTIONAL: Denies any fever, chills, rigors, and weight loss  HEENT: No earache or tinnitus  Denies hearing loss or visual disturbances  CARDIOVASCULAR: No chest pain or palpitations  RESPIRATORY: Denies any cough, hemoptysis, shortness of breath or dyspnea on exertion  GASTROINTESTINAL: As noted in the History of Present Illness  GENITOURINARY: No problems with urination  Denies any hematuria or dysuria    NEUROLOGIC: No dizziness or vertigo, denies headaches  MUSCULOSKELETAL: Denies any muscle or joint pain  SKIN: Denies skin rashes or itching  ENDOCRINE: Denies excessive thirst  Denies intolerance to heat or cold  PSYCHOSOCIAL: Denies depression or anxiety  Denies any recent memory loss         Historical Information   Past Medical History:   Diagnosis Date   • Abnormal blood chemistry     last assessed 2014   • Achalasia, esophageal    • Acute medial meniscus tear     last assessed 03/10/2014   • Acute otitis externa     last assessed 2014   • Adrenal nodule ([facility] [address] )    • Anemia    • Anxiety    • Arthritis    • Atrial fibrillation ([facility] [address] )     resolved 2018   • Atypical chest pain     last assessed 2016   • Eagle's esophagus with high grade dysplasia     last assessed 2018   • Cancer [facility]     esophageal   • Cerumen impaction     last assessed 2014   • Chronic pain of right knee     last assessed 2017   • Coronary artery disease     3 vessel, resolved 2018   • Depression    • Dysphagia     last assessed 2017   • Esophageal stricture     last assessed 2017   • GERD (gastroesophageal reflux disease)    • Headache     last assessed 2013   • Hiatal hernia    • Insomnia     last assessed 10/15/2013   • Jejunostomy tube present ([facility] [address] )     resolved 2018   • Low vitamin D level     resolved 2018   • Osteoarthritis, knee     last assessed 2017   • Pneumonia     last assessed 2013   • Right leg paresthesias     last assessed 2017   • Sciatica     last assessed 2015   • Shingles     last assessed 2015   • Spondylosis of 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     last assessed 2017   • Venous insufficiency     last assessed 2013     Past Surgical History:   Procedure Laterality Date   •  SECTION     • EXPLORATORY LAPAROTOMY W/ BOWEL RESECTION N/A 2019    Procedure: LAPAROTOMY EXPLORATORY W/ BOWEL RESECTION, APPLICATION OF Mila Watkins;  Surgeon: Luis E Daley DO;  [facility];  Service: General   • GASTRECTOMY      partial   • GASTROJEJUNOSTOMY W/ JEJUNOSTOMY TUBE N/A 8/24/2017    Procedure: Jamar Lopez;  Surgeon: Choco Malave MD;  [facility];  Service: Thoracic   • IR IMAGE GUIDED ASPIRATION / DRAINAGE W TUBE  2/21/2020   • JOINT REPLACEMENT Right 11/10/2014    knee, Dr Amena Ho   • LAPAROTOMY N/A 8/3/2019    Procedure: LAPAROTOMY EXPLORATORY, reanastomosis of proximal small bowel, placement of vac dressing;  Surgeon: Luis E Daley DO;  [facility];  Service: General   • WY ESOPHAGOGASTRODUODENOSCOPY TRANSORAL DIAGNOSTIC N/A 3/10/2017    Procedure: ESOPHAGOGASTRODUODENOSCOPY (EGD); Surgeon: Katelyn Dawn MD;  [facility];  Service: Gastroenterology   • WY ESOPHAGOGASTRODUODENOSCOPY TRANSORAL DIAGNOSTIC N/A 5/19/2017    Procedure: ESOPHAGOGASTRODUODENOSCOPY (EGD); Surgeon: Katelyn Dawn MD;  [facility];  Service: Gastroenterology   • WY ESOPHAGOSCOPY FLEXIBLE TRANSORAL WITH BIOPSY N/A 8/24/2017    Procedure: ESOPHAGOGASTRODUODENOSCOPY (EGD);   Surgeon: Choco Malave MD;  [facility];  Service: Thoracic   • WY LAP, VENTRAL HERNIA REPAIR,REDUCIBLE N/A 7/31/2019    Procedure: REPAIR HERNIA VENTRAL LAPAROSCOPIC;  Surgeon: Tramaine Kilpatrick DO;  [facility];  Service: General   • WY REMOVAL [address] THORACOTOMY N/A 8/24/2017    Procedure: TRANSHIATAL ESOPHAGECTOMY;  Surgeon: Choco Malave MD;  [facility];  Service: Thoracic   • STEROID INJECTION KNEE Right 5/2/2017    Procedure: GENICULATE NERVE BLOCKS;  Surgeon: Rocío Javed DO;  [facility];  Service:    • VENTRAL HERNIA REPAIR  07/30/2019     Social History   Social History     Substance and Sexual Activity   Alcohol Use Not Currently   • Alcohol/week: 0 0 standard drinks     Social History     Substance and Sexual Activity   Drug Use No     Social History Tobacco Use   Smoking Status Never Smoker   Smokeless Tobacco Never Used     Family History   Problem Relation Age of Onset   • Alzheimer's disease Mother    • Diabetes type II Father        Meds/Allergies       Current Outpatient Medications:   •  amoxicillin (AMOXIL) 500 mg capsule  •  PARoxetine (PAXIL) 10 mg tablet    No Known Allergies        Objective     Blood pressure 129/84, pulse 101, temperature (!) 94 7 °F (34 8 °C), temperature source Tympanic, height 5' 2" (1 575 m), weight 53 4 kg (117 lb 12 8 oz), SpO2 98 %  Body mass index is 21 55 kg/m²  PHYSICAL EXAM:      General Appearance:   Alert, cooperative, no distress   HEENT:   Normocephalic, atraumatic, anicteric  Neck:  Supple, symmetrical, trachea midline   Lungs:   Clear to auscultation bilaterally; no rales, rhonchi or wheezing; respirations unlabored    Heart[de-identified]   Regular rate and rhythm; no murmur, rub, or gallop  Abdomen:   Soft, non-tender, non-distended; normal bowel sounds; no masses, no organomegaly    Genitalia:   Deferred    Rectal:   Deferred    Extremities:  No cyanosis, clubbing or edema    Pulses:  2+ and symmetric    Skin:  No jaundice, rashes, or lesions    Lymph nodes:  No palpable cervical lymphadenopathy        Lab Results:   No visits with results within 1 Day(s) from this visit  Latest known visit with results is:   Telephone on 10/18/2022   Component Date Value   • Cologuard Result 04/13/2017 Negative          Radiology Results:   No results found

## 2022-11-28 ENCOUNTER — PATIENT OUTREACH (OUTPATIENT)
Dept: FAMILY MEDICINE CLINIC | Facility: CLINIC | Age: 72
End: 2022-11-28

## 2022-11-28 NOTE — PROGRESS NOTES
NERISSA MAJANO outreached patient to f/u regarding if saran received phone from [facility]  Patient stated that she receive her [employer] phone, but it said it had viruses and they sent her another phone  NERISSA MAJANO stated she give update to CM OC  NERISSA MAJANO will f/u as needed and remain available for any needs

## 2022-11-29 ENCOUNTER — PATIENT OUTREACH (OUTPATIENT)
Dept: FAMILY MEDICINE CLINIC | Facility: CLINIC | Age: 72
End: 2022-11-29

## 2022-11-29 NOTE — PROGRESS NOTES
Incoming Inbasket/ Chart Review/ [facility]  11/29/2022    CM  received an Panchito Garcia from  Tim MAJANO  Patient informed Tim that she receive her [employer] phone, but it said it had viruses and they sent her another phone  [address] [facility] completed chart review  [address] [place] [address] [facility] contacted patient to discuss [employer] phone  Patient cell phone stated the number was unable to receive calls, Check the number and try again later  [facility]ridge contacted patient via the home phone number  Phone rang and patient did not answer call  CMOC was unable to leave a message  [address] [place] [address] [address] [facility] contacted [employer] to inquire about the replacement phone  [address] [place] [address] spoke with Roopa Chatterjeeel informed [address] [place] [address] that the patient contacted [employer] on 11/2/2022 regarding alerts that were popping up on the phone  According to Roopa Carroll the patient and  repaired the phone  There is no documentation that the patient was sent a new phone  [facility] thanked Roopa Carroll for the assistance  CMOC will follow up with patient in 1 week  Next outreach is scheduled for 12/6/2022

## 2022-12-06 DIAGNOSIS — F32.A ANXIETY AND DEPRESSION: ICD-10-CM

## 2022-12-06 DIAGNOSIS — F41.9 ANXIETY AND DEPRESSION: ICD-10-CM

## 2022-12-06 RX ORDER — PAROXETINE 10 MG/1
10 TABLET, FILM COATED ORAL DAILY
Qty: 30 TABLET | Refills: 0 | Status: SHIPPED | OUTPATIENT
Start: 2022-12-06

## 2022-12-07 ENCOUNTER — PATIENT OUTREACH (OUTPATIENT)
Dept: FAMILY MEDICINE CLINIC | Facility: CLINIC | Age: 72
End: 2022-12-07

## 2022-12-07 NOTE — PROGRESS NOTES
Chart Review/ Outgoing Call  12/7/2022    [facility] completed chart review  [facility] contacted patient to discuss [employer] phone and confirm that the cell phone is working  [facility] contacted patient  Patients cell phone stated the number was unable to receive calls, check the number and try again later  [facility] contacted patient via the home phone number  Phone rang and patient did not answer call  CMOC was unable to leave a message  CMOC will follow up with patient in 1 week  Next outreach is scheduled for 12/14/2022

## 2022-12-12 ENCOUNTER — PATIENT OUTREACH (OUTPATIENT)
Dept: FAMILY MEDICINE CLINIC | Facility: CLINIC | Age: 72
End: 2022-12-12

## 2022-12-12 NOTE — PROGRESS NOTES
NERISSA MAJANO reviewed case with CM OC  Patient received phone from [facility]  CM OC has outreached [employer] and they confirmed that issues with the phone were resolved and it is activated  Socially complex and community health episodes resolved as patient's goals were achieved  NERISSA CM will remain available for any future needs

## 2023-01-03 ENCOUNTER — TELEPHONE (OUTPATIENT)
Dept: GASTROENTEROLOGY | Facility: CLINIC | Age: 73
End: 2023-01-03

## 2023-01-03 NOTE — TELEPHONE ENCOUNTER
Called patient to confirm upcomming procedure  Patient has questions regarding the safety of procedure  Please contact patient

## 2023-01-06 ENCOUNTER — OFFICE VISIT (OUTPATIENT)
Dept: FAMILY MEDICINE CLINIC | Facility: CLINIC | Age: 73
End: 2023-01-06

## 2023-01-06 VITALS
BODY MASS INDEX: 24.55 KG/M2 | HEART RATE: 74 BPM | HEIGHT: 62 IN | DIASTOLIC BLOOD PRESSURE: 60 MMHG | SYSTOLIC BLOOD PRESSURE: 108 MMHG | WEIGHT: 133.4 LBS | OXYGEN SATURATION: 98 % | TEMPERATURE: 97.3 F

## 2023-01-06 DIAGNOSIS — F41.9 ANXIETY AND DEPRESSION: ICD-10-CM

## 2023-01-06 DIAGNOSIS — F32.A ANXIETY AND DEPRESSION: ICD-10-CM

## 2023-01-06 RX ORDER — PAROXETINE 10 MG/1
10 TABLET, FILM COATED ORAL DAILY
Qty: 30 TABLET | Refills: 2 | Status: SHIPPED | OUTPATIENT
Start: 2023-01-06

## 2023-01-06 NOTE — PROGRESS NOTES
Assessment/Plan:    Problem List Items Addressed This Visit        Other    Anxiety and depression    Relevant Medications    PARoxetine (PAXIL) 10 mg tablet        Diagnoses and all orders for this visit:    Anxiety and depression  Comments:  Pt stable, refill Paxil, RTO 3 months or sooner PRN  Orders:  -     PARoxetine (PAXIL) 10 mg tablet; Take 1 tablet (10 mg total) by mouth daily            Subjective:      Patient ID: Driss Dozier is a 67 y o  female  [facility] is here today for 1 month follow up since starting Paxil  She is doing much better, she notes significant improvement in anxiety/depression  She is scheduled for a colonoscopy next week, is nervous due to past medical procedures that have resulted in surgeries and other complications  Her weight is improved today, likely is eating more now that her anxiety is stable/improved  The following portions of the patient's history were reviewed and updated as appropriate:   She has a past medical history of Abnormal blood chemistry, Achalasia, esophageal, Acute medial meniscus tear, Acute otitis externa, Adrenal nodule ([address] ), Anemia, Anxiety, Arthritis, Atrial fibrillation ([address] ), Atypical chest pain, Eagle's esophagus with high grade dysplasia, Cancer ([address] ), Cerumen impaction, Chronic pain of right knee, Coronary artery disease, Depression, Dysphagia, Esophageal stricture, GERD (gastroesophageal reflux disease), Headache, Hiatal hernia, Insomnia, Jejunostomy tube present ([address] ), Low vitamin D level, Osteoarthritis, knee, Pneumonia, Right leg paresthesias, Sciatica, Shingles, Spondylosis of 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, and Venous insufficiency  ,  does not have any pertinent problems on file  ,   has a past surgical history that includes  section; pr esophagogastroduodenoscopy transoral diagnostic (N/A, 3/10/2017); Steroid injection knee (Right, 2017);  Joint replacement (Right, 11/10/2014); pr esophagogastroduodenoscopy transoral diagnostic (N/A, 5/19/2017); pr esophagoscopy flexible transoral with biopsy (N/A, 8/24/2017); pr tot esophagectomy w/o thorcom w/wo pyloroplasty (N/A, 8/24/2017); Gastrojejunostomy w/ jejunostomy tube (N/A, 8/24/2017); Gastrectomy; pr laps repair hernia except incal/ingun reducible (N/A, 7/31/2019); LAPAROTOMY (N/A, 8/3/2019); Exploratory laparotomy w/ bowel resection (N/A, 8/2/2019); Ventral hernia repair (07/30/2019); and IR image guided aspiration / drainage w tube (2/21/2020)  ,  family history includes Alzheimer's disease in her mother; Diabetes type II in her father  ,   reports that she has never smoked  She has never used smokeless tobacco  She reports that she does not currently use alcohol  She reports that she does not use drugs  ,  has No Known Allergies     Current Outpatient Medications   Medication Sig Dispense Refill   • PARoxetine (PAXIL) 10 mg tablet Take 1 tablet (10 mg total) by mouth daily 30 tablet 2     No current facility-administered medications for this visit  Review of Systems   Constitutional: Negative for activity change, appetite change, chills, diaphoresis, fatigue, fever and unexpected weight change  HENT: Negative for congestion, ear pain, postnasal drip, rhinorrhea, sinus pressure, sinus pain, sneezing, sore throat, tinnitus and voice change  Eyes: Negative for pain, redness and visual disturbance  Respiratory: Negative for cough, chest tightness, shortness of breath and wheezing  Cardiovascular: Negative for chest pain, palpitations and leg swelling  Gastrointestinal: Negative for abdominal pain, blood in stool, constipation, diarrhea, nausea and vomiting  Genitourinary: Negative for difficulty urinating, dysuria, frequency, hematuria and urgency  Musculoskeletal: Negative for arthralgias, back pain, gait problem, joint swelling, myalgias, neck pain and neck stiffness  Skin: Negative for color change, pallor, rash and wound  Neurological: Negative for dizziness, tremors, weakness, light-headedness and headaches  Psychiatric/Behavioral: Negative for dysphoric mood, self-injury, sleep disturbance and suicidal ideas  The patient is not nervous/anxious  Objective:  Vitals:    01/06/23 0759   BP: 108/60   Pulse: 74   Temp: (!) 97 3 °F (36 3 °C)   SpO2: 98%   Weight: 60 5 kg (133 lb 6 4 oz)   Height: 5' 2" (1 575 m)     Body mass index is 24 4 kg/m²  Physical Exam  Vitals reviewed  Constitutional:       General: She is not in acute distress  Appearance: She is well-developed  She is not diaphoretic  HENT:      Head: Normocephalic and atraumatic  Right Ear: Hearing, tympanic membrane, ear canal and external ear normal       Left Ear: Hearing, tympanic membrane, ear canal and external ear normal       Mouth/Throat:      Pharynx: Uvula midline  No oropharyngeal exudate  Eyes:      General: No scleral icterus  Right eye: No discharge  Left eye: No discharge  Conjunctiva/sclera: Conjunctivae normal    Neck:      Thyroid: No thyromegaly  Vascular: No carotid bruit  Cardiovascular:      Rate and Rhythm: Normal rate and regular rhythm  Heart sounds: Normal heart sounds  No murmur heard  Pulmonary:      Effort: Pulmonary effort is normal  No respiratory distress  Breath sounds: Normal breath sounds  No wheezing  Abdominal:      General: Bowel sounds are normal  There is no distension  Palpations: Abdomen is soft  There is no mass  Tenderness: There is no abdominal tenderness  There is no guarding or rebound  Musculoskeletal:         General: No tenderness  Normal range of motion  Cervical back: Neck supple  Lymphadenopathy:      Cervical: No cervical adenopathy  Skin:     General: Skin is warm and dry  Findings: No erythema or rash  Neurological:      Mental Status: She is alert and oriented to person, place, and time     Psychiatric:         Behavior: Behavior normal          Thought Content:  Thought content normal          Judgment: Judgment normal

## 2023-01-11 ENCOUNTER — ANESTHESIA (OUTPATIENT)
Dept: PERIOP | Facility: HOSPITAL | Age: 73
End: 2023-01-11

## 2023-01-11 ENCOUNTER — ANESTHESIA EVENT (OUTPATIENT)
Dept: PERIOP | Facility: HOSPITAL | Age: 73
End: 2023-01-11

## 2023-01-11 ENCOUNTER — HOSPITAL ENCOUNTER (OUTPATIENT)
Dept: PERIOP | Facility: HOSPITAL | Age: 73
Setting detail: OUTPATIENT SURGERY
Discharge: HOME | End: 2023-01-11
Attending: INTERNAL MEDICINE | Admitting: INTERNAL MEDICINE

## 2023-01-11 VITALS
HEART RATE: 74 BPM | WEIGHT: 133 LBS | OXYGEN SATURATION: 97 % | BODY MASS INDEX: 24.48 KG/M2 | SYSTOLIC BLOOD PRESSURE: 123 MMHG | HEIGHT: 62 IN | DIASTOLIC BLOOD PRESSURE: 67 MMHG | RESPIRATION RATE: 20 BRPM | TEMPERATURE: 97.8 F

## 2023-01-11 DIAGNOSIS — Z11.59 SCREENING FOR VIRAL DISEASE: ICD-10-CM

## 2023-01-11 DIAGNOSIS — K20.90 ESOPHAGITIS: Primary | ICD-10-CM

## 2023-01-11 DIAGNOSIS — R63.4 WEIGHT LOSS: ICD-10-CM

## 2023-01-11 RX ORDER — PROPOFOL 10 MG/ML
INJECTION, EMULSION INTRAVENOUS CONTINUOUS PRN
Status: DISCONTINUED | OUTPATIENT
Start: 2023-01-11 | End: 2023-01-11

## 2023-01-11 RX ORDER — OMEPRAZOLE 40 MG/1
40 CAPSULE, DELAYED RELEASE ORAL
Qty: 30 CAPSULE | Refills: 3 | Status: SHIPPED | OUTPATIENT
Start: 2023-01-11

## 2023-01-11 RX ORDER — SODIUM CHLORIDE, SODIUM LACTATE, POTASSIUM CHLORIDE, CALCIUM CHLORIDE 600; 310; 30; 20 MG/100ML; MG/100ML; MG/100ML; MG/100ML
INJECTION, SOLUTION INTRAVENOUS CONTINUOUS PRN
Status: DISCONTINUED | OUTPATIENT
Start: 2023-01-11 | End: 2023-01-11

## 2023-01-11 RX ORDER — PROPOFOL 10 MG/ML
INJECTION, EMULSION INTRAVENOUS AS NEEDED
Status: DISCONTINUED | OUTPATIENT
Start: 2023-01-11 | End: 2023-01-11

## 2023-01-11 RX ORDER — LIDOCAINE HYDROCHLORIDE 20 MG/ML
INJECTION, SOLUTION EPIDURAL; INFILTRATION; INTRACAUDAL; PERINEURAL AS NEEDED
Status: DISCONTINUED | OUTPATIENT
Start: 2023-01-11 | End: 2023-01-11

## 2023-01-11 RX ORDER — LIDOCAINE HYDROCHLORIDE 10 MG/ML
INJECTION, SOLUTION EPIDURAL; INFILTRATION; INTRACAUDAL; PERINEURAL AS NEEDED
Status: DISCONTINUED | OUTPATIENT
Start: 2023-01-11 | End: 2023-01-11

## 2023-01-11 RX ADMIN — PROPOFOL 30 MG: 10 INJECTION, EMULSION INTRAVENOUS at 10:00

## 2023-01-11 RX ADMIN — PROPOFOL 100 MCG/KG/MIN: 10 INJECTION, EMULSION INTRAVENOUS at 10:01

## 2023-01-11 RX ADMIN — PROPOFOL 20 MG: 10 INJECTION, EMULSION INTRAVENOUS at 09:58

## 2023-01-11 RX ADMIN — PROPOFOL 20 MG: 10 INJECTION, EMULSION INTRAVENOUS at 09:51

## 2023-01-11 RX ADMIN — PROPOFOL 30 MG: 10 INJECTION, EMULSION INTRAVENOUS at 09:55

## 2023-01-11 RX ADMIN — PROPOFOL 100 MG: 10 INJECTION, EMULSION INTRAVENOUS at 09:50

## 2023-01-11 RX ADMIN — PROPOFOL 40 MG: 10 INJECTION, EMULSION INTRAVENOUS at 10:12

## 2023-01-11 RX ADMIN — SODIUM CHLORIDE, SODIUM LACTATE, POTASSIUM CHLORIDE, AND CALCIUM CHLORIDE: .6; .31; .03; .02 INJECTION, SOLUTION INTRAVENOUS at 09:46

## 2023-01-11 RX ADMIN — LIDOCAINE HYDROCHLORIDE 50 MG: 10 INJECTION, SOLUTION EPIDURAL; INFILTRATION; INTRACAUDAL; PERINEURAL at 09:50

## 2023-01-11 NOTE — ANESTHESIA PREPROCEDURE EVALUATION
Procedure:  COLONOSCOPY  EGD    Relevant Problems   CARDIO   (+) 3-vessel CAD   (+) Hyperlipidemia      ENDO   (+) Hypothyroidism      GI/HEPATIC   (+) Dysphagia   (+) Gastroesophageal reflux disease      HEMATOLOGY   (+) Anemia      MUSCULOSKELETAL   (+) Osteoarthritis of knee   (+) Spondylosis of 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      NEURO/PSYCH   (+) Anxiety and depression   (+) History of esophageal cancer        Physical Exam    Airway      TM Distance: >3 FB  Neck ROM: full     Dental       Cardiovascular  Cardiovascular exam normal    Pulmonary  Pulmonary exam normal     Other Findings        Anesthesia Plan  ASA Score- 3     Anesthesia Type- IV sedation with anesthesia with ASA Monitors  Additional Monitors:   Airway Plan:           Plan Factors-    Chart reviewed  Induction- intravenous  Postoperative Plan-     Informed Consent- Anesthetic plan and risks discussed with patient  I personally reviewed this patient with the CRNA  Discussed and agreed on the Anesthesia Plan with the CRNA  Sebas Pedraza

## 2023-01-11 NOTE — ANESTHESIA POSTPROCEDURE EVALUATION
Post-Op Assessment Note    CV Status:  Stable  Pain Score: 0    Pain management: adequate     Mental Status:  Alert and awake   Hydration Status:  Euvolemic   PONV Controlled:  Controlled   Airway Patency:  Patent      Post Op Vitals Reviewed: Yes      Staff: CRNA         No notable events documented      BP   119/71   Temp      Pulse  75   Resp   18   SpO2   100

## 2023-01-11 NOTE — H&P
History and Physical - [facility]  Keon Chung 67 y o  female MRN: [id number]                  HPI: Bobby Sher is a 67y o  year old female who presents for weight loss, gan's with HGD s/p partial esophagectomy      REVIEW OF SYSTEMS: Per the HPI, and otherwise unremarkable      Historical Information   Past Medical History:   Diagnosis Date   • Abnormal blood chemistry     last assessed 2014   • Achalasia, esophageal    • Acute medial meniscus tear     last assessed 03/10/2014   • Acute otitis externa     last assessed 2014   • Adrenal nodule ([facility] [address] )    • Anemia    • Anxiety    • Arthritis    • Atrial fibrillation ([facility] [address] )     resolved 2018   • Atypical chest pain     last assessed 2016   • Gan's esophagus with high grade dysplasia     last assessed 2018   • Cancer ([facility] [address] )     esophageal   • Cerumen impaction     last assessed 2014   • Chronic pain of right knee     last assessed 2017   • Coronary artery disease     3 vessel, resolved 2018   • Depression    • Dysphagia     last assessed 2017   • Esophageal stricture     last assessed 2017   • GERD (gastroesophageal reflux disease)    • Headache     last assessed 2013   • Hiatal hernia    • Insomnia     last assessed 10/15/2013   • Jejunostomy tube present ([facility] [address] )     resolved 2018   • Low vitamin D level     resolved 2018   • Osteoarthritis, knee     last assessed 2017   • Pneumonia     last assessed 2013   • Right leg paresthesias     last assessed 2017   • Sciatica     last assessed 2015   • Shingles     last assessed 2015   • Spondylosis of 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     last assessed 2017   • Venous insufficiency     last assessed 2013     Past Surgical History:   Procedure Laterality Date   •  SECTION     • EXPLORATORY LAPAROTOMY W/ BOWEL RESECTION N/A 2019    Procedure: LAPAROTOMY EXPLORATORY W/ BOWEL RESECTION, APPLICATION OF Memory Brake;  Surgeon: Luis Daniel Gamboa DO;  [facility];  Service: General   • GASTRECTOMY      partial   • GASTROJEJUNOSTOMY W/ JEJUNOSTOMY TUBE N/A 8/24/2017    Procedure: Sharonda Leaver;  Surgeon: Rafi Del Valle MD;  [facility];  Service: Thoracic   • IR IMAGE GUIDED ASPIRATION / DRAINAGE W TUBE  2/21/2020   • JOINT REPLACEMENT Right 11/10/2014    knee, Dr Koko Felipe   • LAPAROTOMY N/A 8/3/2019    Procedure: LAPAROTOMY EXPLORATORY, reanastomosis of proximal small bowel, placement of vac dressing;  Surgeon: Luis Daniel Gamboa DO;  [facility];  Service: General   • RI ESOPHAGOGASTRODUODENOSCOPY TRANSORAL DIAGNOSTIC N/A 3/10/2017    Procedure: ESOPHAGOGASTRODUODENOSCOPY (EGD); Surgeon: Alisa Bobby MD;  [facility];  Service: Gastroenterology   • RI ESOPHAGOGASTRODUODENOSCOPY TRANSORAL DIAGNOSTIC N/A 5/19/2017    Procedure: ESOPHAGOGASTRODUODENOSCOPY (EGD); Surgeon: Alisa Bobby MD;  [facility];  Service: Gastroenterology   • RI ESOPHAGOSCOPY FLEXIBLE TRANSORAL WITH BIOPSY N/A 8/24/2017    Procedure: ESOPHAGOGASTRODUODENOSCOPY (EGD);   Surgeon: Rafi Del Valle MD;  [facility];  Service: Thoracic   • RI LAPS REPAIR HERNIA EXCEPT INCAL/INGUN REDUCIBLE N/A 7/31/2019    Procedure: REPAIR HERNIA VENTRAL LAPAROSCOPIC;  Surgeon: Caroline Cross DO;  [facility];  Service: General   • RI TOT ESOPHAGECTOMY W/O THORCOM W/WO PYLOROPLASTY N/A 8/24/2017    Procedure: TRANSHIATAL ESOPHAGECTOMY;  Surgeon: Rafi Del Valle MD;  [facility];  Service: Thoracic   • STEROID INJECTION KNEE Right 5/2/2017    Procedure: GENICULATE NERVE BLOCKS;  Surgeon: Penny Simmons DO;  [facility];  Service:    • VENTRAL HERNIA REPAIR  07/30/2019     Social History   Social History     Substance and Sexual Activity   Alcohol Use Not Currently   • Alcohol/week: 0 0 standard drinks     Social History Substance and Sexual Activity   Drug Use No     Social History     Tobacco Use   Smoking Status Never   Smokeless Tobacco Never     Family History   Problem Relation Age of Onset   • Alzheimer's disease Mother    • Diabetes type II Father        Meds/Allergies     (Not in a hospital admission)      No Known Allergies    Objective     Blood pressure 125/53, pulse 83, temperature 98 5 °F (36 9 °C), temperature source Tympanic, resp  rate 18, height 5' 2" (1 575 m), weight 60 3 kg (133 lb), SpO2 98 %  PHYSICAL EXAMINATION:    General Appearance:   Alert, cooperative, no distress   HEENT:  Normocephalic, atraumatic, anicteric  Neck supple, symmetrical, trachea midline  Lungs:   Equal chest rise and unlabored breathing, normal effort, no coughing  Cardiovascular:   No visualized JVD  Abdomen:   No abdominal distension  Skin:   No jaundice, rashes, or lesions  Musculoskeletal:   Normal range of motion visualized  Psych:  Normal affect and normal insight  Neuro:  Alert and appropriate  ASSESSMENT/PLAN:  This is a 67y o  year old female here for EGD and colonoscopy, and she is stable and optimized for her procedure

## 2023-01-11 NOTE — PROGRESS NOTES
Pt spoke with dr Sona Guan regarding findings; pt denies pain or complaints; tolerated snack well; dc instructions reviewed; pt already called [facility] pharmacy and new prescription is ready and she will pick it up on her way home to start tomorrow morning  Pt's ride here and she was dc'ed to home at this time with her brother

## 2023-01-17 ENCOUNTER — TELEPHONE (OUTPATIENT)
Dept: GASTROENTEROLOGY | Facility: CLINIC | Age: 73
End: 2023-01-17

## 2023-01-17 ENCOUNTER — PREP FOR PROCEDURE (OUTPATIENT)
Dept: GASTROENTEROLOGY | Facility: CLINIC | Age: 73
End: 2023-01-17

## 2023-01-17 DIAGNOSIS — K22.711 BARRETT'S ESOPHAGUS WITH HIGH GRADE DYSPLASIA: ICD-10-CM

## 2023-01-17 DIAGNOSIS — R63.4 UNINTENTIONAL WEIGHT LOSS: Primary | ICD-10-CM

## 2023-01-17 NOTE — RESULT ENCOUNTER NOTE
Hi,    Can we schedule her for repeat endoscopy in the next 3 to 6 months for follow-up of esophagitis? Thank you!

## 2023-01-17 NOTE — TELEPHONE ENCOUNTER
----- Message from Lety Sheikh MD sent at 1/17/2023  7:18 AM EST -----  Hi,    Can we schedule her for repeat endoscopy in the next 3 to 6 months for follow-up of esophagitis? Thank you!

## 2023-01-17 NOTE — RESULT ENCOUNTER NOTE
Hi,    Can you please call the patient and let her know that the biopsies confirmed some esophagitis and I would like her to take the omeprazole 40 mg daily  She should also schedule a repeat endoscopy in the next 3 months to make sure the inflammation is healed      Thank you

## 2023-01-17 NOTE — PROGRESS NOTES
Scheduled date of EGD(as of today): 5/2/2023  Physician performing EGD: Dr Lindsey Winston  Location of EGD: [facility]  Instructions reviewed with patient by: sent via mail patient will contact our office to go over all directions once packet is received    Clearances: N/A

## 2023-01-17 NOTE — TELEPHONE ENCOUNTER
----- Message from Chelsea Muñiz MD sent at 1/17/2023  7:17 AM EST -----  Hi,    Can you please call the patient and let her know that the biopsies confirmed some esophagitis and I would like her to take the omeprazole 40 mg daily  She should also schedule a repeat endoscopy in the next 3 months to make sure the inflammation is healed      Thank you

## 2023-03-10 NOTE — PATIENT INSTRUCTIONS
Scheduled date of EGD/colonoscopy (as of today):1/11/2023  Physician performing EGD/colonoscopy: Dr Harrison Stage  Location of EGD/colonoscopy: [address] [place] [address]  Desired bowel prep reviewed with patient: Miralax/Dulcolax  Instructions reviewed with patient by:  Nandini Garcia  Clearances:   N/A nausea

## 2023-03-21 ENCOUNTER — OFFICE VISIT (OUTPATIENT)
Dept: FAMILY MEDICINE CLINIC | Facility: CLINIC | Age: 73
End: 2023-03-21

## 2023-03-21 VITALS
OXYGEN SATURATION: 99 % | WEIGHT: 145 LBS | HEIGHT: 62 IN | BODY MASS INDEX: 26.68 KG/M2 | TEMPERATURE: 96.8 F | SYSTOLIC BLOOD PRESSURE: 166 MMHG | HEART RATE: 100 BPM | DIASTOLIC BLOOD PRESSURE: 90 MMHG

## 2023-03-21 DIAGNOSIS — Z78.0 MENOPAUSE: Primary | ICD-10-CM

## 2023-03-21 DIAGNOSIS — Z20.822 EXPOSURE TO COVID-19 VIRUS: ICD-10-CM

## 2023-03-21 DIAGNOSIS — B34.9 VIRAL INFECTION, UNSPECIFIED: ICD-10-CM

## 2023-03-21 DIAGNOSIS — Z12.31 ENCOUNTER FOR SCREENING MAMMOGRAM FOR MALIGNANT NEOPLASM OF BREAST: ICD-10-CM

## 2023-03-21 DIAGNOSIS — J06.9 VIRAL UPPER RESPIRATORY TRACT INFECTION: ICD-10-CM

## 2023-03-21 RX ORDER — AZITHROMYCIN 250 MG/1
TABLET, FILM COATED ORAL
Qty: 6 TABLET | Refills: 0 | Status: SHIPPED | OUTPATIENT
Start: 2023-03-21 | End: 2023-03-25

## 2023-03-21 NOTE — PROGRESS NOTES
BMI Counseling: Body mass index is 26 52 kg/m²  The BMI is above normal  Nutrition recommendations include decreasing portion sizes and encouraging healthy choices of fruits and vegetables  Exercise recommendations include exercising 3-5 times per week  Rationale for BMI follow-up plan is due to patient being overweight or obese  Assessment/Plan:tested for covid  Started  On zithromax    Problem List Items Addressed This Visit    None  Visit Diagnoses     Menopause    -  Primary    Relevant Orders    DXA bone density spine hip and pelvis    Encounter for screening mammogram for malignant neoplasm of breast        Relevant Orders    Mammo screening bilateral w 3d & cad    Viral upper respiratory tract infection               Diagnoses and all orders for this visit:    Menopause  -     DXA bone density spine hip and pelvis; Future    Encounter for screening mammogram for malignant neoplasm of breast  -     Mammo screening bilateral w 3d & cad; Future    Viral upper respiratory tract infection        No problem-specific Assessment & Plan notes found for this encounter  Subjective:      Patient ID: Romeo Farias is a 75 y o  female      Upper respiratory infection  Cough sinus congestion      The following portions of the patient's history were reviewed and updated as appropriate:   She has a past medical history of Abnormal blood chemistry, Achalasia, esophageal, Acute medial meniscus tear, Acute otitis externa, Adrenal nodule ([address] ), Anemia, Anxiety, Arthritis, Atrial fibrillation ([address] ), Atypical chest pain, Eagle's esophagus with high grade dysplasia, Cancer (HCC), Cerumen impaction, Chronic pain of right knee, Coronary artery disease, Depression, Dysphagia, Esophageal stricture, GERD (gastroesophageal reflux disease), Headache, Hiatal hernia, Insomnia, Jejunostomy tube present ([address] ), Low vitamin D level, Osteoarthritis, knee, Pneumonia, Right leg paresthesias, Sciatica, Shingles, Spondylosis of 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, and Venous insufficiency  ,  does not have any pertinent problems on file  ,   has a past surgical history that includes  section; pr esophagogastroduodenoscopy transoral diagnostic (N/A, 3/10/2017); Steroid injection knee (Right, 2017); Joint replacement (Right, 11/10/2014); pr esophagogastroduodenoscopy transoral diagnostic (N/A, 2017); pr esophagoscopy flexible transoral with biopsy (N/A, 2017); pr tot esophagectomy w/o thorcom w/wo pyloroplasty (N/A, 2017); Gastrojejunostomy w/ jejunostomy tube (N/A, 2017); Gastrectomy; pr laps repair hernia except incal/ingun reducible (N/A, 2019); LAPAROTOMY (N/A, 8/3/2019); Exploratory laparotomy w/ bowel resection (N/A, 2019); Ventral hernia repair (2019); and IR image guided aspiration / drainage w tube (2020)  ,  family history includes Alzheimer's disease in her mother; Diabetes type II in her father  ,   reports that she has never smoked  She has never used smokeless tobacco  She reports that she does not currently use alcohol  She reports that she does not use drugs  ,  has No Known Allergies     Current Outpatient Medications   Medication Sig Dispense Refill   • omeprazole (PriLOSEC) 40 MG capsule Take 1 capsule (40 mg total) by mouth daily before breakfast 30 capsule 3   • PARoxetine (PAXIL) 10 mg tablet Take 1 tablet (10 mg total) by mouth daily 30 tablet 2     No current facility-administered medications for this visit  Review of Systems   Constitutional: Positive for activity change  Negative for appetite change, diaphoresis, fatigue and fever  HENT: Positive for ear pain, postnasal drip, sinus pressure, sinus pain and sore throat  Eyes: Positive for visual disturbance  Wears glasses   Respiratory: Positive for cough  Negative for apnea, chest tightness, shortness of breath and wheezing      Cardiovascular: Negative for chest pain, palpitations and leg swelling  Gastrointestinal: Negative for abdominal distention, abdominal pain, anal bleeding, constipation, diarrhea, nausea and vomiting  Endocrine: Negative for cold intolerance, heat intolerance, polydipsia, polyphagia and polyuria  Genitourinary: Negative for difficulty urinating, dysuria, flank pain, hematuria and urgency  Musculoskeletal: Negative for arthralgias, back pain, gait problem, joint swelling and myalgias  Skin: Negative for color change, rash and wound  Allergic/Immunologic: Negative for environmental allergies, food allergies and immunocompromised state  Neurological: Negative for dizziness, seizures, syncope, speech difficulty, numbness and headaches  Hematological: Negative for adenopathy  Does not bruise/bleed easily  Psychiatric/Behavioral: Negative for agitation, behavioral problems, hallucinations, sleep disturbance and suicidal ideas  Objective:  Vitals:    03/21/23 0804   BP: 166/90   BP Location: Left arm   Patient Position: Sitting   Cuff Size: Standard   Pulse: 100   Temp: (!) 96 8 °F (36 °C)   TempSrc: Tympanic   SpO2: 99%   Weight: 65 8 kg (145 lb)   Height: 5' 2" (1 575 m)     Body mass index is 26 52 kg/m²  Physical Exam  Constitutional:       General: She is not in acute distress  Appearance: She is well-developed  She is ill-appearing  She is not diaphoretic  HENT:      Head: Normocephalic  Right Ear: External ear normal       Left Ear: External ear normal       Nose: Nose normal    Eyes:      General: No scleral icterus  Right eye: No discharge  Left eye: No discharge  Conjunctiva/sclera: Conjunctivae normal       Pupils: Pupils are equal, round, and reactive to light  Neck:      Thyroid: No thyromegaly  Trachea: No tracheal deviation  Cardiovascular:      Rate and Rhythm: Normal rate and regular rhythm  Heart sounds: Normal heart sounds  No murmur heard  No friction rub  No gallop     Pulmonary: Effort: Pulmonary effort is normal  No respiratory distress  Breath sounds: Normal breath sounds  No wheezing  Abdominal:      General: Bowel sounds are normal       Palpations: Abdomen is soft  There is no mass  Tenderness: There is no abdominal tenderness  There is no guarding  Musculoskeletal:         General: No deformity  Cervical back: Normal range of motion  Lymphadenopathy:      Cervical: No cervical adenopathy  Skin:     General: Skin is warm and dry  Findings: No erythema or rash  Neurological:      Mental Status: She is alert and oriented to person, place, and time  Cranial Nerves: No cranial nerve deficit  Psychiatric:         Thought Content:  Thought content normal

## 2023-03-22 LAB
FLUAV RNA RESP QL NAA+PROBE: NEGATIVE
FLUBV RNA RESP QL NAA+PROBE: NEGATIVE
SARS-COV-2 RNA RESP QL NAA+PROBE: NEGATIVE

## 2023-03-27 ENCOUNTER — APPOINTMENT (EMERGENCY)
Dept: RADIOLOGY | Facility: HOSPITAL | Age: 73
End: 2023-03-27

## 2023-03-27 ENCOUNTER — HOSPITAL ENCOUNTER (EMERGENCY)
Facility: HOSPITAL | Age: 73
Discharge: HOME | End: 2023-03-27
Attending: EMERGENCY MEDICINE

## 2023-03-27 VITALS
RESPIRATION RATE: 16 BRPM | HEART RATE: 89 BPM | OXYGEN SATURATION: 96 % | TEMPERATURE: 98.2 F | DIASTOLIC BLOOD PRESSURE: 57 MMHG | SYSTOLIC BLOOD PRESSURE: 111 MMHG

## 2023-03-27 DIAGNOSIS — R55 NEAR SYNCOPE: Primary | ICD-10-CM

## 2023-03-27 DIAGNOSIS — R42 LIGHTHEADEDNESS: ICD-10-CM

## 2023-03-27 DIAGNOSIS — S80.01XA CONTUSION OF RIGHT KNEE, INITIAL ENCOUNTER: ICD-10-CM

## 2023-03-27 LAB
ANION GAP SERPL CALCULATED.3IONS-SCNC: 9 MMOL/L (ref 4–13)
BASOPHILS # BLD AUTO: 0.02 THOUSANDS/ÂΜL (ref 0–0.1)
BASOPHILS NFR BLD AUTO: 0 % (ref 0–1)
BILIRUB UR QL STRIP: NEGATIVE
BNP SERPL-MCNC: 29 PG/ML (ref 0–100)
BUN SERPL-MCNC: 21 MG/DL (ref 5–25)
CALCIUM SERPL-MCNC: 8.8 MG/DL (ref 8.4–10.2)
CARDIAC TROPONIN I PNL SERPL HS: 3 NG/L
CHLORIDE SERPL-SCNC: 104 MMOL/L (ref 96–108)
CLARITY UR: CLEAR
CO2 SERPL-SCNC: 24 MMOL/L (ref 21–32)
COLOR UR: YELLOW
CREAT SERPL-MCNC: 0.78 MG/DL (ref 0.6–1.3)
EOSINOPHIL # BLD AUTO: 0.02 THOUSAND/ÂΜL (ref 0–0.61)
EOSINOPHIL NFR BLD AUTO: 0 % (ref 0–6)
ERYTHROCYTE [DISTWIDTH] IN BLOOD BY AUTOMATED COUNT: 14.2 % (ref 11.6–15.1)
GFR SERPL CREATININE-BSD FRML MDRD: 75 ML/MIN/1.73SQ M
GLUCOSE SERPL-MCNC: 134 MG/DL (ref 65–140)
GLUCOSE UR STRIP-MCNC: NEGATIVE MG/DL
HCT VFR BLD AUTO: 44 % (ref 34.8–46.1)
HGB BLD-MCNC: 14 G/DL (ref 11.5–15.4)
HGB UR QL STRIP.AUTO: NEGATIVE
IMM GRANULOCYTES # BLD AUTO: 0.02 THOUSAND/UL (ref 0–0.2)
IMM GRANULOCYTES NFR BLD AUTO: 0 % (ref 0–2)
KETONES UR STRIP-MCNC: ABNORMAL MG/DL
LEUKOCYTE ESTERASE UR QL STRIP: NEGATIVE
LYMPHOCYTES # BLD AUTO: 0.81 THOUSANDS/ÂΜL (ref 0.6–4.47)
LYMPHOCYTES NFR BLD AUTO: 10 % (ref 14–44)
MCH RBC QN AUTO: 27.9 PG (ref 26.8–34.3)
MCHC RBC AUTO-ENTMCNC: 31.8 G/DL (ref 31.4–37.4)
MCV RBC AUTO: 88 FL (ref 82–98)
MONOCYTES # BLD AUTO: 0.26 THOUSAND/ÂΜL (ref 0.17–1.22)
MONOCYTES NFR BLD AUTO: 3 % (ref 4–12)
NEUTROPHILS # BLD AUTO: 6.96 THOUSANDS/ÂΜL (ref 1.85–7.62)
NEUTS SEG NFR BLD AUTO: 87 % (ref 43–75)
NITRITE UR QL STRIP: NEGATIVE
NRBC BLD AUTO-RTO: 0 /100 WBCS
PH UR STRIP.AUTO: 5.5 [PH]
PLATELET # BLD AUTO: 188 THOUSANDS/UL (ref 149–390)
PMV BLD AUTO: 11.4 FL (ref 8.9–12.7)
POTASSIUM SERPL-SCNC: 3.8 MMOL/L (ref 3.5–5.3)
PROT UR STRIP-MCNC: NEGATIVE MG/DL
RBC # BLD AUTO: 5.02 MILLION/UL (ref 3.81–5.12)
SODIUM SERPL-SCNC: 137 MMOL/L (ref 135–147)
SP GR UR STRIP.AUTO: 1.02 (ref 1–1.03)
UROBILINOGEN UR QL STRIP.AUTO: 0.2 E.U./DL
WBC # BLD AUTO: 8.09 THOUSAND/UL (ref 4.31–10.16)

## 2023-03-27 RX ORDER — SODIUM CHLORIDE 9 MG/ML
3 INJECTION INTRAVENOUS
Status: DISCONTINUED | OUTPATIENT
Start: 2023-03-27 | End: 2023-03-27 | Stop reason: HOSPADM

## 2023-03-27 RX ADMIN — SODIUM CHLORIDE 1000 ML: 0.9 INJECTION, SOLUTION INTRAVENOUS at 12:50

## 2023-03-27 NOTE — ED PROVIDER NOTES
History  Chief Complaint   Patient presents with   • Dizziness     Pt brought in by EMS for a near syncopal episode, patient states she got very dizzy and lightheaded when she fell to her knees  Pt also c/o headache and right knee pain  Denies head strike and thinners, no loc  Patient is a 50-year-old female presenting after near syncopal event  Patient states she was walking from her car to her residence when she states she became weak and fell like she might pass out  She lowered herself to the ground but struck her right knee against the pavement  She states she was able to get up on her own accord and proceed to her residence  As she to the elevator up to her floor, she states that she was walking out of the elevator she became so weak that again she lowered herself to the ground  At that time she states was unable to get back up  Did not strike her head  Patient is moving all extremities  States he just feels generalized weakness  Patient had an episode of diarrhea this morning but only had the 1 episode and had not returned  No other recent change in medications  No recent travel  No recent antibiotics  Patient not immunocompromised  States that normal p o  intake over the last several days  Does not recall any previous events where she became lightheaded with that and could not continue to ambulate  Patient arrived via EMS  Patient denies any dizziness or vertiginous component but states a near syncopal sensation/lightheaded feeling  Right knee replacement 2014  Dizziness  Associated symptoms: weakness    Associated symptoms: no chest pain, no diarrhea, no headaches, no nausea, no palpitations, no shortness of breath and no vomiting        Prior to Admission Medications   Prescriptions Last Dose Informant Patient Reported? Taking?    PARoxetine (PAXIL) 10 mg tablet   No No   Sig: Take 1 tablet (10 mg total) by mouth daily   omeprazole (PriLOSEC) 40 MG capsule   No No   Sig: Take 1 capsule (40 mg total) by mouth daily before breakfast      Facility-Administered Medications: None       Past Medical History:   Diagnosis Date   • Abnormal blood chemistry     last assessed 2014   • Achalasia, esophageal    • Acute medial meniscus tear     last assessed 03/10/2014   • Acute otitis externa     last assessed 2014   • Adrenal nodule ([facility] [address] )    • Anemia    • Anxiety    • Arthritis    • Atrial fibrillation ([facility] )     resolved 2018   • Atypical chest pain     last assessed 2016   • Eagle's esophagus with high grade dysplasia     last assessed 2018   • Cancer ([facility] )     esophageal   • Cerumen impaction     last assessed 2014   • Chronic pain of right knee     last assessed 2017   • Coronary artery disease     3 vessel, resolved 2018   • Depression    • Dysphagia     last assessed 2017   • Esophageal stricture     last assessed 2017   • GERD (gastroesophageal reflux disease)    • Headache     last assessed 2013   • Hiatal hernia    • Insomnia     last assessed 10/15/2013   • Jejunostomy tube present ([facility] )     resolved 2018   • Low vitamin D level     resolved 2018   • Osteoarthritis, knee     last assessed 2017   • Pneumonia     last assessed 2013   • Right leg paresthesias     last assessed 2017   • Sciatica     last assessed 2015   • Shingles     last assessed 2015   • Spondylosis of 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     last assessed 2017   • Venous insufficiency     last assessed 2013       Past Surgical History:   Procedure Laterality Date   •  SECTION     • EXPLORATORY LAPAROTOMY W/ BOWEL RESECTION N/A 2019    Procedure: LAPAROTOMY EXPLORATORY W/ BOWEL RESECTION, APPLICATION OF Peyton Ham;  Surgeon: Smith Grimaldo DO;  [facility];  Service: General   • GASTRECTOMY      partial   • GASTROJEJUNOSTOMY W/ JEJUNOSTOMY TUBE N/A 2017    Procedure: JEJUNOSTOMY TUBE PLACEMENT;  Surgeon: Magy Clarke MD;  Location: [facility];  Service: Thoracic   • IR IMAGE GUIDED ASPIRATION / DRAINAGE W TUBE  2/21/2020   • JOINT REPLACEMENT Right 11/10/2014    knee, Dr Yesy Verma   • LAPAROTOMY N/A 8/3/2019    Procedure: LAPAROTOMY EXPLORATORY, reanastomosis of proximal small bowel, placement of vac dressing;  Surgeon: Liborio Byrd DO;  [facility];  Service: General   • OK ESOPHAGOGASTRODUODENOSCOPY TRANSORAL DIAGNOSTIC N/A 3/10/2017    Procedure: ESOPHAGOGASTRODUODENOSCOPY (EGD); Surgeon: Magnolia Cruz MD;  Location: [facility];  Service: Gastroenterology   • OK ESOPHAGOGASTRODUODENOSCOPY TRANSORAL DIAGNOSTIC N/A 5/19/2017    Procedure: ESOPHAGOGASTRODUODENOSCOPY (EGD); Surgeon: Magnolia Cruz MD;  Location: [facility];  Service: Gastroenterology   • OK ESOPHAGOSCOPY FLEXIBLE TRANSORAL WITH BIOPSY N/A 8/24/2017    Procedure: ESOPHAGOGASTRODUODENOSCOPY (EGD); Surgeon: Magy Clarke MD;  [facility];  Service: Thoracic   • OK LAPS REPAIR HERNIA EXCEPT INCAL/INGUN REDUCIBLE N/A 7/31/2019    Procedure: REPAIR HERNIA VENTRAL LAPAROSCOPIC;  Surgeon: Ty Simons DO;  [facility]: [facility];  Service: General   • OK TOT ESOPHAGECTOMY W/O THORCOM W/WO PYLOROPLASTY N/A 8/24/2017    Procedure: TRANSHIATAL ESOPHAGECTOMY;  Surgeon: Magy Clarke MD;  [facility];  Service: Thoracic   • STEROID INJECTION KNEE Right 5/2/2017    Procedure: GENICULATE NERVE BLOCKS;  Surgeon: Kim Dunlap DO;  [facility];  Service:    • VENTRAL HERNIA REPAIR  07/30/2019       Family History   Problem Relation Age of Onset   • Alzheimer's disease Mother    • Diabetes type II Father      I have reviewed and agree with the history as documented      E-Cigarette/Vaping   • E-Cigarette Use Never User      E-Cigarette/Vaping Substances     Social History     Tobacco Use   • Smoking status: Never   • Smokeless tobacco: Never   Vaping Use   • Vaping Use: Never used Substance Use Topics   • Alcohol use: Not Currently     Alcohol/week: 0 0 standard drinks   • Drug use: No       Review of Systems   Constitutional: Negative for appetite change, chills, fatigue, fever and unexpected weight change  HENT: Negative for congestion, ear pain, rhinorrhea and sore throat  Eyes: Negative for pain and visual disturbance  Respiratory: Negative for cough, chest tightness, shortness of breath and wheezing  Cardiovascular: Negative for chest pain, palpitations and leg swelling  Gastrointestinal: Negative for abdominal pain, constipation, diarrhea, nausea and vomiting  Genitourinary: Negative for difficulty urinating, dysuria, frequency, hematuria, menstrual problem, pelvic pain, vaginal bleeding and vaginal discharge  Musculoskeletal: Negative for arthralgias, back pain and neck pain  Skin: Negative for color change and rash  Neurological: Positive for weakness and light-headedness  Negative for dizziness, seizures, syncope and headaches  Psychiatric/Behavioral: Negative for confusion and sleep disturbance  All other systems reviewed and are negative  Physical Exam  Physical Exam  Vitals and nursing note reviewed  Constitutional:       General: She is not in acute distress  Appearance: Normal appearance  She is well-developed and normal weight  She is not ill-appearing, toxic-appearing or diaphoretic  HENT:      Head: Normocephalic and atraumatic  Nose: Nose normal       Mouth/Throat:      Mouth: Mucous membranes are moist       Pharynx: Oropharynx is clear  Eyes:      General: No scleral icterus  Extraocular Movements: Extraocular movements intact  Conjunctiva/sclera: Conjunctivae normal    Cardiovascular:      Rate and Rhythm: Normal rate and regular rhythm  Pulses: Normal pulses  Heart sounds: Normal heart sounds  No murmur heard  No friction rub  No gallop     Pulmonary:      Effort: Pulmonary effort is normal  No respiratory distress  Breath sounds: Normal breath sounds  No wheezing or rales  Abdominal:      Palpations: Abdomen is soft  There is no mass  Tenderness: There is no abdominal tenderness  There is no right CVA tenderness, left CVA tenderness, guarding or rebound  Hernia: No hernia is present  Musculoskeletal:         General: No swelling  Normal range of motion  Cervical back: Normal range of motion and neck supple  No rigidity or tenderness  Right lower leg: Edema present  Left lower leg: Edema present  Comments: Trace pedal edema bilaterally, there is no calf pain tenderness or asymmetry  Previous right knee replacement scar  Lymphadenopathy:      Cervical: No cervical adenopathy  Skin:     General: Skin is warm and dry  Capillary Refill: Capillary refill takes less than 2 seconds  Coloration: Skin is not jaundiced or pale  Findings: Bruising present  No lesion or rash  Neurological:      General: No focal deficit present  Mental Status: She is alert and oriented to person, place, and time  Cranial Nerves: No cranial nerve deficit  Motor: No weakness     Psychiatric:         Mood and Affect: Mood normal          Behavior: Behavior normal          Vital Signs  ED Triage Vitals [03/27/23 1217]   Temperature Pulse Respirations Blood Pressure SpO2   98 2 °F (36 8 °C) 90 18 114/57 97 %      Temp Source Heart Rate Source Patient Position - Orthostatic VS BP Location FiO2 (%)   Temporal Monitor Sitting Right arm --      Pain Score       5           Vitals:    03/27/23 1330 03/27/23 1400 03/27/23 1415 03/27/23 1430   BP: 124/62 125/60  113/59   Pulse: 85 86 95 90   Patient Position - Orthostatic VS: Lying            Visual Acuity      ED Medications  Medications   sodium chloride (PF) 0 9 % injection 3 mL (has no administration in time range)   sodium chloride 0 9 % bolus 1,000 mL (0 mL Intravenous Stopped 3/27/23 1350)       Diagnostic Studies  Results Reviewed     Procedure Component Value Units Date/Time    HS Troponin I 4hr [[id number]]     Lab Status: No result Specimen: Blood     UA w Reflex to Microscopic w Reflex to Culture [[id number]]  (Abnormal) Collected: 03/27/23 1415    Lab Status: Final result Specimen: Urine, Clean Catch Updated: 03/27/23 1445     Color, UA Yellow     Clarity, UA Clear     Specific Gravity, UA 1 025     pH, UA 5 5     Leukocytes, UA Negative     Nitrite, UA Negative     Protein, UA Negative mg/dl      Glucose, UA Negative mg/dl      Ketones, UA Trace mg/dl      Urobilinogen, UA 0 2 E U /dl      Bilirubin, UA Negative     Occult Blood, UA Negative    B-Type Natriuretic Peptide(BNP) [[id number]]  (Normal) Collected: 03/27/23 1246    Lab Status: Final result Specimen: Blood from Arm, Right Updated: 03/27/23 1320     BNP 29 pg/mL     HS Troponin 0hr (reflex protocol) [[id number]]  (Normal) Collected: 03/27/23 1246    Lab Status: Final result Specimen: Blood from Arm, Right Updated: 03/27/23 1318     hs TnI 0hr 3 ng/L     HS Troponin I 2hr [[id number]]     Lab Status: No result Specimen: Blood     Basic metabolic panel [[id number]] Collected: 03/27/23 1246    Lab Status: Final result Specimen: Blood from Arm, Right Updated: 03/27/23 1312     Sodium 137 mmol/L      Potassium 3 8 mmol/L      Chloride 104 mmol/L      CO2 24 mmol/L      ANION GAP 9 mmol/L      BUN 21 mg/dL      Creatinine 0 78 mg/dL      Glucose 134 mg/dL      Calcium 8 8 mg/dL      eGFR 75 ml/min/1 73sq m     Narrative:      Franco guidelines for Chronic Kidney Disease (CKD):   •  Stage 1 with normal or high GFR (GFR > 90 mL/min/1 73 square meters)  •  Stage 2 Mild CKD (GFR = 60-89 mL/min/1 73 square meters)  •  Stage 3A Moderate CKD (GFR = 45-59 mL/min/1 73 square meters)  •  Stage 3B Moderate CKD (GFR = 30-44 mL/min/1 73 square meters)  •  Stage 4 Severe CKD (GFR = 15-29 mL/min/1 73 square meters)  •  Stage 5 End Stage CKD (GFR <15 mL/min/1 73 square meters)  Note: GFR calculation is accurate only with a steady state creatinine    CBC and differential [[id number]]  (Abnormal) Collected: 03/27/23 1246    Lab Status: Final result Specimen: Blood from Arm, Right Updated: 03/27/23 1255     WBC 8 09 Thousand/uL      RBC 5 02 Million/uL      Hemoglobin 14 0 g/dL      Hematocrit 44 0 %      MCV 88 fL      MCH 27 9 pg      MCHC 31 8 g/dL      RDW 14 2 %      MPV 11 4 fL      Platelets 440 Thousands/uL      nRBC 0 /100 WBCs      Neutrophils Relative 87 %      Immat GRANS % 0 %      Lymphocytes Relative 10 %      Monocytes Relative 3 %      Eosinophils Relative 0 %      Basophils Relative 0 %      Neutrophils Absolute 6 96 Thousands/µL      Immature Grans Absolute 0 02 Thousand/uL      Lymphocytes Absolute 0 81 Thousands/µL      Monocytes Absolute 0 26 Thousand/µL      Eosinophils Absolute 0 02 Thousand/µL      Basophils Absolute 0 02 Thousands/µL                  X-ray chest 1 view portable    (Results Pending)   XR knee 4+ views Right injury    (Results Pending)           my Independent interpretation of x-rays are as follows:    Right knee x-ray: Status post total knee replacement  No change from previous x-ray, degenerative changes but no acute traumatic injuries      Chest x-ray: No definitive infiltrate, pneumothorax or pleural effusion noted      Procedures  ECG 12 Lead Documentation Only    Date/Time: 3/27/2023 2:17 PM  Performed by: Roopa Gurrola DO  Authorized by: Roopa Gurrola DO     Indications / Diagnosis:  Near-syncope, weakness  ECG reviewed by me, the ED Provider: yes    Patient location:  ED  Previous ECG:     Comparison to cardiac monitor: Yes    Rate:     ECG rate:  88    ECG rate assessment: normal    Rhythm:     Rhythm: sinus rhythm    Ectopy:     Ectopy: none    QRS:     QRS axis:  Normal    QRS intervals:  Normal  Conduction:     Conduction: normal    ST segments:     ST segments:  Normal  T waves:     T waves: non-specific               ED Course         1400: Patient feels better after IV fluids  Will attempt to ambulate in the department  1430 -patient ambulatory in the department without difficulty  SBIRT 22yo+    Flowsheet Row Most Recent Value   SBIRT (23 yo +)    In order to provide better care to our patients, we are screening all of our patients for alcohol and drug use  Would it be okay to ask you these screening questions? No Filed at: 03/27/2023 1226                    Medical Decision Making  62-year-old female presenting with episodes of feeling lightheaded to the point where she had to lower self to the ground on 2 different occasions  On the second occasion unable to get up from this event  Complains of generalized weakness and feeling lightheaded  Contusion of right knee, initial encounter: acute illness or injury  Lightheadedness: acute illness or injury  Near syncope: acute illness or injury  Amount and/or Complexity of Data Reviewed  Independent Historian: EMS     Details: Reviewed EMS report  External Data Reviewed: labs, radiology and notes  Details: Reviewed previous right knee x-ray as well as chest x-ray  Labs: ordered  Decision-making details documented in ED Course  Radiology: ordered and independent interpretation performed  Decision-making details documented in ED Course  ECG/medicine tests: ordered and independent interpretation performed  Decision-making details documented in ED Course  Risk  OTC drugs  Prescription drug management  Decision regarding hospitalization  Risk Details: No evidence for sepsis or septic shock  No evidence for acute stroke as there are no focal motor or sensory neurological deficits  Patient is ambulatory in the department  Feels better after IV fluids  No chest pain and negative cardiac evaluation          Disposition  Final diagnoses:   Near syncope   Lightheadedness   Contusion of right knee, initial encounter Time reflects when diagnosis was documented in both MDM as applicable and the Disposition within this note     Time User Action Codes Description Comment    3/27/2023  2:47 PM Adithya Aguilar T Add [R55] Near syncope     3/27/2023  2:47 PM Adithya Aguilar T Add [R42] Lightheadedness     3/27/2023  2:47 PM Raquel Bar T Add [S80 01XA] Contusion of right knee, initial encounter       ED Disposition     ED Disposition   Discharge    Condition   Stable    Date/Time   Mon Mar 27, 2023  2:47 PM    Comment   Kristel Chung discharge to home/self care  Follow-up Information     Follow up With Specialties Details Why Contact Info    Bruce Cuellar DO Family Medicine Schedule an appointment as soon as possible for a visit   [address] ([place] [address]) [place] [address]  [phone]            Current Discharge Medication List      CONTINUE these medications which have NOT CHANGED    Details   omeprazole (PriLOSEC) 40 MG capsule Take 1 capsule (40 mg total) by mouth daily before breakfast  Qty: 30 capsule, Refills: 3    Associated Diagnoses: Esophagitis      PARoxetine (PAXIL) 10 mg tablet Take 1 tablet (10 mg total) by mouth daily  Qty: 30 tablet, Refills: 2    Associated Diagnoses: Anxiety and depression             No discharge procedures on file      PDMP Review     None          ED Provider  Electronically Signed by           Rebecca Feliz DO  03/27/23 5507

## 2023-03-27 NOTE — ED NOTES
Patient waiting for her ride at this time  Patient resting comfortably and has no complaints        Bladimir Leung RN  03/27/23 7803

## 2023-03-28 LAB
ATRIAL RATE: 89 BPM
P AXIS: 70 DEGREES
PR INTERVAL: 150 MS
QRS AXIS: -58 DEGREES
QRSD INTERVAL: 86 MS
QT INTERVAL: 382 MS
QTC INTERVAL: 464 MS
T WAVE AXIS: 67 DEGREES
VENTRICULAR RATE: 89 BPM

## 2023-03-30 ENCOUNTER — RA CDI HCC (OUTPATIENT)
Dept: OTHER | Facility: HOSPITAL | Age: 73
End: 2023-03-30

## 2023-03-30 NOTE — PROGRESS NOTES
[facility] [address]  coding opportunities       Chart reviewed, no opportunity found: CHART REVIEWED, NO OPPORTUNITY FOUND        Patients Insurance     Medicare Insurance: Medicare

## 2023-04-03 DIAGNOSIS — F32.A ANXIETY AND DEPRESSION: ICD-10-CM

## 2023-04-03 DIAGNOSIS — F41.9 ANXIETY AND DEPRESSION: ICD-10-CM

## 2023-04-03 RX ORDER — PAROXETINE 10 MG/1
10 TABLET, FILM COATED ORAL DAILY
Qty: 30 TABLET | Refills: 2 | Status: SHIPPED | OUTPATIENT
Start: 2023-04-03

## 2023-04-06 ENCOUNTER — OFFICE VISIT (OUTPATIENT)
Dept: FAMILY MEDICINE CLINIC | Facility: CLINIC | Age: 73
End: 2023-04-06

## 2023-04-06 VITALS
BODY MASS INDEX: 26.13 KG/M2 | HEIGHT: 62 IN | DIASTOLIC BLOOD PRESSURE: 80 MMHG | SYSTOLIC BLOOD PRESSURE: 124 MMHG | HEART RATE: 89 BPM | WEIGHT: 142 LBS | TEMPERATURE: 97.8 F | OXYGEN SATURATION: 97 %

## 2023-04-06 DIAGNOSIS — J30.1 ALLERGIC RHINITIS DUE TO POLLEN, UNSPECIFIED SEASONALITY: ICD-10-CM

## 2023-04-06 DIAGNOSIS — H61.21 IMPACTED CERUMEN OF RIGHT EAR: ICD-10-CM

## 2023-04-06 DIAGNOSIS — J01.01 ACUTE RECURRENT MAXILLARY SINUSITIS: Primary | ICD-10-CM

## 2023-04-06 RX ORDER — LORATADINE 10 MG/1
10 TABLET ORAL DAILY
Qty: 15 TABLET | Refills: 0 | Status: SHIPPED | OUTPATIENT
Start: 2023-04-06

## 2023-04-06 NOTE — PROGRESS NOTES
Assessment/Plan:    Problem List Items Addressed This Visit    None  Visit Diagnoses     Acute recurrent maxillary sinusitis    -  Primary    Impacted cerumen of right ear               Diagnoses and all orders for this visit:    Acute recurrent maxillary sinusitis    Impacted cerumen of right ear        No problem-specific Assessment & Plan notes found for this encounter  Subjective:      Patient ID: Yuko Oviedo is a 75 y o  female  Impacted cerumen right ear pressure in the sinuses most likely from allergic rhinitis      The following portions of the patient's history were reviewed and updated as appropriate:   She has a past medical history of Abnormal blood chemistry, Achalasia, esophageal, Acute medial meniscus tear, Acute otitis externa, Adrenal nodule ([address] ), Anemia, Anxiety, Arthritis, Atrial fibrillation ([address] ), Atypical chest pain, Eagle's esophagus with high grade dysplasia, Cancer (HCC), Cerumen impaction, Chronic pain of right knee, Coronary artery disease, Depression, Dysphagia, Esophageal stricture, GERD (gastroesophageal reflux disease), Headache, Hiatal hernia, Insomnia, Jejunostomy tube present ([address] ), Low vitamin D level, Osteoarthritis, knee, Pneumonia, Right leg paresthesias, Sciatica, Shingles, Spondylosis of 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, and Venous insufficiency  ,  does not have any pertinent problems on file  ,   has a past surgical history that includes  section; pr esophagogastroduodenoscopy transoral diagnostic (N/A, 3/10/2017); Steroid injection knee (Right, 2017); Joint replacement (Right, 11/10/2014); pr esophagogastroduodenoscopy transoral diagnostic (N/A, 2017); pr esophagoscopy flexible transoral with biopsy (N/A, 2017); pr tot esophagectomy w/o thorcom w/wo pyloroplasty (N/A, 2017); Gastrojejunostomy w/ jejunostomy tube (N/A, 2017);  Gastrectomy; pr laps repair hernia except incal/ingun reducible (N/A, "7/31/2019); LAPAROTOMY (N/A, 8/3/2019); Exploratory laparotomy w/ bowel resection (N/A, 8/2/2019); Ventral hernia repair (07/30/2019); and IR image guided aspiration / drainage w tube (2/21/2020)  ,  family history includes Alzheimer's disease in her mother; Diabetes type II in her father  ,   reports that she has never smoked  She has never used smokeless tobacco  She reports that she does not currently use alcohol  She reports that she does not use drugs  ,  has No Known Allergies     Current Outpatient Medications   Medication Sig Dispense Refill   • omeprazole (PriLOSEC) 40 MG capsule Take 1 capsule (40 mg total) by mouth daily before breakfast 30 capsule 3   • PARoxetine (PAXIL) 10 mg tablet Take 1 tablet (10 mg total) by mouth daily 30 tablet 2     No current facility-administered medications for this visit  Review of Systems   Constitutional: Positive for activity change and fatigue  HENT: Positive for ear pain, hearing loss, postnasal drip, sinus pressure and sneezing  Eyes: Positive for visual disturbance  Respiratory: Negative for cough and shortness of breath  Cardiovascular: Negative for chest pain and palpitations  Gastrointestinal: Negative for abdominal distention and abdominal pain  Neurological: Negative for dizziness  Objective:  Vitals:    04/06/23 0908   BP: 124/80   BP Location: Left arm   Patient Position: Sitting   Cuff Size: Standard   Pulse: 89   Temp: 97 8 °F (36 6 °C)   TempSrc: Temporal   SpO2: 97%   Weight: 64 4 kg (142 lb)   Height: 5' 2\" (1 575 m)     Body mass index is 25 97 kg/m²  Physical Exam  Constitutional:       General: She is not in acute distress  Appearance: She is well-developed  She is ill-appearing  She is not diaphoretic  HENT:      Head: Normocephalic  Right Ear: External ear normal       Left Ear: External ear normal  There is impacted cerumen  Nose: Congestion present  Eyes:      General: No scleral icterus          Right " eye: No discharge  Left eye: No discharge  Conjunctiva/sclera: Conjunctivae normal       Pupils: Pupils are equal, round, and reactive to light  Neck:      Thyroid: No thyromegaly  Trachea: No tracheal deviation  Cardiovascular:      Rate and Rhythm: Normal rate and regular rhythm  Heart sounds: Normal heart sounds  No murmur heard  No friction rub  No gallop  Pulmonary:      Effort: Pulmonary effort is normal  No respiratory distress  Breath sounds: Normal breath sounds  No wheezing  Abdominal:      General: Bowel sounds are normal       Palpations: Abdomen is soft  There is no mass  Tenderness: There is no abdominal tenderness  There is no guarding  Musculoskeletal:         General: No deformity  Cervical back: Normal range of motion  Lymphadenopathy:      Cervical: No cervical adenopathy  Skin:     General: Skin is warm and dry  Findings: No erythema or rash  Neurological:      Mental Status: She is alert and oriented to person, place, and time  Cranial Nerves: No cranial nerve deficit  Psychiatric:         Thought Content:  Thought content normal

## 2023-04-25 ENCOUNTER — TELEPHONE (OUTPATIENT)
Dept: GASTROENTEROLOGY | Facility: CLINIC | Age: 73
End: 2023-04-25

## 2023-05-02 ENCOUNTER — HOSPITAL ENCOUNTER (OUTPATIENT)
Dept: PERIOP | Facility: HOSPITAL | Age: 73
Setting detail: OUTPATIENT SURGERY
Discharge: HOME | End: 2023-05-02
Attending: INTERNAL MEDICINE | Admitting: INTERNAL MEDICINE

## 2023-05-02 ENCOUNTER — ANESTHESIA (OUTPATIENT)
Dept: ANESTHESIOLOGY | Facility: HOSPITAL | Age: 73
End: 2023-05-02

## 2023-05-02 ENCOUNTER — ANESTHESIA EVENT (OUTPATIENT)
Dept: ANESTHESIOLOGY | Facility: HOSPITAL | Age: 73
End: 2023-05-02

## 2023-05-02 ENCOUNTER — ANESTHESIA EVENT (OUTPATIENT)
Dept: PERIOP | Facility: HOSPITAL | Age: 73
End: 2023-05-02

## 2023-05-02 ENCOUNTER — ANESTHESIA (OUTPATIENT)
Dept: PERIOP | Facility: HOSPITAL | Age: 73
End: 2023-05-02

## 2023-05-02 VITALS
TEMPERATURE: 97.4 F | DIASTOLIC BLOOD PRESSURE: 79 MMHG | RESPIRATION RATE: 18 BRPM | SYSTOLIC BLOOD PRESSURE: 143 MMHG | OXYGEN SATURATION: 99 % | HEART RATE: 69 BPM

## 2023-05-02 DIAGNOSIS — K22.711 BARRETT'S ESOPHAGUS WITH HIGH GRADE DYSPLASIA: ICD-10-CM

## 2023-05-02 DIAGNOSIS — K20.90 ESOPHAGITIS: ICD-10-CM

## 2023-05-02 DIAGNOSIS — R63.4 UNINTENTIONAL WEIGHT LOSS: ICD-10-CM

## 2023-05-02 RX ORDER — LIDOCAINE HYDROCHLORIDE 20 MG/ML
INJECTION, SOLUTION EPIDURAL; INFILTRATION; INTRACAUDAL; PERINEURAL AS NEEDED
Status: DISCONTINUED | OUTPATIENT
Start: 2023-05-02 | End: 2023-05-02

## 2023-05-02 RX ORDER — SODIUM CHLORIDE, SODIUM LACTATE, POTASSIUM CHLORIDE, CALCIUM CHLORIDE 600; 310; 30; 20 MG/100ML; MG/100ML; MG/100ML; MG/100ML
INJECTION, SOLUTION INTRAVENOUS CONTINUOUS PRN
Status: DISCONTINUED | OUTPATIENT
Start: 2023-05-02 | End: 2023-05-02

## 2023-05-02 RX ORDER — OMEPRAZOLE 40 MG/1
CAPSULE, DELAYED RELEASE ORAL
Qty: 30 CAPSULE | Refills: 0 | Status: SHIPPED | OUTPATIENT
Start: 2023-05-02

## 2023-05-02 RX ORDER — PROPOFOL 10 MG/ML
INJECTION, EMULSION INTRAVENOUS AS NEEDED
Status: DISCONTINUED | OUTPATIENT
Start: 2023-05-02 | End: 2023-05-02

## 2023-05-02 RX ADMIN — PROPOFOL 120 MG: 10 INJECTION, EMULSION INTRAVENOUS at 09:39

## 2023-05-02 RX ADMIN — LIDOCAINE HYDROCHLORIDE 60 MG: 20 INJECTION, SOLUTION EPIDURAL; INFILTRATION; INTRACAUDAL; PERINEURAL at 09:39

## 2023-05-02 RX ADMIN — PROPOFOL 30 MG: 10 INJECTION, EMULSION INTRAVENOUS at 09:41

## 2023-05-02 RX ADMIN — SODIUM CHLORIDE, SODIUM LACTATE, POTASSIUM CHLORIDE, AND CALCIUM CHLORIDE: .6; .31; .03; .02 INJECTION, SOLUTION INTRAVENOUS at 09:36

## 2023-05-02 NOTE — ANESTHESIA PREPROCEDURE EVALUATION
Procedure:  PRE-OP ONLY    Relevant Problems   CARDIO   (+) 3-vessel CAD   (+) Hyperlipidemia      ENDO   (+) Hypothyroidism      GI/HEPATIC   (+) Dysphagia   (+) Gastroesophageal reflux disease      HEMATOLOGY   (+) Anemia      MUSCULOSKELETAL   (+) Osteoarthritis of knee   (+) Spondylosis of 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      NEURO/PSYCH   (+) Anxiety and depression   (+) History of esophageal cancer        Physical Exam    Airway      TM Distance: >3 FB  Neck ROM: full     Dental       Cardiovascular  Cardiovascular exam normal    Pulmonary  Pulmonary exam normal     Other Findings        Anesthesia Plan  ASA Score- 3     Anesthesia Type- IV sedation with anesthesia with ASA Monitors  Additional Monitors:   Airway Plan:           Plan Factors-    Chart reviewed  Induction- intravenous  Postoperative Plan-     Informed Consent- Anesthetic plan and risks discussed with patient  I personally reviewed this patient with the CRNA  Discussed and agreed on the Anesthesia Plan with the CRNA  Vicki Osuna

## 2023-05-02 NOTE — ANESTHESIA POSTPROCEDURE EVALUATION
Post-Op Assessment Note    CV Status:  Stable  Pain Score: 0    Pain management: adequate     Mental Status:  Sleepy   Hydration Status:  Euvolemic and stable   PONV Controlled:  None   Airway Patency:  Patent   Two or more mitigation strategies used for obstructive sleep apnea   Post Op Vitals Reviewed: Yes      Staff: CRNA         No notable events documented      BP   138/65   Temp  97 8   Pulse  84   Resp   24   SpO2   99

## 2023-05-02 NOTE — H&P
History and Physical - [facility]  Chito Chung 75 y o  female MRN: [id number]                  HPI: Evie Cifuentes is a 75y o  year old female who presents for esophagitis      REVIEW OF SYSTEMS: Per the HPI, and otherwise unremarkable      Historical Information   Past Medical History:   Diagnosis Date    Abnormal blood chemistry     last assessed 2014    Achalasia, esophageal     Acute medial meniscus tear     last assessed 03/10/2014    Acute otitis externa     last assessed 2014    Adren[facility] nodule ([facility]     Anemia     Anxiety     Arthritis     [facility]     resolved 2018    Atypical chest pain     last assessed 2016    Eagle's esophagus with high grade dysplasia     last assessed 2018    Cancer ([facility] )     esophageal    Cerumen impaction     last assessed 2014    Chronic pain of right knee     last assessed 2017    Coronary artery disease     3 vessel, resolved 2018    Depression     Dysphagia     last assessed 2017    Esophageal stricture     last assessed 2017    GERD (gastroesophageal reflux disease)     Headache     last assessed 2013    Hiatal hernia     Insomnia     last assessed 10/15/2013    Jejunostomy tube present ([facility] )     resolved 2018    Low vitamin D level     resolved 2018    Osteoarthritis, knee     last assessed 2017    Pneumonia     last assessed 2013    Right leg paresthesias     last assessed 2017    Sciatica     last assessed 2015    Shingles     last assessed 2015    Spondylosis of 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     last assessed 2017    Venous insufficiency     last assessed 2013     Past Surgical History:   Procedure Laterality Date     SECTION      EXPLORATORY LAPAROTOMY W/ BOWEL RESECTION N/A 2019    Procedure: LAPAROTOMY EXPLORATORY W/ BOWEL RESECTION, APPLICATION OF Judith Finn;  Surgeon: Karina Carmen DO;  [facility];  Service: General    GASTRECTOMY      partial    GASTROJEJUNOSTOMY W/ JEJUNOSTOMY TUBE N/A 8/24/2017    Procedure: JEJUNOSTOMY TUBE PLACEMENT;  Surgeon: Yair Carreon MD;  [facility];  Service: Thoracic    IR IMAGE [address] / DRAINAGE W TUBE  2/21/2020    JOINT REPLACEMENT Right 11/10/2014    knee, Dr Lilliam Gotti N/A 8/3/2019    Procedure: LAPAROTOMY EXPLORATORY, reanastomosis of proximal small bowel, placement of vac dressing;  Surgeon: Karina Carmen DO;  [facility];  Service: General    AL ESOPHAGOGASTRODUODENOSCOPY TRANSORAL DIAGNOSTIC N/A 3/10/2017    Procedure: ESOPHAGOGASTRODUODENOSCOPY (EGD); Surgeon: Stephania Izquierdo MD;  [facility];  Service: Gastroenterology    AL ESOPHAGOGASTRODUODENOSCOPY TRANSORAL DIAGNOSTIC N/A 5/19/2017    Procedure: ESOPHAGOGASTRODUODENOSCOPY (EGD); Surgeon: Stephania Izquierdo MD;  [facility];  Service: Gastroenterology    AL ESOPHAGOSCOPY FLEXIBLE TRANSORAL WITH BIOPSY N/A 8/24/2017    Procedure: ESOPHAGOGASTRODUODENOSCOPY (EGD);   Surgeon: Yair Carreon MD;  [facility];  Service: Thoracic    AL LAPS REPAIR HERNIA EXCEPT INCAL/INGUN REDUCIBLE N/A 7/31/2019    Procedure: REPAIR HERNIA VENTRAL LAPAROSCOPIC;  Surgeon: Nathaniel Brito DO;  [facility];  Service: General    AL TOT ESOPHAGECTOMY W/O THORCOM W/WO PYLOROPLASTY N/A 8/24/2017    Procedure: TRANSHIATAL ESOPHAGECTOMY;  Surgeon: Yair Carreon MD;  [facility];  Service: Thoracic    STEROID INJECTION KNEE Right 5/2/2017    Procedure: GENICULATE NERVE BLOCKS;  Surgeon: Molly Cruz DO;  [facility];  Service:    [id number]   07/30/2019     Social History   Social History     Substance and Sexual Activity   Alcohol Use Not Currently    Alcohol/week: 0 0 standard drinks     Social History     Substance and Sexual Activity   Drug Use No     Social History     Tobacco Use   Smoking Status Never   Smokeless Tobacco Never     Family History   Problem Relation Age of Onset    Alzheimer's disease Mother     Diabetes type II Father        Meds/Allergies     (Not in a hospital admission)      No Known Allergies    Objective     Blood pressure 149/94, pulse 76, temperature (!) 97 4 °F (36 3 °C), temperature source Tympanic, resp  rate 18, SpO2 97 %  PHYSICAL EXAMINATION:    General Appearance:   Alert, cooperative, no distress   HEENT:  Normocephalic, atraumatic, anicteric  Neck supple, symmetrical, trachea midline  Lungs:   Equal chest rise and unlabored breathing, normal effort, no coughing  Cardiovascular:   No visualized JVD  Abdomen:   No abdominal distension  Skin:   No jaundice, rashes, or lesions  Musculoskeletal:   Normal range of motion visualized  Psych:  Normal affect and normal insight  Neuro:  Alert and appropriate  ASSESSMENT/PLAN:  This is a 75y o  year old female here for EGD, and she is stable and optimized for her procedure

## 2023-05-02 NOTE — ANESTHESIA PREPROCEDURE EVALUATION
Procedure:  EGD    Relevant Problems   CARDIO   (+) 3-vessel CAD   (+) Hyperlipidemia      ENDO   (+) Hypothyroidism      GI/HEPATIC   (+) Dysphagia   (+) Gastroesophageal reflux disease      HEMATOLOGY   (+) Anemia      MUSCULOSKELETAL   (+) Osteoarthritis of knee   (+) Spondylosis of 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      NEURO/PSYCH   (+) Anxiety and depression   (+) History of esophageal cancer        Physical Exam    Airway      TM Distance: >3 FB  Neck ROM: full     Dental       Cardiovascular  Cardiovascular exam normal    Pulmonary  Pulmonary exam normal     Other Findings        Anesthesia Plan  ASA Score- 3     Anesthesia Type- IV sedation with anesthesia with ASA Monitors  Additional Monitors:   Airway Plan:           Plan Factors-    Chart reviewed  Induction- intravenous  Postoperative Plan-     Informed Consent- Anesthetic plan and risks discussed with patient  I personally reviewed this patient with the CRNA  Discussed and agreed on the Anesthesia Plan with the CRNA  Amador Rodriges

## 2023-05-09 ENCOUNTER — HOSPITAL ENCOUNTER (OUTPATIENT)
Dept: MAMMOGRAPHY | Facility: HOSPITAL | Age: 73
Discharge: HOME | End: 2023-05-09
Attending: FAMILY MEDICINE

## 2023-05-09 ENCOUNTER — HOSPITAL ENCOUNTER (OUTPATIENT)
Dept: BONE DENSITY | Facility: HOSPITAL | Age: 73
Discharge: HOME | End: 2023-05-09
Attending: FAMILY MEDICINE

## 2023-05-09 VITALS — WEIGHT: 141.98 LBS | BODY MASS INDEX: 26.13 KG/M2 | HEIGHT: 62 IN

## 2023-05-09 VITALS — HEIGHT: 61 IN | WEIGHT: 148 LBS | BODY MASS INDEX: 27.94 KG/M2

## 2023-05-09 DIAGNOSIS — Z78.0 MENOPAUSE: ICD-10-CM

## 2023-05-09 DIAGNOSIS — Z12.31 ENCOUNTER FOR SCREENING MAMMOGRAM FOR MALIGNANT NEOPLASM OF BREAST: ICD-10-CM

## 2023-06-02 ENCOUNTER — OFFICE VISIT (OUTPATIENT)
Dept: FAMILY MEDICINE CLINIC | Facility: CLINIC | Age: 73
End: 2023-06-02

## 2023-06-02 VITALS
WEIGHT: 152.4 LBS | SYSTOLIC BLOOD PRESSURE: 128 MMHG | TEMPERATURE: 98.8 F | HEIGHT: 61 IN | HEART RATE: 80 BPM | BODY MASS INDEX: 28.77 KG/M2 | DIASTOLIC BLOOD PRESSURE: 68 MMHG | OXYGEN SATURATION: 95 %

## 2023-06-02 DIAGNOSIS — H10.32 ACUTE BACTERIAL CONJUNCTIVITIS OF LEFT EYE: ICD-10-CM

## 2023-06-02 DIAGNOSIS — J06.9 UPPER RESPIRATORY TRACT INFECTION, UNSPECIFIED TYPE: Primary | ICD-10-CM

## 2023-06-02 RX ORDER — TOBRAMYCIN 3 MG/ML
2 SOLUTION/ DROPS OPHTHALMIC
Qty: 3.5 ML | Refills: 0 | Status: SHIPPED | OUTPATIENT
Start: 2023-06-02 | End: 2023-06-09

## 2023-06-02 RX ORDER — AMOXICILLIN 500 MG/1
500 CAPSULE ORAL EVERY 8 HOURS SCHEDULED
Qty: 30 CAPSULE | Refills: 0 | Status: SHIPPED | OUTPATIENT
Start: 2023-06-02 | End: 2023-06-12

## 2023-06-02 NOTE — PROGRESS NOTES
Name: Kallie Doan      : 1950      MRN: [id number]  Encounter Provider: Jackie Silva PA-C  Encounter Date: 2023   Encounter department: [address] [place] [address]     1  Upper respiratory tract infection, unspecified type  Assessment & Plan:  Prescription for amoxicillin  Recommended that she continue with symptomatic relief  She will notify us if symptoms do not improve or worsen      Orders:  -     amoxicillin (AMOXIL) 500 mg capsule; Take 1 capsule (500 mg total) by mouth every 8 (eight) hours for 10 days    2  Acute bacterial conjunctivitis of left eye  Assessment & Plan:  Prescription for tobramycin drops  Apply warm compress  She will notify us if symptoms do not improve or worsen    Orders:  -     tobramycin (Tobrex) 0 3 % SOLN; Administer 2 drops into the left eye every 4 (four) hours while awake for 7 days           Subjective      Sony Santoro a pleasant 18-year-old female who is here today complaining of cold-like symptoms since Monday  She is complaining of cough, sore throat, congestion, sinus pressure, and postnasal drip  She denies any fevers, chills, body aches, chest pains, or shortness of breath  She also started a few days ago with a left itchy eye  This morning her eye was crusted  She has been applying a warm compress  She denies any sick contacts  She has been using throat lozenges  Review of Systems   Constitutional: Negative for chills, diaphoresis, fatigue and fever  HENT: Positive for congestion, postnasal drip, rhinorrhea and sore throat  Negative for ear pain, sneezing and trouble swallowing  Eyes: Negative for pain and visual disturbance  Respiratory: Positive for cough  Negative for apnea, shortness of breath and wheezing  Cardiovascular: Negative for chest pain and palpitations  Gastrointestinal: Negative for abdominal pain, constipation, diarrhea, nausea and vomiting  Genitourinary: Negative for dysuria and hematuria  "  Musculoskeletal: Negative for arthralgias, gait problem and myalgias  Neurological: Negative for dizziness, syncope, weakness, light-headedness, numbness and headaches  Psychiatric/Behavioral: Negative for suicidal ideas  The patient is not nervous/anxious  Current Outpatient Medications on File Prior to Visit   Medication Sig   • loratadine (CLARITIN) 10 mg tablet Take 1 tablet (10 mg total) by mouth daily   • omeprazole (PriLOSEC) 40 MG capsule Take 1 capsule (40 mg total) by mouthdaily before breakfast   • PARoxetine (PAXIL) 10 mg tablet Take 1 tablet (10 mg total) by mouth daily        Objective     /68   Pulse 80   Temp 98 8 °F (37 1 °C)   Ht 5' 0 75\" (1 543 m)   Wt 69 1 kg (152 lb 6 4 oz)   SpO2 95%   BMI 29 03 kg/m²     Physical Exam  Vitals and nursing note reviewed  Constitutional:       Appearance: She is well-developed  HENT:      Head: Normocephalic and atraumatic  Right Ear: Tympanic membrane, ear canal and external ear normal       Left Ear: Tympanic membrane, ear canal and external ear normal       Nose: Mucosal edema, congestion and rhinorrhea present  Mouth/Throat:      Pharynx: Posterior oropharyngeal erythema present  No oropharyngeal exudate  Eyes:      Extraocular Movements: Extraocular movements intact  Cardiovascular:      Rate and Rhythm: Normal rate and regular rhythm  Heart sounds: Normal heart sounds  No murmur heard  No friction rub  No gallop  Pulmonary:      Effort: Pulmonary effort is normal  No respiratory distress  Breath sounds: Normal breath sounds  No wheezing or rales  Musculoskeletal:         General: Normal range of motion  Cervical back: Normal range of motion and neck supple  Lymphadenopathy:      Cervical: Cervical adenopathy present  Skin:     General: Skin is warm and dry  Neurological:      Mental Status: She is alert and oriented to person, place, and time     Psychiatric:         Behavior: Behavior " normal          Thought Content:  Thought content normal          Judgment: Judgment normal        Breana Curtis PA-C

## 2023-06-02 NOTE — ASSESSMENT & PLAN NOTE
Prescription for tobramycin drops  Apply warm compress  She will notify us if symptoms do not improve or worsen

## 2023-06-02 NOTE — ASSESSMENT & PLAN NOTE
Prescription for amoxicillin  Recommended that she continue with symptomatic relief  She will notify us if symptoms do not improve or worsen

## 2023-07-14 DIAGNOSIS — F32.A ANXIETY AND DEPRESSION: ICD-10-CM

## 2023-07-14 DIAGNOSIS — F41.9 ANXIETY AND DEPRESSION: ICD-10-CM

## 2023-07-14 RX ORDER — PAROXETINE 10 MG/1
10 TABLET, FILM COATED ORAL DAILY
Qty: 30 TABLET | Refills: 2 | Status: SHIPPED | OUTPATIENT
Start: 2023-07-14

## 2023-08-01 PROBLEM — H10.32 ACUTE BACTERIAL CONJUNCTIVITIS OF LEFT EYE: Status: RESOLVED | Noted: 2023-06-02 | Resolved: 2023-08-01

## 2023-08-28 ENCOUNTER — APPOINTMENT (OUTPATIENT)
Dept: RADIOLOGY | Facility: MEDICAL CENTER | Age: 73
End: 2023-08-28
Payer: COMMERCIAL

## 2023-08-28 ENCOUNTER — OFFICE VISIT (OUTPATIENT)
Dept: FAMILY MEDICINE CLINIC | Facility: CLINIC | Age: 73
End: 2023-08-28
Payer: COMMERCIAL

## 2023-08-28 VITALS
SYSTOLIC BLOOD PRESSURE: 128 MMHG | BODY MASS INDEX: 30.29 KG/M2 | TEMPERATURE: 97.8 F | HEART RATE: 82 BPM | OXYGEN SATURATION: 97 % | WEIGHT: 160.4 LBS | DIASTOLIC BLOOD PRESSURE: 86 MMHG | HEIGHT: 61 IN

## 2023-08-28 DIAGNOSIS — F32.A ANXIETY AND DEPRESSION: ICD-10-CM

## 2023-08-28 DIAGNOSIS — Z96.651 HISTORY OF TOTAL RIGHT KNEE REPLACEMENT (TKR): ICD-10-CM

## 2023-08-28 DIAGNOSIS — F41.9 ANXIETY AND DEPRESSION: ICD-10-CM

## 2023-08-28 DIAGNOSIS — M25.561 ACUTE PAIN OF RIGHT KNEE: Primary | ICD-10-CM

## 2023-08-28 DIAGNOSIS — M25.561 ACUTE PAIN OF RIGHT KNEE: ICD-10-CM

## 2023-08-28 PROCEDURE — 73562 X-RAY EXAM OF KNEE 3: CPT

## 2023-08-28 PROCEDURE — 99214 OFFICE O/P EST MOD 30 MIN: CPT | Performed by: PHYSICIAN ASSISTANT

## 2023-08-28 RX ORDER — PAROXETINE HYDROCHLORIDE 20 MG/1
20 TABLET, FILM COATED ORAL DAILY
Qty: 30 TABLET | Refills: 0 | Status: SHIPPED | OUTPATIENT
Start: 2023-08-28

## 2023-08-28 NOTE — PROGRESS NOTES
Name: Wendy Turner      : 1950      MRN: [id number]  Encounter Provider: Zia Monsalve PA-C  Encounter Date: 2023   Encounter department: [address]     1. Acute pain of right knee  Assessment & Plan:  Check Xray R knee for further evaluation, pt is S/P R TKR. Denies trauma to knee. Orders:  -     XR knee 3 vw right non injury; Future; Expected date: 2023    2. History of total right knee replacement (TKR)  -     XR knee 3 vw right non injury; Future; Expected date: 2023    3. Anxiety and depression  Assessment & Plan:  Currently taking Paxil 10 mg daily. Will increase to 20 mg daily, pt to RTO 1 month for follow up. Orders:  -     PARoxetine (PAXIL) 20 mg tablet; Take 1 tablet (20 mg total) by mouth daily           Temitope Feldman is here today complaining of pain in R knee x 2 weeks, denies trauma. States did have a bug bite on R lateral calf, however has resolved. Also, pt very upset with daughter again, she is tearful at times, requesting increase in Paxil today. Review of Systems   Constitutional: Negative for activity change, appetite change, chills, diaphoresis, fatigue, fever and unexpected weight change. HENT: Negative for congestion, ear pain, postnasal drip, rhinorrhea, sinus pressure, sinus pain, sneezing, sore throat, tinnitus and voice change. Eyes: Negative for pain, redness and visual disturbance. Respiratory: Negative for cough, chest tightness, shortness of breath and wheezing. Cardiovascular: Negative for chest pain, palpitations and leg swelling. Gastrointestinal: Negative for abdominal pain, blood in stool, constipation, diarrhea, nausea and vomiting. Genitourinary: Negative for difficulty urinating, dysuria, frequency, hematuria and urgency. Musculoskeletal: Positive for arthralgias (R knee pain) and gait problem (R knee pain).  Negative for back pain, joint swelling, myalgias, neck pain and neck stiffness. Skin: Negative for color change, pallor, rash and wound. Neurological: Negative for dizziness, tremors, weakness, light-headedness and headaches. Psychiatric/Behavioral: Positive for dysphoric mood. Negative for self-injury, sleep disturbance and suicidal ideas. The patient is nervous/anxious. Current Outpatient Medications on File Prior to Visit   Medication Sig   • omeprazole (PriLOSEC) 40 MG capsule Take 1 capsule (40 mg total) by mouthdaily before breakfast   • [DISCONTINUED] PARoxetine (PAXIL) 10 mg tablet Take 1 tablet (10 mg total) by mouth daily   • loratadine (CLARITIN) 10 mg tablet Take 1 tablet (10 mg total) by mouth daily (Patient not taking: Reported on 8/28/2023)       Objective     /86 (BP Location: Left arm, Patient Position: Sitting, Cuff Size: Standard)   Pulse 82   Temp 97.8 °F (36.6 °C) (Temporal)   Ht 5' 0.75" (1.543 m)   Wt 72.8 kg (160 lb 6.4 oz)   SpO2 97%   BMI 30.56 kg/m²     Physical Exam  Vitals reviewed. Constitutional:       General: She is not in acute distress. Appearance: She is well-developed. She is not diaphoretic. HENT:      Head: Normocephalic and atraumatic. Right Ear: Hearing, tympanic membrane, ear canal and external ear normal.      Left Ear: Hearing, tympanic membrane, ear canal and external ear normal.      Mouth/Throat:      Pharynx: Uvula midline. No oropharyngeal exudate. Eyes:      General: No scleral icterus. Right eye: No discharge. Left eye: No discharge. Conjunctiva/sclera: Conjunctivae normal.   Neck:      Thyroid: No thyromegaly. Vascular: No carotid bruit. Cardiovascular:      Rate and Rhythm: Normal rate and regular rhythm. Heart sounds: Normal heart sounds. No murmur heard. Pulmonary:      Effort: Pulmonary effort is normal. No respiratory distress. Breath sounds: Normal breath sounds. No wheezing.    Abdominal:      General: Bowel sounds are normal. There is no distension. Palpations: Abdomen is soft. There is no mass. Tenderness: There is no abdominal tenderness. There is no guarding or rebound. Musculoskeletal:         General: Normal range of motion. Cervical back: Neck supple. Right knee: No swelling, effusion, erythema, ecchymosis or crepitus. Normal range of motion. Tenderness (generalized) present. Left knee: Normal.   Lymphadenopathy:      Cervical: No cervical adenopathy. Skin:     General: Skin is warm and dry. Findings: No erythema or rash. Neurological:      Mental Status: She is alert and oriented to person, place, and time. Gait: Gait abnormal (walking with cane). Psychiatric:         Behavior: Behavior normal.         Thought Content:  Thought content normal.         Judgment: Judgment normal.       Reji Rowan PA-C

## 2023-09-28 ENCOUNTER — OFFICE VISIT (OUTPATIENT)
Dept: FAMILY MEDICINE CLINIC | Facility: CLINIC | Age: 73
End: 2023-09-28
Payer: COMMERCIAL

## 2023-09-28 VITALS
WEIGHT: 158.8 LBS | OXYGEN SATURATION: 98 % | DIASTOLIC BLOOD PRESSURE: 76 MMHG | HEART RATE: 74 BPM | BODY MASS INDEX: 29.98 KG/M2 | SYSTOLIC BLOOD PRESSURE: 124 MMHG | HEIGHT: 61 IN | TEMPERATURE: 97 F

## 2023-09-28 DIAGNOSIS — M25.561 ACUTE PAIN OF RIGHT KNEE: ICD-10-CM

## 2023-09-28 DIAGNOSIS — Z23 NEED FOR INFLUENZA VACCINATION: ICD-10-CM

## 2023-09-28 DIAGNOSIS — F41.9 ANXIETY AND DEPRESSION: Primary | ICD-10-CM

## 2023-09-28 DIAGNOSIS — K21.9 GASTROESOPHAGEAL REFLUX DISEASE WITHOUT ESOPHAGITIS: ICD-10-CM

## 2023-09-28 DIAGNOSIS — F32.A ANXIETY AND DEPRESSION: Primary | ICD-10-CM

## 2023-09-28 PROBLEM — K20.90 ESOPHAGITIS: Status: ACTIVE | Noted: 2023-09-28

## 2023-09-28 PROCEDURE — G0008 ADMIN INFLUENZA VIRUS VAC: HCPCS | Performed by: PHYSICIAN ASSISTANT

## 2023-09-28 PROCEDURE — 90662 IIV NO PRSV INCREASED AG IM: CPT | Performed by: PHYSICIAN ASSISTANT

## 2023-09-28 PROCEDURE — 99214 OFFICE O/P EST MOD 30 MIN: CPT | Performed by: PHYSICIAN ASSISTANT

## 2023-09-28 RX ORDER — PAROXETINE HYDROCHLORIDE 20 MG/1
20 TABLET, FILM COATED ORAL DAILY
Qty: 90 TABLET | Refills: 0 | Status: SHIPPED | OUTPATIENT
Start: 2023-09-28

## 2023-09-28 RX ORDER — OMEPRAZOLE 40 MG/1
40 CAPSULE, DELAYED RELEASE ORAL DAILY
Qty: 90 CAPSULE | Refills: 0 | Status: SHIPPED | OUTPATIENT
Start: 2023-09-28

## 2023-09-28 NOTE — PROGRESS NOTES
Name: Edwin Zapata      : 1950      MRN: [id number]  Encounter Provider: Frantz Zhou PA-C  Encounter Date: 2023   Encounter department: [address]     1. Anxiety and depression  Assessment & Plan:  Continue Paxil 20 mg daily, refill given. Orders:  -     PARoxetine (PAXIL) 20 mg tablet; Take 1 tablet (20 mg total) by mouth daily    2. Acute pain of right knee  Assessment & Plan:  Xray R knee WNL, S/P TKR. Will refer to physical therapy. Orders:  -     Ambulatory Referral to Physical Therapy; Future    3. Need for influenza vaccination  -     influenza vaccine, high-dose, PF 0.7 mL (FLUZONE HIGH-DOSE)    4. Gastroesophageal reflux disease without esophagitis  Assessment & Plan:  Stable with omeprazole, refill given. Continue to take daily. Orders:  -     omeprazole (PriLOSEC) 40 MG capsule; Take 1 capsule (40 mg total) by mouth daily           Subjective      Nata Avendano is here today for 1 month follow up since increasing Paxil to 20 mg daily. She notes that she feels improvement in anxiety/depression. She is still complaining of R knee pain, recommend physical therapy as her Xray was WNL. She is requesting refill on omeprazole today for GERD, currently controlled. Review of Systems   Constitutional: Negative for activity change, appetite change, chills, diaphoresis, fatigue, fever and unexpected weight change. HENT: Negative for congestion, ear pain, postnasal drip, rhinorrhea, sinus pressure, sinus pain, sneezing, sore throat, tinnitus and voice change. Eyes: Negative for pain, redness and visual disturbance. Respiratory: Negative for cough, chest tightness, shortness of breath and wheezing. Cardiovascular: Negative for chest pain, palpitations and leg swelling. Gastrointestinal: Negative for abdominal pain, blood in stool, constipation, diarrhea, nausea and vomiting.    Genitourinary: Negative for difficulty urinating, dysuria, frequency, hematuria and urgency. Musculoskeletal: Positive for arthralgias (R knee). Negative for back pain, gait problem, joint swelling, myalgias, neck pain and neck stiffness. Skin: Negative for color change, pallor, rash and wound. Neurological: Negative for dizziness, tremors, weakness, light-headedness and headaches. Psychiatric/Behavioral: Negative for dysphoric mood, self-injury, sleep disturbance and suicidal ideas. The patient is not nervous/anxious. Current Outpatient Medications on File Prior to Visit   Medication Sig   • [DISCONTINUED] omeprazole (PriLOSEC) 40 MG capsule Take 1 capsule (40 mg total) by mouthdaily before breakfast   • [DISCONTINUED] PARoxetine (PAXIL) 20 mg tablet Take 1 tablet (20 mg total) by mouth daily   • loratadine (CLARITIN) 10 mg tablet Take 1 tablet (10 mg total) by mouth daily (Patient not taking: Reported on 8/28/2023)       Objective     /76   Pulse 74   Temp (!) 97 °F (36.1 °C)   Ht 5' 0.75" (1.543 m)   Wt 72 kg (158 lb 12.8 oz)   SpO2 98%   BMI 30.25 kg/m²     Physical Exam  Vitals reviewed. Constitutional:       General: She is not in acute distress. Appearance: She is well-developed. She is not diaphoretic. HENT:      Head: Normocephalic and atraumatic. Right Ear: Hearing, tympanic membrane, ear canal and external ear normal.      Left Ear: Hearing, tympanic membrane, ear canal and external ear normal.      Mouth/Throat:      Pharynx: Uvula midline. No oropharyngeal exudate. Eyes:      General: No scleral icterus. Right eye: No discharge. Left eye: No discharge. Conjunctiva/sclera: Conjunctivae normal.   Neck:      Thyroid: No thyromegaly. Vascular: No carotid bruit. Cardiovascular:      Rate and Rhythm: Normal rate and regular rhythm. Heart sounds: Normal heart sounds. No murmur heard. Pulmonary:      Effort: Pulmonary effort is normal. No respiratory distress. Breath sounds: Normal breath sounds.  No wheezing. Abdominal:      General: Bowel sounds are normal. There is no distension. Palpations: Abdomen is soft. There is no mass. Tenderness: There is no abdominal tenderness. There is no guarding or rebound. Musculoskeletal:      Cervical back: Neck supple. Lymphadenopathy:      Cervical: No cervical adenopathy. Skin:     General: Skin is warm and dry. Findings: No erythema or rash. Neurological:      Mental Status: She is alert and oriented to person, place, and time. Psychiatric:         Behavior: Behavior normal.         Thought Content:  Thought content normal.         Judgment: Judgment normal.       Yao Miller PA-C

## 2023-10-17 ENCOUNTER — EVALUATION (OUTPATIENT)
Dept: PHYSICAL THERAPY | Facility: CLINIC | Age: 73
End: 2023-10-17
Payer: COMMERCIAL

## 2023-10-17 DIAGNOSIS — M25.561 ACUTE PAIN OF RIGHT KNEE: Primary | ICD-10-CM

## 2023-10-17 PROCEDURE — 97161 PT EVAL LOW COMPLEX 20 MIN: CPT | Performed by: PHYSICAL THERAPIST

## 2023-10-17 PROCEDURE — 97110 THERAPEUTIC EXERCISES: CPT | Performed by: PHYSICAL THERAPIST

## 2023-10-17 PROCEDURE — 97535 SELF CARE MNGMENT TRAINING: CPT | Performed by: PHYSICAL THERAPIST

## 2023-10-17 PROCEDURE — 97140 MANUAL THERAPY 1/> REGIONS: CPT | Performed by: PHYSICAL THERAPIST

## 2023-10-17 NOTE — PROGRESS NOTES
PT Evaluation     Today's date: 10/17/2023  Patient name: Selina Saha  : 1950  MRN: [id number]  Referring provider: Marc Brady  Dx:   Encounter Diagnosis     ICD-10-CM    1. Acute pain of right knee  M25.561 Ambulatory Referral to Physical Therapy                     Assessment  Understanding of Dx/Px/POC: good   Prognosis: good    Goals  STGs: To be complete within 4 weeks  - Decrease pain to < 2/10 at worst  - Increase AROM to WNL  - Increase strength to > 4+/5  - Improve gait to WNL for distances < 6 blocks     LTGS: To be complete within 6 weeks  - Able to walk for any extended amount of time/distance without pain or limitation for increased safety and functional capacity with ADLs and home-related duty  - Able to repetitively squat without pain or limitation for increased safety and functional capacity with ADLs and home-related duty  - Able to repetitively ascend/descend a full flight of stairs without pain or limitation for increased safety and functional capacity with ADLs and home-related duty  - Able to repetitively complete transfers without pain or limitation for increased safety and functional capacity with ADLs and home-related duty  - Able to kneel for any extended amount of time without pain or limitation for increased safety and functional capacity with ADLs and home-related duty    Plan  Planned therapy interventions: manual therapy, neuromuscular re-education, patient education, self care, therapeutic activities, therapeutic exercise, gait training and home exercise program  Frequency: 2x week  Duration in weeks: 6       Pt is a very pleasant 75 y.o. Female with R knee pain who presents with functional deficits including decreased capacity with walking, squatting, kneeling, stairs, and transfers. Upon completion of today's initial evaluation, Kristel's sx remain consistent with R Knee PFPS and inflammation secondary to decreased Quad, HS, and Glut Med strength.  Patient will benefit from skilled physical therapy to address current deficits. Subjective Evaluation    Patient Goals  Patient goals for therapy: increased strength, decreased pain and increased motion    Pain  Current pain ratin  At best pain ratin  At worst pain ratin  Location: R Knee         Pt reports she has had R Knee pain for > 4 weeks. Pt reports her sx have continued to worsen to point where it is negatively effecting her overall safety and functional capacity with ADLs and home-related duties. PMHx for R Knee TKA 10 years ago.         Objective Pain level ranges 2-8/10  AROM: R Knee 0-115 degrees; L Knee 0-125 degrees  Strength: R Knee Quad and HS strength 3+/5, Glut Med 4/5; L Quad and HS 5/5, Glut Med 4/5  Gait: Intermittent R Knee extension lag, shortened step length  Swelling: Mod (will continue to monitor)  FOTO: 69; GOAL: 72  Unable to walk without pain and limitation  Unable to squat without pain and limitation  Unable complete transfers without pain and limitation  Unable to ascend/descend stairs without pain and limitation  Unable to kneel without pain and limitation            Precautions: PMHx of R Knee TKA 10 years ago    Daily Treatment Diary    HEP: Handout provided and discussed      Manuals 10/17/23       ART        PROM/Stretch x15'       IASTM        STM/Triggerpoint        JM                Neuro Re-Ed        Standing 1/2 Roll calf stretch  This session      SL Ecc HR                                                        Ther Ex        HS into QS 2x10 Into SLR      HR/TR  This session      TB TKE  This session      TB Heel to Toes        Sit to stand  This session      Bridge with Add squeeze        SL Bridge        Clam shells        SL Sit to Stand        BOSU SLB        Upside down BOSU SL squat holds        TB Lat Walks  This session      Step ups/downs  This session                      Ther Activity        TM        Bike        NuStep  Start with This session Forward/backward heel to toe walk  This session              Gait Training                                        Modalities        MHP        CP x10'       US/Stim

## 2024-01-05 DIAGNOSIS — F41.9 ANXIETY AND DEPRESSION: ICD-10-CM

## 2024-01-05 DIAGNOSIS — K21.9 GASTROESOPHAGEAL REFLUX DISEASE WITHOUT ESOPHAGITIS: ICD-10-CM

## 2024-01-05 DIAGNOSIS — F32.A ANXIETY AND DEPRESSION: ICD-10-CM

## 2024-01-05 RX ORDER — OMEPRAZOLE 40 MG/1
40 CAPSULE, DELAYED RELEASE ORAL DAILY
Qty: 90 CAPSULE | Refills: 0 | Status: SHIPPED | OUTPATIENT
Start: 2024-01-05

## 2024-01-05 RX ORDER — PAROXETINE HYDROCHLORIDE 20 MG/1
20 TABLET, FILM COATED ORAL DAILY
Qty: 90 TABLET | Refills: 0 | Status: SHIPPED | OUTPATIENT
Start: 2024-01-05

## 2024-04-05 DIAGNOSIS — F32.A ANXIETY AND DEPRESSION: ICD-10-CM

## 2024-04-05 DIAGNOSIS — K21.9 GASTROESOPHAGEAL REFLUX DISEASE WITHOUT ESOPHAGITIS: ICD-10-CM

## 2024-04-05 DIAGNOSIS — F41.9 ANXIETY AND DEPRESSION: ICD-10-CM

## 2024-04-05 RX ORDER — PAROXETINE HYDROCHLORIDE 20 MG/1
20 TABLET, FILM COATED ORAL DAILY
Qty: 90 TABLET | Refills: 0 | Status: SHIPPED | OUTPATIENT
Start: 2024-04-05

## 2024-04-05 RX ORDER — OMEPRAZOLE 40 MG/1
40 CAPSULE, DELAYED RELEASE ORAL DAILY
Qty: 90 CAPSULE | Refills: 0 | Status: SHIPPED | OUTPATIENT
Start: 2024-04-05

## 2024-04-15 ENCOUNTER — APPOINTMENT (OUTPATIENT)
Dept: RADIOLOGY | Facility: MEDICAL CENTER | Age: 74
End: 2024-04-15
Payer: COMMERCIAL

## 2024-04-15 ENCOUNTER — OFFICE VISIT (OUTPATIENT)
Dept: URGENT CARE | Facility: MEDICAL CENTER | Age: 74
End: 2024-04-15
Payer: COMMERCIAL

## 2024-04-15 VITALS
DIASTOLIC BLOOD PRESSURE: 86 MMHG | TEMPERATURE: 98.8 F | SYSTOLIC BLOOD PRESSURE: 136 MMHG | BODY MASS INDEX: 33.15 KG/M2 | OXYGEN SATURATION: 97 % | WEIGHT: 174 LBS | HEART RATE: 87 BPM | RESPIRATION RATE: 18 BRPM

## 2024-04-15 DIAGNOSIS — M17.11 ARTHRITIS OF RIGHT KNEE: Primary | ICD-10-CM

## 2024-04-15 DIAGNOSIS — M25.561 ACUTE PAIN OF RIGHT KNEE: ICD-10-CM

## 2024-04-15 PROCEDURE — 99213 OFFICE O/P EST LOW 20 MIN: CPT

## 2024-04-15 PROCEDURE — 73562 X-RAY EXAM OF KNEE 3: CPT

## 2024-04-15 PROCEDURE — G0463 HOSPITAL OUTPT CLINIC VISIT: HCPCS

## 2024-04-15 NOTE — PATIENT INSTRUCTIONS
Please follow up with your primary provider as you may benefit from Physical therapy which they can discuss and order if they agree.     You may take over the counter Tylenol (Acetaminophen) and/or Motrin (Ibuprofen) as needed, as directed on packaging.   Please follow up with your primary provider in the next several days. Should you have any worsening of symptoms, or lack of improvement please be re-evaluated. If needed for significant concerns, consider 911 or ER evaluation.     Your xray was read by the provider you saw today in the office as a preliminary result. Your xray will be reviewed and an official reading will be provided by a Radiologist. If you have access to My Chart you can see these results there. Also if there is any significant findings you will be contacted with those results.

## 2024-04-15 NOTE — PROGRESS NOTES
[facility]        NAME: Kristel Chung is a 74 y.o. female  : 1950    MRN: [id number]  DATE: April 15, 2024  TIME: 1:23 PM    Assessment and Plan   Arthritis of right knee [M17.11]  1. Arthritis of right knee  Diclofenac Sodium (VOLTAREN) 1 %      2. Acute pain of right knee  XR knee 3 vw right non injury    Diclofenac Sodium (VOLTAREN) 1 %            Patient Instructions       Follow up with PCP in 3-5 days.  Proceed to  ER if symptoms worsen.    If tests are performed, our office will contact you with results only if changes need to made to the care plan discussed with you at the visit. [facility]    Chief Complaint     Chief Complaint   Patient presents with   • Leg Swelling     Right leg swelling and right knee cap burns and aches. At sight of Knee replacement. Started 2 weeks ago. Is elevating. Motrin and tylenol not helping         History of Present Illness       Knee replacement in . She has been having pain and swelling to the right knee x2 weeks. Has been taking tylenol/ibuprofen without improvement. No fever or chills. She has a constant ache in her knees and pain (burning pain) in the site of the knee replacement. She does normally use a cane/walker for ambulation. Currently using a cane. Denies any trauma. Patient reports pain is worse in the AM when she gets up and it does ease up a little throuhgout the day but then again gets worse in the evening.         Review of Systems   Review of Systems   Constitutional:  Negative for chills, fatigue and fever.   HENT:  Negative for congestion, rhinorrhea, sore throat and trouble swallowing.    Respiratory:  Negative for cough and shortness of breath.    Gastrointestinal:  Negative for abdominal pain, constipation, diarrhea, nausea and vomiting.   Musculoskeletal:  Positive for arthralgias and gait problem. Negative for myalgias.   Skin:  Negative for color change and rash.   Neurological:   Negative for dizziness, light-headedness and headaches.         Current Medications       Current Outpatient Medications:   •  Diclofenac Sodium (VOLTAREN) 1 %, Apply 2 g topically 4 (four) times a day, Disp: 50 g, Rfl: 0  •  omeprazole (PriLOSEC) 40 MG capsule, Take 1 capsule (40 mg total) by mouth daily, Disp: 90 capsule, Rfl: 0  •  PARoxetine (PAXIL) 20 mg tablet, Take 1 tablet (20 mg total) by mouth daily, Disp: 90 tablet, Rfl: 0    Current Allergies     Allergies as of 04/15/2024   • (No Known Allergies)            The following portions of the patient's history were reviewed and updated as appropriate: allergies, current medications, past family history, past medical history, past social history, past surgical history and problem list.     Past Medical History:   Diagnosis Date   • Abnormal blood chemistry     last assessed 03/06/2014   • Achalasia, esophageal    • Acute medial meniscus tear     last assessed 03/10/2014   • Acute otitis externa     last assessed 03/24/2014   • Adrenal nodule (HCC)    • Anemia    • Anxiety    • Arthritis    • Atrial fibrillation (HCC)     resolved 01/09/2018   • Atypical chest pain     last assessed 11/22/2016   • Eagle's esophagus with high grade dysplasia     last assessed 01/03/2018   • Cancer (HCC)     esophageal   • Cerumen impaction     last assessed 03/24/2014   • Chronic pain of right knee     last assessed 04/06/2017   • Coronary artery disease     3 vessel, resolved 01/09/2018   • Depression    • Dysphagia     last assessed 06/21/2017   • Esophageal stricture     last assessed 06/21/2017   • GERD (gastroesophageal reflux disease)    • Headache     last assessed 08/01/2013   • Hiatal hernia    • Insomnia     last assessed 10/15/2013   • Jejunostomy tube present (HCC)     resolved 01/09/2018   • Low vitamin D level     resolved 01/08/2018   • Osteoarthritis, knee     last assessed 04/06/2017   • Pneumonia     last assessed 05/06/2013   • Right leg paresthesias      last assessed 2017   • Sciatica     last assessed 2015   • Shingles     last assessed 2015   • Spondylosis of 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     last assessed 2017   • Venous insufficiency     last assessed 2013       Past Surgical History:   Procedure Laterality Date   •  SECTION     • EXPLORATORY LAPAROTOMY W/ BOWEL RESECTION N/A 2019    Procedure: LAPAROTOMY EXPLORATORY W/ BOWEL RESECTION, APPLICATION OF ABTHERA VAC;  Surgeon: Keo Wiley DO;  [facility];  Service: General   • GASTRECTOMY      partial   • GASTROJEJUNOSTOMY W/ JEJUNOSTOMY TUBE N/A 2017    Procedure: JEJUNOSTOMY TUBE PLACEMENT;  Surgeon: Reji Mckeon MD;  [facility];  Service: Thoracic   • IR IMAGE GUIDED ASPIRATION / DRAINAGE W TUBE  2020   • JOINT REPLACEMENT Right 11/10/2014    knee, Dr. Bunch   • LAPAROTOMY N/A 8/3/2019    Procedure: LAPAROTOMY EXPLORATORY, reanastomosis of proximal small bowel, placement of vac dressing;  Surgeon: Keo Wiley DO;  [facility];  Service: General   • VA ESOPHAGOGASTRODUODENOSCOPY TRANSORAL DIAGNOSTIC N/A 3/10/2017    Procedure: ESOPHAGOGASTRODUODENOSCOPY (EGD);  Surgeon: Fitz Ramires MD;  Location: [facility];  Service: Gastroenterology   • VA ESOPHAGOGASTRODUODENOSCOPY TRANSORAL DIAGNOSTIC N/A 2017    Procedure: ESOPHAGOGASTRODUODENOSCOPY (EGD);  Surgeon: Fitz Ramires MD;  [facility];  Service: Gastroenterology   • VA ESOPHAGOSCOPY FLEXIBLE TRANSORAL WITH BIOPSY N/A 2017    Procedure: ESOPHAGOGASTRODUODENOSCOPY (EGD);  Surgeon: Reji Mckeon MD;  [facility];  Service: Thoracic   • VA LAPS REPAIR HERNIA EXCEPT INCAL/INGUN REDUCIBLE N/A 2019    Procedure: REPAIR HERNIA VENTRAL LAPAROSCOPIC;  Surgeon: Saurabh Ochoa DO;  [facility]: [facility];  Service: General   • VA TOT ESOPHAGECTOMY W/O THORCOM W/WO PYLOROPLASTY N/A 2017    Procedure: TRANSHIATAL  ESOPHAGECTOMY;  Surgeon: Reji Mckeon MD;  Location:  MAIN OR;  Service: Thoracic   • STEROID INJECTION KNEE Right 5/2/2017    Procedure: GENICULATE NERVE BLOCKS;  Surgeon: Jose Rodriguez DO;  [facility];  Service:    • VENTRAL HERNIA REPAIR  07/30/2019       Family History   Problem Relation Age of Onset   • Alzheimer's disease Mother    • Diabetes type II Father    • No Known Problems Daughter    • No Known Problems Maternal Grandmother    • No Known Problems Maternal Grandfather    • No Known Problems Paternal Grandmother    • No Known Problems Paternal Grandfather    • No Known Problems Brother    • No Known Problems Maternal Aunt    • No Known Problems Maternal Aunt    • No Known Problems Maternal Aunt    • No Known Problems Paternal Aunt    • No Known Problems Paternal Aunt    • No Known Problems Paternal Aunt          Medications have been verified.        Objective   /86   Pulse 87   Temp 98.8 °F (37.1 °C)   Resp 18   Wt 78.9 kg (174 lb)   SpO2 97%   BMI 33.15 kg/m²        Physical Exam     Physical Exam  Vitals and nursing note reviewed.   Constitutional:       General: She is not in acute distress.     Appearance: Normal appearance. She is normal weight. She is not ill-appearing.   HENT:      Head: Normocephalic and atraumatic.      Nose: Nose normal.      Mouth/Throat:      Lips: Pink.      Mouth: Mucous membranes are moist.      Pharynx: Oropharynx is clear.   Eyes:      Extraocular Movements: Extraocular movements intact.      Conjunctiva/sclera: Conjunctivae normal.      Pupils: Pupils are equal, round, and reactive to light.   Cardiovascular:      Rate and Rhythm: Normal rate and regular rhythm.      Pulses:           Dorsalis pedis pulses are 1+ on the right side and 1+ on the left side.      Heart sounds: Normal heart sounds.   Pulmonary:      Effort: Pulmonary effort is normal.      Breath sounds: Normal breath sounds.   Abdominal:      General: Abdomen is flat. Bowel  sounds are normal.      Palpations: Abdomen is soft.   Musculoskeletal:         General: Normal range of motion.      Cervical back: Full passive range of motion without pain, normal range of motion and neck supple.      Right upper leg: Tenderness present. No bony tenderness.      Right knee: No bony tenderness. Tenderness present over the patellar tendon. No medial joint line or lateral joint line tenderness. Normal alignment. Normal pulse.      Left knee: Normal. Normal pulse.      Right lower leg: No edema.      Left lower leg: No edema.        Legs:       Comments: Surgical scar noted over right knee. No acute signs of infection. No localized swelling/warmth. She does have tenderness superior to the knee. Mild swelling noted of the general knee area.     Provider Radiology Interpretation (preliminary)   Final results will be as per official Radiology Report when available:   No acute fracture, no disruption in existing hardware.    Skin:     General: Skin is warm and dry.      Capillary Refill: Capillary refill takes less than 2 seconds.      Findings: No ecchymosis, erythema or rash.   Neurological:      General: No focal deficit present.      Mental Status: She is alert and oriented to person, place, and time.   Psychiatric:         Mood and Affect: Mood normal.         Behavior: Behavior normal.

## 2024-04-18 NOTE — PATIENT INSTRUCTIONS
Medicare Preventive Visit Patient Instructions  Thank you for completing your Welcome to Medicare Visit or Medicare Annual Wellness Visit today  Your next wellness visit will be due in one year (3/10/2021)  The screening/preventive services that you may require over the next 5-10 years are detailed below  Some tests may not apply to you based off risk factors and/or age  Screening tests ordered at today's visit but not completed yet may show as past due  Also, please note that scanned in results may not display below  Preventive Screenings:  Service Recommendations Previous Testing/Comments   Colorectal Cancer Screening  * Colonoscopy    * Fecal Occult Blood Test (FOBT)/Fecal Immunochemical Test (FIT)  * Fecal DNA/Cologuard Test  * Flexible Sigmoidoscopy Age: 54-65 years old   Colonoscopy: every 10 years (may be performed more frequently if at higher risk)  OR  FOBT/FIT: every 1 year  OR  Cologuard: every 3 years  OR  Sigmoidoscopy: every 5 years  Screening may be recommended earlier than age 48 if at higher risk for colorectal cancer  Also, an individualized decision between you and your healthcare provider will decide whether screening between the ages of 74-80 would be appropriate  Colonoscopy: 04/13/2017  FOBT/FIT: Not on file  Cologuard: Not on file  Sigmoidoscopy: Not on file    Screening Not Indicated     Breast Cancer Screening Age: 36 years old  Frequency: every 1-2 years  Not required if history of left and right mastectomy Mammogram: 08/12/2015       Cervical Cancer Screening Between the ages of 21-29, pap smear recommended once every 3 years  Between the ages of 33-67, can perform pap smear with HPV co-testing every 5 years     Recommendations may differ for women with a history of total hysterectomy, cervical cancer, or abnormal pap smears in past  Pap Smear: Not on file    Screening Not Indicated   Hepatitis C Screening Once for adults born between 1945 and 1965  More frequently in patients at high risk for Hepatitis C Hep C Antibody: 11/22/2016    Screening Current   Diabetes Screening 1-2 times per year if you're at risk for diabetes or have pre-diabetes Fasting glucose: 85 mg/dL   A1C: 5 5 %    Screening Current   Cholesterol Screening Once every 5 years if you don't have a lipid disorder  May order more often based on risk factors  Lipid panel: 07/23/2015    Screening Not Indicated  History Lipid Disorder     Other Preventive Screenings Covered by Medicare:  1  Abdominal Aortic Aneurysm (AAA) Screening: covered once if your at risk  You're considered to be at risk if you have a family history of AAA  2  Lung Cancer Screening: covers low dose CT scan once per year if you meet all of the following conditions: (1) Age 50-69; (2) No signs or symptoms of lung cancer; (3) Current smoker or have quit smoking within the last 15 years; (4) You have a tobacco smoking history of at least 30 pack years (packs per day multiplied by number of years you smoked); (5) You get a written order from a healthcare provider  3  Glaucoma Screening: covered annually if you're considered high risk: (1) You have diabetes OR (2) Family history of glaucoma OR (3)  aged 48 and older OR (3)  American aged 72 and older  3  Osteoporosis Screening: covered every 2 years if you meet one of the following conditions: (1) You're estrogen deficient and at risk for osteoporosis based off medical history and other findings; (2) Have a vertebral abnormality; (3) On glucocorticoid therapy for more than 3 months; (4) Have primary hyperparathyroidism; (5) On osteoporosis medications and need to assess response to drug therapy  · Last bone density test (DXA Scan): 08/19/2015  5  HIV Screening: covered annually if you're between the age of 12-76  Also covered annually if you are younger than 13 and older than 72 with risk factors for HIV infection   For pregnant patients, it is covered up to 3 times per pregnancy  Immunizations:  Immunization Recommendations   Influenza Vaccine Annual influenza vaccination during flu season is recommended for all persons aged >= 6 months who do not have contraindications   Pneumococcal Vaccine (Prevnar and Pneumovax)  * Prevnar = PCV13  * Pneumovax = PPSV23   Adults 25-60 years old: 1-3 doses may be recommended based on certain risk factors  Adults 72 years old: Prevnar (PCV13) vaccine recommended followed by Pneumovax (PPSV23) vaccine  If already received PPSV23 since turning 65, then PCV13 recommended at least one year after PPSV23 dose  Hepatitis B Vaccine 3 dose series if at intermediate or high risk (ex: diabetes, end stage renal disease, liver disease)   Tetanus (Td) Vaccine - COST NOT COVERED BY MEDICARE PART B Following completion of primary series, a booster dose should be given every 10 years to maintain immunity against tetanus  Td may also be given as tetanus wound prophylaxis  Tdap Vaccine - COST NOT COVERED BY MEDICARE PART B Recommended at least once for all adults  For pregnant patients, recommended with each pregnancy  Shingles Vaccine (Shingrix) - COST NOT COVERED BY MEDICARE PART B  2 shot series recommended in those aged 48 and above     Health Maintenance Due:      Topic Date Due    MAMMOGRAM  01/02/2021 (Originally 8/12/2016)    Hepatitis C Screening  Completed     Immunizations Due:  There are no preventive care reminders to display for this patient  Advance Directives   What are advance directives? Advance directives are legal documents that state your wishes and plans for medical care  These plans are made ahead of time in case you lose your ability to make decisions for yourself  Advance directives can apply to any medical decision, such as the treatments you want, and if you want to donate organs  What are the types of advance directives? There are many types of advance directives, and each state has rules about how to use them   You may choose a combination of any of the following:  · Living will: This is a written record of the treatment you want  You can also choose which treatments you do not want, which to limit, and which to stop at a certain time  This includes surgery, medicine, IV fluid, and tube feedings  · Durable power of  for healthcare [place] [facility]): This is a written record that states who you want to make healthcare choices for you when you are unable to make them for yourself  This person, called a proxy, is usually a family member or a friend  You may choose more than 1 proxy  · Do not resuscitate (DNR) order:  A DNR order is used in case your heart stops beating or you stop breathing  It is a request not to have certain forms of treatment, such as CPR  A DNR order may be included in other types of advance directives  · Medical directive: This covers the care that you want if you are in a coma, near death, or unable to make decisions for yourself  You can list the treatments you want for each condition  Treatment may include pain medicine, surgery, blood transfusions, dialysis, IV or tube feedings, and a ventilator (breathing machine)  · Values history: This document has questions about your views, beliefs, and how you feel and think about life  This information can help others choose the care that you would choose  Why are advance directives important? An advance directive helps you control your care  Although spoken wishes may be used, it is better to have your wishes written down  Spoken wishes can be misunderstood, or not followed  Treatments may be given even if you do not want them  An advance directive may make it easier for your family to make difficult choices about your care  Urinary Incontinence   Urinary incontinence (UI)  is when you lose control of your bladder  UI develops because your bladder cannot store or empty urine properly   The 3 most common types of UI are stress incontinence, urge incontinence, or both  Medicines:   · May be given to help strengthen your bladder control  Report any side effects of medication to your healthcare provider  Do pelvic muscle exercises often:  Your pelvic muscles help you stop urinating  Squeeze these muscles tight for 5 seconds, then relax for 5 seconds  Gradually work up to squeezing for 10 seconds  Do 3 sets of 15 repetitions a day, or as directed  This will help strengthen your pelvic muscles and improve bladder control  Train your bladder:  Go to the bathroom at set times, such as every 2 hours, even if you do not feel the urge to go  You can also try to hold your urine when you feel the urge to go  For example, hold your urine for 5 minutes when you feel the urge to go  As that becomes easier, hold your urine for 10 minutes  Self-care:   · Keep a UI record  Write down how often you leak urine and how much you leak  Make a note of what you were doing when you leaked urine  · Drink liquids as directed  You may need to limit the amount of liquid you drink to help control your urine leakage  Do not drink any liquid right before you go to bed  Limit or do not have drinks that contain caffeine or alcohol  · Prevent constipation  Eat a variety of high-fiber foods  Good examples are high-fiber cereals, beans, vegetables, and whole-grain breads  Walking is the best way to trigger your intestines to have a bowel movement  · Exercise regularly and maintain a healthy weight  Weight loss and exercise will decrease pressure on your bladder and help you control your leakage  · Use a catheter as directed  to help empty your bladder  A catheter is a tiny, plastic tube that is put into your bladder to drain your urine  · Go to behavior therapy as directed  Behavior therapy may be used to help you learn to control your urge to urinate         © Copyright [employer] 2018 Information is for End User's use only and may not be sold, redistributed or otherwise used for commercial purposes   All illustrations and images included in CareNotes® are the copyrighted property of A [employer] A M , Inc  or [facility] Prasanth Monroe 36.8

## 2024-04-29 ENCOUNTER — TELEPHONE (OUTPATIENT)
Age: 74
End: 2024-04-29

## 2024-04-29 DIAGNOSIS — Z12.31 ENCOUNTER FOR SCREENING MAMMOGRAM FOR MALIGNANT NEOPLASM OF BREAST: Primary | ICD-10-CM

## 2024-04-29 NOTE — TELEPHONE ENCOUNTER
PT called in stating that she needs Dr. Mack to submit a referral for her yearly Mammogram b/c she was not able to setup an appt with them, that they informed her she needs the referral submitted first.     PT also stated she has new insurance and will go to office to provide new insurance card.

## 2024-05-14 ENCOUNTER — HOSPITAL ENCOUNTER (OUTPATIENT)
Dept: MAMMOGRAPHY | Facility: HOSPITAL | Age: 74
Discharge: HOME | End: 2024-05-14
Attending: FAMILY MEDICINE
Payer: COMMERCIAL

## 2024-05-14 VITALS — HEIGHT: 61 IN | WEIGHT: 174 LBS | BODY MASS INDEX: 32.85 KG/M2

## 2024-05-14 DIAGNOSIS — Z12.31 ENCOUNTER FOR SCREENING MAMMOGRAM FOR MALIGNANT NEOPLASM OF BREAST: ICD-10-CM

## 2024-05-14 PROCEDURE — 77063 BREAST TOMOSYNTHESIS BI: CPT

## 2024-05-14 PROCEDURE — 77067 SCR MAMMO BI INCL CAD: CPT

## 2024-05-30 ENCOUNTER — DOCUMENTATION (OUTPATIENT)
Dept: FAMILY MEDICINE CLINIC | Facility: CLINIC | Age: 74
End: 2024-05-30

## 2024-05-30 NOTE — PROGRESS NOTES
Patient came in with updated POA paperwork from her  office. Patient states that her daughter was removed as POA and patient brother is now her updated POA  Paperwork scanned into chart

## 2024-06-28 DIAGNOSIS — F41.9 ANXIETY AND DEPRESSION: ICD-10-CM

## 2024-06-28 DIAGNOSIS — K21.9 GASTROESOPHAGEAL REFLUX DISEASE WITHOUT ESOPHAGITIS: ICD-10-CM

## 2024-06-28 DIAGNOSIS — F32.A ANXIETY AND DEPRESSION: ICD-10-CM

## 2024-06-28 RX ORDER — PAROXETINE HYDROCHLORIDE 20 MG/1
20 TABLET, FILM COATED ORAL DAILY
Qty: 90 TABLET | Refills: 0 | Status: SHIPPED | OUTPATIENT
Start: 2024-06-28 | End: 2024-07-03 | Stop reason: ALTCHOICE

## 2024-06-28 RX ORDER — OMEPRAZOLE 40 MG/1
40 CAPSULE, DELAYED RELEASE ORAL DAILY
Qty: 90 CAPSULE | Refills: 0 | Status: SHIPPED | OUTPATIENT
Start: 2024-06-28 | End: 2024-07-05 | Stop reason: SDUPTHER

## 2024-06-28 NOTE — TELEPHONE ENCOUNTER
Pt was called with message that she needs Rx to continue getting Rx's - Pt was transferred to  to schedule OV

## 2024-07-03 ENCOUNTER — APPOINTMENT (OUTPATIENT)
Dept: RADIOLOGY | Facility: MEDICAL CENTER | Age: 74
End: 2024-07-03
Payer: COMMERCIAL

## 2024-07-03 ENCOUNTER — OFFICE VISIT (OUTPATIENT)
Dept: FAMILY MEDICINE CLINIC | Facility: CLINIC | Age: 74
End: 2024-07-03
Payer: COMMERCIAL

## 2024-07-03 VITALS
HEART RATE: 96 BPM | SYSTOLIC BLOOD PRESSURE: 122 MMHG | WEIGHT: 179 LBS | DIASTOLIC BLOOD PRESSURE: 78 MMHG | BODY MASS INDEX: 33.79 KG/M2 | HEIGHT: 61 IN | TEMPERATURE: 96.7 F | OXYGEN SATURATION: 96 %

## 2024-07-03 DIAGNOSIS — M25.561 CHRONIC PAIN OF RIGHT KNEE: ICD-10-CM

## 2024-07-03 DIAGNOSIS — F32.A ANXIETY AND DEPRESSION: ICD-10-CM

## 2024-07-03 DIAGNOSIS — G89.29 CHRONIC PAIN OF RIGHT KNEE: ICD-10-CM

## 2024-07-03 DIAGNOSIS — Z00.00 MEDICARE ANNUAL WELLNESS VISIT, SUBSEQUENT: Primary | ICD-10-CM

## 2024-07-03 DIAGNOSIS — F41.9 ANXIETY AND DEPRESSION: ICD-10-CM

## 2024-07-03 PROCEDURE — G0439 PPPS, SUBSEQ VISIT: HCPCS | Performed by: FAMILY MEDICINE

## 2024-07-03 PROCEDURE — 73562 X-RAY EXAM OF KNEE 3: CPT

## 2024-07-03 RX ORDER — ESCITALOPRAM OXALATE 20 MG/1
20 TABLET ORAL DAILY
Qty: 30 TABLET | Refills: 1 | Status: SHIPPED | OUTPATIENT
Start: 2024-07-03

## 2024-07-03 NOTE — PATIENT INSTRUCTIONS
Medicare Preventive Visit Patient Instructions  Thank you for completing your Welcome to Medicare Visit or Medicare Annual Wellness Visit today. Your next wellness visit will be due in one year (7/4/2025).  The screening/preventive services that you may require over the next 5-10 years are detailed below. Some tests may not apply to you based off risk factors and/or age. Screening tests ordered at today's visit but not completed yet may show as past due. Also, please note that scanned in results may not display below.  Preventive Screenings:  Service Recommendations Previous Testing/Comments   Colorectal Cancer Screening  * Colonoscopy    * Fecal Occult Blood Test (FOBT)/Fecal Immunochemical Test (FIT)  * Fecal DNA/Cologuard Test  * Flexible Sigmoidoscopy Age: 45-75 years old   Colonoscopy: every 10 years (may be performed more frequently if at higher risk)  OR  FOBT/FIT: every 1 year  OR  Cologuard: every 3 years  OR  Sigmoidoscopy: every 5 years  Screening may be recommended earlier than age 45 if at higher risk for colorectal cancer. Also, an individualized decision between you and your healthcare provider will decide whether screening between the ages of 76-85 would be appropriate. Colonoscopy: 01/11/2023  FOBT/FIT: Not on file  Cologuard: Not on file  Sigmoidoscopy: Not on file    Screening Current     Breast Cancer Screening Age: 40+ years old  Frequency: every 1-2 years  Not required if history of left and right mastectomy Mammogram: 05/14/2024    Screening Current   Cervical Cancer Screening Between the ages of 21-29, pap smear recommended once every 3 years.   Between the ages of 30-65, can perform pap smear with HPV co-testing every 5 years.   Recommendations may differ for women with a history of total hysterectomy, cervical cancer, or abnormal pap smears in past. Pap Smear: Not on file    Screening Not Indicated   Hepatitis C Screening Once for adults born between 1945 and 1965  More frequently in  patients at high risk for Hepatitis C Hep C Antibody: 11/22/2016    Screening Current   Diabetes Screening 1-2 times per year if you're at risk for diabetes or have pre-diabetes Fasting glucose: 90 mg/dL (10/17/2022)  A1C: No results in last 5 years (No results in last 5 years)      Cholesterol Screening Once every 5 years if you don't have a lipid disorder. May order more often based on risk factors. Lipid panel: Not on file    Screening Not Indicated  History Lipid Disorder     Other Preventive Screenings Covered by Medicare:  Abdominal Aortic Aneurysm (AAA) Screening: covered once if your at risk. You're considered to be at risk if you have a family history of AAA.  Lung Cancer Screening: covers low dose CT scan once per year if you meet all of the following conditions: (1) Age 55-77; (2) No signs or symptoms of lung cancer; (3) Current smoker or have quit smoking within the last 15 years; (4) You have a tobacco smoking history of at least 20 pack years (packs per day multiplied by number of years you smoked); (5) You get a written order from a healthcare provider.  Glaucoma Screening: covered annually if you're considered high risk: (1) You have diabetes OR (2) Family history of glaucoma OR (3)  aged 50 and older OR (4)  American aged 65 and older  Osteoporosis Screening: covered every 2 years if you meet one of the following conditions: (1) You're estrogen deficient and at risk for osteoporosis based off medical history and other findings; (2) Have a vertebral abnormality; (3) On glucocorticoid therapy for more than 3 months; (4) Have primary hyperparathyroidism; (5) On osteoporosis medications and need to assess response to drug therapy.   Last bone density test (DXA Scan): 05/09/2023.  HIV Screening: covered annually if you're between the age of 15-65. Also covered annually if you are younger than 15 and older than 65 with risk factors for HIV infection. For pregnant patients, it is  covered up to 3 times per pregnancy.    Immunizations:  Immunization Recommendations   Influenza Vaccine Annual influenza vaccination during flu season is recommended for all persons aged >= 6 months who do not have contraindications   Pneumococcal Vaccine   * Pneumococcal conjugate vaccine = PCV13 (Prevnar 13), PCV15 (Vaxneuvance), PCV20 (Prevnar 20)  * Pneumococcal polysaccharide vaccine = PPSV23 (Pneumovax) Adults 19-63 yo with certain risk factors or if 65+ yo  If never received any pneumonia vaccine: recommend Prevnar 20 (PCV20)  Give PCV20 if previously received 1 dose of PCV13 or PPSV23   Hepatitis B Vaccine 3 dose series if at intermediate or high risk (ex: diabetes, end stage renal disease, liver disease)   Respiratory syncytial virus (RSV) Vaccine - COVERED BY MEDICARE PART D  * RSVPreF3 (Arexvy) CDC recommends that adults 60 years of age and older may receive a single dose of RSV vaccine using shared clinical decision-making (SCDM)   Tetanus (Td) Vaccine - COST NOT COVERED BY MEDICARE PART B Following completion of primary series, a booster dose should be given every 10 years to maintain immunity against tetanus. Td may also be given as tetanus wound prophylaxis.   Tdap Vaccine - COST NOT COVERED BY MEDICARE PART B Recommended at least once for all adults. For pregnant patients, recommended with each pregnancy.   Shingles Vaccine (Shingrix) - COST NOT COVERED BY MEDICARE PART B  2 shot series recommended in those 19 years and older who have or will have weakened immune systems or those 50 years and older     Health Maintenance Due:      Topic Date Due   • Breast Cancer Screening: Mammogram  05/14/2025   • Hepatitis C Screening  Completed   • Colorectal Cancer Screening  Discontinued     Immunizations Due:      Topic Date Due   • COVID-19 Vaccine (5 - 2023-24 season) 09/01/2023   • Influenza Vaccine (1) 09/01/2024     Advance Directives   What are advance directives?  Advance directives are legal documents  that state your wishes and plans for medical care. These plans are made ahead of time in case you lose your ability to make decisions for yourself. Advance directives can apply to any medical decision, such as the treatments you want, and if you want to donate organs.   What are the types of advance directives?  There are many types of advance directives, and each state has rules about how to use them. You may choose a combination of any of the following:  Living will:  This is a written record of the treatment you want. You can also choose which treatments you do not want, which to limit, and which to stop at a certain time. This includes surgery, medicine, IV fluid, and tube feedings.   Durable power of  for healthcare (DPAHC):  This is a written record that states who you want to make healthcare choices for you when you are unable to make them for yourself. This person, called a proxy, is usually a family member or a friend. You may choose more than 1 proxy.  Do not resuscitate (DNR) order:  A DNR order is used in case your heart stops beating or you stop breathing. It is a request not to have certain forms of treatment, such as CPR. A DNR order may be included in other types of advance directives.  Medical directive:  This covers the care that you want if you are in a coma, near death, or unable to make decisions for yourself. You can list the treatments you want for each condition. Treatment may include pain medicine, surgery, blood transfusions, dialysis, IV or tube feedings, and a ventilator (breathing machine).  Values history:  This document has questions about your views, beliefs, and how you feel and think about life. This information can help others choose the care that you would choose.  Why are advance directives important?  An advance directive helps you control your care. Although spoken wishes may be used, it is better to have your wishes written down. Spoken wishes can be misunderstood, or  not followed. Treatments may be given even if you do not want them. An advance directive may make it easier for your family to make difficult choices about your care.   Weight Management   Why it is important to manage your weight:  Being overweight increases your risk of health conditions such as heart disease, high blood pressure, type 2 diabetes, and certain types of cancer. It can also increase your risk for osteoarthritis, sleep apnea, and other respiratory problems. Aim for a slow, steady weight loss. Even a small amount of weight loss can lower your risk of health problems.  How to lose weight safely:  A safe and healthy way to lose weight is to eat fewer calories and get regular exercise. You can lose up about 1 pound a week by decreasing the number of calories you eat by 500 calories each day.   Healthy meal plan for weight management:  A healthy meal plan includes a variety of foods, contains fewer calories, and helps you stay healthy. A healthy meal plan includes the following:  Eat whole-grain foods more often.  A healthy meal plan should contain fiber. Fiber is the part of grains, fruits, and vegetables that is not broken down by your body. Whole-grain foods are healthy and provide extra fiber in your diet. Some examples of whole-grain foods are whole-wheat breads and pastas, oatmeal, brown rice, and bulgur.  Eat a variety of vegetables every day.  Include dark, leafy greens such as spinach, kale, charo greens, and mustard greens. Eat yellow and orange vegetables such as carrots, sweet potatoes, and winter squash.   Eat a variety of fruits every day.  Choose fresh or canned fruit (canned in its own juice or light syrup) instead of juice. Fruit juice has very little or no fiber.  Eat low-fat dairy foods.  Drink fat-free (skim) milk or 1% milk. Eat fat-free yogurt and low-fat cottage cheese. Try low-fat cheeses such as mozzarella and other reduced-fat cheeses.  Choose meat and other protein foods that  are low in fat.  Choose beans or other legumes such as split peas or lentils. Choose fish, skinless poultry (chicken or turkey), or lean cuts of red meat (beef or pork). Before you cook meat or poultry, cut off any visible fat.   Use less fat and oil.  Try baking foods instead of frying them. Add less fat, such as margarine, sour cream, regular salad dressing and mayonnaise to foods. Eat fewer high-fat foods. Some examples of high-fat foods include french fries, doughnuts, ice cream, and cakes.  Eat fewer sweets.  Limit foods and drinks that are high in sugar. This includes candy, cookies, regular soda, and sweetened drinks.  Exercise:  Exercise at least 30 minutes per day on most days of the week. Some examples of exercise include walking, biking, dancing, and swimming. You can also fit in more physical activity by taking the stairs instead of the elevator or parking farther away from stores. Ask your healthcare provider about the best exercise plan for you.      © Copyright [employer] 2018 Information is for End User's use only and may not be sold, redistributed or otherwise used for commercial purposes. All illustrations and images included in CareNotes® are the copyrighted property of [employer] or [employer]

## 2024-07-03 NOTE — PROGRESS NOTES
Ambulatory Visit  Name: Kristel Chung      : 1950      MRN: [id number]  Encounter Provider: Geovani Mack DO  Encounter Date: 7/3/2024   Encounter department: [facility]    Assessment & Plan   1. Medicare annual wellness visit, subsequent  2. Chronic pain of right knee  -     XR knee 3 vw right non injury; Future; Expected date: 2024  3. Anxiety and depression       Preventive health issues were discussed with patient, and age appropriate screening tests were ordered as noted in patient's After Visit Summary. Personalized health advice and appropriate referrals for health education or preventive services given if needed, as noted in patient's After Visit Summary.    History of Present Illness     Lauren is here today for the purposes of a Medicare well visit any acute problems will be addressed       Patient Care Team:  Geovani Mack DO as PCP - General (Family Medicine)  MD Reji Castro MD Joshua M Wert, DO Chandra Reddy, MD Darius Desai, MD Craig Krause, DO    Review of Systems   Constitutional:  Positive for activity change and fatigue. Negative for appetite change, diaphoresis and fever.   HENT:  Positive for dental problem and hearing loss.    Eyes:  Positive for visual disturbance.   Respiratory:  Negative for apnea, cough, chest tightness, shortness of breath and wheezing.    Cardiovascular:  Positive for leg swelling. Negative for chest pain and palpitations.   Gastrointestinal:  Negative for abdominal distention, abdominal pain, anal bleeding, constipation, diarrhea, nausea and vomiting.   Endocrine: Negative for cold intolerance, heat intolerance, polydipsia, polyphagia and polyuria.   Genitourinary:  Negative for difficulty urinating, dysuria, flank pain, hematuria and urgency.        Patient has nocturia no stress incontinence   Musculoskeletal:  Positive for arthralgias and back pain. Negative for gait problem, joint swelling and  myalgias.   Skin:  Negative for color change, rash and wound.   Allergic/Immunologic: Negative for environmental allergies, food allergies and immunocompromised state.   Neurological:  Negative for dizziness, seizures, syncope, speech difficulty, numbness and headaches.   Hematological:  Negative for adenopathy. Does not bruise/bleed easily.   Psychiatric/Behavioral:  Negative for agitation, behavioral problems, hallucinations, sleep disturbance and suicidal ideas.      Medical History Reviewed by provider this encounter:  Tobacco  Allergies  Meds  Problems  Med Hx  Surg Hx  Fam Hx       Annual Wellness Visit Questionnaire   Kristel is here for her Subsequent Wellness visit.     Health Risk Assessment:   Patient rates overall health as fair. Patient feels that their physical health rating is slightly worse. Patient is dissatisfied with their life. Eyesight was rated as same. Hearing was rated as same. Patient feels that their emotional and mental health rating is slightly worse. Patients states they are sometimes angry. Patient states they are often unusually tired/fatigued. Pain experienced in the last 7 days has been a lot. Patient's pain rating has been 8/10. Patient states that she has experienced no weight loss or gain in last 6 months.     Fall Risk Screening:   In the past year, patient has experienced: no history of falling in past year      Urinary Incontinence Screening:   Patient has not leaked urine accidently in the last six months.     Home Safety:  Patient has trouble with stairs inside or outside of their home. Patient has working smoke alarms and has working carbon monoxide detector. Home safety hazards include: none.     Nutrition:   Current diet is Regular.     Medications:   Patient is currently taking over-the-counter supplements. OTC medications include: see medication list. Patient is able to manage medications.     Activities of Daily Living (ADLs)/Instrumental Activities of Daily  Living (IADLs):   Walk and transfer into and out of bed and chair?: Yes  Dress and groom yourself?: Yes    Bathe or shower yourself?: Yes    Feed yourself? Yes  Do your laundry/housekeeping?: Yes  Manage your money, pay your bills and track your expenses?: Yes  Make your own meals?: Yes    Do your own shopping?: Yes    Previous Hospitalizations:   Any hospitalizations or ED visits within the last 12 months?: No      Advance Care Planning:   Living will: Yes    Durable POA for healthcare: Yes    Advanced directive: Yes    Advanced directive counseling given: No    Five wishes given: No    Patient declined ACP directive: No    End of Life Decisions reviewed with patient: Yes    Provider agrees with end of life decisions: Yes      Cognitive Screening:   Provider or family/friend/caregiver concerned regarding cognition?: No    PREVENTIVE SCREENINGS      Cardiovascular Screening:    General: Screening Not Indicated and History Lipid Disorder      Colorectal Cancer Screening:     General: Screening Current      Breast Cancer Screening:     General: Screening Current      Cervical Cancer Screening:    General: Screening Not Indicated      Lung Cancer Screening:     General: Screening Not Indicated      Hepatitis C Screening:    General: Screening Current    Screening, Brief Intervention, and Referral to Treatment (SBIRT)    Screening  Typical number of drinks in a day: 0  Typical number of drinks in a week: 0  Interpretation: Low risk drinking behavior.    Single Item Drug Screening:  How often have you used an illegal drug (including marijuana) or a prescription medication for non-medical reasons in the past year? never    Single Item Drug Screen Score: 0  Interpretation: Negative screen for possible drug use disorder    Social Determinants of Health     Financial Resource Strain: High Risk (10/17/2022)    Overall Financial Resource Strain (CARDIA)     Difficulty of Paying Living Expenses: Hard   Food Insecurity: No Food  "Insecurity (7/3/2024)    Hunger Vital Sign     Worried About Running Out of Food in the Last Year: Never true     Ran Out of Food in the Last Year: Never true   Transportation Needs: No Transportation Needs (7/3/2024)    PRAPARE - Transportation     Lack of Transportation (Medical): No     Lack of Transportation (Non-Medical): No   Housing Stability: Low Risk  (7/3/2024)    Housing Stability Vital Sign     Unable to Pay for Housing in the Last Year: No     Number of Times Moved in the Last Year: 0     Homeless in the Last Year: No   Utilities: Not At Risk (7/3/2024)    [facility] Utilities     Threatened with loss of utilities: No     No results found.    Objective     /78 (BP Location: Left arm, Patient Position: Sitting, Cuff Size: Large)   Pulse 96   Temp (!) 96.7 °F (35.9 °C) (Temporal)   Ht 5' 0.75\" (1.543 m)   Wt 81.2 kg (179 lb)   SpO2 96%   BMI 34.10 kg/m²     Physical Exam  Constitutional:       Appearance: She is well-developed.   HENT:      Head: Normocephalic and atraumatic.      Right Ear: External ear normal.      Left Ear: External ear normal.      Nose: Nose normal.   Eyes:      Conjunctiva/sclera: Conjunctivae normal.      Pupils: Pupils are equal, round, and reactive to light.   Cardiovascular:      Rate and Rhythm: Normal rate and regular rhythm.      Heart sounds: Normal heart sounds. No murmur heard.     No friction rub.   Pulmonary:      Effort: Pulmonary effort is normal. No respiratory distress.      Breath sounds: Normal breath sounds. No wheezing or rales.   Chest:      Chest wall: No tenderness.   Abdominal:      General: Bowel sounds are normal.      Palpations: Abdomen is soft.   Musculoskeletal:         General: Normal range of motion.      Cervical back: Normal range of motion and neck supple.   Skin:     General: Skin is warm and dry.      Capillary Refill: Capillary refill takes 2 to 3 seconds.   Neurological:      General: No focal deficit present.      Mental Status: She is " alert and oriented to person, place, and time.   Psychiatric:         Behavior: Behavior normal.         Thought Content: Thought content normal.         Judgment: Judgment normal.       Administrative Statements

## 2024-07-05 DIAGNOSIS — K21.9 GASTROESOPHAGEAL REFLUX DISEASE WITHOUT ESOPHAGITIS: ICD-10-CM

## 2024-07-05 RX ORDER — OMEPRAZOLE 40 MG/1
40 CAPSULE, DELAYED RELEASE ORAL DAILY
Qty: 90 CAPSULE | Refills: 0 | Status: SHIPPED | OUTPATIENT
Start: 2024-07-05

## 2024-07-05 NOTE — RESULT ENCOUNTER NOTE
Call patient to notify normal results the right knee hardware looks good everything is in good position no sign of any loosening or infection

## 2024-08-26 NOTE — ASSESSMENT & PLAN NOTE
Patient: Willy Saenz    Procedure Summary       Date: 08/26/24 Room / Location: [facility] / [facility]    Anesthesia Start: 1302 Anesthesia Stop: 1322    Procedure: ESOPHAGOGASTRODUODENOSCOPY Diagnosis:       Gastroesophageal reflux disease with esophagitis, unspecified whether hemorrhage      Mejía's esophagus without dysplasia      (Gastroesophageal reflux disease with esophagitis, unspecified whether hemorrhage [K21.00])      (Mejía's esophagus without dysplasia [K22.70])    Surgeons: Kyle Rodriguez MD Provider: Kun Hinkle MD    Anesthesia Type: MAC ASA Status: 3            Anesthesia Type: MAC    Vitals  Vitals Value Taken Time   /70 08/26/24 1321   Temp 36.8 °C (98.2 °F) 08/26/24 1320   Pulse 48 08/26/24 1320   Resp 15 08/26/24 1320   SpO2 98 % 08/26/24 1320   Vitals shown include unfiled device data.        Anesthesia Post Evaluation     Status post esophagectomy, now with incisional hernia of upper midline incision  Minimal to no tenderness  Most were tenderness in left lower quadrant where she states her J-tube was prior  This could be secondary to intra-abdominal adhesions  No evidence of any obstructive symptoms  Hernia is reducible  Patient not interested have any major surgery of the hernia fixed this time  Incarceration and strangulation hernia were discussed  Patient follow-up

## 2024-08-27 ENCOUNTER — RA CDI HCC (OUTPATIENT)
Dept: OTHER | Facility: HOSPITAL | Age: 74
End: 2024-08-27

## 2025-05-06 NOTE — TREATMENT PLAN
Nephrology    Renal function and electrolytes are stable  Polyuria has resolved and urine output is more improved  Oral intake remains poor  On a dysphagia diet mostly drinking but not eating very much  I had no reasons to continue the fluids except for her appetite being poor  I will change her normal saline to isolate  Can discontinue fluids once her oral intake improves  At this time nothing further to add from renal standpoint will sign off call for any questions or concerns    Thank you Render In Strict Bullet Format?: No Initiate Treatment: ketoconazole 2 % topical cream- Apply to affected areas on feet twice a day x 3 weeks. Detail Level: Zone

## (undated) DEVICE — SINGLE-USE SYRINGE/GAUGE ASSEMBLY: Brand: ALLIANCE II

## (undated) DEVICE — INTENDED FOR TISSUE SEPARATION, AND OTHER PROCEDURES THAT REQUIRE A SHARP SURGICAL BLADE TO PUNCTURE OR CUT.: Brand: BARD-PARKER SAFETY BLADES SIZE 15, STERILE

## (undated) DEVICE — GLOVE INDICATOR PI UNDERGLOVE SZ 7 BLUE

## (undated) DEVICE — ELECTRODE LAP J HOOK E-Z CLEAN 33CM-0021

## (undated) DEVICE — 2000CC GUARDIAN II: Brand: GUARDIAN

## (undated) DEVICE — PLASTIC ADHESIVE BANDAGE: Brand: CURITY

## (undated) DEVICE — SUT PDS II 4-0 SH 27 IN Z315H

## (undated) DEVICE — THE LARIAT SNARE IS AN ELECTROSURGICAL DEVICE USED TO ENDOSCOPICALLY GRASP, DISSECT, AND TRANSECT TISSUE DURING GASTROINTESTINAL (GI) ENDOSCOPIC PROCEDURES.  THE SNARE CAN BE USED WITH OR WITHOUT THE USE OF MONOPOLAR DIATHERMIC ENERGY.: Brand: LARIAT

## (undated) DEVICE — SYRINGE 3ML LL

## (undated) DEVICE — GRASPER ATRAUMATIC FEN 5MM X 33CM ROTATING THUMB LOOP GENI-SURGE

## (undated) DEVICE — POOLE SUCTION HANDLE: Brand: CARDINAL HEALTH

## (undated) DEVICE — GENERAL ENDOSCOPY PACK: Brand: CONVERTORS

## (undated) DEVICE — LUBRICANT SURGILUBE TUBE 4 OZ  FLIP TOP

## (undated) DEVICE — PROXIMATE PLUS MD MULTI-DIRECTIONAL RELEASE SKIN STAPLERS CONTAINS 35 STAINLESS STEEL STAPLES APPROXIMATE CLOSED DIMENSIONS: 6.9MM X 3.9MM WIDE: Brand: PROXIMATE

## (undated) DEVICE — STERILE THORACIC PACK: Brand: CARDINAL HEALTH

## (undated) DEVICE — TRAY FOLEY 16FR URIMETER SURESTEP

## (undated) DEVICE — STRL PENROSE DRAIN 18" X 1/4": Brand: CARDINAL HEALTH

## (undated) DEVICE — SUT ETHILON 3-0 FSLX 30 IN 1673H

## (undated) DEVICE — SPONGE LAP 18 X 18 IN

## (undated) DEVICE — ESOPHAGEAL/PYLORIC/COLONIC/BILIARY WIREGUIDED BALLOON DILATATION CATHETER: Brand: CRE™ PRO

## (undated) DEVICE — SUT SILK 2-0 TIES 144 IN LA55G

## (undated) DEVICE — PROXIMATE RELOADABLE LINEAR CUTTER WITH SAFETY LOCK-OUT, 75MM: Brand: PROXIMATE

## (undated) DEVICE — LIGACLIP MCA MULTIPLE CLIP APPLIERS, 20 SMALL CLIPS: Brand: LIGACLIP

## (undated) DEVICE — CONMED SCOPE SAVER BITE BLOCK, 20X27 MM: Brand: SCOPE SAVER

## (undated) DEVICE — MEDI-VAC YANK SUCT HNDL W/TPRD BULBOUS TIP: Brand: CARDINAL HEALTH

## (undated) DEVICE — SUT SILK 3-0 SH CR/8 18 IN C013D

## (undated) DEVICE — SUT VICRYL 0 CT-1 27 IN J260H

## (undated) DEVICE — TUBING SUCTION 5MM X 12 FT

## (undated) DEVICE — ENDOSCOPIC LINEAR CUTTER RELOADS WHITE 2.5 MM: Brand: ECHELON; ENDOPATH

## (undated) DEVICE — STRL COTTON TIP APPLCTR 6IN PK: Brand: CARDINAL HEALTH

## (undated) DEVICE — ROSEBUD DISSECTORS: Brand: DEROYAL

## (undated) DEVICE — ENSEAL TISSUE SEALER G2 SUPER JAW CURVED FOR USE WITH GENERATOR G11 22CM SHAFT LENGTH: Brand: ENSEAL

## (undated) DEVICE — SYRINGE 10ML LL

## (undated) DEVICE — ABTHERA OPEN ABDOMEN DRESSING WITH SENSATRAC PAD: Brand: ABTHERA™ SENSAT.R.A.C.™

## (undated) DEVICE — TRAY EPIDURAL SINGLE SHOT

## (undated) DEVICE — TROCARS: Brand: KII® BALLOON BLUNT TIP SYSTEM

## (undated) DEVICE — PLUMEPEN PRO 10FT

## (undated) DEVICE — GLOVE SRG BIOGEL 7

## (undated) DEVICE — CHLORAPREP HI-LITE 26ML ORANGE

## (undated) DEVICE — GAUZE SPONGES,8 PLY: Brand: CURITY

## (undated) DEVICE — HARMONIC 1100 SHEARS, 20CM SHAFT LENGTH: Brand: HARMONIC

## (undated) DEVICE — THE EXACTO COLD SNARE IS INTENDED TO BE USED WITHOUT DIATHERMIC ENERGY FOR THE ENDOSCOPIC RESECTION OF POLYP TISSUE IN THE GASTROINTESTINAL TRACT.: Brand: EXACTO

## (undated) DEVICE — 3000CC GUARDIAN II: Brand: GUARDIAN

## (undated) DEVICE — ENDOSCOPIC LINEAR CUTTER RELOADS BLUE 3.5MM: Brand: ECHELON; ENDOPATH

## (undated) DEVICE — INTENDED FOR TISSUE SEPARATION, AND OTHER PROCEDURES THAT REQUIRE A SHARP SURGICAL BLADE TO PUNCTURE OR CUT.: Brand: BARD-PARKER SAFETY BLADES SIZE 10, STERILE

## (undated) DEVICE — BETHLEHEM MAJOR GENERAL PACK: Brand: CARDINAL HEALTH

## (undated) DEVICE — SUT SILK 3-0 SH 30 IN K832H

## (undated) DEVICE — TROCAR: Brand: KII SLEEVE

## (undated) DEVICE — ULTRASOUND GEL STERILE FOIL PK

## (undated) DEVICE — SUT VICRYL 2-0 REEL 54 IN J286G

## (undated) DEVICE — REM POLYHESIVE ADULT PATIENT RETURN ELECTRODE: Brand: VALLEYLAB

## (undated) DEVICE — TELFA NON-ADHERENT ABSORBENT DRESSING: Brand: TELFA

## (undated) DEVICE — NEEDLE 25G X 1 1/2

## (undated) DEVICE — CHLORAPREP HI-LITE 10.5ML ORANGE

## (undated) DEVICE — LIGHT GLOVE GREEN

## (undated) DEVICE — 3M™ TEGADERM™ TRANSPARENT FILM DRESSING FRAME STYLE, 1624W, 2-3/8 IN X 2-3/4 IN (6 CM X 7 CM), 100/CT 4CT/CASE: Brand: 3M™ TEGADERM™

## (undated) DEVICE — DRAPE LAPAROTOMY W/POUCHES

## (undated) DEVICE — DRAPE EQUIPMENT RF WAND

## (undated) DEVICE — SUT SILK 0 30 IN A306H

## (undated) DEVICE — SUT VICRYL 2-0 SH 27 IN UNDYED J417H

## (undated) DEVICE — 1820 FOAM BLOCK NEEDLE COUNTER: Brand: DEVON

## (undated) DEVICE — 60 ML SYRINGE,REGULAR TIP: Brand: MONOJECT

## (undated) DEVICE — UTILITY MARKER,BLACK WITH LABELS: Brand: DEVON

## (undated) DEVICE — SUT PDS II 1 XLH 96 IN LOOPED Z881G

## (undated) DEVICE — SUT SILK 2-0 SH 30 IN K833H

## (undated) DEVICE — TUBE FEEDING MIC JEJUNAL 14FR

## (undated) DEVICE — SPECIMEN CONTAINER STERILE PEEL PACK

## (undated) DEVICE — NEEDLE 18 G X 1 1/2

## (undated) DEVICE — 3M™ TEGADERM™ TRANSPARENT FILM DRESSING FRAME STYLE, 1628, 6 IN X 8 IN (15 CM X 20 CM), 10/CT 8CT/CASE: Brand: 3M™ TEGADERM™

## (undated) DEVICE — PAD GROUNDING ADULT

## (undated) DEVICE — SUT VICRYL 0 UR-6 27 IN J603H

## (undated) DEVICE — 1200CC GUARDIAN II: Brand: GUARDIAN

## (undated) DEVICE — NEEDLE SPINAL  22GA X 3.5IN QUINCKE POINT

## (undated) DEVICE — 3M™ IOBAN™ 2 ANTIMICROBIAL INCISE DRAPE 6650EZ: Brand: IOBAN™ 2

## (undated) DEVICE — SUT VICRYL 0 54 IN J207G

## (undated) DEVICE — ABDOMINAL PAD: Brand: DERMACEA

## (undated) DEVICE — TOWEL SET X-RAY

## (undated) DEVICE — TROCAR: Brand: KII FIOS FIRST ENTRY

## (undated) DEVICE — LIGACLIP MCA MULTIPLE CLIP APPLIERS, 20 MEDIUM CLIPS: Brand: LIGACLIP

## (undated) DEVICE — SUT VICRYL 3-0 SH 27 IN J416H

## (undated) DEVICE — SPONGE LAP 18 X 18 IN STRL RFD

## (undated) DEVICE — PENROSE DRAIN, 18 X 3 8: Brand: CARDINAL HEALTH

## (undated) DEVICE — VAC CANISTER 500ML

## (undated) DEVICE — SUT MONOCRYL 4-0 PS-2 27 IN Y426H

## (undated) DEVICE — GLOVE SRG BIOGEL 7.5

## (undated) DEVICE — ENDOSCOPIC LINEAR CUTTER RELOADS GREEN 4.1 MM: Brand: ECHELON; ENDOPATH

## (undated) DEVICE — SUT ETHILON 4-0 PS-2 18 IN 1667H

## (undated) DEVICE — SUT VICRYL 1 CT-1 27 IN J261H

## (undated) DEVICE — PROXIMATE LINEAR CUTTER RELOAD, BLUE, 75MM: Brand: PROXIMATE

## (undated) DEVICE — STRL PENROSE DRAIN 18" X 3/4": Brand: CARDINAL HEALTH

## (undated) DEVICE — 5 MM CURVED DISSECTORS WITH MONOPOLAR CAUTERY: Brand: ENDOPATH

## (undated) DEVICE — INSUFFLATION TUBING PRIMFLO

## (undated) DEVICE — GLOVE SRG BIOGEL ORTHOPEDIC 7.5

## (undated) DEVICE — CLOSURE DEVICE ENDO CLOSE

## (undated) DEVICE — THE ECHELON FLEX POWERED PLUS ARTICULATING ENDOSCOPIC LINEAR CUTTERS ARE STERILE, SINGLE PATIENT USE INSTRUMENTS THAT SIMULTANEOUSLYCUT AND STAPLE TISSUE. THERE ARE SIX STAGGERED ROWS OF STAPLES, THREE ON EITHER SIDE OF THE CUT LINE. THE ECHELON FLEX 45 POWERED PLUSINSTRUMENTS HAVE A STAPLE LINE THAT IS APPROXIMATELY 45 MM LONG AND A CUT LINE THAT IS APPROXIMATELY 42 MM LONG. THE SHAFT CAN ROTATE FREELYIN BOTH DIRECTIONS AND AN ARTICULATION MECHANISM ENABLES THE DISTAL PORTION OF THE SHAFT TO PIVOT TO FACILITATE LATERAL ACCESS TO THE OPERATIVESITE.THE INSTRUMENTS ARE PACKAGED WITH A PRIMARY LITHIUM BATTERY PACK THAT MUST BE INSTALLED PRIOR TO USE. THERE ARE SPECIFIC REQUIREMENTS FORDISPOSING OF THE BATTERY PACK. REFER TO THE BATTERY PACK DISPOSAL SECTION.THE INSTRUMENTS ARE PACKAGED WITHOUT A RELOAD AND MUST BE LOADED PRIOR TO USE. A STAPLE RETAINING CAP ON THE RELOAD PROTECTS THE STAPLE LEGPOINTS DURING SHIPPING AND TRANSPORTATION. THE INSTRUMENTS’ LOCK-OUT FEATURE IS DESIGNED TO PREVENT A USED OR IMPROPERLY INSTALLED RELOADFROM BEING REFIRED OR AN INSTRUMENT FROM BEING FIRED WITHOUT A RELOAD.: Brand: ECHELON FLEX

## (undated) DEVICE — GLOVE SRG BIOGEL 8

## (undated) DEVICE — 3M™ TEGADERM™ TRANSPARENT FILM DRESSING FRAME STYLE, 1626W, 4 IN X 4-3/4 IN (10 CM X 12 CM), 50/CT 4CT/CASE: Brand: 3M™ TEGADERM™

## (undated) DEVICE — ELECTRODE BLADE MOD E-Z CLEAN 2.5IN 6.4CM -0012M

## (undated) DEVICE — Device: Brand: DEFENDO AIR/WATER/SUCTION AND BIOPSY VALVE

## (undated) DEVICE — IRRIG ENDO FLO TUBING

## (undated) DEVICE — IV SET EXT SM BORE CARESITE 8IN

## (undated) DEVICE — SUT PDS II 4-0 RB-1 27 IN Z304H

## (undated) DEVICE — FORCEPS BIOPSY ALLIGATOR JAW W/NEEDLE 2.8MM

## (undated) DEVICE — 3M™ STERI-STRIP™ REINFORCED ADHESIVE SKIN CLOSURES, R1546, 1/4 IN X 4 IN (6 MM X 100 MM), 10 STRIPS/ENVELOPE: Brand: 3M™ STERI-STRIP™

## (undated) DEVICE — GLOVE INDICATOR PI UNDERGLOVE SZ 8 BLUE

## (undated) DEVICE — ENDOPATH PNEUMONEEDLE INSUFFLATION NEEDLES WITH LUER LOCK CONNECTORS 150MM: Brand: ENDOPATH

## (undated) DEVICE — ANTI-FOG SOLUTION WITH FOAM PAD: Brand: DEVON

## (undated) DEVICE — SKIN MARKER DUAL TIP WITH RULER CAP, FLEXIBLE RULER AND LABELS: Brand: DEVON

## (undated) DEVICE — GLOVE INDICATOR PI UNDERGLOVE SZ 7.5 BLUE

## (undated) DEVICE — GAUZE SPONGES,16 PLY: Brand: CURITY

## (undated) DEVICE — VIOLET BRAIDED (POLYGLACTIN 910), SYNTHETIC ABSORBABLE SUTURE: Brand: COATED VICRYL

## (undated) DEVICE — TUBING SMOKE EVAC W/FILTRATION DEVICE PLUMEPORT ACTIV

## (undated) DEVICE — SUT MONOCRYL 4-0 PS-2 18 IN Y496G

## (undated) DEVICE — SUT PDS II 1 CTX 36 IN Z371T